# Patient Record
Sex: FEMALE | Race: ASIAN | NOT HISPANIC OR LATINO | URBAN - METROPOLITAN AREA
[De-identification: names, ages, dates, MRNs, and addresses within clinical notes are randomized per-mention and may not be internally consistent; named-entity substitution may affect disease eponyms.]

---

## 2017-04-27 ENCOUNTER — INPATIENT (INPATIENT)
Facility: HOSPITAL | Age: 62
LOS: 0 days | Discharge: ANOTHER HOSPITAL | DRG: 281 | End: 2017-04-28
Attending: INTERNAL MEDICINE | Admitting: INTERNAL MEDICINE
Payer: MEDICARE

## 2017-04-27 DIAGNOSIS — Z82.49 FAMILY HISTORY OF ISCHEMIC HEART DISEASE AND OTHER DISEASES OF THE CIRCULATORY SYSTEM: ICD-10-CM

## 2017-04-27 DIAGNOSIS — I21.4 NON-ST ELEVATION (NSTEMI) MYOCARDIAL INFARCTION: ICD-10-CM

## 2017-04-27 DIAGNOSIS — Z85.3 PERSONAL HISTORY OF MALIGNANT NEOPLASM OF BREAST: ICD-10-CM

## 2017-04-27 DIAGNOSIS — J44.1 CHRONIC OBSTRUCTIVE PULMONARY DISEASE WITH (ACUTE) EXACERBATION: ICD-10-CM

## 2017-04-27 DIAGNOSIS — I10 ESSENTIAL (PRIMARY) HYPERTENSION: ICD-10-CM

## 2017-04-27 DIAGNOSIS — Z88.0 ALLERGY STATUS TO PENICILLIN: ICD-10-CM

## 2017-04-27 PROCEDURE — 99223 1ST HOSP IP/OBS HIGH 75: CPT | Mod: AI

## 2017-04-27 PROCEDURE — 71010: CPT | Mod: 26

## 2017-04-27 PROCEDURE — 93010 ELECTROCARDIOGRAM REPORT: CPT

## 2017-04-27 PROCEDURE — 71275 CT ANGIOGRAPHY CHEST: CPT | Mod: 26

## 2017-04-28 ENCOUNTER — INPATIENT (INPATIENT)
Facility: HOSPITAL | Age: 62
LOS: 0 days | Discharge: HOME | DRG: 246 | End: 2017-04-29
Attending: INTERNAL MEDICINE | Admitting: INTERNAL MEDICINE
Payer: COMMERCIAL

## 2017-04-28 ENCOUNTER — TRANSCRIPTION ENCOUNTER (OUTPATIENT)
Age: 62
End: 2017-04-28

## 2017-04-28 VITALS
HEIGHT: 59 IN | OXYGEN SATURATION: 97 % | TEMPERATURE: 98 F | WEIGHT: 130.07 LBS | RESPIRATION RATE: 17 BRPM | HEART RATE: 70 BPM | SYSTOLIC BLOOD PRESSURE: 118 MMHG | DIASTOLIC BLOOD PRESSURE: 64 MMHG

## 2017-04-28 DIAGNOSIS — I25.10 ATHEROSCLEROTIC HEART DISEASE OF NATIVE CORONARY ARTERY WITHOUT ANGINA PECTORIS: ICD-10-CM

## 2017-04-28 DIAGNOSIS — Z90.12 ACQUIRED ABSENCE OF LEFT BREAST AND NIPPLE: Chronic | ICD-10-CM

## 2017-04-28 LAB
ALBUMIN SERPL ELPH-MCNC: 4 G/DL — SIGNIFICANT CHANGE UP (ref 3.3–5)
ALP SERPL-CCNC: 97 U/L — SIGNIFICANT CHANGE UP (ref 40–120)
ALT FLD-CCNC: 103 U/L RC — HIGH (ref 10–45)
ANION GAP SERPL CALC-SCNC: 11 MMOL/L — SIGNIFICANT CHANGE UP (ref 5–17)
APTT BLD: 38.8 SEC — HIGH (ref 27.5–37.4)
AST SERPL-CCNC: 130 U/L — HIGH (ref 10–40)
BILIRUB SERPL-MCNC: 0.4 MG/DL — SIGNIFICANT CHANGE UP (ref 0.2–1.2)
BUN SERPL-MCNC: 14 MG/DL — SIGNIFICANT CHANGE UP (ref 7–23)
CALCIUM SERPL-MCNC: 9.4 MG/DL — SIGNIFICANT CHANGE UP (ref 8.4–10.5)
CHLORIDE SERPL-SCNC: 104 MMOL/L — SIGNIFICANT CHANGE UP (ref 96–108)
CO2 SERPL-SCNC: 24 MMOL/L — SIGNIFICANT CHANGE UP (ref 22–31)
CREAT SERPL-MCNC: 0.56 MG/DL — SIGNIFICANT CHANGE UP (ref 0.5–1.3)
GLUCOSE SERPL-MCNC: 144 MG/DL — HIGH (ref 70–99)
HCT VFR BLD CALC: 40.2 % — SIGNIFICANT CHANGE UP (ref 34.5–45)
HGB BLD-MCNC: 13.7 G/DL — SIGNIFICANT CHANGE UP (ref 11.5–15.5)
INR BLD: 1.09 RATIO — SIGNIFICANT CHANGE UP (ref 0.88–1.16)
MCHC RBC-ENTMCNC: 32 PG — SIGNIFICANT CHANGE UP (ref 27–34)
MCHC RBC-ENTMCNC: 34.1 GM/DL — SIGNIFICANT CHANGE UP (ref 32–36)
MCV RBC AUTO: 94 FL — SIGNIFICANT CHANGE UP (ref 80–100)
PLATELET # BLD AUTO: 206 K/UL — SIGNIFICANT CHANGE UP (ref 150–400)
POTASSIUM SERPL-MCNC: 4.4 MMOL/L — SIGNIFICANT CHANGE UP (ref 3.5–5.3)
POTASSIUM SERPL-SCNC: 4.4 MMOL/L — SIGNIFICANT CHANGE UP (ref 3.5–5.3)
PROT SERPL-MCNC: 7.8 G/DL — SIGNIFICANT CHANGE UP (ref 6–8.3)
PROTHROM AB SERPL-ACNC: 11.9 SEC — SIGNIFICANT CHANGE UP (ref 9.8–12.7)
RBC # BLD: 4.27 M/UL — SIGNIFICANT CHANGE UP (ref 3.8–5.2)
RBC # FLD: 12 % — SIGNIFICANT CHANGE UP (ref 10.3–14.5)
SODIUM SERPL-SCNC: 139 MMOL/L — SIGNIFICANT CHANGE UP (ref 135–145)
WBC # BLD: 9.5 K/UL — SIGNIFICANT CHANGE UP (ref 3.8–10.5)
WBC # FLD AUTO: 9.5 K/UL — SIGNIFICANT CHANGE UP (ref 3.8–10.5)

## 2017-04-28 PROCEDURE — 99233 SBSQ HOSP IP/OBS HIGH 50: CPT

## 2017-04-28 PROCEDURE — 96361 HYDRATE IV INFUSION ADD-ON: CPT | Mod: XU

## 2017-04-28 PROCEDURE — 96374 THER/PROPH/DIAG INJ IV PUSH: CPT | Mod: XU

## 2017-04-28 PROCEDURE — 84484 ASSAY OF TROPONIN QUANT: CPT

## 2017-04-28 PROCEDURE — 99285 EMERGENCY DEPT VISIT HI MDM: CPT | Mod: 25

## 2017-04-28 PROCEDURE — 83880 ASSAY OF NATRIURETIC PEPTIDE: CPT

## 2017-04-28 PROCEDURE — 80048 BASIC METABOLIC PNL TOTAL CA: CPT

## 2017-04-28 PROCEDURE — 92978 ENDOLUMINL IVUS OCT C 1ST: CPT | Mod: 26,RC,GC

## 2017-04-28 PROCEDURE — 71045 X-RAY EXAM CHEST 1 VIEW: CPT

## 2017-04-28 PROCEDURE — 83036 HEMOGLOBIN GLYCOSYLATED A1C: CPT

## 2017-04-28 PROCEDURE — 82550 ASSAY OF CK (CPK): CPT

## 2017-04-28 PROCEDURE — 71275 CT ANGIOGRAPHY CHEST: CPT

## 2017-04-28 PROCEDURE — 93010 ELECTROCARDIOGRAM REPORT: CPT | Mod: 77

## 2017-04-28 PROCEDURE — 84439 ASSAY OF FREE THYROXINE: CPT

## 2017-04-28 PROCEDURE — 85730 THROMBOPLASTIN TIME PARTIAL: CPT

## 2017-04-28 PROCEDURE — 80047 BASIC METABLC PNL IONIZED CA: CPT

## 2017-04-28 PROCEDURE — 93454 CORONARY ARTERY ANGIO S&I: CPT | Mod: 26,59,GC

## 2017-04-28 PROCEDURE — 92941 PRQ TRLML REVSC TOT OCCL AMI: CPT | Mod: RC,GC

## 2017-04-28 PROCEDURE — 94640 AIRWAY INHALATION TREATMENT: CPT

## 2017-04-28 PROCEDURE — 85610 PROTHROMBIN TIME: CPT

## 2017-04-28 PROCEDURE — 93005 ELECTROCARDIOGRAM TRACING: CPT

## 2017-04-28 PROCEDURE — 85027 COMPLETE CBC AUTOMATED: CPT

## 2017-04-28 PROCEDURE — 83735 ASSAY OF MAGNESIUM: CPT

## 2017-04-28 PROCEDURE — 82803 BLOOD GASES ANY COMBINATION: CPT

## 2017-04-28 PROCEDURE — 93010 ELECTROCARDIOGRAM REPORT: CPT | Mod: 76

## 2017-04-28 PROCEDURE — 80061 LIPID PANEL: CPT

## 2017-04-28 PROCEDURE — 84443 ASSAY THYROID STIM HORMONE: CPT

## 2017-04-28 PROCEDURE — 82553 CREATINE MB FRACTION: CPT

## 2017-04-28 RX ORDER — NICOTINE POLACRILEX 2 MG
1 GUM BUCCAL DAILY
Qty: 0 | Refills: 0 | Status: DISCONTINUED | OUTPATIENT
Start: 2017-04-28 | End: 2017-04-29

## 2017-04-28 RX ORDER — TIOTROPIUM BROMIDE 18 UG/1
1 CAPSULE ORAL; RESPIRATORY (INHALATION) DAILY
Qty: 0 | Refills: 0 | Status: DISCONTINUED | OUTPATIENT
Start: 2017-04-28 | End: 2017-04-29

## 2017-04-28 RX ORDER — BUDESONIDE AND FORMOTEROL FUMARATE DIHYDRATE 160; 4.5 UG/1; UG/1
2 AEROSOL RESPIRATORY (INHALATION)
Qty: 0 | Refills: 0 | Status: DISCONTINUED | OUTPATIENT
Start: 2017-04-28 | End: 2017-04-29

## 2017-04-28 RX ORDER — TICAGRELOR 90 MG/1
90 TABLET ORAL
Qty: 0 | Refills: 0 | Status: DISCONTINUED | OUTPATIENT
Start: 2017-04-28 | End: 2017-04-29

## 2017-04-28 RX ORDER — TICAGRELOR 90 MG/1
1 TABLET ORAL
Qty: 0 | Refills: 0 | COMMUNITY
Start: 2017-04-28

## 2017-04-28 RX ORDER — SODIUM CHLORIDE 9 MG/ML
3 INJECTION INTRAMUSCULAR; INTRAVENOUS; SUBCUTANEOUS EVERY 8 HOURS
Qty: 0 | Refills: 0 | Status: DISCONTINUED | OUTPATIENT
Start: 2017-04-28 | End: 2017-04-29

## 2017-04-28 RX ORDER — LISINOPRIL 2.5 MG/1
5 TABLET ORAL DAILY
Qty: 0 | Refills: 0 | Status: DISCONTINUED | OUTPATIENT
Start: 2017-04-28 | End: 2017-04-29

## 2017-04-28 RX ORDER — ASPIRIN/CALCIUM CARB/MAGNESIUM 324 MG
81 TABLET ORAL DAILY
Qty: 0 | Refills: 0 | Status: DISCONTINUED | OUTPATIENT
Start: 2017-04-28 | End: 2017-04-29

## 2017-04-28 RX ORDER — DOCUSATE SODIUM 100 MG
100 CAPSULE ORAL DAILY
Qty: 0 | Refills: 0 | Status: DISCONTINUED | OUTPATIENT
Start: 2017-04-28 | End: 2017-04-29

## 2017-04-28 RX ADMIN — BUDESONIDE AND FORMOTEROL FUMARATE DIHYDRATE 2 PUFF(S): 160; 4.5 AEROSOL RESPIRATORY (INHALATION) at 21:28

## 2017-04-28 RX ADMIN — SODIUM CHLORIDE 3 MILLILITER(S): 9 INJECTION INTRAMUSCULAR; INTRAVENOUS; SUBCUTANEOUS at 21:32

## 2017-04-28 NOTE — DISCHARGE NOTE ADULT - CARE PROVIDER_API CALL
Jr Raygoza (MD), Cardiovascular Disease; Internal Medicine  [address] [place] [address]  Phone: [phone]  Fax: [phone]

## 2017-04-28 NOTE — DISCHARGE NOTE ADULT - PLAN OF CARE
free of chest pain, keep stent open Low salt, low fat diet.   Weight management.   Take medications as prescribed.    No smoking.  Follow up appointments with your doctor(s)  as instructed. free of shortness of breath Continue to take inhalers, follow up with your pulmonologist Your blood pressure will be controlled. Continue with your blood pressure medications; eat a heart healthy diet with low salt diet; exercise regularly (consult with your physician or cardiologist first); maintain a heart healthy weight; if you smoke - quit (A resource to help you stop smoking is the [facility] – phone number 461-090-3512.); include healthy ways to manage stress. Continue to follow with your primary care physician or cardiologist. Your hemoglobin A1C will be between 7-8. Continue to follow with your primary care MD or your endocrinologist.  Follow a heart healthy diabetic diet. If you check your fingerstick glucose at home, call your MD if it is greater than 250mg/dL on 2 occasions or less than 100mg/dL on 2 occasions. Know signs of low blood sugar, such as: dizziness, shakiness, sweating, confusion, hunger, nervousness-drink 4 ounces apple juice if occurs and call your doctor. Know early signs of high blood sugar, such as: frequent urination, increased thirst, blurry vision, fatigue, headache - call your doctor if this occurs. Follow with other practitioners to care for your diabetes, such as ophthalmologist and podiatrist. stop smoking If you are on medication to help you quit smoking, be sure to take it as prescribed. Find healthy ways to deal with stress, such as exercise (check with your healthcare provider first), deep breathing, meditation, or enjoyable healthy hobbies.  Avoid situations that may cause you to smoke a cigarette.  Look for help with quitting; you are not alone. A resource to help you stop smoking is the [facility] – phone number [phone]. Follow up with your cardiologist and primary care provider in 1-2 weeks.

## 2017-04-28 NOTE — DISCHARGE NOTE ADULT - HOSPITAL COURSE
61 yr old female with PMH of COPD, 2 ppd smoker,  htn, family history of CAD (brother had CABG in his 50s), breast cancer s/p left mastectomy 2010, on chemo q 3 weeks (next dose due in 1 week), presented to [facility] on 4/27 with complaint of increasing dyspnea and cough x 2 days. Upon admission had + troponin  with EKG changes (ST depression V2-V6). ACS protocol initiated. CT angio negative for PE. + COPD exacerbation and started on steroids. Transferred today for cardiac catheterization. Denies chest pain. Of note: patient was on lisinopril and spiriva at home which she stopped 3 months ago  S/p stent x 3 in RCA via right radial access. 61 yr old female with PMH of COPD, 2 ppd smoker,  htn, family history of CAD (brother had CABG in his 50s), breast cancer s/p left mastectomy 2010, on chemo q 3 weeks (next dose due in 1 week), presented to [facility] on 4/27 with complaint of increasing dyspnea and cough x 2 days. Upon admission had + troponin  with EKG changes (ST depression V2-V6). ACS protocol initiated. CT angio negative for PE. + COPD exacerbation and started on steroids. Transferred today for cardiac catheterization. Denies chest pain. Of note: patient was on lisinopril and spiriva at home which she stopped 3 months ago  S/p stent x 3 in RCA via right radial access.   Pulmonary consult Dr. Edgar ([facility]) 61 yr old female with PMH of COPD, 2 ppd smoker,  htn, family history of CAD (brother had CABG in his 50s), breast cancer s/p left mastectomy 2010, on chemo q 3 weeks (next dose due in 1 week), presented to [facility] on 4/27 with complaint of increasing dyspnea and cough x 2 days. Upon admission had + troponin  with EKG changes (ST depression V2-V6). ACS protocol initiated. CT angio negative for PE. + COPD exacerbation and started on steroids. Transferred today for cardiac catheterization. Denies chest pain. Of note: patient was on lisinopril and spiriva at home which she stopped 3 months ago.  Pt s/p ESTELA x 3 in RCA via right radial access. Pulmonary consult Dr. Edgar ([email or web address]) saw pt medications reviewed pt with O2 sat on RA 96% ambulating without distress.  Pt radial site stable with no chest pain or change in her chronic dyspnea with elevated Trop at [facility] unable to tolerate BB 2/2 severe COPD.  Received her Brilinta from [facility]. started on nicotine patch and reinforced the importance of smoking cessation.  Pt with elevated Liver enzymes and unable to be started on statin.  Pt will followup with her Cardiologist and monitor closely. Pt with 6.4 AIC and is following up with her PCP within the next 2 weeks.  Pt tolerated procedure well with no post cath complications and hospitalization remained uneventful. Pt stable for discharge home.

## 2017-04-28 NOTE — DISCHARGE NOTE ADULT - ADDITIONAL INSTRUCTIONS
No heavy lifting or pushing/pulling with procedure arm for 2 weeks. No driving for 2 days. You may shower 24 hours following the procedure but avoid baths/swimming for 1 week. Check your wrist site for bleeding and/or swelling daily following procedure and call your doctor immediately if it occurs or if you experience increased pain at the site. Follow up with your cardiologist in 1-2 weeks. You may call [facility] if you have any questions/concerns regarding your procedure [phone]. DO NOT STOP ASPIRIN OR Brilinta UNLESS INSTRUCTED BY CARDIOLOGIST.

## 2017-04-28 NOTE — H&P CARDIOLOGY - FAMILY HISTORY
Sibling  Still living? Yes, Estimated age: Age Unknown  Family history of heart disease, Age at diagnosis: Age Unknown

## 2017-04-28 NOTE — DISCHARGE NOTE ADULT - MEDICATION SUMMARY - MEDICATIONS TO CHANGE
I will SWITCH the dose or number of times a day I take the medications listed below when I get home from the hospital:    aspirin 81 mg oral tablet  -- 2 tab(s) by mouth once a day hosp    Colace 100 mg oral capsule  -- 2 cap(s) by mouth once a day HOSP

## 2017-04-28 NOTE — DISCHARGE NOTE ADULT - MEDICATION SUMMARY - MEDICATIONS TO TAKE
I will START or STAY ON the medications listed below when I get home from the hospital:    predniSONE 10 mg oral tablet  -- 2 tab(s) by mouth once a day for 3 days 1 tab by mouth once a day for 3 days 0.5 tab by mouth once a day for 3 days then d/c  -- It is very important that you take or use this exactly as directed.  Do not skip doses or discontinue unless directed by your doctor.  Obtain medical advice before taking any non-prescription drugs as some may affect the action of this medication.  Take with food or milk.    -- Indication: For COPD (chronic obstructive pulmonary disease)    aspirin 81 mg oral tablet  -- 1 tab(s) by mouth once a day  -- Indication: For CAD (coronary artery disease)    lisinopril 5 mg oral tablet  -- 1 tab(s) by mouth once a day HOSP  -- Indication: For Hypertension    ticagrelor 90 mg oral tablet  -- 1 tab(s) by mouth 2 times a day MDD:2  -- Indication: For Stents placed in heart    Advair Diskus 250 mcg-50 mcg inhalation powder  -- 1 puff(s) inhaled 2 times a day HOSP  -- Indication: For COPD (chronic obstructive pulmonary disease)    Spiriva 18 mcg inhalation capsule  -- 1 cap(s) inhaled once a day  -- Indication: For COPD (chronic obstructive pulmonary disease)    Colace 100 mg oral capsule  -- 1 cap(s) by mouth once a day, As Needed  -- Indication: For COnstipation    nicotine 21 mg/24 hr transdermal film, extended release  -- 1 patch by transdermal patch once a day MDD:1  -- Indication: For Smoking cessation

## 2017-04-28 NOTE — DISCHARGE NOTE ADULT - PATIENT PORTAL LINK FT
“You can access the [facility] Patient Portal, offered by [facility], by registering with the following website: [email or web address]”

## 2017-04-28 NOTE — DISCHARGE NOTE ADULT - CARE PROVIDERS DIRECT ADDRESSES
[email or web address]

## 2017-04-28 NOTE — DISCHARGE NOTE ADULT - CARE PLAN
Principal Discharge DX:	CAD (coronary artery disease)  Goal:	free of chest pain, keep stent open  Instructions for follow-up, activity and diet:	Low salt, low fat diet.   Weight management.   Take medications as prescribed.    No smoking.  Follow up appointments with your doctor(s)  as instructed.  Secondary Diagnosis:	COPD (chronic obstructive pulmonary disease)  Goal:	free of shortness of breath  Instructions for follow-up, activity and diet:	Continue to take inhalers, follow up with your pulmonologist  Secondary Diagnosis:	HTN (hypertension)  Goal:	Your blood pressure will be controlled.  Instructions for follow-up, activity and diet:	Continue with your blood pressure medications; eat a heart healthy diet with low salt diet; exercise regularly (consult with your physician or cardiologist first); maintain a heart healthy weight; if you smoke - quit (A resource to help you stop smoking is the [facility] – phone number [phone].); include healthy ways to manage stress. Continue to follow with your primary care physician or cardiologist.  Secondary Diagnosis:	DM (diabetes mellitus)  Goal:	Your hemoglobin A1C will be between 7-8.  Instructions for follow-up, activity and diet:	Continue to follow with your primary care MD or your endocrinologist.  Follow a heart healthy diabetic diet. If you check your fingerstick glucose at home, call your MD if it is greater than 250mg/dL on 2 occasions or less than 100mg/dL on 2 occasions. Know signs of low blood sugar, such as: dizziness, shakiness, sweating, confusion, hunger, nervousness-drink 4 ounces apple juice if occurs and call your doctor. Know early signs of high blood sugar, such as: frequent urination, increased thirst, blurry vision, fatigue, headache - call your doctor if this occurs. Follow with other practitioners to care for your diabetes, such as ophthalmologist and podiatrist. Principal Discharge DX:	CAD (coronary artery disease)  Goal:	free of chest pain, keep stent open  Instructions for follow-up, activity and diet:	Low salt, low fat diet.   Weight management.   Take medications as prescribed.    No smoking.  Follow up appointments with your doctor(s)  as instructed.  Secondary Diagnosis:	COPD (chronic obstructive pulmonary disease)  Goal:	free of shortness of breath  Instructions for follow-up, activity and diet:	Continue to take inhalers, follow up with your pulmonologist  Secondary Diagnosis:	HTN (hypertension)  Goal:	Your blood pressure will be controlled.  Instructions for follow-up, activity and diet:	Continue with your blood pressure medications; eat a heart healthy diet with low salt diet; exercise regularly (consult with your physician or cardiologist first); maintain a heart healthy weight; if you smoke - quit (A resource to help you stop smoking is the [facility] Tobacco [facility] – phone number [phone].); include healthy ways to manage stress. Continue to follow with your primary care physician or cardiologist.  Secondary Diagnosis:	DM (diabetes mellitus)  Goal:	Your hemoglobin A1C will be between 7-8.  Instructions for follow-up, activity and diet:	Continue to follow with your primary care MD or your endocrinologist.  Follow a heart healthy diabetic diet. If you check your fingerstick glucose at home, call your MD if it is greater than 250mg/dL on 2 occasions or less than 100mg/dL on 2 occasions. Know signs of low blood sugar, such as: dizziness, shakiness, sweating, confusion, hunger, nervousness-drink 4 ounces apple juice if occurs and call your doctor. Know early signs of high blood sugar, such as: frequent urination, increased thirst, blurry vision, fatigue, headache - call your doctor if this occurs. Follow with other practitioners to care for your diabetes, such as ophthalmologist and podiatrist. Principal Discharge DX:	CAD (coronary artery disease)  Goal:	free of chest pain, keep stent open  Instructions for follow-up, activity and diet:	Low salt, low fat diet.   Weight management.   Take medications as prescribed.    No smoking.  Follow up appointments with your doctor(s)  as instructed.  Secondary Diagnosis:	COPD (chronic obstructive pulmonary disease)  Goal:	free of shortness of breath  Instructions for follow-up, activity and diet:	Continue to take inhalers, follow up with your pulmonologist  Secondary Diagnosis:	HTN (hypertension)  Goal:	Your blood pressure will be controlled.  Instructions for follow-up, activity and diet:	Continue with your blood pressure medications; eat a heart healthy diet with low salt diet; exercise regularly (consult with your physician or cardiologist first); maintain a heart healthy weight; if you smoke - quit (A resource to help you stop smoking is the [facility] [facility] – phone number [phone].); include healthy ways to manage stress. Continue to follow with your primary care physician or cardiologist.  Secondary Diagnosis:	DM (diabetes mellitus)  Goal:	Your hemoglobin A1C will be between 7-8.  Instructions for follow-up, activity and diet:	Continue to follow with your primary care MD or your endocrinologist.  Follow a heart healthy diabetic diet. If you check your fingerstick glucose at home, call your MD if it is greater than 250mg/dL on 2 occasions or less than 100mg/dL on 2 occasions. Know signs of low blood sugar, such as: dizziness, shakiness, sweating, confusion, hunger, nervousness-drink 4 ounces apple juice if occurs and call your doctor. Know early signs of high blood sugar, such as: frequent urination, increased thirst, blurry vision, fatigue, headache - call your doctor if this occurs. Follow with other practitioners to care for your diabetes, such as ophthalmologist and podiatrist.  Secondary Diagnosis:	Smoker  Goal:	stop smoking  Instructions for follow-up, activity and diet:	If you are on medication to help you quit smoking, be sure to take it as prescribed. Find healthy ways to deal with stress, such as exercise (check with your healthcare provider first), deep breathing, meditation, or enjoyable healthy hobbies.  Avoid situations that may cause you to smoke a cigarette.  Look for help with quitting; you are not alone. A resource to help you stop smoking is the [facility] for Tobacco Control – phone number [phone]. Principal Discharge DX:	CAD (coronary artery disease)  Goal:	free of chest pain, keep stent open  Instructions for follow-up, activity and diet:	Low salt, low fat diet.   Weight management.   Take medications as prescribed.    No smoking.  Follow up appointments with your doctor(s)  as instructed.  Secondary Diagnosis:	COPD (chronic obstructive pulmonary disease)  Goal:	free of shortness of breath  Instructions for follow-up, activity and diet:	Continue to take inhalers, follow up with your pulmonologist  Secondary Diagnosis:	HTN (hypertension)  Goal:	Your blood pressure will be controlled.  Instructions for follow-up, activity and diet:	Continue with your blood pressure medications; eat a heart healthy diet with low salt diet; exercise regularly (consult with your physician or cardiologist first); maintain a heart healthy weight; if you smoke - quit (A resource to help you stop smoking is the [facility] – phone number [phone].); include healthy ways to manage stress. Continue to follow with your primary care physician or cardiologist.  Secondary Diagnosis:	DM (diabetes mellitus)  Goal:	Your hemoglobin A1C will be between 7-8.  Instructions for follow-up, activity and diet:	Continue to follow with your primary care MD or your endocrinologist.  Follow a heart healthy diabetic diet. If you check your fingerstick glucose at home, call your MD if it is greater than 250mg/dL on 2 occasions or less than 100mg/dL on 2 occasions. Know signs of low blood sugar, such as: dizziness, shakiness, sweating, confusion, hunger, nervousness-drink 4 ounces apple juice if occurs and call your doctor. Know early signs of high blood sugar, such as: frequent urination, increased thirst, blurry vision, fatigue, headache - call your doctor if this occurs. Follow with other practitioners to care for your diabetes, such as ophthalmologist and podiatrist.  Secondary Diagnosis:	Smoker  Goal:	stop smoking  Instructions for follow-up, activity and diet:	If you are on medication to help you quit smoking, be sure to take it as prescribed. Find healthy ways to deal with stress, such as exercise (check with your healthcare provider first), deep breathing, meditation, or enjoyable healthy hobbies.  Avoid situations that may cause you to smoke a cigarette.  Look for help with quitting; you are not alone. A resource to help you stop smoking is the [facility] for Tobacco Control – phone number [phone]. Principal Discharge DX:	CAD (coronary artery disease)  Goal:	free of chest pain, keep stent open  Instructions for follow-up, activity and diet:	Low salt, low fat diet.   Weight management.   Take medications as prescribed.    No smoking.  Follow up appointments with your doctor(s)  as instructed.  Secondary Diagnosis:	COPD (chronic obstructive pulmonary disease)  Goal:	free of shortness of breath  Instructions for follow-up, activity and diet:	Continue to take inhalers, follow up with your pulmonologist  Secondary Diagnosis:	HTN (hypertension)  Goal:	Your blood pressure will be controlled.  Instructions for follow-up, activity and diet:	Continue with your blood pressure medications; eat a heart healthy diet with low salt diet; exercise regularly (consult with your physician or cardiologist first); maintain a heart healthy weight; if you smoke - quit (A resource to help you stop smoking is the [facility] [employer] Control – phone number [phone].); include healthy ways to manage stress. Continue to follow with your primary care physician or cardiologist.  Secondary Diagnosis:	DM (diabetes mellitus)  Goal:	Your hemoglobin A1C will be between 7-8.  Instructions for follow-up, activity and diet:	Continue to follow with your primary care MD or your endocrinologist.  Follow a heart healthy diabetic diet. If you check your fingerstick glucose at home, call your MD if it is greater than 250mg/dL on 2 occasions or less than 100mg/dL on 2 occasions. Know signs of low blood sugar, such as: dizziness, shakiness, sweating, confusion, hunger, nervousness-drink 4 ounces apple juice if occurs and call your doctor. Know early signs of high blood sugar, such as: frequent urination, increased thirst, blurry vision, fatigue, headache - call your doctor if this occurs. Follow with other practitioners to care for your diabetes, such as ophthalmologist and podiatrist.  Secondary Diagnosis:	Smoker  Goal:	stop smoking  Instructions for follow-up, activity and diet:	If you are on medication to help you quit smoking, be sure to take it as prescribed. Find healthy ways to deal with stress, such as exercise (check with your healthcare provider first), deep breathing, meditation, or enjoyable healthy hobbies.  Avoid situations that may cause you to smoke a cigarette.  Look for help with quitting; you are not alone. A resource to help you stop smoking is the [facility] for Tobacco Control – phone number [phone]. Principal Discharge DX:	CAD (coronary artery disease)  Goal:	free of chest pain, keep stent open  Instructions for follow-up, activity and diet:	Low salt, low fat diet.   Weight management.   Take medications as prescribed.    No smoking.  Follow up appointments with your doctor(s)  as instructed.  Secondary Diagnosis:	COPD (chronic obstructive pulmonary disease)  Goal:	free of shortness of breath  Instructions for follow-up, activity and diet:	Continue to take inhalers, follow up with your pulmonologist  Secondary Diagnosis:	HTN (hypertension)  Goal:	Your blood pressure will be controlled.  Instructions for follow-up, activity and diet:	Continue with your blood pressure medications; eat a heart healthy diet with low salt diet; exercise regularly (consult with your physician or cardiologist first); maintain a heart healthy weight; if you smoke - quit (A resource to help you stop smoking is the [facility] [employer] Control – phone number [phone].); include healthy ways to manage stress. Continue to follow with your primary care physician or cardiologist.  Secondary Diagnosis:	DM (diabetes mellitus)  Goal:	Your hemoglobin A1C will be between 7-8.  Instructions for follow-up, activity and diet:	Continue to follow with your primary care MD or your endocrinologist.  Follow a heart healthy diabetic diet. If you check your fingerstick glucose at home, call your MD if it is greater than 250mg/dL on 2 occasions or less than 100mg/dL on 2 occasions. Know signs of low blood sugar, such as: dizziness, shakiness, sweating, confusion, hunger, nervousness-drink 4 ounces apple juice if occurs and call your doctor. Know early signs of high blood sugar, such as: frequent urination, increased thirst, blurry vision, fatigue, headache - call your doctor if this occurs. Follow with other practitioners to care for your diabetes, such as ophthalmologist and podiatrist.  Secondary Diagnosis:	Smoker  Goal:	stop smoking  Instructions for follow-up, activity and diet:	If you are on medication to help you quit smoking, be sure to take it as prescribed. Find healthy ways to deal with stress, such as exercise (check with your healthcare provider first), deep breathing, meditation, or enjoyable healthy hobbies.  Avoid situations that may cause you to smoke a cigarette.  Look for help with quitting; you are not alone. A resource to help you stop smoking is the [facility] Tobacco Control – phone number [phone].  Instructions for follow-up, activity and diet:	Follow up with your cardiologist and primary care provider in 1-2 weeks. Principal Discharge DX:	CAD (coronary artery disease)  Goal:	free of chest pain, keep stent open  Instructions for follow-up, activity and diet:	Low salt, low fat diet.   Weight management.   Take medications as prescribed.    No smoking.  Follow up appointments with your doctor(s)  as instructed.  Secondary Diagnosis:	COPD (chronic obstructive pulmonary disease)  Goal:	free of shortness of breath  Instructions for follow-up, activity and diet:	Continue to take inhalers, follow up with your pulmonologist  Secondary Diagnosis:	HTN (hypertension)  Goal:	Your blood pressure will be controlled.  Instructions for follow-up, activity and diet:	Continue with your blood pressure medications; eat a heart healthy diet with low salt diet; exercise regularly (consult with your physician or cardiologist first); maintain a heart healthy weight; if you smoke - quit (A resource to help you stop smoking is the [facility] [employer] Control – phone number [phone].); include healthy ways to manage stress. Continue to follow with your primary care physician or cardiologist.  Secondary Diagnosis:	DM (diabetes mellitus)  Goal:	Your hemoglobin A1C will be between 7-8.  Instructions for follow-up, activity and diet:	Continue to follow with your primary care MD or your endocrinologist.  Follow a heart healthy diabetic diet. If you check your fingerstick glucose at home, call your MD if it is greater than 250mg/dL on 2 occasions or less than 100mg/dL on 2 occasions. Know signs of low blood sugar, such as: dizziness, shakiness, sweating, confusion, hunger, nervousness-drink 4 ounces apple juice if occurs and call your doctor. Know early signs of high blood sugar, such as: frequent urination, increased thirst, blurry vision, fatigue, headache - call your doctor if this occurs. Follow with other practitioners to care for your diabetes, such as ophthalmologist and podiatrist.  Secondary Diagnosis:	Smoker  Goal:	stop smoking  Instructions for follow-up, activity and diet:	If you are on medication to help you quit smoking, be sure to take it as prescribed. Find healthy ways to deal with stress, such as exercise (check with your healthcare provider first), deep breathing, meditation, or enjoyable healthy hobbies.  Avoid situations that may cause you to smoke a cigarette.  Look for help with quitting; you are not alone. A resource to help you stop smoking is the [facility] Tobacco Control – phone number [phone].  Instructions for follow-up, activity and diet:	Follow up with your cardiologist and primary care provider in 1-2 weeks. Principal Discharge DX:	CAD (coronary artery disease)  Goal:	free of chest pain, keep stent open  Instructions for follow-up, activity and diet:	Low salt, low fat diet.   Weight management.   Take medications as prescribed.    No smoking.  Follow up appointments with your doctor(s)  as instructed.  Secondary Diagnosis:	COPD (chronic obstructive pulmonary disease)  Goal:	free of shortness of breath  Instructions for follow-up, activity and diet:	Continue to take inhalers, follow up with your pulmonologist  Secondary Diagnosis:	HTN (hypertension)  Goal:	Your blood pressure will be controlled.  Instructions for follow-up, activity and diet:	Continue with your blood pressure medications; eat a heart healthy diet with low salt diet; exercise regularly (consult with your physician or cardiologist first); maintain a heart healthy weight; if you smoke - quit (A resource to help you stop smoking is the [facility] [employer] Control – phone number [phone].); include healthy ways to manage stress. Continue to follow with your primary care physician or cardiologist.  Secondary Diagnosis:	DM (diabetes mellitus)  Goal:	Your hemoglobin A1C will be between 7-8.  Instructions for follow-up, activity and diet:	Continue to follow with your primary care MD or your endocrinologist.  Follow a heart healthy diabetic diet. If you check your fingerstick glucose at home, call your MD if it is greater than 250mg/dL on 2 occasions or less than 100mg/dL on 2 occasions. Know signs of low blood sugar, such as: dizziness, shakiness, sweating, confusion, hunger, nervousness-drink 4 ounces apple juice if occurs and call your doctor. Know early signs of high blood sugar, such as: frequent urination, increased thirst, blurry vision, fatigue, headache - call your doctor if this occurs. Follow with other practitioners to care for your diabetes, such as ophthalmologist and podiatrist.  Secondary Diagnosis:	Smoker  Goal:	stop smoking  Instructions for follow-up, activity and diet:	If you are on medication to help you quit smoking, be sure to take it as prescribed. Find healthy ways to deal with stress, such as exercise (check with your healthcare provider first), deep breathing, meditation, or enjoyable healthy hobbies.  Avoid situations that may cause you to smoke a cigarette.  Look for help with quitting; you are not alone. A resource to help you stop smoking is the [facility] for Tobacco Control – phone number [phone].  Instructions for follow-up, activity and diet:	Follow up with your cardiologist and primary care provider in 1-2 weeks.

## 2017-04-28 NOTE — H&P CARDIOLOGY - PMH
Breast cancer    COPD (chronic obstructive pulmonary disease)    DM (diabetes mellitus)  new diagnosis  Former smoker    Smoker Breast cancer    COPD (chronic obstructive pulmonary disease)    DM (diabetes mellitus)  new diagnosis  Former smoker    HTN (hypertension)    Smoker

## 2017-04-28 NOTE — H&P CARDIOLOGY - HISTORY OF PRESENT ILLNESS
61 yr old female with PMH of htn, family history of CAD (brother had CABG in his 50s), breast cancer s/p left mastectomy 2010, on chemo q 3 weeks (next dose due in 1 week), presented to [facility] on 4/27 with complaint of increasing dyspnea and cough. Upon admission had + troponin  with EKG changes (ST depression V2-V6). ACS protocol initiated. CT angio negative for PE. + COPD exacerbation and started on steroids. Transferred today for cardiac catheterization. Denies chest pain. 61 yr old female with PMH of COPD, 2 ppd smoker,  htn, family history of CAD (brother had CABG in his 50s), breast cancer s/p left mastectomy 2010, on chemo q 3 weeks (next dose due in 1 week), presented to [facility] on 4/27 with complaint of increasing dyspnea and cough x 2 days. Upon admission had + troponin  with EKG changes (ST depression V2-V6). ACS protocol initiated. CT angio negative for PE. + COPD exacerbation and started on steroids. Transferred today for cardiac catheterization. Denies chest pain.     Of note: patient was on lisinopril and spiriva at home which she stopped 3 months ago

## 2017-04-29 VITALS
OXYGEN SATURATION: 95 % | TEMPERATURE: 98 F | RESPIRATION RATE: 16 BRPM | DIASTOLIC BLOOD PRESSURE: 67 MMHG | HEART RATE: 84 BPM | SYSTOLIC BLOOD PRESSURE: 125 MMHG

## 2017-04-29 LAB
ANION GAP SERPL CALC-SCNC: 11 MMOL/L — SIGNIFICANT CHANGE UP (ref 5–17)
BUN SERPL-MCNC: 18 MG/DL — SIGNIFICANT CHANGE UP (ref 7–23)
CALCIUM SERPL-MCNC: 9 MG/DL — SIGNIFICANT CHANGE UP (ref 8.4–10.5)
CHLORIDE SERPL-SCNC: 104 MMOL/L — SIGNIFICANT CHANGE UP (ref 96–108)
CO2 SERPL-SCNC: 24 MMOL/L — SIGNIFICANT CHANGE UP (ref 22–31)
CREAT SERPL-MCNC: 0.68 MG/DL — SIGNIFICANT CHANGE UP (ref 0.5–1.3)
GLUCOSE SERPL-MCNC: 114 MG/DL — HIGH (ref 70–99)
HBA1C BLD-MCNC: 6.4 % — HIGH (ref 4–5.6)
HCT VFR BLD CALC: 38.9 % — SIGNIFICANT CHANGE UP (ref 34.5–45)
HGB BLD-MCNC: 12.8 G/DL — SIGNIFICANT CHANGE UP (ref 11.5–15.5)
MCHC RBC-ENTMCNC: 31.4 PG — SIGNIFICANT CHANGE UP (ref 27–34)
MCHC RBC-ENTMCNC: 32.8 GM/DL — SIGNIFICANT CHANGE UP (ref 32–36)
MCV RBC AUTO: 95.6 FL — SIGNIFICANT CHANGE UP (ref 80–100)
PLATELET # BLD AUTO: 223 K/UL — SIGNIFICANT CHANGE UP (ref 150–400)
POTASSIUM SERPL-MCNC: 4.5 MMOL/L — SIGNIFICANT CHANGE UP (ref 3.5–5.3)
POTASSIUM SERPL-SCNC: 4.5 MMOL/L — SIGNIFICANT CHANGE UP (ref 3.5–5.3)
RBC # BLD: 4.07 M/UL — SIGNIFICANT CHANGE UP (ref 3.8–5.2)
RBC # FLD: 11.9 % — SIGNIFICANT CHANGE UP (ref 10.3–14.5)
SODIUM SERPL-SCNC: 139 MMOL/L — SIGNIFICANT CHANGE UP (ref 135–145)
WBC # BLD: 14.7 K/UL — HIGH (ref 3.8–10.5)
WBC # FLD AUTO: 14.7 K/UL — HIGH (ref 3.8–10.5)

## 2017-04-29 PROCEDURE — 93005 ELECTROCARDIOGRAM TRACING: CPT

## 2017-04-29 PROCEDURE — C9606: CPT | Mod: RC

## 2017-04-29 PROCEDURE — 93454 CORONARY ARTERY ANGIO S&I: CPT | Mod: 59

## 2017-04-29 PROCEDURE — C1874: CPT

## 2017-04-29 PROCEDURE — 93010 ELECTROCARDIOGRAM REPORT: CPT

## 2017-04-29 PROCEDURE — 85730 THROMBOPLASTIN TIME PARTIAL: CPT

## 2017-04-29 PROCEDURE — 83036 HEMOGLOBIN GLYCOSYLATED A1C: CPT

## 2017-04-29 PROCEDURE — 85610 PROTHROMBIN TIME: CPT

## 2017-04-29 PROCEDURE — C1887: CPT

## 2017-04-29 PROCEDURE — C1894: CPT

## 2017-04-29 PROCEDURE — C1769: CPT

## 2017-04-29 PROCEDURE — 80053 COMPREHEN METABOLIC PANEL: CPT

## 2017-04-29 PROCEDURE — 85027 COMPLETE CBC AUTOMATED: CPT

## 2017-04-29 PROCEDURE — 92978 ENDOLUMINL IVUS OCT C 1ST: CPT | Mod: RC

## 2017-04-29 PROCEDURE — C1725: CPT

## 2017-04-29 PROCEDURE — 80048 BASIC METABOLIC PNL TOTAL CA: CPT

## 2017-04-29 PROCEDURE — C1753: CPT

## 2017-04-29 PROCEDURE — 94640 AIRWAY INHALATION TREATMENT: CPT

## 2017-04-29 RX ORDER — ASPIRIN/CALCIUM CARB/MAGNESIUM 324 MG
2 TABLET ORAL
Qty: 0 | Refills: 0 | COMMUNITY

## 2017-04-29 RX ORDER — TIOTROPIUM BROMIDE 18 UG/1
1 CAPSULE ORAL; RESPIRATORY (INHALATION)
Qty: 0 | Refills: 0 | COMMUNITY

## 2017-04-29 RX ORDER — TICAGRELOR 90 MG/1
1 TABLET ORAL
Qty: 60 | Refills: 11 | OUTPATIENT
Start: 2017-04-29 | End: 2018-04-23

## 2017-04-29 RX ORDER — TIOTROPIUM BROMIDE 18 UG/1
1 CAPSULE ORAL; RESPIRATORY (INHALATION)
Qty: 1 | Refills: 0 | OUTPATIENT
Start: 2017-04-29 | End: 2017-05-29

## 2017-04-29 RX ORDER — DOCUSATE SODIUM 100 MG
2 CAPSULE ORAL
Qty: 0 | Refills: 0 | COMMUNITY

## 2017-04-29 RX ORDER — TICAGRELOR 90 MG/1
1 TABLET ORAL
Qty: 60 | Refills: 0 | OUTPATIENT
Start: 2017-04-29 | End: 2017-05-29

## 2017-04-29 RX ORDER — LISINOPRIL 2.5 MG/1
1 TABLET ORAL
Qty: 30 | Refills: 0
Start: 2017-04-29 | End: 2017-05-29

## 2017-04-29 RX ORDER — FLUTICASONE PROPIONATE AND SALMETEROL 50; 250 UG/1; UG/1
1 POWDER ORAL; RESPIRATORY (INHALATION)
Qty: 1 | Refills: 0 | OUTPATIENT
Start: 2017-04-29 | End: 2017-05-29

## 2017-04-29 RX ORDER — NICOTINE POLACRILEX 2 MG
1 GUM BUCCAL
Qty: 30 | Refills: 0 | OUTPATIENT
Start: 2017-04-29 | End: 2017-05-29

## 2017-04-29 RX ORDER — CLOPIDOGREL BISULFATE 75 MG/1
1 TABLET, FILM COATED ORAL
Qty: 0 | Refills: 0 | COMMUNITY

## 2017-04-29 RX ORDER — LISINOPRIL 2.5 MG/1
1 TABLET ORAL
Qty: 30 | Refills: 0 | OUTPATIENT
Start: 2017-04-29 | End: 2017-05-29

## 2017-04-29 RX ORDER — ENOXAPARIN SODIUM 100 MG/ML
1 INJECTION SUBCUTANEOUS
Qty: 0 | Refills: 0 | COMMUNITY

## 2017-04-29 RX ORDER — LISINOPRIL 2.5 MG/1
1 TABLET ORAL
Qty: 0 | Refills: 0 | COMMUNITY

## 2017-04-29 RX ORDER — FLUTICASONE PROPIONATE AND SALMETEROL 50; 250 UG/1; UG/1
1 POWDER ORAL; RESPIRATORY (INHALATION)
Qty: 0 | Refills: 0 | COMMUNITY

## 2017-04-29 RX ADMIN — LISINOPRIL 5 MILLIGRAM(S): 2.5 TABLET ORAL at 05:05

## 2017-04-29 RX ADMIN — TIOTROPIUM BROMIDE 1 CAPSULE(S): 18 CAPSULE ORAL; RESPIRATORY (INHALATION) at 12:27

## 2017-04-29 RX ADMIN — Medication 81 MILLIGRAM(S): at 05:05

## 2017-04-29 RX ADMIN — Medication 40 MILLIGRAM(S): at 05:05

## 2017-04-29 RX ADMIN — SODIUM CHLORIDE 3 MILLILITER(S): 9 INJECTION INTRAMUSCULAR; INTRAVENOUS; SUBCUTANEOUS at 13:01

## 2017-04-29 RX ADMIN — TICAGRELOR 90 MILLIGRAM(S): 90 TABLET ORAL at 05:05

## 2017-04-29 RX ADMIN — Medication 1 PATCH: at 12:12

## 2017-04-29 RX ADMIN — SODIUM CHLORIDE 3 MILLILITER(S): 9 INJECTION INTRAMUSCULAR; INTRAVENOUS; SUBCUTANEOUS at 05:10

## 2017-04-29 RX ADMIN — Medication 100 MILLIGRAM(S): at 12:11

## 2017-04-29 RX ADMIN — BUDESONIDE AND FORMOTEROL FUMARATE DIHYDRATE 2 PUFF(S): 160; 4.5 AEROSOL RESPIRATORY (INHALATION) at 12:12

## 2017-08-08 ENCOUNTER — INPATIENT (INPATIENT)
Facility: HOSPITAL | Age: 62
LOS: 1 days | Discharge: HOME | DRG: 192 | End: 2017-08-10
Attending: HOSPITALIST | Admitting: HOSPITALIST
Payer: MEDICARE

## 2017-08-08 DIAGNOSIS — I25.10 ATHEROSCLEROTIC HEART DISEASE OF NATIVE CORONARY ARTERY WITHOUT ANGINA PECTORIS: ICD-10-CM

## 2017-08-08 DIAGNOSIS — I10 ESSENTIAL (PRIMARY) HYPERTENSION: ICD-10-CM

## 2017-08-08 DIAGNOSIS — Z95.5 PRESENCE OF CORONARY ANGIOPLASTY IMPLANT AND GRAFT: ICD-10-CM

## 2017-08-08 DIAGNOSIS — D72.829 ELEVATED WHITE BLOOD CELL COUNT, UNSPECIFIED: ICD-10-CM

## 2017-08-08 DIAGNOSIS — Z90.12 ACQUIRED ABSENCE OF LEFT BREAST AND NIPPLE: Chronic | ICD-10-CM

## 2017-08-08 DIAGNOSIS — J44.1 CHRONIC OBSTRUCTIVE PULMONARY DISEASE WITH (ACUTE) EXACERBATION: ICD-10-CM

## 2017-08-08 DIAGNOSIS — Z90.12 ACQUIRED ABSENCE OF LEFT BREAST AND NIPPLE: ICD-10-CM

## 2017-08-08 DIAGNOSIS — Z88.0 ALLERGY STATUS TO PENICILLIN: ICD-10-CM

## 2017-08-08 DIAGNOSIS — Z85.3 PERSONAL HISTORY OF MALIGNANT NEOPLASM OF BREAST: ICD-10-CM

## 2017-08-08 PROCEDURE — 71010: CPT | Mod: 26

## 2017-08-08 PROCEDURE — 93010 ELECTROCARDIOGRAM REPORT: CPT

## 2017-08-08 PROCEDURE — 99223 1ST HOSP IP/OBS HIGH 75: CPT | Mod: AI

## 2017-08-09 PROCEDURE — 93306 TTE W/DOPPLER COMPLETE: CPT | Mod: 26

## 2017-08-09 PROCEDURE — 99223 1ST HOSP IP/OBS HIGH 75: CPT

## 2017-08-09 PROCEDURE — 99233 SBSQ HOSP IP/OBS HIGH 50: CPT

## 2017-08-10 PROCEDURE — 83036 HEMOGLOBIN GLYCOSYLATED A1C: CPT

## 2017-08-10 PROCEDURE — 93005 ELECTROCARDIOGRAM TRACING: CPT

## 2017-08-10 PROCEDURE — 80069 RENAL FUNCTION PANEL: CPT

## 2017-08-10 PROCEDURE — 93306 TTE W/DOPPLER COMPLETE: CPT

## 2017-08-10 PROCEDURE — 94762 N-INVAS EAR/PLS OXIMTRY CONT: CPT

## 2017-08-10 PROCEDURE — 85027 COMPLETE CBC AUTOMATED: CPT

## 2017-08-10 PROCEDURE — 99285 EMERGENCY DEPT VISIT HI MDM: CPT | Mod: 25

## 2017-08-10 PROCEDURE — 71045 X-RAY EXAM CHEST 1 VIEW: CPT

## 2017-08-10 PROCEDURE — 87040 BLOOD CULTURE FOR BACTERIA: CPT

## 2017-08-10 PROCEDURE — 83880 ASSAY OF NATRIURETIC PEPTIDE: CPT

## 2017-08-10 PROCEDURE — 85025 COMPLETE CBC W/AUTO DIFF WBC: CPT

## 2017-08-10 PROCEDURE — 82553 CREATINE MB FRACTION: CPT

## 2017-08-10 PROCEDURE — 80048 BASIC METABOLIC PNL TOTAL CA: CPT

## 2017-08-10 PROCEDURE — 94640 AIRWAY INHALATION TREATMENT: CPT

## 2017-08-10 PROCEDURE — 84484 ASSAY OF TROPONIN QUANT: CPT

## 2017-08-10 PROCEDURE — 85730 THROMBOPLASTIN TIME PARTIAL: CPT

## 2017-08-10 PROCEDURE — 87086 URINE CULTURE/COLONY COUNT: CPT

## 2017-08-10 PROCEDURE — 81003 URINALYSIS AUTO W/O SCOPE: CPT

## 2017-08-10 PROCEDURE — 96374 THER/PROPH/DIAG INJ IV PUSH: CPT

## 2017-08-10 PROCEDURE — 83605 ASSAY OF LACTIC ACID: CPT

## 2017-08-10 PROCEDURE — 82550 ASSAY OF CK (CPK): CPT

## 2017-08-10 PROCEDURE — 85610 PROTHROMBIN TIME: CPT

## 2017-08-10 PROCEDURE — 99238 HOSP IP/OBS DSCHRG MGMT 30/<: CPT

## 2018-01-22 ENCOUNTER — INPATIENT (INPATIENT)
Facility: HOSPITAL | Age: 63
LOS: 3 days | Discharge: NURSING, REHAB OR LONG-TERM CARE | DRG: 190 | End: 2018-01-26
Attending: STUDENT IN AN ORGANIZED HEALTH CARE EDUCATION/TRAINING PROGRAM | Admitting: FAMILY MEDICINE
Payer: MEDICARE

## 2018-01-22 DIAGNOSIS — Z88.0 ALLERGY STATUS TO PENICILLIN: ICD-10-CM

## 2018-01-22 DIAGNOSIS — I26.99 OTHER PULMONARY EMBOLISM WITHOUT ACUTE COR PULMONALE: ICD-10-CM

## 2018-01-22 DIAGNOSIS — Z90.12 ACQUIRED ABSENCE OF LEFT BREAST AND NIPPLE: ICD-10-CM

## 2018-01-22 DIAGNOSIS — I25.10 ATHEROSCLEROTIC HEART DISEASE OF NATIVE CORONARY ARTERY WITHOUT ANGINA PECTORIS: ICD-10-CM

## 2018-01-22 DIAGNOSIS — Z90.12 ACQUIRED ABSENCE OF LEFT BREAST AND NIPPLE: Chronic | ICD-10-CM

## 2018-01-22 DIAGNOSIS — J44.1 CHRONIC OBSTRUCTIVE PULMONARY DISEASE WITH (ACUTE) EXACERBATION: ICD-10-CM

## 2018-01-22 DIAGNOSIS — Z95.5 PRESENCE OF CORONARY ANGIOPLASTY IMPLANT AND GRAFT: ICD-10-CM

## 2018-01-22 DIAGNOSIS — F17.210 NICOTINE DEPENDENCE, CIGARETTES, UNCOMPLICATED: ICD-10-CM

## 2018-01-22 DIAGNOSIS — Z85.3 PERSONAL HISTORY OF MALIGNANT NEOPLASM OF BREAST: ICD-10-CM

## 2018-01-22 DIAGNOSIS — J96.00 ACUTE RESPIRATORY FAILURE, UNSPECIFIED WHETHER WITH HYPOXIA OR HYPERCAPNIA: ICD-10-CM

## 2018-01-22 DIAGNOSIS — I10 ESSENTIAL (PRIMARY) HYPERTENSION: ICD-10-CM

## 2018-01-22 PROCEDURE — 71045 X-RAY EXAM CHEST 1 VIEW: CPT | Mod: 26

## 2018-01-22 PROCEDURE — 74177 CT ABD & PELVIS W/CONTRAST: CPT | Mod: 26

## 2018-01-22 PROCEDURE — 71275 CT ANGIOGRAPHY CHEST: CPT | Mod: 26

## 2018-01-22 PROCEDURE — 99223 1ST HOSP IP/OBS HIGH 75: CPT | Mod: AI

## 2018-01-22 PROCEDURE — 93010 ELECTROCARDIOGRAM REPORT: CPT

## 2018-01-23 PROCEDURE — 93306 TTE W/DOPPLER COMPLETE: CPT | Mod: 26

## 2018-01-23 PROCEDURE — 99223 1ST HOSP IP/OBS HIGH 75: CPT

## 2018-01-23 PROCEDURE — 93010 ELECTROCARDIOGRAM REPORT: CPT

## 2018-01-23 PROCEDURE — 99233 SBSQ HOSP IP/OBS HIGH 50: CPT

## 2018-01-24 PROCEDURE — 99233 SBSQ HOSP IP/OBS HIGH 50: CPT

## 2018-01-24 PROCEDURE — 99223 1ST HOSP IP/OBS HIGH 75: CPT

## 2018-01-25 PROCEDURE — 99233 SBSQ HOSP IP/OBS HIGH 50: CPT

## 2018-01-25 PROCEDURE — 99239 HOSP IP/OBS DSCHRG MGMT >30: CPT

## 2018-01-25 PROCEDURE — 99232 SBSQ HOSP IP/OBS MODERATE 35: CPT

## 2018-01-26 PROCEDURE — 93005 ELECTROCARDIOGRAM TRACING: CPT

## 2018-01-26 PROCEDURE — 82550 ASSAY OF CK (CPK): CPT

## 2018-01-26 PROCEDURE — 85027 COMPLETE CBC AUTOMATED: CPT

## 2018-01-26 PROCEDURE — 93970 EXTREMITY STUDY: CPT | Mod: 26

## 2018-01-26 PROCEDURE — 83036 HEMOGLOBIN GLYCOSYLATED A1C: CPT

## 2018-01-26 PROCEDURE — 99239 HOSP IP/OBS DSCHRG MGMT >30: CPT

## 2018-01-26 PROCEDURE — 97116 GAIT TRAINING THERAPY: CPT

## 2018-01-26 PROCEDURE — 80053 COMPREHEN METABOLIC PANEL: CPT

## 2018-01-26 PROCEDURE — 80048 BASIC METABOLIC PNL TOTAL CA: CPT

## 2018-01-26 PROCEDURE — 80069 RENAL FUNCTION PANEL: CPT

## 2018-01-26 PROCEDURE — 84443 ASSAY THYROID STIM HORMONE: CPT

## 2018-01-26 PROCEDURE — 87040 BLOOD CULTURE FOR BACTERIA: CPT

## 2018-01-26 PROCEDURE — 93970 EXTREMITY STUDY: CPT

## 2018-01-26 PROCEDURE — 80076 HEPATIC FUNCTION PANEL: CPT

## 2018-01-26 PROCEDURE — 87486 CHLMYD PNEUM DNA AMP PROBE: CPT

## 2018-01-26 PROCEDURE — 96361 HYDRATE IV INFUSION ADD-ON: CPT

## 2018-01-26 PROCEDURE — 85610 PROTHROMBIN TIME: CPT

## 2018-01-26 PROCEDURE — 71275 CT ANGIOGRAPHY CHEST: CPT

## 2018-01-26 PROCEDURE — 74177 CT ABD & PELVIS W/CONTRAST: CPT

## 2018-01-26 PROCEDURE — 96365 THER/PROPH/DIAG IV INF INIT: CPT | Mod: XU

## 2018-01-26 PROCEDURE — 83605 ASSAY OF LACTIC ACID: CPT

## 2018-01-26 PROCEDURE — 85730 THROMBOPLASTIN TIME PARTIAL: CPT

## 2018-01-26 PROCEDURE — 93306 TTE W/DOPPLER COMPLETE: CPT

## 2018-01-26 PROCEDURE — 71045 X-RAY EXAM CHEST 1 VIEW: CPT

## 2018-01-26 PROCEDURE — 87633 RESP VIRUS 12-25 TARGETS: CPT

## 2018-01-26 PROCEDURE — 83735 ASSAY OF MAGNESIUM: CPT

## 2018-01-26 PROCEDURE — 87400 INFLUENZA A/B EACH AG IA: CPT

## 2018-01-26 PROCEDURE — 97162 PT EVAL MOD COMPLEX 30 MIN: CPT

## 2018-01-26 PROCEDURE — 99285 EMERGENCY DEPT VISIT HI MDM: CPT | Mod: 25

## 2018-01-26 PROCEDURE — 83880 ASSAY OF NATRIURETIC PEPTIDE: CPT

## 2018-01-26 PROCEDURE — 82553 CREATINE MB FRACTION: CPT

## 2018-01-26 PROCEDURE — 82150 ASSAY OF AMYLASE: CPT

## 2018-01-26 PROCEDURE — 81003 URINALYSIS AUTO W/O SCOPE: CPT

## 2018-01-26 PROCEDURE — 96375 TX/PRO/DX INJ NEW DRUG ADDON: CPT | Mod: XU

## 2018-01-26 PROCEDURE — 80061 LIPID PANEL: CPT

## 2018-01-26 PROCEDURE — 99232 SBSQ HOSP IP/OBS MODERATE 35: CPT

## 2018-01-26 PROCEDURE — 87581 M.PNEUMON DNA AMP PROBE: CPT

## 2018-01-26 PROCEDURE — 83690 ASSAY OF LIPASE: CPT

## 2018-01-26 PROCEDURE — 82270 OCCULT BLOOD FECES: CPT

## 2018-01-26 PROCEDURE — 84484 ASSAY OF TROPONIN QUANT: CPT

## 2018-01-26 PROCEDURE — 94640 AIRWAY INHALATION TREATMENT: CPT

## 2019-03-29 ENCOUNTER — INPATIENT (INPATIENT)
Facility: HOSPITAL | Age: 64
LOS: 6 days | Discharge: HOME | DRG: 191 | End: 2019-04-05
Attending: INTERNAL MEDICINE | Admitting: INTERNAL MEDICINE
Payer: MEDICARE

## 2019-03-29 VITALS
DIASTOLIC BLOOD PRESSURE: 80 MMHG | OXYGEN SATURATION: 94 % | SYSTOLIC BLOOD PRESSURE: 150 MMHG | HEIGHT: 59 IN | TEMPERATURE: 99 F | HEART RATE: 132 BPM | RESPIRATION RATE: 23 BRPM | WEIGHT: 138.89 LBS

## 2019-03-29 DIAGNOSIS — Z90.12 ACQUIRED ABSENCE OF LEFT BREAST AND NIPPLE: Chronic | ICD-10-CM

## 2019-03-29 DIAGNOSIS — J44.1 CHRONIC OBSTRUCTIVE PULMONARY DISEASE WITH (ACUTE) EXACERBATION: ICD-10-CM

## 2019-03-29 PROBLEM — I10 ESSENTIAL (PRIMARY) HYPERTENSION: Chronic | Status: ACTIVE | Noted: 2017-04-28

## 2019-03-29 PROBLEM — E11.9 TYPE 2 DIABETES MELLITUS WITHOUT COMPLICATIONS: Chronic | Status: ACTIVE | Noted: 2017-04-28

## 2019-03-29 PROBLEM — F17.200 NICOTINE DEPENDENCE, UNSPECIFIED, UNCOMPLICATED: Chronic | Status: ACTIVE | Noted: 2017-04-28

## 2019-03-29 PROBLEM — J44.9 CHRONIC OBSTRUCTIVE PULMONARY DISEASE, UNSPECIFIED: Chronic | Status: ACTIVE | Noted: 2017-04-28

## 2019-03-29 PROBLEM — C50.919 MALIGNANT NEOPLASM OF UNSPECIFIED SITE OF UNSPECIFIED FEMALE BREAST: Chronic | Status: ACTIVE | Noted: 2017-04-28

## 2019-03-29 LAB
ALBUMIN SERPL ELPH-MCNC: 4.1 G/DL — SIGNIFICANT CHANGE UP (ref 3.3–5)
ALP SERPL-CCNC: 117 U/L — SIGNIFICANT CHANGE UP (ref 40–120)
ALT FLD-CCNC: 55 U/L DA — HIGH (ref 10–45)
ANION GAP SERPL CALC-SCNC: 13 MMOL/L — SIGNIFICANT CHANGE UP (ref 5–17)
APPEARANCE UR: CLEAR — SIGNIFICANT CHANGE UP
APTT BLD: 43.2 SEC — HIGH (ref 27.5–36.3)
AST SERPL-CCNC: 38 U/L — SIGNIFICANT CHANGE UP (ref 10–40)
BACTERIA # UR AUTO: ABNORMAL /HPF
BASOPHILS # BLD AUTO: 0.1 K/UL — SIGNIFICANT CHANGE UP (ref 0–0.2)
BASOPHILS NFR BLD AUTO: 0.7 % — SIGNIFICANT CHANGE UP (ref 0–2)
BILIRUB SERPL-MCNC: 0.4 MG/DL — SIGNIFICANT CHANGE UP (ref 0.2–1.2)
BILIRUB UR-MCNC: NEGATIVE — SIGNIFICANT CHANGE UP
BUN SERPL-MCNC: 12 MG/DL — SIGNIFICANT CHANGE UP (ref 7–23)
CALCIUM SERPL-MCNC: 9.8 MG/DL — SIGNIFICANT CHANGE UP (ref 8.4–10.5)
CHLORIDE SERPL-SCNC: 98 MMOL/L — SIGNIFICANT CHANGE UP (ref 96–108)
CO2 SERPL-SCNC: 26 MMOL/L — SIGNIFICANT CHANGE UP (ref 22–31)
COLOR SPEC: YELLOW — SIGNIFICANT CHANGE UP
CREAT SERPL-MCNC: 0.79 MG/DL — SIGNIFICANT CHANGE UP (ref 0.5–1.3)
DIFF PNL FLD: ABNORMAL
EOSINOPHIL # BLD AUTO: 0 K/UL — SIGNIFICANT CHANGE UP (ref 0–0.5)
EOSINOPHIL NFR BLD AUTO: 0 % — SIGNIFICANT CHANGE UP (ref 0–6)
EPI CELLS # UR: SIGNIFICANT CHANGE UP
FLU A RESULT: SIGNIFICANT CHANGE UP
FLU A RESULT: SIGNIFICANT CHANGE UP
FLUAV AG NPH QL: SIGNIFICANT CHANGE UP
FLUBV AG NPH QL: SIGNIFICANT CHANGE UP
GLUCOSE SERPL-MCNC: 178 MG/DL — HIGH (ref 70–99)
GLUCOSE UR QL: NEGATIVE — SIGNIFICANT CHANGE UP
HCT VFR BLD CALC: 47.8 % — HIGH (ref 34.5–45)
HCV AB S/CO SERPL IA: 0.19 S/CO — SIGNIFICANT CHANGE UP (ref 0–0.79)
HCV AB SERPL-IMP: SIGNIFICANT CHANGE UP
HGB BLD-MCNC: 15.7 G/DL — HIGH (ref 11.5–15.5)
INR BLD: 2.04 RATIO — HIGH (ref 0.88–1.16)
KETONES UR-MCNC: NEGATIVE — SIGNIFICANT CHANGE UP
LACTATE SERPL-SCNC: 2.8 MMOL/L — HIGH (ref 0.7–2)
LACTATE SERPL-SCNC: 4 MMOL/L — CRITICAL HIGH (ref 0.7–2)
LEUKOCYTE ESTERASE UR-ACNC: NEGATIVE — SIGNIFICANT CHANGE UP
LYMPHOCYTES # BLD AUTO: 1.2 K/UL — SIGNIFICANT CHANGE UP (ref 1–3.3)
LYMPHOCYTES # BLD AUTO: 11.7 % — LOW (ref 13–44)
MCHC RBC-ENTMCNC: 31.8 PG — SIGNIFICANT CHANGE UP (ref 27–34)
MCHC RBC-ENTMCNC: 32.8 GM/DL — SIGNIFICANT CHANGE UP (ref 32–36)
MCV RBC AUTO: 97 FL — SIGNIFICANT CHANGE UP (ref 80–100)
MONOCYTES # BLD AUTO: 0.6 K/UL — SIGNIFICANT CHANGE UP (ref 0–0.9)
MONOCYTES NFR BLD AUTO: 6.2 % — SIGNIFICANT CHANGE UP (ref 2–14)
NEUTROPHILS # BLD AUTO: 8.4 K/UL — HIGH (ref 1.8–7.4)
NEUTROPHILS NFR BLD AUTO: 81.4 % — HIGH (ref 43–77)
NITRITE UR-MCNC: NEGATIVE — SIGNIFICANT CHANGE UP
PH UR: 5 — SIGNIFICANT CHANGE UP (ref 5–8)
PLATELET # BLD AUTO: 256 K/UL — SIGNIFICANT CHANGE UP (ref 150–400)
POTASSIUM SERPL-MCNC: 3.6 MMOL/L — SIGNIFICANT CHANGE UP (ref 3.5–5.3)
POTASSIUM SERPL-SCNC: 3.6 MMOL/L — SIGNIFICANT CHANGE UP (ref 3.5–5.3)
PROCALCITONIN SERPL-MCNC: 0.12 NG/ML — HIGH
PROT SERPL-MCNC: 8.7 G/DL — HIGH (ref 6–8.3)
PROT UR-MCNC: 100
PROTHROM AB SERPL-ACNC: 23.4 SEC — HIGH (ref 10–12.9)
RAPID RVP RESULT: SIGNIFICANT CHANGE UP
RBC # BLD: 4.93 M/UL — SIGNIFICANT CHANGE UP (ref 3.8–5.2)
RBC # FLD: 12.3 % — SIGNIFICANT CHANGE UP (ref 10.3–14.5)
RBC CASTS # UR COMP ASSIST: ABNORMAL /HPF (ref 0–4)
RSV RESULT: SIGNIFICANT CHANGE UP
RSV RNA RESP QL NAA+PROBE: SIGNIFICANT CHANGE UP
SODIUM SERPL-SCNC: 137 MMOL/L — SIGNIFICANT CHANGE UP (ref 135–145)
SP GR SPEC: 1.02 — SIGNIFICANT CHANGE UP (ref 1.01–1.02)
TROPONIN I SERPL-MCNC: 0.03 NG/ML — SIGNIFICANT CHANGE UP (ref 0.02–0.06)
TROPONIN I SERPL-MCNC: <.017 NG/ML — LOW (ref 0.02–0.06)
TSH SERPL-MCNC: 0.88 UIU/ML — SIGNIFICANT CHANGE UP (ref 0.27–4.2)
UROBILINOGEN FLD QL: NEGATIVE — SIGNIFICANT CHANGE UP
WBC # BLD: 10.3 K/UL — SIGNIFICANT CHANGE UP (ref 3.8–10.5)
WBC # FLD AUTO: 10.3 K/UL — SIGNIFICANT CHANGE UP (ref 3.8–10.5)
WBC UR QL: NEGATIVE /HPF — SIGNIFICANT CHANGE UP (ref 0–5)

## 2019-03-29 PROCEDURE — 99223 1ST HOSP IP/OBS HIGH 75: CPT

## 2019-03-29 PROCEDURE — 71045 X-RAY EXAM CHEST 1 VIEW: CPT | Mod: 26

## 2019-03-29 PROCEDURE — 93010 ELECTROCARDIOGRAM REPORT: CPT

## 2019-03-29 PROCEDURE — 99285 EMERGENCY DEPT VISIT HI MDM: CPT | Mod: 25

## 2019-03-29 RX ORDER — IPRATROPIUM/ALBUTEROL SULFATE 18-103MCG
3 AEROSOL WITH ADAPTER (GRAM) INHALATION EVERY 4 HOURS
Qty: 0 | Refills: 0 | Status: DISCONTINUED | OUTPATIENT
Start: 2019-03-29 | End: 2019-03-31

## 2019-03-29 RX ORDER — SODIUM CHLORIDE 9 MG/ML
1000 INJECTION INTRAMUSCULAR; INTRAVENOUS; SUBCUTANEOUS ONCE
Qty: 0 | Refills: 0 | Status: COMPLETED | OUTPATIENT
Start: 2019-03-29 | End: 2019-03-29

## 2019-03-29 RX ORDER — LISINOPRIL 2.5 MG/1
5 TABLET ORAL DAILY
Qty: 0 | Refills: 0 | Status: DISCONTINUED | OUTPATIENT
Start: 2019-03-30 | End: 2019-04-05

## 2019-03-29 RX ORDER — BUDESONIDE, MICRONIZED 100 %
0.5 POWDER (GRAM) MISCELLANEOUS ONCE
Qty: 0 | Refills: 0 | Status: COMPLETED | OUTPATIENT
Start: 2019-03-29 | End: 2019-03-29

## 2019-03-29 RX ORDER — ACETAMINOPHEN 500 MG
650 TABLET ORAL EVERY 6 HOURS
Qty: 0 | Refills: 0 | Status: DISCONTINUED | OUTPATIENT
Start: 2019-03-29 | End: 2019-04-05

## 2019-03-29 RX ORDER — ACETAMINOPHEN 500 MG
650 TABLET ORAL ONCE
Qty: 0 | Refills: 0 | Status: COMPLETED | OUTPATIENT
Start: 2019-03-29 | End: 2019-03-29

## 2019-03-29 RX ORDER — BUDESONIDE AND FORMOTEROL FUMARATE DIHYDRATE 160; 4.5 UG/1; UG/1
2 AEROSOL RESPIRATORY (INHALATION)
Qty: 0 | Refills: 0 | Status: DISCONTINUED | OUTPATIENT
Start: 2019-03-29 | End: 2019-04-05

## 2019-03-29 RX ORDER — SODIUM CHLORIDE 9 MG/ML
1900 INJECTION INTRAMUSCULAR; INTRAVENOUS; SUBCUTANEOUS ONCE
Qty: 0 | Refills: 0 | Status: COMPLETED | OUTPATIENT
Start: 2019-03-29 | End: 2019-03-29

## 2019-03-29 RX ORDER — ASPIRIN/CALCIUM CARB/MAGNESIUM 324 MG
1 TABLET ORAL
Qty: 0 | Refills: 0 | COMMUNITY

## 2019-03-29 RX ORDER — RIVAROXABAN 15 MG-20MG
20 KIT ORAL AT BEDTIME
Qty: 0 | Refills: 0 | Status: DISCONTINUED | OUTPATIENT
Start: 2019-03-29 | End: 2019-04-05

## 2019-03-29 RX ORDER — DOCUSATE SODIUM 100 MG
1 CAPSULE ORAL
Qty: 0 | Refills: 0 | COMMUNITY

## 2019-03-29 RX ORDER — TIOTROPIUM BROMIDE 18 UG/1
1 CAPSULE ORAL; RESPIRATORY (INHALATION) DAILY
Qty: 0 | Refills: 0 | Status: DISCONTINUED | OUTPATIENT
Start: 2019-03-29 | End: 2019-04-05

## 2019-03-29 RX ORDER — NICOTINE POLACRILEX 2 MG
1 GUM BUCCAL DAILY
Qty: 0 | Refills: 0 | Status: DISCONTINUED | OUTPATIENT
Start: 2019-03-29 | End: 2019-04-05

## 2019-03-29 RX ORDER — IPRATROPIUM/ALBUTEROL SULFATE 18-103MCG
3 AEROSOL WITH ADAPTER (GRAM) INHALATION
Qty: 0 | Refills: 0 | Status: COMPLETED | OUTPATIENT
Start: 2019-03-29 | End: 2019-03-29

## 2019-03-29 RX ORDER — ATORVASTATIN CALCIUM 80 MG/1
80 TABLET, FILM COATED ORAL AT BEDTIME
Qty: 0 | Refills: 0 | Status: DISCONTINUED | OUTPATIENT
Start: 2019-03-29 | End: 2019-04-05

## 2019-03-29 RX ORDER — FAMOTIDINE 10 MG/ML
20 INJECTION INTRAVENOUS DAILY
Qty: 0 | Refills: 0 | Status: DISCONTINUED | OUTPATIENT
Start: 2019-03-29 | End: 2019-04-05

## 2019-03-29 RX ORDER — LETROZOLE 2.5 MG/1
2.5 TABLET, FILM COATED ORAL DAILY
Qty: 0 | Refills: 0 | Status: DISCONTINUED | OUTPATIENT
Start: 2019-03-29 | End: 2019-04-05

## 2019-03-29 RX ADMIN — Medication 650 MILLIGRAM(S): at 05:45

## 2019-03-29 RX ADMIN — BUDESONIDE AND FORMOTEROL FUMARATE DIHYDRATE 2 PUFF(S): 160; 4.5 AEROSOL RESPIRATORY (INHALATION) at 10:13

## 2019-03-29 RX ADMIN — Medication 1 PATCH: at 14:46

## 2019-03-29 RX ADMIN — Medication 1 PATCH: at 20:01

## 2019-03-29 RX ADMIN — Medication 40 MILLIGRAM(S): at 14:46

## 2019-03-29 RX ADMIN — TIOTROPIUM BROMIDE 1 CAPSULE(S): 18 CAPSULE ORAL; RESPIRATORY (INHALATION) at 10:13

## 2019-03-29 RX ADMIN — Medication 3 MILLILITER(S): at 06:15

## 2019-03-29 RX ADMIN — BUDESONIDE AND FORMOTEROL FUMARATE DIHYDRATE 2 PUFF(S): 160; 4.5 AEROSOL RESPIRATORY (INHALATION) at 20:23

## 2019-03-29 RX ADMIN — Medication 3 MILLILITER(S): at 03:00

## 2019-03-29 RX ADMIN — Medication 650 MILLIGRAM(S): at 06:27

## 2019-03-29 RX ADMIN — Medication 75 MILLIGRAM(S): at 06:26

## 2019-03-29 RX ADMIN — Medication 3 MILLILITER(S): at 20:24

## 2019-03-29 RX ADMIN — LETROZOLE 2.5 MILLIGRAM(S): 2.5 TABLET, FILM COATED ORAL at 14:47

## 2019-03-29 RX ADMIN — Medication 3 MILLILITER(S): at 03:24

## 2019-03-29 RX ADMIN — Medication 3 MILLILITER(S): at 16:10

## 2019-03-29 RX ADMIN — SODIUM CHLORIDE 1900 MILLILITER(S): 9 INJECTION INTRAMUSCULAR; INTRAVENOUS; SUBCUTANEOUS at 03:00

## 2019-03-29 RX ADMIN — Medication 40 MILLIGRAM(S): at 21:31

## 2019-03-29 RX ADMIN — Medication 3 MILLILITER(S): at 13:23

## 2019-03-29 RX ADMIN — SODIUM CHLORIDE 500 MILLILITER(S): 9 INJECTION INTRAMUSCULAR; INTRAVENOUS; SUBCUTANEOUS at 04:30

## 2019-03-29 RX ADMIN — ATORVASTATIN CALCIUM 80 MILLIGRAM(S): 80 TABLET, FILM COATED ORAL at 21:31

## 2019-03-29 RX ADMIN — RIVAROXABAN 20 MILLIGRAM(S): KIT at 21:31

## 2019-03-29 RX ADMIN — Medication 3 MILLILITER(S): at 10:13

## 2019-03-29 RX ADMIN — FAMOTIDINE 20 MILLIGRAM(S): 10 INJECTION INTRAVENOUS at 05:59

## 2019-03-29 RX ADMIN — Medication 125 MILLIGRAM(S): at 03:15

## 2019-03-29 NOTE — DIETITIAN INITIAL EVALUATION ADULT. - NS AS NUTRI INTERV ED CONTENT
Attempted to provide education on Cons CHO; DASH/TLC diet orders, pt not receptive and denied further need for education

## 2019-03-29 NOTE — ED PROVIDER NOTE - OBJECTIVE STATEMENT
Pertinent PMH/PSH/FHx/SHx and Review of Systems contained within:  62 y/o f with h/o COPD, BIB family, c/o cough, fever and SOB for 2 days.

## 2019-03-29 NOTE — H&P ADULT - NSICDXFAMILYHX_GEN_ALL_CORE_FT
FAMILY HISTORY:  Sibling  Still living? Yes, Estimated age: Age Unknown  Family history of heart disease, Age at diagnosis: Age Unknown

## 2019-03-29 NOTE — DIETITIAN INITIAL EVALUATION ADULT. - OTHER INFO
63yr Old Female - Dx: COPD exacerbation- Initial Assessment - Regular, Cons CHO, DASH/TLC Diet, Educated pt on need for Cons CHO, DASH/TLC diet, pt states that she has prior knowledge about both therapeutic diets and denies any further education and educational material, Denies Food Allergy, Tolerates Diet, No Chewing/Swallowing Complications (Per Pt), Consumes 100% of 1 Meal, No Pressure Ulcers, No Edema, No Recent N/V/D/C, Last BM 3/28 per pt

## 2019-03-29 NOTE — H&P ADULT - HISTORY OF PRESENT ILLNESS
62 y/o f with h/o COPD, BIB family, c/o cough, fever and SOB for 2 days. 62 y/o f with h/o breast Ca, +left mastectomy, +COPD, smoker (2PPD), on chronic Prednisone 5 mg/day, +CAD, +stent, brought in by family for worsening dyspnea.  Pt states she developed a cough 2 days ago, occ clear phlegm.  Pt developed a fever yesterday, breathing progressively worsened during the night, had to use nebulizer x 3, but not relieved.  Had a temp of 103.9 in the ED.  Pt tachy to 130's in the ED.  Pt states she's on Xarelto because of cardiac stent.  Flu swab so far, neg.

## 2019-03-29 NOTE — ED PROVIDER NOTE - PMH
Breast cancer    COPD (chronic obstructive pulmonary disease)    DM (diabetes mellitus)  new diagnosis  Former smoker    HTN (hypertension)    Smoker

## 2019-03-29 NOTE — ED PROVIDER NOTE - CLINICAL SUMMARY MEDICAL DECISION MAKING FREE TEXT BOX
pt has copd exacerbation  due to viral infection, IV steroids, bronchodilator , treat as flu and admit

## 2019-03-29 NOTE — DIETITIAN INITIAL EVALUATION ADULT. - ORAL INTAKE PTA
good/Pt reports a good appetite PTA. Pt reports eating 3 meals a day that she cooks herself at home with a snack or two between meals on occasion. Pt states that she does all of the food shopping. Pt denies taking any supplements at home

## 2019-03-29 NOTE — H&P ADULT - ASSESSMENT
64 y/o f with h/o breast Ca, +left mastectomy, +COPD, smoker (2PPD), on chronic Prednisone 5 mg/day, +CAD, +stent, presents with cough, fever, dyspnea  Acute bronchitis, COPD exacerbation    Admit  O2 supp to keep O2 sat >92%  Empiric Levaquin pending cultures, procalcitonin  RVP panel  IV Steroids for now  Bronchodilators RTC via neb - Duoneb  Cont LABA/inhaled steroids, Spiriva  Cont Xarelto, Lisinopril  Statin, Letrozole  GI prophylaxis  FFup labs, lactate  Smoking cessation, counseled, Nicotine patch    IMPROVE VTE Individual Risk Assessment  RISK                                                                Points  [  ] Previous VTE                                                  3  [  ] Thrombophilia                                               2  [  ] Lower limb paralysis                                      2        (unable to hold up >15 seconds)    [ x ] Current Cancer                                              2         (within 6 months)  [  ] Immobilization > 24 hrs                                1  [  ] ICU/CCU stay > 24 hours                              1  [ x ] Age > 60                                                      1  IMPROVE VTE Score __3____  IMPROVE Score 0-1: Low Risk, No VTE prophylaxis required for most patients, encourage ambulation.   IMPROVE Score 2-3: At risk, pharmacologic VTE prophylaxis is indicated for most patients (in the absence of a contraindication)  IMPROVE Score > or = 4: High Risk, pharmacologic VTE prophylaxis is indicated for most patients (in the absence of a contraindication)    *Pt is already on Xarelto

## 2019-03-29 NOTE — H&P ADULT - NSICDXPASTMEDICALHX_GEN_ALL_CORE_FT
PAST MEDICAL HISTORY:  Breast cancer     COPD (chronic obstructive pulmonary disease)     DM (diabetes mellitus) new diagnosis    Former smoker     HTN (hypertension)     Smoker

## 2019-03-29 NOTE — H&P ADULT - NEUROLOGICAL DETAILS
responds to pain/responds to verbal commands/alert and oriented x 3/sensation intact/deep reflexes intact

## 2019-03-29 NOTE — H&P ADULT - NSHPPHYSICALEXAM_GEN_ALL_CORE
Vital Signs (24 Hrs):  T(C): 39.9 (03-29-19 @ 03:33), Max: 39.9 (03-29-19 @ 03:33)  HR: 125 (03-29-19 @ 04:52) (121 - 132)  BP: 134/81 (03-29-19 @ 04:52) (130/68 - 150/80)  RR: 22 (03-29-19 @ 04:52) (22 - 23)  SpO2: 97% (03-29-19 @ 04:52) (94% - 99%)  Daily Height in cm: 149.86 (29 Mar 2019 03:28)

## 2019-03-30 LAB
ANION GAP SERPL CALC-SCNC: 12 MMOL/L — SIGNIFICANT CHANGE UP (ref 5–17)
BUN SERPL-MCNC: 13 MG/DL — SIGNIFICANT CHANGE UP (ref 7–23)
CALCIUM SERPL-MCNC: 9.4 MG/DL — SIGNIFICANT CHANGE UP (ref 8.4–10.5)
CHLORIDE SERPL-SCNC: 103 MMOL/L — SIGNIFICANT CHANGE UP (ref 96–108)
CO2 BLDA-SCNC: 28 MMOL/L — SIGNIFICANT CHANGE UP (ref 22–30)
CO2 SERPL-SCNC: 27 MMOL/L — SIGNIFICANT CHANGE UP (ref 22–31)
CREAT SERPL-MCNC: 0.79 MG/DL — SIGNIFICANT CHANGE UP (ref 0.5–1.3)
CULTURE RESULTS: SIGNIFICANT CHANGE UP
GAS PNL BLDA: SIGNIFICANT CHANGE UP
GLUCOSE SERPL-MCNC: 194 MG/DL — HIGH (ref 70–99)
HCT VFR BLD CALC: 40.8 % — SIGNIFICANT CHANGE UP (ref 34.5–45)
HGB BLD-MCNC: 13.2 G/DL — SIGNIFICANT CHANGE UP (ref 11.5–15.5)
HOROWITZ INDEX BLDA+IHG-RTO: SIGNIFICANT CHANGE UP
LACTATE SERPL-SCNC: 2.3 MMOL/L — HIGH (ref 0.7–2)
LACTATE SERPL-SCNC: 4.2 MMOL/L — CRITICAL HIGH (ref 0.7–2)
MCHC RBC-ENTMCNC: 32.1 PG — SIGNIFICANT CHANGE UP (ref 27–34)
MCHC RBC-ENTMCNC: 32.5 GM/DL — SIGNIFICANT CHANGE UP (ref 32–36)
MCV RBC AUTO: 98.8 FL — SIGNIFICANT CHANGE UP (ref 80–100)
PCO2 BLDA: 44 MMHG — SIGNIFICANT CHANGE UP (ref 32–46)
PH BLDA: 7.39 — SIGNIFICANT CHANGE UP (ref 7.35–7.45)
PLATELET # BLD AUTO: 214 K/UL — SIGNIFICANT CHANGE UP (ref 150–400)
PO2 BLDA: 99 MMHG — SIGNIFICANT CHANGE UP (ref 74–108)
POTASSIUM SERPL-MCNC: 3.7 MMOL/L — SIGNIFICANT CHANGE UP (ref 3.5–5.3)
POTASSIUM SERPL-SCNC: 3.7 MMOL/L — SIGNIFICANT CHANGE UP (ref 3.5–5.3)
RBC # BLD: 4.13 M/UL — SIGNIFICANT CHANGE UP (ref 3.8–5.2)
RBC # FLD: 12.7 % — SIGNIFICANT CHANGE UP (ref 10.3–14.5)
SAO2 % BLDA: 95 % — SIGNIFICANT CHANGE UP (ref 92–96)
SODIUM SERPL-SCNC: 142 MMOL/L — SIGNIFICANT CHANGE UP (ref 135–145)
SPECIMEN SOURCE: SIGNIFICANT CHANGE UP
WBC # BLD: 9.2 K/UL — SIGNIFICANT CHANGE UP (ref 3.8–10.5)
WBC # FLD AUTO: 9.2 K/UL — SIGNIFICANT CHANGE UP (ref 3.8–10.5)

## 2019-03-30 PROCEDURE — 99233 SBSQ HOSP IP/OBS HIGH 50: CPT

## 2019-03-30 PROCEDURE — 71275 CT ANGIOGRAPHY CHEST: CPT | Mod: 26

## 2019-03-30 RX ORDER — SODIUM CHLORIDE 9 MG/ML
1000 INJECTION INTRAMUSCULAR; INTRAVENOUS; SUBCUTANEOUS
Qty: 0 | Refills: 0 | Status: DISCONTINUED | OUTPATIENT
Start: 2019-03-30 | End: 2019-04-02

## 2019-03-30 RX ADMIN — RIVAROXABAN 20 MILLIGRAM(S): KIT at 22:13

## 2019-03-30 RX ADMIN — Medication 3 MILLILITER(S): at 01:17

## 2019-03-30 RX ADMIN — Medication 1 PATCH: at 14:00

## 2019-03-30 RX ADMIN — Medication 3 MILLILITER(S): at 16:29

## 2019-03-30 RX ADMIN — Medication 40 MILLIGRAM(S): at 22:13

## 2019-03-30 RX ADMIN — Medication 1 PATCH: at 12:20

## 2019-03-30 RX ADMIN — Medication 3 MILLILITER(S): at 05:18

## 2019-03-30 RX ADMIN — Medication 3 MILLILITER(S): at 20:46

## 2019-03-30 RX ADMIN — ATORVASTATIN CALCIUM 80 MILLIGRAM(S): 80 TABLET, FILM COATED ORAL at 22:13

## 2019-03-30 RX ADMIN — BUDESONIDE AND FORMOTEROL FUMARATE DIHYDRATE 2 PUFF(S): 160; 4.5 AEROSOL RESPIRATORY (INHALATION) at 20:46

## 2019-03-30 RX ADMIN — Medication 3 MILLILITER(S): at 14:16

## 2019-03-30 RX ADMIN — SODIUM CHLORIDE 100 MILLILITER(S): 9 INJECTION INTRAMUSCULAR; INTRAVENOUS; SUBCUTANEOUS at 17:50

## 2019-03-30 RX ADMIN — TIOTROPIUM BROMIDE 1 CAPSULE(S): 18 CAPSULE ORAL; RESPIRATORY (INHALATION) at 09:47

## 2019-03-30 RX ADMIN — Medication 40 MILLIGRAM(S): at 13:42

## 2019-03-30 RX ADMIN — Medication 1 PATCH: at 22:09

## 2019-03-30 RX ADMIN — Medication 3 MILLILITER(S): at 09:49

## 2019-03-30 RX ADMIN — FAMOTIDINE 20 MILLIGRAM(S): 10 INJECTION INTRAVENOUS at 12:19

## 2019-03-30 RX ADMIN — Medication 1 PATCH: at 07:24

## 2019-03-30 RX ADMIN — LISINOPRIL 5 MILLIGRAM(S): 2.5 TABLET ORAL at 05:10

## 2019-03-30 RX ADMIN — Medication 40 MILLIGRAM(S): at 05:10

## 2019-03-30 RX ADMIN — BUDESONIDE AND FORMOTEROL FUMARATE DIHYDRATE 2 PUFF(S): 160; 4.5 AEROSOL RESPIRATORY (INHALATION) at 09:49

## 2019-03-30 NOTE — PROGRESS NOTE ADULT - ASSESSMENT
63 y o F with PMH breast Ca, +left mastectomy, +COPD, smoker (2PPD), on chronic Prednisone 5 mg/day, +CAD, +stent, presents with cough, fever, dyspnea admitted for acute bronchitis, COPD exacerbation.    #Acute COPD exacerbation: continue steroids, nebs, abx, monitor oxygen saturation, PT    #Acute bronchitis: empirically covered with abx, limit to 5 days    #Breast cancer: continue Letrozole    #DVT PPX: on Xarelto 63 y o F with PMH breast Ca, +left mastectomy, +COPD, smoker (2PPD), on chronic Prednisone 5 mg/day, +CAD, +stent, presents with cough, fever, dyspnea admitted for acute bronchitis, COPD exacerbation.    #Acute COPD exacerbation: still has wheezing, GANDHI, will check ambulatory O2 sats, continue steroids, nebs, abx, monitor oxygen saturation, PT eval    #Acute bronchitis: empirically covered with abx, limit to 5 days    #Breast cancer: continue Letrozole    #Long term AC: pt does not know why she is on Xarelto, denies blood clot, denies Afib    #DVT PPX: on Xarelto 63 y o F with PMH breast Ca, +left mastectomy, +COPD, smoker (2PPD), on chronic Prednisone 5 mg/day, +CAD, +stent, presents with cough, fever, dyspnea admitted for acute bronchitis, COPD exacerbation.    #Acute COPD exacerbation: still has wheezing, GANDHI, will check ambulatory O2 sats, continue steroids, nebs, abx, monitor oxygen saturation, PT eval    #Acute bronchitis: empirically covered with abx, limit to 5 days    #Breast cancer: continue Letrozole    #Long term AC: pt does not know why she is on Xarelto, denies blood clot, denies Afib    #Smoking cessation counseling: counseled pt to stop smoking, states she still smokes, likely contributing to her COPD exacerbation    #DVT PPX: on Xarelto 63 y o F with PMH breast Ca, +left mastectomy, +COPD, smoker (2PPD), on chronic Prednisone 5 mg/day, +CAD, +stent, presents with cough, fever, dyspnea admitted for acute bronchitis, COPD exacerbation.    #Dyspnea: likely COPD but pt has a history of PE though pt cannot confirm it, she is on Xarelto though prior to admission, will get CTA to r/o acute PE as pt still has GANDHI, elevated lactate    #Acute COPD exacerbation: still has wheezing, GANDHI, will check ambulatory O2 sats, continue steroids, nebs, abx, monitor oxygen saturation, PT eval    #Acute bronchitis: empirically covered with abx, limit to 5 days    #Breast cancer: continue Letrozole    #Long term AC: pt does not know why she is on Xarelto, denies blood clot though it seems she may have had a PE in the past, denies Afib     #Smoking cessation counseling: counseled pt to stop smoking, states she still smokes, likely contributing to her COPD exacerbation    #DVT PPX: on Xarelto

## 2019-03-30 NOTE — PROGRESS NOTE ADULT - SUBJECTIVE AND OBJECTIVE BOX
CC: Patient is a 63y old  Female who presents with a chief complaint of dyspnea, cough, fever (29 Mar 2019 04:06)      S: No f/c/n/v/pain    Patient seen and examined at bedside.    ALLERGIES:  penicillin (Other)      MEDICATIONS:  acetaminophen   Tablet .. 650 milliGRAM(s) Oral every 6 hours PRN  ALBUTerol/ipratropium for Nebulization 3 milliLiter(s) Nebulizer every 4 hours  atorvastatin 80 milliGRAM(s) Oral at bedtime  buDESOnide 160 MICROgram(s)/formoterol 4.5 MICROgram(s) Inhaler 2 Puff(s) Inhalation two times a day  famotidine    Tablet 20 milliGRAM(s) Oral daily  letrozole 2.5 milliGRAM(s) Oral daily  levoFLOXacin  Tablet 500 milliGRAM(s) Oral every 24 hours  lisinopril 5 milliGRAM(s) Oral daily  methylPREDNISolone sodium succinate Injectable 40 milliGRAM(s) IV Push every 8 hours  nicotine - 21 mG/24Hr(s) Patch 1 patch Transdermal daily  rivaroxaban 20 milliGRAM(s) Oral at bedtime  tiotropium 18 MICROgram(s) Capsule 1 Capsule(s) Inhalation daily        Vital Signs Last 24 Hrs  T(F): 98.3 (29 Mar 2019 19:13), Max: 99.5 (29 Mar 2019 12:52)  HR: 113 (30 Mar 2019 05:21) (110 - 125)  BP: 145/57 (29 Mar 2019 19:13) (118/67 - 145/85)  RR: 16 (29 Mar 2019 19:13) (14 - 16)  SpO2: 97% (30 Mar 2019 05:21) (95% - 98%)  I&O's Summary    29 Mar 2019 07:01  -  30 Mar 2019 07:00  --------------------------------------------------------  IN: 800 mL / OUT: 0 mL / NET: 800 mL        PHYSICAL EXAM:  General: NAD  ENT: MMM  Neck: Supple, No JVD  Lungs: Clear to auscultation bilaterally  Cardio: RRR, S1/S2, No murmurs  Abdomen: Soft, Nontender, Nondistended; Bowel sounds present  Extremities: No cyanosis, No edema  Neuro: no new deficits  Skin: no rashes  Psych: AAO    LABS:                        15.7   10.3  )-----------( 256      ( 29 Mar 2019 02:57 )             47.8         137  |  98  |  12  ----------------------------<  178  3.6   |  26  |  0.79    Ca    9.8      29 Mar 2019 02:57    TPro  8.7  /  Alb  4.1  /  TBili  0.4  /  DBili  x   /  AST  38  /  ALT  55  /  AlkPhos  117      eGFR if Non African American: 80 mL/min/1.73M2 (19 @ 02:57)  eGFR if : 93 mL/min/1.73M2 (19 @ 02:57)    PT/INR - ( 29 Mar 2019 02:57 )   PT: 23.4 sec;   INR: 2.04 ratio         PTT - ( 29 Mar 2019 02:57 )  PTT:43.2 sec  Lactate, Blood: 2.8 mmol/L ( @ 06:33)  Lactate, Blood: 4.0 mmol/L ( @ 02:57)    CARDIAC MARKERS ( 29 Mar 2019 08:14 )  .027 ng/mL / x     / x     / x     / x      CARDIAC MARKERS ( 29 Mar 2019 02:50 )  <.017 ng/mL / x     / x     / x     / x            TSH 0.88   TSH with FT4 reflex --  Total T3 --        CAPILLARY BLOOD GLUCOSE          Urinalysis Basic - ( 29 Mar 2019 02:57 )    Color: Yellow / Appearance: Clear / S.025 / pH: x  Gluc: x / Ketone: Negative  / Bili: Negative / Urobili: Negative   Blood: x / Protein: 100 / Nitrite: Negative   Leuk Esterase: Negative / RBC: 5-10 /HPF / WBC Negative /HPF   Sq Epi: x / Non Sq Epi: Neg.-Few / Bacteria: Few /HPF          RADIOLOGY & ADDITIONAL TESTS:    Care Discussed with Consultants/Other Providers: CC: Patient is a 63y old  Female who presents with a chief complaint of dyspnea, cough, fever (29 Mar 2019 04:06)      S: No f/c/n/v/pain, still has GANDHI, dyspnea at rest    Patient seen and examined at bedside.    ALLERGIES:  penicillin (Other)      MEDICATIONS:  acetaminophen   Tablet .. 650 milliGRAM(s) Oral every 6 hours PRN  ALBUTerol/ipratropium for Nebulization 3 milliLiter(s) Nebulizer every 4 hours  atorvastatin 80 milliGRAM(s) Oral at bedtime  buDESOnide 160 MICROgram(s)/formoterol 4.5 MICROgram(s) Inhaler 2 Puff(s) Inhalation two times a day  famotidine    Tablet 20 milliGRAM(s) Oral daily  letrozole 2.5 milliGRAM(s) Oral daily  levoFLOXacin  Tablet 500 milliGRAM(s) Oral every 24 hours  lisinopril 5 milliGRAM(s) Oral daily  methylPREDNISolone sodium succinate Injectable 40 milliGRAM(s) IV Push every 8 hours  nicotine - 21 mG/24Hr(s) Patch 1 patch Transdermal daily  rivaroxaban 20 milliGRAM(s) Oral at bedtime  tiotropium 18 MICROgram(s) Capsule 1 Capsule(s) Inhalation daily        Vital Signs Last 24 Hrs  T(F): 98.3 (29 Mar 2019 19:13), Max: 99.5 (29 Mar 2019 12:52)  HR: 113 (30 Mar 2019 05:21) (110 - 125)  BP: 145/57 (29 Mar 2019 19:13) (118/67 - 145/85)  RR: 16 (29 Mar 2019 19:13) (14 - 16)  SpO2: 97% (30 Mar 2019 05:21) (95% - 98%)  I&O's Summary    29 Mar 2019 07:01  -  30 Mar 2019 07:00  --------------------------------------------------------  IN: 800 mL / OUT: 0 mL / NET: 800 mL        PHYSICAL EXAM:  General: NAD  ENT: MMM  Neck: Supple, No JVD  Lungs: Expiratory wheeze bilaterally mild  Cardio: RRR, S1/S2, No murmurs  Abdomen: Soft, Nontender, Nondistended; Bowel sounds present  Extremities: No cyanosis, No edema  Neuro: no new deficits  Skin: no rashes  Psych: AAO    LABS:                        15.7   10.3  )-----------( 256      ( 29 Mar 2019 02:57 )             47.8         137  |  98  |  12  ----------------------------<  178  3.6   |  26  |  0.79    Ca    9.8      29 Mar 2019 02:57    TPro  8.7  /  Alb  4.1  /  TBili  0.4  /  DBili  x   /  AST  38  /  ALT  55  /  AlkPhos  117      eGFR if Non African American: 80 mL/min/1.73M2 (19 @ 02:57)  eGFR if : 93 mL/min/1.73M2 (19 @ 02:57)    PT/INR - ( 29 Mar 2019 02:57 )   PT: 23.4 sec;   INR: 2.04 ratio         PTT - ( 29 Mar 2019 02:57 )  PTT:43.2 sec  Lactate, Blood: 2.8 mmol/L ( @ 06:33)  Lactate, Blood: 4.0 mmol/L ( @ 02:57)    CARDIAC MARKERS ( 29 Mar 2019 08:14 )  .027 ng/mL / x     / x     / x     / x      CARDIAC MARKERS ( 29 Mar 2019 02:50 )  <.017 ng/mL / x     / x     / x     / x            TSH 0.88   TSH with FT4 reflex --  Total T3 --        CAPILLARY BLOOD GLUCOSE          Urinalysis Basic - ( 29 Mar 2019 02:57 )    Color: Yellow / Appearance: Clear / S.025 / pH: x  Gluc: x / Ketone: Negative  / Bili: Negative / Urobili: Negative   Blood: x / Protein: 100 / Nitrite: Negative   Leuk Esterase: Negative / RBC: 5-10 /HPF / WBC Negative /HPF   Sq Epi: x / Non Sq Epi: Neg.-Few / Bacteria: Few /HPF          RADIOLOGY & ADDITIONAL TESTS:    Care Discussed with Consultants/Other Providers:

## 2019-03-31 LAB
GLUCOSE BLDC GLUCOMTR-MCNC: 224 MG/DL — HIGH (ref 70–99)
LACTATE SERPL-SCNC: 3 MMOL/L — HIGH (ref 0.7–2)

## 2019-03-31 PROCEDURE — 93306 TTE W/DOPPLER COMPLETE: CPT | Mod: 26

## 2019-03-31 PROCEDURE — 99233 SBSQ HOSP IP/OBS HIGH 50: CPT

## 2019-03-31 RX ORDER — IPRATROPIUM/ALBUTEROL SULFATE 18-103MCG
3 AEROSOL WITH ADAPTER (GRAM) INHALATION EVERY 6 HOURS
Qty: 0 | Refills: 0 | Status: DISCONTINUED | OUTPATIENT
Start: 2019-03-31 | End: 2019-04-01

## 2019-03-31 RX ADMIN — BUDESONIDE AND FORMOTEROL FUMARATE DIHYDRATE 2 PUFF(S): 160; 4.5 AEROSOL RESPIRATORY (INHALATION) at 20:27

## 2019-03-31 RX ADMIN — Medication 3 MILLILITER(S): at 20:27

## 2019-03-31 RX ADMIN — RIVAROXABAN 20 MILLIGRAM(S): KIT at 21:11

## 2019-03-31 RX ADMIN — LETROZOLE 2.5 MILLIGRAM(S): 2.5 TABLET, FILM COATED ORAL at 15:08

## 2019-03-31 RX ADMIN — FAMOTIDINE 20 MILLIGRAM(S): 10 INJECTION INTRAVENOUS at 11:53

## 2019-03-31 RX ADMIN — TIOTROPIUM BROMIDE 1 CAPSULE(S): 18 CAPSULE ORAL; RESPIRATORY (INHALATION) at 08:34

## 2019-03-31 RX ADMIN — BUDESONIDE AND FORMOTEROL FUMARATE DIHYDRATE 2 PUFF(S): 160; 4.5 AEROSOL RESPIRATORY (INHALATION) at 08:35

## 2019-03-31 RX ADMIN — Medication 40 MILLIGRAM(S): at 05:06

## 2019-03-31 RX ADMIN — Medication 40 MILLIGRAM(S): at 21:11

## 2019-03-31 RX ADMIN — Medication 1 PATCH: at 11:52

## 2019-03-31 RX ADMIN — LISINOPRIL 5 MILLIGRAM(S): 2.5 TABLET ORAL at 05:05

## 2019-03-31 RX ADMIN — Medication 3 MILLILITER(S): at 00:06

## 2019-03-31 RX ADMIN — SODIUM CHLORIDE 100 MILLILITER(S): 9 INJECTION INTRAMUSCULAR; INTRAVENOUS; SUBCUTANEOUS at 14:55

## 2019-03-31 RX ADMIN — Medication 3 MILLILITER(S): at 04:21

## 2019-03-31 RX ADMIN — Medication 40 MILLIGRAM(S): at 15:04

## 2019-03-31 RX ADMIN — Medication 3 MILLILITER(S): at 14:09

## 2019-03-31 RX ADMIN — Medication 3 MILLILITER(S): at 08:35

## 2019-03-31 RX ADMIN — Medication 1 PATCH: at 15:02

## 2019-03-31 RX ADMIN — ATORVASTATIN CALCIUM 80 MILLIGRAM(S): 80 TABLET, FILM COATED ORAL at 21:11

## 2019-03-31 RX ADMIN — Medication 1 PATCH: at 20:00

## 2019-03-31 NOTE — PROGRESS NOTE ADULT - ASSESSMENT
Physical Examination:  GENERAL:               Alert, Oriented, No acute distress.    HEENT:                   No JVD, dry  MM,   PULM:                     Bilateral air entry,  no significant sputum production, No Rales, +trace Rhonchi, mild Wheezing, prolonged expiratory phase  CVS:                         S1, S2,  + Murmur  ABD:                        Soft, nondistended, nontender, normoactive bowel sounds,   EXT:                         No edema, nontender, No Cyanosis or Clubbing   Vascular:                Warm Extremities, Normal Capillary refill, Normal Distal Pulses  NEURO:                  Alert, oriented, interactive, nonfocal, follows commands  PSYC:                      Calm, + Insight.        Assessment  Acute bronchitis   Chronic severe COPD - emphysema on chronic steroids- prednisone 5  Active nicotine use  persistent lactic acidosis unsure of cause, doubt from sepsis, clinically no evidence of hypoperfusion noted ? Beta agonist use  underlying DM2, HTN, h/o     Plan  Continue steroids  Nebs PRN  Continue Symbicort Spiriva  n/c o2 to maintain sat > 88  check sputum culture  will follow  trend lactic acid daily  case d/w Dr. Mathias Physical Examination:  GENERAL:               Alert, Oriented, No acute distress.    HEENT:                   No JVD, dry  MM,   PULM:                     Bilateral air entry,  no significant sputum production, No Rales, +trace Rhonchi, mild Wheezing, prolonged expiratory phase  CVS:                         S1, S2,  + Murmur  ABD:                        Soft, nondistended, nontender, normoactive bowel sounds,   EXT:                         No edema, nontender, No Cyanosis or Clubbing   Vascular:                Warm Extremities, Normal Capillary refill, Normal Distal Pulses  NEURO:                  Alert, oriented, interactive, nonfocal, follows commands  PSYC:                      Calm, + Insight.        Assessment  Acute bronchitis   Chronic severe COPD - emphysema on chronic steroids- prednisone 5  Active nicotine use  persistent lactic acidosis unsure of cause, doubt from sepsis, clinically no evidence of hypoperfusion noted ? Beta agonist use  underlying DM2, HTN, h/o Breat Cancer, CAD s/p pci    Plan  Continue steroids  Nebs PRN  Continue Symbicort Spiriva  n/c o2 to maintain sat > 88  check sputum culture  will follow  trend lactic acid daily  case d/w Dr. Mathias

## 2019-03-31 NOTE — PROGRESS NOTE ADULT - SUBJECTIVE AND OBJECTIVE BOX
PULMONARY CONSULT  Location of Patient :  [id number] ( [facility])  Attending requesting Consult:Chanelle Mathias  Chief Complaint :  SOB  Reason For consult : COPD      Initial HPI on admission:  HPI:  63 year old female with h/o Breast cancer s/p Left masectomy, COPD with active nicotine use, not on home o2, on chronic prednisone, CAD s/p PCI who p/w GANDHI, Cough, productive and fever x 2-3 days. not improving so came to ED for furhter evaluation. workup negative PE on CTA, RVP negative but noted to have severe emphysema.  and persistent lactic acidosis noted.   pt states sob at rest improved, but GANDHI continues.    denies sick contacts  last smoke thursday  no cp, palp, n/v noted.    PAST MEDICAL & SURGICAL HISTORY:  HTN (hypertension)  DM (diabetes mellitus): new diagnosis  Smoker  Breast cancer  Former smoker  COPD (chronic obstructive pulmonary disease)  H/O left mastectomy    Allergies    penicillin (Other)    Intolerances      FAMILY HISTORY:  Family history of heart disease (Sibling)    Social history:      Smoking:     Drinking:     Drug use:    Review of Systems:  CONSTITUTIONAL: +fever, No chills, +fatigue  EYES: No eye pain, No visual disturbances, No discharge  ENMT:  No difficulty hearing, No tinnitus, No vertigo; No sinus or throat pain  NECK: No pain or stiffness  RESPIRATORY: Per above  CARDIOVASCULAR: No chest pain, No palpitations, No dizziness, or No leg swelling  GASTROINTESTINAL: No abdominal or epigastric pain. No nausea, No vomiting, No hematemesis; No diarrhea or constipation. No melena, No hematochezia.  GENITOURINARY: No dysuria, No frequency, No hematuria, No incontinence  NEUROLOGICAL: No headaches, No memory loss, No loss of strength, No numbness, No tremors  SKIN: No itching, No burning, No rashes, No lesions   MUSCULOSKELETAL: No joint pain or swelling; No muscle, back, No extremity pain  PSYCHIATRIC: No depression, No anxiety, No mood swings, No difficulty sleeping    Medications:  MEDICATIONS  (STANDING):  ALBUTerol/ipratropium for Nebulization 3 milliLiter(s) Nebulizer every 4 hours  atorvastatin 80 milliGRAM(s) Oral at bedtime  buDESOnide 160 MICROgram(s)/formoterol 4.5 MICROgram(s) Inhaler 2 Puff(s) Inhalation two times a day  famotidine    Tablet 20 milliGRAM(s) Oral daily  letrozole 2.5 milliGRAM(s) Oral daily  levoFLOXacin  Tablet 500 milliGRAM(s) Oral every 24 hours  lisinopril 5 milliGRAM(s) Oral daily  methylPREDNISolone sodium succinate Injectable 40 milliGRAM(s) IV Push every 8 hours  nicotine - 21 mG/24Hr(s) Patch 1 patch Transdermal daily  rivaroxaban 20 milliGRAM(s) Oral at bedtime  sodium chloride 0.9%. 1000 milliLiter(s) (100 mL/Hr) IV Continuous <Continuous>  tiotropium 18 MICROgram(s) Capsule 1 Capsule(s) Inhalation daily    MEDICATIONS  (PRN):  acetaminophen   Tablet .. 650 milliGRAM(s) Oral every 6 hours PRN Temp greater or equal to 38C (100.4F), Mild Pain (1 - 3)      Home Medications:  Last Order Reconciliation Date: 03-29-19 @ 05:57 (Admission Reconciliation)  Advair Diskus 250 mcg-50 mcg inhalation powder: 1 puff(s) inhaled 2 times a day HOSP (04-29-17 @ 13:25)  Advair Diskus 250 mcg-50 mcg inhalation powder: 1 puff(s) inhaled 2 times a day HOSP (03-29-19 @ 04:12)  Advair Diskus 250 mcg-50 mcg inhalation powder: 1 puff(s) inhaled 2 times a day HOSP (04-29-17 @ 13:23)  aspirin 81 mg oral tablet: 1 tab(s) orally once a day (03-29-19 @ 04:12)  aspirin 81 mg oral tablet: 2 tab(s) orally once a day hosp (04-29-17 @ 13:23)  atorvastatin 80 mg oral tablet: 1 tab(s) orally once a day (03-29-19 @ 05:57)  Breo Ellipta 100 mcg-25 mcg/inh inhalation powder: 1 puff(s) inhaled once a day (03-29-19 @ 05:57)  Colace 100 mg oral capsule: 1 cap(s) orally once a day, As Needed (03-29-19 @ 04:12)  Colace 100 mg oral capsule: 2 cap(s) orally once a day HOSP (04-29-17 @ 13:23)  letrozole 2.5 mg oral tablet: 1 tab(s) orally once a day (03-29-19 @ 05:57)  lisinopril 5 mg oral tablet: 1 tab(s) orally once a day HOSP (04-29-17 @ 13:25)  lisinopril 5 mg oral tablet: 1 tab(s) orally once a day HOSP (03-29-19 @ 04:16)  lisinopril 5 mg oral tablet: 1 tab(s) orally once a day HOSP (04-29-17 @ 13:23)  Lovenox 60 mg/0.6 mL injectable solution: 1 dose(s) injectable 2 times a day HOSP  LAST DOSE 9 AM today (04-29-17 @ 13:23)  nicotine 21 mg/24 hr transdermal film, extended release: 1 patch transdermal once a day MDD:1 (04-29-17 @ 13:25)  nicotine 21 mg/24 hr transdermal film, extended release: 1 patch transdermal once a day MDD:1 (03-29-19 @ 04:12)  Plavix 75 mg oral tablet: 1 tab(s) orally once a day HOSP (04-29-17 @ 13:23)  predniSONE: 80 milligram(s) orally once a day HOSP (04-28-17 @ 15:43)  predniSONE: 80 milligram(s) orally once hosp (04-28-17 @ 16:09)  predniSONE: 40 milligram(s) orally once 4/28  hosp (04-29-17 @ 13:23)  predniSONE 10 mg oral tablet: 2 tab(s) orally once a day for 3 days  1 tab orally once a day for 3 days  0.5 tab orally once a day for 3 days then d/c (04-29-17 @ 13:32)  predniSONE 10 mg oral tablet: 2 tab(s) orally once a day for 3 days 1 tab orally once a day for 3 days 0.5 tab orally once a day for 3 days then d/c (03-29-19 @ 04:12)  Spiriva 18 mcg inhalation capsule: 1 cap(s) inhaled once a day (03-29-19 @ 04:12)  Spiriva 18 mcg inhalation capsule: 1 cap(s) inhaled once a day (04-29-17 @ 13:26)  Spiriva 18 mcg inhalation capsule: 1 cap(s) inhaled once a day HOSP (04-29-17 @ 13:23)  ticagrelor 90 mg oral tablet: 1 tab(s) orally 2 times a day (04-29-17 @ 10:33)  ticagrelor 90 mg oral tablet: 1 tab(s) orally 2 times a day MDD:2 (04-29-17 @ 10:46)  ticagrelor 90 mg oral tablet: 1 tab(s) orally 2 times a day MDD:2 (04-29-17 @ 13:23)  ticagrelor 90 mg oral tablet: 1 tab(s) orally 2 times a day MDD:2 (04-29-17 @ 13:25)  ticagrelor 90 mg oral tablet: 1 tab(s) orally 2 times a day MDD:2 (03-29-19 @ 04:12)  Xarelto 20 mg oral tablet: 1 tab(s) orally once a day (in the evening) (03-29-19 @ 05:57)      LABS:                        13.2   9.2   )-----------( 214      ( 30 Mar 2019 05:20 )             40.8     03-30    142  |  103  |  13  ----------------------------<  194<H>  3.7   |  27  |  0.79    Ca    9.4      30 Mar 2019 05:20                  Procalcitonin, Serum: 0.12 ng/mL (03-29-19 @ 02:50)          CULTURES: (if applicable)    Culture - Urine (collected 03-29-19 @ 02:57)  Source: .Urine Clean Catch (Midstream)  Final Report (03-30-19 @ 08:02):    <10,000 CFU/mL Normal Urogenital Daria    Culture - Blood (collected 03-29-19 @ 02:57)  Source: .Blood Blood-Peripheral  Preliminary Report (03-30-19 @ 10:00):    No growth to date.    Culture - Blood (collected 03-29-19 @ 02:57)  Source: .Blood Blood-Peripheral  Preliminary Report (03-30-19 @ 10:00):    No growth to date.      Rapid RVP Result: NotDetec (03-29-19 @ 07:00)      ABG - ( 30 Mar 2019 16:23 )  pH, Arterial: 7.39  pH, Blood: x     /  pCO2: 44    /  pO2: 99    / HCO3: x     / Base Excess: x     /  SaO2: 95        Lactic Acid Trend  03-31-19 @ 05:20   -   3.0<H>  03-30-19 @ 22:50   -   2.3<H>  03-30-19 @ 05:20   -   4.2<HH>  03-29-19 @ 06:33   -   2.8<H>  03-29-19 @ 02:57   -   4.0<HH>        CAPILLARY BLOOD GLUCOSE          RADIOLOGY  CXR:  < from: Xray Chest 1 View- PORTABLE-Urgent (03.29.19 @ 03:13) >    FINDINGS/  IMPRESSION:       The lungs are hyperaerated with emphysematous changes. There is no   pleural effusion. There is no pneumothorax.  The cardiac silhouette is   within normal limits. There is right sided catheter tip in right atrium.    Postsurgical changes in the left axillary region.      < end of copied text >      CT:  < from: CT Angio Chest w/ IV Cont (03.30.19 @ 17:00) >  CTA chest. Prior 1/22/2018.  75 cc Omnipaque 350 injected intravenously  Axial images augmented by coronal sagittal reformats reformats, 3-D MIP   images.    Satisfactory contrast bolus.  No pulmonary emboli. Nonaneurysmal thoracic aorta.  Central airways patent. No mediastinal adenopathy.  Severe emphysematous changes. No lobar consolidation or suspicious   nodularity.  No pleural pericardial effusion.  Normal heart size. Coronary artery calcination.  Gallstones noted.  No acute skeletal abnormality.    Impression:   Negative for pulmonary emboli.  Severe emphysema.    < end of copied text >          ECHO:  < from: TTE Echo Complete w/Doppler (03.31.19 @ 08:21) >  Summary:   1. Left ventricular ejection fraction, by visual estimation, is 55 to 60%.   2. Spectral Doppler shows impaired relaxation pattern of left ventricular myocardial filling (Grade I diastolic dysfunction).   3. Thickening and calcification of the anterior and posterior mitral valve leaflets.   4. Mild tricuspid regurgitation.   5. Sclerotic aortic valve with normal opening.   6. Trace pulmonic valve regurgitation.   7. Estimated pulmonary artery systolic pressure is 49.2 mmHg assuming a right atrial pressure of 10 mmHg, which is consistent withmild pulmonary hypertension.   8. LA volume Index is 24.3 ml/m² ml/m2.   9. The aortic valve mean gradient is 4.9 mmHg consistent with normally opening aortic valve.    < end of copied text >        VITALS:  T(C): 36.7 (03-31-19 @ 15:46), Max: 37.1 (03-30-19 @ 22:17)  T(F): 98.1 (03-31-19 @ 15:46), Max: 98.8 (03-30-19 @ 22:17)  HR: 110 (03-31-19 @ 15:46) (86 - 110)  BP: 124/58 (03-31-19 @ 15:46) (117/58 - 143/89)  BP(mean): --  ABP: --  ABP(mean): --  RR: 16 (03-31-19 @ 15:46) (15 - 16)  SpO2: 96% (03-31-19 @ 15:46) (95% - 99%)  CVP(mm Hg): --  CVP(cm H2O): --    Ins and Outs     03-30-19 @ 07:01  -  03-31-19 @ 07:00  --------------------------------------------------------  IN: 450 mL / OUT: 0 mL / NET: 450 mL    03-31-19 @ 07:01  -  03-31-19 @ 15:54  --------------------------------------------------------  IN: 620 mL / OUT: 0 mL / NET: 620 mL        Height (cm): 149.86 (03-29-19 @ 03:28)  Weight (kg): 63 (03-30-19 @ 08:01)  BMI (kg/m2): 28.1 (03-30-19 @ 08:01)

## 2019-03-31 NOTE — PROGRESS NOTE ADULT - ASSESSMENT
63 y o F with PMH breast Ca, +left mastectomy, +COPD, smoker (2PPD), on chronic Prednisone 5 mg/day, +CAD, +stent, presents with cough, fever, dyspnea admitted for acute bronchitis, COPD exacerbation.    #Dyspnea: likely COPD but pt has a history of PE though pt cannot confirm it, she is on Xarelto though prior to admission,CTA done to r/o acute PE as pt still has GANDHI which was negative but did show severe emphysematous changes    #Elevated lactate: improved but fluctuates now, giving IVF, oxygen sat stable    #Acute COPD exacerbation: still has wheezing, GANDHI, will check ambulatory O2 sats, continue steroids, nebs, abx, monitor oxygen saturation, PT eval    #Acute bronchitis: empirically covered with abx, limit to 5 days    #Breast cancer: continue Letrozole    #Long term AC: pt does not know why she is on Xarelto, denies blood clot though it seems she may have had a PE in the past, denies Afib     #Smoking cessation counseling: counseled pt to stop smoking, states she still smokes, likely contributing to her COPD exacerbation    #DVT PPX: on Xarelto 63 y o F with PMH breast Ca, +left mastectomy, +COPD, smoker (2PPD), on chronic Prednisone 5 mg/day, +CAD, +stent, presents with cough, fever, dyspnea admitted for acute bronchitis, COPD exacerbation.    #Dyspnea: likely COPD but pt has a history of PE though pt cannot confirm it, she is on Xarelto though prior to admission, CTA done to r/o acute PE as pt still has GANDHI which was negative but did show severe emphysematous changes    #Elevated lactate: improved but fluctuates now, giving IVF, oxygen sat stable    #Acute COPD exacerbation: still has wheezing, GANDHI, will check ambulatory O2 sats, continue steroids, nebs, abx, monitor oxygen saturation, PT eval    #Acute bronchitis: empirically covered with abx, limit to 5 days    #Breast cancer: continue Letrozole    #Long term AC: pt does not know why she is on Xarelto, denies blood clot though it seems she may have had a PE in the past, denies Afib     #Smoking cessation counseling: counseled pt to stop smoking, states she still smokes, likely contributing to her COPD exacerbation    #DVT PPX: on Xarelto

## 2019-03-31 NOTE — PROGRESS NOTE ADULT - SUBJECTIVE AND OBJECTIVE BOX
CC: Patient is a 63y old  Female who presents with a chief complaint of dyspnea, cough, fever (30 Mar 2019 07:22)      S: No f/c/n/v/pain    Patient seen and examined at bedside.    ALLERGIES:  penicillin (Other)      MEDICATIONS:  acetaminophen   Tablet .. 650 milliGRAM(s) Oral every 6 hours PRN  ALBUTerol/ipratropium for Nebulization 3 milliLiter(s) Nebulizer every 4 hours  atorvastatin 80 milliGRAM(s) Oral at bedtime  buDESOnide 160 MICROgram(s)/formoterol 4.5 MICROgram(s) Inhaler 2 Puff(s) Inhalation two times a day  famotidine    Tablet 20 milliGRAM(s) Oral daily  letrozole 2.5 milliGRAM(s) Oral daily  levoFLOXacin  Tablet 500 milliGRAM(s) Oral every 24 hours  lisinopril 5 milliGRAM(s) Oral daily  methylPREDNISolone sodium succinate Injectable 40 milliGRAM(s) IV Push every 8 hours  nicotine - 21 mG/24Hr(s) Patch 1 patch Transdermal daily  rivaroxaban 20 milliGRAM(s) Oral at bedtime  sodium chloride 0.9%. 1000 milliLiter(s) IV Continuous <Continuous>  tiotropium 18 MICROgram(s) Capsule 1 Capsule(s) Inhalation daily        Vital Signs Last 24 Hrs  T(F): 98.5 (31 Mar 2019 05:03), Max: 98.9 (30 Mar 2019 15:49)  HR: 105 (31 Mar 2019 05:03) (90 - 105)  BP: 117/58 (31 Mar 2019 05:03) (108/56 - 132/73)  RR: 16 (31 Mar 2019 05:03) (15 - 16)  SpO2: 96% (31 Mar 2019 05:03) (92% - 99%)  I&O's Summary    30 Mar 2019 07:01  -  31 Mar 2019 07:00  --------------------------------------------------------  IN: 450 mL / OUT: 0 mL / NET: 450 mL        PHYSICAL EXAM:  General: NAD  ENT: MMM  Neck: Supple, No JVD  Lungs: Clear to auscultation bilaterally  Cardio: RRR, S1/S2, No murmurs  Abdomen: Soft, Nontender, Nondistended; Bowel sounds present  Extremities: No cyanosis, No edema  Neuro: no new deficits  Skin: no rashes  Psych: AAO    LABS:                        13.2   9.2   )-----------( 214      ( 30 Mar 2019 05:20 )             40.8         142  |  103  |  13  ----------------------------<  194  3.7   |  27  |  0.79    Ca    9.4      30 Mar 2019 05:20    TPro  8.7  /  Alb  4.1  /  TBili  0.4  /  DBili  x   /  AST  38  /  ALT  55  /  AlkPhos  117      eGFR if Non African American: 81 mL/min/1.73M2 (19 @ 05:20)  eGFR if African American: 94 mL/min/1.73M2 (19 @ 05:20)    PT/INR - ( 29 Mar 2019 02:57 )   PT: 23.4 sec;   INR: 2.04 ratio         PTT - ( 29 Mar 2019 02:57 )  PTT:43.2 sec  Lactate, Blood: 3.0 mmol/L ( @ 05:20)  Lactate, Blood: 2.3 mmol/L ( @ 22:50)  Lactate, Blood: 4.2 mmol/L ( @ 05:20)  Lactate, Blood: 2.8 mmol/L ( @ 06:33)  Lactate, Blood: 4.0 mmol/L ( @ 02:57)    CARDIAC MARKERS ( 29 Mar 2019 08:14 )  .027 ng/mL / x     / x     / x     / x      CARDIAC MARKERS ( 29 Mar 2019 02:50 )  <.017 ng/mL / x     / x     / x     / x            TSH 0.88   TSH with FT4 reflex --  Total T3 --      ABG - ( 30 Mar 2019 16:23 )  pH, Arterial: 7.39  pH, Blood: x     /  pCO2: 44    /  pO2: 99    / HCO3: x     / Base Excess: x     /  SaO2: 95                CAPILLARY BLOOD GLUCOSE          Urinalysis Basic - ( 29 Mar 2019 02:57 )    Color: Yellow / Appearance: Clear / S.025 / pH: x  Gluc: x / Ketone: Negative  / Bili: Negative / Urobili: Negative   Blood: x / Protein: 100 / Nitrite: Negative   Leuk Esterase: Negative / RBC: 5-10 /HPF / WBC Negative /HPF   Sq Epi: x / Non Sq Epi: Neg.-Few / Bacteria: Few /HPF        Culture - Urine (collected 29 Mar 2019 02:57)  Source: .Urine Clean Catch (Midstream)  Final Report (30 Mar 2019 08:02):    <10,000 CFU/mL Normal Urogenital Daria    Culture - Blood (collected 29 Mar 2019 02:57)  Source: .Blood Blood-Peripheral  Preliminary Report (30 Mar 2019 10:00):    No growth to date.    Culture - Blood (collected 29 Mar 2019 02:57)  Source: .Blood Blood-Peripheral  Preliminary Report (30 Mar 2019 10:00):    No growth to date.        RADIOLOGY & ADDITIONAL TESTS:    Care Discussed with Consultants/Other Providers:

## 2019-04-01 LAB
CO2 BLDA-SCNC: 25 MMOL/L — SIGNIFICANT CHANGE UP (ref 22–30)
GAS PNL BLDA: SIGNIFICANT CHANGE UP
GLUCOSE BLDC GLUCOMTR-MCNC: 187 MG/DL — HIGH (ref 70–99)
GLUCOSE BLDC GLUCOMTR-MCNC: 198 MG/DL — HIGH (ref 70–99)
GLUCOSE BLDC GLUCOMTR-MCNC: 203 MG/DL — HIGH (ref 70–99)
HOROWITZ INDEX BLDA+IHG-RTO: 2.5 — SIGNIFICANT CHANGE UP
PCO2 BLDA: 39 MMHG — SIGNIFICANT CHANGE UP (ref 32–46)
PH BLDA: 7.41 — SIGNIFICANT CHANGE UP (ref 7.35–7.45)
PO2 BLDA: 99 MMHG — SIGNIFICANT CHANGE UP (ref 74–108)
SAO2 % BLDA: 97 % — HIGH (ref 92–96)

## 2019-04-01 PROCEDURE — 99233 SBSQ HOSP IP/OBS HIGH 50: CPT | Mod: GC

## 2019-04-01 RX ORDER — IPRATROPIUM BROMIDE 0.2 MG/ML
500 SOLUTION, NON-ORAL INHALATION EVERY 6 HOURS
Qty: 0 | Refills: 0 | Status: DISCONTINUED | OUTPATIENT
Start: 2019-04-01 | End: 2019-04-05

## 2019-04-01 RX ORDER — IPRATROPIUM/ALBUTEROL SULFATE 18-103MCG
3 AEROSOL WITH ADAPTER (GRAM) INHALATION EVERY 6 HOURS
Qty: 0 | Refills: 0 | Status: DISCONTINUED | OUTPATIENT
Start: 2019-04-01 | End: 2019-04-01

## 2019-04-01 RX ORDER — HYDROCORTISONE 20 MG
60 TABLET ORAL ONCE
Qty: 0 | Refills: 0 | Status: DISCONTINUED | OUTPATIENT
Start: 2019-04-01 | End: 2019-04-01

## 2019-04-01 RX ADMIN — Medication 500 MICROGRAM(S): at 22:35

## 2019-04-01 RX ADMIN — BUDESONIDE AND FORMOTEROL FUMARATE DIHYDRATE 2 PUFF(S): 160; 4.5 AEROSOL RESPIRATORY (INHALATION) at 22:35

## 2019-04-01 RX ADMIN — Medication 60 MILLIGRAM(S): at 09:56

## 2019-04-01 RX ADMIN — Medication 1 PATCH: at 07:22

## 2019-04-01 RX ADMIN — Medication 1 PATCH: at 12:00

## 2019-04-01 RX ADMIN — TIOTROPIUM BROMIDE 1 CAPSULE(S): 18 CAPSULE ORAL; RESPIRATORY (INHALATION) at 09:10

## 2019-04-01 RX ADMIN — LISINOPRIL 5 MILLIGRAM(S): 2.5 TABLET ORAL at 05:12

## 2019-04-01 RX ADMIN — LETROZOLE 2.5 MILLIGRAM(S): 2.5 TABLET, FILM COATED ORAL at 11:31

## 2019-04-01 RX ADMIN — RIVAROXABAN 20 MILLIGRAM(S): KIT at 21:55

## 2019-04-01 RX ADMIN — Medication 40 MILLIGRAM(S): at 21:55

## 2019-04-01 RX ADMIN — BUDESONIDE AND FORMOTEROL FUMARATE DIHYDRATE 2 PUFF(S): 160; 4.5 AEROSOL RESPIRATORY (INHALATION) at 09:10

## 2019-04-01 RX ADMIN — Medication 3 MILLILITER(S): at 09:10

## 2019-04-01 RX ADMIN — Medication 500 MICROGRAM(S): at 15:21

## 2019-04-01 RX ADMIN — Medication 1 PATCH: at 11:32

## 2019-04-01 RX ADMIN — ATORVASTATIN CALCIUM 80 MILLIGRAM(S): 80 TABLET, FILM COATED ORAL at 21:55

## 2019-04-01 RX ADMIN — Medication 40 MILLIGRAM(S): at 05:12

## 2019-04-01 RX ADMIN — SODIUM CHLORIDE 100 MILLILITER(S): 9 INJECTION INTRAMUSCULAR; INTRAVENOUS; SUBCUTANEOUS at 17:48

## 2019-04-01 RX ADMIN — SODIUM CHLORIDE 100 MILLILITER(S): 9 INJECTION INTRAMUSCULAR; INTRAVENOUS; SUBCUTANEOUS at 09:11

## 2019-04-01 RX ADMIN — Medication 40 MILLIGRAM(S): at 15:11

## 2019-04-01 RX ADMIN — FAMOTIDINE 20 MILLIGRAM(S): 10 INJECTION INTRAVENOUS at 11:31

## 2019-04-01 NOTE — PROGRESS NOTE ADULT - SUBJECTIVE AND OBJECTIVE BOX
Patient is a 63y old  Female who presents with a chief complaint of dyspnea, cough, fever (01 Apr 2019 11:38)  Patient seen and examined at bedside.  Pt. c/o shortness of breath, not getting better.    ALLERGIES:  penicillin (Other)    MEDICATIONS  (STANDING):  atorvastatin 80 milliGRAM(s) Oral at bedtime  buDESOnide 160 MICROgram(s)/formoterol 4.5 MICROgram(s) Inhaler 2 Puff(s) Inhalation two times a day  famotidine    Tablet 20 milliGRAM(s) Oral daily  ipratropium    for Nebulization 500 MICROGram(s) Nebulizer every 6 hours  letrozole 2.5 milliGRAM(s) Oral daily  levoFLOXacin  Tablet 500 milliGRAM(s) Oral every 24 hours  lisinopril 5 milliGRAM(s) Oral daily  methylPREDNISolone sodium succinate Injectable 40 milliGRAM(s) IV Push every 8 hours  nicotine - 21 mG/24Hr(s) Patch 1 patch Transdermal daily  rivaroxaban 20 milliGRAM(s) Oral at bedtime  sodium chloride 0.9%. 1000 milliLiter(s) (100 mL/Hr) IV Continuous <Continuous>  tiotropium 18 MICROgram(s) Capsule 1 Capsule(s) Inhalation daily    MEDICATIONS  (PRN):  acetaminophen   Tablet .. 650 milliGRAM(s) Oral every 6 hours PRN Temp greater or equal to 38C (100.4F), Mild Pain (1 - 3)    Vital Signs Last 24 Hrs  T(F): 98 (01 Apr 2019 08:03), Max: 98.1 (31 Mar 2019 15:46)  HR: 110 (01 Apr 2019 09:10) (85 - 110)  BP: 153/87 (01 Apr 2019 08:03) (124/58 - 156/81)  RR: 16 (01 Apr 2019 08:03) (16 - 16)  SpO2: 96% (01 Apr 2019 09:10) (96% - 99%)  I&O's Summary    31 Mar 2019 07:01  -  01 Apr 2019 07:00  --------------------------------------------------------  IN: 1570 mL / OUT: 0 mL / NET: 1570 mL    01 Apr 2019 07:01  -  01 Apr 2019 14:05  --------------------------------------------------------  IN: 420 mL / OUT: 0 mL / NET: 420 mL      PHYSICAL EXAM:  General: NAD, A/O x 3, oxygen in place via NC.  ENT: MMM  Neck: Supple, No JVD  Lungs: decreased air entry, +wheezing throughout all lung fields  Cardio: RRR, S1/S2, No murmurs  Abdomen: Soft, Nontender, Nondistended; Bowel sounds present  Extremities: No calf tenderness, No pitting edema  Neuro:  no focal deficits    LABS:                        13.2   9.2   )-----------( 214      ( 30 Mar 2019 05:20 )             40.8     03-30    142  |  103  |  13  ----------------------------<  194  3.7   |  27  |  0.79    Ca    9.4      30 Mar 2019 05:20      eGFR if Non African American: 81 mL/min/1.73M2 (03-30-19 @ 05:20)  eGFR if African American: 94 mL/min/1.73M2 (03-30-19 @ 05:20)      Lactate, Blood: 3.0 mmol/L (03-31 @ 05:20)  Lactate, Blood: 2.3 mmol/L (03-30 @ 22:50)  Lactate, Blood: 4.2 mmol/L (03-30 @ 05:20)              ABG - ( 01 Apr 2019 13:48 )  pH, Arterial: 7.41  pH, Blood: x     /  pCO2: 39    /  pO2: 99    / HCO3: x     / Base Excess: x     /  SaO2: 97                CAPILLARY BLOOD GLUCOSE      POCT Blood Glucose.: 198 mg/dL (01 Apr 2019 11:42)  POCT Blood Glucose.: 224 mg/dL (31 Mar 2019 22:05)          Culture - Urine (collected 29 Mar 2019 02:57)  Source: .Urine Clean Catch (Midstream)  Final Report (30 Mar 2019 08:02):    <10,000 CFU/mL Normal Urogenital Daria    Culture - Blood (collected 29 Mar 2019 02:57)  Source: .Blood Blood-Peripheral  Preliminary Report (30 Mar 2019 10:00):    No growth to date.    Culture - Blood (collected 29 Mar 2019 02:57)  Source: .Blood Blood-Peripheral  Preliminary Report (30 Mar 2019 10:00):    No growth to date.        RADIOLOGY & ADDITIONAL TESTS:    Care Discussed with Consultants/Other Providers:

## 2019-04-01 NOTE — PROGRESS NOTE ADULT - ASSESSMENT
63 y o F with PMH breast Ca, +left mastectomy, +COPD, smoker (2PPD), on chronic Prednisone 5 mg/day, +CAD, +stent, presents with cough, fever, dyspnea admitted for acute bronchitis, COPD exacerbation.    #Dyspnea: likely COPD but pt has a history of PE though pt cannot confirm it, she is on Xarelto though prior to admission, CTA done to r/o acute PE as pt still has GANDHI which was negative but did show severe emphysematous changes    #Elevated lactate: improved but fluctuates now, giving IVF, oxygen sat stable    #Acute COPD exacerbation: still has wheezing, GANDHI, will check ambulatory O2 sats, continue steroids, nebs, abx, monitor oxygen saturation, PT eval    #Acute bronchitis: empirically covered with abx, limit to 5 days    #Breast cancer: continue Letrozole    #Long term AC: pt does not know why she is on Xarelto, denies blood clot though it seems she may have had a PE in the past, denies Afib     #Smoking cessation counseling: counseled pt to stop smoking, states she still smokes, likely contributing to her COPD exacerbation    #DVT PPX: on Xarelto 63 y o F with PMH breast Ca, +left mastectomy, +COPD, smoker (2PPD), on chronic Prednisone 5 mg/day, +CAD, +stent, presents with cough, fever, dyspnea admitted for acute bronchitis, COPD exacerbation.  A/P:  1. COPD Exas.  -continue IV steroids, Duonebs, Spiriva, oral antibiotics  Pulm consult appreciated; ABG done today, no acidosis or retention    #Elevated lactate: improved but fluctuates now, giving IVF, oxygen sat stable    #Acute COPD exacerbation: still has wheezing, GANDHI, will check ambulatory O2 sats, continue steroids, nebs, abx, monitor oxygen saturation, PT eval    #Acute bronchitis: empirically covered with abx, limit to 5 days    #Breast cancer: continue Letrozole    #Long term AC: pt does not know why she is on Xarelto, denies blood clot though it seems she may have had a PE in the past, denies Afib     #Smoking cessation counseling: counseled pt to stop smoking, states she still smokes, likely contributing to her COPD exacerbation    #DVT PPX: on Xarelto 63 y o F with PMH breast Ca, +left mastectomy, +COPD, smoker (2PPD), on chronic Prednisone 5 mg/day, +CAD, +stent, presents with cough, fever, dyspnea admitted for acute bronchitis, COPD exacerbation.  A/P:  1. COPD Exas.  - continue IV steroids, given extra 60 mg. IV dose this morning  - Duonebs, Spiriva, oral antibiotics  - Pulm consult appreciated; ABG done today, no acidosis or retention  - PT eval    2. Elevated lactate: improved but fluctuates, giving IVF, oxygen sat stable    3. Breast cancer: continue Letrozole    4. Long term AC: pt does not know why she is on Xarelto, denies blood clot though it seems she may have had a PE in the past, denies Afib     5. Tobacco abuse; Smoking cessation counseling and Nicotine patch

## 2019-04-01 NOTE — PROGRESS NOTE ADULT - SUBJECTIVE AND OBJECTIVE BOX
Follow-up Pulmonary Progress Note  Chief Complaint : Chronic obstructive pulmonary disease with acute exacerbation    Patient seen at bedside. Sitting in chair. Talking to family in full sentences. Had an episode of distress this Am when walking to the bed side commode with out oxygen. No chest pain or palpitations.      Allergies :penicillin (Other)    PAST MEDICAL & SURGICAL HISTORY:  HTN (hypertension)  DM (diabetes mellitus): new diagnosis  Smoker  Breast cancer  Former smoker  COPD (chronic obstructive pulmonary disease)  H/O left mastectomy    Medications:  MEDICATIONS  (STANDING):  ALBUTerol/ipratropium for Nebulization 3 milliLiter(s) Nebulizer every 6 hours  atorvastatin 80 milliGRAM(s) Oral at bedtime  buDESOnide 160 MICROgram(s)/formoterol 4.5 MICROgram(s) Inhaler 2 Puff(s) Inhalation two times a day  famotidine    Tablet 20 milliGRAM(s) Oral daily  letrozole 2.5 milliGRAM(s) Oral daily  levoFLOXacin  Tablet 500 milliGRAM(s) Oral every 24 hours  lisinopril 5 milliGRAM(s) Oral daily  methylPREDNISolone sodium succinate Injectable 40 milliGRAM(s) IV Push every 8 hours  nicotine - 21 mG/24Hr(s) Patch 1 patch Transdermal daily  rivaroxaban 20 milliGRAM(s) Oral at bedtime  sodium chloride 0.9%. 1000 milliLiter(s) (100 mL/Hr) IV Continuous <Continuous>  tiotropium 18 MICROgram(s) Capsule 1 Capsule(s) Inhalation daily    MEDICATIONS  (PRN):  acetaminophen   Tablet .. 650 milliGRAM(s) Oral every 6 hours PRN Temp greater or equal to 38C (100.4F), Mild Pain (1 - 3)    CULTURES: (if applicable)    Culture - Urine (collected 03-29-19 @ 02:57)  Source: .Urine Clean Catch (Midstream)  Final Report (03-30-19 @ 08:02):    <10,000 CFU/mL Normal Urogenital Daria    Culture - Blood (collected 03-29-19 @ 02:57)  Source: .Blood Blood-Peripheral  Preliminary Report (03-30-19 @ 10:00):    No growth to date.    Culture - Blood (collected 03-29-19 @ 02:57)  Source: .Blood Blood-Peripheral  Preliminary Report (03-30-19 @ 10:00):    No growth to date.    Rapid RVP Result: NotDetec (03-29-19 @ 07:00)    ABG - ( 30 Mar 2019 16:23 )  pH, Arterial: 7.39  pH, Blood: x     /  pCO2: 44    /  pO2: 99    / HCO3: x     / Base Excess: x     /  SaO2: 95        CAPILLARY BLOOD GLUCOSE    POCT Blood Glucose.: 224 mg/dL (31 Mar 2019 22:05)    VITALS:  T(C): 36.7 (04-01-19 @ 08:03), Max: 36.7 (03-31-19 @ 14:01)  T(F): 98 (04-01-19 @ 08:03), Max: 98.1 (03-31-19 @ 15:46)  HR: 110 (04-01-19 @ 09:10) (85 - 110)  BP: 153/87 (04-01-19 @ 08:03) (124/58 - 156/81)  BP(mean): --  ABP: --  ABP(mean): --  RR: 16 (04-01-19 @ 08:03) (16 - 16)  SpO2: 96% (04-01-19 @ 09:10) (96% - 99%)  CVP(mm Hg): --  CVP(cm H2O): --    Ins and Outs     03-31-19 @ 07:01  -  04-01-19 @ 07:00  --------------------------------------------------------  IN: 1570 mL / OUT: 0 mL / NET: 1570 mL    04-01-19 @ 07:01  -  04-01-19 @ 11:39  --------------------------------------------------------  IN: 420 mL / OUT: 0 mL / NET: 420 mL    Weight (kg): 63 (03-30-19 @ 08:01)

## 2019-04-01 NOTE — PROGRESS NOTE ADULT - ASSESSMENT
Physical Examination:  GENERAL:               Alert, Oriented, No acute distress.    HEENT:                   No JVD, dry  MM,   PULM:                     Bilateral air entry,  no significant sputum production, No Rales, Prolonged expiratory phase, mild end expiratory wheezing   CVS:                        S1, S2,  + Murmur  ABD:                        Soft, nondistended, nontender, normoactive bowel sounds,   EXT:                         No edema, nontender, No Cyanosis or Clubbing   Vascular:                  Warm Extremities, Normal Capillary refill, Normal Distal Pulses  NEURO:                   Alert, oriented, interactive, nonfocal, follows commands  PSYC:                       Calm, + Insight.        Assessment  Acute bronchitis   Chronic severe COPD - emphysema on chronic steroids- prednisone 5  Active nicotine use  persistent lactic acidosis not resolving with IV hydration. Beta agonist use may be causing lactic acidosis  underlying DM2, HTN, h/o Breat Cancer, CAD s/p pci  Current every day smoker   CT scan of the chest with severe emphysema, no PE no dense consolidation, viral panel negative     Plan  Continue steroids  Nebs PRN  D/c albuterol and IV fluids   Continue Symbicort Spiriva  n/c o2 to maintain sat > 88  trend lactic acid daily  case d/w with Dr. Finch

## 2019-04-02 LAB
GLUCOSE BLDC GLUCOMTR-MCNC: 178 MG/DL — HIGH (ref 70–99)
GLUCOSE BLDC GLUCOMTR-MCNC: 184 MG/DL — HIGH (ref 70–99)
GLUCOSE BLDC GLUCOMTR-MCNC: 212 MG/DL — HIGH (ref 70–99)

## 2019-04-02 PROCEDURE — 99233 SBSQ HOSP IP/OBS HIGH 50: CPT | Mod: GC

## 2019-04-02 RX ORDER — INSULIN LISPRO 100/ML
VIAL (ML) SUBCUTANEOUS
Qty: 0 | Refills: 0 | Status: DISCONTINUED | OUTPATIENT
Start: 2019-04-02 | End: 2019-04-05

## 2019-04-02 RX ORDER — SODIUM CHLORIDE 9 MG/ML
1000 INJECTION, SOLUTION INTRAVENOUS
Qty: 0 | Refills: 0 | Status: DISCONTINUED | OUTPATIENT
Start: 2019-04-02 | End: 2019-04-05

## 2019-04-02 RX ORDER — DEXTROSE 50 % IN WATER 50 %
25 SYRINGE (ML) INTRAVENOUS ONCE
Qty: 0 | Refills: 0 | Status: DISCONTINUED | OUTPATIENT
Start: 2019-04-02 | End: 2019-04-05

## 2019-04-02 RX ORDER — GLUCAGON INJECTION, SOLUTION 0.5 MG/.1ML
1 INJECTION, SOLUTION SUBCUTANEOUS ONCE
Qty: 0 | Refills: 0 | Status: DISCONTINUED | OUTPATIENT
Start: 2019-04-02 | End: 2019-04-05

## 2019-04-02 RX ORDER — DEXTROSE 50 % IN WATER 50 %
15 SYRINGE (ML) INTRAVENOUS ONCE
Qty: 0 | Refills: 0 | Status: DISCONTINUED | OUTPATIENT
Start: 2019-04-02 | End: 2019-04-05

## 2019-04-02 RX ORDER — DEXTROSE 50 % IN WATER 50 %
12.5 SYRINGE (ML) INTRAVENOUS ONCE
Qty: 0 | Refills: 0 | Status: DISCONTINUED | OUTPATIENT
Start: 2019-04-02 | End: 2019-04-05

## 2019-04-02 RX ADMIN — LETROZOLE 2.5 MILLIGRAM(S): 2.5 TABLET, FILM COATED ORAL at 12:16

## 2019-04-02 RX ADMIN — Medication 1 PATCH: at 19:15

## 2019-04-02 RX ADMIN — Medication 1 PATCH: at 05:37

## 2019-04-02 RX ADMIN — Medication 500 MICROGRAM(S): at 21:29

## 2019-04-02 RX ADMIN — BUDESONIDE AND FORMOTEROL FUMARATE DIHYDRATE 2 PUFF(S): 160; 4.5 AEROSOL RESPIRATORY (INHALATION) at 09:20

## 2019-04-02 RX ADMIN — TIOTROPIUM BROMIDE 1 CAPSULE(S): 18 CAPSULE ORAL; RESPIRATORY (INHALATION) at 09:20

## 2019-04-02 RX ADMIN — Medication 1 PATCH: at 17:41

## 2019-04-02 RX ADMIN — Medication 4: at 17:23

## 2019-04-02 RX ADMIN — Medication 2: at 12:14

## 2019-04-02 RX ADMIN — Medication 500 MICROGRAM(S): at 04:15

## 2019-04-02 RX ADMIN — Medication 40 MILLIGRAM(S): at 14:14

## 2019-04-02 RX ADMIN — Medication 1 PATCH: at 12:19

## 2019-04-02 RX ADMIN — LISINOPRIL 5 MILLIGRAM(S): 2.5 TABLET ORAL at 05:35

## 2019-04-02 RX ADMIN — Medication 500 MICROGRAM(S): at 09:21

## 2019-04-02 RX ADMIN — Medication 40 MILLIGRAM(S): at 23:40

## 2019-04-02 RX ADMIN — ATORVASTATIN CALCIUM 80 MILLIGRAM(S): 80 TABLET, FILM COATED ORAL at 23:40

## 2019-04-02 RX ADMIN — BUDESONIDE AND FORMOTEROL FUMARATE DIHYDRATE 2 PUFF(S): 160; 4.5 AEROSOL RESPIRATORY (INHALATION) at 21:29

## 2019-04-02 RX ADMIN — Medication 40 MILLIGRAM(S): at 05:35

## 2019-04-02 RX ADMIN — RIVAROXABAN 20 MILLIGRAM(S): KIT at 23:40

## 2019-04-02 RX ADMIN — Medication 1 PATCH: at 12:14

## 2019-04-02 RX ADMIN — FAMOTIDINE 20 MILLIGRAM(S): 10 INJECTION INTRAVENOUS at 12:14

## 2019-04-02 RX ADMIN — Medication 500 MICROGRAM(S): at 15:31

## 2019-04-02 NOTE — PHARMACOTHERAPY INTERVENTION NOTE - COMMENTS
flu has been ruled out so dc tamiflu
Levaquin iv x 1 on 3/29 then PO 3/30 daily for bronchitis  5 day course  completed today - will monitor pt off AB
flu ruled out so dc tamilfu  continue with po levafloxacin for bronchitis (5 day course of therapy)

## 2019-04-02 NOTE — PROGRESS NOTE ADULT - SUBJECTIVE AND OBJECTIVE BOX
Follow-up Pulmonary Progress Note  Chief Complaint : Chronic obstructive pulmonary disease with acute exacerbation      Sitting in chair. Not in distress.     Allergies :penicillin (Other)      PAST MEDICAL & SURGICAL HISTORY:  HTN (hypertension)  DM (diabetes mellitus): new diagnosis  Smoker  Breast cancer  Former smoker  COPD (chronic obstructive pulmonary disease)  H/O left mastectomy      Medications:  MEDICATIONS  (STANDING):  atorvastatin 80 milliGRAM(s) Oral at bedtime  buDESOnide 160 MICROgram(s)/formoterol 4.5 MICROgram(s) Inhaler 2 Puff(s) Inhalation two times a day  dextrose 5%. 1000 milliLiter(s) (50 mL/Hr) IV Continuous <Continuous>  dextrose 50% Injectable 12.5 Gram(s) IV Push once  dextrose 50% Injectable 25 Gram(s) IV Push once  dextrose 50% Injectable 25 Gram(s) IV Push once  famotidine    Tablet 20 milliGRAM(s) Oral daily  insulin lispro (HumaLOG) corrective regimen sliding scale   SubCutaneous three times a day before meals  ipratropium    for Nebulization 500 MICROGram(s) Nebulizer every 6 hours  letrozole 2.5 milliGRAM(s) Oral daily  lisinopril 5 milliGRAM(s) Oral daily  methylPREDNISolone sodium succinate Injectable 40 milliGRAM(s) IV Push every 8 hours  nicotine - 21 mG/24Hr(s) Patch 1 patch Transdermal daily  rivaroxaban 20 milliGRAM(s) Oral at bedtime  tiotropium 18 MICROgram(s) Capsule 1 Capsule(s) Inhalation daily    MEDICATIONS  (PRN):  acetaminophen   Tablet .. 650 milliGRAM(s) Oral every 6 hours PRN Temp greater or equal to 38C (100.4F), Mild Pain (1 - 3)  dextrose 40% Gel 15 Gram(s) Oral once PRN Blood Glucose LESS THAN 70 milliGRAM(s)/deciliter  glucagon  Injectable 1 milliGRAM(s) IntraMuscular once PRN Glucose LESS THAN 70 milligrams/deciliter    CULTURES: (if applicable)    Culture - Urine (collected 03-29-19 @ 02:57)  Source: .Urine Clean Catch (Midstream)  Final Report (03-30-19 @ 08:02):    <10,000 CFU/mL Normal Urogenital Daria    Culture - Blood (collected 03-29-19 @ 02:57)  Source: .Blood Blood-Peripheral  Preliminary Report (03-30-19 @ 10:00):    No growth to date.    Culture - Blood (collected 03-29-19 @ 02:57)  Source: .Blood Blood-Peripheral  Preliminary Report (03-30-19 @ 10:00):    No growth to date.    Rapid RVP Result: NotDetec (03-29-19 @ 07:00)    ABG - ( 01 Apr 2019 13:48 )  pH, Arterial: 7.41  pH, Blood: x     /  pCO2: 39    /  pO2: 99    / HCO3: x     / Base Excess: x     /  SaO2: 97        CAPILLARY BLOOD GLUCOSE    POCT Blood Glucose.: 184 mg/dL (02 Apr 2019 11:54)    VITALS:  T(C): 36.4 (04-02-19 @ 10:43), Max: 36.8 (04-01-19 @ 20:42)  T(F): 97.5 (04-02-19 @ 10:43), Max: 98.2 (04-01-19 @ 20:42)  HR: 91 (04-02-19 @ 10:43) (83 - 102)  BP: 173/84 (04-02-19 @ 10:43) (149/95 - 173/84)  BP(mean): --  ABP: --  ABP(mean): --  RR: 16 (04-02-19 @ 10:43) (15 - 16)  SpO2: 98% (04-02-19 @ 10:43) (96% - 99%)  CVP(mm Hg): --  CVP(cm H2O): --    Ins and Outs     04-01-19 @ 07:01  -  04-02-19 @ 07:00  --------------------------------------------------------  IN: 1760 mL / OUT: 0 mL / NET: 1760 mL

## 2019-04-02 NOTE — PROGRESS NOTE ADULT - ASSESSMENT
63 y o F with PMHx of Breast Ca ( +left mastectomy), +COPD, smoker (2PPD), on chronic Prednisone 5 mg/day, +CAD, +stent, presents with cough, fever, dyspnea admitted for acute bronchitis, COPD exacerbation.  A/P:  1. COPD Exas.  - continue IV steroids, improved after extra dose of steroids  - Duonebs, Spiriva, oral antibiotics, oxygen via NC  - Pulm consult appreciated; pt. improving.    2. Elevated lactate: improved but fluctuates, check repeat lactate level in am  IVF stopped; pt with elevated BP's    3. Breast cancer: continue Letrozole    4. Long term AC: pt does not know why she is on Xarelto, denies blood clot though it seems she may have had a PE in the past, denies Afib     5. Tobacco abuse:  - Smoking cessation counseling and Nicotine patch

## 2019-04-02 NOTE — PROGRESS NOTE ADULT - ASSESSMENT
Physical Examination:  GENERAL:               Alert, Oriented, No acute distress.    HEENT:                   No JVD, dry  MM,   PULM:                     Bilateral air entry,  no significant sputum production, No Rales, No wheezing   CVS:                        S1, S2,  + Murmur  ABD:                        Soft, nondistended, nontender, normoactive bowel sounds,   EXT:                         No edema, nontender, No Cyanosis or Clubbing   Vascular:                  Warm Extremities, Normal Capillary refill, Normal Distal Pulses  NEURO:                   Alert, oriented, interactive, nonfocal, follows commands  PSYC:                       Calm, + Insight.        Assessment  Acute bronchitis   Chronic severe COPD - emphysema on chronic steroids- prednisone 5  Active nicotine use  persistent lactic acidosis not resolving with IV hydration. Beta agonist use may be causing lactic acidosis  underlying DM2, HTN, h/o Breat Cancer, CAD s/p pci  Current every day smoker   CT scan of the chest with severe emphysema, no PE no dense consolidation, viral panel negative     Plan  Continue steroids  Nebs PRN  Continue Symbicort Spiriva  n/c o2 to maintain sat > 88  trend lactic acid daily  case d/w with Dr. Finch   Attempted to call family member Enid Maurer ([phone]) per patient's request, phone kept ringing.

## 2019-04-02 NOTE — PROGRESS NOTE ADULT - SUBJECTIVE AND OBJECTIVE BOX
Patient is a 63y old  Female who presents with a chief complaint of dyspnea, cough, fever (02 Apr 2019 13:39)  Patient seen and examined at bedside.  Pt. feels better today, less short of breath.    ALLERGIES:  penicillin (Other)    MEDICATIONS  (STANDING):  atorvastatin 80 milliGRAM(s) Oral at bedtime  buDESOnide 160 MICROgram(s)/formoterol 4.5 MICROgram(s) Inhaler 2 Puff(s) Inhalation two times a day  dextrose 5%. 1000 milliLiter(s) (50 mL/Hr) IV Continuous <Continuous>  dextrose 50% Injectable 12.5 Gram(s) IV Push once  dextrose 50% Injectable 25 Gram(s) IV Push once  dextrose 50% Injectable 25 Gram(s) IV Push once  famotidine    Tablet 20 milliGRAM(s) Oral daily  insulin lispro (HumaLOG) corrective regimen sliding scale   SubCutaneous three times a day before meals  ipratropium    for Nebulization 500 MICROGram(s) Nebulizer every 6 hours  letrozole 2.5 milliGRAM(s) Oral daily  lisinopril 5 milliGRAM(s) Oral daily  methylPREDNISolone sodium succinate Injectable 40 milliGRAM(s) IV Push every 8 hours  nicotine - 21 mG/24Hr(s) Patch 1 patch Transdermal daily  rivaroxaban 20 milliGRAM(s) Oral at bedtime  tiotropium 18 MICROgram(s) Capsule 1 Capsule(s) Inhalation daily    MEDICATIONS  (PRN):  acetaminophen   Tablet .. 650 milliGRAM(s) Oral every 6 hours PRN Temp greater or equal to 38C (100.4F), Mild Pain (1 - 3)  dextrose 40% Gel 15 Gram(s) Oral once PRN Blood Glucose LESS THAN 70 milliGRAM(s)/deciliter  glucagon  Injectable 1 milliGRAM(s) IntraMuscular once PRN Glucose LESS THAN 70 milligrams/deciliter    Vital Signs Last 24 Hrs  T(F): 97.5 (02 Apr 2019 10:43), Max: 98.2 (01 Apr 2019 20:42)  HR: 91 (02 Apr 2019 10:43) (83 - 102)  BP: 173/84 (02 Apr 2019 10:43) (149/95 - 173/84)  RR: 16 (02 Apr 2019 10:43) (15 - 16)  SpO2: 98% (02 Apr 2019 10:43) (96% - 99%)  I&O's Summary    01 Apr 2019 07:01  -  02 Apr 2019 07:00  --------------------------------------------------------  IN: 1760 mL / OUT: 0 mL / NET: 1760 mL      PHYSICAL EXAM:  General: NAD, A/O x 3  ENT: MMM  Neck: Supple, No JVD  Lungs: poor air entry, no wheezing, no rhonchi  Cardio: RRR, S1/S2, No murmurs  Abdomen: Soft, Nontender, Nondistended; Bowel sounds present  Extremities: No calf tenderness, No pitting edema  Neuro:  no focal deficits    Lactate, Blood: 3.0 mmol/L (03-31 @ 05:20)  Lactate, Blood: 2.3 mmol/L (03-30 @ 22:50)              ABG - ( 01 Apr 2019 13:48 )  pH, Arterial: 7.41  pH, Blood: x     /  pCO2: 39    /  pO2: 99    / HCO3: x     / Base Excess: x     /  SaO2: 97                CAPILLARY BLOOD GLUCOSE      POCT Blood Glucose.: 184 mg/dL (02 Apr 2019 11:54)  POCT Blood Glucose.: 178 mg/dL (02 Apr 2019 08:15)  POCT Blood Glucose.: 187 mg/dL (01 Apr 2019 22:13)  POCT Blood Glucose.: 203 mg/dL (01 Apr 2019 17:05)          Culture - Urine (collected 29 Mar 2019 02:57)  Source: .Urine Clean Catch (Midstream)  Final Report (30 Mar 2019 08:02):    <10,000 CFU/mL Normal Urogenital Daria    Culture - Blood (collected 29 Mar 2019 02:57)  Source: .Blood Blood-Peripheral  Preliminary Report (30 Mar 2019 10:00):    No growth to date.    Culture - Blood (collected 29 Mar 2019 02:57)  Source: .Blood Blood-Peripheral  Preliminary Report (30 Mar 2019 10:00):    No growth to date.        RADIOLOGY & ADDITIONAL TESTS:    Care Discussed with Consultants/Other Providers:

## 2019-04-03 LAB
ANION GAP SERPL CALC-SCNC: 7 MMOL/L — SIGNIFICANT CHANGE UP (ref 5–17)
BUN SERPL-MCNC: 21 MG/DL — SIGNIFICANT CHANGE UP (ref 7–23)
CALCIUM SERPL-MCNC: 8.7 MG/DL — SIGNIFICANT CHANGE UP (ref 8.4–10.5)
CHLORIDE SERPL-SCNC: 104 MMOL/L — SIGNIFICANT CHANGE UP (ref 96–108)
CO2 SERPL-SCNC: 30 MMOL/L — SIGNIFICANT CHANGE UP (ref 22–31)
CREAT SERPL-MCNC: 0.54 MG/DL — SIGNIFICANT CHANGE UP (ref 0.5–1.3)
CULTURE RESULTS: SIGNIFICANT CHANGE UP
CULTURE RESULTS: SIGNIFICANT CHANGE UP
GLUCOSE BLDC GLUCOMTR-MCNC: 124 MG/DL — HIGH (ref 70–99)
GLUCOSE BLDC GLUCOMTR-MCNC: 157 MG/DL — HIGH (ref 70–99)
GLUCOSE BLDC GLUCOMTR-MCNC: 173 MG/DL — HIGH (ref 70–99)
GLUCOSE BLDC GLUCOMTR-MCNC: 185 MG/DL — HIGH (ref 70–99)
GLUCOSE BLDC GLUCOMTR-MCNC: 221 MG/DL — HIGH (ref 70–99)
GLUCOSE SERPL-MCNC: 222 MG/DL — HIGH (ref 70–99)
HBA1C BLD-MCNC: 8.1 % — HIGH (ref 4–5.6)
HCT VFR BLD CALC: 41.1 % — SIGNIFICANT CHANGE UP (ref 34.5–45)
HGB BLD-MCNC: 13.2 G/DL — SIGNIFICANT CHANGE UP (ref 11.5–15.5)
LACTATE SERPL-SCNC: 1.6 MMOL/L — SIGNIFICANT CHANGE UP (ref 0.7–2)
MCHC RBC-ENTMCNC: 30.9 PG — SIGNIFICANT CHANGE UP (ref 27–34)
MCHC RBC-ENTMCNC: 32.1 GM/DL — SIGNIFICANT CHANGE UP (ref 32–36)
MCV RBC AUTO: 96.2 FL — SIGNIFICANT CHANGE UP (ref 80–100)
PLATELET # BLD AUTO: 184 K/UL — SIGNIFICANT CHANGE UP (ref 150–400)
POTASSIUM SERPL-MCNC: 4.1 MMOL/L — SIGNIFICANT CHANGE UP (ref 3.5–5.3)
POTASSIUM SERPL-SCNC: 4.1 MMOL/L — SIGNIFICANT CHANGE UP (ref 3.5–5.3)
RBC # BLD: 4.28 M/UL — SIGNIFICANT CHANGE UP (ref 3.8–5.2)
RBC # FLD: 12 % — SIGNIFICANT CHANGE UP (ref 10.3–14.5)
SODIUM SERPL-SCNC: 141 MMOL/L — SIGNIFICANT CHANGE UP (ref 135–145)
SPECIMEN SOURCE: SIGNIFICANT CHANGE UP
SPECIMEN SOURCE: SIGNIFICANT CHANGE UP
WBC # BLD: 9.2 K/UL — SIGNIFICANT CHANGE UP (ref 3.8–10.5)
WBC # FLD AUTO: 9.2 K/UL — SIGNIFICANT CHANGE UP (ref 3.8–10.5)

## 2019-04-03 PROCEDURE — 99233 SBSQ HOSP IP/OBS HIGH 50: CPT | Mod: GC

## 2019-04-03 RX ADMIN — Medication 500 MICROGRAM(S): at 21:37

## 2019-04-03 RX ADMIN — Medication 2: at 08:40

## 2019-04-03 RX ADMIN — FAMOTIDINE 20 MILLIGRAM(S): 10 INJECTION INTRAVENOUS at 12:25

## 2019-04-03 RX ADMIN — Medication 1 PATCH: at 07:30

## 2019-04-03 RX ADMIN — TIOTROPIUM BROMIDE 1 CAPSULE(S): 18 CAPSULE ORAL; RESPIRATORY (INHALATION) at 08:28

## 2019-04-03 RX ADMIN — Medication 4: at 12:25

## 2019-04-03 RX ADMIN — Medication 500 MICROGRAM(S): at 08:27

## 2019-04-03 RX ADMIN — Medication 1 PATCH: at 12:24

## 2019-04-03 RX ADMIN — LETROZOLE 2.5 MILLIGRAM(S): 2.5 TABLET, FILM COATED ORAL at 12:28

## 2019-04-03 RX ADMIN — Medication 2: at 16:53

## 2019-04-03 RX ADMIN — Medication 40 MILLIGRAM(S): at 05:51

## 2019-04-03 RX ADMIN — BUDESONIDE AND FORMOTEROL FUMARATE DIHYDRATE 2 PUFF(S): 160; 4.5 AEROSOL RESPIRATORY (INHALATION) at 21:37

## 2019-04-03 RX ADMIN — Medication 500 MICROGRAM(S): at 03:14

## 2019-04-03 RX ADMIN — BUDESONIDE AND FORMOTEROL FUMARATE DIHYDRATE 2 PUFF(S): 160; 4.5 AEROSOL RESPIRATORY (INHALATION) at 08:27

## 2019-04-03 RX ADMIN — LISINOPRIL 5 MILLIGRAM(S): 2.5 TABLET ORAL at 05:51

## 2019-04-03 RX ADMIN — ATORVASTATIN CALCIUM 80 MILLIGRAM(S): 80 TABLET, FILM COATED ORAL at 21:22

## 2019-04-03 RX ADMIN — Medication 1 PATCH: at 12:15

## 2019-04-03 RX ADMIN — Medication 1 PATCH: at 19:30

## 2019-04-03 RX ADMIN — Medication 500 MICROGRAM(S): at 15:20

## 2019-04-03 RX ADMIN — RIVAROXABAN 20 MILLIGRAM(S): KIT at 21:21

## 2019-04-03 NOTE — PROGRESS NOTE ADULT - ASSESSMENT
Physical Examination:  GENERAL:               Alert, Oriented, No acute distress.    HEENT:                   No JVD, dry  MM,   PULM:                     Bilateral air entry,  no significant sputum production, No Rales, No wheezing   CVS:                        S1, S2,  + Murmur  ABD:                        Soft, nondistended, nontender, normoactive bowel sounds,   EXT:                         No edema, nontender, No Cyanosis or Clubbing   Vascular:                  Warm Extremities, Normal Capillary refill, Normal Distal Pulses  NEURO:                   Alert, oriented, interactive, nonfocal, follows commands  PSYC:                       Calm, + Insight.        Assessment  Acute bronchitis Improving   Chronic severe COPD - emphysema on chronic steroids- prednisone 5  Active nicotine use  Resolved lactic acidosis - doubt from sepsis   underlying DM2, HTN, h/o Breat Cancer, CAD s/p pci  Current every day smoker   CT scan of the chest with severe emphysema, no PE no dense consolidation, viral panel negative     Plan  Taper steoids  Continue Symbicort Spiriva  Taper n/c o2 to maintain sat > 88    check RA sat on ambulation and rest    today sat 93 RA Rest, 96 % on 2L rest  trend lactic acid daily  case d/w with Dr. Finch

## 2019-04-03 NOTE — PROGRESS NOTE ADULT - SUBJECTIVE AND OBJECTIVE BOX
Follow-up Pulmonary Progress Note  Chief Complaint : Chronic obstructive pulmonary disease with acute exacerbation      pt feeling better  more abulatory  less wheezing  dry cough unable to bring secretions up        Allergies :penicillin (Other)      PAST MEDICAL & SURGICAL HISTORY:  HTN (hypertension)  DM (diabetes mellitus): new diagnosis  Smoker  Breast cancer  Former smoker  COPD (chronic obstructive pulmonary disease)  H/O left mastectomy      Medications:  MEDICATIONS  (STANDING):  atorvastatin 80 milliGRAM(s) Oral at bedtime  buDESOnide 160 MICROgram(s)/formoterol 4.5 MICROgram(s) Inhaler 2 Puff(s) Inhalation two times a day  dextrose 5%. 1000 milliLiter(s) (50 mL/Hr) IV Continuous <Continuous>  dextrose 50% Injectable 12.5 Gram(s) IV Push once  dextrose 50% Injectable 25 Gram(s) IV Push once  dextrose 50% Injectable 25 Gram(s) IV Push once  famotidine    Tablet 20 milliGRAM(s) Oral daily  insulin lispro (HumaLOG) corrective regimen sliding scale   SubCutaneous three times a day before meals  ipratropium    for Nebulization 500 MICROGram(s) Nebulizer every 6 hours  letrozole 2.5 milliGRAM(s) Oral daily  lisinopril 5 milliGRAM(s) Oral daily  methylPREDNISolone sodium succinate Injectable 40 milliGRAM(s) IV Push every 8 hours  nicotine - 21 mG/24Hr(s) Patch 1 patch Transdermal daily  rivaroxaban 20 milliGRAM(s) Oral at bedtime  tiotropium 18 MICROgram(s) Capsule 1 Capsule(s) Inhalation daily    MEDICATIONS  (PRN):  acetaminophen   Tablet .. 650 milliGRAM(s) Oral every 6 hours PRN Temp greater or equal to 38C (100.4F), Mild Pain (1 - 3)  dextrose 40% Gel 15 Gram(s) Oral once PRN Blood Glucose LESS THAN 70 milliGRAM(s)/deciliter  glucagon  Injectable 1 milliGRAM(s) IntraMuscular once PRN Glucose LESS THAN 70 milligrams/deciliter      LABS:                        13.2   9.2   )-----------( 184      ( 03 Apr 2019 07:04 )             41.1     04-03    141  |  104  |  21  ----------------------------<  222<H>  4.1   |  30  |  0.54    Ca    8.7      03 Apr 2019 07:04                          CULTURES: (if applicable)    Culture - Urine (collected 03-29-19 @ 02:57)  Source: .Urine Clean Catch (Midstream)  Final Report (03-30-19 @ 08:02):    <10,000 CFU/mL Normal Urogenital Daria    Culture - Blood (collected 03-29-19 @ 02:57)  Source: .Blood Blood-Peripheral  Final Report (04-03-19 @ 10:01):    No growth at 5 days.    Culture - Blood (collected 03-29-19 @ 02:57)  Source: .Blood Blood-Peripheral  Final Report (04-03-19 @ 10:01):    No growth at 5 days.      Rapid RVP Result: NotDetec (03-29-19 @ 07:00)      ABG - ( 01 Apr 2019 13:48 )  pH, Arterial: 7.41  pH, Blood: x     /  pCO2: 39    /  pO2: 99    / HCO3: x     / Base Excess: x     /  SaO2: 97                CAPILLARY BLOOD GLUCOSE      POCT Blood Glucose.: 221 mg/dL (03 Apr 2019 12:23)      RADIOLOGY  CT:  < from: CT Angio Chest w/ IV Cont (03.30.19 @ 17:00) >    Impression:   Negative for pulmonary emboli.  Severe emphysema.    < end of copied text >        VITALS:  T(C): 37.1 (04-03-19 @ 09:30), Max: 37.1 (04-03-19 @ 09:30)  T(F): 98.7 (04-03-19 @ 09:30), Max: 98.7 (04-03-19 @ 09:30)  HR: 82 (04-03-19 @ 09:30) (80 - 95)  BP: 150/70 (04-03-19 @ 09:30) (148/73 - 157/74)  BP(mean): --  ABP: --  ABP(mean): --  RR: 15 (04-03-19 @ 09:30) (15 - 16)  SpO2: 98% (04-03-19 @ 09:30) (98% - 99%)  CVP(mm Hg): --  CVP(cm H2O): --    Ins and Outs     04-02-19 @ 07:01  -  04-03-19 @ 07:00  --------------------------------------------------------  IN: 480 mL / OUT: 0 mL / NET: 480 mL    04-03-19 @ 07:01  -  04-03-19 @ 13:41  --------------------------------------------------------  IN: 480 mL / OUT: 0 mL / NET: 480 mL

## 2019-04-04 ENCOUNTER — TRANSCRIPTION ENCOUNTER (OUTPATIENT)
Age: 64
End: 2019-04-04

## 2019-04-04 LAB
GLUCOSE BLDC GLUCOMTR-MCNC: 117 MG/DL — HIGH (ref 70–99)
GLUCOSE BLDC GLUCOMTR-MCNC: 135 MG/DL — HIGH (ref 70–99)
GLUCOSE BLDC GLUCOMTR-MCNC: 143 MG/DL — HIGH (ref 70–99)
GLUCOSE BLDC GLUCOMTR-MCNC: 224 MG/DL — HIGH (ref 70–99)

## 2019-04-04 PROCEDURE — 99233 SBSQ HOSP IP/OBS HIGH 50: CPT

## 2019-04-04 RX ADMIN — Medication 1 PATCH: at 11:45

## 2019-04-04 RX ADMIN — Medication 1 PATCH: at 15:31

## 2019-04-04 RX ADMIN — Medication 500 MICROGRAM(S): at 21:59

## 2019-04-04 RX ADMIN — LISINOPRIL 5 MILLIGRAM(S): 2.5 TABLET ORAL at 06:24

## 2019-04-04 RX ADMIN — ATORVASTATIN CALCIUM 80 MILLIGRAM(S): 80 TABLET, FILM COATED ORAL at 21:21

## 2019-04-04 RX ADMIN — RIVAROXABAN 20 MILLIGRAM(S): KIT at 21:21

## 2019-04-04 RX ADMIN — Medication 500 MICROGRAM(S): at 09:42

## 2019-04-04 RX ADMIN — Medication 500 MICROGRAM(S): at 15:47

## 2019-04-04 RX ADMIN — LETROZOLE 2.5 MILLIGRAM(S): 2.5 TABLET, FILM COATED ORAL at 11:30

## 2019-04-04 RX ADMIN — BUDESONIDE AND FORMOTEROL FUMARATE DIHYDRATE 2 PUFF(S): 160; 4.5 AEROSOL RESPIRATORY (INHALATION) at 09:41

## 2019-04-04 RX ADMIN — Medication 500 MICROGRAM(S): at 04:29

## 2019-04-04 RX ADMIN — Medication 1 PATCH: at 19:50

## 2019-04-04 RX ADMIN — Medication 1 PATCH: at 11:30

## 2019-04-04 RX ADMIN — FAMOTIDINE 20 MILLIGRAM(S): 10 INJECTION INTRAVENOUS at 11:30

## 2019-04-04 RX ADMIN — BUDESONIDE AND FORMOTEROL FUMARATE DIHYDRATE 2 PUFF(S): 160; 4.5 AEROSOL RESPIRATORY (INHALATION) at 21:59

## 2019-04-04 RX ADMIN — Medication 40 MILLIGRAM(S): at 06:24

## 2019-04-04 RX ADMIN — TIOTROPIUM BROMIDE 1 CAPSULE(S): 18 CAPSULE ORAL; RESPIRATORY (INHALATION) at 09:42

## 2019-04-04 RX ADMIN — Medication 4: at 11:55

## 2019-04-04 NOTE — PROGRESS NOTE ADULT - ASSESSMENT
63 y o F with PMHx of Breast Ca ( +left mastectomy), +COPD, smoker (2PPD), on chronic Prednisone 5 mg/day, +CAD, +stent, presents with cough, fever, dyspnea admitted for acute bronchitis, COPD exacerbation.    #COPD Exacerbation, severe  - slow steroid taper  - Spiriva, LABA/inhaled steroids, oxygen via NC  -Will need home O2 (see below)   - Pulmonary rehab eval  -Pulmonary following - will need to discuss if okay to restart albuterol prn (had lactic acidosis from the albuterol previously)     #Breast cancer: stable, continue Letrozole    #Possible history of PE: pt does not know why she is on Xarelto, though it seems she may have had a PE in the past, denies Afib     #Tobacco abuse:  - Smoking cessation counseling and Nicotine patch    #Chronic hypoxic respiratory failure due to #Advanced COPD  -The patient is currently in a chronic and stable state.   -The medication that the patient is currently taking is not sufficient in correcting the hypoxemia and requires home oxygen at this time.      SpO2 at rest on ROOM AIR = 91%    Ambulatory SpO2 on ROOM AIR  = 85%    Ambulatory SpO2 with 2 L NC = 93%    2 Liters Nasal Canula of Supplemental Oxygen is required to treat the patient's J44.9    Call placed to Enid Maurer, HCP (and niece) [phone] - updated with the care plan

## 2019-04-04 NOTE — PROGRESS NOTE ADULT - ASSESSMENT
Physical Examination:  GENERAL:               Alert, Oriented, No acute distress.    HEENT:                   No JVD, dry  MM,   PULM:                     Bilateral air entry,  no significant sputum production, No Rales, No wheezing   CVS:                        S1, S2,  + Murmur  ABD:                        Soft, nondistended, nontender, normoactive bowel sounds,   EXT:                         No edema, nontender, No Cyanosis or Clubbing   Vascular:                  Warm Extremities, Normal Capillary refill, Normal Distal Pulses  NEURO:                   Alert, oriented, interactive, nonfocal, follows commands  PSYC:                       Calm, + Insight.        Assessment  Acute bronchitis Improving   Chronic severe COPD - emphysema on chronic steroids- prednisone 5  Active nicotine use  Resolved lactic acidosis - doubt from sepsis   underlying DM2, HTN, h/o Breat Cancer, CAD s/p pci  Current every day smoker   CT scan of the chest with severe emphysema, no PE no dense consolidation, viral panel negative     Plan  continue oral steroids  Continue Symbicort Spiriva  Taper n/c o2 to maintain sat > 88    check RA sat on ambulation and rest    may require home o2  smoking cessation advised  pt sees dr. Hurst as out patient  case d/w with Dr. Finch if stable in am, on oral steroids, may consider d/c planning.

## 2019-04-04 NOTE — PROVIDER CONTACT NOTE (OTHER) - SITUATION
Pt ambulated without supplemental O2. Pt maintained Sp02 between 92 and 95%. Pt becomes out of breath, but maintained oximetry. RN guided patient back to chair, supplemental O2 placed via NC.

## 2019-04-04 NOTE — PROGRESS NOTE ADULT - SUBJECTIVE AND OBJECTIVE BOX
Patient is a 63y old  Female who presents with a chief complaint of dyspnea, cough, fever (03 Apr 2019 13:41)      Patient seen and examined at bedside.    ALLERGIES:  penicillin (Other)    MEDICATIONS  (STANDING):  atorvastatin 80 milliGRAM(s) Oral at bedtime  buDESOnide 160 MICROgram(s)/formoterol 4.5 MICROgram(s) Inhaler 2 Puff(s) Inhalation two times a day  dextrose 5%. 1000 milliLiter(s) (50 mL/Hr) IV Continuous <Continuous>  dextrose 50% Injectable 12.5 Gram(s) IV Push once  dextrose 50% Injectable 25 Gram(s) IV Push once  dextrose 50% Injectable 25 Gram(s) IV Push once  famotidine    Tablet 20 milliGRAM(s) Oral daily  insulin lispro (HumaLOG) corrective regimen sliding scale   SubCutaneous three times a day before meals  ipratropium    for Nebulization 500 MICROGram(s) Nebulizer every 6 hours  letrozole 2.5 milliGRAM(s) Oral daily  lisinopril 5 milliGRAM(s) Oral daily  nicotine - 21 mG/24Hr(s) Patch 1 patch Transdermal daily  predniSONE   Tablet 40 milliGRAM(s) Oral daily  rivaroxaban 20 milliGRAM(s) Oral at bedtime  tiotropium 18 MICROgram(s) Capsule 1 Capsule(s) Inhalation daily    MEDICATIONS  (PRN):  acetaminophen   Tablet .. 650 milliGRAM(s) Oral every 6 hours PRN Temp greater or equal to 38C (100.4F), Mild Pain (1 - 3)  dextrose 40% Gel 15 Gram(s) Oral once PRN Blood Glucose LESS THAN 70 milliGRAM(s)/deciliter  glucagon  Injectable 1 milliGRAM(s) IntraMuscular once PRN Glucose LESS THAN 70 milligrams/deciliter    Vital Signs Last 24 Hrs  T(F): 97.8 (04 Apr 2019 09:30), Max: 98.2 (03 Apr 2019 21:14)  HR: 70 (04 Apr 2019 09:30) (70 - 94)  BP: 136/73 (04 Apr 2019 09:30) (136/73 - 165/82)  RR: 14 (04 Apr 2019 09:30) (14 - 16)  SpO2: 94% (04 Apr 2019 09:30) (91% - 97%)  I&O's Summary    03 Apr 2019 07:01  -  04 Apr 2019 07:00  --------------------------------------------------------  IN: 680 mL / OUT: 0 mL / NET: 680 mL      PHYSICAL EXAM:  General: NAD, A/O x 3  ENT: MMM  Neck: Supple, No JVD  Lungs: Clear to auscultation bilaterally, fair air entry into lungs bilaterally (improving)  Cardio: RRR, S1/S2  Abdomen: Soft, Nontender, Nondistended; Bowel sounds present  Extremities: No calf tenderness, No pitting edema    LABS:                        13.2   9.2   )-----------( 184      ( 03 Apr 2019 07:04 )             41.1     04-03    141  |  104  |  21  ----------------------------<  222  4.1   |  30  |  0.54    Ca    8.7      03 Apr 2019 07:04      eGFR if Non African American: 102 mL/min/1.73M2 (04-03-19 @ 07:04)  eGFR if African American: 118 mL/min/1.73M2 (04-03-19 @ 07:04)      Lactate, Blood: 1.6 mmol/L (04-03 @ 07:04)              ABG - ( 01 Apr 2019 13:48 )  pH, Arterial: 7.41  pH, Blood: x     /  pCO2: 39    /  pO2: 99    / HCO3: x     / Base Excess: x     /  SaO2: 97                CAPILLARY BLOOD GLUCOSE      POCT Blood Glucose.: 117 mg/dL (04 Apr 2019 07:26)  POCT Blood Glucose.: 124 mg/dL (03 Apr 2019 21:23)  POCT Blood Glucose.: 157 mg/dL (03 Apr 2019 16:52)  POCT Blood Glucose.: 221 mg/dL (03 Apr 2019 12:23)    04-03 [id number] 8.1

## 2019-04-04 NOTE — PROGRESS NOTE ADULT - SUBJECTIVE AND OBJECTIVE BOX
Follow-up Pulmonary Progress Note  Chief Complaint : Chronic obstructive pulmonary disease with acute exacerbation      pt feeling better  less sob  wheezing improved  noted to desat and required o2     Allergies :penicillin (Other)      PAST MEDICAL & SURGICAL HISTORY:  HTN (hypertension)  DM (diabetes mellitus): new diagnosis  Smoker  Breast cancer  Former smoker  COPD (chronic obstructive pulmonary disease)  H/O left mastectomy      Medications:  MEDICATIONS  (STANDING):  atorvastatin 80 milliGRAM(s) Oral at bedtime  buDESOnide 160 MICROgram(s)/formoterol 4.5 MICROgram(s) Inhaler 2 Puff(s) Inhalation two times a day  dextrose 5%. 1000 milliLiter(s) (50 mL/Hr) IV Continuous <Continuous>  dextrose 50% Injectable 12.5 Gram(s) IV Push once  dextrose 50% Injectable 25 Gram(s) IV Push once  dextrose 50% Injectable 25 Gram(s) IV Push once  famotidine    Tablet 20 milliGRAM(s) Oral daily  insulin lispro (HumaLOG) corrective regimen sliding scale   SubCutaneous three times a day before meals  ipratropium    for Nebulization 500 MICROGram(s) Nebulizer every 6 hours  letrozole 2.5 milliGRAM(s) Oral daily  lisinopril 5 milliGRAM(s) Oral daily  nicotine - 21 mG/24Hr(s) Patch 1 patch Transdermal daily  predniSONE   Tablet 40 milliGRAM(s) Oral daily  rivaroxaban 20 milliGRAM(s) Oral at bedtime  tiotropium 18 MICROgram(s) Capsule 1 Capsule(s) Inhalation daily    MEDICATIONS  (PRN):  acetaminophen   Tablet .. 650 milliGRAM(s) Oral every 6 hours PRN Temp greater or equal to 38C (100.4F), Mild Pain (1 - 3)  dextrose 40% Gel 15 Gram(s) Oral once PRN Blood Glucose LESS THAN 70 milliGRAM(s)/deciliter  glucagon  Injectable 1 milliGRAM(s) IntraMuscular once PRN Glucose LESS THAN 70 milligrams/deciliter      LABS:                        13.2   9.2   )-----------( 184      ( 03 Apr 2019 07:04 )             41.1     04-03    141  |  104  |  21  ----------------------------<  222<H>  4.1   |  30  |  0.54    Ca    8.7      03 Apr 2019 07:04                          CULTURES: (if applicable)    Culture - Urine (collected 03-29-19 @ 02:57)  Source: .Urine Clean Catch (Midstream)  Final Report (03-30-19 @ 08:02):    <10,000 CFU/mL Normal Urogenital Daria    Culture - Blood (collected 03-29-19 @ 02:57)  Source: .Blood Blood-Peripheral  Final Report (04-03-19 @ 10:01):    No growth at 5 days.    Culture - Blood (collected 03-29-19 @ 02:57)  Source: .Blood Blood-Peripheral  Final Report (04-03-19 @ 10:01):    No growth at 5 days.      Rapid RVP Result: NotDetec (03-29-19 @ 07:00)      VITALS:  T(C): 36.6 (04-04-19 @ 09:30), Max: 36.8 (04-03-19 @ 21:14)  T(F): 97.8 (04-04-19 @ 09:30), Max: 98.2 (04-03-19 @ 21:14)  HR: 70 (04-04-19 @ 09:30) (70 - 94)  BP: 136/73 (04-04-19 @ 09:30) (136/73 - 165/82)  BP(mean): --  ABP: --  ABP(mean): --  RR: 14 (04-04-19 @ 09:30) (14 - 16)  SpO2: 94% (04-04-19 @ 09:30) (91% - 97%)  CVP(mm Hg): --  CVP(cm H2O): --    Ins and Outs     04-03-19 @ 07:01  -  04-04-19 @ 07:00  --------------------------------------------------------  IN: 680 mL / OUT: 0 mL / NET: 680 mL

## 2019-04-05 ENCOUNTER — TRANSCRIPTION ENCOUNTER (OUTPATIENT)
Age: 64
End: 2019-04-05

## 2019-04-05 VITALS — OXYGEN SATURATION: 93 %

## 2019-04-05 LAB
GLUCOSE BLDC GLUCOMTR-MCNC: 107 MG/DL — HIGH (ref 70–99)
GLUCOSE BLDC GLUCOMTR-MCNC: 268 MG/DL — HIGH (ref 70–99)

## 2019-04-05 PROCEDURE — 83605 ASSAY OF LACTIC ACID: CPT

## 2019-04-05 PROCEDURE — 87633 RESP VIRUS 12-25 TARGETS: CPT

## 2019-04-05 PROCEDURE — 36415 COLL VENOUS BLD VENIPUNCTURE: CPT

## 2019-04-05 PROCEDURE — 87086 URINE CULTURE/COLONY COUNT: CPT

## 2019-04-05 PROCEDURE — 83036 HEMOGLOBIN GLYCOSYLATED A1C: CPT

## 2019-04-05 PROCEDURE — 99239 HOSP IP/OBS DSCHRG MGMT >30: CPT

## 2019-04-05 PROCEDURE — 71045 X-RAY EXAM CHEST 1 VIEW: CPT

## 2019-04-05 PROCEDURE — 85730 THROMBOPLASTIN TIME PARTIAL: CPT

## 2019-04-05 PROCEDURE — 87798 DETECT AGENT NOS DNA AMP: CPT

## 2019-04-05 PROCEDURE — 87486 CHLMYD PNEUM DNA AMP PROBE: CPT

## 2019-04-05 PROCEDURE — 84484 ASSAY OF TROPONIN QUANT: CPT

## 2019-04-05 PROCEDURE — 93005 ELECTROCARDIOGRAM TRACING: CPT

## 2019-04-05 PROCEDURE — 84443 ASSAY THYROID STIM HORMONE: CPT

## 2019-04-05 PROCEDURE — 87040 BLOOD CULTURE FOR BACTERIA: CPT

## 2019-04-05 PROCEDURE — 80053 COMPREHEN METABOLIC PANEL: CPT

## 2019-04-05 PROCEDURE — 87581 M.PNEUMON DNA AMP PROBE: CPT

## 2019-04-05 PROCEDURE — 96374 THER/PROPH/DIAG INJ IV PUSH: CPT

## 2019-04-05 PROCEDURE — 81001 URINALYSIS AUTO W/SCOPE: CPT

## 2019-04-05 PROCEDURE — 97161 PT EVAL LOW COMPLEX 20 MIN: CPT

## 2019-04-05 PROCEDURE — 85610 PROTHROMBIN TIME: CPT

## 2019-04-05 PROCEDURE — 93306 TTE W/DOPPLER COMPLETE: CPT

## 2019-04-05 PROCEDURE — 99285 EMERGENCY DEPT VISIT HI MDM: CPT | Mod: 25

## 2019-04-05 PROCEDURE — 94640 AIRWAY INHALATION TREATMENT: CPT

## 2019-04-05 PROCEDURE — 84145 PROCALCITONIN (PCT): CPT

## 2019-04-05 PROCEDURE — 80048 BASIC METABOLIC PNL TOTAL CA: CPT

## 2019-04-05 PROCEDURE — 82962 GLUCOSE BLOOD TEST: CPT

## 2019-04-05 PROCEDURE — 87631 RESP VIRUS 3-5 TARGETS: CPT

## 2019-04-05 PROCEDURE — 86803 HEPATITIS C AB TEST: CPT

## 2019-04-05 PROCEDURE — 82803 BLOOD GASES ANY COMBINATION: CPT

## 2019-04-05 PROCEDURE — 36600 WITHDRAWAL OF ARTERIAL BLOOD: CPT

## 2019-04-05 PROCEDURE — 85027 COMPLETE CBC AUTOMATED: CPT

## 2019-04-05 PROCEDURE — 71275 CT ANGIOGRAPHY CHEST: CPT

## 2019-04-05 RX ORDER — POLYETHYLENE GLYCOL 3350 17 G/17G
17 POWDER, FOR SOLUTION ORAL DAILY
Qty: 0 | Refills: 0 | Status: DISCONTINUED | OUTPATIENT
Start: 2019-04-05 | End: 2019-04-05

## 2019-04-05 RX ORDER — TIOTROPIUM BROMIDE 18 UG/1
1 CAPSULE ORAL; RESPIRATORY (INHALATION)
Qty: 30 | Refills: 0
Start: 2019-04-05 | End: 2019-05-04

## 2019-04-05 RX ORDER — BUDESONIDE AND FORMOTEROL FUMARATE DIHYDRATE 160; 4.5 UG/1; UG/1
2 AEROSOL RESPIRATORY (INHALATION)
Qty: 1 | Refills: 0
Start: 2019-04-05 | End: 2019-05-04

## 2019-04-05 RX ORDER — TIOTROPIUM BROMIDE 18 UG/1
1 CAPSULE ORAL; RESPIRATORY (INHALATION)
Qty: 0 | Refills: 0 | COMMUNITY
Start: 2019-04-05

## 2019-04-05 RX ORDER — NICOTINE POLACRILEX 2 MG
1 GUM BUCCAL
Qty: 30 | Refills: 0
Start: 2019-04-05 | End: 2019-05-04

## 2019-04-05 RX ORDER — BUDESONIDE AND FORMOTEROL FUMARATE DIHYDRATE 160; 4.5 UG/1; UG/1
1 AEROSOL RESPIRATORY (INHALATION)
Qty: 1 | Refills: 0
Start: 2019-04-05 | End: 2019-05-04

## 2019-04-05 RX ORDER — FLUTICASONE FUROATE AND VILANTEROL TRIFENATATE 100; 25 UG/1; UG/1
1 POWDER RESPIRATORY (INHALATION)
Qty: 0 | Refills: 0 | COMMUNITY

## 2019-04-05 RX ORDER — METFORMIN HYDROCHLORIDE 850 MG/1
500 TABLET ORAL
Qty: 60 | Refills: 0
Start: 2019-04-05 | End: 2019-05-04

## 2019-04-05 RX ORDER — METFORMIN HYDROCHLORIDE 850 MG/1
1 TABLET ORAL
Qty: 60 | Refills: 0
Start: 2019-04-05 | End: 2019-05-04

## 2019-04-05 RX ORDER — NICOTINE POLACRILEX 2 MG
1 GUM BUCCAL
Qty: 0 | Refills: 0 | COMMUNITY
Start: 2019-04-05

## 2019-04-05 RX ORDER — FAMOTIDINE 10 MG/ML
1 INJECTION INTRAVENOUS
Qty: 0 | Refills: 0 | DISCHARGE
Start: 2019-04-05

## 2019-04-05 RX ADMIN — Medication 500 MICROGRAM(S): at 03:44

## 2019-04-05 RX ADMIN — LETROZOLE 2.5 MILLIGRAM(S): 2.5 TABLET, FILM COATED ORAL at 11:29

## 2019-04-05 RX ADMIN — FAMOTIDINE 20 MILLIGRAM(S): 10 INJECTION INTRAVENOUS at 11:28

## 2019-04-05 RX ADMIN — Medication 1 PATCH: at 08:06

## 2019-04-05 RX ADMIN — Medication 6: at 11:27

## 2019-04-05 RX ADMIN — POLYETHYLENE GLYCOL 3350 17 GRAM(S): 17 POWDER, FOR SOLUTION ORAL at 05:58

## 2019-04-05 RX ADMIN — BUDESONIDE AND FORMOTEROL FUMARATE DIHYDRATE 2 PUFF(S): 160; 4.5 AEROSOL RESPIRATORY (INHALATION) at 11:07

## 2019-04-05 RX ADMIN — Medication 40 MILLIGRAM(S): at 05:58

## 2019-04-05 RX ADMIN — LISINOPRIL 5 MILLIGRAM(S): 2.5 TABLET ORAL at 05:58

## 2019-04-05 RX ADMIN — Medication 1 PATCH: at 11:29

## 2019-04-05 RX ADMIN — TIOTROPIUM BROMIDE 1 CAPSULE(S): 18 CAPSULE ORAL; RESPIRATORY (INHALATION) at 11:07

## 2019-04-05 RX ADMIN — Medication 500 MICROGRAM(S): at 11:07

## 2019-04-05 NOTE — DISCHARGE NOTE PROVIDER - HOSPITAL COURSE
63 y o F with PMHx of Breast Ca ( +left mastectomy), +COPD, smoker (2PPD), on chronic Prednisone 5 mg/day, +CAD, +stent, presents with cough, fever, dyspnea admitted for acute bronchitis, COPD exacerbation. Treated with prednisone.  Pulm consulted during hospital course. Stable for discharge home to complete prednisone taper        Of note, her HgbA1c was noted to be 8.1 She will start metformin 500mg BID at discharge    has follow up with family medicine clinic on 4/12 .        Advied to quit smoking, counseled on smoking cessation. 63 y o F with PMHx of Breast Ca ( +left mastectomy), +COPD, smoker (2PPD), on chronic Prednisone 5 mg/day, +CAD, +stent, presents with cough, fever, dyspnea admitted for acute bronchitis, COPD exacerbation. Treated with prednisone.  Pulm consulted during hospital course. Stable for discharge home to complete prednisone taper. She was set up for home O2        Of note, her HgbA1c was noted to be 8.1 She will start metformin 500mg BID at discharge    has follow up with family medicine clinic on 4/12 .        Advied to quit smoking, counseled on smoking cessation.         She has PMD appt with family clinic on 4.12 and she will make appt with Dr Jayro Nash this week

## 2019-04-05 NOTE — PROGRESS NOTE ADULT - ATTENDING COMMENTS
I have personally seen and examined patient on the above date.  I discussed the case with MORIAH Casey and I agree with findings and plan as detailed per note above, which I have amended where appropriate.      COPD exacerbation, severe - improving overall, but still with poor air entry, continue inpatient management
I have personally seen and examined patient on the above date.  I discussed the case with MORIAH Casey and I agree with findings and plan as detailed per note above, which I have amended where appropriate.      COPD exacerbation, severe - improving overall, but still with poor air entry, continue inpatient management  Need to document O2 sats with ambulation - patient notified to let RN know when she goes to restroom  Pulmonary rehab eval
I have personally seen and examined patient on the above date.  I discussed the case with Polly, PA and I agree with findings and plan as detailed per note above, which I have amended where appropriate.      COPD exacerbation, continue prednisone taper   pulmonary following, discused with Dr. Edgar   going on home oxygen.   quit smoking, counseled on smoking cessation.     DM2- a1c 8.1, diabetic teaching by RN, start metformin 500mg BID at discharge  has follow up with family medicine clinic on 4/12
I have personally seen and examined patient on the above date.  I discussed the case with MORIHA Casey and I agree with findings and plan as detailed per note above, which I have amended where appropriate.    Increased lactate could be from beta agonists. Albuterol discontinued. Repeat lactate in AM. Case d/w Dr. Stark.    ALANNA.C tele monitoring

## 2019-04-05 NOTE — PROGRESS NOTE ADULT - SUBJECTIVE AND OBJECTIVE BOX
Follow-up Pulmonary Progress Note  Chief Complaint : Chronic obstructive pulmonary disease with acute exacerbation      PT feels well, + mild GANDHI   no sob at rest  no cough  no fever  no wheezing    smoking cessation advised    Allergies :penicillin (Other)      PAST MEDICAL & SURGICAL HISTORY:  HTN (hypertension)  DM (diabetes mellitus): new diagnosis  Smoker  Breast cancer  Former smoker  COPD (chronic obstructive pulmonary disease)  H/O left mastectomy      Medications:  MEDICATIONS  (STANDING):  atorvastatin 80 milliGRAM(s) Oral at bedtime  buDESOnide 160 MICROgram(s)/formoterol 4.5 MICROgram(s) Inhaler 2 Puff(s) Inhalation two times a day  dextrose 5%. 1000 milliLiter(s) (50 mL/Hr) IV Continuous <Continuous>  dextrose 50% Injectable 12.5 Gram(s) IV Push once  dextrose 50% Injectable 25 Gram(s) IV Push once  dextrose 50% Injectable 25 Gram(s) IV Push once  famotidine    Tablet 20 milliGRAM(s) Oral daily  insulin lispro (HumaLOG) corrective regimen sliding scale   SubCutaneous three times a day before meals  ipratropium    for Nebulization 500 MICROGram(s) Nebulizer every 6 hours  letrozole 2.5 milliGRAM(s) Oral daily  lisinopril 5 milliGRAM(s) Oral daily  nicotine - 21 mG/24Hr(s) Patch 1 patch Transdermal daily  predniSONE   Tablet 40 milliGRAM(s) Oral daily  rivaroxaban 20 milliGRAM(s) Oral at bedtime  tiotropium 18 MICROgram(s) Capsule 1 Capsule(s) Inhalation daily    MEDICATIONS  (PRN):  acetaminophen   Tablet .. 650 milliGRAM(s) Oral every 6 hours PRN Temp greater or equal to 38C (100.4F), Mild Pain (1 - 3)  dextrose 40% Gel 15 Gram(s) Oral once PRN Blood Glucose LESS THAN 70 milliGRAM(s)/deciliter  glucagon  Injectable 1 milliGRAM(s) IntraMuscular once PRN Glucose LESS THAN 70 milligrams/deciliter  polyethylene glycol 3350 17 Gram(s) Oral daily PRN Constipation      LABS:                              CULTURES: (if applicable)    Culture - Urine (collected 03-29-19 @ 02:57)  Source: .Urine Clean Catch (Midstream)  Final Report (03-30-19 @ 08:02):    <10,000 CFU/mL Normal Urogenital Daria    Culture - Blood (collected 03-29-19 @ 02:57)  Source: .Blood Blood-Peripheral  Final Report (04-03-19 @ 10:01):    No growth at 5 days.    Culture - Blood (collected 03-29-19 @ 02:57)  Source: .Blood Blood-Peripheral  Final Report (04-03-19 @ 10:01):    No growth at 5 days.      Rapid RVP Result: NotDetec (03-29-19 @ 07:00)        VITALS:  T(C): 36.7 (04-05-19 @ 09:36), Max: 37.4 (04-04-19 @ 20:43)  T(F): 98 (04-05-19 @ 09:36), Max: 99.3 (04-04-19 @ 20:43)  HR: 97 (04-05-19 @ 09:36) (79 - 97)  BP: 147/74 (04-05-19 @ 09:36) (134/66 - 151/65)  BP(mean): --  ABP: --  ABP(mean): --  RR: 15 (04-05-19 @ 09:36) (14 - 16)  SpO2: 93% (04-05-19 @ 09:36) (93% - 100%)  CVP(mm Hg): --  CVP(cm H2O): --    Ins and Outs     04-04-19 @ 07:01  -  04-05-19 @ 07:00  --------------------------------------------------------  IN: 500 mL / OUT: 0 mL / NET: 500 mL    04-05-19 @ 07:01  -  04-05-19 @ 12:29  --------------------------------------------------------  IN: 360 mL / OUT: 0 mL / NET: 360 mL

## 2019-04-05 NOTE — DISCHARGE NOTE NURSING/CASE MANAGEMENT/SOCIAL WORK - NSDCPEEMAIL_GEN_ALL_CORE
[facility] for Tobacco [facility] email [email or web address] for Tobacco [email or web address]

## 2019-04-05 NOTE — PROGRESS NOTE ADULT - ASSESSMENT
Physical Examination:  GENERAL:               Alert, Oriented, No acute distress.    HEENT:                   No JVD, dry  MM,   PULM:                     Bilateral air entry,  no significant sputum production, No Rales, No wheezing   CVS:                        S1, S2,  + Murmur  ABD:                        Soft, nondistended, nontender, normoactive bowel sounds,   EXT:                         No edema, nontender, No Cyanosis or Clubbing   Vascular:                  Warm Extremities, Normal Capillary refill, Normal Distal Pulses  NEURO:                   Alert, oriented, interactive, nonfocal, follows commands  PSYC:                       Calm, + Insight.        Assessment  Acute bronchitis Improving   Chronic severe COPD - emphysema on chronic steroids- prednisone 5  Active nicotine use  Resolved lactic acidosis - doubt from sepsis   underlying DM2, HTN, h/o Breat Cancer, CAD s/p pci  Current every day smoker   CT scan of the chest with severe emphysema, no PE no dense consolidation, viral panel negative     Plan  continue oral steroids, taper 10mg q 3 days to baseline 5 mg  Continue Symbicort Spiriva  will need home o2.  smoking cessation re advised, nicotine patch    pt aware of explosive risks of smoking and home oxygen  follow up with  dr. Hurst as out patient  Case d/w Dr. Veronica Physical Examination:  GENERAL:               Alert, Oriented, No acute distress.    HEENT:                   No JVD, dry  MM,   PULM:                     Bilateral air entry,  no significant sputum production, No Rales, No wheezing , low lung sounds I=E  CVS:                        S1, S2,  + Murmur  ABD:                        Soft, nondistended, nontender, normoactive bowel sounds,   EXT:                         No edema, nontender, No Cyanosis or Clubbing   Vascular:                  Warm Extremities, Normal Capillary refill, Normal Distal Pulses  NEURO:                   Alert, oriented, interactive, nonfocal, follows commands  PSYC:                       Calm, + Insight.        Assessment  Acute bronchitis Improving   Chronic severe COPD - emphysema on chronic steroids- prednisone 5mg  Active nicotine use  Resolved lactic acidosis - doubt from sepsis   underlying DM2, HTN, h/o Breat Cancer, CAD s/p pci  Current every day smoker   CT scan of the chest with severe emphysema, no PE no dense consolidation, viral panel negative     Plan  continue oral steroids, taper 10mg q 3 days to baseline 5 mg  Continue Symbicort Spiriva  will need home o2.  smoking cessation re advised, nicotine patch    pt aware of explosive risks of smoking and home oxygen  follow up with  dr. Hurst as out patient  Case d/w Dr. Veronica

## 2019-04-05 NOTE — PROGRESS NOTE ADULT - ASSESSMENT
63 y o F with PMHx of Breast Ca ( +left mastectomy), +COPD, smoker (2PPD), on chronic Prednisone 5 mg/day, +CAD, +stent, presents with cough, fever, dyspnea admitted for acute bronchitis, COPD exacerbation.    #COPD Exacerbation, severe  - On Prednisone 40 mg   - Spiriva, LABA/inhaled steroids, oxygen via NC  -Will need home O2   - Pulmonary rehab eval  -Pulmonary following    #Breast cancer: stable, continue Letrozole    #Possible history of PE: pt does not know why she is on Xarelto, though it seems she may have had a PE in the past, denies Afib     #Tobacco abuse:  - Smoking cessation counseling and Nicotine patch    #Chronic hypoxic respiratory failure due to #Advanced COPD  -The patient is currently in a chronic and stable state.   -The medication that the patient is currently taking is not sufficient in correcting the hypoxemia and requires home oxygen at this time.        Dispo: Probable home today with home O2. [facility].  Will follow up with Dr Nash as outpt 63 y o F with PMHx of Breast Ca ( +left mastectomy), +COPD, smoker (2PPD), on chronic Prednisone 5 mg/day, +CAD, +stent, presents with cough, fever, dyspnea admitted for acute bronchitis, COPD exacerbation.    #COPD Exacerbation, severe  - On Prednisone 40 mg   - Spiriva, LABA/inhaled steroids, oxygen via NC  -Will need home O2   - Pulmonary rehab eval  -Pulmonary following    #Breast cancer: stable, continue Letrozole    #Possible history of PE: pt does not know why she is on Xarelto, though it seems she may have had a PE in the past, denies Afib     #Tobacco abuse:  - Smoking cessation counseling and Nicotine patch    #Chronic hypoxic respiratory failure due to #Advanced COPD  -The patient is currently in a chronic and stable state.   -The medication that the patient is currently taking is not sufficient in correcting the hypoxemia and requires home oxygen at this time.        Dispo: Probable home today with home O2. [facility].  Will follow up with Dr Nash as outpt. She has PMD Appt on 4/12 at 10 AM

## 2019-04-05 NOTE — CHART NOTE - NSCHARTNOTEFT_GEN_A_CORE
Assessment:   Patient seen for: F/U    Source: [x] medical review, [x] patient    Factors impacting intake: [x] constipation     Intake: As per chart pt consumes % of her meals.    Pt reported fair appetite, stated consumes half of her meals or less. Pt denies N/V/D. Pt complains about constipation. Last BM 04/01. Pt was suggested to consume prunes, increasing fluids and walks as much as her physician recommends. Consistent Carbohydrates diet was discussed. Pt stated she consumes Chinese food every day, doesn't like sweets, avoids juices, consumes artificial sweeteners. Pt was advice to control CHO intake per meal. Portions were discussed. Pt agreed to read about CHO counting handout offered to better manage BG levels. Currently pt HgA1C 8.1 (04/03). Pt is on corticoids medicines as well.     Diet Prescription: Diet, Regular:   Consistent Carbohydrate {No Snacks}  DASH/TLC {Sodium & Cholesterol Restricted} (03-29-19 @ 04:22)    Current Weight: Weight (kg): 63 (03-30 @ 08:01)  % Weight Change: +4.5% (Pt gained 6 lbs in a week)     Pertinent Medications: MEDICATIONS  (STANDING):  atorvastatin 80 milliGRAM(s) Oral at bedtime  buDESOnide 160 MICROgram(s)/formoterol 4.5 MICROgram(s) Inhaler 2 Puff(s) Inhalation two times a day  dextrose 5%. 1000 milliLiter(s) (50 mL/Hr) IV Continuous <Continuous>  dextrose 50% Injectable 12.5 Gram(s) IV Push once  dextrose 50% Injectable 25 Gram(s) IV Push once  dextrose 50% Injectable 25 Gram(s) IV Push once  famotidine    Tablet 20 milliGRAM(s) Oral daily  insulin lispro (HumaLOG) corrective regimen sliding scale   SubCutaneous three times a day before meals  ipratropium    for Nebulization 500 MICROGram(s) Nebulizer every 6 hours  letrozole 2.5 milliGRAM(s) Oral daily  lisinopril 5 milliGRAM(s) Oral daily  nicotine - 21 mG/24Hr(s) Patch 1 patch Transdermal daily  predniSONE   Tablet 40 milliGRAM(s) Oral daily  rivaroxaban 20 milliGRAM(s) Oral at bedtime  tiotropium 18 MICROgram(s) Capsule 1 Capsule(s) Inhalation daily    MEDICATIONS  (PRN):  acetaminophen   Tablet .. 650 milliGRAM(s) Oral every 6 hours PRN Temp greater or equal to 38C (100.4F), Mild Pain (1 - 3)  dextrose 40% Gel 15 Gram(s) Oral once PRN Blood Glucose LESS THAN 70 milliGRAM(s)/deciliter  glucagon  Injectable 1 milliGRAM(s) IntraMuscular once PRN Glucose LESS THAN 70 milligrams/deciliter  polyethylene glycol 3350 17 Gram(s) Oral daily PRN Constipation    Pertinent Labs: 04-03 Na141 mmol/L Glu 222 mg/dL<H> K+ 4.1 mmol/L Cr  0.54 mg/dL BUN 21 mg/dL 04-03 [id number] 8.1 %<H>    CAPILLARY BLOOD GLUCOSE    POCT Blood Glucose.: 107 mg/dL (05 Apr 2019 07:20)  POCT Blood Glucose.: 135 mg/dL (04 Apr 2019 20:39)  POCT Blood Glucose.: 143 mg/dL (04 Apr 2019 16:13)  POCT Blood Glucose.: 224 mg/dL (04 Apr 2019 11:25)      Skin: intact    Edema: none    Estimated Needs:   [x] no change since previous assessment  [ ] recalculated:     Previous Nutrition Diagnosis: Limited adherence to nutrition recommendation related diet    Nutrition Diagnosis is [x] ongoing, but improving. Today pt was receptive to diet recommendations and accepted CHO counting literature to follow at home.     Interventions:   Recommend  [x] Continue with current diet: Consistent Carbohydrates diet (no snacks) DASH/TLC    [x] Other:     Monitoring and Evaluation:   Continue to monitor Nutritional intake, Tolerance to diet prescription, weights, labs, skin integrity.  other:  RD remains available upon request and will follow up per protocol.

## 2019-04-05 NOTE — DISCHARGE NOTE NURSING/CASE MANAGEMENT/SOCIAL WORK - NSDCPEPAMP_GEN_ALL_CORE
“[facility] for Tobacco Control” pamphlet given
“[facility] for Tobacco Control” pamphlet given

## 2019-04-05 NOTE — DISCHARGE NOTE PROVIDER - CARE PROVIDER_API CALL
Jayro Nash)  Critical Care Medicine; Internal Medicine; Pulmonary Disease  [address] [place] [address], [facility] [address]  [place] [address]  Phone: [phone]  Fax: [phone]  Follow Up Time:

## 2019-04-05 NOTE — PROGRESS NOTE ADULT - REASON FOR ADMISSION
dyspnea, cough, fever

## 2019-04-05 NOTE — PROGRESS NOTE ADULT - SUBJECTIVE AND OBJECTIVE BOX
Patient is a 63y old  Female who presents with a chief complaint of dyspnea, cough, fever (04 Apr 2019 14:54).  Feeling well. No acute events overnight.  Eager to go home      Patient seen and examined at bedside.    ALLERGIES:  penicillin (Other)    MEDICATIONS  (STANDING):  atorvastatin 80 milliGRAM(s) Oral at bedtime  buDESOnide 160 MICROgram(s)/formoterol 4.5 MICROgram(s) Inhaler 2 Puff(s) Inhalation two times a day  dextrose 5%. 1000 milliLiter(s) (50 mL/Hr) IV Continuous <Continuous>  dextrose 50% Injectable 12.5 Gram(s) IV Push once  dextrose 50% Injectable 25 Gram(s) IV Push once  dextrose 50% Injectable 25 Gram(s) IV Push once  famotidine    Tablet 20 milliGRAM(s) Oral daily  insulin lispro (HumaLOG) corrective regimen sliding scale   SubCutaneous three times a day before meals  ipratropium    for Nebulization 500 MICROGram(s) Nebulizer every 6 hours  letrozole 2.5 milliGRAM(s) Oral daily  lisinopril 5 milliGRAM(s) Oral daily  nicotine - 21 mG/24Hr(s) Patch 1 patch Transdermal daily  predniSONE   Tablet 40 milliGRAM(s) Oral daily  rivaroxaban 20 milliGRAM(s) Oral at bedtime  tiotropium 18 MICROgram(s) Capsule 1 Capsule(s) Inhalation daily    MEDICATIONS  (PRN):  acetaminophen   Tablet .. 650 milliGRAM(s) Oral every 6 hours PRN Temp greater or equal to 38C (100.4F), Mild Pain (1 - 3)  dextrose 40% Gel 15 Gram(s) Oral once PRN Blood Glucose LESS THAN 70 milliGRAM(s)/deciliter  glucagon  Injectable 1 milliGRAM(s) IntraMuscular once PRN Glucose LESS THAN 70 milligrams/deciliter  polyethylene glycol 3350 17 Gram(s) Oral daily PRN Constipation    Vital Signs Last 24 Hrs  T(F): 98 (05 Apr 2019 09:36), Max: 99.3 (04 Apr 2019 20:43)  HR: 97 (05 Apr 2019 09:36) (79 - 97)  BP: 147/74 (05 Apr 2019 09:36) (134/66 - 151/65)  RR: 15 (05 Apr 2019 09:36) (14 - 16)  SpO2: 93% (05 Apr 2019 09:36) (93% - 100%)  I&O's Summary    04 Apr 2019 07:01  -  05 Apr 2019 07:00  --------------------------------------------------------  IN: 500 mL / OUT: 0 mL / NET: 500 mL    05 Apr 2019 07:01  -  05 Apr 2019 10:21  --------------------------------------------------------  IN: 360 mL / OUT: 0 mL / NET: 360 mL        PHYSICAL EXAM:  General: NAD, A/O x 3  ENT: MMM  Neck: Supple, No JVD  Lungs: Clear to auscultation bilaterally  Cardio: RRR, S1/S2, +murmur  Abdomen: Soft, Nontender, Nondistended; Bowel sounds present  Extremities: No calf tenderness, No pitting edema    LABS:                        13.2   9.2   )-----------( 184      ( 03 Apr 2019 07:04 )             41.1       04-03    141  |  104  |  21  ----------------------------<  222  4.1   |  30  |  0.54    Ca    8.7      03 Apr 2019 07:04       eGFR if Non African American: 102 mL/min/1.73M2 (04-03-19 @ 07:04)  eGFR if African American: 118 mL/min/1.73M2 (04-03-19 @ 07:04)       Lactate, Blood: 1.6 mmol/L (04-03 @ 07:04)                CAPILLARY BLOOD GLUCOSE      POCT Blood Glucose.: 107 mg/dL (05 Apr 2019 07:20)  POCT Blood Glucose.: 135 mg/dL (04 Apr 2019 20:39)  POCT Blood Glucose.: 143 mg/dL (04 Apr 2019 16:13)  POCT Blood Glucose.: 224 mg/dL (04 Apr 2019 11:25)    04-03 [id number] 8.1          RADIOLOGY & ADDITIONAL TESTS:    Care Discussed with Consultants/Other Providers:

## 2019-04-05 NOTE — DISCHARGE NOTE NURSING/CASE MANAGEMENT/SOCIAL WORK - NSDCPEWEB_GEN_ALL_CORE
[facility] for Tobacco Control website --- [email or web address] website --- [email or web address]
[place] website --- [email or web address]/[facility] for Tobacco Control website --- [email or web address]

## 2019-04-05 NOTE — DISCHARGE NOTE NURSING/CASE MANAGEMENT/SOCIAL WORK - NSDCPETBCESMAN_GEN_ALL_CORE
If you are a smoker, it is important for your health to stop smoking. Please be aware that second hand smoke is also harmful.
If you are a smoker, it is important for your health to stop smoking. Please be aware that second hand smoke is also harmful.

## 2019-04-05 NOTE — DISCHARGE NOTE NURSING/CASE MANAGEMENT/SOCIAL WORK - NSDCDPATPORTLINK_GEN_ALL_CORE
You can access the [facility] Patient Portal, offered by [facility], by registering with the following website: [email or web address]
You can access the [facility] Patient Portal, offered by [facility], by registering with the following website: [email or web address]

## 2019-04-05 NOTE — DISCHARGE NOTE PROVIDER - NSDCCPCAREPLAN_GEN_ALL_CORE_FT
PRINCIPAL DISCHARGE DIAGNOSIS  Diagnosis: COPD exacerbation  Assessment and Plan of Treatment:       SECONDARY DISCHARGE DIAGNOSES  Diagnosis: Acute viral bronchitis  Assessment and Plan of Treatment:

## 2019-04-05 NOTE — PROGRESS NOTE ADULT - PROVIDER SPECIALTY LIST ADULT
Hospitalist
Pulmonology
Hospitalist

## 2019-04-06 RX ORDER — IPRATROPIUM/ALBUTEROL SULFATE 18-103MCG
3 AEROSOL WITH ADAPTER (GRAM) INHALATION
Qty: 270 | Refills: 0
Start: 2019-04-06

## 2019-04-12 ENCOUNTER — APPOINTMENT (OUTPATIENT)
Age: 64
End: 2019-04-12

## 2019-04-12 ENCOUNTER — OUTPATIENT (OUTPATIENT)
Dept: OUTPATIENT SERVICES | Facility: HOSPITAL | Age: 64
LOS: 1 days | End: 2019-04-12
Payer: MEDICARE

## 2019-04-12 VITALS
HEART RATE: 101 BPM | DIASTOLIC BLOOD PRESSURE: 90 MMHG | WEIGHT: 124 LBS | BODY MASS INDEX: 25 KG/M2 | OXYGEN SATURATION: 98 % | RESPIRATION RATE: 16 BRPM | TEMPERATURE: 98 F | SYSTOLIC BLOOD PRESSURE: 179 MMHG | HEIGHT: 59 IN

## 2019-04-12 DIAGNOSIS — Z95.5 PRESENCE OF CORONARY ANGIOPLASTY IMPLANT AND GRAFT: ICD-10-CM

## 2019-04-12 DIAGNOSIS — Z86.79 PERSONAL HISTORY OF OTHER DISEASES OF THE CIRCULATORY SYSTEM: ICD-10-CM

## 2019-04-12 DIAGNOSIS — Z00.00 ENCOUNTER FOR GENERAL ADULT MEDICAL EXAMINATION WITHOUT ABNORMAL FINDINGS: ICD-10-CM

## 2019-04-12 DIAGNOSIS — Z85.3 PERSONAL HISTORY OF MALIGNANT NEOPLASM OF BREAST: ICD-10-CM

## 2019-04-12 DIAGNOSIS — Z90.12 ACQUIRED ABSENCE OF LEFT BREAST AND NIPPLE: Chronic | ICD-10-CM

## 2019-04-12 DIAGNOSIS — Z86.39 PERSONAL HISTORY OF OTHER ENDOCRINE, NUTRITIONAL AND METABOLIC DISEASE: ICD-10-CM

## 2019-04-12 PROCEDURE — G0463: CPT

## 2019-04-12 RX ORDER — BUDESONIDE AND FORMOTEROL FUMARATE DIHYDRATE 160; 4.5 UG/1; UG/1
160-4.5 AEROSOL RESPIRATORY (INHALATION) TWICE DAILY
Refills: 0 | Status: COMPLETED | COMMUNITY
Start: 2019-04-12 | End: 2019-04-12

## 2019-04-12 NOTE — COUNSELING
[Healthy eating counseling provided] : healthy eating [Weight management counseling provided] : Weight management [Low Fat Diet] : Low fat diet [Activity counseling provided] : activity [Low Salt Diet] : Low salt diet [Decrease Portions] : Decrease food portions [Walking] : Walking 5

## 2019-04-13 NOTE — HEALTH RISK ASSESSMENT
[No falls in past year] : Patient reported no falls in the past year [0] : 2) Feeling down, depressed, or hopeless: Not at all (0) [] : No [[id number]] : 0 [de-identified] : 2PPD

## 2019-04-13 NOTE — PHYSICAL EXAM
[No Acute Distress] : no acute distress [Well Nourished] : well nourished [Well Developed] : well developed [Well-Appearing] : well-appearing [No Respiratory Distress] : no respiratory distress  [No Accessory Muscle Use] : no accessory muscle use [Normal Rate] : normal rate  [Regular Rhythm] : with a regular rhythm [Normal S1, S2] : normal S1 and S2 [No Murmur] : no murmur heard [Normal Gait] : normal gait [Coordination Grossly Intact] : coordination grossly intact [No Focal Deficits] : no focal deficits [Normal Affect] : the affect was normal [Normal Insight/Judgement] : insight and judgment were intact [de-identified] : decreased breath sounds bilaterally.

## 2019-04-13 NOTE — ASSESSMENT
[FreeTextEntry1] : 63 year old female with pmhx of copd (currently on home oxygen), DM2 on metformin, CAD s/p 1 stent last year on xarelto, breast cancer  currently on letrozole here for f/u s/p discharge from [facility] for copd exacerbation on 4/5/19. \par \par #COPD\par - continue prednisone taper\par - continue famotidine\par - f/u pulm appointment 4/18/19\par - continue spiriva and symbicort\par - continue home oxygen\par - pt believes she got influenza and pneumovax with her pulmonologist but will f/u in 2 weeks and check with pulmonologist\par \par #History of Cardiac Stent\par - atorvastatin 80\par - continue xarelto\par - follow up with Dr. Roland in [facility] on xarelto.\par \par #HTN\par - start lisinopril 10mg\par \par #DM\par - increased metformin 1000mg BID\par - last a1c of 8.1\par - repeat in 3 months\par \par #History of Breast cancer\par - continue letrozole\par \par return in 2 weeks\par \par case discussed with Dr. Best

## 2019-04-13 NOTE — HISTORY OF PRESENT ILLNESS
[FreeTextEntry8] : 63 year old female with pmhx of copd (currently on home oxygen), DM2 on metformin, CAD s/p 1 stent last year on xarelto, breast cancer  currently on letrozole here for f/u s/p discharge from [facility] for copd exacerbation on 4/5/19. Pt currently is still on her prednisone taper and has no further complaints. Pt has her pulm appointment on 4/18/19.

## 2019-04-15 DIAGNOSIS — J44.9 CHRONIC OBSTRUCTIVE PULMONARY DISEASE, UNSPECIFIED: ICD-10-CM

## 2019-04-24 ENCOUNTER — APPOINTMENT (OUTPATIENT)
Age: 64
End: 2019-04-24

## 2019-04-24 ENCOUNTER — OUTPATIENT (OUTPATIENT)
Dept: OUTPATIENT SERVICES | Facility: HOSPITAL | Age: 64
LOS: 1 days | End: 2019-04-24
Payer: MEDICARE

## 2019-04-24 VITALS
HEART RATE: 89 BPM | TEMPERATURE: 97.8 F | RESPIRATION RATE: 16 BRPM | DIASTOLIC BLOOD PRESSURE: 81 MMHG | SYSTOLIC BLOOD PRESSURE: 154 MMHG | WEIGHT: 128 LBS | BODY MASS INDEX: 25.85 KG/M2 | OXYGEN SATURATION: 97 %

## 2019-04-24 DIAGNOSIS — Z90.12 ACQUIRED ABSENCE OF LEFT BREAST AND NIPPLE: Chronic | ICD-10-CM

## 2019-04-24 DIAGNOSIS — I26.99 OTHER PULMONARY EMBOLISM W/OUT ACUTE COR PULMONALE: ICD-10-CM

## 2019-04-24 DIAGNOSIS — Z86.711 PERSONAL HISTORY OF PULMONARY EMBOLISM: ICD-10-CM

## 2019-04-24 DIAGNOSIS — Z00.00 ENCOUNTER FOR GENERAL ADULT MEDICAL EXAMINATION WITHOUT ABNORMAL FINDINGS: ICD-10-CM

## 2019-04-24 PROCEDURE — G0463: CPT

## 2019-04-24 PROCEDURE — G0008: CPT

## 2019-04-24 PROCEDURE — 90732 PPSV23 VACC 2 YRS+ SUBQ/IM: CPT

## 2019-04-24 PROCEDURE — G0009: CPT

## 2019-04-24 PROCEDURE — 82043 UR ALBUMIN QUANTITATIVE: CPT

## 2019-04-24 RX ORDER — TIOTROPIUM BROMIDE 18 UG/1
18 CAPSULE ORAL; RESPIRATORY (INHALATION) DAILY
Refills: 0 | Status: DISCONTINUED | COMMUNITY
Start: 2019-04-12 | End: 2019-04-24

## 2019-04-24 RX ORDER — PREDNISONE 5 MG/1
5 TABLET ORAL DAILY
Qty: 1 | Refills: 0 | Status: DISCONTINUED | COMMUNITY
Start: 2019-04-12 | End: 2019-04-24

## 2019-04-24 NOTE — PHYSICAL EXAM
[No Acute Distress] : no acute distress [Well Nourished] : well nourished [No JVD] : no jugular venous distention [No Respiratory Distress] : no respiratory distress  [Clear to Auscultation] : lungs were clear to auscultation bilaterally [No Accessory Muscle Use] : no accessory muscle use [Normal Rate] : normal rate  [Regular Rhythm] : with a regular rhythm [Normal S1, S2] : normal S1 and S2 [No Murmur] : no murmur heard [Pedal Pulses Present] : the pedal pulses are present [Soft] : abdomen soft [No Edema] : there was no peripheral edema [Non Tender] : non-tender [Non-distended] : non-distended [No Masses] : no abdominal mass palpated [No HSM] : no HSM [Normal Bowel Sounds] : normal bowel sounds [No Rash] : no rash [Normal Gait] : normal gait

## 2019-04-24 NOTE — ASSESSMENT
[FreeTextEntry1] : #COPD\par -Continue prednisone therapy per Dr. Nash's office\par -Continue inhalers per Dr. Nash's office (renewed tiotropium for now)\par -Patient symptomatically improving\par -Congratulated on smoking cessation\par -Give pneumovax today\par \par #DM2\par -Encouraged patient to take the new metformin dose of 1000mg BID (she had been taking the prior dose)\par -Referral for A1C given to be completed prior to next appointment in 2-3 months\par \par #HTN\par -Increase lisinopril to 20mg (patient to finish current bottle then  new dose to avoid medication confusion)\par -Follow-up in 2-3 months\par -Check BMP prior to next visit\par \par #Hx of PE\par -As d/w Dr. Adams\par -Follows with Dr. Roland at [facility] for Xarelto and management of her CAD

## 2019-04-24 NOTE — HISTORY OF PRESENT ILLNESS
[FreeTextEntry1] : Follow-up COPD, HTN [de-identified] : Patient reports her breathing is improved. Adherent with prednisone (was prescribed 10mg daily by her pulmonologist but previously on chronic prednisone 5mg) and inhaler medications. Still with residual cough but no other symptoms. She has quit smoking since her last visit and is using the nicotine patches. Patient was congratulated.\par \par Patient has been taking her lisinopril daily.\par \par Patient was initially unsure if she needs the prednisone permanently. Spoke with Dr. Adams (pulmonologist who is partner with Dr. Nash at [facility]) who states she is on chronic prednisone. Also noted in the chart was a history of PE which may explain her Xarelto use.

## 2019-04-25 DIAGNOSIS — I10 ESSENTIAL (PRIMARY) HYPERTENSION: ICD-10-CM

## 2019-04-25 DIAGNOSIS — Z23 ENCOUNTER FOR IMMUNIZATION: ICD-10-CM

## 2019-04-25 DIAGNOSIS — J44.9 CHRONIC OBSTRUCTIVE PULMONARY DISEASE, UNSPECIFIED: ICD-10-CM

## 2019-05-01 LAB
CREAT SPEC-SCNC: 50 MG/DL
MICROALBUMIN 24H UR DL<=1MG/L-MCNC: 4.5 MG/DL
MICROALBUMIN/CREAT 24H UR-RTO: 90 MG/G

## 2019-06-12 ENCOUNTER — LABORATORY RESULT (OUTPATIENT)
Age: 64
End: 2019-06-12

## 2019-06-19 ENCOUNTER — OUTPATIENT (OUTPATIENT)
Dept: OUTPATIENT SERVICES | Facility: HOSPITAL | Age: 64
LOS: 1 days | End: 2019-06-19
Payer: MEDICARE

## 2019-06-19 ENCOUNTER — APPOINTMENT (OUTPATIENT)
Age: 64
End: 2019-06-19

## 2019-06-19 VITALS
BODY MASS INDEX: 26.41 KG/M2 | WEIGHT: 131 LBS | SYSTOLIC BLOOD PRESSURE: 151 MMHG | OXYGEN SATURATION: 98 % | HEIGHT: 59 IN | HEART RATE: 88 BPM | TEMPERATURE: 98.1 F | DIASTOLIC BLOOD PRESSURE: 80 MMHG | RESPIRATION RATE: 14 BRPM

## 2019-06-19 DIAGNOSIS — Z00.00 ENCOUNTER FOR GENERAL ADULT MEDICAL EXAMINATION WITHOUT ABNORMAL FINDINGS: ICD-10-CM

## 2019-06-19 DIAGNOSIS — Z90.12 ACQUIRED ABSENCE OF LEFT BREAST AND NIPPLE: Chronic | ICD-10-CM

## 2019-06-19 PROCEDURE — G0463: CPT

## 2019-06-19 RX ORDER — PREDNISONE 5 MG/1
5 TABLET ORAL DAILY
Qty: 1 | Refills: 0 | Status: DISCONTINUED | COMMUNITY
Start: 2019-04-24 | End: 2019-06-19

## 2019-06-19 NOTE — PHYSICAL EXAM
[No Acute Distress] : no acute distress [Well Nourished] : well nourished [No Respiratory Distress] : no respiratory distress  [Clear to Auscultation] : lungs were clear to auscultation bilaterally [No Accessory Muscle Use] : no accessory muscle use [Normal Rate] : normal rate  [Regular Rhythm] : with a regular rhythm [Normal S1, S2] : normal S1 and S2 [No Murmur] : no murmur heard [Pedal Pulses Present] : the pedal pulses are present [No Edema] : there was no peripheral edema [Soft] : abdomen soft [Non Tender] : non-tender [No HSM] : no HSM [Normal Bowel Sounds] : normal bowel sounds [No Rash] : no rash [Normal Gait] : normal gait [Comprehensive Foot Exam Normal] : Right and left foot were examined and both feet are normal. No ulcers in either foot. Toes are normal and with full ROM.  Normal tactile sensation with monofilament testing throughout both feet

## 2019-06-19 NOTE — HISTORY OF PRESENT ILLNESS
[Diabetes Mellitus] : Diabetes Mellitus [Hyperlipidemia] : Hyperlipidemia [Hypertension] : Hypertension [FreeTextEntry6] : Adherent with her COPD medications.\par Reports prednisone was discontinued by her pulmonologist\par When the weather was humid she used oxygen but now she is feeling better.\par Has a chronic cough with grey sputum. Unchanged from baseline. No fever, chills, weakness.  [No episodes] : No hypoglycemic episodes since the last visit. [Does not check] : Patient does not check blood glucose regularly [Understanding of foot care] : Patient expressed understanding of foot care [Most Recent A1C: ___] : Most recent A1C was [unfilled] [Target goal met] : A1C target goal met [High Intensity] : Patient is currently on high intensity statin therapy [Checks BP Sporadically] : The patient checks ~his/her~ blood pressure sporadically [<140/90] : Target blood pressure is <140/90 [Near target goal] : BP near target goal [Doing Well] : Patient is doing well [Managed with medications] : managed with  medication [Stopped use since ___] : No tobacco use since [unfilled] [___ Months] : [unfilled] months ago

## 2019-06-19 NOTE — ASSESSMENT
[FreeTextEntry1] : #DM2\par -Well controlled on metformin\par -Follow-up 3 months\par -Lipid profile at that time\par -Ophthalmology referral given\par \par #HTN\par -Increase lisinopril to 40 mg daily\par -Prior BMP was normal\par -Has follow-up with her cardiologist in July (letter given to front office staff to fax to Dr. Javier Roland's office regarding her current medications)\par \par #COPD\par -Congratulated on smoking cessation\par -Has been following regularly with pulmonology for this \par \par #Health maintenance\par -Recommended pap smear within next 3-6 months\par -Follows with another doctor for her breast cancer follow-up/ screening

## 2019-06-20 DIAGNOSIS — I10 ESSENTIAL (PRIMARY) HYPERTENSION: ICD-10-CM

## 2019-06-20 DIAGNOSIS — E11.9 TYPE 2 DIABETES MELLITUS WITHOUT COMPLICATIONS: ICD-10-CM

## 2019-09-03 NOTE — ED ADULT NURSE NOTE - NS PRO AD BILL OF RIGHTS
Message left for patient to return call to clinic regarding shingrix vaccine. If patient is still interested in receiving the vaccine, please schedule.  
Patient wife Connie returned call and scheduled appointment for Shingrix vaccine on 9/11/2019 at 1:45 PM. No further action needed.   
Yes

## 2019-09-09 ENCOUNTER — APPOINTMENT (OUTPATIENT)
Dept: FAMILY MEDICINE | Facility: HOSPITAL | Age: 64
End: 2019-09-09

## 2019-09-09 ENCOUNTER — RESULT CHARGE (OUTPATIENT)
Age: 64
End: 2019-09-09

## 2019-09-09 ENCOUNTER — MED ADMIN CHARGE (OUTPATIENT)
Age: 64
End: 2019-09-09

## 2019-09-09 ENCOUNTER — OUTPATIENT (OUTPATIENT)
Dept: OUTPATIENT SERVICES | Facility: HOSPITAL | Age: 64
LOS: 1 days | End: 2019-09-09
Payer: MEDICARE

## 2019-09-09 VITALS
HEIGHT: 59 IN | OXYGEN SATURATION: 96 % | WEIGHT: 125 LBS | SYSTOLIC BLOOD PRESSURE: 125 MMHG | RESPIRATION RATE: 16 BRPM | BODY MASS INDEX: 25.2 KG/M2 | DIASTOLIC BLOOD PRESSURE: 87 MMHG | HEART RATE: 94 BPM | TEMPERATURE: 98.2 F

## 2019-09-09 DIAGNOSIS — Z00.00 ENCOUNTER FOR GENERAL ADULT MEDICAL EXAMINATION WITHOUT ABNORMAL FINDINGS: ICD-10-CM

## 2019-09-09 DIAGNOSIS — E78.5 HYPERLIPIDEMIA, UNSPECIFIED: ICD-10-CM

## 2019-09-09 DIAGNOSIS — Z90.12 ACQUIRED ABSENCE OF LEFT BREAST AND NIPPLE: Chronic | ICD-10-CM

## 2019-09-09 DIAGNOSIS — Z82.69 FAMILY HISTORY OF OTHER DISEASES OF THE MUSCULOSKELETAL SYSTEM AND CONNECTIVE TISSUE: ICD-10-CM

## 2019-09-09 DIAGNOSIS — Z82.49 FAMILY HISTORY OF ISCHEMIC HEART DISEASE AND OTHER DISEASES OF THE CIRCULATORY SYSTEM: ICD-10-CM

## 2019-09-09 LAB — HBA1C MFR BLD HPLC: 6.4

## 2019-09-09 NOTE — PLAN
[FreeTextEntry1] : RTC for pap smear as soon as possible\par RTC for smoking cessation counseling when ready, patient refuses today, not ready to stop smoking.\par RTC q3mos for A1C check.\par Flu vaccine administered today.\par \par case dw Dr. Rios

## 2019-09-09 NOTE — COUNSELING
[Risk of tobacco use and health benefits of smoking cessation discussed] : Risk of tobacco use and health benefits of smoking cessation discussed [Cessation strategies including cessation program discussed] : Cessation strategies including cessation program discussed [Encouraged to pick a quit date and identify support needed to quit] : Encouraged to pick a quit date and identify support needed to quit [Use of nicotine replacement therapies and other medications discussed] : Use of nicotine replacement therapies and other medications discussed [Good understanding] : Patient has a good understanding of lifestyle changes and steps needed to achieve self management goal [Willing to Quit Smoking] : Not willing to quit smoking [FreeTextEntry2] : Patient feels she is not ready to try and quit again, despite education, and her COPD. [de-identified] : Low carbohydrate diets are what are going to help decrease the A1C in DMII, avoid juices/sodas, decrease intake of breads, starches, pastas, noodles, sweets.

## 2019-09-09 NOTE — HISTORY OF PRESENT ILLNESS
[FreeTextEntry1] : CPE [de-identified] : Patient admits that she has chronic SOB, 2/2 COPD, admits that she is still smoking. Patient with abdominal pain, with sitting down, pain is sharp, in LUQ, RUQ, 5-10 seconds, no exacerbating factors, denies n/v, no Diarrhea/Constipation, urinary changes.\par \par SOB with exertion, going up and down flights of stairs, could not walk one block 2/2 sob.

## 2019-09-09 NOTE — REVIEW OF SYSTEMS
[Shortness Of Breath] : shortness of breath [Wheezing] : wheezing [Cough] : cough [Dyspnea on Exertion] : dyspnea on exertion [Abdominal Pain] : abdominal pain [Negative] : Musculoskeletal [Fever] : no fever [Fatigue] : no fatigue [Chills] : no chills [Recent Change In Weight] : ~T no recent weight change [Vision Problems] : no vision problems [Sore Throat] : no sore throat [Nasal Discharge] : no nasal discharge [Chest Pain] : no chest pain [Palpitations] : no palpitations [Lower Ext Edema] : no lower extremity edema [Orthopnea] : no orthopnea [Constipation] : no constipation [Nausea] : no nausea [Diarrhea] : diarrhea [Dizziness] : no dizziness [Vomiting] : no vomiting [Swollen Glands] : no swollen glands [FreeTextEntry7] : intermtitent abdominal pain, sharp, self resolving

## 2019-09-09 NOTE — PHYSICAL EXAM
[No Acute Distress] : no acute distress [Well Developed] : well developed [Well Nourished] : well nourished [Well-Appearing] : well-appearing [Normal Sclera/Conjunctiva] : normal sclera/conjunctiva [PERRL] : pupils equal round and reactive to light [EOMI] : extraocular movements intact [Normal Outer Ear/Nose] : the outer ears and nose were normal in appearance [Normal Oropharynx] : the oropharynx was normal [No Lymphadenopathy] : no lymphadenopathy [No JVD] : no jugular venous distention [Supple] : supple [Thyroid Normal, No Nodules] : the thyroid was normal and there were no nodules present [No Respiratory Distress] : no respiratory distress  [No Accessory Muscle Use] : no accessory muscle use [Clear to Auscultation] : lungs were clear to auscultation bilaterally [Normal Rate] : normal rate  [Regular Rhythm] : with a regular rhythm [Normal S1, S2] : normal S1 and S2 [No Murmur] : no murmur heard [No Abdominal Bruit] : a ~M bruit was not heard ~T in the abdomen [Pedal Pulses Present] : the pedal pulses are present [No Edema] : there was no peripheral edema [No Extremity Clubbing/Cyanosis] : no extremity clubbing/cyanosis [Non Tender] : non-tender [Soft] : abdomen soft [Non-distended] : non-distended [Normal Bowel Sounds] : normal bowel sounds [No Masses] : no abdominal mass palpated [No Hernias] : no hernias [Normal Supraclavicular Nodes] : no supraclavicular lymphadenopathy [Normal Posterior Cervical Nodes] : no posterior cervical lymphadenopathy [Normal Anterior Cervical Nodes] : no anterior cervical lymphadenopathy [No CVA Tenderness] : no CVA  tenderness [No Joint Swelling] : no joint swelling [No Spinal Tenderness] : no spinal tenderness [Normal Gait] : normal gait [Normal Affect] : the affect was normal [Normal Mood] : the mood was normal [Comprehensive Foot Exam Normal] : Right and left foot were examined and both feet are normal. No ulcers in either foot. Toes are normal and with full ROM.  Normal tactile sensation with monofilament testing throughout both feet [Normal Insight/Judgement] : insight and judgment were intact [Normal Axillary Nodes] : no axillary lymphadenopathy [de-identified] : L sided mastectomy+

## 2019-09-09 NOTE — HEALTH RISK ASSESSMENT
[Good] : ~his/her~  mood as  good [] : Yes [30 or more] : 30 or more [No] : No [0] : 2) Feeling down, depressed, or hopeless: Not at all (0) [HIV test declined] : HIV test declined [Hepatitis C test declined] : Hepatitis C test declined [] :  [# Of Children ___] : has [unfilled] children [de-identified] : Various diet [de-identified] : 42years 1.5packs. Quit for 2 months, and then started again. [de-identified] : no physical activity [MammogramComments] : Follows every 3 weeks, Dr. KEITH traceyectomy Dr. Ronan Bravo [ColonoscopyComments] : No hx of Colonoscopy [PapSmearComments] : No hx of Pap smear [HIVComments] : patient feels she does not need it, will decline [HepatitisCComments] : educated on benefits of testing and reasons for screening [de-identified] : disability/retired - [employer] [de-identified] : [place], lives with extended family

## 2019-09-11 DIAGNOSIS — E11.9 TYPE 2 DIABETES MELLITUS WITHOUT COMPLICATIONS: ICD-10-CM

## 2019-09-11 DIAGNOSIS — E78.5 HYPERLIPIDEMIA, UNSPECIFIED: ICD-10-CM

## 2019-09-17 ENCOUNTER — CHART COPY (OUTPATIENT)
Age: 64
End: 2019-09-17

## 2019-09-17 LAB
CHOLEST SERPL-MCNC: 125 MG/DL
CHOLEST/HDLC SERPL: 3.5 RATIO
HDLC SERPL-MCNC: 36 MG/DL
HEMOCCULT STL QL IA: POSITIVE
LDLC SERPL CALC-MCNC: 77 MG/DL
TRIGL SERPL-MCNC: 60 MG/DL
TSH SERPL-ACNC: 2.02 UIU/ML

## 2019-09-17 PROCEDURE — 84443 ASSAY THYROID STIM HORMONE: CPT

## 2019-09-17 PROCEDURE — 82274 ASSAY TEST FOR BLOOD FECAL: CPT

## 2019-09-17 PROCEDURE — 80061 LIPID PANEL: CPT

## 2019-09-17 PROCEDURE — G0463: CPT

## 2019-09-17 PROCEDURE — 36415 COLL VENOUS BLD VENIPUNCTURE: CPT

## 2019-09-30 ENCOUNTER — OUTPATIENT (OUTPATIENT)
Dept: OUTPATIENT SERVICES | Facility: HOSPITAL | Age: 64
LOS: 1 days | End: 2019-09-30
Payer: MEDICARE

## 2019-09-30 ENCOUNTER — APPOINTMENT (OUTPATIENT)
Dept: FAMILY MEDICINE | Facility: HOSPITAL | Age: 64
End: 2019-09-30

## 2019-09-30 VITALS
SYSTOLIC BLOOD PRESSURE: 142 MMHG | WEIGHT: 126 LBS | DIASTOLIC BLOOD PRESSURE: 82 MMHG | BODY MASS INDEX: 25.45 KG/M2 | RESPIRATION RATE: 14 BRPM | OXYGEN SATURATION: 99 % | HEART RATE: 82 BPM | TEMPERATURE: 97.5 F

## 2019-09-30 DIAGNOSIS — Z00.00 ENCOUNTER FOR GENERAL ADULT MEDICAL EXAMINATION WITHOUT ABNORMAL FINDINGS: ICD-10-CM

## 2019-09-30 DIAGNOSIS — Z90.12 ACQUIRED ABSENCE OF LEFT BREAST AND NIPPLE: Chronic | ICD-10-CM

## 2019-09-30 PROCEDURE — G0463: CPT

## 2019-09-30 NOTE — END OF VISIT
[] : Resident [FreeTextEntry3] : Dr. heller called pt and discussed due to use of Xarelto pt should be monitoring her stool and if she has more bleeding to go to ER

## 2019-09-30 NOTE — HISTORY OF PRESENT ILLNESS
[de-identified] : 64F w/PMH of COPD, DM2 (A1c 6.4 Sept 2019), HTN, HLD, CAD S/P stent presents for F/U positive FOBI Sept 2019. Pt denies BRBPR or dark stools. No family hx colon ca. Denies changes in weight. [FreeTextEntry1] : F/U positive FOBI

## 2019-09-30 NOTE — REVIEW OF SYSTEMS
[Negative] : Cardiovascular [Abdominal Pain] : no abdominal pain [Diarrhea] : diarrhea [Constipation] : no constipation [Melena] : no melena

## 2019-09-30 NOTE — PLAN
[FreeTextEntry1] : 64F w/PMH of COPD, DM2 (A1c 6.4 Sept 2019), HTN, HLD, CAD S/P stent presents for F/U positive FOBI Sept 2019. \par \par #Positive FOBI\par - Asymptomatic\par - GI referral\par \par #DM2\par - Continue metformin 1000mg bid \par \par Case discussed w/ Dr Rios

## 2019-09-30 NOTE — PHYSICAL EXAM
[No Acute Distress] : no acute distress [Well Nourished] : well nourished [Well Developed] : well developed [Well-Appearing] : well-appearing [No Respiratory Distress] : no respiratory distress  [No Accessory Muscle Use] : no accessory muscle use [Clear to Auscultation] : lungs were clear to auscultation bilaterally [Normal Rate] : normal rate  [Regular Rhythm] : with a regular rhythm [Normal S1, S2] : normal S1 and S2 [Normal Affect] : the affect was normal [No Murmur] : no murmur heard [Normal Insight/Judgement] : insight and judgment were intact

## 2019-10-01 DIAGNOSIS — E11.9 TYPE 2 DIABETES MELLITUS WITHOUT COMPLICATIONS: ICD-10-CM

## 2019-10-01 DIAGNOSIS — R19.5 OTHER FECAL ABNORMALITIES: ICD-10-CM

## 2019-12-01 ENCOUNTER — TRANSCRIPTION ENCOUNTER (OUTPATIENT)
Age: 64
End: 2019-12-01

## 2019-12-02 ENCOUNTER — OUTPATIENT (OUTPATIENT)
Dept: OUTPATIENT SERVICES | Facility: HOSPITAL | Age: 64
LOS: 1 days | End: 2019-12-02
Payer: MEDICARE

## 2019-12-02 DIAGNOSIS — Z12.11 ENCOUNTER FOR SCREENING FOR MALIGNANT NEOPLASM OF COLON: ICD-10-CM

## 2019-12-02 DIAGNOSIS — Z90.12 ACQUIRED ABSENCE OF LEFT BREAST AND NIPPLE: Chronic | ICD-10-CM

## 2019-12-02 DIAGNOSIS — K92.2 GASTROINTESTINAL HEMORRHAGE, UNSPECIFIED: ICD-10-CM

## 2019-12-02 LAB — GLUCOSE BLDC GLUCOMTR-MCNC: 111 MG/DL — HIGH (ref 70–99)

## 2019-12-02 PROCEDURE — G0121: CPT

## 2019-12-02 PROCEDURE — 82962 GLUCOSE BLOOD TEST: CPT

## 2019-12-02 RX ORDER — SODIUM CHLORIDE 9 MG/ML
1000 INJECTION INTRAMUSCULAR; INTRAVENOUS; SUBCUTANEOUS
Refills: 0 | Status: DISCONTINUED | OUTPATIENT
Start: 2019-12-02 | End: 2019-12-17

## 2019-12-02 RX ADMIN — SODIUM CHLORIDE 75 MILLILITER(S): 9 INJECTION INTRAMUSCULAR; INTRAVENOUS; SUBCUTANEOUS at 11:49

## 2019-12-02 NOTE — CHART NOTE - NSCHARTNOTEFT_GEN_A_CORE
COLONOSCOPY REPORT    INDICATION: Screening for malignancy    ANESTHESIOLOGIST: Olvin Diaz MD    MEDICATION: MAC    COMPLICATIONS: none    --------------------------------------------------------------------------------------------------------------------------------------------------------------------    PRE-ANESTHESIA ASSESSMENT:    1. The risks and benefits of the procedure and the sedation options and risks were discussed with the patient.  All questions were answered and informed consent was obtained.    2. Prior to the procedure, a History and Physical was performed, and patient medications, allergies and sensitivities have been reviewed.  The patient's tolerance of preious anesthesia has been reviewed.    3. After obtaining informed consent, the endoscope was passed under direct vision.  Throughout the procedure, the patient's blood pressure, pulse, and oxygen saturation were monitored continuously.  The colonoscope was introduced through the anus and advanced to the cecum, identified by the appendiceal orifice and ileal-cecal valve.  The colonoscopy was performed without difficulty.  The patient tolerated the procedure well.  The quality of the bowel preparation was good.    FINDINGS:    A. Mucosa throughout the colon appeared unremarkable.  No ulcerations, mass lesions or polyps.  No colitis present.  B. Non-bleeding hemorrhoids, moderate in size seen upon retroflexion in the rectal vault.    ESTIMATED BLOOD LOSS: 0 cc.    IMPRESSION:  1. Non-bleeding internal hemorrhoids      RECOMMENDATIONS:  1. High fiber diet  2. Follow-up colonoscopy in 7-10 years

## 2020-02-01 ENCOUNTER — OUTPATIENT (OUTPATIENT)
Dept: OUTPATIENT SERVICES | Facility: HOSPITAL | Age: 65
LOS: 1 days | End: 2020-02-01
Payer: MEDICARE

## 2020-02-01 ENCOUNTER — RESULT CHARGE (OUTPATIENT)
Age: 65
End: 2020-02-01

## 2020-02-01 ENCOUNTER — APPOINTMENT (OUTPATIENT)
Dept: FAMILY MEDICINE | Facility: HOSPITAL | Age: 65
End: 2020-02-01

## 2020-02-01 VITALS
RESPIRATION RATE: 14 BRPM | TEMPERATURE: 97.7 F | OXYGEN SATURATION: 92 % | WEIGHT: 118 LBS | DIASTOLIC BLOOD PRESSURE: 91 MMHG | HEART RATE: 112 BPM | BODY MASS INDEX: 23.83 KG/M2 | SYSTOLIC BLOOD PRESSURE: 142 MMHG

## 2020-02-01 DIAGNOSIS — Z12.11 ENCOUNTER FOR SCREENING FOR MALIGNANT NEOPLASM OF COLON: ICD-10-CM

## 2020-02-01 DIAGNOSIS — Z95.5 PRESENCE OF CORONARY ANGIOPLASTY IMPLANT AND GRAFT: ICD-10-CM

## 2020-02-01 DIAGNOSIS — Z00.00 ENCOUNTER FOR GENERAL ADULT MEDICAL EXAMINATION WITHOUT ABNORMAL FINDINGS: ICD-10-CM

## 2020-02-01 DIAGNOSIS — Z90.12 ACQUIRED ABSENCE OF LEFT BREAST AND NIPPLE: Chronic | ICD-10-CM

## 2020-02-01 LAB — HBA1C MFR BLD HPLC: 6.1

## 2020-02-01 PROCEDURE — G0463: CPT

## 2020-02-01 RX ORDER — TIOTROPIUM BROMIDE INHALATION SPRAY 3.12 UG/1
2.5 SPRAY, METERED RESPIRATORY (INHALATION)
Qty: 4 | Refills: 0 | Status: DISCONTINUED | COMMUNITY
Start: 2020-01-13

## 2020-02-01 RX ORDER — BUPROPION HYDROCHLORIDE 100 MG/1
100 TABLET, FILM COATED, EXTENDED RELEASE ORAL
Qty: 30 | Refills: 0 | Status: DISCONTINUED | COMMUNITY
Start: 2020-01-13

## 2020-02-01 RX ORDER — POLYETHYLENE GLYCOL-3350 AND ELECTROLYTES 236; 6.74; 5.86; 2.97; 22.74 G/274.31G; G/274.31G; G/274.31G; G/274.31G; G/274.31G
236 POWDER, FOR SOLUTION ORAL
Qty: 4000 | Refills: 0 | Status: DISCONTINUED | COMMUNITY
Start: 2019-10-24

## 2020-02-01 RX ORDER — FAMOTIDINE 20 MG/1
20 TABLET, FILM COATED ORAL DAILY
Qty: 30 | Refills: 0 | Status: DISCONTINUED | COMMUNITY
Start: 2019-04-12 | End: 2020-02-01

## 2020-02-01 RX ORDER — METFORMIN HYDROCHLORIDE 500 MG/1
500 TABLET, COATED ORAL
Qty: 180 | Refills: 0 | Status: DISCONTINUED | COMMUNITY
Start: 2020-01-27

## 2020-02-01 NOTE — PHYSICAL EXAM
[No Acute Distress] : no acute distress [Well Nourished] : well nourished [Normal Outer Ear/Nose] : the outer ears and nose were normal in appearance [Normal Oropharynx] : the oropharynx was normal [No Respiratory Distress] : no respiratory distress  [No Accessory Muscle Use] : no accessory muscle use [Clear to Auscultation] : lungs were clear to auscultation bilaterally [Normal Rate] : normal rate  [Regular Rhythm] : with a regular rhythm [Normal S1, S2] : normal S1 and S2 [No Murmur] : no murmur heard [Soft] : abdomen soft [Non Tender] : non-tender [Non-distended] : non-distended [Normal Bowel Sounds] : normal bowel sounds [Normal Affect] : the affect was normal [Normal Insight/Judgement] : insight and judgment were intact

## 2020-02-03 DIAGNOSIS — E11.9 TYPE 2 DIABETES MELLITUS WITHOUT COMPLICATIONS: ICD-10-CM

## 2020-02-07 DIAGNOSIS — J44.9 CHRONIC OBSTRUCTIVE PULMONARY DISEASE, UNSPECIFIED: ICD-10-CM

## 2020-02-07 DIAGNOSIS — Z95.5 PRESENCE OF CORONARY ANGIOPLASTY IMPLANT AND GRAFT: ICD-10-CM

## 2020-02-07 DIAGNOSIS — Z85.3 PERSONAL HISTORY OF MALIGNANT NEOPLASM OF BREAST: ICD-10-CM

## 2020-02-07 DIAGNOSIS — R19.5 OTHER FECAL ABNORMALITIES: ICD-10-CM

## 2020-07-20 NOTE — PROGRESS NOTE ADULT - ASSESSMENT
63 y o F with PMHx of Breast Ca ( +left mastectomy), +COPD, smoker (2PPD), on chronic Prednisone 5 mg/day, +CAD, +stent, presents with cough, fever, dyspnea admitted for acute bronchitis, COPD exacerbation.  A/P:  1. COPD Exas.  - continue IV steroids, improved after extra dose of steroids  - Duonebs, Spiriva, oral antibiotics, oxygen via NC  - Pulm consult appreciated; pt. improving.    2. Elevated lactate: improved but fluctuates, check repeat lactate level in am  IVF stopped; pt with elevated BP's    3. Breast cancer: continue Letrozole    4. Long term AC: pt does not know why she is on Xarelto, denies blood clot though it seems she may have had a PE in the past, denies Afib     5. Tobacco abuse:  - Smoking cessation counseling and Nicotine patch 63 y o F with PMHx of Breast Ca ( +left mastectomy), +COPD, smoker (2PPD), on chronic Prednisone 5 mg/day, +CAD, +stent, presents with cough, fever, dyspnea admitted for acute bronchitis, COPD exacerbation.  A/P:  1. COPD Exas.  - continue IV steroids, improved after extra dose of steroids  - Duonebs, Spiriva, oral antibiotics, oxygen via NC; will need to evaluate O2 levels today to determine need for Home O2  - Pulm consult appreciated; pt. improving.  - refer to Pulm Rehab upon D/C    2. Lactic Acidosis; resolved  IVF stopped; pt with elevated BP's    3. Breast cancer: continue Letrozole    4. Long term AC: pt does not know why she is on Xarelto, denies blood clot though it seems she may have had a PE in the past, denies Afib     5. Tobacco abuse:  - Smoking cessation counseling and Nicotine patch No Residual Tumor Seen Histology Text: There were no malignant cells seen in the sections examined.

## 2021-01-26 NOTE — PROGRESS NOTE ADULT - SUBJECTIVE AND OBJECTIVE BOX
Patient is a 63y old  Female who presents with a chief complaint of dyspnea, cough, fever (02 Apr 2019 13:54)    Patient seen and examined at bedside.  Pt. feeling better, still SOB w exertion.    ALLERGIES:  penicillin (Other)    MEDICATIONS  (STANDING):  atorvastatin 80 milliGRAM(s) Oral at bedtime  buDESOnide 160 MICROgram(s)/formoterol 4.5 MICROgram(s) Inhaler 2 Puff(s) Inhalation two times a day  dextrose 5%. 1000 milliLiter(s) (50 mL/Hr) IV Continuous <Continuous>  dextrose 50% Injectable 12.5 Gram(s) IV Push once  dextrose 50% Injectable 25 Gram(s) IV Push once  dextrose 50% Injectable 25 Gram(s) IV Push once  famotidine    Tablet 20 milliGRAM(s) Oral daily  insulin lispro (HumaLOG) corrective regimen sliding scale   SubCutaneous three times a day before meals  ipratropium    for Nebulization 500 MICROGram(s) Nebulizer every 6 hours  letrozole 2.5 milliGRAM(s) Oral daily  lisinopril 5 milliGRAM(s) Oral daily  methylPREDNISolone sodium succinate Injectable 40 milliGRAM(s) IV Push every 8 hours  nicotine - 21 mG/24Hr(s) Patch 1 patch Transdermal daily  rivaroxaban 20 milliGRAM(s) Oral at bedtime  tiotropium 18 MICROgram(s) Capsule 1 Capsule(s) Inhalation daily    MEDICATIONS  (PRN):  acetaminophen   Tablet .. 650 milliGRAM(s) Oral every 6 hours PRN Temp greater or equal to 38C (100.4F), Mild Pain (1 - 3)  dextrose 40% Gel 15 Gram(s) Oral once PRN Blood Glucose LESS THAN 70 milliGRAM(s)/deciliter  glucagon  Injectable 1 milliGRAM(s) IntraMuscular once PRN Glucose LESS THAN 70 milligrams/deciliter    Vital Signs Last 24 Hrs  T(F): 98.1 (03 Apr 2019 05:36), Max: 98.1 (02 Apr 2019 15:36)  HR: 91 (03 Apr 2019 08:30) (80 - 95)  BP: 148/73 (03 Apr 2019 05:36) (148/73 - 173/84)  RR: 16 (03 Apr 2019 05:36) (16 - 16)  SpO2: 98% (03 Apr 2019 08:30) (98% - 99%)  I&O's Summary    02 Apr 2019 07:01  -  03 Apr 2019 07:00  --------------------------------------------------------  IN: 480 mL / OUT: 0 mL / NET: 480 mL    03 Apr 2019 07:01  -  03 Apr 2019 10:17  --------------------------------------------------------  IN: 480 mL / OUT: 0 mL / NET: 480 mL      PHYSICAL EXAM:  General: NAD, A/O x 3  ENT: MMM  Neck: Supple, No JVD  Lungs: very tight airway, poor air entry, no wheezing, no rhonchi  Cardio: RRR, S1/S2, No murmurs  Abdomen: Soft, Nontender, Nondistended; Bowel sounds present  Extremities: No calf tenderness, No pitting edema  Neuro;  No focal deficits    LABS:                        13.2   9.2   )-----------( 184      ( 03 Apr 2019 07:04 )             41.1     04-03    141  |  104  |  21  ----------------------------<  222  4.1   |  30  |  0.54    Ca    8.7      03 Apr 2019 07:04      eGFR if Non African American: 102 mL/min/1.73M2 (04-03-19 @ 07:04)  eGFR if African American: 118 mL/min/1.73M2 (04-03-19 @ 07:04)      Lactate, Blood: 1.6 mmol/L (04-03 @ 07:04)      ABG - ( 01 Apr 2019 13:48 )  pH, Arterial: 7.41  pH, Blood: x     /  pCO2: 39    /  pO2: 99    / HCO3: x     / Base Excess: x     /  SaO2: 97                CAPILLARY BLOOD GLUCOSE      POCT Blood Glucose.: 185 mg/dL (03 Apr 2019 08:36)  POCT Blood Glucose.: 173 mg/dL (02 Apr 2019 23:39)  POCT Blood Glucose.: 212 mg/dL (02 Apr 2019 17:11)  POCT Blood Glucose.: 184 mg/dL (02 Apr 2019 11:54)            RADIOLOGY & ADDITIONAL TESTS:    Care Discussed with Consultants/Other Providers: Acitretin Counseling:  I discussed with the patient the risks of acitretin including but not limited to hair loss, dry lips/skin/eyes, liver damage, hyperlipidemia, depression/suicidal ideation, photosensitivity.  Serious rare side effects can include but are not limited to pancreatitis, pseudotumor cerebri, bony changes, clot formation/stroke/heart attack.  Patient understands that alcohol is contraindicated since it can result in liver toxicity and significantly prolong the elimination of the drug by many years.

## 2021-04-12 ENCOUNTER — APPOINTMENT (OUTPATIENT)
Dept: DISASTER EMERGENCY | Facility: OTHER | Age: 66
End: 2021-04-12

## 2021-06-11 ENCOUNTER — EMERGENCY (EMERGENCY)
Facility: HOSPITAL | Age: 66
LOS: 1 days | Discharge: HOME | End: 2021-06-11
Attending: EMERGENCY MEDICINE | Admitting: EMERGENCY MEDICINE
Payer: MEDICARE

## 2021-06-11 VITALS
SYSTOLIC BLOOD PRESSURE: 171 MMHG | HEIGHT: 59 IN | WEIGHT: 95.9 LBS | OXYGEN SATURATION: 98 % | RESPIRATION RATE: 17 BRPM | HEART RATE: 95 BPM | TEMPERATURE: 99 F | DIASTOLIC BLOOD PRESSURE: 90 MMHG

## 2021-06-11 DIAGNOSIS — Z90.12 ACQUIRED ABSENCE OF LEFT BREAST AND NIPPLE: Chronic | ICD-10-CM

## 2021-06-11 PROCEDURE — 99283 EMERGENCY DEPT VISIT LOW MDM: CPT

## 2021-06-11 NOTE — ED ADULT NURSE NOTE - PMH
Breast cancer    COPD (chronic obstructive pulmonary disease)    DM (diabetes mellitus)  new diagnosis  HTN (hypertension)    Smoker

## 2021-06-12 VITALS
HEART RATE: 90 BPM | OXYGEN SATURATION: 98 % | TEMPERATURE: 99 F | SYSTOLIC BLOOD PRESSURE: 150 MMHG | DIASTOLIC BLOOD PRESSURE: 85 MMHG | RESPIRATION RATE: 16 BRPM

## 2021-06-12 PROCEDURE — 73562 X-RAY EXAM OF KNEE 3: CPT | Mod: 26,LT

## 2021-06-12 PROCEDURE — 73562 X-RAY EXAM OF KNEE 3: CPT

## 2021-06-12 PROCEDURE — 99283 EMERGENCY DEPT VISIT LOW MDM: CPT | Mod: 25

## 2021-06-12 RX ORDER — OXYCODONE AND ACETAMINOPHEN 5; 325 MG/1; MG/1
1 TABLET ORAL ONCE
Refills: 0 | Status: DISCONTINUED | OUTPATIENT
Start: 2021-06-12 | End: 2021-06-12

## 2021-06-12 RX ORDER — OXYCODONE HYDROCHLORIDE 5 MG/1
1 TABLET ORAL
Qty: 12 | Refills: 0
Start: 2021-06-12

## 2021-06-12 RX ADMIN — OXYCODONE AND ACETAMINOPHEN 1 TABLET(S): 5; 325 TABLET ORAL at 00:13

## 2021-06-12 NOTE — ED PROVIDER NOTE - CARE PROVIDER_API CALL
Gatito Michelle)  Orthopaedic Surgery  [address], [address]  [place] [address]  Phone: [phone]  Fax: [phone]  Follow Up Time: 1-3 Days

## 2021-06-12 NOTE — ED PROVIDER NOTE - MUSCULOSKELETAL, MLM
Spine appears normal, range of motion is not limited,  L knee: slight focal ttp medial knee. no swelling or increased warmth. no redness. no skin break. FROM of knee but with pain. no calf swelling or ttp. 2+ dp pulses

## 2021-06-12 NOTE — ED PROVIDER NOTE - OBJECTIVE STATEMENT
pt c/o L knee pain, moderate, x 1 day, today, worse c walking. no recalled trauma. no chest pain/sob, fever. no leg swelling. took tylenol with minimal relief.  takes xarelto (pt doesn't know for what)

## 2021-06-12 NOTE — ED PROVIDER NOTE - PATIENT PORTAL LINK FT
You can access the FollowMyHealth Patient Portal offered by [facility] by registering at the following website: [email or web address]. By joining [employer]’s FollowMyHealth portal, you will also be able to view your health information using other applications (apps) compatible with our system.

## 2021-06-12 NOTE — ED PROVIDER NOTE - NSFOLLOWUPINSTRUCTIONS_ED_ALL_ED_FT
- take tylenol (extra strength ) for pain as needed  - take oxycodone in addition for stronger pain.  - wear ACE wrap for support      Knee Pain    WHAT YOU NEED TO KNOW:    Knee pain may start suddenly, or it may be a long-term problem. You may have pain on the side, front, or back of your knee. You may have knee stiffness and swelling. You may hear popping sounds or feel like your knee is giving way or locking up as you walk. You may feel pain when you sit, stand, walk, or climb up and down stairs. Knee pain can be caused by conditions such as obesity, inflammation, or strains or tears in ligaments or tendons.     DISCHARGE INSTRUCTIONS:    Return to the emergency department if:   •Your pain is worse, even after treatment.       •You cannot bend or straighten your leg completely.       •The swelling around your knee does not go down even with treatment.      •Your knee is painful and hot to the touch.       Contact your healthcare provider if:   •You have questions or concerns about your condition or care.           Medicines: You may need any of the following:   •NSAIDs help decrease swelling and pain or fever. This medicine is available with or without a doctor's order. NSAIDs can cause stomach bleeding or kidney problems in certain people. If you take blood thinner medicine, always ask your healthcare provider if NSAIDs are safe for you. Always read the medicine label and follow directions.      •Acetaminophen decreases pain and fever. It is available without a doctor's order. Ask how much to take and how often to take it. Follow directions. Read the labels of all other medicines you are using to see if they also contain acetaminophen, or ask your doctor or pharmacist. Acetaminophen can cause liver damage if not taken correctly. Do not use more than 4 grams (4,000 milligrams) total of acetaminophen in one day.       •Prescription pain medicine may be given. Ask your healthcare provider how to take this medicine safely. Some prescription pain medicines contain acetaminophen. Do not take other medicines that contain acetaminophen without talking to your healthcare provider. Too much acetaminophen may cause liver damage. Prescription pain medicine may cause constipation. Ask your healthcare provider how to prevent or treat constipation.       •Take your medicine as directed. Contact your healthcare provider if you think your medicine is not helping or if you have side effects. Tell him or her if you are allergic to any medicine. Keep a list of the medicines, vitamins, and herbs you take. Include the amounts, and when and why you take them. Bring the list or the pill bottles to follow-up visits. Carry your medicine list with you in case of an emergency.      What you can do to manage your symptoms:   •Rest your knee so it can heal. Limit activities that increase your pain. Do low-impact exercises, such as walking or swimming.       •Apply ice to help reduce swelling and pain. Use an ice pack, or put crushed ice in a plastic bag. Cover it with a towel before you apply it to your knee. Apply ice for 15 to 20 minutes every hour, or as directed.      •Apply compression to help reduce swelling. Use a brace or bandage only as directed.      •Elevate your knee to help decrease pain and swelling. Elevate your knee while you are sitting or lying down. Prop your leg on pillows to keep your knee above the level of your heart.      •Prevent your knee from moving as directed. Your healthcare provider may put on a cast or splint. You may need to wear a leg brace to stabilize your knee. A leg brace can be adjusted to increase your range of motion as your knee heals.  Hinged Knee Braces            What you can do to prevent knee pain:   •Maintain a healthy weight. Extra weight increases your risk for knee pain. Ask your healthcare provider how much you should weigh. He or she can help you create a safe weight loss plan if you need to lose weight.      •Exercise or train properly. Use the correct equipment for sports. Wear shoes that provide good support. Check your posture often as you exercise, play sports, or train for an event. This can help prevent stress and strain on your knees. Rest between sessions so you do not overwork your knees.      Follow up with your healthcare provider within 24 hours or as directed: You may need follow-up treatments, such as steroid injections to decrease pain. Write down your questions so you remember to ask them during your visits.

## 2021-06-12 NOTE — ED PROVIDER NOTE - CLINICAL SUMMARY MEDICAL DECISION MAKING FREE TEXT BOX
atraumatic L knee pain x 1 d. possible arthritis, sprain.  unlikely dvt as pain focal and no swelling and pt taking xarelto.  check xr.  percocet for pain.     xray shows some djd. pain much better p meds. continue ACE wrap. f/u ortho

## 2021-08-06 ENCOUNTER — RESULT CHARGE (OUTPATIENT)
Age: 66
End: 2021-08-06

## 2021-08-06 ENCOUNTER — APPOINTMENT (OUTPATIENT)
Dept: FAMILY MEDICINE | Facility: HOSPITAL | Age: 66
End: 2021-08-06

## 2021-08-06 ENCOUNTER — OUTPATIENT (OUTPATIENT)
Dept: OUTPATIENT SERVICES | Facility: HOSPITAL | Age: 66
LOS: 1 days | End: 2021-08-06
Payer: MEDICARE

## 2021-08-06 VITALS
SYSTOLIC BLOOD PRESSURE: 150 MMHG | TEMPERATURE: 97 F | HEART RATE: 92 BPM | WEIGHT: 98 LBS | RESPIRATION RATE: 14 BRPM | DIASTOLIC BLOOD PRESSURE: 77 MMHG | BODY MASS INDEX: 19.79 KG/M2 | OXYGEN SATURATION: 98 %

## 2021-08-06 DIAGNOSIS — Z85.3 PERSONAL HISTORY OF MALIGNANT NEOPLASM OF BREAST: ICD-10-CM

## 2021-08-06 DIAGNOSIS — Z00.00 ENCOUNTER FOR GENERAL ADULT MEDICAL EXAMINATION WITHOUT ABNORMAL FINDINGS: ICD-10-CM

## 2021-08-06 DIAGNOSIS — Z90.12 ACQUIRED ABSENCE OF LEFT BREAST AND NIPPLE: Chronic | ICD-10-CM

## 2021-08-06 LAB — HBA1C MFR BLD HPLC: 5.7

## 2021-08-06 PROCEDURE — 82043 UR ALBUMIN QUANTITATIVE: CPT

## 2021-08-06 PROCEDURE — G0463: CPT

## 2021-08-06 NOTE — PHYSICAL EXAM
[No Respiratory Distress] : no respiratory distress  [No Accessory Muscle Use] : no accessory muscle use [Clear to Auscultation] : lungs were clear to auscultation bilaterally [Normal Rate] : normal rate  [Regular Rhythm] : with a regular rhythm [Normal S1, S2] : normal S1 and S2 [Soft] : abdomen soft [Non Tender] : non-tender [No HSM] : no HSM [Normal Bowel Sounds] : normal bowel sounds [Normal] : no joint swelling and grossly normal strength and tone

## 2021-08-07 DIAGNOSIS — Z85.3 PERSONAL HISTORY OF MALIGNANT NEOPLASM OF BREAST: ICD-10-CM

## 2021-08-08 NOTE — HISTORY OF PRESENT ILLNESS
[FreeTextEntry1] : Patient presents for physical exam, recent ER Visit for knee pain.  [de-identified] : Patient presents for a follow up of DM, COPD, She still smokes, 1/2 a pack of cigarettes a day  and has no intention of quitting, She states that she does not like physicians, but follows up with Pulmonology, Cardiologist and OBGYN separately. She receives most of her medications from them, but is looking for refills for Metformin 1000 BID. Patient states that she checks her blood sugar twice a day most days and she does not have any numbers outside of the 100-200 range.

## 2021-08-08 NOTE — ASSESSMENT
[FreeTextEntry1] : 66 YO F with a PMH Of COPD, DM2 presents for follow up of medications and DM, COPD. \par \par #Type 2 DM\par - Controlled DM\par - POCT/Last  A1c 5.7. Celebrated goal!\par - Continue metformin 1000 BID \par - Maintain DM Diet, weight loss, continued glucose monitoring. \par - Referral to Podiatry, Opthalmology provided.\par - Follow up Urine Micro\par \par #Smoking Cessation\par - Precontemplative stage\par - Offered resources for quitting\par \par #COPD\par - Continue medications per pulmonology\par \par #HTN\par - Continue Lisinopril 40 QD\par - Follow up with Cardiology \par - DASH Diet, Exercise goals \par \par Return to clinic in 3 months for continued DM, HTN Management \par

## 2021-08-08 NOTE — REVIEW OF SYSTEMS
[Negative] : Heme/Lymph [Chest Pain] : no chest pain [Palpitations] : no palpitations [Paroxysmal Nocturnal Dyspnea] : no paroxysmal nocturnal dyspnea [Shortness Of Breath] : no shortness of breath [Wheezing] : no wheezing [Abdominal Pain] : no abdominal pain [Nausea] : no nausea [Constipation] : no constipation [Vomiting] : no vomiting [Joint Pain] : no joint pain [Joint Stiffness] : no joint stiffness [Muscle Pain] : no muscle pain [Itching] : no itching [Skin Rash] : no skin rash [Headache] : no headache [Dizziness] : no dizziness [Memory Loss] : no memory loss [Unsteady Walking] : no ataxia [Suicidal] : not suicidal

## 2021-08-08 NOTE — HEALTH RISK ASSESSMENT
[Intercurrent Urgi Care visits] : went to urgent care [No] : No [Smoke Detector] : smoke detector [Carbon Monoxide Detector] : carbon monoxide detector [] : No [de-identified] : Knee pain 2 months ago.  [Reports changes in hearing] : Reports no changes in hearing [Reports changes in vision] : Reports no changes in vision [Reports changes in dental health] : Reports no changes in dental health

## 2021-08-10 LAB
CREAT SPEC-SCNC: 20 MG/DL
MICROALBUMIN 24H UR DL<=1MG/L-MCNC: 7.4 MG/DL
MICROALBUMIN/CREAT 24H UR-RTO: 373 MG/G

## 2021-12-30 NOTE — H&P CARDIOLOGY - PATIENT REFERRED TO
93/53/5100    I certify that the skilled nursing and/or rehabilitation services are required to be given on a daily basis, which as a practical matter can only be provided in a skilled nursing unit on an inpatient basis.     Electronically signed by Penny Stoner RN on 12/30/2021 at 4:23 PM Cardiac catherization with possible intervention

## 2022-01-06 ENCOUNTER — OUTPATIENT (OUTPATIENT)
Dept: OUTPATIENT SERVICES | Facility: HOSPITAL | Age: 67
LOS: 1 days | End: 2022-01-06
Payer: MEDICARE

## 2022-01-06 ENCOUNTER — RESULT CHARGE (OUTPATIENT)
Age: 67
End: 2022-01-06

## 2022-01-06 ENCOUNTER — APPOINTMENT (OUTPATIENT)
Dept: FAMILY MEDICINE | Facility: HOSPITAL | Age: 67
End: 2022-01-06

## 2022-01-06 VITALS
RESPIRATION RATE: 18 BRPM | HEART RATE: 99 BPM | DIASTOLIC BLOOD PRESSURE: 85 MMHG | TEMPERATURE: 97.1 F | BODY MASS INDEX: 20.2 KG/M2 | SYSTOLIC BLOOD PRESSURE: 161 MMHG | OXYGEN SATURATION: 95 % | WEIGHT: 100 LBS

## 2022-01-06 VITALS — SYSTOLIC BLOOD PRESSURE: 144 MMHG | DIASTOLIC BLOOD PRESSURE: 90 MMHG

## 2022-01-06 DIAGNOSIS — Z00.00 ENCOUNTER FOR GENERAL ADULT MEDICAL EXAMINATION WITHOUT ABNORMAL FINDINGS: ICD-10-CM

## 2022-01-06 DIAGNOSIS — Z90.12 ACQUIRED ABSENCE OF LEFT BREAST AND NIPPLE: Chronic | ICD-10-CM

## 2022-01-06 LAB — HBA1C MFR BLD HPLC: 5.9

## 2022-01-06 PROCEDURE — G0463: CPT

## 2022-01-06 PROCEDURE — 82043 UR ALBUMIN QUANTITATIVE: CPT

## 2022-01-06 RX ORDER — LISINOPRIL 40 MG/1
40 TABLET ORAL DAILY
Qty: 45 | Refills: 0 | Status: DISCONTINUED | COMMUNITY
Start: 2019-04-12 | End: 2022-01-06

## 2022-01-06 NOTE — PHYSICAL EXAM
[No Respiratory Distress] : no respiratory distress  [No Accessory Muscle Use] : no accessory muscle use [Clear to Auscultation] : lungs were clear to auscultation bilaterally [Normal Rate] : normal rate  [Regular Rhythm] : with a regular rhythm [Normal S1, S2] : normal S1 and S2 [Soft] : abdomen soft [Non Tender] : non-tender [No HSM] : no HSM [Normal Bowel Sounds] : normal bowel sounds [Normal] : no joint swelling and grossly normal strength and tone [Fundoscopic Exam Performed] : fundoscopic ~T exam ~C was performed [Comprehensive Foot Exam Normal] : Right and left foot were examined and both feet are normal. No ulcers in either foot. Toes are normal and with full ROM.  Normal tactile sensation with monofilament testing throughout both feet

## 2022-01-10 LAB
CREAT SPEC-SCNC: 51 MG/DL
MICROALBUMIN 24H UR DL<=1MG/L-MCNC: 12.2 MG/DL
MICROALBUMIN/CREAT 24H UR-RTO: 239 MG/G

## 2022-01-13 NOTE — ASSESSMENT
[FreeTextEntry1] : Return to Clinic in 1 month for GYN examination \par Follow up labs. Patient fasting. \par Patient to return for Optho Clinic \par

## 2022-01-13 NOTE — HISTORY OF PRESENT ILLNESS
[Diabetes Mellitus] : Diabetes Mellitus [Hypertension] : Hypertension [Patient was last seen on ___] : Patient was last seen on [unfilled] [No episodes] : No hypoglycemic episodes since the last visit. [Regular podiatrist visits] : Patient had regular podiatrist visits [Understanding of foot care] : Patient expressed understanding of foot care [Most Recent A1C: ___] : Most recent A1C was [unfilled] [Target A1C:  ___] : Target A1C is [unfilled] [Target goal met] : A1C target goal met [Does not check BP] : The patient is not checking blood pressure [<130/90] : Target blood pressure is  <130/90 [At intermediate goal] : BP at intermediate goal [Stable] : Patient is stable [Moderate Intensity Therapy] : Patient is currently on moderate intensity statin  therapy [Symptoms Limit Activities] : Symptoms limit ~his/her~ activities [Attempted to quit since ___, occasional tobacco use] : Attempted to quit since [unfilled] with occasional tobacco use [Cigarettes ___ packs/day] : Patient smokes [unfilled] packs of cigarettes per day [___ Year(s)] : [unfilled] year(s) ago [Discussed Risks and Advised to Quit Smoking] : Discussed risks and advised to quit smoking [Not Ready] : Patient is not ready for cessation intervention [Pre-contemplation] : Pre-contemplation: patient is not planning to quit within the next 6 months [No support system] : no support system available [Patient refused treatment] : Patient refused treatment [Quit Smoking] : Quit Smoking [Will quit smoking within ___ months] : Will quit smoking within [unfilled]  months [Continue smoking cessation plan] : Continue smoking cessation plan [FreeTextEntry1] : 67 YO F with a PMH of DM, COPD, Currently follows with Pulmonology (Dr. Nash) and Cardiology (Dr. Roland). She claims to not have any issues with chest pain, shortness of breath, abdominal pain, problems with bowel movements.

## 2022-02-04 ENCOUNTER — OUTPATIENT (OUTPATIENT)
Dept: OUTPATIENT SERVICES | Facility: HOSPITAL | Age: 67
LOS: 1 days | End: 2022-02-04
Payer: MEDICARE

## 2022-02-04 ENCOUNTER — APPOINTMENT (OUTPATIENT)
Dept: FAMILY MEDICINE | Facility: HOSPITAL | Age: 67
End: 2022-02-04

## 2022-02-04 VITALS
OXYGEN SATURATION: 97 % | DIASTOLIC BLOOD PRESSURE: 85 MMHG | BODY MASS INDEX: 19.39 KG/M2 | TEMPERATURE: 96.8 F | HEART RATE: 96 BPM | SYSTOLIC BLOOD PRESSURE: 140 MMHG | WEIGHT: 96 LBS | RESPIRATION RATE: 18 BRPM

## 2022-02-04 DIAGNOSIS — Z90.12 ACQUIRED ABSENCE OF LEFT BREAST AND NIPPLE: Chronic | ICD-10-CM

## 2022-02-04 DIAGNOSIS — Z00.00 ENCOUNTER FOR GENERAL ADULT MEDICAL EXAMINATION WITHOUT ABNORMAL FINDINGS: ICD-10-CM

## 2022-02-04 PROCEDURE — 87624 HPV HI-RISK TYP POOLED RSLT: CPT

## 2022-02-04 PROCEDURE — G0463: CPT

## 2022-02-04 NOTE — COUNSELING
[Cessation strategies including cessation program discussed] : Cessation strategies including cessation program discussed [Encouraged to pick a quit date and identify support needed to quit] : Encouraged to pick a quit date and identify support needed to quit [Smoking Cessation Program Referral] : Smoking Cessation Program Referral  [Yes] : Willing to quit smoking [FreeTextEntry3] : 10

## 2022-02-04 NOTE — HISTORY OF PRESENT ILLNESS
[de-identified] : 65 YO F smoker, DM, HTN, follows up for PAP Smear. Patient states that she continues to smoke cigarettes, she has high blood pressure but is on Losartan 25mg every day, states that she is nervous for pap smear. She denies any complaints today, states that she knows that smoking is bad for her, but she does not feel like she can quit. She states that she has no other complaints at this time.

## 2022-02-04 NOTE — HEALTH RISK ASSESSMENT
[Current] : Current [15-19] : 15-19 [No] : No [0] : 2) Feeling down, depressed, or hopeless: Not at all (0)

## 2022-02-04 NOTE — PHYSICAL EXAM
[Fundoscopic Exam Performed] : fundoscopic ~T exam ~C was performed [No Respiratory Distress] : no respiratory distress  [No Accessory Muscle Use] : no accessory muscle use [Clear to Auscultation] : lungs were clear to auscultation bilaterally [Normal Rate] : normal rate  [Regular Rhythm] : with a regular rhythm [Normal S1, S2] : normal S1 and S2 [Soft] : abdomen soft [Non Tender] : non-tender [No HSM] : no HSM [Normal Bowel Sounds] : normal bowel sounds [Normal] : no joint swelling and grossly normal strength and tone [Comprehensive Foot Exam Normal] : Right and left foot were examined and both feet are normal. No ulcers in either foot. Toes are normal and with full ROM.  Normal tactile sensation with monofilament testing throughout both feet

## 2022-02-04 NOTE — ASSESSMENT
[FreeTextEntry1] : Patient to return to clinic in 2 weeks for follow up of wellbutrin and smoking cessaton.

## 2022-02-05 ENCOUNTER — NON-APPOINTMENT (OUTPATIENT)
Age: 67
End: 2022-02-05

## 2022-02-05 DIAGNOSIS — R19.5 OTHER FECAL ABNORMALITIES: ICD-10-CM

## 2022-02-05 DIAGNOSIS — Z12.4 ENCOUNTER FOR SCREENING FOR MALIGNANT NEOPLASM OF CERVIX: ICD-10-CM

## 2022-02-05 LAB — HPV HIGH+LOW RISK DNA PNL CVX: NOT DETECTED

## 2022-02-13 LAB — CYTOLOGY CVX/VAG DOC THIN PREP: ABNORMAL

## 2022-02-17 NOTE — ED ADULT TRIAGE NOTE - WEIGHT METHOD
Pt with incarcerated hernia/abscess/perf appendix s/p femoral hernia repair and appendectomy on 2/14/22  HTN   no h/o MI or CVA   Ductal Ca Rt Breat - s/p mastectomy - DR Canela/Surgery and Dr Kauffman RT   h/o OA - rt hip replacement     Plan:  pain mx with IV morphine and percocet  on IV zosyn post-op - but abscess cx shows esbl ecoli - start ertapenem - ID Cx  on regular diet - will dc IVFs  hypokalemia - replete K prn     regarding her paranoia - pt very upset that neuro/psych etc are seeing her - she states that she feels old and vulnerable and tries to be safe.   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  she has capacity to make her own decisions.   regardless, agree with neuro that given her advanced age she will benefit from closer supervision from her kids and other family.     VTE P x- hep sc     discussed with RN   Pls call with winifred      86 yr old female with PMH of right breast cancer s/p mastectomy, HTN admitted to surgical service for right femoral hernia repair  on 2/14/22 found to have  perforated appendix and abscess in incarcerated hernia sac.    s/p appendectomy and femoral hernia repair on 2/14/22 . Medicine called for assistance with her medical issues.      Right femoral  incarcerated hernia with perforated appendix and abscess with  ESBL E coli , ENFAE and BAOV   s/p femoral hernia repair and appendectomy on 2/14/22  - s/p Zosyn, plan for 1 gm IV qd Ertapenem for 10 more days   - midline placement   - ID and surgery input appreciated     no h/o MI or CVA     Ductal Ca Rt Breat - s/p mastectomy - DR Canela/Surgery and Dr Kauffman RT     h/o OA - rt hip replacement     hypokalemia - replete K prn     Ruled out  paranoia   - pt very upset that neuro/psych etc are seeing her - she states that she feels old and vulnerable and tries to be safe.   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  she has capacity to make her own decisions.   regardless, agree with neuro that given her advanced age she will benefit from closer supervision from her kids and other family.     VTE P x- hep sc     discussed with RN    Thank you for consult will follow with you.    stated

## 2022-02-18 ENCOUNTER — APPOINTMENT (OUTPATIENT)
Dept: FAMILY MEDICINE | Facility: HOSPITAL | Age: 67
End: 2022-02-18

## 2022-02-18 ENCOUNTER — OUTPATIENT (OUTPATIENT)
Dept: OUTPATIENT SERVICES | Facility: HOSPITAL | Age: 67
LOS: 1 days | End: 2022-02-18
Payer: MEDICARE

## 2022-02-18 VITALS
TEMPERATURE: 98 F | WEIGHT: 98 LBS | OXYGEN SATURATION: 98 % | BODY MASS INDEX: 19.76 KG/M2 | HEART RATE: 82 BPM | SYSTOLIC BLOOD PRESSURE: 171 MMHG | HEIGHT: 59 IN | DIASTOLIC BLOOD PRESSURE: 76 MMHG | RESPIRATION RATE: 18 BRPM

## 2022-02-18 DIAGNOSIS — Z00.00 ENCOUNTER FOR GENERAL ADULT MEDICAL EXAMINATION WITHOUT ABNORMAL FINDINGS: ICD-10-CM

## 2022-02-18 DIAGNOSIS — Z90.12 ACQUIRED ABSENCE OF LEFT BREAST AND NIPPLE: Chronic | ICD-10-CM

## 2022-02-18 PROCEDURE — G0463: CPT

## 2022-02-18 NOTE — HISTORY OF PRESENT ILLNESS
[FreeTextEntry1] : Smoking cessation.  [de-identified] : 65 YO F with a PMH of HTN, HLD, DM2 presents for follow up of Starting Wellbutrin. Patient states that she has been taking the medication as prescribed, and that she is cutting down her cigarette intake to ~ 10 a day. Patient would like to continue as she states that she is losing cravings for cigarettes and she does not even finish some cigarettes as she has done before. blood pressure elevated on Losartan 25, but did not take her medications this morning.

## 2022-02-18 NOTE — HEALTH RISK ASSESSMENT
[Current] : Current [20 or more] : 20 or more [No] : No [No falls in past year] : Patient reported no falls in the past year

## 2022-02-25 DIAGNOSIS — E11.9 TYPE 2 DIABETES MELLITUS WITHOUT COMPLICATIONS: ICD-10-CM

## 2022-02-25 DIAGNOSIS — I10 ESSENTIAL (PRIMARY) HYPERTENSION: ICD-10-CM

## 2022-02-25 DIAGNOSIS — F17.219 NICOTINE DEPENDENCE, CIGARETTES, WITH UNSPECIFIED NICOTINE-INDUCED DISORDERS: ICD-10-CM

## 2022-03-21 ENCOUNTER — APPOINTMENT (OUTPATIENT)
Dept: FAMILY MEDICINE | Facility: HOSPITAL | Age: 67
End: 2022-03-21

## 2022-03-21 ENCOUNTER — OUTPATIENT (OUTPATIENT)
Dept: OUTPATIENT SERVICES | Facility: HOSPITAL | Age: 67
LOS: 1 days | End: 2022-03-21
Payer: MEDICARE

## 2022-03-21 VITALS
RESPIRATION RATE: 18 BRPM | SYSTOLIC BLOOD PRESSURE: 135 MMHG | TEMPERATURE: 97.6 F | OXYGEN SATURATION: 95 % | HEIGHT: 59 IN | DIASTOLIC BLOOD PRESSURE: 83 MMHG | HEART RATE: 90 BPM

## 2022-03-21 DIAGNOSIS — Z12.4 ENCOUNTER FOR SCREENING FOR MALIGNANT NEOPLASM OF CERVIX: ICD-10-CM

## 2022-03-21 DIAGNOSIS — Z90.12 ACQUIRED ABSENCE OF LEFT BREAST AND NIPPLE: Chronic | ICD-10-CM

## 2022-03-21 DIAGNOSIS — Z00.00 ENCOUNTER FOR GENERAL ADULT MEDICAL EXAMINATION WITHOUT ABNORMAL FINDINGS: ICD-10-CM

## 2022-03-23 DIAGNOSIS — E11.69 TYPE 2 DIABETES MELLITUS WITH OTHER SPECIFIED COMPLICATION: ICD-10-CM

## 2022-03-23 DIAGNOSIS — Z12.4 ENCOUNTER FOR SCREENING FOR MALIGNANT NEOPLASM OF CERVIX: ICD-10-CM

## 2022-03-23 NOTE — COUNSELING
[Cessation strategies including cessation program discussed] : Cessation strategies including cessation program discussed [Use of nicotine replacement therapies and other medications discussed] : Use of nicotine replacement therapies and other medications discussed [Encouraged to pick a quit date and identify support needed to quit] : Encouraged to pick a quit date and identify support needed to quit [Smoking Cessation Program Referral] : Smoking Cessation Program Referral

## 2022-03-23 NOTE — HISTORY OF PRESENT ILLNESS
[de-identified] : Follow up for insufficient cells on PAP Smear. Patient is still a smoker and is taking Wellbutrin. Patient followed up by Cardiology and pulmonary at [facility].  Patient does not have any complaints at this time, and has decreased smoking to ~5 cigarettes a day. Patient would like to make this a quick visit, and understands the reasoning behind the repeat pap smear.

## 2022-03-23 NOTE — PHYSICAL EXAM
[Normal] : soft, non-tender, non-distended, no masses palpated, no HSM and normal bowel sounds [Urethral Meatus] : normal urethra [Vagina] : normal vaginal exam

## 2022-03-26 LAB — CYTOLOGY CVX/VAG DOC THIN PREP: ABNORMAL

## 2022-05-14 ENCOUNTER — INPATIENT (INPATIENT)
Facility: HOSPITAL | Age: 67
LOS: 3 days | Discharge: HOME | DRG: 193 | End: 2022-05-18
Attending: STUDENT IN AN ORGANIZED HEALTH CARE EDUCATION/TRAINING PROGRAM | Admitting: FAMILY MEDICINE
Payer: MEDICARE

## 2022-05-14 VITALS
SYSTOLIC BLOOD PRESSURE: 147 MMHG | HEIGHT: 59 IN | DIASTOLIC BLOOD PRESSURE: 72 MMHG | WEIGHT: 95.9 LBS | RESPIRATION RATE: 22 BRPM | HEART RATE: 89 BPM | TEMPERATURE: 99 F | OXYGEN SATURATION: 96 %

## 2022-05-14 DIAGNOSIS — Z90.12 ACQUIRED ABSENCE OF LEFT BREAST AND NIPPLE: Chronic | ICD-10-CM

## 2022-05-14 DIAGNOSIS — J44.1 CHRONIC OBSTRUCTIVE PULMONARY DISEASE WITH (ACUTE) EXACERBATION: ICD-10-CM

## 2022-05-14 LAB
ALBUMIN SERPL ELPH-MCNC: 3.3 G/DL — SIGNIFICANT CHANGE UP (ref 3.3–5)
ALP SERPL-CCNC: 100 U/L — SIGNIFICANT CHANGE UP (ref 40–120)
ALT FLD-CCNC: 41 U/L — SIGNIFICANT CHANGE UP (ref 10–45)
ANION GAP SERPL CALC-SCNC: 11 MMOL/L — SIGNIFICANT CHANGE UP (ref 5–17)
AST SERPL-CCNC: 42 U/L — HIGH (ref 10–40)
B PERT DNA SPEC QL NAA+PROBE: SIGNIFICANT CHANGE UP
BASOPHILS # BLD AUTO: 0.01 K/UL — SIGNIFICANT CHANGE UP (ref 0–0.2)
BASOPHILS NFR BLD AUTO: 0.2 % — SIGNIFICANT CHANGE UP (ref 0–2)
BILIRUB SERPL-MCNC: 0.3 MG/DL — SIGNIFICANT CHANGE UP (ref 0.2–1.2)
BUN SERPL-MCNC: 16 MG/DL — SIGNIFICANT CHANGE UP (ref 7–23)
C PNEUM DNA SPEC QL NAA+PROBE: SIGNIFICANT CHANGE UP
CALCIUM SERPL-MCNC: 9.2 MG/DL — SIGNIFICANT CHANGE UP (ref 8.4–10.5)
CHLORIDE SERPL-SCNC: 100 MMOL/L — SIGNIFICANT CHANGE UP (ref 96–108)
CO2 SERPL-SCNC: 28 MMOL/L — SIGNIFICANT CHANGE UP (ref 22–31)
CREAT SERPL-MCNC: 0.53 MG/DL — SIGNIFICANT CHANGE UP (ref 0.5–1.3)
D DIMER BLD IA.RAPID-MCNC: <150 NG/ML DDU — SIGNIFICANT CHANGE UP
EGFR: 103 ML/MIN/1.73M2 — SIGNIFICANT CHANGE UP
EOSINOPHIL # BLD AUTO: 0.05 K/UL — SIGNIFICANT CHANGE UP (ref 0–0.5)
EOSINOPHIL NFR BLD AUTO: 0.8 % — SIGNIFICANT CHANGE UP (ref 0–6)
FLUAV H1 2009 PAND RNA SPEC QL NAA+PROBE: DETECTED
FLUAV H1 RNA SPEC QL NAA+PROBE: SIGNIFICANT CHANGE UP
FLUAV H3 RNA SPEC QL NAA+PROBE: SIGNIFICANT CHANGE UP
FLUAV SUBTYP SPEC NAA+PROBE: DETECTED
FLUBV RNA SPEC QL NAA+PROBE: SIGNIFICANT CHANGE UP
GLUCOSE BLDC GLUCOMTR-MCNC: 137 MG/DL — HIGH (ref 70–99)
GLUCOSE SERPL-MCNC: 108 MG/DL — HIGH (ref 70–99)
HADV DNA SPEC QL NAA+PROBE: SIGNIFICANT CHANGE UP
HCOV PNL SPEC NAA+PROBE: SIGNIFICANT CHANGE UP
HCT VFR BLD CALC: 37.3 % — SIGNIFICANT CHANGE UP (ref 34.5–45)
HGB BLD-MCNC: 12.6 G/DL — SIGNIFICANT CHANGE UP (ref 11.5–15.5)
HMPV RNA SPEC QL NAA+PROBE: SIGNIFICANT CHANGE UP
HPIV1 RNA SPEC QL NAA+PROBE: SIGNIFICANT CHANGE UP
HPIV2 RNA SPEC QL NAA+PROBE: SIGNIFICANT CHANGE UP
HPIV3 RNA SPEC QL NAA+PROBE: SIGNIFICANT CHANGE UP
HPIV4 RNA SPEC QL NAA+PROBE: SIGNIFICANT CHANGE UP
IMM GRANULOCYTES NFR BLD AUTO: 0.3 % — SIGNIFICANT CHANGE UP (ref 0–1.5)
LYMPHOCYTES # BLD AUTO: 2.16 K/UL — SIGNIFICANT CHANGE UP (ref 1–3.3)
LYMPHOCYTES # BLD AUTO: 33.3 % — SIGNIFICANT CHANGE UP (ref 13–44)
MCHC RBC-ENTMCNC: 31.6 PG — SIGNIFICANT CHANGE UP (ref 27–34)
MCHC RBC-ENTMCNC: 33.8 GM/DL — SIGNIFICANT CHANGE UP (ref 32–36)
MCV RBC AUTO: 93.5 FL — SIGNIFICANT CHANGE UP (ref 80–100)
MONOCYTES # BLD AUTO: 0.82 K/UL — SIGNIFICANT CHANGE UP (ref 0–0.9)
MONOCYTES NFR BLD AUTO: 12.7 % — SIGNIFICANT CHANGE UP (ref 2–14)
NEUTROPHILS # BLD AUTO: 3.42 K/UL — SIGNIFICANT CHANGE UP (ref 1.8–7.4)
NEUTROPHILS NFR BLD AUTO: 52.7 % — SIGNIFICANT CHANGE UP (ref 43–77)
NRBC # BLD: 0 /100 WBCS — SIGNIFICANT CHANGE UP (ref 0–0)
PLATELET # BLD AUTO: 248 K/UL — SIGNIFICANT CHANGE UP (ref 150–400)
POTASSIUM SERPL-MCNC: 3.6 MMOL/L — SIGNIFICANT CHANGE UP (ref 3.5–5.3)
POTASSIUM SERPL-SCNC: 3.6 MMOL/L — SIGNIFICANT CHANGE UP (ref 3.5–5.3)
PROT SERPL-MCNC: 7.8 G/DL — SIGNIFICANT CHANGE UP (ref 6–8.3)
RAPID RVP RESULT: DETECTED
RBC # BLD: 3.99 M/UL — SIGNIFICANT CHANGE UP (ref 3.8–5.2)
RBC # FLD: 12.9 % — SIGNIFICANT CHANGE UP (ref 10.3–14.5)
RV+EV RNA SPEC QL NAA+PROBE: SIGNIFICANT CHANGE UP
SARS-COV-2 RNA SPEC QL NAA+PROBE: SIGNIFICANT CHANGE UP
SODIUM SERPL-SCNC: 139 MMOL/L — SIGNIFICANT CHANGE UP (ref 135–145)
TROPONIN I, HIGH SENSITIVITY RESULT: 10.1 NG/L — SIGNIFICANT CHANGE UP
TROPONIN I, HIGH SENSITIVITY RESULT: 12.6 NG/L — SIGNIFICANT CHANGE UP
WBC # BLD: 6.48 K/UL — SIGNIFICANT CHANGE UP (ref 3.8–10.5)
WBC # FLD AUTO: 6.48 K/UL — SIGNIFICANT CHANGE UP (ref 3.8–10.5)

## 2022-05-14 PROCEDURE — 99285 EMERGENCY DEPT VISIT HI MDM: CPT | Mod: FS

## 2022-05-14 PROCEDURE — 93010 ELECTROCARDIOGRAM REPORT: CPT

## 2022-05-14 PROCEDURE — 99223 1ST HOSP IP/OBS HIGH 75: CPT

## 2022-05-14 PROCEDURE — 71045 X-RAY EXAM CHEST 1 VIEW: CPT | Mod: 26

## 2022-05-14 RX ORDER — IPRATROPIUM/ALBUTEROL SULFATE 18-103MCG
3 AEROSOL WITH ADAPTER (GRAM) INHALATION ONCE
Refills: 0 | Status: COMPLETED | OUTPATIENT
Start: 2022-05-14 | End: 2022-05-14

## 2022-05-14 RX ORDER — TIOTROPIUM BROMIDE 18 UG/1
1 CAPSULE ORAL; RESPIRATORY (INHALATION) DAILY
Refills: 0 | Status: DISCONTINUED | OUTPATIENT
Start: 2022-05-14 | End: 2022-05-18

## 2022-05-14 RX ORDER — BUDESONIDE AND FORMOTEROL FUMARATE DIHYDRATE 160; 4.5 UG/1; UG/1
2 AEROSOL RESPIRATORY (INHALATION)
Refills: 0 | Status: DISCONTINUED | OUTPATIENT
Start: 2022-05-14 | End: 2022-05-18

## 2022-05-14 RX ORDER — ALBUTEROL 90 UG/1
2 AEROSOL, METERED ORAL EVERY 6 HOURS
Refills: 0 | Status: DISCONTINUED | OUTPATIENT
Start: 2022-05-14 | End: 2022-05-18

## 2022-05-14 RX ORDER — SODIUM CHLORIDE 9 MG/ML
1000 INJECTION INTRAMUSCULAR; INTRAVENOUS; SUBCUTANEOUS ONCE
Refills: 0 | Status: COMPLETED | OUTPATIENT
Start: 2022-05-14 | End: 2022-05-14

## 2022-05-14 RX ADMIN — SODIUM CHLORIDE 1000 MILLILITER(S): 9 INJECTION INTRAMUSCULAR; INTRAVENOUS; SUBCUTANEOUS at 21:35

## 2022-05-14 RX ADMIN — SODIUM CHLORIDE 1000 MILLILITER(S): 9 INJECTION INTRAMUSCULAR; INTRAVENOUS; SUBCUTANEOUS at 22:35

## 2022-05-14 RX ADMIN — Medication 125 MILLIGRAM(S): at 21:35

## 2022-05-14 RX ADMIN — Medication 3 MILLILITER(S): at 21:36

## 2022-05-14 NOTE — ED PROVIDER NOTE - ATTENDING APP SHARED VISIT CONTRIBUTION OF CARE
Kunal with MORIAH Ruiz. 65 y/o F BIBA accompanied with Brother from home, with h/o COPD (every day smoker), HTN, DM pw productive cough and low grade fever x 1 week and now worsening SOB. As per EMS O2 sat 88% on RA, given neb Albuterol and Atrovent and sat improved. Denies CP, Ab pain, n/v/d, weakness. Not on home O2. No known sick contacts.   (+) 1.5 ppd smoker  Plan: Will check labs, CXR, EKG, give nebs and steroids, fluids and reassess  Pt with exertional dyspnea. Still with cough. O2 sat on RA is 95%. However, with SOB at minimal exertion, will admit. Will add dimer. Pt on xarelto 20mg. d/w Dr Mathias, who will f/u dimer, trop, and rvp. Plan for admission.    I performed a face to face bedside interview with patient regarding history of present illness, review of symptoms and past medical history. I completed an independent physical exam.  I have discussed the patient's plan of care with Physician Assistant (PA). I agree with note as stated above, having amended the EMR as needed to reflect my findings.   This includes History of Present Illness, HIV, Past Medical/Surgical/Family/Social History, Allergies and Home Medications, Review of Systems, Physical Exam, and any Progress Notes during the time I functioned as the attending physician for this patient.

## 2022-05-14 NOTE — H&P ADULT - NSHPPHYSICALEXAM_GEN_ALL_CORE
Vitals  T(F): 99.7 (05-14-22 @ 21:00), Max: 99.7 (05-14-22 @ 21:00)  HR: 93 (05-15-22 @ 00:00) (89 - 95)  BP: 136/54 (05-15-22 @ 00:00) (136/54 - 153/55)  RR: 18 (05-15-22 @ 00:00) (17 - 22)  SpO2: 100% (05-15-22 @ 00:00) (96% - 100%)    PHYSICAL EXAM   Gen: NAD, comfortable, AA&Ox3  HEENT: head atrumatic and normocephalic, PEERLA, EOMI,  no gross abnormalities of ears, mucous membranes moist, no oral lesions, neck supple without masses/goiter/lymphadenopathy, no JVD  CVS: +s1, s2, regular rate and rhythm, no murmurs, rubs or gallops, no thrill, normal PMI  Pulmonary: b/l wheezing throughout lung fields  Abdomen: soft, non-tender, non-distended, +bowel sounds in all 4 quadrants, no masses noted, no guarding or rigidity   Back: no scoliosis, lordosis, or kyphosis, no point tenderness, no CVA tenderness   Extremities: no pedal edema, pedal pulses palpable, <2 sec. cap refill   Skin: warm and dry, no wounds   Neuro: answering questions appropriately, face symmetric, sensation equal bilaterally in face, tongue midline, no dysarthria, 5/5 strength in upper and lower extremities bilaterally, sensation intact in upper and lower extremities bilaterally, finger to nose wnl, and heel to shin wnl. no abnormalities in gait. Vitals  T(F): 99.7 (05-14-22 @ 21:00), Max: 99.7 (05-14-22 @ 21:00)  HR: 93 (05-15-22 @ 00:00) (89 - 95)  BP: 136/54 (05-15-22 @ 00:00) (136/54 - 153/55)  RR: 18 (05-15-22 @ 00:00) (17 - 22)  SpO2: 100% (05-15-22 @ 00:00) (96% - 100%)    PHYSICAL EXAM   Gen: NAD, comfortable, AA&Ox3  HEENT: head traumatic and normocephalic, NIC, EOMI,  no gross abnormalities of ears, mucous membranes moist, no oral lesions, neck supple without masses/goiter/lymphadenopathy, no JVD  CVS: +s1, s2, regular rate and rhythm, no murmurs, rubs or gallops, no thrill, normal PMI  Pulmonary: b/l wheezing throughout lung fields, laboured breathing on exertion  Abdomen: soft, non-tender, non-distended, +bowel sounds in all 4 quadrants, no masses noted, no guarding or rigidity   Back: no scoliosis, lordosis, or kyphosis, no point tenderness, no CVA tenderness   Extremities: no pedal edema, pedal pulses palpable, <2 sec. cap refill   Skin: warm and dry, no wounds   Neuro: answering questions appropriately, face symmetric, sensation equal bilaterally in face, tongue midline, no dysarthria, 5/5 strength in upper and lower extremities bilaterally, sensation intact in upper and lower extremities bilaterally, finger to nose wnl, and heel to shin wnl. no abnormalities in gait.

## 2022-05-14 NOTE — H&P ADULT - ASSESSMENT
That would be fine - Please given Dr. Arsenio Delong and Dr. David Leroy names.    66F with PMHx as above presents for acute hypoxic respiratory failure.    #Acute Hypoxic Respiratory Failure 2/2 multifactorial  -In ED, satting 100% on 4L NC  -RVP +Influenza A  -CXR shows hyperinflated lungs concerning for progressive COPD though no evidence of infiltrates; awaiting official read  -D-dimer <150  -Continuous pulse Ox   -s/p Methylprednisolone 125mg IVP x1  -Cont Spiriva 18mcg 1 cap QD  -Cont Symbicort 160mcg 2 puffs  -Cont Albuterol 2puffs Q6H   -Methylprednisolone 40mg TID IV   -Cont Tamiflu 75mg PO BID for 5 days   -Pulm consult    #HLD  -Cont Lipitor 80mg QHS    #HTN  -Cont Losartan 25mg PO QD    #DM2  -F/U A1c in AM  -ISS    #DVT PPX   -Xarelto 20mg PO QD    #Full Code    Case discussed with Dr Mathias     66F with PMHx as above presents for acute hypoxic respiratory failure.    #Acute Hypoxic Respiratory Failure 2/2 multifactorial  -In ED, satting 100% on 4L NC  -RVP +Influenza A  -CXR shows hyperinflated lungs concerning for progressive COPD though no evidence of infiltrates; personally reviewed; awaiting official read  -EKG unremarkable; personally reviewed  -D-dimer <150  -Continuous pulse Ox   -s/p Methylprednisolone 125mg IVP x1  -Cont Spiriva 18mcg 1 cap QD  -Cont Symbicort 160mcg 2 puffs  -Cont Albuterol 2puffs Q6H   -Methylprednisolone 40mg BID IV   -Cont Tamiflu 75mg PO BID for 5 days   -Pulm consult    #HLD  -Cont Lipitor 80mg QHS    #HTN  -Cont Losartan 25mg PO QD    #DM2  -F/U A1c in AM  -ISS pre meals  -ISS QHS    #DVT PPX   -Xarelto 20mg PO QD    #Full Code    Case discussed with Dr Mathias     66F with PMHx COPD, DM, HLD, HTN, breast CA s/p left mastectomy (2011) presents for acute hypoxic respiratory failure.    #Acute Hypoxic Respiratory Failure 2/2 multifactorial  -In ED, satting 100% on 4L NC  -RVP +Influenza A  -CXR shows hyperinflated lungs concerning for progressive COPD though no evidence of infiltrates; personally reviewed; awaiting official read  -EKG unremarkable; personally reviewed  -D-dimer <150  -Continuous pulse Ox   -s/p Methylprednisolone 125mg IVP x1  -Cont Spiriva 18mcg 1 cap QD  -Cont Symbicort 160mcg 2 puffs  -Cont Albuterol 2puffs Q6H   -Methylprednisolone 40mg BID IV   -Cont Tamiflu 75mg PO BID for 5 days   -Pulm consult    #Hx of breast CA s/p left mastectomy  -pt's next chemo treatment on 5/31  -Has right chest port placed    #HLD  -Cont Lipitor 80mg QHS    #HTN  -Cont Losartan 25mg PO QD    #DM2  -F/U A1c in AM  -ISS pre meals  -ISS QHS    #DVT PPX   -Xarelto 20mg PO QD    #Full Code    Case discussed with Dr Mathias

## 2022-05-14 NOTE — H&P ADULT - ATTENDING COMMENTS
66F with PMHx COPD, DM2, HLD, HTN, breast CA s/p left mastectomy (2011), CAD s/p cardiac stent x1 (2020) PE/DVT on Xarelto, presents for worsening SOB since yesterday with associated yellow sputum production. per EMS sats on RA 88%, received albuterol and sats in ED 95%  O/E  lungs- wheezing b/l   cxr- no acute infiltrates noted  trops and d dimer negative  RVP positive for influenza A  plan- admitted for acute hypoxic respiratory failure,  likely due to influenza A infection  stated on Tamiflu  solumedrol ivp bid x 4 doses then reassess  albuterol prn  o2 support  DM2- iss, Accu-Cheks  HTN- c/w losartan  H/O PE/DVT- c/w Xarelto  monitor.

## 2022-05-14 NOTE — ED PROVIDER NOTE - NS ED ATTENDING STATEMENT MOD
This was a shared visit with the [facility]. I reviewed and verified the documentation and independently performed the documented:

## 2022-05-14 NOTE — ED PROVIDER NOTE - OBJECTIVE STATEMENT
65 y/o F BIBA accompanied with Brother from home, with h/o COPD (every day smoker), HTN, DM pw cough and low grade fever x 1 week and now worsening SOB. As per EMS O2 sat 88% on RA, given neb Albuterol and Atrovent and sat came up to 99%on RA. Denies CP, Ab pain, n/v/d, weakness. 65 y/o F BIBA accompanied with Brother from home, with h/o COPD (every day smoker), HTN, DM pw productive cough and low grade fever x 1 week and now worsening SOB. As per EMS O2 sat 88% on RA, given neb Albuterol and Atrovent and sat improved. Denies CP, Ab pain, n/v/d, weakness. Not on home O2. No known sick contacts.   (+) 1.5 ppd smoker

## 2022-05-14 NOTE — H&P ADULT - NSHPREVIEWOFSYSTEMS_GEN_ALL_CORE
Review of Symptoms  Gen: no fevers, chills, sweats, weight loss, fatigue  Visual: no recent changes in vision, no blurriness, no seeing spots  Cardiovascular: no chest pain, no palpitations, no orthopnea, no leg swelling  Respiratory: ++shortness of breath, no exertional dyspnea, no cough, no rhinorrhea, no nasal congestion  GI: no difficulty swallowing, no nausea, no vomiting, no abdominal pain, no diarrhea, no constipation, no melana  : no dysuria, no increased freq, no hematuria, no malodorous urine  Derm: no wounds, no rashes  Heme: no easy bleeding or bruising  MSK: no joint pain, no joint swelling or redness, no extremity pain   Neuro: no headache, no numbness, no weakness, no memory loss  Psych: no depression, no anxiety, no SI

## 2022-05-14 NOTE — ED PROVIDER NOTE - CLINICAL SUMMARY MEDICAL DECISION MAKING FREE TEXT BOX
65 y/o F BIBA accompanied with Brother from home, with h/o COPD (every day smoker), HTN, DM pw cough and low grade fever x 1 week and now worsening SOB. As per EMS O2 sat 88% on RA, given neb Albuterol and Atrovent and sat came up to 99%on RA. Denies CP, Ab pain, n/v/d, weakness.   Plan: Will check labs, CXR, give nebs and steroids 65 y/o F BIBA accompanied with Brother from home, with h/o COPD (every day smoker), HTN, DM pw productive cough and low grade fever x 1 week and now worsening SOB. As per EMS O2 sat 88% on RA, given neb Albuterol and Atrovent and sat improved. Denies CP, Ab pain, n/v/d, weakness. Not on home O2. No known sick contacts.   (+) 1.5 ppd smoker  Plan: Will check labs, CXR, EKG, give nebs and steroids, fluids and reassess 67 y/o F BIBA accompanied with Brother from home, with h/o COPD (every day smoker), HTN, DM pw productive cough and low grade fever x 1 week and now worsening SOB. As per EMS O2 sat 88% on RA, given neb Albuterol and Atrovent and sat improved. Denies CP, Ab pain, n/v/d, weakness. Not on home O2. No known sick contacts.   (+) 1.5 ppd smoker  Plan: Will check labs, CXR, EKG, give nebs and steroids, fluids and reassess  Pt with exertional dyspnea. Still with cough. O2 sat on RA is 95%. However, with SOB at minimal exertion, will admit. Will add dimer. Pt on xarelto 20mg. d/w Dr Mathias, who will f/u dimer, trop, and rvp. Plan for admission.

## 2022-05-14 NOTE — H&P ADULT - HISTORY OF PRESENT ILLNESS
66F with PMHx COPD, DM, HLD, HTN presents for SOB. Worsening SOB. 45 pack/year. smoking 1-1.5pack/day. Per EMS, pt waas satting 88% on RA, given albuterol and atrovent and sats improved. Denies CP, abd pain, nausea/vomiting. Not on home O2. Denies sick contacts.  66F with PMHx COPD, DM, HLD, HTN, breast CA s/p left mastectomy (2011), CAD s/p cardiac stent x1 (2020) presents for worsening SOB since yesterday with associated yellow sputum production. Today, pt states she had SOB just walking to the bathroom which promted her to go to ED. 45 pack/year. smoking 1-1.5pack/day. Per EMS, pt waas satting 88% on RA, given albuterol and atrovent and sats improved. Denies CP, abd pain, nausea/vomiting. Has home O2 but doesn't use it. Denies sick contacts. Dr paulson -outpt oncologist. Dr morales -outpt Cardio. Dr Nash -outpt Pulm.    In ED, vitals T99.7F, HR95, /55, RR17, satting 100% on 4L  66F with PMHx COPD, DM2, HLD, HTN, breast CA s/p left mastectomy (2011), CAD s/p cardiac stent x1 (2020) dvt /PE on Xarelto presents for worsening SOB since yesterday with associated yellow sputum production. Today, pt states she had SOB just walking to the bathroom which prompted her to go to ED. 45 pack/year. smoking 1-1.5pack/day. Per EMS, pt was satting 88% on RA, given albuterol and Atrovent and sats improved to 95%.   Denies abd pain, nausea/vomiting. Has home O2 but doesn't use it. Denies sick contacts. Dr paulson -outpt oncologist. Dr morales -outpt Cardio. Dr Nash -outpt Pulm.    In ED, triage vitals T 99.7F, HR 89, /72, RR 22, sat 100% on 4L

## 2022-05-14 NOTE — H&P ADULT - NSICDXFAMILYHX_GEN_ALL_CORE_FT
FAMILY HISTORY:  Father  Still living? Unknown  Family history of gout, Age at diagnosis: Age Unknown  FH: coronary artery disease, Age at diagnosis: Age Unknown    Mother  Still living? Unknown  Family history of gout, Age at diagnosis: Age Unknown

## 2022-05-14 NOTE — ED ADULT TRIAGE NOTE - ARRIVAL INFO ADDITIONAL COMMENTS
Patient arrives by ambulance from home for three to four days of increased SOB, history of COPD with recent sick contacts. Initially with 02Sat 88% RA per EMS at home. Received albuterol atrovent.Patient with obvious shortness of breath, tripod posture on 4L 02Sat 96%, receiving nebulizer. Alert and oriented, pale, warm and dry skin. Patient able to speak in abbreviated sentences. Subjective fever two days ago. Rhonchi bilaterally reported by EMS. Afebrile in triage. No extremity swelling noted.

## 2022-05-14 NOTE — ED ADULT NURSE NOTE - OBJECTIVE STATEMENT
BIBEMS from home, with h/o COPD (every day smoker), HTN, DM pw productive cough and low grade fever x 1 week and now worsening SOB. As per EMS O2 sat 88% on RA, given neb Albuterol and Atrovent and sat improved. Pt. received from EMS on 4LNC. Denies CP, Ab pain, n/v/d, weakness. Not on home O2. No known sick contacts. Pt. reports smoking 1.5 packs a day.

## 2022-05-15 DIAGNOSIS — Z98.890 OTHER SPECIFIED POSTPROCEDURAL STATES: Chronic | ICD-10-CM

## 2022-05-15 LAB
A1C WITH ESTIMATED AVERAGE GLUCOSE RESULT: 5.8 % — HIGH (ref 4–5.6)
ALBUMIN SERPL ELPH-MCNC: 3 G/DL — LOW (ref 3.3–5)
ALP SERPL-CCNC: 80 U/L — SIGNIFICANT CHANGE UP (ref 40–120)
ALT FLD-CCNC: 38 U/L — SIGNIFICANT CHANGE UP (ref 10–45)
ANION GAP SERPL CALC-SCNC: 5 MMOL/L — SIGNIFICANT CHANGE UP (ref 5–17)
AST SERPL-CCNC: 32 U/L — SIGNIFICANT CHANGE UP (ref 10–40)
BILIRUB SERPL-MCNC: 0.3 MG/DL — SIGNIFICANT CHANGE UP (ref 0.2–1.2)
BUN SERPL-MCNC: 10 MG/DL — SIGNIFICANT CHANGE UP (ref 7–23)
CALCIUM SERPL-MCNC: 9.2 MG/DL — SIGNIFICANT CHANGE UP (ref 8.4–10.5)
CHLORIDE SERPL-SCNC: 103 MMOL/L — SIGNIFICANT CHANGE UP (ref 96–108)
CO2 SERPL-SCNC: 31 MMOL/L — SIGNIFICANT CHANGE UP (ref 22–31)
CREAT SERPL-MCNC: 0.43 MG/DL — LOW (ref 0.5–1.3)
EGFR: 109 ML/MIN/1.73M2 — SIGNIFICANT CHANGE UP
ESTIMATED AVERAGE GLUCOSE: 120 MG/DL — HIGH (ref 68–114)
GLUCOSE BLDC GLUCOMTR-MCNC: 111 MG/DL — HIGH (ref 70–99)
GLUCOSE BLDC GLUCOMTR-MCNC: 119 MG/DL — HIGH (ref 70–99)
GLUCOSE BLDC GLUCOMTR-MCNC: 141 MG/DL — HIGH (ref 70–99)
GLUCOSE BLDC GLUCOMTR-MCNC: 97 MG/DL — SIGNIFICANT CHANGE UP (ref 70–99)
GLUCOSE SERPL-MCNC: 134 MG/DL — HIGH (ref 70–99)
HCT VFR BLD CALC: 34.5 % — SIGNIFICANT CHANGE UP (ref 34.5–45)
HCV AB S/CO SERPL IA: 0.08 S/CO — SIGNIFICANT CHANGE UP (ref 0–0.99)
HCV AB SERPL-IMP: SIGNIFICANT CHANGE UP
HGB BLD-MCNC: 11.3 G/DL — LOW (ref 11.5–15.5)
MCHC RBC-ENTMCNC: 30.9 PG — SIGNIFICANT CHANGE UP (ref 27–34)
MCHC RBC-ENTMCNC: 32.8 GM/DL — SIGNIFICANT CHANGE UP (ref 32–36)
MCV RBC AUTO: 94.3 FL — SIGNIFICANT CHANGE UP (ref 80–100)
NRBC # BLD: 0 /100 WBCS — SIGNIFICANT CHANGE UP (ref 0–0)
PLATELET # BLD AUTO: 213 K/UL — SIGNIFICANT CHANGE UP (ref 150–400)
POTASSIUM SERPL-MCNC: 4.1 MMOL/L — SIGNIFICANT CHANGE UP (ref 3.5–5.3)
POTASSIUM SERPL-SCNC: 4.1 MMOL/L — SIGNIFICANT CHANGE UP (ref 3.5–5.3)
PROT SERPL-MCNC: 7.4 G/DL — SIGNIFICANT CHANGE UP (ref 6–8.3)
RBC # BLD: 3.66 M/UL — LOW (ref 3.8–5.2)
RBC # FLD: 12.8 % — SIGNIFICANT CHANGE UP (ref 10.3–14.5)
SODIUM SERPL-SCNC: 139 MMOL/L — SIGNIFICANT CHANGE UP (ref 135–145)
WBC # BLD: 3.76 K/UL — LOW (ref 3.8–10.5)
WBC # FLD AUTO: 3.76 K/UL — LOW (ref 3.8–10.5)

## 2022-05-15 PROCEDURE — 99233 SBSQ HOSP IP/OBS HIGH 50: CPT | Mod: GC

## 2022-05-15 RX ORDER — GLUCAGON INJECTION, SOLUTION 0.5 MG/.1ML
1 INJECTION, SOLUTION SUBCUTANEOUS ONCE
Refills: 0 | Status: DISCONTINUED | OUTPATIENT
Start: 2022-05-15 | End: 2022-05-18

## 2022-05-15 RX ORDER — ALBUTEROL 90 UG/1
2 AEROSOL, METERED ORAL ONCE
Refills: 0 | Status: COMPLETED | OUTPATIENT
Start: 2022-05-15 | End: 2022-05-15

## 2022-05-15 RX ORDER — ALPRAZOLAM 0.25 MG
0.25 TABLET ORAL ONCE
Refills: 0 | Status: DISCONTINUED | OUTPATIENT
Start: 2022-05-15 | End: 2022-05-15

## 2022-05-15 RX ORDER — DEXTROSE 50 % IN WATER 50 %
25 SYRINGE (ML) INTRAVENOUS ONCE
Refills: 0 | Status: DISCONTINUED | OUTPATIENT
Start: 2022-05-15 | End: 2022-05-18

## 2022-05-15 RX ORDER — RIVAROXABAN 15 MG-20MG
20 KIT ORAL
Refills: 0 | Status: DISCONTINUED | OUTPATIENT
Start: 2022-05-15 | End: 2022-05-18

## 2022-05-15 RX ORDER — SODIUM CHLORIDE 9 MG/ML
1000 INJECTION, SOLUTION INTRAVENOUS
Refills: 0 | Status: DISCONTINUED | OUTPATIENT
Start: 2022-05-15 | End: 2022-05-18

## 2022-05-15 RX ORDER — DEXTROSE 50 % IN WATER 50 %
12.5 SYRINGE (ML) INTRAVENOUS ONCE
Refills: 0 | Status: DISCONTINUED | OUTPATIENT
Start: 2022-05-15 | End: 2022-05-18

## 2022-05-15 RX ORDER — LOSARTAN POTASSIUM 100 MG/1
25 TABLET, FILM COATED ORAL DAILY
Refills: 0 | Status: DISCONTINUED | OUTPATIENT
Start: 2022-05-15 | End: 2022-05-18

## 2022-05-15 RX ORDER — DEXTROSE 50 % IN WATER 50 %
15 SYRINGE (ML) INTRAVENOUS ONCE
Refills: 0 | Status: DISCONTINUED | OUTPATIENT
Start: 2022-05-15 | End: 2022-05-18

## 2022-05-15 RX ORDER — LETROZOLE 2.5 MG/1
2.5 TABLET, FILM COATED ORAL DAILY
Refills: 0 | Status: DISCONTINUED | OUTPATIENT
Start: 2022-05-15 | End: 2022-05-18

## 2022-05-15 RX ORDER — ATORVASTATIN CALCIUM 80 MG/1
80 TABLET, FILM COATED ORAL AT BEDTIME
Refills: 0 | Status: DISCONTINUED | OUTPATIENT
Start: 2022-05-15 | End: 2022-05-18

## 2022-05-15 RX ORDER — INSULIN LISPRO 100/ML
VIAL (ML) SUBCUTANEOUS AT BEDTIME
Refills: 0 | Status: DISCONTINUED | OUTPATIENT
Start: 2022-05-15 | End: 2022-05-18

## 2022-05-15 RX ORDER — INSULIN LISPRO 100/ML
VIAL (ML) SUBCUTANEOUS
Refills: 0 | Status: DISCONTINUED | OUTPATIENT
Start: 2022-05-15 | End: 2022-05-18

## 2022-05-15 RX ADMIN — ATORVASTATIN CALCIUM 80 MILLIGRAM(S): 80 TABLET, FILM COATED ORAL at 21:56

## 2022-05-15 RX ADMIN — Medication 40 MILLIGRAM(S): at 03:27

## 2022-05-15 RX ADMIN — LETROZOLE 2.5 MILLIGRAM(S): 2.5 TABLET, FILM COATED ORAL at 17:04

## 2022-05-15 RX ADMIN — BUDESONIDE AND FORMOTEROL FUMARATE DIHYDRATE 2 PUFF(S): 160; 4.5 AEROSOL RESPIRATORY (INHALATION) at 21:46

## 2022-05-15 RX ADMIN — BUDESONIDE AND FORMOTEROL FUMARATE DIHYDRATE 2 PUFF(S): 160; 4.5 AEROSOL RESPIRATORY (INHALATION) at 08:51

## 2022-05-15 RX ADMIN — TIOTROPIUM BROMIDE 1 CAPSULE(S): 18 CAPSULE ORAL; RESPIRATORY (INHALATION) at 08:51

## 2022-05-15 RX ADMIN — Medication 0.25 MILLIGRAM(S): at 03:40

## 2022-05-15 RX ADMIN — RIVAROXABAN 20 MILLIGRAM(S): KIT at 17:04

## 2022-05-15 RX ADMIN — Medication 40 MILLIGRAM(S): at 17:05

## 2022-05-15 RX ADMIN — LOSARTAN POTASSIUM 25 MILLIGRAM(S): 100 TABLET, FILM COATED ORAL at 05:59

## 2022-05-15 RX ADMIN — Medication 600 MILLIGRAM(S): at 06:00

## 2022-05-15 RX ADMIN — Medication 600 MILLIGRAM(S): at 17:04

## 2022-05-15 RX ADMIN — Medication 75 MILLIGRAM(S): at 17:05

## 2022-05-15 RX ADMIN — Medication 75 MILLIGRAM(S): at 06:00

## 2022-05-15 NOTE — PATIENT PROFILE ADULT - FALL HARM RISK - HARM RISK INTERVENTIONS

## 2022-05-15 NOTE — PROGRESS NOTE ADULT - ASSESSMENT
A: 66F with PMHx COPD, DM, HLD, HTN, breast CA s/p left mastectomy (2011) presents for acute hypoxic respiratory failure.    #Acute Hypoxic Respiratory Failure  likely 2/2 Influenza  Positive RVP for influenza A  continuous pulse ox  Oxygen goal 92-95% on NC  as per patient did not require oxygen as outpatient  Methylprednisolone 40mg BID IV  Spiriva  Symbicort  Albuterol prn cough/wheezing   Albuterol q6   Tamiflu- Day 2/5  Pending Pulm recs if patient needs antibiotic coverage     #Hx of Breast CA s/p mastetcomy  X-ray shows possible nipple shadow vs mass, will order x-ray with nipple marker  Patient has right chest port    #HLD  Lipitor    #HTN  Losartan     #DM2  5.8 A1C  -ISS premeals and qhs    #DVT ppx  Xarelto    #Code Status    Full Code     Discussed with Dr. Manzanares

## 2022-05-16 LAB
ALBUMIN SERPL ELPH-MCNC: 2.9 G/DL — LOW (ref 3.3–5)
ALP SERPL-CCNC: 77 U/L — SIGNIFICANT CHANGE UP (ref 40–120)
ALT FLD-CCNC: 32 U/L — SIGNIFICANT CHANGE UP (ref 10–45)
ANION GAP SERPL CALC-SCNC: 7 MMOL/L — SIGNIFICANT CHANGE UP (ref 5–17)
AST SERPL-CCNC: 31 U/L — SIGNIFICANT CHANGE UP (ref 10–40)
BILIRUB SERPL-MCNC: 0.2 MG/DL — SIGNIFICANT CHANGE UP (ref 0.2–1.2)
BUN SERPL-MCNC: 17 MG/DL — SIGNIFICANT CHANGE UP (ref 7–23)
CALCIUM SERPL-MCNC: 8.9 MG/DL — SIGNIFICANT CHANGE UP (ref 8.4–10.5)
CHLORIDE SERPL-SCNC: 107 MMOL/L — SIGNIFICANT CHANGE UP (ref 96–108)
CO2 SERPL-SCNC: 29 MMOL/L — SIGNIFICANT CHANGE UP (ref 22–31)
CREAT SERPL-MCNC: 0.45 MG/DL — LOW (ref 0.5–1.3)
EGFR: 107 ML/MIN/1.73M2 — SIGNIFICANT CHANGE UP
GLUCOSE BLDC GLUCOMTR-MCNC: 126 MG/DL — HIGH (ref 70–99)
GLUCOSE BLDC GLUCOMTR-MCNC: 128 MG/DL — HIGH (ref 70–99)
GLUCOSE BLDC GLUCOMTR-MCNC: 143 MG/DL — HIGH (ref 70–99)
GLUCOSE BLDC GLUCOMTR-MCNC: 150 MG/DL — HIGH (ref 70–99)
GLUCOSE SERPL-MCNC: 106 MG/DL — HIGH (ref 70–99)
POTASSIUM SERPL-MCNC: 3.9 MMOL/L — SIGNIFICANT CHANGE UP (ref 3.5–5.3)
POTASSIUM SERPL-SCNC: 3.9 MMOL/L — SIGNIFICANT CHANGE UP (ref 3.5–5.3)
PROT SERPL-MCNC: 7.2 G/DL — SIGNIFICANT CHANGE UP (ref 6–8.3)
SODIUM SERPL-SCNC: 143 MMOL/L — SIGNIFICANT CHANGE UP (ref 135–145)

## 2022-05-16 PROCEDURE — 99232 SBSQ HOSP IP/OBS MODERATE 35: CPT | Mod: GC

## 2022-05-16 PROCEDURE — 71045 X-RAY EXAM CHEST 1 VIEW: CPT | Mod: 26

## 2022-05-16 RX ORDER — AZITHROMYCIN 500 MG/1
500 TABLET, FILM COATED ORAL
Refills: 0 | Status: DISCONTINUED | OUTPATIENT
Start: 2022-05-16 | End: 2022-05-18

## 2022-05-16 RX ADMIN — Medication 75 MILLIGRAM(S): at 05:29

## 2022-05-16 RX ADMIN — LETROZOLE 2.5 MILLIGRAM(S): 2.5 TABLET, FILM COATED ORAL at 16:43

## 2022-05-16 RX ADMIN — Medication 40 MILLIGRAM(S): at 17:45

## 2022-05-16 RX ADMIN — AZITHROMYCIN 500 MILLIGRAM(S): 500 TABLET, FILM COATED ORAL at 16:43

## 2022-05-16 RX ADMIN — Medication 40 MILLIGRAM(S): at 05:29

## 2022-05-16 RX ADMIN — RIVAROXABAN 20 MILLIGRAM(S): KIT at 16:43

## 2022-05-16 RX ADMIN — BUDESONIDE AND FORMOTEROL FUMARATE DIHYDRATE 2 PUFF(S): 160; 4.5 AEROSOL RESPIRATORY (INHALATION) at 09:54

## 2022-05-16 RX ADMIN — ATORVASTATIN CALCIUM 80 MILLIGRAM(S): 80 TABLET, FILM COATED ORAL at 22:12

## 2022-05-16 RX ADMIN — LOSARTAN POTASSIUM 25 MILLIGRAM(S): 100 TABLET, FILM COATED ORAL at 05:29

## 2022-05-16 RX ADMIN — BUDESONIDE AND FORMOTEROL FUMARATE DIHYDRATE 2 PUFF(S): 160; 4.5 AEROSOL RESPIRATORY (INHALATION) at 21:00

## 2022-05-16 RX ADMIN — Medication 600 MILLIGRAM(S): at 17:46

## 2022-05-16 RX ADMIN — Medication 600 MILLIGRAM(S): at 05:29

## 2022-05-16 RX ADMIN — Medication 75 MILLIGRAM(S): at 17:45

## 2022-05-16 RX ADMIN — TIOTROPIUM BROMIDE 1 CAPSULE(S): 18 CAPSULE ORAL; RESPIRATORY (INHALATION) at 09:54

## 2022-05-16 NOTE — DIETITIAN INITIAL EVALUATION ADULT - OTHER INFO
66F with PMHx COPD, DM2, HLD, HTN, breast CA s/p left mastectomy (2011), CAD s/p cardiac stent x1 (2020) dvt /PE on Xarelto presents for worsening SOB since yesterday with associated yellow sputum production .Patient admitted with SOB . Hx of DM , patient noted on Metformin as home med. Patient with evidence of moderate protein calorie malnutrition (+)  muscle wasting & loss of body fat observed  66F with PMHx COPD, DM2, HLD, HTN, breast CA s/p left mastectomy (2011), CAD s/p cardiac stent x1 (2020) dvt /PE on Xarelto presents for worsening SOB since yesterday with associated yellow sputum production .Patient admitted with SOB . Hx of DM , patient noted on Metformin as home med. Suggest add Consistent Carbohydrate restriction to current diet  & the addition of Glucerna BID .  Patient with evidence of moderate protein calorie malnutrition (+)  muscle wasting & loss of body fat observed

## 2022-05-16 NOTE — PROGRESS NOTE ADULT - ASSESSMENT
66F with PMHx COPD, DM, HLD, HTN, breast CA s/p left mastectomy (2011) presents for acute hypoxic respiratory failure.    #Acute Hypoxic Respiratory Failure  -likely 2/2 Influenza  -Positive RVP for influenza A  -continuous pulse ox, Oxygen goal 92-95% on NC. Currently on 4L NC, titrate as tolerated  -as per patient did not require oxygen as outpatient, has home O2, however, does not use  -Continue Methylprednisolone 40mg BID IV, will titrate as improvement is noted  -Continue breathing treatments: Spiriva, Symbicort, Albuterol prn cough/wheezing   -Tamiflu- Day 3/5  -Pulm recs appreciated: started on Azithromycin for 3 days  -Continue to monitor    #COPD  -Pt active smoker  -CXR compatible w severe emphysema  -Pt has various CTA's throughout the years for PE, however, no lung nodules noted. No low contrast CT chest noted on record  -Smoking cessation advised, pt in pre-contemplation    #Hx of Breast CA s/p mastetcomy  -pt's next chemo treatment on 5/31  -Patient has right chest port    #HLD  -Continue Lipitor    #HTN  -Continue Losartan   -monitor BP    #DM2  -5.8 A1C  -ISS premeals and qhs    #DVT ppx  Xarelto    #Code Status  Full Code     #Dispo  -Pending improvement of resp status    *Above discussed with Dr. Manzanares

## 2022-05-16 NOTE — CONSULT NOTE ADULT - ASSESSMENT
Dr. Hurst   66 year old female with active nicotine use, COPD, DM2, HTN, Breast cancer s/p left mastectomy 2011, CAD s/p PCI 2020, DVT/PE on xaralto who p/w one week of progressive sob, with productive yellow cough associated with SOB, GANDHI,.     Assessment  1. Influenza AH3 Infection  2. Bronchospasm / Ac on chronic copd exacerbation - due to above  3. Active nicotine adiction  4. DVT/PE on xarlto   5. underlying DM2, HTN, CAD, Breast cancer      Plan  Continue steroids,   Nebs, bronchodilators - Symbicort / Spiriva  N/C o2 to maintain sat  zithromax x 3 days  On tamiflu for 5 days  check sputum culture  continue xaralto      Dr. Hurst [phone] paged to see if old CT available r/o malignancy if need CT while here    case d/w Resident

## 2022-05-16 NOTE — DIETITIAN INITIAL EVALUATION ADULT - PERTINENT MEDS FT
MEDICATIONS  (STANDING):  atorvastatin 80 milliGRAM(s) Oral at bedtime  budesonide 160 MICROgram(s)/formoterol 4.5 MICROgram(s) Inhaler 2 Puff(s) Inhalation two times a day  dextrose 5%. 1000 milliLiter(s) (50 mL/Hr) IV Continuous <Continuous>  dextrose 5%. 1000 milliLiter(s) (100 mL/Hr) IV Continuous <Continuous>  dextrose 50% Injectable 25 Gram(s) IV Push once  dextrose 50% Injectable 12.5 Gram(s) IV Push once  dextrose 50% Injectable 25 Gram(s) IV Push once  glucagon  Injectable 1 milliGRAM(s) IntraMuscular once  guaiFENesin  milliGRAM(s) Oral every 12 hours  insulin lispro (ADMELOG) corrective regimen sliding scale   SubCutaneous three times a day before meals  insulin lispro (ADMELOG) corrective regimen sliding scale   SubCutaneous at bedtime  letrozole 2.5 milliGRAM(s) Oral daily  losartan 25 milliGRAM(s) Oral daily  methylPREDNISolone sodium succinate Injectable 40 milliGRAM(s) IV Push every 12 hours  oseltamivir 75 milliGRAM(s) Oral two times a day  rivaroxaban 20 milliGRAM(s) Oral with dinner  tiotropium 18 MICROgram(s) Capsule 1 Capsule(s) Inhalation daily    MEDICATIONS  (PRN):  ALBUTerol    90 MICROgram(s) HFA Inhaler 2 Puff(s) Inhalation every 6 hours PRN Shortness of Breath and/or Wheezing  dextrose Oral Gel 15 Gram(s) Oral once PRN Blood Glucose LESS THAN 70 milliGRAM(s)/deciliter

## 2022-05-16 NOTE — PHARMACOTHERAPY INTERVENTION NOTE - COMMENTS
Met with patient and reviewed current medications. Pt reports no issues taking medications and has no concerns. Advised patient to take Xarelto with food for better absorption. Pt verbalized understanding, all questions answered.

## 2022-05-16 NOTE — DIETITIAN INITIAL EVALUATION ADULT - PERTINENT LABORATORY DATA
05-16    143  |  107  |  17  ----------------------------<  106<H>  3.9   |  29  |  0.45<L>    Ca    8.9      16 May 2022 06:25    TPro  7.2  /  Alb  2.9<L>  /  TBili  0.2  /  DBili  x   /  AST  31  /  ALT  32  /  AlkPhos  77  05-16  POCT Blood Glucose.: 128 mg/dL (05-16-22 @ 11:54)  A1C with Estimated Average Glucose Result: 5.8 % (05-15-22 @ 06:45)

## 2022-05-16 NOTE — CONSULT NOTE ADULT - SUBJECTIVE AND OBJECTIVE BOX
sPULMONARY CONSULT  Location of Patient : [facility] ([facility]  Attending requesting Consult:Savita Mathias  Chief Complaint :     Reason For consult :      Initial HPI on admission:  HPI:  66F with PMHx COPD, DM2, HLD, HTN, breast CA s/p left mastectomy (2011), CAD s/p cardiac stent x1 (2020) dvt /PE on Xarelto presents for worsening SOB since yesterday with associated yellow sputum production. Today, pt states she had SOB just walking to the bathroom which prompted her to go to ED. 45 pack/year. smoking 1-1.5pack/day. Per EMS, pt was satting 88% on RA, given albuterol and Atrovent and sats improved to 95%.   Denies abd pain, nausea/vomiting. Has home O2 but doesn't use it. Denies sick contacts. Dr paulson -outpt oncologist. Dr morales -outpt Cardio. Dr Nash -outpt Pulm.    In ED, triage vitals T 99.7F, HR 89, /72, RR 22, sat 100% on 4L  (14 May 2022 23:57)      BRIEF HOSPITAL COURSE: ***    PAST MEDICAL & SURGICAL HISTORY:  COPD, moderate      HTN (hypertension)      Diabetes      H/O breast surgery      History of cardiac cath        Allergies    penicillin (Short breath)    Intolerances      FAMILY HISTORY:  Family history of gout (Father, Mother)    FH: coronary artery disease (Father)      Social history: Social History:  Active smoking- 1.5 pk /day since age [age]  denies etoh use (14 May 2022 23:57)       Smoking:     Drinking:     Drug use:    Review of Systems: as stated above    CONSTITUTIONAL: No fever, No chills, No fatigue  EYES: No eye pain, No visual disturbances, No discharge  ENMT:  No difficulty hearing, No tinnitus, No vertigo; No sinus or throat pain  NECK: No pain, No stiffness  RESPIRATORY: No Cough, No SOB, No Secretions  CARDIOVASCULAR: No chest pain, No palpitations, No dizziness, or No leg swelling  GASTROINTESTINAL: No abdominal or epigastric pain. No nausea, No vomiting, No hematemesis; No diarrhea, No constipation. No melena, No hematochezia.  GENITOURINARY: No dysuria, No frequency, No hematuria, No incontinence  NEUROLOGICAL: No headaches, No memory loss, No loss of strength, No numbness, No tremors  SKIN: No itching, No burning, No rashes, No lesions   MUSCULOSKELETAL: No joint pain or swelling; No muscle, back, No extremity pain  PSYCHIATRIC: No depression, No anxiety, No mood swings, No difficulty sleeping      Medications:  MEDICATIONS  (STANDING):  atorvastatin 80 milliGRAM(s) Oral at bedtime  budesonide 160 MICROgram(s)/formoterol 4.5 MICROgram(s) Inhaler 2 Puff(s) Inhalation two times a day  dextrose 5%. 1000 milliLiter(s) (50 mL/Hr) IV Continuous <Continuous>  dextrose 5%. 1000 milliLiter(s) (100 mL/Hr) IV Continuous <Continuous>  dextrose 50% Injectable 25 Gram(s) IV Push once  dextrose 50% Injectable 12.5 Gram(s) IV Push once  dextrose 50% Injectable 25 Gram(s) IV Push once  glucagon  Injectable 1 milliGRAM(s) IntraMuscular once  guaiFENesin  milliGRAM(s) Oral every 12 hours  insulin lispro (ADMELOG) corrective regimen sliding scale   SubCutaneous three times a day before meals  insulin lispro (ADMELOG) corrective regimen sliding scale   SubCutaneous at bedtime  letrozole 2.5 milliGRAM(s) Oral daily  losartan 25 milliGRAM(s) Oral daily  methylPREDNISolone sodium succinate Injectable 40 milliGRAM(s) IV Push every 12 hours  oseltamivir 75 milliGRAM(s) Oral two times a day  rivaroxaban 20 milliGRAM(s) Oral with dinner  tiotropium 18 MICROgram(s) Capsule 1 Capsule(s) Inhalation daily    MEDICATIONS  (PRN):  ALBUTerol    90 MICROgram(s) HFA Inhaler 2 Puff(s) Inhalation every 6 hours PRN Shortness of Breath and/or Wheezing  dextrose Oral Gel 15 Gram(s) Oral once PRN Blood Glucose LESS THAN 70 milliGRAM(s)/deciliter      Antibiotics History  oseltamivir 75 milliGRAM(s) Oral two times a day, 05-14-22 @ 23:47, Stop order after: 5 Days      Heme Medications   rivaroxaban 20 milliGRAM(s) Oral with dinner, 05-15-22 @ 00:02      GI Medications        Home Medications:  Last Order Reconciliation Date: Not Done  atorvastatin 80 mg oral tablet: 1 tab(s) orally once a day (05-16-22 @ 11:25)  buPROPion 300 mg/24 hours (XL) oral tablet, extended release: 1 tab(s) orally every 24 hours (05-16-22 @ 11:25)  letrozole 2.5 mg oral tablet: 1 tab(s) orally once a day (05-16-22 @ 11:25)  losartan 25 mg oral tablet: 1 tab(s) orally once a day (05-16-22 @ 11:25)  metFORMIN 1000 mg oral tablet: 1 tab(s) orally 2 times a day (05-16-22 @ 11:25)  Spiriva Respimat 10 ACT 2.5 mcg/inh inhalation aerosol: 2 puff(s) inhaled once a day (05-16-22 @ 11:25)  Symbicort 160 mcg-4.5 mcg/inh inhalation aerosol: 2 puff(s) inhaled 2 times a day (05-16-22 @ 11:25)  Xarelto 20 mg oral tablet: 1 tab(s) orally once a day (in the evening) (05-16-22 @ 11:25)      LABS:                        11.3   3.76  )-----------( 213      ( 15 May 2022 06:45 )             34.5     05-16    143  |  107  |  17  ----------------------------<  106<H>  3.9   |  29  |  0.45<L>    Ca    8.9      16 May 2022 06:25    TPro  7.2  /  Alb  2.9<L>  /  TBili  0.2  /  DBili  x   /  AST  31  /  ALT  32  /  AlkPhos  77  05-16    HIT ab -- 05-14 @ 22:42  HIT ab EIA --  D Dimer -<150           CULTURES: (if applicable)    Rapid RVP Result: Detected (05-14-22 @ 21:30)        CAPILLARY BLOOD GLUCOSE      POCT Blood Glucose.: 128 mg/dL (16 May 2022 11:54)      RADIOLOGY  CXR:    < from: Xray Chest 1 View- PORTABLE-Urgent (05.14.22 @ 21:31) >    ACC: [id number] EXAM:  XR CHEST PORTABLE URGENT 1V                          PROCEDURE DATE:  05/14/2022          INTERPRETATION:  Chest radiograph (one view)     CPT 22289    CLINICAL INFORMATION: Chest pain of unknown severity or duration.    TECHNIQUE:  Single frontal view of the chest was obtained.    FINDINGS:  No previous examinations are available for review.    The lungs are clear.  No pleural abnormality is seen. A   well-circumscribed 2 cm nodular density is seen at the right base   suggestive of a nipple shadow. Repeat frontal view with nipple marker is   suggested for confirmation. Right-sided central venous access catheter is   seen with the distal tip overlying the region of the right atrium.  The heart and mediastinum appear intact.  Surgical clips are seen in the   left axilla. The patient appears status post left breast mastectomy.          IMPRESSION: No gross consolidation is seen. 2 cm nodular density seen at   the right base suggestive of a nipple shadow. Repeat frontal view with   nipple marker is suggested for confirmation.    --- End of Report ---    < end of copied text >    CT:    ECHO:      VITALS:  T(C): 36.6 (05-16-22 @ 05:20), Max: 36.8 (05-15-22 @ 16:42)  T(F): 97.9 (05-16-22 @ 05:20), Max: 98.3 (05-15-22 @ 21:16)  HR: 84 (05-16-22 @ 09:57) (60 - 90)  BP: 124/58 (05-16-22 @ 05:20) (124/58 - 140/78)  BP(mean): --  ABP: --  ABP(mean): --  RR: 16 (05-16-22 @ 05:20) (16 - 17)  SpO2: 99% (05-16-22 @ 09:57) (99% - 99%)  CVP(mm Hg): --  CVP(cm H2O): --    Ins and Outs       Height (cm): 149.9 (05-14-22 @ 21:02)  Weight (kg): 43.5 (05-14-22 @ 21:02)  BMI (kg/m2): 19.4 (05-14-22 @ 21:02)        I&O's Detail      Physical Examination:  GENERAL:               Alert, Oriented, No acute distress.    HEENT:                    Pupils equal, reactive to light.  Symmetric. No JVD, Moist MM  PULM:                     Bilateral air entry, Clear to auscultation bilaterally, no significant sputum production, No Rales, No Rhonchi, No Wheezing  CVS:                         S1, S2,  No Murmur  ABD:                        Soft, nondistended, nontender, normoactive bowel sounds,   EXT:                         No edema, nontender, No Cyanosis or Clubbing   Vascular:                Warm Extremities, Normal Capillary refill, Normal Distal Pulses  SKIN:                       Warm and well perfused, no rashes noted.   NEURO:                  Alert, oriented, interactive, nonfocal, follows commands  PSYC:                      Calm, + Insight.     sPULMONARY CONSULT  Location of Patient : [facility] [address] ([facility]  Attending requesting Consult: Family practice resident  Chief Complaint :  SOB    Reason For consult : COPD       Initial HPI on admission:  HPI:  66 year old female with active nicotine use, COPD, DM2, HTN, Breast cancer s/p left mastectomy 2011, CAD s/p PCI 2020, DVT/PE on xaralto who p/w one week of progressive sob, with productive yellow cough associated with SOB, PÉREZ,.   Denies fever, chills, sick contacts travel, cp, n/v  unsure if wheezing  states has home o2 in past that was taken away, but has kept the portable o2, and used it prior to arrival.    In ED, triage vitals T 99.7F, HR 89, /72, RR 22, sat 100% on 4L    work up found ot have Influenza AH3 positive    BRIEF HOSPITAL COURSE:   today states feeling btter  pérez not want to stop smoking  no plap, n/v    PAST MEDICAL & SURGICAL HISTORY:  COPD, moderate      HTN (hypertension)      Diabetes      H/O breast surgery      History of cardiac cath        Allergies    penicillin (Short breath)    Intolerances      FAMILY HISTORY:  Family history of gout (Father, Mother)    FH: coronary artery disease (Father)      Social history: Social History:  Active smoking- 1.5 pk /day since age [age]  denies etoh use (14 May 2022 23:57)     Review of Systems: as stated above    CONSTITUTIONAL: No fever, No chills, +fatigue  EYES: No eye pain, No visual disturbances, No discharge  ENMT:  No difficulty hearing, No tinnitus, No vertigo; No sinus or throat pain  NECK: No pain, No stiffness  RESPIRATORY: +Cough, +SOB, +Secretions  CARDIOVASCULAR: No chest pain, No palpitations, No dizziness, or No leg swelling  GASTROINTESTINAL: No abdominal or epigastric pain. No nausea, No vomiting, No hematemesis; No diarrhea, No constipation. No melena, No hematochezia.  GENITOURINARY: No dysuria, No frequency, No hematuria, No incontinence  NEUROLOGICAL: No headaches, No memory loss, No loss of strength, No numbness, No tremors  SKIN: No itching, No burning, No rashes, No lesions   MUSCULOSKELETAL: No joint pain or swelling; No muscle, back, No extremity pain  PSYCHIATRIC: No depression, No anxiety, No mood swings, No difficulty sleeping      Medications:  MEDICATIONS  (STANDING):  atorvastatin 80 milliGRAM(s) Oral at bedtime  budesonide 160 MICROgram(s)/formoterol 4.5 MICROgram(s) Inhaler 2 Puff(s) Inhalation two times a day  dextrose 5%. 1000 milliLiter(s) (50 mL/Hr) IV Continuous <Continuous>  dextrose 5%. 1000 milliLiter(s) (100 mL/Hr) IV Continuous <Continuous>  dextrose 50% Injectable 25 Gram(s) IV Push once  dextrose 50% Injectable 12.5 Gram(s) IV Push once  dextrose 50% Injectable 25 Gram(s) IV Push once  glucagon  Injectable 1 milliGRAM(s) IntraMuscular once  guaiFENesin  milliGRAM(s) Oral every 12 hours  insulin lispro (ADMELOG) corrective regimen sliding scale   SubCutaneous three times a day before meals  insulin lispro (ADMELOG) corrective regimen sliding scale   SubCutaneous at bedtime  letrozole 2.5 milliGRAM(s) Oral daily  losartan 25 milliGRAM(s) Oral daily  methylPREDNISolone sodium succinate Injectable 40 milliGRAM(s) IV Push every 12 hours  oseltamivir 75 milliGRAM(s) Oral two times a day  rivaroxaban 20 milliGRAM(s) Oral with dinner  tiotropium 18 MICROgram(s) Capsule 1 Capsule(s) Inhalation daily    MEDICATIONS  (PRN):  ALBUTerol    90 MICROgram(s) HFA Inhaler 2 Puff(s) Inhalation every 6 hours PRN Shortness of Breath and/or Wheezing  dextrose Oral Gel 15 Gram(s) Oral once PRN Blood Glucose LESS THAN 70 milliGRAM(s)/deciliter      Antibiotics History  oseltamivir 75 milliGRAM(s) Oral two times a day, 05-14-22 @ 23:47, Stop order after: 5 Days      Heme Medications   rivaroxaban 20 milliGRAM(s) Oral with dinner, 05-15-22 @ 00:02      GI Medications        Home Medications:  Last Order Reconciliation Date: Not Done  atorvastatin 80 mg oral tablet: 1 tab(s) orally once a day (05-16-22 @ 11:25)  buPROPion 300 mg/24 hours (XL) oral tablet, extended release: 1 tab(s) orally every 24 hours (05-16-22 @ 11:25)  letrozole 2.5 mg oral tablet: 1 tab(s) orally once a day (05-16-22 @ 11:25)  losartan 25 mg oral tablet: 1 tab(s) orally once a day (05-16-22 @ 11:25)  metFORMIN 1000 mg oral tablet: 1 tab(s) orally 2 times a day (05-16-22 @ 11:25)  Spiriva Respimat 10 ACT 2.5 mcg/inh inhalation aerosol: 2 puff(s) inhaled once a day (05-16-22 @ 11:25)  Symbicort 160 mcg-4.5 mcg/inh inhalation aerosol: 2 puff(s) inhaled 2 times a day (05-16-22 @ 11:25)  Xarelto 20 mg oral tablet: 1 tab(s) orally once a day (in the evening) (05-16-22 @ 11:25)      LABS:                        11.3   3.76  )-----------( 213      ( 15 May 2022 06:45 )             34.5     05-16    143  |  107  |  17  ----------------------------<  106<H>  3.9   |  29  |  0.45<L>    Ca    8.9      16 May 2022 06:25    TPro  7.2  /  Alb  2.9<L>  /  TBili  0.2  /  DBili  x   /  AST  31  /  ALT  32  /  AlkPhos  77  05-16    HIT ab -- 05-14 @ 22:42  HIT ab EIA --  D Dimer -<150           CULTURES: (if applicable)    Rapid RVP Result: Detected (05-14-22 @ 21:30)        CAPILLARY BLOOD GLUCOSE      POCT Blood Glucose.: 128 mg/dL (16 May 2022 11:54)      RADIOLOGY  CXR:  Looked at pacs, noted pt with diffrent MR number  noted that nipple shadow present in previous o2 cxr, and appeared out side of lung field (3/29/19 cxr)    < from: Xray Chest 1 View- PORTABLE-Urgent (05.14.22 @ 21:31) >    ACC: [id number] EXAM:  XR CHEST PORTABLE URGENT 1V                          PROCEDURE DATE:  05/14/2022          INTERPRETATION:  Chest radiograph (one view)     CPT 97552    CLINICAL INFORMATION: Chest pain of unknown severity or duration.    TECHNIQUE:  Single frontal view of the chest was obtained.    FINDINGS:  No previous examinations are available for review.    The lungs are clear.  No pleural abnormality is seen. A   well-circumscribed 2 cm nodular density is seen at the right base   suggestive of a nipple shadow. Repeat frontal view with nipple marker is   suggested for confirmation. Right-sided central venous access catheter is   seen with the distal tip overlying the region of the right atrium.  The heart and mediastinum appear intact.  Surgical clips are seen in the   left axilla. The patient appears status post left breast mastectomy.          IMPRESSION: No gross consolidation is seen. 2 cm nodular density seen at   the right base suggestive of a nipple shadow. Repeat frontal view with   nipple marker is suggested for confirmation.    --- End of Report ---    < end of copied text >    CT:  States has had CT chest with in past year at [facility] and has no nodules            EXAM: CT ANGIO CHEST (W)AW IC    PROCEDURE DATE: 03/30/2019      INTERPRETATION: COPD, dyspnea evaluate for pulmonary emboli.    CTA chest. Prior 1/22/2018.  75 cc Omnipaque 350 injected intravenously  Axial images augmented by coronal sagittal reformats reformats, 3-D MIP images.    Satisfactory contrast bolus.  No pulmonary emboli. Nonaneurysmal thoracic aorta.  Central airways patent. No mediastinal adenopathy.  Severe emphysematous changes. No lobar consolidation or suspicious  nodularity.  No pleural pericardial effusion.  Normal heart size. Coronary artery calcination.  Gallstones noted.  No acute skeletal abnormality.    Impression:  Negative for pulmonary emboli.  Severe emphysema.    ECHO:      VITALS:  T(C): 36.6 (05-16-22 @ 05:20), Max: 36.8 (05-15-22 @ 16:42)  T(F): 97.9 (05-16-22 @ 05:20), Max: 98.3 (05-15-22 @ 21:16)  HR: 84 (05-16-22 @ 09:57) (60 - 90)  BP: 124/58 (05-16-22 @ 05:20) (124/58 - 140/78)  BP(mean): --  ABP: --  ABP(mean): --  RR: 16 (05-16-22 @ 05:20) (16 - 17)  SpO2: 99% (05-16-22 @ 09:57) (99% - 99%)  CVP(mm Hg): --  CVP(cm H2O): --    Ins and Outs       Height (cm): 149.9 (05-14-22 @ 21:02)  Weight (kg): 43.5 (05-14-22 @ 21:02)  BMI (kg/m2): 19.4 (05-14-22 @ 21:02)        I&O's Detail      Physical Examination:  GENERAL:               Alert, Oriented, No acute distress.    HEENT:                    o JVD, Moist MM  PULM:                     Bilateral air entry, dimminished to auscultation bilaterally, no significant sputum production, No Rales, No Rhonchi, +Wheezing  CVS:                         S1, S2,  +Murmur  ABD:                        Soft, nondistended, nontender, normoactive bowel sounds,   EXT:                         No edema, nontender, No Cyanosis or Clubbing   Vascular:                Warm Extremities, Normal Capillary refill, Normal Distal Pulses  SKIN:                       Warm and well perfused, no rashes noted.   NEURO:                  Alert, oriented, interactive, nonfocal, follows commands  PSYC:                      Calm, + Insight.

## 2022-05-17 LAB
ANION GAP SERPL CALC-SCNC: 6 MMOL/L — SIGNIFICANT CHANGE UP (ref 5–17)
BUN SERPL-MCNC: 12 MG/DL — SIGNIFICANT CHANGE UP (ref 7–23)
CALCIUM SERPL-MCNC: 9.2 MG/DL — SIGNIFICANT CHANGE UP (ref 8.4–10.5)
CHLORIDE SERPL-SCNC: 104 MMOL/L — SIGNIFICANT CHANGE UP (ref 96–108)
CO2 SERPL-SCNC: 33 MMOL/L — HIGH (ref 22–31)
CREAT SERPL-MCNC: 0.42 MG/DL — LOW (ref 0.5–1.3)
EGFR: 109 ML/MIN/1.73M2 — SIGNIFICANT CHANGE UP
GLUCOSE BLDC GLUCOMTR-MCNC: 130 MG/DL — HIGH (ref 70–99)
GLUCOSE BLDC GLUCOMTR-MCNC: 130 MG/DL — HIGH (ref 70–99)
GLUCOSE BLDC GLUCOMTR-MCNC: 200 MG/DL — HIGH (ref 70–99)
GLUCOSE BLDC GLUCOMTR-MCNC: 91 MG/DL — SIGNIFICANT CHANGE UP (ref 70–99)
GLUCOSE SERPL-MCNC: 124 MG/DL — HIGH (ref 70–99)
GRAM STN FLD: SIGNIFICANT CHANGE UP
HCT VFR BLD CALC: 34.7 % — SIGNIFICANT CHANGE UP (ref 34.5–45)
HGB BLD-MCNC: 11.3 G/DL — LOW (ref 11.5–15.5)
MAGNESIUM SERPL-MCNC: 2.2 MG/DL — SIGNIFICANT CHANGE UP (ref 1.6–2.6)
MCHC RBC-ENTMCNC: 30.6 PG — SIGNIFICANT CHANGE UP (ref 27–34)
MCHC RBC-ENTMCNC: 32.6 GM/DL — SIGNIFICANT CHANGE UP (ref 32–36)
MCV RBC AUTO: 94 FL — SIGNIFICANT CHANGE UP (ref 80–100)
NRBC # BLD: 0 /100 WBCS — SIGNIFICANT CHANGE UP (ref 0–0)
PHOSPHATE SERPL-MCNC: 4.1 MG/DL — SIGNIFICANT CHANGE UP (ref 2.5–4.5)
PLATELET # BLD AUTO: 254 K/UL — SIGNIFICANT CHANGE UP (ref 150–400)
POTASSIUM SERPL-MCNC: 4.3 MMOL/L — SIGNIFICANT CHANGE UP (ref 3.5–5.3)
POTASSIUM SERPL-SCNC: 4.3 MMOL/L — SIGNIFICANT CHANGE UP (ref 3.5–5.3)
RBC # BLD: 3.69 M/UL — LOW (ref 3.8–5.2)
RBC # FLD: 13.2 % — SIGNIFICANT CHANGE UP (ref 10.3–14.5)
SODIUM SERPL-SCNC: 143 MMOL/L — SIGNIFICANT CHANGE UP (ref 135–145)
SPECIMEN SOURCE: SIGNIFICANT CHANGE UP
WBC # BLD: 8.56 K/UL — SIGNIFICANT CHANGE UP (ref 3.8–10.5)
WBC # FLD AUTO: 8.56 K/UL — SIGNIFICANT CHANGE UP (ref 3.8–10.5)

## 2022-05-17 PROCEDURE — 99232 SBSQ HOSP IP/OBS MODERATE 35: CPT | Mod: GC

## 2022-05-17 RX ADMIN — LOSARTAN POTASSIUM 25 MILLIGRAM(S): 100 TABLET, FILM COATED ORAL at 05:52

## 2022-05-17 RX ADMIN — RIVAROXABAN 20 MILLIGRAM(S): KIT at 16:59

## 2022-05-17 RX ADMIN — Medication 75 MILLIGRAM(S): at 19:10

## 2022-05-17 RX ADMIN — TIOTROPIUM BROMIDE 1 CAPSULE(S): 18 CAPSULE ORAL; RESPIRATORY (INHALATION) at 08:39

## 2022-05-17 RX ADMIN — ATORVASTATIN CALCIUM 80 MILLIGRAM(S): 80 TABLET, FILM COATED ORAL at 21:36

## 2022-05-17 RX ADMIN — Medication 40 MILLIGRAM(S): at 05:53

## 2022-05-17 RX ADMIN — Medication 75 MILLIGRAM(S): at 05:52

## 2022-05-17 RX ADMIN — Medication 600 MILLIGRAM(S): at 05:52

## 2022-05-17 RX ADMIN — AZITHROMYCIN 500 MILLIGRAM(S): 500 TABLET, FILM COATED ORAL at 16:59

## 2022-05-17 RX ADMIN — BUDESONIDE AND FORMOTEROL FUMARATE DIHYDRATE 2 PUFF(S): 160; 4.5 AEROSOL RESPIRATORY (INHALATION) at 21:05

## 2022-05-17 RX ADMIN — LETROZOLE 2.5 MILLIGRAM(S): 2.5 TABLET, FILM COATED ORAL at 17:15

## 2022-05-17 RX ADMIN — BUDESONIDE AND FORMOTEROL FUMARATE DIHYDRATE 2 PUFF(S): 160; 4.5 AEROSOL RESPIRATORY (INHALATION) at 08:41

## 2022-05-17 RX ADMIN — Medication 600 MILLIGRAM(S): at 19:10

## 2022-05-17 RX ADMIN — Medication 20 MILLIGRAM(S): at 19:09

## 2022-05-17 NOTE — PROGRESS NOTE ADULT - NUTRITIONAL ASSESSMENT
This patient has been assessed with a concern for Malnutrition and has been determined to have a diagnosis/diagnoses of Severe protein-calorie malnutrition and Underweight (BMI < 19).    This patient is being managed with:   Diet DASH/TLC-  Sodium & Cholesterol Restricted  Entered: May 16 2022  1:07PM    The following pending diet order is being considered for treatment of Severe protein-calorie malnutrition and Underweight (BMI < 19):  Diet Consistent Carbohydrate/No Snacks-  DASH/TLC {Sodium & Cholesterol Restricted}  Supplement Feeding Modality:  Oral  Glucerna Shake Cans or Servings Per Day:  1       Frequency:  Two Times a day  Entered: May 16 2022 12:55PM  
This patient has been assessed with a concern for Malnutrition and has been determined to have a diagnosis/diagnoses of Severe protein-calorie malnutrition and Underweight (BMI < 19).    This patient is being managed with:   Diet DASH/TLC-  Sodium & Cholesterol Restricted  Entered: May 16 2022  1:07PM    The following pending diet order is being considered for treatment of Severe protein-calorie malnutrition and Underweight (BMI < 19):  Diet Consistent Carbohydrate/No Snacks-  DASH/TLC {Sodium & Cholesterol Restricted}  Supplement Feeding Modality:  Oral  Glucerna Shake Cans or Servings Per Day:  1       Frequency:  Two Times a day  Entered: May 16 2022 12:55PM

## 2022-05-17 NOTE — PHYSICAL THERAPY INITIAL EVALUATION ADULT - PERTINENT HX OF CURRENT PROBLEM, REHAB EVAL
66F with PMHx COPD, DM, HLD, HTN, breast CA s/p left mastectomy (2011) presents for acute hypoxic respiratory failure.

## 2022-05-17 NOTE — PROGRESS NOTE ADULT - ASSESSMENT
Physical Examination:  GENERAL:               Alert, Oriented, No acute distress.    HEENT:                    o JVD, Moist MM  PULM:                     Bilateral air entry, No Rales, No Rhonchi, +Wheezing  CVS:                         S1, S2,  +Murmur  ABD:                        Soft, nondistended, nontender, normoactive bowel sounds,   EXT:                         No edema, nontender, No Cyanosis or Clubbing   Vascular:                Warm Extremities, Normal Capillary refill, Normal Distal Pulses  SKIN:                       Warm and well perfused, no rashes noted.   NEURO:                  Alert, oriented, interactive, nonfocal, follows commands  PSYC:                      Calm, + Insight.      66 year old female with active nicotine use, COPD, DM2, HTN, Breast cancer s/p left mastectomy 2011, CAD s/p PCI 2020, DVT/PE on xaralto who p/w one week of progressive sob, with productive yellow cough associated with SOB, GANDHI,.     Assessment  1. Influenza AH3 Infection  2. Bronchospasm / Ac on chronic copd exacerbation - due to above  3. Active nicotine adiction  4. DVT/PE on xarlto   5. underlying DM2, HTN, CAD, Breast cancer      Plan:  Continue steroids,   Nebs, bronchodilators - Symbicort / Spiriva  N/C o2 to maintain sat >92%  zithromax x 3 days  On tamiflu for 5 days  continue xaralto  OOB to chair

## 2022-05-17 NOTE — PHYSICAL THERAPY INITIAL EVALUATION ADULT - DID THE PATIENT HAVE SURGERY?
C/O COUGH, N/V/D, HA, SORE THROAT  9/10 SINCE  11/20/20. COVID TESTED + 
11/24/20.

 MED HX: DENIES
Patient discharged with v/s stable. Written and verbal after care instructions 
given and explained. 

Patient alert, oriented and verbalized understanding of instructions. 
Ambulatory with steady gait. All questions addressed prior to discharge. ID 
band removed. Patient advised to follow up with PMD. Rx of ZOFRAN & 
PROMETHAZINE given. Patient educated on indication of medication including 
possible reaction and side effects. Opportunity to ask questions provided and 
answered.
n/a

## 2022-05-17 NOTE — PHYSICAL THERAPY INITIAL EVALUATION ADULT - ADDITIONAL COMMENTS
pt lives in a house with 5 steps to enter, lives with brother. Remains independent with transfer and ambulation. Pt drives.

## 2022-05-17 NOTE — PROGRESS NOTE ADULT - ASSESSMENT
66F with PMHx COPD, DM, HLD, HTN, breast CA s/p left mastectomy (2011) presents for acute hypoxic respiratory failure.    #Acute Hypoxic Respiratory Failure  -likely 2/2 Influenza  -Positive RVP for influenza A  -continuous pulse ox, Oxygen goal 92-95% on NC. Currently on 2L NC, titrate as tolerated  -as per patient did not require oxygen as outpatient, has home O2, however, does not use  -Methylprednisolone decreased to 20mg BID IV, will titrate as improvement is noted  -Continue breathing treatments: Spiriva, Symbicort, Albuterol prn cough/wheezing   -Tamiflu- Day 4/5  -Pulm recs appreciated: continue Azithromycin for 2/3 days  -Continue to monitor    #COPD  -Pt active smoker  -CXR compatible w severe emphysema  -Pt has various CTA's throughout the years for PE, however, no lung nodules noted. No low contrast CT chest noted on record  -Smoking cessation advised, pt in pre-contemplation    #Hx of Breast CA s/p mastetcomy  -pt's next chemo treatment on 5/31  -Patient has right chest port    #HLD  -Continue Lipitor    #HTN  -Continue Losartan   -monitor BP    #DM2  -5.8 A1C  -ISS premeals and qhs    #DVT ppx  Xarelto    #Code Status  Full Code     #Dispo  -Pending improvement of resp status, within the next day or two  -No PT needs    *Above discussed with Dr. Manzanares  66F with PMHx COPD, DM, HLD, HTN, breast CA s/p left mastectomy (2011) presents for acute hypoxic respiratory failure.    #Acute Hypoxic Respiratory Failure  -likely 2/2 Influenza  -Positive RVP for influenza A  -continuous pulse ox, Oxygen goal 92-95% on NC. Currently on 2L NC, titrate as tolerated  -as per patient did not require oxygen as outpatient, has home O2, however, does not use  -Methylprednisolone decreased to 20mg BID IV, will titrate as improvement is noted  -Continue breathing treatments: Spiriva, Symbicort, Albuterol prn cough/wheezing   -Tamiflu- Day 4/5  -Pulm recs appreciated: continue Azithromycin for 2/3 days  -Continue to monitor    #COPD  -Pt active smoker  -CXR compatible w severe emphysema  -Pt has various CTA's throughout the years for PE, however, no lung nodules noted. No low contrast CT chest noted on record  -Smoking cessation advised, pt in pre-contemplation    #Hx of Breast CA s/p mastetcomy  -pt's next chemo treatment on 5/31  -Patient has right chest port    #HLD  -Continue Lipitor    #HTN  -Continue Losartan   -monitor BP    #DM2  -5.8 A1C  -ISS premeals and qhs    #DVT ppx  Xarelto    #Code Status  Full Code     #Dispo  -Pending improvement of resp status, within the next day or two  -No PT needs    -Per patient's request, [facility] called and updated. All questions and concerns addressed.     *Above discussed with Dr. Manzanares

## 2022-05-18 ENCOUNTER — TRANSCRIPTION ENCOUNTER (OUTPATIENT)
Age: 67
End: 2022-05-18

## 2022-05-18 VITALS — OXYGEN SATURATION: 98 %

## 2022-05-18 LAB
ANION GAP SERPL CALC-SCNC: 5 MMOL/L — SIGNIFICANT CHANGE UP (ref 5–17)
BUN SERPL-MCNC: 15 MG/DL — SIGNIFICANT CHANGE UP (ref 7–23)
CALCIUM SERPL-MCNC: 9.2 MG/DL — SIGNIFICANT CHANGE UP (ref 8.4–10.5)
CHLORIDE SERPL-SCNC: 104 MMOL/L — SIGNIFICANT CHANGE UP (ref 96–108)
CO2 SERPL-SCNC: 33 MMOL/L — HIGH (ref 22–31)
CREAT SERPL-MCNC: 0.47 MG/DL — LOW (ref 0.5–1.3)
EGFR: 106 ML/MIN/1.73M2 — SIGNIFICANT CHANGE UP
GLUCOSE BLDC GLUCOMTR-MCNC: 138 MG/DL — HIGH (ref 70–99)
GLUCOSE BLDC GLUCOMTR-MCNC: 169 MG/DL — HIGH (ref 70–99)
GLUCOSE SERPL-MCNC: 120 MG/DL — HIGH (ref 70–99)
POTASSIUM SERPL-MCNC: 3.8 MMOL/L — SIGNIFICANT CHANGE UP (ref 3.5–5.3)
POTASSIUM SERPL-SCNC: 3.8 MMOL/L — SIGNIFICANT CHANGE UP (ref 3.5–5.3)
SODIUM SERPL-SCNC: 142 MMOL/L — SIGNIFICANT CHANGE UP (ref 135–145)

## 2022-05-18 PROCEDURE — 99239 HOSP IP/OBS DSCHRG MGMT >30: CPT

## 2022-05-18 RX ADMIN — ALBUTEROL 2 PUFF(S): 90 AEROSOL, METERED ORAL at 08:20

## 2022-05-18 RX ADMIN — Medication 20 MILLIGRAM(S): at 05:43

## 2022-05-18 RX ADMIN — LOSARTAN POTASSIUM 25 MILLIGRAM(S): 100 TABLET, FILM COATED ORAL at 05:42

## 2022-05-18 RX ADMIN — TIOTROPIUM BROMIDE 1 CAPSULE(S): 18 CAPSULE ORAL; RESPIRATORY (INHALATION) at 08:48

## 2022-05-18 RX ADMIN — ALBUTEROL 2 PUFF(S): 90 AEROSOL, METERED ORAL at 15:55

## 2022-05-18 RX ADMIN — Medication 600 MILLIGRAM(S): at 05:43

## 2022-05-18 RX ADMIN — Medication 75 MILLIGRAM(S): at 05:43

## 2022-05-18 RX ADMIN — BUDESONIDE AND FORMOTEROL FUMARATE DIHYDRATE 2 PUFF(S): 160; 4.5 AEROSOL RESPIRATORY (INHALATION) at 08:20

## 2022-05-18 NOTE — DISCHARGE NOTE PROVIDER - NSDCMRMEDTOKEN_GEN_ALL_CORE_FT
atorvastatin 80 mg oral tablet: 1 tab(s) orally once a day  letrozole 2.5 mg oral tablet: 1 tab(s) orally once a day  losartan 25 mg oral tablet: 1 tab(s) orally once a day  metFORMIN 1000 mg oral tablet: 1 tab(s) orally 2 times a day  Spiriva Respimat 10 ACT 2.5 mcg/inh inhalation aerosol: 2 puff(s) inhaled once a day  Symbicort 160 mcg-4.5 mcg/inh inhalation aerosol: 2 puff(s) inhaled 2 times a day  Xarelto 20 mg oral tablet: 1 tab(s) orally once a day (in the evening)   atorvastatin 80 mg oral tablet: 1 tab(s) orally once a day  letrozole 2.5 mg oral tablet: 1 tab(s) orally once a day  losartan 25 mg oral tablet: 1 tab(s) orally once a day  metFORMIN 1000 mg oral tablet: 1 tab(s) orally 2 times a day  predniSONE 20 mg oral tablet: 2 tab(s) orally once a day   Spiriva Respimat 10 ACT 2.5 mcg/inh inhalation aerosol: 2 puff(s) inhaled once a day  Symbicort 160 mcg-4.5 mcg/inh inhalation aerosol: 2 puff(s) inhaled 2 times a day  Xarelto 20 mg oral tablet: 1 tab(s) orally once a day (in the evening)

## 2022-05-18 NOTE — DISCHARGE NOTE PROVIDER - DETAILS OF MALNUTRITION DIAGNOSIS/DIAGNOSES
This patient has been assessed with a concern for Malnutrition and was treated during this hospitalization for the following Nutrition diagnosis/diagnoses:     -  05/16/2022: Severe protein-calorie malnutrition   -  05/16/2022: Underweight (BMI < 19)

## 2022-05-18 NOTE — DISCHARGE NOTE PROVIDER - NSDCHC_MEDRECSTATUS_GEN_ALL_CORE
Admission Reconciliation is Not Complete  Discharge Reconciliation is Not Complete lateral recumbent Admission Reconciliation is Not Complete  Discharge Reconciliation is Completed

## 2022-05-18 NOTE — DISCHARGE NOTE NURSING/CASE MANAGEMENT/SOCIAL WORK - NSDCPEWEB_GEN_ALL_CORE
[facility] for Tobacco Control website --- [email or web address] website --- [email or web address]

## 2022-05-18 NOTE — DISCHARGE NOTE NURSING/CASE MANAGEMENT/SOCIAL WORK - PATIENT PORTAL LINK FT
You can access the FollowMyHealth Patient Portal offered by [facility] by registering at the following website: [email or web address]. By joining [facility]’s FollowMyHealth portal, you will also be able to view your health information using other applications (apps) compatible with our system.

## 2022-05-18 NOTE — PROGRESS NOTE ADULT - ASSESSMENT
Physical Examination:  GENERAL:               Alert, Oriented, No acute distress.    HEENT:                    o JVD, Moist MM  PULM:                     Bilateral air entry, No Rales, No Rhonchi, No Wheezing noted on exam this morning  CVS:                         S1, S2,  +Murmur  ABD:                        Soft, nondistended, nontender, normoactive bowel sounds,   EXT:                         No edema, nontender, No Cyanosis or Clubbing   Vascular:                Warm Extremities, Normal Capillary refill, Normal Distal Pulses  SKIN:                       Warm and well perfused, no rashes noted.   NEURO:                  Alert, oriented, interactive, nonfocal, follows commands  PSYC:                      Calm, + Insight.      66 year old female with active nicotine use, COPD, DM2, HTN, Breast cancer s/p left mastectomy 2011, CAD s/p PCI 2020, DVT/PE on xaralto who p/w one week of progressive sob, with productive yellow cough associated with SOB, GANDHI,.     Assessment  1. Influenza AH3 Infection  2. Bronchospasm / Ac on chronic copd exacerbation - due to above  3. Active nicotine adiction  4. DVT/PE on xarlto   5. underlying DM2, HTN, CAD, Breast cancer      Plan:  Continue steroids, can transition to PO  Nebs, bronchodilators - Symbicort / Spiriva  N/C o2 to maintain sat >92%  zithromax x 3 days  On tamiflu for 5 days  continue xaralto  OOB to chair

## 2022-05-18 NOTE — DISCHARGE NOTE NURSING/CASE MANAGEMENT/SOCIAL WORK - NSDCPEEMAIL_GEN_ALL_CORE
[facility] for Tobacco Control email [email or web address]

## 2022-05-18 NOTE — PROGRESS NOTE ADULT - SUBJECTIVE AND OBJECTIVE BOX
Follow-up Pulmonary Progress Note  Chief Complaint : Chronic obstructive pulmonary disease with acute exacerbation    Feels better. Not in distress    Allergies :penicillin (Short breath)      PAST MEDICAL & SURGICAL HISTORY:  COPD, moderate    HTN (hypertension)    Diabetes    H/O breast surgery    History of cardiac cath        Medications:  MEDICATIONS  (STANDING):  atorvastatin 80 milliGRAM(s) Oral at bedtime  azithromycin   Tablet 500 milliGRAM(s) Oral <User Schedule>  budesonide 160 MICROgram(s)/formoterol 4.5 MICROgram(s) Inhaler 2 Puff(s) Inhalation two times a day  dextrose 5%. 1000 milliLiter(s) (100 mL/Hr) IV Continuous <Continuous>  dextrose 5%. 1000 milliLiter(s) (50 mL/Hr) IV Continuous <Continuous>  dextrose 50% Injectable 25 Gram(s) IV Push once  dextrose 50% Injectable 12.5 Gram(s) IV Push once  dextrose 50% Injectable 25 Gram(s) IV Push once  glucagon  Injectable 1 milliGRAM(s) IntraMuscular once  guaiFENesin  milliGRAM(s) Oral every 12 hours  insulin lispro (ADMELOG) corrective regimen sliding scale   SubCutaneous at bedtime  insulin lispro (ADMELOG) corrective regimen sliding scale   SubCutaneous three times a day before meals  letrozole 2.5 milliGRAM(s) Oral daily  losartan 25 milliGRAM(s) Oral daily  methylPREDNISolone sodium succinate Injectable 20 milliGRAM(s) IV Push two times a day  oseltamivir 75 milliGRAM(s) Oral two times a day  rivaroxaban 20 milliGRAM(s) Oral with dinner  tiotropium 18 MICROgram(s) Capsule 1 Capsule(s) Inhalation daily    MEDICATIONS  (PRN):  ALBUTerol    90 MICROgram(s) HFA Inhaler 2 Puff(s) Inhalation every 6 hours PRN Shortness of Breath and/or Wheezing  dextrose Oral Gel 15 Gram(s) Oral once PRN Blood Glucose LESS THAN 70 milliGRAM(s)/deciliter      Antibiotics History  azithromycin   Tablet 500 milliGRAM(s) Oral <User Schedule>, 05-16-22 @ 15:19, Stop order after: 3 Doses  oseltamivir 75 milliGRAM(s) Oral two times a day, 05-14-22 @ 23:47, Stop order after: 5 Days      Heme Medications   rivaroxaban 20 milliGRAM(s) Oral with dinner, 05-15-22 @ 00:02      GI Medications        LABS:                        11.3   8.56  )-----------( 254      ( 17 May 2022 06:34 )             34.7     05-18    142  |  104  |  15  ----------------------------<  120<H>  3.8   |  33<H>  |  0.47<L>    Ca    9.2      18 May 2022 06:20  Phos  4.1     05-17  Mg     2.2     05-17      HIT ab -- 05-14 @ 22:42  HIT ab EIA --  D Dimer -<150                      CULTURES: (if applicable)    Culture - Sputum (collected 05-16-22 @ 22:40)  Source: .Sputum Sputum  Gram Stain (05-17-22 @ 13:27):    Moderate polymorphonuclear leukocytes per low power field    Few Squamous epithelial cells per low power field    Rare Gram Positive Rods per oil power field    Few Gram Positive Cocci in Pairs and Chains per oil power field  Preliminary Report (05-18-22 @ 10:13):    Normal Respiratory Daria present      Rapid RVP Result: Detected (05-14-22 @ 21:30)        CAPILLARY BLOOD GLUCOSE      POCT Blood Glucose.: 138 mg/dL (18 May 2022 08:01)    VITALS:  T(C): 36.4 (05-18-22 @ 05:13), Max: 36.7 (05-17-22 @ 19:53)  T(F): 97.5 (05-18-22 @ 05:13), Max: 98 (05-17-22 @ 19:53)  HR: 76 (05-18-22 @ 08:20) (76 - 84)  BP: 132/76 (05-18-22 @ 05:13) (120/62 - 150/80)  BP(mean): --  ABP: --  ABP(mean): --  RR: 18 (05-18-22 @ 05:13) (18 - 19)  SpO2: 100% (05-18-22 @ 08:20) (98% - 100%)  CVP(mm Hg): --  CVP(cm H2O): --    Ins and Outs     05-17-22 @ 07:01  -  05-18-22 @ 07:00  --------------------------------------------------------  IN: 720 mL / OUT: 0 mL / NET: 720 mL                I&O's Detail    17 May 2022 07:01  -  18 May 2022 07:00  --------------------------------------------------------  IN:    Oral Fluid: 720 mL  Total IN: 720 mL    OUT:  Total OUT: 0 mL    Total NET: 720 mL          
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 Had SOB just walking to the bathroom which prompted her to go to ED. 45 pack/year. smoking 1-1.5pack/day. Per EMS, pt was satting 88% on RA, given albuterol and Atrovent and sats improved to 95%.  Has home O2 but doesn't use it. Dr Larios -outpt Oncologist. Dr Roland -outpt Cardio. Dr Nash -outpt Pulm.    05/17: Pt seen at bedside, states mild improvement of SOB. Seems to desat on ambulation. Cough improving. Denies chest pain or palpitations.     REVIEW OF SYSTEMS:  CONSTITUTIONAL: No weakness, fevers or chills  EYES/ENT: No visual changes;  No vertigo or throat pain   NECK: No pain or stiffness  RESPIRATORY: No cough, wheezing, hemoptysis; Mild SOB.   CARDIOVASCULAR: No chest pain or palpitations  GASTROINTESTINAL: No abdominal or epigastric pain. No nausea, vomiting, or hematemesis; No diarrhea or constipation. No melena or hematochezia.  GENITOURINARY: No dysuria, frequency or hematuria  NEUROLOGICAL: No numbness or weakness  SKIN: No itching, burning, rashes, or lesions   All other review of systems is negative unless indicated above    Vitals  T(F): 98.1 (05-17-22 @ 05:46), Max: 98.6 (05-16-22 @ 15:48)  HR: 84 (05-17-22 @ 11:46) (73 - 89)  BP: 150/80 (05-17-22 @ 11:46) (132/63 - 150/80)  RR: 18 (05-17-22 @ 11:46) (13 - 18)  SpO2: 99% (05-17-22 @ 11:46) (92% - 100%)    Constitutional: Pt lying in bed, awake and alert, NAD  HEENT: EOMI, normal hearing, moist mucous membranes  Neck: Soft and supple, no JVD  Respiratory: CTABL, No wheezing, rales or rhonchi  Cardiovascular: S1S2+, RRR, no M/G/R  Gastrointestinal: BS+, soft, NT/ND, no guarding, no rebound  Extremities: No peripheral edema  Vascular: 2+ peripheral pulses  Neurological: AAOx3, no focal deficits  Skin: No rashes    MEDICATIONS  (STANDING):  atorvastatin 80 milliGRAM(s) Oral at bedtime  azithromycin   Tablet 500 milliGRAM(s) Oral <User Schedule>  budesonide 160 MICROgram(s)/formoterol 4.5 MICROgram(s) Inhaler 2 Puff(s) Inhalation two times a day  dextrose 5%. 1000 milliLiter(s) (50 mL/Hr) IV Continuous <Continuous>  dextrose 5%. 1000 milliLiter(s) (100 mL/Hr) IV Continuous <Continuous>  dextrose 50% Injectable 25 Gram(s) IV Push once  dextrose 50% Injectable 12.5 Gram(s) IV Push once  dextrose 50% Injectable 25 Gram(s) IV Push once  glucagon  Injectable 1 milliGRAM(s) IntraMuscular once  guaiFENesin  milliGRAM(s) Oral every 12 hours  insulin lispro (ADMELOG) corrective regimen sliding scale   SubCutaneous three times a day before meals  insulin lispro (ADMELOG) corrective regimen sliding scale   SubCutaneous at bedtime  letrozole 2.5 milliGRAM(s) Oral daily  losartan 25 milliGRAM(s) Oral daily  methylPREDNISolone sodium succinate Injectable 20 milliGRAM(s) IV Push two times a day  oseltamivir 75 milliGRAM(s) Oral two times a day  rivaroxaban 20 milliGRAM(s) Oral with dinner  tiotropium 18 MICROgram(s) Capsule 1 Capsule(s) Inhalation daily    MEDICATIONS  (PRN):  ALBUTerol    90 MICROgram(s) HFA Inhaler 2 Puff(s) Inhalation every 6 hours PRN Shortness of Breath and/or Wheezing  dextrose Oral Gel 15 Gram(s) Oral once PRN Blood Glucose LESS THAN 70 milliGRAM(s)/deciliter      LABS:  cret                        11.3   8.56  )-----------( 254      ( 17 May 2022 06:34 )             34.7     05-17    143  |  104  |  12  ----------------------------<  124<H>  4.3   |  33<H>  |  0.42<L>    Ca    9.2      17 May 2022 06:34  Phos  4.1     05-17  Mg     2.2     05-17    TPro  7.2  /  Alb  2.9<L>  /  TBili  0.2  /  DBili  x   /  AST  31  /  ALT  32  /  AlkPhos  77  05-16          RADIOLOGY/EKG:    < from: Xray Chest 1 View- PORTABLE-Urgent (05.14.22 @ 21:31) >  FINDINGS:  No previous examinations are available for review.    The lungs are clear.  No pleural abnormality is seen. A   well-circumscribed 2 cm nodular density is seen at the right base   suggestive of a nipple shadow. Repeat frontal view with nipple marker is   suggested for confirmation. Right-sided central venous access catheter is   seen with the distal tip overlying the region of the right atrium.  The heart and mediastinum appear intact.  Surgical clips are seen in the   left axilla. The patient appears status post left breast mastectomy.          IMPRESSION: No gross consolidation is seen. 2 cm nodular density seen at   the right base suggestive of a nipple shadow. Repeat frontal view with   nipple marker is suggested for confirmation.    < end of copied text >    < from: Xray Chest 1 View- PORTABLE-Routine (Xray Chest 1 View- PORTABLE-Routine .) (05.16.22 @ 14:01) >  IMPRESSION: No acute finding or change.    < end of copied text >  
HPI: 66F with PMHx COPD, DM2, HLD, HTN, breast CA s/p left mastectomy (2011), CAD s/p cardiac stent x1 (2020) dvt /PE on Xarelto presents for worsening SOB since yesterday with associated yellow sputum production. Today, pt states she had SOB just walking to the bathroom which prompted her to go to ED. 45 pack/year. smoking 1-1.5pack/day. Per EMS, pt was satting 88% on RA, given albuterol and Atrovent and sats improved to 95%.   Denies abd pain, nausea/vomiting. Has home O2 but doesn't use it. Denies sick contacts. Dr paulson -outpt oncologist. Dr morales -outpt Cardio. Dr Nash -outpt Pulm.    S: Patient says she has a cough today, and has felt anxious when she coughs    O:     REVIEW OF SYSTEMS:  CONSTITUTIONAL: No weakness, fevers or chills  EYES/ENT: No visual changes;  No vertigo or throat pain   NECK: No pain or stiffness  RESPIRATORY: No cough, wheezing, hemoptysis; No shortness of breath  CARDIOVASCULAR: No chest pain or palpitations  GASTROINTESTINAL: No abdominal or epigastric pain. No nausea, vomiting, or hematemesis; No diarrhea or constipation. No melena or hematochezia.  GENITOURINARY: No dysuria, frequency or hematuria  NEUROLOGICAL: No numbness or weakness  SKIN: No itching, burning, rashes, or lesions   All other review of systems is negative unless indicated above    PHYSICAL EXAM:  Vital Signs Last 24 Hrs  T(C): 36.6 (15 May 2022 05:58), Max: 37.6 (14 May 2022 21:00)  T(F): 97.9 (15 May 2022 05:58), Max: 99.7 (14 May 2022 21:00)  HR: 88 (15 May 2022 05:58) (84 - 105)  BP: 146/72 (15 May 2022 05:58) (132/63 - 157/74)  BP(mean): 70 (15 May 2022 00:00) (70 - 81)  RR: 18 (15 May 2022 05:58) (16 - 24)  SpO2: 99% (15 May 2022 05:58) (96% - 100%)    Constitutional: Pt lying in bed, awake and alert, NAD  HEENT: EOMI, normal hearing, moist mucous membranes  Neck: Soft and supple, no JVD  Respiratory: CTABL, No wheezing, rales or rhonchi  Cardiovascular: S1S2+, RRR, no M/G/R  Gastrointestinal: BS+, soft, NT/ND, no guarding, no rebound  Extremities: No peripheral edema  Vascular: 2+ peripheral pulses  Neurological: AAOx3, no focal deficits  Musculoskeletal: 5/5 strength b/l upper and lower extremities  Skin: No rashes    MEDICATIONS:  MEDICATIONS  (STANDING):  atorvastatin 80 milliGRAM(s) Oral at bedtime  budesonide 160 MICROgram(s)/formoterol 4.5 MICROgram(s) Inhaler 2 Puff(s) Inhalation two times a day  dextrose 5%. 1000 milliLiter(s) (50 mL/Hr) IV Continuous <Continuous>  dextrose 5%. 1000 milliLiter(s) (100 mL/Hr) IV Continuous <Continuous>  dextrose 50% Injectable 25 Gram(s) IV Push once  dextrose 50% Injectable 12.5 Gram(s) IV Push once  dextrose 50% Injectable 25 Gram(s) IV Push once  glucagon  Injectable 1 milliGRAM(s) IntraMuscular once  guaiFENesin  milliGRAM(s) Oral every 12 hours  insulin lispro (ADMELOG) corrective regimen sliding scale   SubCutaneous three times a day before meals  insulin lispro (ADMELOG) corrective regimen sliding scale   SubCutaneous at bedtime  letrozole 2.5 milliGRAM(s) Oral daily  losartan 25 milliGRAM(s) Oral daily  methylPREDNISolone sodium succinate Injectable 40 milliGRAM(s) IV Push every 12 hours  oseltamivir 75 milliGRAM(s) Oral two times a day  rivaroxaban 20 milliGRAM(s) Oral with dinner  tiotropium 18 MICROgram(s) Capsule 1 Capsule(s) Inhalation daily      LABS: All Labs Reviewed:                        11.3   3.76  )-----------( 213      ( 15 May 2022 06:45 )             34.5     05-15    139  |  103  |  10  ----------------------------<  134<H>  4.1   |  31  |  0.43<L>    Ca    9.2      15 May 2022 10:40    TPro  7.4  /  Alb  3.0<L>  /  TBili  0.3  /  DBili  x   /  AST  32  /  ALT  38  /  AlkPhos  80  05-15      Blood Culture:   I&O's Summary    CAPILLARY BLOOD GLUCOSE      POCT Blood Glucose.: 141 mg/dL (15 May 2022 07:56)  POCT Blood Glucose.: 137 mg/dL (14 May 2022 23:55)      RADIOLOGY/EKG:    < from: Xray Chest 1 View- PORTABLE-Urgent (05.14.22 @ 21:31) >  FINDINGS:  No previous examinations are available for review.    The lungs are clear.  No pleural abnormality is seen. A   well-circumscribed 2 cm nodular density is seen at the right base   suggestive of a nipple shadow. Repeat frontal view with nipple marker is   suggested for confirmation. Right-sided central venous access catheter is   seen with the distal tip overlying the region of the right atrium.  The heart and mediastinum appear intact.  Surgical clips are seen in the   left axilla. The patient appears status post left breast mastectomy.          IMPRESSION: No gross consolidation is seen. 2 cm nodular density seen at   the right base suggestive of a nipple shadow. Repeat frontal view with   nipple marker is suggested for confirmation.    < end of copied text >  
Follow-up Pulmonary Progress Note  Chief Complaint : Chronic obstructive pulmonary disease with acute exacerbation    Comfortable, not in distress. Though feels not back to baseline yet, endorsing dyspnea with exertion    Allergies :penicillin (Short breath)      PAST MEDICAL & SURGICAL HISTORY:  COPD, moderate    HTN (hypertension)    Diabetes    H/O breast surgery    History of cardiac cath        Medications:  MEDICATIONS  (STANDING):  atorvastatin 80 milliGRAM(s) Oral at bedtime  azithromycin   Tablet 500 milliGRAM(s) Oral <User Schedule>  budesonide 160 MICROgram(s)/formoterol 4.5 MICROgram(s) Inhaler 2 Puff(s) Inhalation two times a day  dextrose 5%. 1000 milliLiter(s) (50 mL/Hr) IV Continuous <Continuous>  dextrose 5%. 1000 milliLiter(s) (100 mL/Hr) IV Continuous <Continuous>  dextrose 50% Injectable 25 Gram(s) IV Push once  dextrose 50% Injectable 12.5 Gram(s) IV Push once  dextrose 50% Injectable 25 Gram(s) IV Push once  glucagon  Injectable 1 milliGRAM(s) IntraMuscular once  guaiFENesin  milliGRAM(s) Oral every 12 hours  insulin lispro (ADMELOG) corrective regimen sliding scale   SubCutaneous three times a day before meals  insulin lispro (ADMELOG) corrective regimen sliding scale   SubCutaneous at bedtime  letrozole 2.5 milliGRAM(s) Oral daily  losartan 25 milliGRAM(s) Oral daily  methylPREDNISolone sodium succinate Injectable 20 milliGRAM(s) IV Push two times a day  oseltamivir 75 milliGRAM(s) Oral two times a day  rivaroxaban 20 milliGRAM(s) Oral with dinner  tiotropium 18 MICROgram(s) Capsule 1 Capsule(s) Inhalation daily    MEDICATIONS  (PRN):  ALBUTerol    90 MICROgram(s) HFA Inhaler 2 Puff(s) Inhalation every 6 hours PRN Shortness of Breath and/or Wheezing  dextrose Oral Gel 15 Gram(s) Oral once PRN Blood Glucose LESS THAN 70 milliGRAM(s)/deciliter      Antibiotics History  azithromycin   Tablet 500 milliGRAM(s) Oral <User Schedule>, 05-16-22 @ 15:19, Stop order after: 3 Doses  oseltamivir 75 milliGRAM(s) Oral two times a day, 05-14-22 @ 23:47, Stop order after: 5 Days      Heme Medications   rivaroxaban 20 milliGRAM(s) Oral with dinner, 05-15-22 @ 00:02      GI Medications        LABS:                        11.3   8.56  )-----------( 254      ( 17 May 2022 06:34 )             34.7     05-17    143  |  104  |  12  ----------------------------<  124<H>  4.3   |  33<H>  |  0.42<L>    Ca    9.2      17 May 2022 06:34  Phos  4.1     05-17  Mg     2.2     05-17    TPro  7.2  /  Alb  2.9<L>  /  TBili  0.2  /  DBili  x   /  AST  31  /  ALT  32  /  AlkPhos  77  05-16    HIT ab -- 05-14 @ 22:42  HIT ab EIA --  D Dimer -<150                      CULTURES: (if applicable)    Rapid RVP Result: Detected (05-14-22 @ 21:30)        CAPILLARY BLOOD GLUCOSE      POCT Blood Glucose.: 130 mg/dL (17 May 2022 08:32)      RADIOLOGY  CXR:      CT:    ECHO:      VITALS:  T(C): 36.7 (05-17-22 @ 05:46), Max: 37 (05-16-22 @ 15:48)  T(F): 98.1 (05-17-22 @ 05:46), Max: 98.6 (05-16-22 @ 15:48)  HR: 73 (05-17-22 @ 08:41) (73 - 89)  BP: 145/66 (05-17-22 @ 05:46) (132/63 - 147/74)  BP(mean): --  ABP: --  ABP(mean): --  RR: 13 (05-17-22 @ 05:46) (13 - 18)  SpO2: 99% (05-17-22 @ 08:41) (92% - 100%)  CVP(mm Hg): --  CVP(cm H2O): --    Ins and Outs       Height (cm): 149.9 (05-14-22 @ 21:02)  Weight (kg): 43.5 (05-14-22 @ 21:02)  BMI (kg/m2): 19.4 (05-14-22 @ 21:02)          
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 Had SOB just walking to the bathroom which prompted her to go to ED. 45 pack/year. smoking 1-1.5pack/day. Per EMS, pt was satting 88% on RA, given albuterol and Atrovent and sats improved to 95%.  Has home O2 but doesn't use it. Dr Larios -outpt Oncologist. Dr Roland -outpt Cardio. Dr Nash -outpt Pulm.    05/16: Pt seen at bedside, denies much improvement of SOB when compared to yesterday.  Still on 4L NC. Has no interest in quitting smoking.     REVIEW OF SYSTEMS:  CONSTITUTIONAL: No weakness, fevers or chills  EYES/ENT: No visual changes;  No vertigo or throat pain   NECK: No pain or stiffness  RESPIRATORY: No cough, wheezing, hemoptysis; Mild SOB.   CARDIOVASCULAR: No chest pain or palpitations  GASTROINTESTINAL: No abdominal or epigastric pain. No nausea, vomiting, or hematemesis; No diarrhea or constipation. No melena or hematochezia.  GENITOURINARY: No dysuria, frequency or hematuria  NEUROLOGICAL: No numbness or weakness  SKIN: No itching, burning, rashes, or lesions   All other review of systems is negative unless indicated above    Vitals  T(F): 98.6 (05-16-22 @ 15:48), Max: 98.6 (05-16-22 @ 15:48)  HR: 82 (05-16-22 @ 15:48) (60 - 90)  BP: 132/63 (05-16-22 @ 15:48) (124/58 - 140/78)  RR: 16 (05-16-22 @ 15:48) (16 - 17)  SpO2: 99% (05-16-22 @ 15:48) (99% - 99%)    Constitutional: Pt lying in bed, awake and alert, NAD  HEENT: EOMI, normal hearing, moist mucous membranes  Neck: Soft and supple, no JVD  Respiratory: CTABL, No wheezing, rales or rhonchi  Cardiovascular: S1S2+, RRR, no M/G/R  Gastrointestinal: BS+, soft, NT/ND, no guarding, no rebound  Extremities: No peripheral edema  Vascular: 2+ peripheral pulses  Neurological: AAOx3, no focal deficits  Skin: No rashes    MEDICATIONS  (STANDING):  atorvastatin 80 milliGRAM(s) Oral at bedtime  azithromycin   Tablet 500 milliGRAM(s) Oral <User Schedule>  budesonide 160 MICROgram(s)/formoterol 4.5 MICROgram(s) Inhaler 2 Puff(s) Inhalation two times a day  dextrose 5%. 1000 milliLiter(s) (50 mL/Hr) IV Continuous <Continuous>  dextrose 5%. 1000 milliLiter(s) (100 mL/Hr) IV Continuous <Continuous>  dextrose 50% Injectable 25 Gram(s) IV Push once  dextrose 50% Injectable 12.5 Gram(s) IV Push once  dextrose 50% Injectable 25 Gram(s) IV Push once  glucagon  Injectable 1 milliGRAM(s) IntraMuscular once  guaiFENesin  milliGRAM(s) Oral every 12 hours  insulin lispro (ADMELOG) corrective regimen sliding scale   SubCutaneous three times a day before meals  insulin lispro (ADMELOG) corrective regimen sliding scale   SubCutaneous at bedtime  letrozole 2.5 milliGRAM(s) Oral daily  losartan 25 milliGRAM(s) Oral daily  methylPREDNISolone sodium succinate Injectable 40 milliGRAM(s) IV Push every 12 hours  oseltamivir 75 milliGRAM(s) Oral two times a day  rivaroxaban 20 milliGRAM(s) Oral with dinner  tiotropium 18 MICROgram(s) Capsule 1 Capsule(s) Inhalation daily    MEDICATIONS  (PRN):  ALBUTerol    90 MICROgram(s) HFA Inhaler 2 Puff(s) Inhalation every 6 hours PRN Shortness of Breath and/or Wheezing  dextrose Oral Gel 15 Gram(s) Oral once PRN Blood Glucose LESS THAN 70 milliGRAM(s)/deciliter        LABS:  cret                        11.3   3.76  )-----------( 213      ( 15 May 2022 06:45 )             34.5     05-16    143  |  107  |  17  ----------------------------<  106<H>  3.9   |  29  |  0.45<L>    Ca    8.9      16 May 2022 06:25    TPro  7.2  /  Alb  2.9<L>  /  TBili  0.2  /  DBili  x   /  AST  31  /  ALT  32  /  AlkPhos  77  05-16          RADIOLOGY/EKG:    < from: Xray Chest 1 View- PORTABLE-Urgent (05.14.22 @ 21:31) >  FINDINGS:  No previous examinations are available for review.    The lungs are clear.  No pleural abnormality is seen. A   well-circumscribed 2 cm nodular density is seen at the right base   suggestive of a nipple shadow. Repeat frontal view with nipple marker is   suggested for confirmation. Right-sided central venous access catheter is   seen with the distal tip overlying the region of the right atrium.  The heart and mediastinum appear intact.  Surgical clips are seen in the   left axilla. The patient appears status post left breast mastectomy.          IMPRESSION: No gross consolidation is seen. 2 cm nodular density seen at   the right base suggestive of a nipple shadow. Repeat frontal view with   nipple marker is suggested for confirmation.    < end of copied text >    < from: Xray Chest 1 View- PORTABLE-Routine (Xray Chest 1 View- PORTABLE-Routine .) (05.16.22 @ 14:01) >  IMPRESSION: No acute finding or change.    < end of copied text >

## 2022-05-18 NOTE — DISCHARGE NOTE PROVIDER - CARE PROVIDER_API CALL
Jayro Nash)  Critical Care Medicine; Internal Medicine; Pulmonary Disease  [address] [place] [address], [facility] [address]  [place] [address]  Phone: [phone]  Fax: [phone]  Follow Up Time:     [facility],   Phone: (   )    -  Fax: (   )    -  Follow Up Time:

## 2022-05-18 NOTE — DISCHARGE NOTE PROVIDER - NSDCCPCAREPLAN_GEN_ALL_CORE_FT
PRINCIPAL DISCHARGE DIAGNOSIS  Diagnosis: Influenza with respiratory manifestation  Assessment and Plan of Treatment:        PRINCIPAL DISCHARGE DIAGNOSIS  Diagnosis: Influenza  Assessment and Plan of Treatment: You came in with increased SOB and cough and were admitted due to exacerbation of your COPD secondary to influenza virus.  Your chest x-ray did not show any signs of pneumonia. We gave you steroids, antibiotics, tamiflu, albuterol, symbicort, spiriva to help control your symptoms  -We will send you home with a short course of steroids, prednisone 40 mg daily for 5 days.    -Continue using your home inhalers upon discharge. Continue using supplemental oxygen as needed. Please ollow-up with your outpatient pulmonologist and primary care doctor within one week of discharge for further monitoring of this condition  Call your provider immediately if you have any of the following:  Shortness of breath, wheezing, or coughing  Increased mucus  Yellow, green, bloody, or smelly mucus  Fever or chills  Tightness in your chest that does not go away with rest or medicine  An irregular heartbeat or a feeling that your heart is beating very fast  Swollen ankles.       PRINCIPAL DISCHARGE DIAGNOSIS  Diagnosis: Influenza  Assessment and Plan of Treatment: You came in with increased SOB and cough and were admitted due to exacerbation of your COPD secondary to influenza virus.  Your chest x-ray did not show any signs of pneumonia. We gave you steroids, antibiotics, tamiflu, albuterol, symbicort, spiriva to help control your symptoms  -We will send you home with a short course of steroids, prednisone 40 mg daily for 5 days.    -Continue using your home inhalers upon discharge. Continue using supplemental oxygen as needed. Please follow-up with your outpatient pulmonologist and primary care doctor within one week of discharge for further monitoring of this condition  Call your provider immediately if you have any of the following:  Shortness of breath, wheezing, or coughing  Increased mucus  Yellow, green, bloody, or smelly mucus  Fever or chills  Tightness in your chest that does not go away with rest or medicine  An irregular heartbeat or a feeling that your heart is beating very fast  Swollen ankles.

## 2022-05-18 NOTE — DISCHARGE NOTE PROVIDER - HOSPITAL COURSE
HPI: 66F with PMHx COPD, DM2, HLD, HTN, breast CA s/p left mastectomy (2011), CAD s/p cardiac stent x1 (2020) dvt /PE in 2018 on Xarelto presents for worsening SOB x2 days with associated yellow sputum production. Per EMS, pt was satting 88% on RA, given albuterol and Atrovent and sats improved to 95%.  Pt has home O2 but doesn't use it. Dr Larios -outpt Oncologist. Dr Roland -outpt Cardio. Dr Nash -outpt Pulm.  100%)    Vitals  T(F): 97.5 (05-18-22 @ 05:13), Max: 98 (05-17-22 @ 19:53)  HR: 76 (05-18-22 @ 08:20) (76 - 82)  BP: 132/76 (05-18-22 @ 05:13) (120/62 - 132/76)  RR: 18 (05-18-22 @ 05:13) (18 - 19)  SpO2: 97% (05-18-22 @ 11:44) (91% - 100%)    Constitutional: Pt lying in bed, awake and alert, NAD  HEENT: EOMI, normal hearing, moist mucous membranes  Neck: Soft and supple, no JVD  Respiratory: CTABL, No wheezing, rales or rhonchi  Cardiovascular: S1S2+, RRR, no M/G/R  Gastrointestinal: BS+, soft, NT/ND, no guarding, no rebound  Extremities: No peripheral edema  Vascular: 2+ peripheral pulses  Neurological: AAOx3, no focal deficits  Skin: No rashes    RADIOLOGY:   HPI: 66F with PMHx COPD, DM2, HLD, HTN, breast CA s/p left mastectomy (2011), CAD s/p cardiac stent x1 (2020) dvt /PE in 2018 on Xarelto presents for worsening SOB x2 days with associated yellow sputum production. Per EMS, pt was satting 88% on RA, given albuterol and Atrovent and sats improved to 95%.  In the ED temp 99.7 satting 100% on 4L.   100%). RVP positive for influenza A. CXR show no acute findings, EKG unremarkable. D-dimer <150. S/p methylprednisone 125 mg IVP x1, duoneb x1 in ED. Pt continued on methylprednisone 40 mg BID IV, spirivia, symbicort, albuterol. Pulmonology consulted. S/p azithromycin x 3 days and tamiflu x 5 days. Pt has home oxygen at home which has not used. Recommend to use home oxygen upon discharge. Pt cleared for discharge, will send home with short course steroid prednisone 40 mg x 5 days. Pt recommend to followup outpatient with pulmonologist Dr. Nash and PCP at [facility].     Vitals  T(F): 97.5 (05-18-22 @ 05:13), Max: 98 (05-17-22 @ 19:53)  HR: 76 (05-18-22 @ 08:20) (76 - 82)  BP: 132/76 (05-18-22 @ 05:13) (120/62 - 132/76)  RR: 18 (05-18-22 @ 05:13) (18 - 19)  SpO2: 97% (05-18-22 @ 11:44) (91% - 100%)    Constitutional: Pt lying in bed, awake and alert, NAD  HEENT: EOMI, normal hearing, moist mucous membranes  Neck: Soft and supple, no JVD  Respiratory: CTABL, No wheezing, rales or rhonchi  Cardiovascular: S1S2+, RRR, no M/G/R  Gastrointestinal: BS+, soft, NT/ND, no guarding, no rebound  Extremities: No peripheral edema  Vascular: 2+ peripheral pulses  Neurological: AAOx3, no focal deficits  Skin: No rashes    RADIOLOGY:   HPI: 66F with PMHx COPD, DM2, HLD, HTN, breast CA s/p left mastectomy (2011), CAD s/p cardiac stent x1 (2020) dvt /PE in 2018 on Xarelto presents for worsening SOB x 2 days with associated yellow sputum production. Per EMS, pt was satting 88% on RA, given albuterol and Atrovent and sats improved to 95%.  In the ED temp 99.7 satting 100% on 4L.   100%). RVP positive for influenza A. CXR show no acute findings, EKG unremarkable. D-dimer <150. S/p methylprednisone 125 mg IVP x1, duoneb x1 in ED. Pt continued on methylprednisone 40 mg BID IV, spirivia, symbicort, albuterol. Pulmonology consulted. S/p azithromycin x 3 days and tamiflu x 5 days. Pulse ox on ambulation at RA 91% and with 2L oxygen 97%. Pt currently has home oxygen at home which she states has not been using. Pt's symptoms have improved and she is cleared for discharge, will send home with short course steroid prednisone 40 mg x 5 days and recommended to use home oxygen. Pt should followup outpatient with pulmonologist Dr. Nash and PCP at [facility].     Vitals  T(F): 97.5 (05-18-22 @ 05:13), Max: 98 (05-17-22 @ 19:53)  HR: 76 (05-18-22 @ 08:20) (76 - 82)  BP: 132/76 (05-18-22 @ 05:13) (120/62 - 132/76)  RR: 18 (05-18-22 @ 05:13) (18 - 19)  SpO2: 97% (05-18-22 @ 11:44) (91% - 100%)    Constitutional: Pt lying in bed, awake and alert, NAD  HEENT: EOMI, normal hearing, moist mucous membranes  Neck: Soft and supple, no JVD  Respiratory: CTABL, No wheezing, rales or rhonchi  Cardiovascular: S1S2+, RRR, no M/G/R  Gastrointestinal: BS+, soft, NT/ND, no guarding, no rebound  Extremities: No peripheral edema  Vascular: 2+ peripheral pulses  Neurological: AAOx3, no focal deficits  Skin: No rashes    RADIOLOGY:  < from: Xray Chest 1 View- PORTABLE-Routine (Xray Chest 1 View- PORTABLE-Routine .) (05.16.22 @ 14:01) >      IMPRESSION: No acute finding or change.    --- End of Report ---      < end of copied text >     HPI: 66F with PMHx COPD, DM2, HLD, HTN, breast CA s/p left mastectomy (2011), CAD s/p cardiac stent x1 (2020) dvt /PE in 2018 on Xarelto presents for worsening SOB x 2 days with associated yellow sputum production. Per EMS, pt was satting 88% on RA, given albuterol and Atrovent and sats improved to 95%.  In the ED temp 99.7 satting 100% on 4L. RVP positive for influenza A. CXR show no acute findings, EKG unremarkable. D-dimer <150. S/p methylprednisone 125 mg IVP x1, duoneb x1 in ED. Pt continued on methylprednisone 40 mg BID IV, spirivia, symbicort, albuterol. Pulmonology consulted. S/p azithromycin x 3 days and tamiflu x 5 days. Pulse ox on ambulation at RA 91% and with 2L oxygen 97%. Pt currently has home oxygen at home which she states has not been using. Pt's symptoms have improved and she is cleared for discharge, will send home with short course steroid prednisone 40 mg x 5 days and recommended to use home oxygen. Pt should followup outpatient with pulmonologist Dr. Nash and PCP at [facility].     Vitals  T(F): 97.5 (05-18-22 @ 05:13), Max: 98 (05-17-22 @ 19:53)  HR: 76 (05-18-22 @ 08:20) (76 - 82)  BP: 132/76 (05-18-22 @ 05:13) (120/62 - 132/76)  RR: 18 (05-18-22 @ 05:13) (18 - 19)  SpO2: 97% (05-18-22 @ 11:44) (91% - 100%)    Constitutional: Pt lying in bed, awake and alert, NAD  HEENT: EOMI, normal hearing, moist mucous membranes  Neck: Soft and supple, no JVD  Respiratory: CTABL, No wheezing, rales or rhonchi  Cardiovascular: S1S2+, RRR, no M/G/R  Gastrointestinal: BS+, soft, NT/ND, no guarding, no rebound  Extremities: No peripheral edema  Vascular: 2+ peripheral pulses  Neurological: AAOx3, no focal deficits  Skin: No rashes    RADIOLOGY:  < from: Xray Chest 1 View- PORTABLE-Routine (Xray Chest 1 View- PORTABLE-Routine .) (05.16.22 @ 14:01) >      IMPRESSION: No acute finding or change.    --- End of Report ---      < end of copied text >

## 2022-05-19 LAB
CULTURE RESULTS: SIGNIFICANT CHANGE UP
SPECIMEN SOURCE: SIGNIFICANT CHANGE UP

## 2022-05-23 ENCOUNTER — OUTPATIENT (OUTPATIENT)
Dept: OUTPATIENT SERVICES | Facility: HOSPITAL | Age: 67
LOS: 1 days | End: 2022-05-23
Payer: MEDICARE

## 2022-05-23 ENCOUNTER — APPOINTMENT (OUTPATIENT)
Dept: FAMILY MEDICINE | Facility: HOSPITAL | Age: 67
End: 2022-05-23

## 2022-05-23 VITALS
TEMPERATURE: 98 F | BODY MASS INDEX: 18.95 KG/M2 | RESPIRATION RATE: 18 BRPM | DIASTOLIC BLOOD PRESSURE: 75 MMHG | HEIGHT: 59 IN | OXYGEN SATURATION: 95 % | WEIGHT: 94 LBS | SYSTOLIC BLOOD PRESSURE: 146 MMHG | HEART RATE: 86 BPM

## 2022-05-23 DIAGNOSIS — Z98.890 OTHER SPECIFIED POSTPROCEDURAL STATES: Chronic | ICD-10-CM

## 2022-05-23 DIAGNOSIS — Z00.00 ENCOUNTER FOR GENERAL ADULT MEDICAL EXAMINATION WITHOUT ABNORMAL FINDINGS: ICD-10-CM

## 2022-05-23 DIAGNOSIS — Z90.12 ACQUIRED ABSENCE OF LEFT BREAST AND NIPPLE: Chronic | ICD-10-CM

## 2022-05-23 PROCEDURE — G0463: CPT

## 2022-05-23 RX ORDER — PREDNISONE 20 MG/1
20 TABLET ORAL
Qty: 10 | Refills: 0 | Status: COMPLETED | COMMUNITY
Start: 2022-05-18

## 2022-05-23 NOTE — PLAN
[FreeTextEntry1] : \par #COPD exacerbation 2/2 Influenza A infection\par -Recent hospitalization 5/14-5/18 at [facility]\par -PO steroid course completed\par -Pt doing better, continue Symbicort, spiriva\par -f/u with Dr. Nash of pulmonology\par -SpO2 95% on RA today\par -ED precautions given, if having fever, continued cough after 2 weeks, SOB\par \par RTC in 2 weeks for f/u with PCP for routine screening f/u and discuss bupropion use\par

## 2022-05-23 NOTE — ASSESSMENT
[FreeTextEntry1] : \par 67 y/o F presenting after hospitalization for COPD exacerbation 2/2 influenza A infection

## 2022-05-23 NOTE — HISTORY OF PRESENT ILLNESS
[Post-hospitalization from ___ Hospital] : Post-hospitalization from [unfilled] Hospital [Admitted on: ___] : The patient was admitted on [unfilled] [Discharged on ___] : discharged on [unfilled] [FreeTextEntry2] : 65 y/o F presenting after hospitalization for COPD exacerbation 2/2 influenza A infection. Pt was admitted from 5/14-5/18 and discharged home on 5 day oral prednisone course. Received tamiflu while hospitalized. Today, pt reports she is feeling better and not short of breath at this time. Still with occasional productive cough and dyspnea with excessive exertion. Denies fever, chills, HA, SOB, CP, n/v/d, abd pain. Pt has not smoked since being discharged last week.\par Pt was prescribed home oxygen last year but states she is not using it at this time.

## 2022-05-23 NOTE — REVIEW OF SYSTEMS
[Cough] : cough [Dyspnea on Exertion] : dyspnea on exertion [Negative] : Psychiatric [Shortness Of Breath] : no shortness of breath [Wheezing] : no wheezing

## 2022-05-27 DIAGNOSIS — J44.9 CHRONIC OBSTRUCTIVE PULMONARY DISEASE, UNSPECIFIED: ICD-10-CM

## 2022-05-27 DIAGNOSIS — Z09 ENCOUNTER FOR FOLLOW-UP EXAMINATION AFTER COMPLETED TREATMENT FOR CONDITIONS OTHER THAN MALIGNANT NEOPLASM: ICD-10-CM

## 2022-06-08 RX ORDER — BUDESONIDE AND FORMOTEROL FUMARATE DIHYDRATE 160; 4.5 UG/1; UG/1
0 AEROSOL RESPIRATORY (INHALATION)
Qty: 0 | Refills: 0 | DISCHARGE

## 2022-06-08 RX ORDER — BUPROPION HYDROCHLORIDE 150 MG/1
1 TABLET, EXTENDED RELEASE ORAL
Qty: 0 | Refills: 0 | DISCHARGE

## 2022-06-08 RX ORDER — METFORMIN HYDROCHLORIDE 850 MG/1
0 TABLET ORAL
Qty: 0 | Refills: 0 | DISCHARGE

## 2022-06-08 RX ORDER — TIOTROPIUM BROMIDE 18 UG/1
1 CAPSULE ORAL; RESPIRATORY (INHALATION)
Qty: 0 | Refills: 0 | DISCHARGE

## 2022-06-09 ENCOUNTER — OUTPATIENT (OUTPATIENT)
Dept: OUTPATIENT SERVICES | Facility: HOSPITAL | Age: 67
LOS: 1 days | End: 2022-06-09
Payer: MEDICARE

## 2022-06-09 ENCOUNTER — APPOINTMENT (OUTPATIENT)
Dept: FAMILY MEDICINE | Facility: HOSPITAL | Age: 67
End: 2022-06-09

## 2022-06-09 ENCOUNTER — RESULT CHARGE (OUTPATIENT)
Age: 67
End: 2022-06-09

## 2022-06-09 VITALS
SYSTOLIC BLOOD PRESSURE: 146 MMHG | RESPIRATION RATE: 6 BRPM | BODY MASS INDEX: 19.15 KG/M2 | OXYGEN SATURATION: 96 % | HEART RATE: 89 BPM | HEIGHT: 59 IN | WEIGHT: 95 LBS | DIASTOLIC BLOOD PRESSURE: 80 MMHG | TEMPERATURE: 96.8 F

## 2022-06-09 DIAGNOSIS — Z98.890 OTHER SPECIFIED POSTPROCEDURAL STATES: Chronic | ICD-10-CM

## 2022-06-09 DIAGNOSIS — Z90.12 ACQUIRED ABSENCE OF LEFT BREAST AND NIPPLE: Chronic | ICD-10-CM

## 2022-06-09 DIAGNOSIS — Z00.00 ENCOUNTER FOR GENERAL ADULT MEDICAL EXAMINATION WITHOUT ABNORMAL FINDINGS: ICD-10-CM

## 2022-06-09 LAB — HBA1C MFR BLD HPLC: 5.9

## 2022-06-09 PROCEDURE — G0463: CPT

## 2022-06-09 RX ORDER — LETROZOLE TABLETS 2.5 MG/1
2.5 TABLET, FILM COATED ORAL DAILY
Qty: 1 | Refills: 3 | Status: ACTIVE | COMMUNITY
Start: 2019-04-12

## 2022-06-09 NOTE — HISTORY OF PRESENT ILLNESS
[de-identified] : 65 YO F with a PMH of DM2, COPD, presents for follow up after hospitalization in May 2022 for COPD exacerbation secondary to Influenza infection. Patient follows up with Dr. Roland and Dr. Nash for Cardiology and Pulmonology respectively. Patient has been seen multiple times with outstanding follow up with Gastroenterolgy for a positve FIT test in 2019, mammogram. She claims to have cut down on smoking since her discharge where she continues to smoke 1 cigarette a day. Patient has her COVID vaccines up to date, but she has not gotten her Flu shot this past year.

## 2022-06-09 NOTE — REVIEW OF SYSTEMS
[Shortness Of Breath] : shortness of breath [Negative] : Heme/Lymph [Chest Pain] : no chest pain [Palpitations] : no palpitations [Paroxysmal Nocturnal Dyspnea] : no paroxysmal nocturnal dyspnea [Wheezing] : no wheezing [Abdominal Pain] : no abdominal pain [Nausea] : no nausea [Constipation] : no constipation [Vomiting] : no vomiting [Joint Pain] : no joint pain [Joint Stiffness] : no joint stiffness [Muscle Pain] : no muscle pain [Itching] : no itching [Skin Rash] : no skin rash [Headache] : no headache [Dizziness] : no dizziness [Memory Loss] : no memory loss [Unsteady Walking] : no ataxia [Suicidal] : not suicidal [FreeTextEntry6] : on walking.

## 2022-06-09 NOTE — ASSESSMENT
[FreeTextEntry1] : patient to return to clinic in 1 month for continued maintenance, followup of COPD

## 2022-06-13 DIAGNOSIS — E11.9 TYPE 2 DIABETES MELLITUS WITHOUT COMPLICATIONS: ICD-10-CM

## 2022-07-08 ENCOUNTER — APPOINTMENT (OUTPATIENT)
Dept: FAMILY MEDICINE | Facility: HOSPITAL | Age: 67
End: 2022-07-08

## 2022-07-08 ENCOUNTER — OUTPATIENT (OUTPATIENT)
Dept: OUTPATIENT SERVICES | Facility: HOSPITAL | Age: 67
LOS: 1 days | End: 2022-07-08
Payer: MEDICARE

## 2022-07-08 VITALS
SYSTOLIC BLOOD PRESSURE: 179 MMHG | BODY MASS INDEX: 20.2 KG/M2 | HEART RATE: 91 BPM | WEIGHT: 100 LBS | DIASTOLIC BLOOD PRESSURE: 73 MMHG | RESPIRATION RATE: 16 BRPM | TEMPERATURE: 97.4 F | OXYGEN SATURATION: 98 %

## 2022-07-08 VITALS
BODY MASS INDEX: 33.93 KG/M2 | OXYGEN SATURATION: 97 % | TEMPERATURE: 97.1 F | RESPIRATION RATE: 16 BRPM | HEART RATE: 72 BPM | WEIGHT: 168 LBS | SYSTOLIC BLOOD PRESSURE: 134 MMHG | DIASTOLIC BLOOD PRESSURE: 67 MMHG

## 2022-07-08 DIAGNOSIS — Z98.890 OTHER SPECIFIED POSTPROCEDURAL STATES: Chronic | ICD-10-CM

## 2022-07-08 DIAGNOSIS — R19.5 OTHER FECAL ABNORMALITIES: ICD-10-CM

## 2022-07-08 DIAGNOSIS — Z00.00 ENCOUNTER FOR GENERAL ADULT MEDICAL EXAMINATION WITHOUT ABNORMAL FINDINGS: ICD-10-CM

## 2022-07-08 DIAGNOSIS — Z90.12 ACQUIRED ABSENCE OF LEFT BREAST AND NIPPLE: Chronic | ICD-10-CM

## 2022-07-08 PROBLEM — J44.9 CHRONIC OBSTRUCTIVE PULMONARY DISEASE, UNSPECIFIED: Chronic | Status: ACTIVE | Noted: 2022-05-14

## 2022-07-08 PROBLEM — I10 ESSENTIAL (PRIMARY) HYPERTENSION: Chronic | Status: ACTIVE | Noted: 2022-05-14

## 2022-07-08 PROBLEM — E11.9 TYPE 2 DIABETES MELLITUS WITHOUT COMPLICATIONS: Chronic | Status: ACTIVE | Noted: 2022-05-14

## 2022-07-08 PROCEDURE — G0463: CPT

## 2022-07-08 RX ORDER — NICOTINE 21-14-7MG
21-14-7 KIT TRANSDERMAL
Refills: 0 | Status: DISCONTINUED | COMMUNITY
Start: 2019-04-12 | End: 2022-07-08

## 2022-07-10 RX ORDER — LOSARTAN POTASSIUM 25 MG/1
25 TABLET, FILM COATED ORAL DAILY
Qty: 1 | Refills: 0 | Status: DISCONTINUED | COMMUNITY
Start: 2022-02-18 | End: 2022-07-10

## 2022-07-11 PROBLEM — R19.5 POSITIVE FECAL OCCULT BLOOD TEST: Status: ACTIVE | Noted: 2019-09-30

## 2022-07-12 NOTE — HISTORY OF PRESENT ILLNESS
[FreeTextEntry1] : follow-up  [de-identified] : Patient is a 65 y/o F with a PMH of COPD, T2DM, and HTN presenting to clinic for follow-up. She was last seen on June 9, 2022. Since then she does not report any significant changes in her breathing. She manages with Symbicort and Spiriva which she uses everyday. Patient has a significant smoking history but reports that she now smokes 2 cigarettes a day - down from 1.5 to 2 ppd. She attributes being able to cut down due to Wellbutrin. Today, patient noted to have a BP of 179/73. Repeat blood pressure was 156/79. She is currently prescribed Losartan 25 mg daily which she reports taking every morning. Patient has had multiple visits where her blood pressure has been elevated. Currently, she is asymptomatic. Emphasized the importance of following up with a gastroenterologist as she had a positive FIT test in 2019.

## 2022-07-12 NOTE — REVIEW OF SYSTEMS
[Fever] : no fever [Chills] : no chills [Fatigue] : no fatigue [Vision Problems] : no vision problems [Nasal Discharge] : no nasal discharge [Sore Throat] : no sore throat [Chest Pain] : no chest pain [Palpitations] : no palpitations [Shortness Of Breath] : no shortness of breath [Wheezing] : no wheezing [Cough] : no cough [Abdominal Pain] : no abdominal pain [Nausea] : no nausea [Headache] : no headache [Dizziness] : no dizziness

## 2022-07-14 DIAGNOSIS — Z12.2 ENCOUNTER FOR SCREENING FOR MALIGNANT NEOPLASM OF RESPIRATORY ORGANS: ICD-10-CM

## 2022-07-14 DIAGNOSIS — R19.5 OTHER FECAL ABNORMALITIES: ICD-10-CM

## 2022-07-14 DIAGNOSIS — I10 ESSENTIAL (PRIMARY) HYPERTENSION: ICD-10-CM

## 2022-07-14 DIAGNOSIS — J44.9 CHRONIC OBSTRUCTIVE PULMONARY DISEASE, UNSPECIFIED: ICD-10-CM

## 2022-07-21 ENCOUNTER — APPOINTMENT (OUTPATIENT)
Dept: FAMILY MEDICINE | Facility: HOSPITAL | Age: 67
End: 2022-07-21

## 2022-07-21 ENCOUNTER — OUTPATIENT (OUTPATIENT)
Dept: OUTPATIENT SERVICES | Facility: HOSPITAL | Age: 67
LOS: 1 days | End: 2022-07-21
Payer: MEDICARE

## 2022-07-21 VITALS
BODY MASS INDEX: 19.79 KG/M2 | SYSTOLIC BLOOD PRESSURE: 165 MMHG | WEIGHT: 98 LBS | RESPIRATION RATE: 17 BRPM | HEART RATE: 100 BPM | OXYGEN SATURATION: 99 % | TEMPERATURE: 97.8 F | DIASTOLIC BLOOD PRESSURE: 78 MMHG

## 2022-07-21 DIAGNOSIS — Z98.890 OTHER SPECIFIED POSTPROCEDURAL STATES: Chronic | ICD-10-CM

## 2022-07-21 DIAGNOSIS — Z90.12 ACQUIRED ABSENCE OF LEFT BREAST AND NIPPLE: Chronic | ICD-10-CM

## 2022-07-21 DIAGNOSIS — Z00.00 ENCOUNTER FOR GENERAL ADULT MEDICAL EXAMINATION WITHOUT ABNORMAL FINDINGS: ICD-10-CM

## 2022-07-21 PROCEDURE — G0463: CPT

## 2022-07-26 NOTE — HISTORY OF PRESENT ILLNESS
[de-identified] : 65 YO F presents for a blood pressure check. Patient was seen 1 week prior to this visit with a BP of  179/73. Patient was increased from Losartan 25 to 50, which she states she is taking. She also claims to have cut down on her smoking, as she is taking wellbutrin. Patient denies any fever, chills, chest pain, shortness of breath, nausea, vomiting, headache, cough, leg pain dizziness or weakness. \par \par

## 2022-07-27 DIAGNOSIS — I10 ESSENTIAL (PRIMARY) HYPERTENSION: ICD-10-CM

## 2022-07-29 ENCOUNTER — APPOINTMENT (OUTPATIENT)
Dept: FAMILY MEDICINE | Facility: HOSPITAL | Age: 67
End: 2022-07-29

## 2022-07-29 ENCOUNTER — OUTPATIENT (OUTPATIENT)
Dept: OUTPATIENT SERVICES | Facility: HOSPITAL | Age: 67
LOS: 1 days | End: 2022-07-29
Payer: MEDICARE

## 2022-07-29 VITALS
DIASTOLIC BLOOD PRESSURE: 85 MMHG | OXYGEN SATURATION: 96 % | HEART RATE: 110 BPM | BODY MASS INDEX: 19.59 KG/M2 | RESPIRATION RATE: 18 BRPM | TEMPERATURE: 98.2 F | WEIGHT: 97 LBS | SYSTOLIC BLOOD PRESSURE: 161 MMHG

## 2022-07-29 DIAGNOSIS — Z98.890 OTHER SPECIFIED POSTPROCEDURAL STATES: Chronic | ICD-10-CM

## 2022-07-29 DIAGNOSIS — I10 ESSENTIAL (PRIMARY) HYPERTENSION: ICD-10-CM

## 2022-07-29 DIAGNOSIS — Z90.12 ACQUIRED ABSENCE OF LEFT BREAST AND NIPPLE: Chronic | ICD-10-CM

## 2022-07-29 DIAGNOSIS — Z00.00 ENCOUNTER FOR GENERAL ADULT MEDICAL EXAMINATION WITHOUT ABNORMAL FINDINGS: ICD-10-CM

## 2022-07-29 PROCEDURE — G0463: CPT

## 2022-08-02 NOTE — ASSESSMENT
[FreeTextEntry1] : Hypertension (401.9) (I10)\par  · - Blood Pressure is not at goal. \par     - Will increase Losartan to 100 mg daily \par     - Encouraged DASH Diet \par     - Encouraged Exercise for 30-45 min a day, 5 days a week\par     - Encouraged BP Log Journal. \par     - Patient claims to have normal BP at home, advised to bring machine to compare and to\par     evaluate\par \par Patient to return to clinic in 2 weeks with BP Cuff for HTN monitor. \par

## 2022-08-02 NOTE — HISTORY OF PRESENT ILLNESS
[FreeTextEntry1] : BP check [de-identified] : 65 YO F presents for a blood pressure check. Patient was seen 1 week prior to this visit with a BP of 165/78. Patient currently on Losartan 50, which she states she is taking. She also claims to have cut down on her smoking, as she is taking wellbutrin. Patient denies any fever, chills, chest pain, shortness of breath, nausea, vomiting, headache, cough, leg pain dizziness or weakness. \par

## 2022-08-03 RX ORDER — LOSARTAN POTASSIUM 50 MG/1
50 TABLET, FILM COATED ORAL DAILY
Qty: 30 | Refills: 2 | Status: DISCONTINUED | COMMUNITY
Start: 2022-07-10 | End: 2022-08-03

## 2022-08-08 ENCOUNTER — NON-APPOINTMENT (OUTPATIENT)
Age: 67
End: 2022-08-08

## 2022-08-08 ENCOUNTER — OUTPATIENT (OUTPATIENT)
Dept: OUTPATIENT SERVICES | Facility: HOSPITAL | Age: 67
LOS: 1 days | End: 2022-08-08
Payer: MEDICARE

## 2022-08-08 ENCOUNTER — APPOINTMENT (OUTPATIENT)
Dept: FAMILY MEDICINE | Facility: HOSPITAL | Age: 67
End: 2022-08-08

## 2022-08-08 VITALS
HEART RATE: 102 BPM | DIASTOLIC BLOOD PRESSURE: 68 MMHG | OXYGEN SATURATION: 98 % | BODY MASS INDEX: 19.56 KG/M2 | SYSTOLIC BLOOD PRESSURE: 133 MMHG | WEIGHT: 97 LBS | HEIGHT: 59 IN | TEMPERATURE: 97.6 F | RESPIRATION RATE: 18 BRPM

## 2022-08-08 DIAGNOSIS — Z00.00 ENCOUNTER FOR GENERAL ADULT MEDICAL EXAMINATION WITHOUT ABNORMAL FINDINGS: ICD-10-CM

## 2022-08-08 DIAGNOSIS — Z90.12 ACQUIRED ABSENCE OF LEFT BREAST AND NIPPLE: Chronic | ICD-10-CM

## 2022-08-08 DIAGNOSIS — Z98.890 OTHER SPECIFIED POSTPROCEDURAL STATES: Chronic | ICD-10-CM

## 2022-08-08 DIAGNOSIS — H25.093 OTHER AGE-RELATED INCIPIENT CATARACT, BILATERAL: ICD-10-CM

## 2022-08-08 PROCEDURE — G0463: CPT

## 2022-08-08 NOTE — HISTORY OF PRESENT ILLNESS
[FreeTextEntry1] : 65 y/o F presents for fundscopic exam  [de-identified] : Pt is a 65 yo F w PMH of Type 2 DM, COPD, HLD presenting for funduscopic exam. Pt denies any acute complaints. Denies any changes in vision since last being seen. Pt's last HbA1c was on 6/9/22 5.9. Pt's diabetes is currently managed on 1000 mg BID. Pt has next appt set up w PCP on 8/12/22.

## 2022-08-08 NOTE — ASSESSMENT
[FreeTextEntry1] : 65 y/o F presents for fundoscopic eye exam\par \par #Funduscopic Examination \par -Pt presents for funduscopic exam \par -Pt informed that results will be interpreted by Ophthalmologist and repeat yearly if results WNL \par Patient informed that they will be called with results of eye exam.  If results are abnormal, patient will be referred for further ophthalmologic evaluation.  We will assist in making that appointment if needed. . \par -Patient verbalized understanding, all questions and concerns addressed.\par  \par *Above discussed w Dr. Rios \par \par -RTC on 8/12/22 w PCP Dr. Stewart\par

## 2022-08-09 DIAGNOSIS — E11.9 TYPE 2 DIABETES MELLITUS WITHOUT COMPLICATIONS: ICD-10-CM

## 2022-08-12 ENCOUNTER — OUTPATIENT (OUTPATIENT)
Dept: OUTPATIENT SERVICES | Facility: HOSPITAL | Age: 67
LOS: 1 days | End: 2022-08-12
Payer: MEDICARE

## 2022-08-12 ENCOUNTER — APPOINTMENT (OUTPATIENT)
Dept: FAMILY MEDICINE | Facility: HOSPITAL | Age: 67
End: 2022-08-12

## 2022-08-12 VITALS
BODY MASS INDEX: 19.76 KG/M2 | SYSTOLIC BLOOD PRESSURE: 136 MMHG | OXYGEN SATURATION: 100 % | DIASTOLIC BLOOD PRESSURE: 71 MMHG | HEART RATE: 100 BPM | TEMPERATURE: 98.6 F | WEIGHT: 98 LBS | HEIGHT: 59 IN | RESPIRATION RATE: 18 BRPM

## 2022-08-12 DIAGNOSIS — Z00.00 ENCOUNTER FOR GENERAL ADULT MEDICAL EXAMINATION WITHOUT ABNORMAL FINDINGS: ICD-10-CM

## 2022-08-12 DIAGNOSIS — Z98.890 OTHER SPECIFIED POSTPROCEDURAL STATES: Chronic | ICD-10-CM

## 2022-08-12 DIAGNOSIS — Z90.12 ACQUIRED ABSENCE OF LEFT BREAST AND NIPPLE: Chronic | ICD-10-CM

## 2022-08-12 PROCEDURE — G0463: CPT

## 2022-08-15 ENCOUNTER — APPOINTMENT (OUTPATIENT)
Dept: OPHTHALMOLOGY | Facility: CLINIC | Age: 67
End: 2022-08-15

## 2022-08-15 ENCOUNTER — NON-APPOINTMENT (OUTPATIENT)
Age: 67
End: 2022-08-15

## 2022-08-15 DIAGNOSIS — I10 ESSENTIAL (PRIMARY) HYPERTENSION: ICD-10-CM

## 2022-08-15 PROCEDURE — 92133 CPTRZD OPH DX IMG PST SGM ON: CPT

## 2022-08-15 PROCEDURE — 92015 DETERMINE REFRACTIVE STATE: CPT

## 2022-08-15 PROCEDURE — 92004 COMPRE OPH EXAM NEW PT 1/>: CPT

## 2022-08-17 PROBLEM — H25.093 AGE-RELATED INCIPIENT CATARACT OF BOTH EYES: Status: ACTIVE | Noted: 2022-08-17

## 2022-08-17 NOTE — HISTORY OF PRESENT ILLNESS
[FreeTextEntry1] : 65 y/o F presents for BP check  [de-identified] : 65 y/o F presents for BP check after her Losartan dose was increased from 50 to 100 mg. Today patient feels well and has no symptoms or concerns. Today BP is improved at 136/71. Pt does not log her at home BP readings, but states that they are "all over the place", with her systolic BP going as high as 160 and as low as 118. In regards to her smoking, pt states that she is trying to quit. She has not yet undergone CT lung cancer screening because the office has not called her to schedule it. Pt was asked to record her BP readings for the next visit. Denies fever, chills, chest pain, SOB, N/V, abdominal pain, headache, cough, palpitations, leg pain, dizziness, weakness\par

## 2022-08-17 NOTE — ASSESSMENT
[FreeTextEntry1] : RTC in one month with PCP for HTN mgmt\par \par Case discussed with Dr. Hernandez

## 2022-08-17 NOTE — REVIEW OF SYSTEMS
[Fever] : no fever [Chills] : no chills [Chest Pain] : no chest pain [Palpitations] : no palpitations [Shortness Of Breath] : no shortness of breath [Wheezing] : no wheezing [Cough] : no cough [Headache] : no headache [Dizziness] : no dizziness

## 2022-09-04 ENCOUNTER — RX RENEWAL (OUTPATIENT)
Age: 67
End: 2022-09-04

## 2022-09-12 ENCOUNTER — OUTPATIENT (OUTPATIENT)
Dept: OUTPATIENT SERVICES | Facility: HOSPITAL | Age: 67
LOS: 1 days | End: 2022-09-12

## 2022-09-12 ENCOUNTER — APPOINTMENT (OUTPATIENT)
Dept: FAMILY MEDICINE | Facility: HOSPITAL | Age: 67
End: 2022-09-12

## 2022-09-12 ENCOUNTER — EMERGENCY (EMERGENCY)
Facility: HOSPITAL | Age: 67
LOS: 1 days | Discharge: HOME | End: 2022-09-12
Attending: EMERGENCY MEDICINE | Admitting: EMERGENCY MEDICINE
Payer: MEDICARE

## 2022-09-12 VITALS — HEART RATE: 97 BPM | RESPIRATION RATE: 18 BRPM | TEMPERATURE: 97 F

## 2022-09-12 VITALS
RESPIRATION RATE: 18 BRPM | WEIGHT: 93.04 LBS | HEART RATE: 87 BPM | SYSTOLIC BLOOD PRESSURE: 145 MMHG | TEMPERATURE: 98 F | OXYGEN SATURATION: 98 % | DIASTOLIC BLOOD PRESSURE: 75 MMHG | HEIGHT: 59 IN

## 2022-09-12 VITALS
HEART RATE: 84 BPM | OXYGEN SATURATION: 100 % | SYSTOLIC BLOOD PRESSURE: 127 MMHG | DIASTOLIC BLOOD PRESSURE: 66 MMHG | RESPIRATION RATE: 18 BRPM

## 2022-09-12 VITALS — SYSTOLIC BLOOD PRESSURE: 153 MMHG | TEMPERATURE: 97 F | DIASTOLIC BLOOD PRESSURE: 82 MMHG | OXYGEN SATURATION: 100 %

## 2022-09-12 DIAGNOSIS — Z98.890 OTHER SPECIFIED POSTPROCEDURAL STATES: Chronic | ICD-10-CM

## 2022-09-12 DIAGNOSIS — Z90.12 ACQUIRED ABSENCE OF LEFT BREAST AND NIPPLE: Chronic | ICD-10-CM

## 2022-09-12 DIAGNOSIS — Z00.00 ENCOUNTER FOR GENERAL ADULT MEDICAL EXAMINATION WITHOUT ABNORMAL FINDINGS: ICD-10-CM

## 2022-09-12 DIAGNOSIS — F17.210 NICOTINE DEPENDENCE, CIGARETTES, UNCOMPLICATED: ICD-10-CM

## 2022-09-12 LAB
ALBUMIN SERPL ELPH-MCNC: 3.9 G/DL — SIGNIFICANT CHANGE UP (ref 3.3–5)
ALP SERPL-CCNC: 97 U/L — SIGNIFICANT CHANGE UP (ref 40–120)
ALT FLD-CCNC: 40 U/L — SIGNIFICANT CHANGE UP (ref 10–45)
ANION GAP SERPL CALC-SCNC: 7 MMOL/L — SIGNIFICANT CHANGE UP (ref 5–17)
AST SERPL-CCNC: 36 U/L — SIGNIFICANT CHANGE UP (ref 10–40)
BASOPHILS # BLD AUTO: 0.02 K/UL — SIGNIFICANT CHANGE UP (ref 0–0.2)
BASOPHILS NFR BLD AUTO: 0.2 % — SIGNIFICANT CHANGE UP (ref 0–2)
BILIRUB SERPL-MCNC: 0.2 MG/DL — SIGNIFICANT CHANGE UP (ref 0.2–1.2)
BUN SERPL-MCNC: 10 MG/DL — SIGNIFICANT CHANGE UP (ref 7–23)
CALCIUM SERPL-MCNC: 9.9 MG/DL — SIGNIFICANT CHANGE UP (ref 8.4–10.5)
CHLORIDE SERPL-SCNC: 104 MMOL/L — SIGNIFICANT CHANGE UP (ref 96–108)
CO2 SERPL-SCNC: 30 MMOL/L — SIGNIFICANT CHANGE UP (ref 22–31)
CREAT SERPL-MCNC: 0.6 MG/DL — SIGNIFICANT CHANGE UP (ref 0.5–1.3)
D DIMER BLD IA.RAPID-MCNC: <150 NG/ML DDU — SIGNIFICANT CHANGE UP
EGFR: 99 ML/MIN/1.73M2 — SIGNIFICANT CHANGE UP
EOSINOPHIL # BLD AUTO: 0.06 K/UL — SIGNIFICANT CHANGE UP (ref 0–0.5)
EOSINOPHIL NFR BLD AUTO: 0.7 % — SIGNIFICANT CHANGE UP (ref 0–6)
GLUCOSE SERPL-MCNC: 106 MG/DL — HIGH (ref 70–99)
HCT VFR BLD CALC: 38.4 % — SIGNIFICANT CHANGE UP (ref 34.5–45)
HGB BLD-MCNC: 11.9 G/DL — SIGNIFICANT CHANGE UP (ref 11.5–15.5)
IMM GRANULOCYTES NFR BLD AUTO: 0.1 % — SIGNIFICANT CHANGE UP (ref 0–1.5)
LYMPHOCYTES # BLD AUTO: 2.17 K/UL — SIGNIFICANT CHANGE UP (ref 1–3.3)
LYMPHOCYTES # BLD AUTO: 24 % — SIGNIFICANT CHANGE UP (ref 13–44)
MCHC RBC-ENTMCNC: 27.2 PG — SIGNIFICANT CHANGE UP (ref 27–34)
MCHC RBC-ENTMCNC: 31 GM/DL — LOW (ref 32–36)
MCV RBC AUTO: 87.9 FL — SIGNIFICANT CHANGE UP (ref 80–100)
MONOCYTES # BLD AUTO: 0.68 K/UL — SIGNIFICANT CHANGE UP (ref 0–0.9)
MONOCYTES NFR BLD AUTO: 7.5 % — SIGNIFICANT CHANGE UP (ref 2–14)
NEUTROPHILS # BLD AUTO: 6.09 K/UL — SIGNIFICANT CHANGE UP (ref 1.8–7.4)
NEUTROPHILS NFR BLD AUTO: 67.5 % — SIGNIFICANT CHANGE UP (ref 43–77)
NRBC # BLD: 0 /100 WBCS — SIGNIFICANT CHANGE UP (ref 0–0)
NT-PROBNP SERPL-SCNC: 29 PG/ML — SIGNIFICANT CHANGE UP (ref 0–300)
PLATELET # BLD AUTO: 366 K/UL — SIGNIFICANT CHANGE UP (ref 150–400)
POTASSIUM SERPL-MCNC: 4.8 MMOL/L — SIGNIFICANT CHANGE UP (ref 3.5–5.3)
POTASSIUM SERPL-SCNC: 4.8 MMOL/L — SIGNIFICANT CHANGE UP (ref 3.5–5.3)
PROT SERPL-MCNC: 8.6 G/DL — HIGH (ref 6–8.3)
RAPID RVP RESULT: SIGNIFICANT CHANGE UP
RBC # BLD: 4.37 M/UL — SIGNIFICANT CHANGE UP (ref 3.8–5.2)
RBC # FLD: 14.1 % — SIGNIFICANT CHANGE UP (ref 10.3–14.5)
SARS-COV-2 RNA SPEC QL NAA+PROBE: SIGNIFICANT CHANGE UP
SODIUM SERPL-SCNC: 141 MMOL/L — SIGNIFICANT CHANGE UP (ref 135–145)
TROPONIN I, HIGH SENSITIVITY RESULT: 10.2 NG/L — SIGNIFICANT CHANGE UP
TROPONIN I, HIGH SENSITIVITY RESULT: 10.4 NG/L — SIGNIFICANT CHANGE UP
WBC # BLD: 9.03 K/UL — SIGNIFICANT CHANGE UP (ref 3.8–10.5)
WBC # FLD AUTO: 9.03 K/UL — SIGNIFICANT CHANGE UP (ref 3.8–10.5)

## 2022-09-12 PROCEDURE — 93010 ELECTROCARDIOGRAM REPORT: CPT

## 2022-09-12 PROCEDURE — 83880 ASSAY OF NATRIURETIC PEPTIDE: CPT

## 2022-09-12 PROCEDURE — 96360 HYDRATION IV INFUSION INIT: CPT

## 2022-09-12 PROCEDURE — G0463: CPT

## 2022-09-12 PROCEDURE — 36415 COLL VENOUS BLD VENIPUNCTURE: CPT

## 2022-09-12 PROCEDURE — 0225U NFCT DS DNA&RNA 21 SARSCOV2: CPT

## 2022-09-12 PROCEDURE — 85379 FIBRIN DEGRADATION QUANT: CPT

## 2022-09-12 PROCEDURE — 71045 X-RAY EXAM CHEST 1 VIEW: CPT

## 2022-09-12 PROCEDURE — 80053 COMPREHEN METABOLIC PANEL: CPT

## 2022-09-12 PROCEDURE — 99285 EMERGENCY DEPT VISIT HI MDM: CPT | Mod: FS

## 2022-09-12 PROCEDURE — 93005 ELECTROCARDIOGRAM TRACING: CPT

## 2022-09-12 PROCEDURE — 94640 AIRWAY INHALATION TREATMENT: CPT

## 2022-09-12 PROCEDURE — 99285 EMERGENCY DEPT VISIT HI MDM: CPT | Mod: 25

## 2022-09-12 PROCEDURE — 71045 X-RAY EXAM CHEST 1 VIEW: CPT | Mod: 26

## 2022-09-12 PROCEDURE — 85025 COMPLETE CBC W/AUTO DIFF WBC: CPT

## 2022-09-12 PROCEDURE — 84484 ASSAY OF TROPONIN QUANT: CPT

## 2022-09-12 RX ORDER — SODIUM CHLORIDE 9 MG/ML
1000 INJECTION INTRAMUSCULAR; INTRAVENOUS; SUBCUTANEOUS ONCE
Refills: 0 | Status: COMPLETED | OUTPATIENT
Start: 2022-09-12 | End: 2022-09-12

## 2022-09-12 RX ORDER — ALBUTEROL 90 UG/1
3 AEROSOL, METERED ORAL
Qty: 360 | Refills: 0
Start: 2022-09-12 | End: 2022-10-11

## 2022-09-12 RX ORDER — LEVOFLOXACIN 5 MG/ML
1 INJECTION, SOLUTION INTRAVENOUS
Qty: 5 | Refills: 0
Start: 2022-09-12 | End: 2022-09-16

## 2022-09-12 RX ORDER — IPRATROPIUM/ALBUTEROL SULFATE 18-103MCG
3 AEROSOL WITH ADAPTER (GRAM) INHALATION ONCE
Refills: 0 | Status: COMPLETED | OUTPATIENT
Start: 2022-09-12 | End: 2022-09-12

## 2022-09-12 RX ADMIN — Medication 3 MILLILITER(S): at 14:53

## 2022-09-12 RX ADMIN — SODIUM CHLORIDE 1000 MILLILITER(S): 9 INJECTION INTRAMUSCULAR; INTRAVENOUS; SUBCUTANEOUS at 13:05

## 2022-09-12 NOTE — ED ADULT NURSE NOTE - CADM POA CENTRAL LINE
Discharge to home or other facility with appropriate resources Progressing      Absence or prevention of progression during hospitalization Progressing      Verbalizes/displays adequate comfort level or baseline comfort level Progressing      Patient will remain free of falls Progressing      Skin integrity is maintained or improved Progressing      Maintain or return to baseline ADL function Progressing      Maintain or return mobility status to optimal level Progressing No

## 2022-09-12 NOTE — ED PROVIDER NOTE - NSFOLLOWUPINSTRUCTIONS_ED_ALL_ED_FT
Stop smoking    Continue previously prescribed medications Stop smoking    Continue previously prescribed medications   Take Levaquin as prescribed   Return to the ED if any worsening or persistent symptoms.             COPD (Chronic Obstructive Pulmonary Disease)    WHAT YOU NEED TO KNOW:    COPD (chronic obstructive pulmonary disease) can get worse quickly. Your healthcare providers will help you create a care plan to use at home. The plan will give directions on how to prevent or manage shortness of breath. Your family members or anyone who cares for you will also get directions to help you.  [employer]         DISCHARGE INSTRUCTIONS:    Call your local emergency number (911 in the [place]) if:   •You feel lightheaded, short of breath, and have chest pain.          Return to the emergency department if:   •You cough up blood.      •You are confused, dizzy, or feel faint.      •Your arm or leg feels warm, tender, and painful. It may look swollen and red.      Call your doctor if:   •You have increased shortness of breath.      •You need more medicine than usual to control your symptoms.      •You are coughing or wheezing more than usual.      •You are coughing up more mucus, or it has a new color or odor.      •You gain more than 3 pounds in a week.      •You have a fever, a runny or stuffy nose, and a sore throat, or other cold or flu symptoms.      •Your skin, lips, or nails start to turn blue.      •You have swelling in your legs or ankles.      •You are very tired or weak for more than a day.      •You notice changes in your mood, or changes in your ability to think or concentrate.      •You have questions or concerns about your condition or care.      Medicines:   •Short-acting bronchodilators may be called rescue inhalers or relievers. They relieve sudden, severe symptoms and start to work right away.      •Long-acting bronchodilators may be called controllers. This medicine helps open the airways over time, and is used to decrease and prevent breathing problems. Long-acting bronchodilators should not be used to treat sudden, severe symptoms, such as trouble breathing.      •Antibioticsmay be given for up to 5 days to treat a bacterial infection during an exacerbation.      •Take your medicine as directed. Contact your healthcare provider if you think your medicine is not helping or if you have side effects. Tell your provider if you are allergic to any medicine. Keep a list of the medicines, vitamins, and herbs you take. Include the amounts, and when and why you take them. Bring the list or the pill bottles to follow-up visits. Carry your medicine list with you in case of an emergency.      Help make breathing easier:   •Use pursed-lip breathing any time you feel short of breath. Take a deep breath in through your nose. Slowly breathe out through your mouth with your lips pursed. Try to take 2 times as long to breathe out as to breathe in. This helps you get rid of as much air from your lungs as possible. You can also practice this breathing pattern while you bend, lift, climb stairs, or exercise. It slows down your breathing and helps move more air in and out of your lungs.  Breathe in Breathe out           •Avoid anything that makes your symptoms worse. Stay out of high altitudes and places with high humidity. Stay inside, or cover your mouth and nose with a scarf when you are outside in cold weather. Stay inside on days when air pollution or pollen counts are high. Do not use aerosol sprays such as deodorant, bug spray, and hairspray.      •Exercise as directed. Your healthcare provider may recommend at least 20 minutes of exercise each day to help increase your energy and decrease shortness of breath. Talk to your provider about the best exercise plan for you.  [employer] Walking for Exercise           Manage COPD and help prevent exacerbations: COPD is a serious condition that gets worse over time. A COPD exacerbation means your symptoms suddenly get worse. It is important to prevent exacerbations. An exacerbation can cause more lung damage. COPD cannot be cured, but you can take action to feel better and prevent exacerbations:   •Do not smoke. Nicotine and other chemicals in cigarettes and cigars can cause lung damage and make your COPD worse. Ask your healthcare provider for information if you currently smoke and need help to quit. E-cigarettes or smokeless tobacco still contain nicotine. Talk to your healthcare provider before you use these products.      •Avoid secondhand smoke. This is smoke another person exhales. Even if you have never smoked or have quit, it is important to avoid secondhand smoke. This smoke can also cause lung damage or trigger an exacerbation.      •Go to pulmonary rehabilitation (rehab) if directed. Rehab is a program run by specialists who help you learn to manage COPD. Examples include a pulmonologist (lung specialist), dietitian, or exercise therapist. The specialists will help you make a plan to avoid triggers that cause an exacerbation.      •Take your medicines as directed. Refill your medicines before you are out so that you do not miss a dose. Ask your healthcare provider if you have any questions on how to take your medicines.      •Protect yourself from germs. Germs can get into your lungs and cause an infection. An infection in your lungs can create more mucus and make it harder to breathe. An infection can also create swelling in your airway and prevent air from getting in. You can decrease your risk for infection by doing the following:        ?Wash your hands often with soap and water. Carry germ-killing gel with you. You can use the gel to clean your hands when soap and water are not available.  Handwashing           ?Do not touch your eyes, nose, or mouth unless you have washed your hands first.      ?Always cover your mouth when you cough. Cough into a tissue or your shirtsleeve so you do not spread germs from your hands.      ?Try to avoid people who have a cold or the flu. If you are sick, stay away from others as much as possible.      ?Ask about vaccines you may need. Influenza (the flu), pneumonia, and COVID-19 can become life-threatening for a person who has COPD. Get a yearly flu vaccine as soon as recommended, usually in September or October. The pneumonia vaccine may be given every 5 years, or as directed. COVID-19 vaccines are available in shots given in 1 or 2 doses. Your healthcare provider can tell you if you should also get other vaccines, and when to get them.      •Drink liquids as directed. You may need to drink more liquid than usual. Liquid will help to keep your air passages moist and help you cough up mucus. Ask how much liquid to drink each day and which liquids are best for you.      Follow up with your doctor as directed: You may need more tests. Your doctor may refer you to a specialist, depending on your needs. Some specialist services may be available through your pulmonary rehab program. Write down your questions so you remember to ask them during your visits.       © Copyright [employer] 2022           back to top                          © Copyright [employer] 2022

## 2022-09-12 NOTE — ED ADULT TRIAGE NOTE - CHIEF COMPLAINT QUOTE
Patient presents to ED from Family [facility] with SOB x2 days. Patient has hx of COPD, has home O2, does not wear it all the time but started herself on 2LNC this morning.

## 2022-09-12 NOTE — ED PROVIDER NOTE - CLINICAL SUMMARY MEDICAL DECISION MAKING FREE TEXT BOX
67 yr old female with hx of HTN, DM, COPD, smoker, CAD, breast cancer, PE, on home o2  ( 2L at times) presents with worsening shortness of breath x 2 days. Pt sent by family practice for eval. Pt reports used her O2 this am and inhalers this am. + productive cough. Denies any fever, chills or any other symptoms. No leg swelling.    well appearing, no respiratory distress or LOB, decreased Lung sounds diffuse 67 yr old female with hx of HTN, DM, COPD, smoker, CAD, breast cancer, PE, on home o2  ( 2L at times) presents with worsening shortness of breath x 2 days. Pt sent by family practice for eval. Pt reports used her O2 this am and inhalers this am. + productive cough. Denies any fever, chills or any other symptoms. No leg swelling.    well appearing, no respiratory distress or LOB, decreased Lung sounds diffuse   labs and imaging reviewed, pt stable for dc and to follow up with family pracdarlene yeung at dc: pt on 02 2L and o2 100 percent   pt stable for dc and will follow up with family practice 67 yr old female with hx of HTN, DM, COPD, smoker, CAD, breast cancer, PE, on home o2  ( 2L at times) presents with worsening shortness of breath x 2 days. Pt sent by family practice for eval. Pt reports used her O2 this am and inhalers this am. + productive cough. Denies any fever, chills or any other symptoms. No leg swelling.    well appearing, no respiratory distress or LOB, decreased Lung sounds diffuse   labs and imaging reviewed, pt stable for dc and to follow up with family practice   re eval at dc: pt on 02 2L and o2 100 percent , pt feeling better after duoneb, .pt stable for dc   pt stable for dc and will follow up with family practice Elvin: 67 yr old female with hx of HTN, DM, COPD, smoker, CAD, breast cancer, PE, on home o2  ( 2L at times) presents with worsening shortness of breath x 2 days. Pt sent by family practice for eval. Pt reports used her O2 this am and inhalers this am. + productive cough. Denies any fever, chills or any other symptoms. No leg swelling.    well appearing, no respiratory distress or LOB, decreased Lung sounds diffuse   labs and imaging reviewed, pt stable for dc and to follow up with family practice   re eval at dc: pt on 02 2L and o2 100 percent , pt feeling better after duoneb, .pt stable for dc   pt stable for dc and will follow up with family practice

## 2022-09-12 NOTE — ED ADULT NURSE NOTE - NSICDXFAMILYHX_GEN_ALL_CORE_FT
FAMILY HISTORY:  Father  Still living? Unknown  Family history of gout, Age at diagnosis: Age Unknown  FH: coronary artery disease, Age at diagnosis: Age Unknown    Mother  Still living? Unknown  Family history of gout, Age at diagnosis: Age Unknown    Sibling  Still living? Yes, Estimated age: Age Unknown  Family history of heart disease, Age at diagnosis: Age Unknown

## 2022-09-12 NOTE — ED PROVIDER NOTE - OBJECTIVE STATEMENT
67 yr old female with hx of HTN, DM, COPD, smoker, CAD, breast cancer, PE, on home o2  ( 2L at times) presents with worsening shortness of breath x 2 days. Pt sent by family practice for eval. Pt reports used her O2 this am and inhalers this am. + productive cough. Denies any fever, chills or any other symptoms. No leg swelling.

## 2022-09-12 NOTE — HISTORY OF PRESENT ILLNESS
[de-identified] : 68 YO F with a PMH of smoking, HTN, DM2, COPD,  She originally was supposed to present for HTN follow up but had Shortness of breath for 2 days. Patient states that she has been using her portable oxygen tank since this morning. she has labored breathing, Her last appointment with Pulmonologist (Dr. Nash) was 1 month ago. Productive cough, Brown mucus, no fevers, no chills, She is taking her spiriva, sybicort, albuterol as a rescue inhaler this morning, and has been taking her lisinopril, wheeze, scattered, decreased breath sounds. She is located in a wheelchair and followed up

## 2022-09-12 NOTE — PHYSICAL EXAM
[No Accessory Muscle Use] : no accessory muscle use [Normal] : soft, non-tender, non-distended, no masses palpated, no HSM and normal bowel sounds [Urethral Meatus] : normal urethra [Vagina] : normal vaginal exam [de-identified] : decreased breath sounds , in a wheelchair, satting 100%, able to speak in short sentences.

## 2022-09-12 NOTE — ED PROVIDER NOTE - NSFOLLOWUPCLINICS_GEN_ALL_ED_FT
Family Practice Clinic  Family Medicine  [address] [place] [id number]  Phone: [phone]  Fax:

## 2022-09-12 NOTE — ED PROVIDER NOTE - PATIENT PORTAL LINK FT
You can access the FollowMyHealth Patient Portal offered by [facility] by registering at the following website: [email or web address]. By joining [facility]’s FollowMyHealth portal, you will also be able to view your health information using other applications (apps) compatible with our system.

## 2022-09-12 NOTE — ED ADULT NURSE NOTE - NSICDXPASTMEDICALHX_GEN_ALL_CORE_FT
PAST MEDICAL HISTORY:  Breast cancer     COPD (chronic obstructive pulmonary disease)     COPD, moderate     Diabetes     DM (diabetes mellitus) new diagnosis    HTN (hypertension)     HTN (hypertension)     Smoker

## 2022-09-12 NOTE — REVIEW OF SYSTEMS
[Shortness Of Breath] : shortness of breath [Cough] : cough [Dyspnea on Exertion] : dyspnea on exertion [Negative] : Heme/Lymph [Chest Pain] : no chest pain [Palpitations] : no palpitations [Paroxysmal Nocturnal Dyspnea] : no paroxysmal nocturnal dyspnea [Wheezing] : no wheezing [Abdominal Pain] : no abdominal pain [Nausea] : no nausea [Constipation] : no constipation [Vomiting] : no vomiting [Joint Pain] : no joint pain [Joint Stiffness] : no joint stiffness [Muscle Pain] : no muscle pain [Itching] : no itching [Skin Rash] : no skin rash [Headache] : no headache [Dizziness] : no dizziness [Memory Loss] : no memory loss [Unsteady Walking] : no ataxia [Suicidal] : not suicidal [FreeTextEntry6] : on 2 L home oxygen, 2 days, smoker, productive cough,

## 2022-09-13 DIAGNOSIS — J44.9 CHRONIC OBSTRUCTIVE PULMONARY DISEASE, UNSPECIFIED: ICD-10-CM

## 2022-09-15 NOTE — CHART NOTE - NSCHARTNOTEFT_GEN_A_CORE
RUSTY placed call to patient to discuss and assist with follow up care.  Patient presented to ED on 9/12/22 for SOB.  Patient reports she is feeling better, denies scheduling PMD appointment as of yet.  RUSTY offered, patient in agreement.  RUSTY called [facility] [phone] and scheduled follow up for 9/19/22 at 1:00 PM.  Patient in agreement with time and date.

## 2022-09-19 ENCOUNTER — OUTPATIENT (OUTPATIENT)
Dept: OUTPATIENT SERVICES | Facility: HOSPITAL | Age: 67
LOS: 1 days | End: 2022-09-19
Payer: MEDICARE

## 2022-09-19 ENCOUNTER — APPOINTMENT (OUTPATIENT)
Dept: FAMILY MEDICINE | Facility: HOSPITAL | Age: 67
End: 2022-09-19

## 2022-09-19 VITALS
SYSTOLIC BLOOD PRESSURE: 146 MMHG | DIASTOLIC BLOOD PRESSURE: 73 MMHG | HEIGHT: 59 IN | RESPIRATION RATE: 18 BRPM | TEMPERATURE: 98.3 F | WEIGHT: 100 LBS | OXYGEN SATURATION: 99 % | HEART RATE: 100 BPM | BODY MASS INDEX: 20.16 KG/M2

## 2022-09-19 DIAGNOSIS — Z98.890 OTHER SPECIFIED POSTPROCEDURAL STATES: Chronic | ICD-10-CM

## 2022-09-19 DIAGNOSIS — Z90.12 ACQUIRED ABSENCE OF LEFT BREAST AND NIPPLE: Chronic | ICD-10-CM

## 2022-09-19 DIAGNOSIS — Z00.00 ENCOUNTER FOR GENERAL ADULT MEDICAL EXAMINATION WITHOUT ABNORMAL FINDINGS: ICD-10-CM

## 2022-09-19 PROCEDURE — G0463: CPT

## 2022-09-19 NOTE — PHYSICAL EXAM
[No Respiratory Distress] : no respiratory distress  [No Accessory Muscle Use] : no accessory muscle use [Normal] : no joint swelling and grossly normal strength and tone

## 2022-09-20 DIAGNOSIS — J44.9 CHRONIC OBSTRUCTIVE PULMONARY DISEASE, UNSPECIFIED: ICD-10-CM

## 2022-09-20 DIAGNOSIS — F17.210 NICOTINE DEPENDENCE, CIGARETTES, UNCOMPLICATED: ICD-10-CM

## 2022-09-20 NOTE — HISTORY OF PRESENT ILLNESS
[FreeTextEntry2] : 66 YO F Sent to ER from clinic on 9/12 for SOB, requiring home O2 administration. Patient with a PMH of COPD and patient continues to smoke a cigarette every few days. Patient states that she feels better and had an xray done, had DuoNeb administered and was discharged the same day. Patient denies any fever, chills, chest pain, shortness of breath, nausea, vomiting, headache, cough, leg pain dizziness or weakness. Patient had flu shot given by pulmonologist earlier, will bring paperwork at next session\par \par \par

## 2022-10-06 ENCOUNTER — RX RENEWAL (OUTPATIENT)
Age: 67
End: 2022-10-06

## 2022-10-21 ENCOUNTER — APPOINTMENT (OUTPATIENT)
Dept: FAMILY MEDICINE | Facility: HOSPITAL | Age: 67
End: 2022-10-21

## 2022-10-21 ENCOUNTER — OUTPATIENT (OUTPATIENT)
Dept: OUTPATIENT SERVICES | Facility: HOSPITAL | Age: 67
LOS: 1 days | End: 2022-10-21
Payer: MEDICARE

## 2022-10-21 ENCOUNTER — RESULT CHARGE (OUTPATIENT)
Age: 67
End: 2022-10-21

## 2022-10-21 VITALS
BODY MASS INDEX: 20.16 KG/M2 | OXYGEN SATURATION: 100 % | TEMPERATURE: 98.6 F | WEIGHT: 100 LBS | RESPIRATION RATE: 18 BRPM | HEART RATE: 100 BPM | SYSTOLIC BLOOD PRESSURE: 127 MMHG | DIASTOLIC BLOOD PRESSURE: 81 MMHG | HEIGHT: 59 IN

## 2022-10-21 DIAGNOSIS — Z00.00 ENCOUNTER FOR GENERAL ADULT MEDICAL EXAMINATION WITHOUT ABNORMAL FINDINGS: ICD-10-CM

## 2022-10-21 DIAGNOSIS — Z98.890 OTHER SPECIFIED POSTPROCEDURAL STATES: Chronic | ICD-10-CM

## 2022-10-21 DIAGNOSIS — Z90.12 ACQUIRED ABSENCE OF LEFT BREAST AND NIPPLE: Chronic | ICD-10-CM

## 2022-10-21 LAB — HBA1C MFR BLD HPLC: 5.6

## 2022-10-21 PROCEDURE — 82043 UR ALBUMIN QUANTITATIVE: CPT

## 2022-10-21 PROCEDURE — G0463: CPT

## 2022-10-21 RX ORDER — BUPROPION HYDROCHLORIDE 150 MG/1
150 TABLET, EXTENDED RELEASE ORAL
Refills: 0 | Status: COMPLETED | COMMUNITY
Start: 2022-02-04 | End: 2022-10-21

## 2022-10-21 NOTE — PHYSICAL EXAM
[No Respiratory Distress] : no respiratory distress  [No Accessory Muscle Use] : no accessory muscle use [Normal] : no joint swelling and grossly normal strength and tone [de-identified] : Breath sounds present, no wheezes or rales bilaterally.

## 2022-10-21 NOTE — REVIEW OF SYSTEMS
[Negative] : Heme/Lymph [Chest Pain] : no chest pain [Palpitations] : no palpitations [Paroxysmal Nocturnal Dyspnea] : no paroxysmal nocturnal dyspnea [Shortness Of Breath] : no shortness of breath [Wheezing] : no wheezing [Cough] : no cough [Dyspnea on Exertion] : no dyspnea on exertion [Abdominal Pain] : no abdominal pain [Nausea] : no nausea [Constipation] : no constipation [Vomiting] : no vomiting [Joint Pain] : no joint pain [Joint Stiffness] : no joint stiffness [Muscle Pain] : no muscle pain [Itching] : no itching [Skin Rash] : no skin rash [Headache] : no headache [Dizziness] : no dizziness [Memory Loss] : no memory loss [Unsteady Walking] : no ataxia [Suicidal] : not suicidal [FreeTextEntry6] : resipiratory symptoms improving

## 2022-10-21 NOTE — HISTORY OF PRESENT ILLNESS
[de-identified] : 68 YO F with a PMH of COPD, T2DM, presents for follow up of COPD. Patient was recently in ER for SOB. She has Home O2, and is not using it this morning, she is able to speak in complete sentences. Patient is jittery which she states is due to her not smoking for a bit, she has tried gums and patches and bupropion, which she does not like to use. Patient received flu shot at pulmonologist and will get her bivalent vaccine when she can. DM A1c 5.6 today. Taking medications as indicated. Patient still smokes 1 cigarette every few days, and states that she feels jittery when she doesn’t smoke.

## 2022-10-22 DIAGNOSIS — F12.122 CANNABIS ABUSE WITH INTOXICATION WITH PERCEPTUAL DISTURBANCE: ICD-10-CM

## 2022-10-22 DIAGNOSIS — J44.9 CHRONIC OBSTRUCTIVE PULMONARY DISEASE, UNSPECIFIED: ICD-10-CM

## 2022-10-22 DIAGNOSIS — E11.9 TYPE 2 DIABETES MELLITUS WITHOUT COMPLICATIONS: ICD-10-CM

## 2022-10-22 DIAGNOSIS — F17.219 NICOTINE DEPENDENCE, CIGARETTES, WITH UNSPECIFIED NICOTINE-INDUCED DISORDERS: ICD-10-CM

## 2022-10-24 LAB
CREAT SPEC-SCNC: 135 MG/DL
MICROALBUMIN 24H UR DL<=1MG/L-MCNC: 10.3 MG/DL
MICROALBUMIN/CREAT 24H UR-RTO: 76 MG/G

## 2022-11-04 NOTE — DISCHARGE NOTE NURSING/CASE MANAGEMENT/SOCIAL WORK - NSDCPEHOTLINE_GEN_ALL_CORE
November 4, 2022     Patient: Danielle Castrejon  YOB: 1984  Date of Visit: 11/4/2022      To Whom it May Concern:    Kaye Stapleton is under my professional care  Kaye was seen in my office on 11/4/2022  Kaye may return to work on 11/6/2022  If you have any questions or concerns, please don't hesitate to call           Sincerely,          Juliet Rodriguez MD        CC: No Recipients
[facility] Smokers Quitline [phone] ([phone])
[facility] Smokers Quitline [phone] ([phone])

## 2022-11-19 ENCOUNTER — APPOINTMENT (OUTPATIENT)
Dept: FAMILY MEDICINE | Facility: HOSPITAL | Age: 67
End: 2022-11-19

## 2022-11-19 ENCOUNTER — MED ADMIN CHARGE (OUTPATIENT)
Age: 67
End: 2022-11-19

## 2022-11-19 ENCOUNTER — OUTPATIENT (OUTPATIENT)
Dept: OUTPATIENT SERVICES | Facility: HOSPITAL | Age: 67
LOS: 1 days | End: 2022-11-19
Payer: MEDICARE

## 2022-11-19 VITALS
WEIGHT: 100 LBS | OXYGEN SATURATION: 98 % | TEMPERATURE: 98.6 F | SYSTOLIC BLOOD PRESSURE: 128 MMHG | HEIGHT: 59 IN | HEART RATE: 87 BPM | DIASTOLIC BLOOD PRESSURE: 74 MMHG | RESPIRATION RATE: 18 BRPM | BODY MASS INDEX: 20.16 KG/M2

## 2022-11-19 DIAGNOSIS — Z00.00 ENCOUNTER FOR GENERAL ADULT MEDICAL EXAMINATION WITHOUT ABNORMAL FINDINGS: ICD-10-CM

## 2022-11-19 PROCEDURE — G0463: CPT | Mod: 25

## 2022-11-19 PROCEDURE — G0008: CPT

## 2022-11-19 RX ORDER — LEVOFLOXACIN 500 MG/1
500 TABLET, FILM COATED ORAL
Qty: 5 | Refills: 0 | Status: COMPLETED | COMMUNITY
Start: 2022-09-12

## 2022-11-19 RX ORDER — SODIUM PICOSULFATE, MAGNESIUM OXIDE, AND ANHYDROUS CITRIC ACID 10; 3.5; 12 MG/160ML; G/160ML; G/160ML
10-3.5-12 MG-GM LIQUID ORAL
Qty: 320 | Refills: 0 | Status: COMPLETED | COMMUNITY
Start: 2022-11-01

## 2022-11-19 NOTE — HEALTH RISK ASSESSMENT
[Never] : Never [20 or more] : 20 or more [No] : No [PHQ-2 Positive] : PHQ-2 Positive [1] : 2) Feeling down, depressed, or hopeless for several days (1)

## 2022-11-21 DIAGNOSIS — E11.9 TYPE 2 DIABETES MELLITUS WITHOUT COMPLICATIONS: ICD-10-CM

## 2022-11-21 DIAGNOSIS — J44.9 CHRONIC OBSTRUCTIVE PULMONARY DISEASE, UNSPECIFIED: ICD-10-CM

## 2022-11-23 ENCOUNTER — EMERGENCY (EMERGENCY)
Facility: HOSPITAL | Age: 67
LOS: 1 days | Discharge: HOME | End: 2022-11-23
Attending: EMERGENCY MEDICINE | Admitting: EMERGENCY MEDICINE
Payer: MEDICARE

## 2022-11-23 VITALS
TEMPERATURE: 97 F | HEART RATE: 119 BPM | WEIGHT: 97 LBS | OXYGEN SATURATION: 98 % | DIASTOLIC BLOOD PRESSURE: 81 MMHG | SYSTOLIC BLOOD PRESSURE: 178 MMHG | HEIGHT: 59 IN | RESPIRATION RATE: 32 BRPM

## 2022-11-23 DIAGNOSIS — Z90.12 ACQUIRED ABSENCE OF LEFT BREAST AND NIPPLE: Chronic | ICD-10-CM

## 2022-11-23 DIAGNOSIS — Z98.890 OTHER SPECIFIED POSTPROCEDURAL STATES: Chronic | ICD-10-CM

## 2022-11-23 LAB
ALBUMIN SERPL ELPH-MCNC: 4.2 G/DL — SIGNIFICANT CHANGE UP (ref 3.3–5)
ALP SERPL-CCNC: 105 U/L — SIGNIFICANT CHANGE UP (ref 40–120)
ALT FLD-CCNC: 45 U/L — SIGNIFICANT CHANGE UP (ref 10–45)
ANION GAP SERPL CALC-SCNC: 11 MMOL/L — SIGNIFICANT CHANGE UP (ref 5–17)
AST SERPL-CCNC: 45 U/L — HIGH (ref 10–40)
BASOPHILS # BLD AUTO: 0.02 K/UL — SIGNIFICANT CHANGE UP (ref 0–0.2)
BASOPHILS NFR BLD AUTO: 0.3 % — SIGNIFICANT CHANGE UP (ref 0–2)
BILIRUB SERPL-MCNC: 0.3 MG/DL — SIGNIFICANT CHANGE UP (ref 0.2–1.2)
BUN SERPL-MCNC: 19 MG/DL — SIGNIFICANT CHANGE UP (ref 7–23)
CALCIUM SERPL-MCNC: 10.1 MG/DL — SIGNIFICANT CHANGE UP (ref 8.4–10.5)
CHLORIDE SERPL-SCNC: 100 MMOL/L — SIGNIFICANT CHANGE UP (ref 96–108)
CO2 SERPL-SCNC: 28 MMOL/L — SIGNIFICANT CHANGE UP (ref 22–31)
CREAT SERPL-MCNC: 0.8 MG/DL — SIGNIFICANT CHANGE UP (ref 0.5–1.3)
EGFR: 82 ML/MIN/1.73M2 — SIGNIFICANT CHANGE UP
EOSINOPHIL # BLD AUTO: 0.06 K/UL — SIGNIFICANT CHANGE UP (ref 0–0.5)
EOSINOPHIL NFR BLD AUTO: 0.8 % — SIGNIFICANT CHANGE UP (ref 0–6)
GLUCOSE SERPL-MCNC: 90 MG/DL — SIGNIFICANT CHANGE UP (ref 70–99)
HCT VFR BLD CALC: 34.7 % — SIGNIFICANT CHANGE UP (ref 34.5–45)
HGB BLD-MCNC: 10.4 G/DL — LOW (ref 11.5–15.5)
IMM GRANULOCYTES NFR BLD AUTO: 0.3 % — SIGNIFICANT CHANGE UP (ref 0–0.9)
LYMPHOCYTES # BLD AUTO: 2.13 K/UL — SIGNIFICANT CHANGE UP (ref 1–3.3)
LYMPHOCYTES # BLD AUTO: 27.5 % — SIGNIFICANT CHANGE UP (ref 13–44)
MCHC RBC-ENTMCNC: 23.3 PG — LOW (ref 27–34)
MCHC RBC-ENTMCNC: 30 GM/DL — LOW (ref 32–36)
MCV RBC AUTO: 77.8 FL — LOW (ref 80–100)
MONOCYTES # BLD AUTO: 0.74 K/UL — SIGNIFICANT CHANGE UP (ref 0–0.9)
MONOCYTES NFR BLD AUTO: 9.6 % — SIGNIFICANT CHANGE UP (ref 2–14)
NEUTROPHILS # BLD AUTO: 4.77 K/UL — SIGNIFICANT CHANGE UP (ref 1.8–7.4)
NEUTROPHILS NFR BLD AUTO: 61.5 % — SIGNIFICANT CHANGE UP (ref 43–77)
NRBC # BLD: 0 /100 WBCS — SIGNIFICANT CHANGE UP (ref 0–0)
PLATELET # BLD AUTO: 431 K/UL — HIGH (ref 150–400)
POTASSIUM SERPL-MCNC: 4.1 MMOL/L — SIGNIFICANT CHANGE UP (ref 3.5–5.3)
POTASSIUM SERPL-SCNC: 4.1 MMOL/L — SIGNIFICANT CHANGE UP (ref 3.5–5.3)
PROT SERPL-MCNC: 8.8 G/DL — HIGH (ref 6–8.3)
RAPID RVP RESULT: SIGNIFICANT CHANGE UP
RBC # BLD: 4.46 M/UL — SIGNIFICANT CHANGE UP (ref 3.8–5.2)
RBC # FLD: 16.8 % — HIGH (ref 10.3–14.5)
SARS-COV-2 RNA SPEC QL NAA+PROBE: SIGNIFICANT CHANGE UP
SODIUM SERPL-SCNC: 139 MMOL/L — SIGNIFICANT CHANGE UP (ref 135–145)
TROPONIN I, HIGH SENSITIVITY RESULT: 11.9 NG/L — SIGNIFICANT CHANGE UP
WBC # BLD: 7.74 K/UL — SIGNIFICANT CHANGE UP (ref 3.8–10.5)
WBC # FLD AUTO: 7.74 K/UL — SIGNIFICANT CHANGE UP (ref 3.8–10.5)

## 2022-11-23 PROCEDURE — 99285 EMERGENCY DEPT VISIT HI MDM: CPT

## 2022-11-23 PROCEDURE — 71045 X-RAY EXAM CHEST 1 VIEW: CPT | Mod: 26

## 2022-11-23 PROCEDURE — 93010 ELECTROCARDIOGRAM REPORT: CPT

## 2022-11-23 RX ORDER — IPRATROPIUM/ALBUTEROL SULFATE 18-103MCG
3 AEROSOL WITH ADAPTER (GRAM) INHALATION
Refills: 0 | Status: COMPLETED | OUTPATIENT
Start: 2022-11-23 | End: 2022-11-23

## 2022-11-23 RX ORDER — IPRATROPIUM/ALBUTEROL SULFATE 18-103MCG
3 AEROSOL WITH ADAPTER (GRAM) INHALATION
Refills: 0 | Status: COMPLETED | OUTPATIENT
Start: 2022-11-23 | End: 2022-11-24

## 2022-11-23 RX ADMIN — Medication 3 MILLILITER(S): at 21:42

## 2022-11-23 RX ADMIN — Medication 3 MILLILITER(S): at 21:57

## 2022-11-23 RX ADMIN — Medication 125 MILLIGRAM(S): at 21:42

## 2022-11-23 RX ADMIN — Medication 3 MILLILITER(S): at 23:49

## 2022-11-23 RX ADMIN — Medication 3 MILLILITER(S): at 22:30

## 2022-11-23 NOTE — ED PROVIDER NOTE - CLINICAL SUMMARY MEDICAL DECISION MAKING FREE TEXT BOX
Patient is a [age]-year-old female with history of COPD on home oxygen active smoker presented complaining exacerbation of COPD with wheezing on exam patient had a very minimal distress coarse wheezing bilaterally she was given at DuoNebs multiple times and IVs Solu-Medrol she was improved after treatment the wheezing was cleared patient had a oxygen saturation of 93 to 94% on room air so she was placed on 2 L and she was satting 90s saturating 97% patient does have a home oxygen she was advised advised to use it she was also advised to use albuterol nebulizer every 4 hours for next 3 days she was also prescribed Medrol Dosepak and was advised to follow-up with her primary doctor.

## 2022-11-23 NOTE — ED ADULT TRIAGE NOTE - PAIN: PRESENCE, MLM
Patient is presented to clinic to check the status of [facility] Form paperwork that  Casandra Pickard MD received. The form had been  given  to the Dr Mei Mcmillan. Patient Name: Cris Blackwell Number:  Mobile [phone]       Additional Info: Dr Mei Mcmillan informed patient he wanted to add dates to form. Ralph Cadena stated it is on his desk filled out and signed but patient wants to make sure it is fully complete. Ralph Cadena will check with Dr Mei Mcmillan and bring to the front and I will call patient to let him know they are ready. Patient was advised that they will receive a call back from our clinic within 24-48 hours. denies pain/discomfort

## 2022-11-23 NOTE — ED ADULT NURSE NOTE - OBJECTIVE STATEMENT
pt axo3, c/o difficulty breathing since this morning. upon arrival to ED, pt airway is patent and breathing is tachypneic. pt states that she has hx of COPD and uses home O2 3L. pt used breathing treatments at home prior to arrival, but breathing was still difficult. pt denies chest pain. safety maintained.

## 2022-11-23 NOTE — ED PROVIDER NOTE - PHYSICAL EXAMINATION
General:     NAD, well-nourished, well-appearing, distress minimal   Head:     NC/AT, EOMI, oral mucosa moist  Neck:     supple  Lungs:     poor air entry B/l, + wheezing  CVS:     S1S2, RRR, no m/g/r  Abd:     +BS, s/nt/nd, no organomegaly  Ext:    2+ radial and pedal pulses, no c/c/e  Neuro: grossly intact

## 2022-11-23 NOTE — ED PROVIDER NOTE - PATIENT PORTAL LINK FT
You can access the FollowMyHealth Patient Portal offered by [facility] by registering at the following website: [email or web address]. By joining [employer] [facility]’s FollowMyHealth portal, you will also be able to view your health information using other applications (apps) compatible with our system.

## 2022-11-23 NOTE — ED PROVIDER NOTE - NSFOLLOWUPINSTRUCTIONS_ED_ALL_ED_FT
Chronic Obstructive Pulmonary Disease    Chronic obstructive pulmonary disease (COPD) is a lung condition in which airflow from the lungs is limited. Causes include smoking, secondhand smoke exposure, genetics, or recurrent infections. Take all medicines (inhaled or pills) as directed by your health care provider. Avoid exposure to irritants such as smoke, chemicals, and fumes that aggravate your breathing.  please use oxygen at home  take nebulized albuterol every 4 hours for 3 days then PRN  take medrol dose pack as directed on packet     If you are a smoker, the most important thing that you can do is stop smoking. Continuing to smoke will cause further lung damage and breathing trouble. Ask your health care provider for help with quitting smoking.    SEEK IMMEDIATE MEDICAL CARE IF YOU HAVE ANY OF THE FOLLOWING SYMPTOMS: shortness of breath at rest or when talking, bluish discoloration of lips, skin, fever, worsening cough, unexplained chest pain, or lightheadedness/dizziness.

## 2022-11-23 NOTE — ED PROVIDER NOTE - OBJECTIVE STATEMENT
67-year-old female active smoker with history of COPD asthma presents to the ED complaining mild shortness of breath since this afternoon.  She used her nebulizer treatment at home without any relief.  Denies any fever denies any chest pain, denies any runny nose nasal congestion.

## 2022-11-23 NOTE — ED PROVIDER NOTE - WET READ LAUNCH FT
[id number] [facility]  Admission Note     Admission Type:   Admission Type: Involuntary    Reason for admission:  Reason for Admission: recent manic behaviors spending money, recent bought a AR 15 to protect the city, has been threatening to shoot people family concerned, pt denies wanting to hurt people states \"I'm a [place] I love Trump\"         Addictive Behavior:   Addictive Behavior  In the Past 3 Months, Have You Felt or Has Someone Told You That You Have a Problem With  : None    Medical Problems:   History reviewed. No pertinent past medical history.     Status EXAM:  Mental Status and Behavioral Exam  Normal: No  Level of Assistance: Independent/Self  Facial Expression: Exaggerated,Euphoric  Affect: Unstable  Level of Consciousness: Alert  Frequency of Checks: 4 times per hour, close  Mood:Normal: No  Mood: Anxious,Elated,Euphoric  Motor Activity:Normal: No  Motor Activity: Increased  Eye Contact: Fair  Observed Behavior: Preoccupied,Impulsive,Hypermobile  Sexual Misconduct History: Past - no  Preception: [place] to person,[place] to time,[place] to place  Attention:Normal: No  Attention: Distractible,Unable to concentrate  Thought Processes: Flight of ideas,Tangential  Thought Content:Normal: No  Thought Content: Delusions,Preoccupations  Depression Symptoms: Impaired concentration  Anxiety Symptoms: Obsessions  Rochelle Symptoms: Flight of ideas,Grandiosity,Increased energy,Increased spending,Labile,Poor judgment,Pressured speech  Hallucinations: None  Delusions: Yes  Delusions: Grandeur,Mosque,Obsessions  Memory:Normal: No  Memory: Poor recent  Insight and Judgment: No  Insight and Judgment: Poor judgment,Poor insight,Unrealistic    Tobacco Screening:  Practical Counseling, on admission, arti X, if applicable and completed (first 3 are required if patient doesn't refuse):            ( )  Recognizing danger situations (included triggers and roadblocks)                    ( )  Coping skills (new ways to manage stress, exercise, relaxation techniques, changing routine, distraction)                                                           ( )  Basic information about quitting (benefits of quitting, techniques in how to quit, available resources  ( ) Referral for counseling faxed to [facility]                                           ( ) Patient refused counseling  (X ) Patient has not smoked in the last 30 days    Metabolic Screening:    No results found for: LABA1C    No results found for: CHOL  No results found for: TRIG  No results found for: HDL  No components found for: LDLCAL  No results found for: LABVLDL      Body mass index is 22.56 kg/m². BP Readings from Last 2 Encounters:   07/06/22 127/79   09/03/21 131/75           Pt admitted with followings belongings:  Dental Appliances: None  Vision - Corrective Lenses: None  Hearing Aid: None  Jewelry: None  Body Piercings Removed: No  Clothing: Footwear,Shirt,Shorts,Socks,Undergarments  Other Valuables: Other (Comment) (none)     Patient's home medications were NA. Patient oriented to surroundings and program expectations and copy of patient rights given. Received admission packet:  yes. Consents reviewed, signed no. Refused to sign any paperwork. Patient verbalize understanding:  yes. Patient education on precautions: yes    PT admitted to unit and wanded for safety. PT has had increase in manic behaviors the last few weeks and was brought to ED by family. Per family pt has been spending a lot of money believes he is going to make a million dollars this month. 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 Per family pt recently bought a AR 13 and has been driving around in the car with it threatening to shoot people. PT is hyper verbal and grandiose upon admission. PT upset about unit rules due to he has so many 's and rappers calling  Him he needs his phone.   PT denies suicidal or homicidal thoughts states \"I'm a [place] and love Trump\". PT denies any alcohol use states he does smoke marijuana and is worried it was laced.              Renate Leonardo RN There are no Wet Read(s) to document.

## 2022-11-24 VITALS
DIASTOLIC BLOOD PRESSURE: 73 MMHG | RESPIRATION RATE: 18 BRPM | HEART RATE: 102 BPM | SYSTOLIC BLOOD PRESSURE: 125 MMHG | OXYGEN SATURATION: 94 %

## 2022-11-24 PROCEDURE — 36415 COLL VENOUS BLD VENIPUNCTURE: CPT

## 2022-11-24 PROCEDURE — 99285 EMERGENCY DEPT VISIT HI MDM: CPT | Mod: 25

## 2022-11-24 PROCEDURE — 71045 X-RAY EXAM CHEST 1 VIEW: CPT

## 2022-11-24 PROCEDURE — 84484 ASSAY OF TROPONIN QUANT: CPT

## 2022-11-24 PROCEDURE — 80053 COMPREHEN METABOLIC PANEL: CPT

## 2022-11-24 PROCEDURE — 93005 ELECTROCARDIOGRAM TRACING: CPT

## 2022-11-24 PROCEDURE — 96374 THER/PROPH/DIAG INJ IV PUSH: CPT

## 2022-11-24 PROCEDURE — 94640 AIRWAY INHALATION TREATMENT: CPT

## 2022-11-24 PROCEDURE — 85025 COMPLETE CBC W/AUTO DIFF WBC: CPT

## 2022-11-24 PROCEDURE — 0225U NFCT DS DNA&RNA 21 SARSCOV2: CPT

## 2022-11-24 RX ORDER — ALBUTEROL 90 UG/1
3 AEROSOL, METERED ORAL
Qty: 1 | Refills: 0
Start: 2022-11-24 | End: 2022-12-23

## 2022-11-24 RX ADMIN — Medication 3 MILLILITER(S): at 00:18

## 2022-11-24 RX ADMIN — Medication 3 MILLILITER(S): at 00:49

## 2022-11-24 NOTE — ED PROVIDER NOTE - BIRTH SEX
· Not on medications per chart review  · Consider psych consult while in patient  · Supportive care Female

## 2022-11-25 ENCOUNTER — NON-APPOINTMENT (OUTPATIENT)
Age: 67
End: 2022-11-25

## 2022-12-01 ENCOUNTER — APPOINTMENT (OUTPATIENT)
Dept: FAMILY MEDICINE | Facility: HOSPITAL | Age: 67
End: 2022-12-01

## 2022-12-03 NOTE — CHART NOTE - NSCHARTNOTEFT_GEN_A_CORE
SW called pt to discuss and assist with follow up care.  Pt is 68 y/o female presented to ED for Breathing difficulty.  As per HIE, pt had scheduled primary care appt with Jose Raul Johns on 12/1/22.

## 2022-12-15 ENCOUNTER — APPOINTMENT (OUTPATIENT)
Dept: OPHTHALMOLOGY | Facility: CLINIC | Age: 67
End: 2022-12-15

## 2022-12-15 ENCOUNTER — NON-APPOINTMENT (OUTPATIENT)
Age: 67
End: 2022-12-15

## 2022-12-15 ENCOUNTER — OUTPATIENT (OUTPATIENT)
Dept: OUTPATIENT SERVICES | Facility: HOSPITAL | Age: 67
LOS: 1 days | End: 2022-12-15
Payer: MEDICARE

## 2022-12-15 ENCOUNTER — APPOINTMENT (OUTPATIENT)
Dept: FAMILY MEDICINE | Facility: HOSPITAL | Age: 67
End: 2022-12-15

## 2022-12-15 VITALS
DIASTOLIC BLOOD PRESSURE: 79 MMHG | RESPIRATION RATE: 18 BRPM | OXYGEN SATURATION: 98 % | BODY MASS INDEX: 19.39 KG/M2 | SYSTOLIC BLOOD PRESSURE: 157 MMHG | WEIGHT: 96 LBS | TEMPERATURE: 97.4 F | HEART RATE: 110 BPM

## 2022-12-15 VITALS — SYSTOLIC BLOOD PRESSURE: 145 MMHG | DIASTOLIC BLOOD PRESSURE: 75 MMHG

## 2022-12-15 DIAGNOSIS — Z90.12 ACQUIRED ABSENCE OF LEFT BREAST AND NIPPLE: Chronic | ICD-10-CM

## 2022-12-15 DIAGNOSIS — Z98.890 OTHER SPECIFIED POSTPROCEDURAL STATES: Chronic | ICD-10-CM

## 2022-12-15 DIAGNOSIS — Z00.00 ENCOUNTER FOR GENERAL ADULT MEDICAL EXAMINATION WITHOUT ABNORMAL FINDINGS: ICD-10-CM

## 2022-12-15 PROCEDURE — G0463: CPT

## 2022-12-15 RX ORDER — LOSARTAN POTASSIUM AND HYDROCHLOROTHIAZIDE 12.5; 1 MG/1; MG/1
100-12.5 TABLET ORAL DAILY
Qty: 90 | Refills: 2 | Status: ACTIVE | COMMUNITY
Start: 2022-12-15 | End: 1900-01-01

## 2022-12-15 RX ORDER — BUPROPION HYDROCHLORIDE 150 MG/1
150 TABLET, EXTENDED RELEASE ORAL DAILY
Qty: 180 | Refills: 3 | Status: ACTIVE | COMMUNITY
Start: 2022-03-21 | End: 1900-01-01

## 2022-12-15 RX ORDER — LOSARTAN POTASSIUM 100 MG/1
100 TABLET, FILM COATED ORAL
Qty: 60 | Refills: 1 | Status: DISCONTINUED | COMMUNITY
Start: 2022-08-03 | End: 2022-12-15

## 2022-12-15 RX ORDER — ATORVASTATIN CALCIUM 80 MG/1
80 TABLET, FILM COATED ORAL
Qty: 90 | Refills: 3 | Status: ACTIVE | COMMUNITY
Start: 2019-04-12 | End: 1900-01-01

## 2022-12-15 NOTE — PHYSICAL EXAM
[No Acute Distress] : no acute distress [No Respiratory Distress] : no respiratory distress  [Scattered Wheezes] : scattered wheezing was heard [Prolonged Exp Time] : expiratory time was prolonged [Rales / Crackles Right Base] : crackles were heard over the right base [Rales / Crackles Left Base] : crackles were heard over the left  base [Wheezing Unilaterally On The Right At The Midlung Field] : wheezing was heard over the right midlung field [Wheezing Unilaterally On The Left At The Midlung Field] : wheezing was heard over the left midlung field [Decreased Breath Sounds Bilaterally] : breath sounds were diminished over both lungs [Normal to Percussion] : the lungs were normal to percussion [Normal Rate] : normal rate  [Normal Affect] : the affect was normal [Normal Insight/Judgement] : insight and judgment were intact

## 2022-12-15 NOTE — REVIEW OF SYSTEMS
[Dyspnea on Exertion] : dyspnea on exertion [Fever] : no fever [Discharge] : no discharge [Earache] : no earache [Chest Pain] : no chest pain [Palpitations] : no palpitations [Orthopnea] : no orthopnea [Shortness Of Breath] : no shortness of breath [Wheezing] : no wheezing [Abdominal Pain] : no abdominal pain [Vomiting] : no vomiting [Dysuria] : no dysuria [Anxiety] : no anxiety [Depression] : no depression

## 2022-12-15 NOTE — HISTORY OF PRESENT ILLNESS
[Other: ___] : [unfilled] [FreeTextEntry6] : Patient continues to get short of breath when she is walking too fast.

## 2022-12-15 NOTE — PLAN
[FreeTextEntry1] : #COPD\par s/p exacerbation\par patient at baseline\par asked patient to have Dr. Nash's office send over recent records, including PFT reports\par Symbicort\par Albuterol prn as needed\par Spiriva\par \par #Hx of smoking cessation\par patient interested in quitting again\par Nicotine patch\par Wellbutrin daily increase to 300mg \par \par RTC in 2 weeks for f/u with PCP and DM check\par \par Discussed with Dr. Hernandez

## 2022-12-16 DIAGNOSIS — F17.210 NICOTINE DEPENDENCE, CIGARETTES, UNCOMPLICATED: ICD-10-CM

## 2022-12-16 DIAGNOSIS — I10 ESSENTIAL (PRIMARY) HYPERTENSION: ICD-10-CM

## 2022-12-16 DIAGNOSIS — J44.9 CHRONIC OBSTRUCTIVE PULMONARY DISEASE, UNSPECIFIED: ICD-10-CM

## 2023-01-01 ENCOUNTER — TRANSCRIPTION ENCOUNTER (OUTPATIENT)
Age: 68
End: 2023-01-01

## 2023-01-01 ENCOUNTER — MED ADMIN CHARGE (OUTPATIENT)
Age: 68
End: 2023-01-01

## 2023-01-01 ENCOUNTER — NON-APPOINTMENT (OUTPATIENT)
Age: 68
End: 2023-01-01

## 2023-01-01 ENCOUNTER — OUTPATIENT (OUTPATIENT)
Dept: OUTPATIENT SERVICES | Facility: HOSPITAL | Age: 68
LOS: 1 days | End: 2023-01-01
Payer: MEDICARE

## 2023-01-01 ENCOUNTER — APPOINTMENT (OUTPATIENT)
Dept: FAMILY MEDICINE | Facility: HOSPITAL | Age: 68
End: 2023-01-01

## 2023-01-01 ENCOUNTER — APPOINTMENT (OUTPATIENT)
Dept: MAMMOGRAPHY | Facility: HOSPITAL | Age: 68
End: 2023-01-01
Payer: MEDICARE

## 2023-01-01 ENCOUNTER — INPATIENT (INPATIENT)
Facility: HOSPITAL | Age: 68
LOS: 19 days | Discharge: NURSING, REHAB OR LONG-TERM CARE | DRG: 208 | End: 2023-11-16
Attending: FAMILY MEDICINE | Admitting: INTERNAL MEDICINE
Payer: MEDICARE

## 2023-01-01 ENCOUNTER — LABORATORY RESULT (OUTPATIENT)
Age: 68
End: 2023-01-01

## 2023-01-01 ENCOUNTER — RESULT REVIEW (OUTPATIENT)
Age: 68
End: 2023-01-01

## 2023-01-01 ENCOUNTER — INPATIENT (INPATIENT)
Facility: HOSPITAL | Age: 68
LOS: 6 days | Discharge: NURSING, REHAB OR LONG-TERM CARE | DRG: 189 | End: 2023-12-11
Attending: STUDENT IN AN ORGANIZED HEALTH CARE EDUCATION/TRAINING PROGRAM | Admitting: FAMILY MEDICINE
Payer: MEDICARE

## 2023-01-01 ENCOUNTER — RX RENEWAL (OUTPATIENT)
Age: 68
End: 2023-01-01

## 2023-01-01 ENCOUNTER — APPOINTMENT (OUTPATIENT)
Dept: CT IMAGING | Facility: HOSPITAL | Age: 68
End: 2023-01-01
Payer: MEDICARE

## 2023-01-01 ENCOUNTER — APPOINTMENT (OUTPATIENT)
Dept: THORACIC SURGERY | Facility: CLINIC | Age: 68
End: 2023-01-01
Payer: MEDICARE

## 2023-01-01 ENCOUNTER — INPATIENT (INPATIENT)
Facility: HOSPITAL | Age: 68
LOS: 3 days | Discharge: NURSING, REHAB OR LONG-TERM CARE | DRG: 871 | End: 2023-12-31
Attending: FAMILY MEDICINE | Admitting: INTERNAL MEDICINE
Payer: MEDICARE

## 2023-01-01 VITALS
WEIGHT: 79 LBS | HEART RATE: 92 BPM | SYSTOLIC BLOOD PRESSURE: 107 MMHG | RESPIRATION RATE: 14 BRPM | BODY MASS INDEX: 15.96 KG/M2 | DIASTOLIC BLOOD PRESSURE: 68 MMHG | OXYGEN SATURATION: 100 % | TEMPERATURE: 97.6 F

## 2023-01-01 VITALS
DIASTOLIC BLOOD PRESSURE: 78 MMHG | OXYGEN SATURATION: 100 % | HEART RATE: 78 BPM | RESPIRATION RATE: 15 BRPM | SYSTOLIC BLOOD PRESSURE: 117 MMHG | WEIGHT: 79 LBS | BODY MASS INDEX: 15.96 KG/M2

## 2023-01-01 VITALS
RESPIRATION RATE: 32 BRPM | DIASTOLIC BLOOD PRESSURE: 72 MMHG | OXYGEN SATURATION: 100 % | WEIGHT: 74.08 LBS | HEIGHT: 59 IN | SYSTOLIC BLOOD PRESSURE: 164 MMHG | TEMPERATURE: 99 F | HEART RATE: 123 BPM

## 2023-01-01 VITALS
HEART RATE: 112 BPM | WEIGHT: 81 LBS | RESPIRATION RATE: 16 BRPM | OXYGEN SATURATION: 96 % | DIASTOLIC BLOOD PRESSURE: 65 MMHG | BODY MASS INDEX: 16.36 KG/M2 | TEMPERATURE: 98.2 F | SYSTOLIC BLOOD PRESSURE: 105 MMHG

## 2023-01-01 VITALS
DIASTOLIC BLOOD PRESSURE: 79 MMHG | SYSTOLIC BLOOD PRESSURE: 147 MMHG | OXYGEN SATURATION: 98 % | BODY MASS INDEX: 18.35 KG/M2 | TEMPERATURE: 98.6 F | HEART RATE: 105 BPM | RESPIRATION RATE: 18 BRPM | HEIGHT: 59 IN | WEIGHT: 91 LBS

## 2023-01-01 VITALS
RESPIRATION RATE: 18 BRPM | TEMPERATURE: 97.3 F | DIASTOLIC BLOOD PRESSURE: 57 MMHG | OXYGEN SATURATION: 96 % | HEART RATE: 106 BPM | SYSTOLIC BLOOD PRESSURE: 113 MMHG

## 2023-01-01 VITALS — OXYGEN SATURATION: 100 %

## 2023-01-01 VITALS
TEMPERATURE: 99 F | HEART RATE: 134 BPM | WEIGHT: 93.04 LBS | RESPIRATION RATE: 23 BRPM | SYSTOLIC BLOOD PRESSURE: 90 MMHG | OXYGEN SATURATION: 100 % | DIASTOLIC BLOOD PRESSURE: 70 MMHG

## 2023-01-01 VITALS
TEMPERATURE: 98.2 F | RESPIRATION RATE: 18 BRPM | SYSTOLIC BLOOD PRESSURE: 124 MMHG | HEART RATE: 99 BPM | DIASTOLIC BLOOD PRESSURE: 75 MMHG | OXYGEN SATURATION: 97 %

## 2023-01-01 VITALS
TEMPERATURE: 98 F | HEART RATE: 123 BPM | RESPIRATION RATE: 14 BRPM | WEIGHT: 80 LBS | HEIGHT: 59 IN | OXYGEN SATURATION: 97 % | BODY MASS INDEX: 16.13 KG/M2 | SYSTOLIC BLOOD PRESSURE: 94 MMHG | DIASTOLIC BLOOD PRESSURE: 59 MMHG

## 2023-01-01 VITALS
HEART RATE: 83 BPM | BODY MASS INDEX: 17.37 KG/M2 | WEIGHT: 86 LBS | DIASTOLIC BLOOD PRESSURE: 69 MMHG | SYSTOLIC BLOOD PRESSURE: 139 MMHG | TEMPERATURE: 98.7 F | RESPIRATION RATE: 17 BRPM | OXYGEN SATURATION: 96 %

## 2023-01-01 VITALS — RESPIRATION RATE: 15 BRPM | WEIGHT: 80 LBS | BODY MASS INDEX: 16.16 KG/M2

## 2023-01-01 VITALS
SYSTOLIC BLOOD PRESSURE: 109 MMHG | HEIGHT: 59 IN | RESPIRATION RATE: 26 BRPM | WEIGHT: 85.1 LBS | TEMPERATURE: 99 F | OXYGEN SATURATION: 96 % | HEART RATE: 85 BPM | DIASTOLIC BLOOD PRESSURE: 64 MMHG

## 2023-01-01 VITALS
DIASTOLIC BLOOD PRESSURE: 70 MMHG | HEART RATE: 112 BPM | SYSTOLIC BLOOD PRESSURE: 129 MMHG | RESPIRATION RATE: 16 BRPM | TEMPERATURE: 98.2 F | OXYGEN SATURATION: 98 %

## 2023-01-01 VITALS — HEART RATE: 95 BPM | OXYGEN SATURATION: 97 %

## 2023-01-01 VITALS — DIASTOLIC BLOOD PRESSURE: 81 MMHG | SYSTOLIC BLOOD PRESSURE: 145 MMHG | HEART RATE: 106 BPM

## 2023-01-01 VITALS — BODY MASS INDEX: 15.72 KG/M2 | WEIGHT: 78 LBS | HEIGHT: 59 IN

## 2023-01-01 DIAGNOSIS — Z98.890 OTHER SPECIFIED POSTPROCEDURAL STATES: Chronic | ICD-10-CM

## 2023-01-01 DIAGNOSIS — A41.9 SEPSIS, UNSPECIFIED ORGANISM: ICD-10-CM

## 2023-01-01 DIAGNOSIS — Z12.39 ENCOUNTER FOR OTHER SCREENING FOR MALIGNANT NEOPLASM OF BREAST: ICD-10-CM

## 2023-01-01 DIAGNOSIS — F17.219 NICOTINE DEPENDENCE, CIGARETTES, WITH UNSPECIFIED NICOTINE-INDUCED DISORDERS: ICD-10-CM

## 2023-01-01 DIAGNOSIS — Z23 ENCOUNTER FOR IMMUNIZATION: ICD-10-CM

## 2023-01-01 DIAGNOSIS — D64.9 ANEMIA, UNSPECIFIED: ICD-10-CM

## 2023-01-01 DIAGNOSIS — Z12.11 ENCOUNTER FOR SCREENING FOR MALIGNANT NEOPLASM OF COLON: ICD-10-CM

## 2023-01-01 DIAGNOSIS — Z00.00 ENCOUNTER FOR GENERAL ADULT MEDICAL EXAMINATION WITHOUT ABNORMAL FINDINGS: ICD-10-CM

## 2023-01-01 DIAGNOSIS — J44.9 CHRONIC OBSTRUCTIVE PULMONARY DISEASE, UNSPECIFIED: ICD-10-CM

## 2023-01-01 DIAGNOSIS — E87.6 HYPOKALEMIA: ICD-10-CM

## 2023-01-01 DIAGNOSIS — Z12.2 ENCOUNTER FOR SCREENING FOR MALIGNANT NEOPLASM OF RESPIRATORY ORGANS: ICD-10-CM

## 2023-01-01 DIAGNOSIS — E43 UNSPECIFIED SEVERE PROTEIN-CALORIE MALNUTRITION: ICD-10-CM

## 2023-01-01 DIAGNOSIS — J44.1 CHRONIC OBSTRUCTIVE PULMONARY DISEASE WITH (ACUTE) EXACERBATION: ICD-10-CM

## 2023-01-01 DIAGNOSIS — Z90.12 ACQUIRED ABSENCE OF LEFT BREAST AND NIPPLE: Chronic | ICD-10-CM

## 2023-01-01 DIAGNOSIS — I25.10 ATHEROSCLEROTIC HEART DISEASE OF NATIVE CORONARY ARTERY WITHOUT ANGINA PECTORIS: ICD-10-CM

## 2023-01-01 DIAGNOSIS — J96.21 ACUTE AND CHRONIC RESPIRATORY FAILURE WITH HYPOXIA: ICD-10-CM

## 2023-01-01 DIAGNOSIS — R71.0 PRECIPITOUS DROP IN HEMATOCRIT: ICD-10-CM

## 2023-01-01 DIAGNOSIS — E11.9 TYPE 2 DIABETES MELLITUS WITHOUT COMPLICATIONS: ICD-10-CM

## 2023-01-01 DIAGNOSIS — Z87.891 PERSONAL HISTORY OF NICOTINE DEPENDENCE: ICD-10-CM

## 2023-01-01 DIAGNOSIS — Z09 ENCOUNTER FOR FOLLOW-UP EXAMINATION AFTER COMPLETED TREATMENT FOR CONDITIONS OTHER THAN MALIGNANT NEOPLASM: ICD-10-CM

## 2023-01-01 DIAGNOSIS — Z71.85 ENCOUNTER FOR IMMUNIZATION SAFETY COUNSELING: ICD-10-CM

## 2023-01-01 DIAGNOSIS — K63.5 POLYP OF COLON: ICD-10-CM

## 2023-01-01 DIAGNOSIS — K80.20 CALCULUS OF GALLBLADDER WITHOUT CHOLECYSTITIS WITHOUT OBSTRUCTION: ICD-10-CM

## 2023-01-01 DIAGNOSIS — C50.919 MALIGNANT NEOPLASM OF UNSPECIFIED SITE OF UNSPECIFIED FEMALE BREAST: ICD-10-CM

## 2023-01-01 DIAGNOSIS — F17.210 NICOTINE DEPENDENCE, CIGARETTES, UNCOMPLICATED: ICD-10-CM

## 2023-01-01 DIAGNOSIS — L98.429 NON-PRESSURE CHRONIC ULCER OF BACK WITH UNSPECIFIED SEVERITY: ICD-10-CM

## 2023-01-01 DIAGNOSIS — I27.23 PULMONARY HYPERTENSION DUE TO LUNG DISEASES AND HYPOXIA: ICD-10-CM

## 2023-01-01 DIAGNOSIS — R79.89 OTHER SPECIFIED ABNORMAL FINDINGS OF BLOOD CHEMISTRY: ICD-10-CM

## 2023-01-01 DIAGNOSIS — Z85.3 PERSONAL HISTORY OF MALIGNANT NEOPLASM OF BREAST: ICD-10-CM

## 2023-01-01 DIAGNOSIS — Z71.89 OTHER SPECIFIED COUNSELING: ICD-10-CM

## 2023-01-01 DIAGNOSIS — I51.89 OTHER ILL-DEFINED HEART DISEASES: ICD-10-CM

## 2023-01-01 DIAGNOSIS — Z95.5 PRESENCE OF CORONARY ANGIOPLASTY IMPLANT AND GRAFT: ICD-10-CM

## 2023-01-01 DIAGNOSIS — J44.9 PULMONARY HYPERTENSION DUE TO LUNG DISEASES AND HYPOXIA: ICD-10-CM

## 2023-01-01 DIAGNOSIS — F41.1 GENERALIZED ANXIETY DISORDER: ICD-10-CM

## 2023-01-01 DIAGNOSIS — F93.8 OTHER CHILDHOOD EMOTIONAL DISORDERS: ICD-10-CM

## 2023-01-01 DIAGNOSIS — Z29.9 ENCOUNTER FOR PROPHYLACTIC MEASURES, UNSPECIFIED: ICD-10-CM

## 2023-01-01 DIAGNOSIS — E11.9 TYPE 2 DIABETES MELLITUS W/OUT COMPLICATIONS: ICD-10-CM

## 2023-01-01 LAB
-  AMPICILLIN: SIGNIFICANT CHANGE UP
-  AMPICILLIN: SIGNIFICANT CHANGE UP
-  CIPROFLOXACIN: SIGNIFICANT CHANGE UP
-  CIPROFLOXACIN: SIGNIFICANT CHANGE UP
-  LEVOFLOXACIN: SIGNIFICANT CHANGE UP
-  LEVOFLOXACIN: SIGNIFICANT CHANGE UP
-  NITROFURANTOIN: SIGNIFICANT CHANGE UP
-  NITROFURANTOIN: SIGNIFICANT CHANGE UP
-  STAPHYLOCOCCUS EPIDERMIDIS: SIGNIFICANT CHANGE UP
-  STAPHYLOCOCCUS EPIDERMIDIS: SIGNIFICANT CHANGE UP
-  TETRACYCLINE: SIGNIFICANT CHANGE UP
-  TETRACYCLINE: SIGNIFICANT CHANGE UP
-  VANCOMYCIN: SIGNIFICANT CHANGE UP
-  VANCOMYCIN: SIGNIFICANT CHANGE UP
A1C WITH ESTIMATED AVERAGE GLUCOSE RESULT: 5.1 % — SIGNIFICANT CHANGE UP (ref 4–5.6)
A1C WITH ESTIMATED AVERAGE GLUCOSE RESULT: 5.1 % — SIGNIFICANT CHANGE UP (ref 4–5.6)
A1C WITH ESTIMATED AVERAGE GLUCOSE RESULT: 5.3 % — SIGNIFICANT CHANGE UP (ref 4–5.6)
A1C WITH ESTIMATED AVERAGE GLUCOSE RESULT: 5.3 % — SIGNIFICANT CHANGE UP (ref 4–5.6)
ABO RH CONFIRMATION: SIGNIFICANT CHANGE UP
ABO RH CONFIRMATION: SIGNIFICANT CHANGE UP
ALBUMIN SERPL ELPH-MCNC: 1.6 G/DL — LOW (ref 3.3–5)
ALBUMIN SERPL ELPH-MCNC: 1.6 G/DL — LOW (ref 3.3–5)
ALBUMIN SERPL ELPH-MCNC: 1.7 G/DL — LOW (ref 3.3–5)
ALBUMIN SERPL ELPH-MCNC: 1.7 G/DL — LOW (ref 3.3–5)
ALBUMIN SERPL ELPH-MCNC: 1.8 G/DL — LOW (ref 3.3–5)
ALBUMIN SERPL ELPH-MCNC: 1.8 G/DL — LOW (ref 3.3–5)
ALBUMIN SERPL ELPH-MCNC: 2.1 G/DL — LOW (ref 3.3–5)
ALBUMIN SERPL ELPH-MCNC: 2.1 G/DL — LOW (ref 3.3–5)
ALBUMIN SERPL ELPH-MCNC: 2.2 G/DL — LOW (ref 3.3–5)
ALBUMIN SERPL ELPH-MCNC: 2.2 G/DL — LOW (ref 3.3–5)
ALBUMIN SERPL ELPH-MCNC: 2.3 G/DL — LOW (ref 3.3–5)
ALBUMIN SERPL ELPH-MCNC: 2.3 G/DL — LOW (ref 3.3–5)
ALBUMIN SERPL ELPH-MCNC: 2.4 G/DL — LOW (ref 3.3–5)
ALBUMIN SERPL ELPH-MCNC: 2.4 G/DL — LOW (ref 3.3–5)
ALBUMIN SERPL ELPH-MCNC: 2.5 G/DL — LOW (ref 3.3–5)
ALBUMIN SERPL ELPH-MCNC: 2.5 G/DL — LOW (ref 3.3–5)
ALBUMIN SERPL ELPH-MCNC: 2.6 G/DL — LOW (ref 3.3–5)
ALBUMIN SERPL ELPH-MCNC: 2.6 G/DL — LOW (ref 3.3–5)
ALBUMIN SERPL ELPH-MCNC: 2.7 G/DL — LOW (ref 3.3–5)
ALBUMIN SERPL ELPH-MCNC: 2.7 G/DL — LOW (ref 3.3–5)
ALBUMIN SERPL ELPH-MCNC: 2.8 G/DL — LOW (ref 3.3–5)
ALBUMIN SERPL ELPH-MCNC: 2.8 G/DL — LOW (ref 3.3–5)
ALBUMIN SERPL ELPH-MCNC: 3.1 G/DL — LOW (ref 3.3–5)
ALBUMIN SERPL ELPH-MCNC: 3.3 G/DL — SIGNIFICANT CHANGE UP (ref 3.3–5)
ALBUMIN SERPL ELPH-MCNC: 3.3 G/DL — SIGNIFICANT CHANGE UP (ref 3.3–5)
ALBUMIN SERPL ELPH-MCNC: 3.4 G/DL — SIGNIFICANT CHANGE UP (ref 3.3–5)
ALBUMIN SERPL ELPH-MCNC: 3.4 G/DL — SIGNIFICANT CHANGE UP (ref 3.3–5)
ALBUMIN SERPL ELPH-MCNC: 4 G/DL — SIGNIFICANT CHANGE UP (ref 3.3–5)
ALBUMIN SERPL ELPH-MCNC: 4 G/DL — SIGNIFICANT CHANGE UP (ref 3.3–5)
ALP SERPL-CCNC: 43 U/L — SIGNIFICANT CHANGE UP (ref 40–120)
ALP SERPL-CCNC: 43 U/L — SIGNIFICANT CHANGE UP (ref 40–120)
ALP SERPL-CCNC: 49 U/L — SIGNIFICANT CHANGE UP (ref 40–120)
ALP SERPL-CCNC: 49 U/L — SIGNIFICANT CHANGE UP (ref 40–120)
ALP SERPL-CCNC: 50 U/L — SIGNIFICANT CHANGE UP (ref 40–120)
ALP SERPL-CCNC: 50 U/L — SIGNIFICANT CHANGE UP (ref 40–120)
ALP SERPL-CCNC: 51 U/L — SIGNIFICANT CHANGE UP (ref 40–120)
ALP SERPL-CCNC: 51 U/L — SIGNIFICANT CHANGE UP (ref 40–120)
ALP SERPL-CCNC: 52 U/L — SIGNIFICANT CHANGE UP (ref 40–120)
ALP SERPL-CCNC: 56 U/L — SIGNIFICANT CHANGE UP (ref 40–120)
ALP SERPL-CCNC: 56 U/L — SIGNIFICANT CHANGE UP (ref 40–120)
ALP SERPL-CCNC: 57 U/L — SIGNIFICANT CHANGE UP (ref 40–120)
ALP SERPL-CCNC: 57 U/L — SIGNIFICANT CHANGE UP (ref 40–120)
ALP SERPL-CCNC: 59 U/L — SIGNIFICANT CHANGE UP (ref 40–120)
ALP SERPL-CCNC: 59 U/L — SIGNIFICANT CHANGE UP (ref 40–120)
ALP SERPL-CCNC: 60 U/L — SIGNIFICANT CHANGE UP (ref 40–120)
ALP SERPL-CCNC: 60 U/L — SIGNIFICANT CHANGE UP (ref 40–120)
ALP SERPL-CCNC: 63 U/L — SIGNIFICANT CHANGE UP (ref 40–120)
ALP SERPL-CCNC: 63 U/L — SIGNIFICANT CHANGE UP (ref 40–120)
ALP SERPL-CCNC: 70 U/L — SIGNIFICANT CHANGE UP (ref 40–120)
ALP SERPL-CCNC: 70 U/L — SIGNIFICANT CHANGE UP (ref 40–120)
ALP SERPL-CCNC: 76 U/L — SIGNIFICANT CHANGE UP (ref 40–120)
ALP SERPL-CCNC: 76 U/L — SIGNIFICANT CHANGE UP (ref 40–120)
ALP SERPL-CCNC: 79 U/L — SIGNIFICANT CHANGE UP (ref 40–120)
ALP SERPL-CCNC: 79 U/L — SIGNIFICANT CHANGE UP (ref 40–120)
ALP SERPL-CCNC: 85 U/L — SIGNIFICANT CHANGE UP (ref 40–120)
ALP SERPL-CCNC: 85 U/L — SIGNIFICANT CHANGE UP (ref 40–120)
ALP SERPL-CCNC: 91 U/L — SIGNIFICANT CHANGE UP (ref 40–120)
ALP SERPL-CCNC: 91 U/L — SIGNIFICANT CHANGE UP (ref 40–120)
ALT FLD-CCNC: 24 U/L — SIGNIFICANT CHANGE UP (ref 10–45)
ALT FLD-CCNC: 24 U/L — SIGNIFICANT CHANGE UP (ref 10–45)
ALT FLD-CCNC: 27 U/L — SIGNIFICANT CHANGE UP (ref 10–45)
ALT FLD-CCNC: 27 U/L — SIGNIFICANT CHANGE UP (ref 10–45)
ALT FLD-CCNC: 28 U/L — SIGNIFICANT CHANGE UP (ref 10–45)
ALT FLD-CCNC: 30 U/L — SIGNIFICANT CHANGE UP (ref 10–45)
ALT FLD-CCNC: 30 U/L — SIGNIFICANT CHANGE UP (ref 10–45)
ALT FLD-CCNC: 32 U/L — SIGNIFICANT CHANGE UP (ref 10–45)
ALT FLD-CCNC: 35 U/L — SIGNIFICANT CHANGE UP (ref 10–45)
ALT FLD-CCNC: 39 U/L — SIGNIFICANT CHANGE UP (ref 10–45)
ALT FLD-CCNC: 39 U/L — SIGNIFICANT CHANGE UP (ref 10–45)
ALT FLD-CCNC: 43 U/L — SIGNIFICANT CHANGE UP (ref 10–45)
ALT FLD-CCNC: 43 U/L — SIGNIFICANT CHANGE UP (ref 10–45)
ALT FLD-CCNC: 51 U/L — HIGH (ref 10–45)
ALT FLD-CCNC: 51 U/L — HIGH (ref 10–45)
ALT FLD-CCNC: 56 U/L — HIGH (ref 10–45)
ALT FLD-CCNC: 57 U/L — HIGH (ref 10–45)
ALT FLD-CCNC: 57 U/L — HIGH (ref 10–45)
ANION GAP SERPL CALC-SCNC: 0 MMOL/L — LOW (ref 5–17)
ANION GAP SERPL CALC-SCNC: 1 MMOL/L — LOW (ref 5–17)
ANION GAP SERPL CALC-SCNC: 1 MMOL/L — LOW (ref 5–17)
ANION GAP SERPL CALC-SCNC: 10 MMOL/L — SIGNIFICANT CHANGE UP (ref 5–17)
ANION GAP SERPL CALC-SCNC: 10 MMOL/L — SIGNIFICANT CHANGE UP (ref 5–17)
ANION GAP SERPL CALC-SCNC: 11 MMOL/L — SIGNIFICANT CHANGE UP (ref 5–17)
ANION GAP SERPL CALC-SCNC: 11 MMOL/L — SIGNIFICANT CHANGE UP (ref 5–17)
ANION GAP SERPL CALC-SCNC: 2 MMOL/L — LOW (ref 5–17)
ANION GAP SERPL CALC-SCNC: 3 MMOL/L — LOW (ref 5–17)
ANION GAP SERPL CALC-SCNC: 4 MMOL/L — LOW (ref 5–17)
ANION GAP SERPL CALC-SCNC: 5 MMOL/L — SIGNIFICANT CHANGE UP (ref 5–17)
ANION GAP SERPL CALC-SCNC: 6 MMOL/L — SIGNIFICANT CHANGE UP (ref 5–17)
ANION GAP SERPL CALC-SCNC: 7 MMOL/L — SIGNIFICANT CHANGE UP (ref 5–17)
ANION GAP SERPL CALC-SCNC: 8 MMOL/L — SIGNIFICANT CHANGE UP (ref 5–17)
ANION GAP SERPL CALC-SCNC: 9 MMOL/L — SIGNIFICANT CHANGE UP (ref 5–17)
ANION GAP SERPL CALC-SCNC: 9 MMOL/L — SIGNIFICANT CHANGE UP (ref 5–17)
APPEARANCE UR: CLEAR — SIGNIFICANT CHANGE UP
APTT BLD: 30.5 SEC — SIGNIFICANT CHANGE UP (ref 24.5–35.6)
APTT BLD: 30.5 SEC — SIGNIFICANT CHANGE UP (ref 24.5–35.6)
APTT BLD: 37.1 SEC — HIGH (ref 24.5–35.6)
APTT BLD: 37.1 SEC — HIGH (ref 24.5–35.6)
APTT BLD: 51.6 SEC — HIGH (ref 24.5–35.6)
APTT BLD: 51.6 SEC — HIGH (ref 24.5–35.6)
AST SERPL-CCNC: 22 U/L — SIGNIFICANT CHANGE UP (ref 10–40)
AST SERPL-CCNC: 24 U/L — SIGNIFICANT CHANGE UP (ref 10–40)
AST SERPL-CCNC: 25 U/L — SIGNIFICANT CHANGE UP (ref 10–40)
AST SERPL-CCNC: 26 U/L — SIGNIFICANT CHANGE UP (ref 10–40)
AST SERPL-CCNC: 26 U/L — SIGNIFICANT CHANGE UP (ref 10–40)
AST SERPL-CCNC: 27 U/L — SIGNIFICANT CHANGE UP (ref 10–40)
AST SERPL-CCNC: 27 U/L — SIGNIFICANT CHANGE UP (ref 10–40)
AST SERPL-CCNC: 28 U/L — SIGNIFICANT CHANGE UP (ref 10–40)
AST SERPL-CCNC: 28 U/L — SIGNIFICANT CHANGE UP (ref 10–40)
AST SERPL-CCNC: 29 U/L — SIGNIFICANT CHANGE UP (ref 10–40)
AST SERPL-CCNC: 29 U/L — SIGNIFICANT CHANGE UP (ref 10–40)
AST SERPL-CCNC: 30 U/L — SIGNIFICANT CHANGE UP (ref 10–40)
AST SERPL-CCNC: 30 U/L — SIGNIFICANT CHANGE UP (ref 10–40)
AST SERPL-CCNC: 36 U/L — SIGNIFICANT CHANGE UP (ref 10–40)
AST SERPL-CCNC: 36 U/L — SIGNIFICANT CHANGE UP (ref 10–40)
AST SERPL-CCNC: 42 U/L — HIGH (ref 10–40)
AST SERPL-CCNC: 42 U/L — HIGH (ref 10–40)
AST SERPL-CCNC: 48 U/L — HIGH (ref 10–40)
AST SERPL-CCNC: 48 U/L — HIGH (ref 10–40)
AST SERPL-CCNC: 52 U/L — HIGH (ref 10–40)
AST SERPL-CCNC: 52 U/L — HIGH (ref 10–40)
AST SERPL-CCNC: 78 U/L — HIGH (ref 10–40)
AST SERPL-CCNC: 78 U/L — HIGH (ref 10–40)
BACTERIA # UR AUTO: ABNORMAL /HPF
BACTERIA # UR AUTO: ABNORMAL /HPF
BASE EXCESS BLDA CALC-SCNC: 1.5 MMOL/L — SIGNIFICANT CHANGE UP (ref -2–3)
BASE EXCESS BLDA CALC-SCNC: 1.5 MMOL/L — SIGNIFICANT CHANGE UP (ref -2–3)
BASE EXCESS BLDA CALC-SCNC: 1.6 MMOL/L — SIGNIFICANT CHANGE UP (ref -2–3)
BASE EXCESS BLDA CALC-SCNC: 1.6 MMOL/L — SIGNIFICANT CHANGE UP (ref -2–3)
BASE EXCESS BLDA CALC-SCNC: 1.8 MMOL/L — SIGNIFICANT CHANGE UP (ref -2–3)
BASE EXCESS BLDA CALC-SCNC: 1.8 MMOL/L — SIGNIFICANT CHANGE UP (ref -2–3)
BASE EXCESS BLDA CALC-SCNC: 10.2 MMOL/L — HIGH (ref -2–3)
BASE EXCESS BLDA CALC-SCNC: 10.2 MMOL/L — HIGH (ref -2–3)
BASE EXCESS BLDA CALC-SCNC: 10.4 MMOL/L — HIGH (ref -2–3)
BASE EXCESS BLDA CALC-SCNC: 10.4 MMOL/L — HIGH (ref -2–3)
BASE EXCESS BLDA CALC-SCNC: 11.5 MMOL/L — HIGH (ref -2–3)
BASE EXCESS BLDA CALC-SCNC: 11.5 MMOL/L — HIGH (ref -2–3)
BASE EXCESS BLDA CALC-SCNC: 11.8 MMOL/L — HIGH (ref -2–3)
BASE EXCESS BLDA CALC-SCNC: 11.8 MMOL/L — HIGH (ref -2–3)
BASE EXCESS BLDA CALC-SCNC: 13.5 MMOL/L — HIGH (ref -2–3)
BASE EXCESS BLDA CALC-SCNC: 13.5 MMOL/L — HIGH (ref -2–3)
BASE EXCESS BLDA CALC-SCNC: 14.1 MMOL/L — HIGH (ref -2–3)
BASE EXCESS BLDA CALC-SCNC: 14.1 MMOL/L — HIGH (ref -2–3)
BASE EXCESS BLDA CALC-SCNC: 15.3 MMOL/L — HIGH (ref -2–3)
BASE EXCESS BLDA CALC-SCNC: 15.3 MMOL/L — HIGH (ref -2–3)
BASE EXCESS BLDA CALC-SCNC: 2 MMOL/L — SIGNIFICANT CHANGE UP (ref -2–3)
BASE EXCESS BLDA CALC-SCNC: 2 MMOL/L — SIGNIFICANT CHANGE UP (ref -2–3)
BASE EXCESS BLDA CALC-SCNC: 5 MMOL/L — HIGH (ref -2–3)
BASE EXCESS BLDA CALC-SCNC: 5 MMOL/L — HIGH (ref -2–3)
BASE EXCESS BLDA CALC-SCNC: 5.2 MMOL/L — HIGH (ref -2–3)
BASE EXCESS BLDA CALC-SCNC: 5.2 MMOL/L — HIGH (ref -2–3)
BASE EXCESS BLDA CALC-SCNC: 5.3 MMOL/L — HIGH (ref -2–3)
BASE EXCESS BLDA CALC-SCNC: 5.3 MMOL/L — HIGH (ref -2–3)
BASE EXCESS BLDA CALC-SCNC: 9.6 MMOL/L — HIGH (ref -2–3)
BASE EXCESS BLDV CALC-SCNC: 7.2 MMOL/L — HIGH (ref -2–3)
BASE EXCESS BLDV CALC-SCNC: 7.2 MMOL/L — HIGH (ref -2–3)
BASOPHILS # BLD AUTO: 0 K/UL — SIGNIFICANT CHANGE UP (ref 0–0.2)
BASOPHILS # BLD AUTO: 0 K/UL — SIGNIFICANT CHANGE UP (ref 0–0.2)
BASOPHILS # BLD AUTO: 0.02 K/UL — SIGNIFICANT CHANGE UP (ref 0–0.2)
BASOPHILS # BLD AUTO: 0.04 K/UL
BASOPHILS # BLD AUTO: 0.04 K/UL — SIGNIFICANT CHANGE UP (ref 0–0.2)
BASOPHILS # BLD AUTO: 0.04 K/UL — SIGNIFICANT CHANGE UP (ref 0–0.2)
BASOPHILS # BLD AUTO: 0.05 K/UL
BASOPHILS # BLD AUTO: 0.05 K/UL — SIGNIFICANT CHANGE UP (ref 0–0.2)
BASOPHILS # BLD AUTO: 0.05 K/UL — SIGNIFICANT CHANGE UP (ref 0–0.2)
BASOPHILS # BLD AUTO: 0.06 K/UL — SIGNIFICANT CHANGE UP (ref 0–0.2)
BASOPHILS # BLD AUTO: 0.06 K/UL — SIGNIFICANT CHANGE UP (ref 0–0.2)
BASOPHILS NFR BLD AUTO: 0 % — SIGNIFICANT CHANGE UP (ref 0–2)
BASOPHILS NFR BLD AUTO: 0 % — SIGNIFICANT CHANGE UP (ref 0–2)
BASOPHILS NFR BLD AUTO: 0.1 % — SIGNIFICANT CHANGE UP (ref 0–2)
BASOPHILS NFR BLD AUTO: 0.1 % — SIGNIFICANT CHANGE UP (ref 0–2)
BASOPHILS NFR BLD AUTO: 0.2 % — SIGNIFICANT CHANGE UP (ref 0–2)
BASOPHILS NFR BLD AUTO: 0.2 % — SIGNIFICANT CHANGE UP (ref 0–2)
BASOPHILS NFR BLD AUTO: 0.3 % — SIGNIFICANT CHANGE UP (ref 0–2)
BASOPHILS NFR BLD AUTO: 0.4 % — SIGNIFICANT CHANGE UP (ref 0–2)
BASOPHILS NFR BLD AUTO: 0.4 % — SIGNIFICANT CHANGE UP (ref 0–2)
BASOPHILS NFR BLD AUTO: 0.6 %
BASOPHILS NFR BLD AUTO: 0.7 %
BILIRUB SERPL-MCNC: 0.2 MG/DL — SIGNIFICANT CHANGE UP (ref 0.2–1.2)
BILIRUB SERPL-MCNC: 0.3 MG/DL — SIGNIFICANT CHANGE UP (ref 0.2–1.2)
BILIRUB SERPL-MCNC: 0.4 MG/DL — SIGNIFICANT CHANGE UP (ref 0.2–1.2)
BILIRUB SERPL-MCNC: 0.5 MG/DL — SIGNIFICANT CHANGE UP (ref 0.2–1.2)
BILIRUB SERPL-MCNC: 0.6 MG/DL — SIGNIFICANT CHANGE UP (ref 0.2–1.2)
BILIRUB UR-MCNC: NEGATIVE — SIGNIFICANT CHANGE UP
BLD GP AB SCN SERPL QL: SIGNIFICANT CHANGE UP
BLD GP AB SCN SERPL QL: SIGNIFICANT CHANGE UP
BLOOD GAS COMMENTS ARTERIAL: SIGNIFICANT CHANGE UP
BUN SERPL-MCNC: 10 MG/DL — SIGNIFICANT CHANGE UP (ref 7–23)
BUN SERPL-MCNC: 10 MG/DL — SIGNIFICANT CHANGE UP (ref 7–23)
BUN SERPL-MCNC: 11 MG/DL — SIGNIFICANT CHANGE UP (ref 7–23)
BUN SERPL-MCNC: 12 MG/DL — SIGNIFICANT CHANGE UP (ref 7–23)
BUN SERPL-MCNC: 13 MG/DL — SIGNIFICANT CHANGE UP (ref 7–23)
BUN SERPL-MCNC: 14 MG/DL — SIGNIFICANT CHANGE UP (ref 7–23)
BUN SERPL-MCNC: 14 MG/DL — SIGNIFICANT CHANGE UP (ref 7–23)
BUN SERPL-MCNC: 15 MG/DL — SIGNIFICANT CHANGE UP (ref 7–23)
BUN SERPL-MCNC: 16 MG/DL — SIGNIFICANT CHANGE UP (ref 7–23)
BUN SERPL-MCNC: 19 MG/DL — SIGNIFICANT CHANGE UP (ref 7–23)
BUN SERPL-MCNC: 19 MG/DL — SIGNIFICANT CHANGE UP (ref 7–23)
BUN SERPL-MCNC: 22 MG/DL — SIGNIFICANT CHANGE UP (ref 7–23)
BUN SERPL-MCNC: 22 MG/DL — SIGNIFICANT CHANGE UP (ref 7–23)
BUN SERPL-MCNC: 3 MG/DL — LOW (ref 7–23)
BUN SERPL-MCNC: 3 MG/DL — LOW (ref 7–23)
BUN SERPL-MCNC: 30 MG/DL — HIGH (ref 7–23)
BUN SERPL-MCNC: 30 MG/DL — HIGH (ref 7–23)
BUN SERPL-MCNC: 31 MG/DL — HIGH (ref 7–23)
BUN SERPL-MCNC: 31 MG/DL — HIGH (ref 7–23)
BUN SERPL-MCNC: 32 MG/DL — HIGH (ref 7–23)
BUN SERPL-MCNC: 32 MG/DL — HIGH (ref 7–23)
BUN SERPL-MCNC: 44 MG/DL — HIGH (ref 7–23)
BUN SERPL-MCNC: 44 MG/DL — HIGH (ref 7–23)
BUN SERPL-MCNC: 6 MG/DL — LOW (ref 7–23)
BUN SERPL-MCNC: 6 MG/DL — LOW (ref 7–23)
BUN SERPL-MCNC: 7 MG/DL — SIGNIFICANT CHANGE UP (ref 7–23)
BUN SERPL-MCNC: 7 MG/DL — SIGNIFICANT CHANGE UP (ref 7–23)
BUN SERPL-MCNC: 8 MG/DL — SIGNIFICANT CHANGE UP (ref 7–23)
BUN SERPL-MCNC: 9 MG/DL — SIGNIFICANT CHANGE UP (ref 7–23)
CALCIUM SERPL-MCNC: 7.6 MG/DL — LOW (ref 8.4–10.5)
CALCIUM SERPL-MCNC: 7.6 MG/DL — LOW (ref 8.4–10.5)
CALCIUM SERPL-MCNC: 7.8 MG/DL — LOW (ref 8.4–10.5)
CALCIUM SERPL-MCNC: 7.9 MG/DL — LOW (ref 8.4–10.5)
CALCIUM SERPL-MCNC: 8 MG/DL — LOW (ref 8.4–10.5)
CALCIUM SERPL-MCNC: 8.1 MG/DL — LOW (ref 8.4–10.5)
CALCIUM SERPL-MCNC: 8.2 MG/DL — LOW (ref 8.4–10.5)
CALCIUM SERPL-MCNC: 8.3 MG/DL — LOW (ref 8.4–10.5)
CALCIUM SERPL-MCNC: 8.4 MG/DL — SIGNIFICANT CHANGE UP (ref 8.4–10.5)
CALCIUM SERPL-MCNC: 8.5 MG/DL — SIGNIFICANT CHANGE UP (ref 8.4–10.5)
CALCIUM SERPL-MCNC: 8.6 MG/DL — SIGNIFICANT CHANGE UP (ref 8.4–10.5)
CALCIUM SERPL-MCNC: 8.7 MG/DL — SIGNIFICANT CHANGE UP (ref 8.4–10.5)
CALCIUM SERPL-MCNC: 9.1 MG/DL — SIGNIFICANT CHANGE UP (ref 8.4–10.5)
CALCIUM SERPL-MCNC: 9.1 MG/DL — SIGNIFICANT CHANGE UP (ref 8.4–10.5)
CHLORIDE SERPL-SCNC: 100 MMOL/L — SIGNIFICANT CHANGE UP (ref 96–108)
CHLORIDE SERPL-SCNC: 101 MMOL/L — SIGNIFICANT CHANGE UP (ref 96–108)
CHLORIDE SERPL-SCNC: 102 MMOL/L — SIGNIFICANT CHANGE UP (ref 96–108)
CHLORIDE SERPL-SCNC: 103 MMOL/L — SIGNIFICANT CHANGE UP (ref 96–108)
CHLORIDE SERPL-SCNC: 104 MMOL/L — SIGNIFICANT CHANGE UP (ref 96–108)
CHLORIDE SERPL-SCNC: 105 MMOL/L — SIGNIFICANT CHANGE UP (ref 96–108)
CHLORIDE SERPL-SCNC: 106 MMOL/L — SIGNIFICANT CHANGE UP (ref 96–108)
CHLORIDE SERPL-SCNC: 95 MMOL/L — LOW (ref 96–108)
CHLORIDE SERPL-SCNC: 95 MMOL/L — LOW (ref 96–108)
CHLORIDE SERPL-SCNC: 97 MMOL/L — SIGNIFICANT CHANGE UP (ref 96–108)
CHLORIDE SERPL-SCNC: 97 MMOL/L — SIGNIFICANT CHANGE UP (ref 96–108)
CHLORIDE SERPL-SCNC: 98 MMOL/L — SIGNIFICANT CHANGE UP (ref 96–108)
CHLORIDE SERPL-SCNC: 98 MMOL/L — SIGNIFICANT CHANGE UP (ref 96–108)
CHLORIDE SERPL-SCNC: 99 MMOL/L — SIGNIFICANT CHANGE UP (ref 96–108)
CHLORIDE SERPL-SCNC: 99 MMOL/L — SIGNIFICANT CHANGE UP (ref 96–108)
CO2 BLDA-SCNC: 27 MMOL/L — HIGH (ref 19–24)
CO2 BLDA-SCNC: 28 MMOL/L — HIGH (ref 19–24)
CO2 BLDA-SCNC: 28 MMOL/L — HIGH (ref 19–24)
CO2 BLDA-SCNC: 29 MMOL/L — HIGH (ref 19–24)
CO2 BLDA-SCNC: 29 MMOL/L — HIGH (ref 19–24)
CO2 BLDA-SCNC: 30 MMOL/L — HIGH (ref 19–24)
CO2 BLDA-SCNC: 30 MMOL/L — HIGH (ref 19–24)
CO2 BLDA-SCNC: 31 MMOL/L — HIGH (ref 19–24)
CO2 BLDA-SCNC: 35 MMOL/L — HIGH (ref 19–24)
CO2 BLDA-SCNC: 36 MMOL/L — HIGH (ref 19–24)
CO2 BLDA-SCNC: 36 MMOL/L — HIGH (ref 19–24)
CO2 BLDA-SCNC: 37 MMOL/L — HIGH (ref 19–24)
CO2 BLDA-SCNC: 37 MMOL/L — HIGH (ref 19–24)
CO2 BLDA-SCNC: 38 MMOL/L — HIGH (ref 19–24)
CO2 BLDA-SCNC: 38 MMOL/L — HIGH (ref 19–24)
CO2 BLDA-SCNC: 40 MMOL/L — HIGH (ref 19–24)
CO2 BLDA-SCNC: 41 MMOL/L — HIGH (ref 19–24)
CO2 BLDA-SCNC: 41 MMOL/L — HIGH (ref 19–24)
CO2 BLDV-SCNC: 32 MMOL/L — HIGH (ref 22–26)
CO2 BLDV-SCNC: 32 MMOL/L — HIGH (ref 22–26)
CO2 SERPL-SCNC: 23 MMOL/L — SIGNIFICANT CHANGE UP (ref 22–31)
CO2 SERPL-SCNC: 23 MMOL/L — SIGNIFICANT CHANGE UP (ref 22–31)
CO2 SERPL-SCNC: 27 MMOL/L — SIGNIFICANT CHANGE UP (ref 22–31)
CO2 SERPL-SCNC: 28 MMOL/L — SIGNIFICANT CHANGE UP (ref 22–31)
CO2 SERPL-SCNC: 29 MMOL/L — SIGNIFICANT CHANGE UP (ref 22–31)
CO2 SERPL-SCNC: 30 MMOL/L — SIGNIFICANT CHANGE UP (ref 22–31)
CO2 SERPL-SCNC: 31 MMOL/L — SIGNIFICANT CHANGE UP (ref 22–31)
CO2 SERPL-SCNC: 33 MMOL/L — HIGH (ref 22–31)
CO2 SERPL-SCNC: 34 MMOL/L — HIGH (ref 22–31)
CO2 SERPL-SCNC: 35 MMOL/L — HIGH (ref 22–31)
CO2 SERPL-SCNC: 36 MMOL/L — HIGH (ref 22–31)
CO2 SERPL-SCNC: 37 MMOL/L — HIGH (ref 22–31)
CO2 SERPL-SCNC: 37 MMOL/L — HIGH (ref 22–31)
COLOR SPEC: YELLOW — SIGNIFICANT CHANGE UP
CREAT SERPL-MCNC: 0.2 MG/DL — LOW (ref 0.5–1.3)
CREAT SERPL-MCNC: 0.2 MG/DL — LOW (ref 0.5–1.3)
CREAT SERPL-MCNC: 0.21 MG/DL — LOW (ref 0.5–1.3)
CREAT SERPL-MCNC: 0.21 MG/DL — LOW (ref 0.5–1.3)
CREAT SERPL-MCNC: 0.22 MG/DL — LOW (ref 0.5–1.3)
CREAT SERPL-MCNC: 0.23 MG/DL — LOW (ref 0.5–1.3)
CREAT SERPL-MCNC: 0.24 MG/DL — LOW (ref 0.5–1.3)
CREAT SERPL-MCNC: 0.24 MG/DL — LOW (ref 0.5–1.3)
CREAT SERPL-MCNC: 0.25 MG/DL — LOW (ref 0.5–1.3)
CREAT SERPL-MCNC: 0.25 MG/DL — LOW (ref 0.5–1.3)
CREAT SERPL-MCNC: 0.26 MG/DL — LOW (ref 0.5–1.3)
CREAT SERPL-MCNC: 0.26 MG/DL — LOW (ref 0.5–1.3)
CREAT SERPL-MCNC: 0.27 MG/DL — LOW (ref 0.5–1.3)
CREAT SERPL-MCNC: 0.28 MG/DL — LOW (ref 0.5–1.3)
CREAT SERPL-MCNC: 0.28 MG/DL — LOW (ref 0.5–1.3)
CREAT SERPL-MCNC: 0.29 MG/DL — LOW (ref 0.5–1.3)
CREAT SERPL-MCNC: 0.29 MG/DL — LOW (ref 0.5–1.3)
CREAT SERPL-MCNC: 0.33 MG/DL — LOW (ref 0.5–1.3)
CREAT SERPL-MCNC: 0.33 MG/DL — LOW (ref 0.5–1.3)
CREAT SERPL-MCNC: 0.34 MG/DL — LOW (ref 0.5–1.3)
CREAT SERPL-MCNC: 0.34 MG/DL — LOW (ref 0.5–1.3)
CREAT SERPL-MCNC: 0.35 MG/DL — LOW (ref 0.5–1.3)
CREAT SERPL-MCNC: 0.35 MG/DL — LOW (ref 0.5–1.3)
CREAT SERPL-MCNC: 0.36 MG/DL — LOW (ref 0.5–1.3)
CREAT SERPL-MCNC: 0.39 MG/DL — LOW (ref 0.5–1.3)
CREAT SERPL-MCNC: 0.39 MG/DL — LOW (ref 0.5–1.3)
CREAT SERPL-MCNC: 0.48 MG/DL — LOW (ref 0.5–1.3)
CREAT SERPL-MCNC: 0.48 MG/DL — LOW (ref 0.5–1.3)
CREAT SERPL-MCNC: 0.5 MG/DL — SIGNIFICANT CHANGE UP (ref 0.5–1.3)
CREAT SERPL-MCNC: 0.5 MG/DL — SIGNIFICANT CHANGE UP (ref 0.5–1.3)
CREAT SERPL-MCNC: 0.53 MG/DL — SIGNIFICANT CHANGE UP (ref 0.5–1.3)
CREAT SERPL-MCNC: 0.53 MG/DL — SIGNIFICANT CHANGE UP (ref 0.5–1.3)
CREAT SERPL-MCNC: 0.56 MG/DL — SIGNIFICANT CHANGE UP (ref 0.5–1.3)
CREAT SERPL-MCNC: 0.56 MG/DL — SIGNIFICANT CHANGE UP (ref 0.5–1.3)
CREAT SERPL-MCNC: 0.58 MG/DL — SIGNIFICANT CHANGE UP (ref 0.5–1.3)
CREAT SERPL-MCNC: 0.58 MG/DL — SIGNIFICANT CHANGE UP (ref 0.5–1.3)
CREAT SERPL-MCNC: <0.2 MG/DL — LOW (ref 0.5–1.3)
CRP SERPL-MCNC: 202 MG/L — HIGH
CRP SERPL-MCNC: 202 MG/L — HIGH
CULTURE RESULTS: ABNORMAL
CULTURE RESULTS: NO GROWTH — SIGNIFICANT CHANGE UP
CULTURE RESULTS: NO GROWTH — SIGNIFICANT CHANGE UP
CULTURE RESULTS: SIGNIFICANT CHANGE UP
D DIMER BLD IA.RAPID-MCNC: <150 NG/ML DDU — SIGNIFICANT CHANGE UP
D DIMER BLD IA.RAPID-MCNC: <150 NG/ML DDU — SIGNIFICANT CHANGE UP
DIFF PNL FLD: ABNORMAL
DIFF PNL FLD: NEGATIVE — SIGNIFICANT CHANGE UP
DIFF PNL FLD: NEGATIVE — SIGNIFICANT CHANGE UP
EGFR: 100 ML/MIN/1.73M2 — SIGNIFICANT CHANGE UP
EGFR: 102 ML/MIN/1.73M2 — SIGNIFICANT CHANGE UP
EGFR: 102 ML/MIN/1.73M2 — SIGNIFICANT CHANGE UP
EGFR: 104 ML/MIN/1.73M2 — SIGNIFICANT CHANGE UP
EGFR: 110 ML/MIN/1.73M2 — SIGNIFICANT CHANGE UP
EGFR: 110 ML/MIN/1.73M2 — SIGNIFICANT CHANGE UP
EGFR: 112 ML/MIN/1.73M2 — SIGNIFICANT CHANGE UP
EGFR: 113 ML/MIN/1.73M2 — SIGNIFICANT CHANGE UP
EGFR: 113 ML/MIN/1.73M2 — SIGNIFICANT CHANGE UP
EGFR: 114 ML/MIN/1.73M2 — SIGNIFICANT CHANGE UP
EGFR: 118 ML/MIN/1.73M2 — SIGNIFICANT CHANGE UP
EGFR: 118 ML/MIN/1.73M2 — SIGNIFICANT CHANGE UP
EGFR: 119 ML/MIN/1.73M2 — SIGNIFICANT CHANGE UP
EGFR: 119 ML/MIN/1.73M2 — SIGNIFICANT CHANGE UP
EGFR: 120 ML/MIN/1.73M2 — SIGNIFICANT CHANGE UP
EGFR: 121 ML/MIN/1.73M2 — SIGNIFICANT CHANGE UP
EGFR: 121 ML/MIN/1.73M2 — SIGNIFICANT CHANGE UP
EGFR: 122 ML/MIN/1.73M2 — SIGNIFICANT CHANGE UP
EGFR: 122 ML/MIN/1.73M2 — SIGNIFICANT CHANGE UP
EGFR: 123 ML/MIN/1.73M2 — SIGNIFICANT CHANGE UP
EGFR: 123 ML/MIN/1.73M2 — SIGNIFICANT CHANGE UP
EGFR: 124 ML/MIN/1.73M2 — SIGNIFICANT CHANGE UP
EGFR: 126 ML/MIN/1.73M2 — SIGNIFICANT CHANGE UP
EGFR: 127 ML/MIN/1.73M2 — SIGNIFICANT CHANGE UP
EGFR: 127 ML/MIN/1.73M2 — SIGNIFICANT CHANGE UP
EGFR: 129 ML/MIN/1.73M2 — SIGNIFICANT CHANGE UP
EGFR: 129 ML/MIN/1.73M2 — SIGNIFICANT CHANGE UP
EGFR: 131 ML/MIN/1.73M2 — SIGNIFICANT CHANGE UP
EGFR: 131 ML/MIN/1.73M2 — SIGNIFICANT CHANGE UP
EGFR: 132 ML/MIN/1.73M2 — SIGNIFICANT CHANGE UP
EGFR: 132 ML/MIN/1.73M2 — SIGNIFICANT CHANGE UP
EGFR: 136 ML/MIN/1.73M2 — SIGNIFICANT CHANGE UP
EGFR: 136 ML/MIN/1.73M2 — SIGNIFICANT CHANGE UP
EGFR: 138 ML/MIN/1.73M2 — SIGNIFICANT CHANGE UP
EGFR: 138 ML/MIN/1.73M2 — SIGNIFICANT CHANGE UP
EGFR: 146 ML/MIN/1.73M2 — SIGNIFICANT CHANGE UP
EGFR: 156 ML/MIN/1.73M2 — SIGNIFICANT CHANGE UP
EGFR: 156 ML/MIN/1.73M2 — SIGNIFICANT CHANGE UP
EGFR: SIGNIFICANT CHANGE UP ML/MIN/1.73M2
EGFR: SIGNIFICANT CHANGE UP ML/MIN/1.73M2
EOSINOPHIL # BLD AUTO: 0 K/UL — SIGNIFICANT CHANGE UP (ref 0–0.5)
EOSINOPHIL # BLD AUTO: 0.01 K/UL — SIGNIFICANT CHANGE UP (ref 0–0.5)
EOSINOPHIL # BLD AUTO: 0.02 K/UL — SIGNIFICANT CHANGE UP (ref 0–0.5)
EOSINOPHIL # BLD AUTO: 0.04 K/UL
EOSINOPHIL # BLD AUTO: 0.07 K/UL
EOSINOPHIL NFR BLD AUTO: 0 % — SIGNIFICANT CHANGE UP (ref 0–6)
EOSINOPHIL NFR BLD AUTO: 0.1 % — SIGNIFICANT CHANGE UP (ref 0–6)
EOSINOPHIL NFR BLD AUTO: 0.5 %
EOSINOPHIL NFR BLD AUTO: 1.2 %
EPI CELLS # UR: 0 — SIGNIFICANT CHANGE UP
EPI CELLS # UR: 0 — SIGNIFICANT CHANGE UP
ESTIMATED AVERAGE GLUCOSE: 100 MG/DL — SIGNIFICANT CHANGE UP (ref 68–114)
ESTIMATED AVERAGE GLUCOSE: 100 MG/DL — SIGNIFICANT CHANGE UP (ref 68–114)
ESTIMATED AVERAGE GLUCOSE: 105 MG/DL — SIGNIFICANT CHANGE UP (ref 68–114)
ESTIMATED AVERAGE GLUCOSE: 105 MG/DL — SIGNIFICANT CHANGE UP (ref 68–114)
FERRITIN SERPL-MCNC: 49 NG/ML
GAS PNL BLDA: SIGNIFICANT CHANGE UP
GAS PNL BLDV: SIGNIFICANT CHANGE UP
GAS PNL BLDV: SIGNIFICANT CHANGE UP
GLUCOSE BLDC GLUCOMTR-MCNC: 112 MG/DL — HIGH (ref 70–99)
GLUCOSE BLDC GLUCOMTR-MCNC: 112 MG/DL — HIGH (ref 70–99)
GLUCOSE BLDC GLUCOMTR-MCNC: 113 MG/DL — HIGH (ref 70–99)
GLUCOSE BLDC GLUCOMTR-MCNC: 117 MG/DL — HIGH (ref 70–99)
GLUCOSE BLDC GLUCOMTR-MCNC: 118 MG/DL — HIGH (ref 70–99)
GLUCOSE BLDC GLUCOMTR-MCNC: 118 MG/DL — HIGH (ref 70–99)
GLUCOSE BLDC GLUCOMTR-MCNC: 119 MG/DL — HIGH (ref 70–99)
GLUCOSE BLDC GLUCOMTR-MCNC: 119 MG/DL — HIGH (ref 70–99)
GLUCOSE BLDC GLUCOMTR-MCNC: 121 MG/DL — HIGH (ref 70–99)
GLUCOSE BLDC GLUCOMTR-MCNC: 121 MG/DL — HIGH (ref 70–99)
GLUCOSE BLDC GLUCOMTR-MCNC: 123 MG/DL — HIGH (ref 70–99)
GLUCOSE BLDC GLUCOMTR-MCNC: 123 MG/DL — HIGH (ref 70–99)
GLUCOSE BLDC GLUCOMTR-MCNC: 124 MG/DL — HIGH (ref 70–99)
GLUCOSE BLDC GLUCOMTR-MCNC: 124 MG/DL — HIGH (ref 70–99)
GLUCOSE BLDC GLUCOMTR-MCNC: 125 MG/DL — HIGH (ref 70–99)
GLUCOSE BLDC GLUCOMTR-MCNC: 127 MG/DL — HIGH (ref 70–99)
GLUCOSE BLDC GLUCOMTR-MCNC: 127 MG/DL — HIGH (ref 70–99)
GLUCOSE BLDC GLUCOMTR-MCNC: 129 MG/DL — HIGH (ref 70–99)
GLUCOSE BLDC GLUCOMTR-MCNC: 130 MG/DL — HIGH (ref 70–99)
GLUCOSE BLDC GLUCOMTR-MCNC: 130 MG/DL — HIGH (ref 70–99)
GLUCOSE BLDC GLUCOMTR-MCNC: 131 MG/DL — HIGH (ref 70–99)
GLUCOSE BLDC GLUCOMTR-MCNC: 131 MG/DL — HIGH (ref 70–99)
GLUCOSE BLDC GLUCOMTR-MCNC: 133 MG/DL — HIGH (ref 70–99)
GLUCOSE BLDC GLUCOMTR-MCNC: 134 MG/DL — HIGH (ref 70–99)
GLUCOSE BLDC GLUCOMTR-MCNC: 134 MG/DL — HIGH (ref 70–99)
GLUCOSE BLDC GLUCOMTR-MCNC: 136 MG/DL — HIGH (ref 70–99)
GLUCOSE BLDC GLUCOMTR-MCNC: 136 MG/DL — HIGH (ref 70–99)
GLUCOSE BLDC GLUCOMTR-MCNC: 137 MG/DL — HIGH (ref 70–99)
GLUCOSE BLDC GLUCOMTR-MCNC: 137 MG/DL — HIGH (ref 70–99)
GLUCOSE BLDC GLUCOMTR-MCNC: 138 MG/DL — HIGH (ref 70–99)
GLUCOSE BLDC GLUCOMTR-MCNC: 138 MG/DL — HIGH (ref 70–99)
GLUCOSE BLDC GLUCOMTR-MCNC: 140 MG/DL — HIGH (ref 70–99)
GLUCOSE BLDC GLUCOMTR-MCNC: 144 MG/DL — HIGH (ref 70–99)
GLUCOSE BLDC GLUCOMTR-MCNC: 144 MG/DL — HIGH (ref 70–99)
GLUCOSE BLDC GLUCOMTR-MCNC: 145 MG/DL — HIGH (ref 70–99)
GLUCOSE BLDC GLUCOMTR-MCNC: 145 MG/DL — HIGH (ref 70–99)
GLUCOSE BLDC GLUCOMTR-MCNC: 146 MG/DL — HIGH (ref 70–99)
GLUCOSE BLDC GLUCOMTR-MCNC: 147 MG/DL — HIGH (ref 70–99)
GLUCOSE BLDC GLUCOMTR-MCNC: 148 MG/DL — HIGH (ref 70–99)
GLUCOSE BLDC GLUCOMTR-MCNC: 148 MG/DL — HIGH (ref 70–99)
GLUCOSE BLDC GLUCOMTR-MCNC: 149 MG/DL — HIGH (ref 70–99)
GLUCOSE BLDC GLUCOMTR-MCNC: 151 MG/DL — HIGH (ref 70–99)
GLUCOSE BLDC GLUCOMTR-MCNC: 151 MG/DL — HIGH (ref 70–99)
GLUCOSE BLDC GLUCOMTR-MCNC: 153 MG/DL — HIGH (ref 70–99)
GLUCOSE BLDC GLUCOMTR-MCNC: 157 MG/DL — HIGH (ref 70–99)
GLUCOSE BLDC GLUCOMTR-MCNC: 158 MG/DL — HIGH (ref 70–99)
GLUCOSE BLDC GLUCOMTR-MCNC: 163 MG/DL — HIGH (ref 70–99)
GLUCOSE BLDC GLUCOMTR-MCNC: 163 MG/DL — HIGH (ref 70–99)
GLUCOSE BLDC GLUCOMTR-MCNC: 164 MG/DL — HIGH (ref 70–99)
GLUCOSE BLDC GLUCOMTR-MCNC: 164 MG/DL — HIGH (ref 70–99)
GLUCOSE BLDC GLUCOMTR-MCNC: 166 MG/DL — HIGH (ref 70–99)
GLUCOSE BLDC GLUCOMTR-MCNC: 166 MG/DL — HIGH (ref 70–99)
GLUCOSE BLDC GLUCOMTR-MCNC: 168 MG/DL — HIGH (ref 70–99)
GLUCOSE BLDC GLUCOMTR-MCNC: 169 MG/DL — HIGH (ref 70–99)
GLUCOSE BLDC GLUCOMTR-MCNC: 171 MG/DL — HIGH (ref 70–99)
GLUCOSE BLDC GLUCOMTR-MCNC: 172 MG/DL — HIGH (ref 70–99)
GLUCOSE BLDC GLUCOMTR-MCNC: 172 MG/DL — HIGH (ref 70–99)
GLUCOSE BLDC GLUCOMTR-MCNC: 173 MG/DL — HIGH (ref 70–99)
GLUCOSE BLDC GLUCOMTR-MCNC: 173 MG/DL — HIGH (ref 70–99)
GLUCOSE BLDC GLUCOMTR-MCNC: 174 MG/DL — HIGH (ref 70–99)
GLUCOSE BLDC GLUCOMTR-MCNC: 174 MG/DL — HIGH (ref 70–99)
GLUCOSE BLDC GLUCOMTR-MCNC: 175 MG/DL — HIGH (ref 70–99)
GLUCOSE BLDC GLUCOMTR-MCNC: 177 MG/DL — HIGH (ref 70–99)
GLUCOSE BLDC GLUCOMTR-MCNC: 177 MG/DL — HIGH (ref 70–99)
GLUCOSE BLDC GLUCOMTR-MCNC: 178 MG/DL — HIGH (ref 70–99)
GLUCOSE BLDC GLUCOMTR-MCNC: 178 MG/DL — HIGH (ref 70–99)
GLUCOSE BLDC GLUCOMTR-MCNC: 182 MG/DL — HIGH (ref 70–99)
GLUCOSE BLDC GLUCOMTR-MCNC: 182 MG/DL — HIGH (ref 70–99)
GLUCOSE BLDC GLUCOMTR-MCNC: 184 MG/DL — HIGH (ref 70–99)
GLUCOSE BLDC GLUCOMTR-MCNC: 184 MG/DL — HIGH (ref 70–99)
GLUCOSE BLDC GLUCOMTR-MCNC: 186 MG/DL — HIGH (ref 70–99)
GLUCOSE BLDC GLUCOMTR-MCNC: 186 MG/DL — HIGH (ref 70–99)
GLUCOSE BLDC GLUCOMTR-MCNC: 188 MG/DL — HIGH (ref 70–99)
GLUCOSE BLDC GLUCOMTR-MCNC: 188 MG/DL — HIGH (ref 70–99)
GLUCOSE BLDC GLUCOMTR-MCNC: 189 MG/DL — HIGH (ref 70–99)
GLUCOSE BLDC GLUCOMTR-MCNC: 189 MG/DL — HIGH (ref 70–99)
GLUCOSE BLDC GLUCOMTR-MCNC: 190 MG/DL — HIGH (ref 70–99)
GLUCOSE BLDC GLUCOMTR-MCNC: 190 MG/DL — HIGH (ref 70–99)
GLUCOSE BLDC GLUCOMTR-MCNC: 192 MG/DL — HIGH (ref 70–99)
GLUCOSE BLDC GLUCOMTR-MCNC: 192 MG/DL — HIGH (ref 70–99)
GLUCOSE BLDC GLUCOMTR-MCNC: 193 MG/DL — HIGH (ref 70–99)
GLUCOSE BLDC GLUCOMTR-MCNC: 193 MG/DL — HIGH (ref 70–99)
GLUCOSE BLDC GLUCOMTR-MCNC: 194 MG/DL — HIGH (ref 70–99)
GLUCOSE BLDC GLUCOMTR-MCNC: 194 MG/DL — HIGH (ref 70–99)
GLUCOSE BLDC GLUCOMTR-MCNC: 196 MG/DL — HIGH (ref 70–99)
GLUCOSE BLDC GLUCOMTR-MCNC: 197 MG/DL — HIGH (ref 70–99)
GLUCOSE BLDC GLUCOMTR-MCNC: 198 MG/DL — HIGH (ref 70–99)
GLUCOSE BLDC GLUCOMTR-MCNC: 198 MG/DL — HIGH (ref 70–99)
GLUCOSE BLDC GLUCOMTR-MCNC: 200 MG/DL — HIGH (ref 70–99)
GLUCOSE BLDC GLUCOMTR-MCNC: 200 MG/DL — HIGH (ref 70–99)
GLUCOSE BLDC GLUCOMTR-MCNC: 202 MG/DL — HIGH (ref 70–99)
GLUCOSE BLDC GLUCOMTR-MCNC: 203 MG/DL — HIGH (ref 70–99)
GLUCOSE BLDC GLUCOMTR-MCNC: 205 MG/DL — HIGH (ref 70–99)
GLUCOSE BLDC GLUCOMTR-MCNC: 207 MG/DL — HIGH (ref 70–99)
GLUCOSE BLDC GLUCOMTR-MCNC: 207 MG/DL — HIGH (ref 70–99)
GLUCOSE BLDC GLUCOMTR-MCNC: 208 MG/DL — HIGH (ref 70–99)
GLUCOSE BLDC GLUCOMTR-MCNC: 208 MG/DL — HIGH (ref 70–99)
GLUCOSE BLDC GLUCOMTR-MCNC: 210 MG/DL — HIGH (ref 70–99)
GLUCOSE BLDC GLUCOMTR-MCNC: 210 MG/DL — HIGH (ref 70–99)
GLUCOSE BLDC GLUCOMTR-MCNC: 211 MG/DL — HIGH (ref 70–99)
GLUCOSE BLDC GLUCOMTR-MCNC: 211 MG/DL — HIGH (ref 70–99)
GLUCOSE BLDC GLUCOMTR-MCNC: 212 MG/DL — HIGH (ref 70–99)
GLUCOSE BLDC GLUCOMTR-MCNC: 212 MG/DL — HIGH (ref 70–99)
GLUCOSE BLDC GLUCOMTR-MCNC: 213 MG/DL — HIGH (ref 70–99)
GLUCOSE BLDC GLUCOMTR-MCNC: 214 MG/DL — HIGH (ref 70–99)
GLUCOSE BLDC GLUCOMTR-MCNC: 214 MG/DL — HIGH (ref 70–99)
GLUCOSE BLDC GLUCOMTR-MCNC: 216 MG/DL — HIGH (ref 70–99)
GLUCOSE BLDC GLUCOMTR-MCNC: 216 MG/DL — HIGH (ref 70–99)
GLUCOSE BLDC GLUCOMTR-MCNC: 217 MG/DL — HIGH (ref 70–99)
GLUCOSE BLDC GLUCOMTR-MCNC: 217 MG/DL — HIGH (ref 70–99)
GLUCOSE BLDC GLUCOMTR-MCNC: 220 MG/DL — HIGH (ref 70–99)
GLUCOSE BLDC GLUCOMTR-MCNC: 223 MG/DL — HIGH (ref 70–99)
GLUCOSE BLDC GLUCOMTR-MCNC: 223 MG/DL — HIGH (ref 70–99)
GLUCOSE BLDC GLUCOMTR-MCNC: 224 MG/DL — HIGH (ref 70–99)
GLUCOSE BLDC GLUCOMTR-MCNC: 224 MG/DL — HIGH (ref 70–99)
GLUCOSE BLDC GLUCOMTR-MCNC: 226 MG/DL — HIGH (ref 70–99)
GLUCOSE BLDC GLUCOMTR-MCNC: 226 MG/DL — HIGH (ref 70–99)
GLUCOSE BLDC GLUCOMTR-MCNC: 228 MG/DL — HIGH (ref 70–99)
GLUCOSE BLDC GLUCOMTR-MCNC: 228 MG/DL — HIGH (ref 70–99)
GLUCOSE BLDC GLUCOMTR-MCNC: 231 MG/DL — HIGH (ref 70–99)
GLUCOSE BLDC GLUCOMTR-MCNC: 231 MG/DL — HIGH (ref 70–99)
GLUCOSE BLDC GLUCOMTR-MCNC: 233 MG/DL — HIGH (ref 70–99)
GLUCOSE BLDC GLUCOMTR-MCNC: 233 MG/DL — HIGH (ref 70–99)
GLUCOSE BLDC GLUCOMTR-MCNC: 236 MG/DL — HIGH (ref 70–99)
GLUCOSE BLDC GLUCOMTR-MCNC: 236 MG/DL — HIGH (ref 70–99)
GLUCOSE BLDC GLUCOMTR-MCNC: 240 MG/DL — HIGH (ref 70–99)
GLUCOSE BLDC GLUCOMTR-MCNC: 240 MG/DL — HIGH (ref 70–99)
GLUCOSE BLDC GLUCOMTR-MCNC: 245 MG/DL — HIGH (ref 70–99)
GLUCOSE BLDC GLUCOMTR-MCNC: 245 MG/DL — HIGH (ref 70–99)
GLUCOSE BLDC GLUCOMTR-MCNC: 246 MG/DL — HIGH (ref 70–99)
GLUCOSE BLDC GLUCOMTR-MCNC: 246 MG/DL — HIGH (ref 70–99)
GLUCOSE BLDC GLUCOMTR-MCNC: 253 MG/DL — HIGH (ref 70–99)
GLUCOSE BLDC GLUCOMTR-MCNC: 253 MG/DL — HIGH (ref 70–99)
GLUCOSE BLDC GLUCOMTR-MCNC: 261 MG/DL — HIGH (ref 70–99)
GLUCOSE BLDC GLUCOMTR-MCNC: 261 MG/DL — HIGH (ref 70–99)
GLUCOSE BLDC GLUCOMTR-MCNC: 263 MG/DL — HIGH (ref 70–99)
GLUCOSE BLDC GLUCOMTR-MCNC: 263 MG/DL — HIGH (ref 70–99)
GLUCOSE BLDC GLUCOMTR-MCNC: 264 MG/DL — HIGH (ref 70–99)
GLUCOSE BLDC GLUCOMTR-MCNC: 264 MG/DL — HIGH (ref 70–99)
GLUCOSE BLDC GLUCOMTR-MCNC: 266 MG/DL — HIGH (ref 70–99)
GLUCOSE BLDC GLUCOMTR-MCNC: 266 MG/DL — HIGH (ref 70–99)
GLUCOSE BLDC GLUCOMTR-MCNC: 274 MG/DL — HIGH (ref 70–99)
GLUCOSE BLDC GLUCOMTR-MCNC: 274 MG/DL — HIGH (ref 70–99)
GLUCOSE BLDC GLUCOMTR-MCNC: 277 MG/DL — HIGH (ref 70–99)
GLUCOSE BLDC GLUCOMTR-MCNC: 281 MG/DL — HIGH (ref 70–99)
GLUCOSE BLDC GLUCOMTR-MCNC: 281 MG/DL — HIGH (ref 70–99)
GLUCOSE BLDC GLUCOMTR-MCNC: 307 MG/DL — HIGH (ref 70–99)
GLUCOSE BLDC GLUCOMTR-MCNC: 307 MG/DL — HIGH (ref 70–99)
GLUCOSE BLDC GLUCOMTR-MCNC: 313 MG/DL — HIGH (ref 70–99)
GLUCOSE BLDC GLUCOMTR-MCNC: 313 MG/DL — HIGH (ref 70–99)
GLUCOSE BLDC GLUCOMTR-MCNC: 321 MG/DL — HIGH (ref 70–99)
GLUCOSE BLDC GLUCOMTR-MCNC: 82 MG/DL — SIGNIFICANT CHANGE UP (ref 70–99)
GLUCOSE BLDC GLUCOMTR-MCNC: 82 MG/DL — SIGNIFICANT CHANGE UP (ref 70–99)
GLUCOSE BLDC GLUCOMTR-MCNC: 97 MG/DL — SIGNIFICANT CHANGE UP (ref 70–99)
GLUCOSE SERPL-MCNC: 101 MG/DL — HIGH (ref 70–99)
GLUCOSE SERPL-MCNC: 101 MG/DL — HIGH (ref 70–99)
GLUCOSE SERPL-MCNC: 103 MG/DL — HIGH (ref 70–99)
GLUCOSE SERPL-MCNC: 103 MG/DL — HIGH (ref 70–99)
GLUCOSE SERPL-MCNC: 111 MG/DL — HIGH (ref 70–99)
GLUCOSE SERPL-MCNC: 111 MG/DL — HIGH (ref 70–99)
GLUCOSE SERPL-MCNC: 113 MG/DL — HIGH (ref 70–99)
GLUCOSE SERPL-MCNC: 113 MG/DL — HIGH (ref 70–99)
GLUCOSE SERPL-MCNC: 121 MG/DL — HIGH (ref 70–99)
GLUCOSE SERPL-MCNC: 121 MG/DL — HIGH (ref 70–99)
GLUCOSE SERPL-MCNC: 129 MG/DL — HIGH (ref 70–99)
GLUCOSE SERPL-MCNC: 130 MG/DL — HIGH (ref 70–99)
GLUCOSE SERPL-MCNC: 130 MG/DL — HIGH (ref 70–99)
GLUCOSE SERPL-MCNC: 133 MG/DL — HIGH (ref 70–99)
GLUCOSE SERPL-MCNC: 133 MG/DL — HIGH (ref 70–99)
GLUCOSE SERPL-MCNC: 135 MG/DL — HIGH (ref 70–99)
GLUCOSE SERPL-MCNC: 135 MG/DL — HIGH (ref 70–99)
GLUCOSE SERPL-MCNC: 139 MG/DL — HIGH (ref 70–99)
GLUCOSE SERPL-MCNC: 139 MG/DL — HIGH (ref 70–99)
GLUCOSE SERPL-MCNC: 140 MG/DL — HIGH (ref 70–99)
GLUCOSE SERPL-MCNC: 140 MG/DL — HIGH (ref 70–99)
GLUCOSE SERPL-MCNC: 149 MG/DL — HIGH (ref 70–99)
GLUCOSE SERPL-MCNC: 149 MG/DL — HIGH (ref 70–99)
GLUCOSE SERPL-MCNC: 156 MG/DL — HIGH (ref 70–99)
GLUCOSE SERPL-MCNC: 156 MG/DL — HIGH (ref 70–99)
GLUCOSE SERPL-MCNC: 160 MG/DL — HIGH (ref 70–99)
GLUCOSE SERPL-MCNC: 160 MG/DL — HIGH (ref 70–99)
GLUCOSE SERPL-MCNC: 166 MG/DL — HIGH (ref 70–99)
GLUCOSE SERPL-MCNC: 168 MG/DL — HIGH (ref 70–99)
GLUCOSE SERPL-MCNC: 168 MG/DL — HIGH (ref 70–99)
GLUCOSE SERPL-MCNC: 170 MG/DL — HIGH (ref 70–99)
GLUCOSE SERPL-MCNC: 170 MG/DL — HIGH (ref 70–99)
GLUCOSE SERPL-MCNC: 177 MG/DL — HIGH (ref 70–99)
GLUCOSE SERPL-MCNC: 177 MG/DL — HIGH (ref 70–99)
GLUCOSE SERPL-MCNC: 183 MG/DL — HIGH (ref 70–99)
GLUCOSE SERPL-MCNC: 183 MG/DL — HIGH (ref 70–99)
GLUCOSE SERPL-MCNC: 184 MG/DL — HIGH (ref 70–99)
GLUCOSE SERPL-MCNC: 184 MG/DL — HIGH (ref 70–99)
GLUCOSE SERPL-MCNC: 187 MG/DL — HIGH (ref 70–99)
GLUCOSE SERPL-MCNC: 187 MG/DL — HIGH (ref 70–99)
GLUCOSE SERPL-MCNC: 188 MG/DL — HIGH (ref 70–99)
GLUCOSE SERPL-MCNC: 188 MG/DL — HIGH (ref 70–99)
GLUCOSE SERPL-MCNC: 191 MG/DL — HIGH (ref 70–99)
GLUCOSE SERPL-MCNC: 191 MG/DL — HIGH (ref 70–99)
GLUCOSE SERPL-MCNC: 198 MG/DL — HIGH (ref 70–99)
GLUCOSE SERPL-MCNC: 198 MG/DL — HIGH (ref 70–99)
GLUCOSE SERPL-MCNC: 202 MG/DL — HIGH (ref 70–99)
GLUCOSE SERPL-MCNC: 202 MG/DL — HIGH (ref 70–99)
GLUCOSE SERPL-MCNC: 204 MG/DL — HIGH (ref 70–99)
GLUCOSE SERPL-MCNC: 204 MG/DL — HIGH (ref 70–99)
GLUCOSE SERPL-MCNC: 205 MG/DL — HIGH (ref 70–99)
GLUCOSE SERPL-MCNC: 205 MG/DL — HIGH (ref 70–99)
GLUCOSE SERPL-MCNC: 211 MG/DL — HIGH (ref 70–99)
GLUCOSE SERPL-MCNC: 211 MG/DL — HIGH (ref 70–99)
GLUCOSE SERPL-MCNC: 213 MG/DL — HIGH (ref 70–99)
GLUCOSE SERPL-MCNC: 213 MG/DL — HIGH (ref 70–99)
GLUCOSE SERPL-MCNC: 74 MG/DL — SIGNIFICANT CHANGE UP (ref 70–99)
GLUCOSE SERPL-MCNC: 74 MG/DL — SIGNIFICANT CHANGE UP (ref 70–99)
GLUCOSE SERPL-MCNC: 87 MG/DL — SIGNIFICANT CHANGE UP (ref 70–99)
GLUCOSE SERPL-MCNC: 87 MG/DL — SIGNIFICANT CHANGE UP (ref 70–99)
GLUCOSE UR QL: 100 MG/DL
GLUCOSE UR QL: 100 MG/DL
GLUCOSE UR QL: NEGATIVE MG/DL — SIGNIFICANT CHANGE UP
GRAM STN FLD: ABNORMAL
GRAM STN FLD: ABNORMAL
GRAM STN FLD: SIGNIFICANT CHANGE UP
GRAM STN FLD: SIGNIFICANT CHANGE UP
HBA1C MFR BLD HPLC: 4.5
HCO3 BLDA-SCNC: 26 MMOL/L — SIGNIFICANT CHANGE UP (ref 21–28)
HCO3 BLDA-SCNC: 27 MMOL/L — SIGNIFICANT CHANGE UP (ref 21–28)
HCO3 BLDA-SCNC: 27 MMOL/L — SIGNIFICANT CHANGE UP (ref 21–28)
HCO3 BLDA-SCNC: 28 MMOL/L — SIGNIFICANT CHANGE UP (ref 21–28)
HCO3 BLDA-SCNC: 28 MMOL/L — SIGNIFICANT CHANGE UP (ref 21–28)
HCO3 BLDA-SCNC: 29 MMOL/L — HIGH (ref 21–28)
HCO3 BLDA-SCNC: 29 MMOL/L — HIGH (ref 21–28)
HCO3 BLDA-SCNC: 30 MMOL/L — HIGH (ref 21–28)
HCO3 BLDA-SCNC: 33 MMOL/L — HIGH (ref 21–28)
HCO3 BLDA-SCNC: 34 MMOL/L — HIGH (ref 21–28)
HCO3 BLDA-SCNC: 34 MMOL/L — HIGH (ref 21–28)
HCO3 BLDA-SCNC: 35 MMOL/L — HIGH (ref 21–28)
HCO3 BLDA-SCNC: 35 MMOL/L — HIGH (ref 21–28)
HCO3 BLDA-SCNC: 36 MMOL/L — HIGH (ref 21–28)
HCO3 BLDA-SCNC: 36 MMOL/L — HIGH (ref 21–28)
HCO3 BLDA-SCNC: 37 MMOL/L — HIGH (ref 21–28)
HCO3 BLDA-SCNC: 37 MMOL/L — HIGH (ref 21–28)
HCO3 BLDA-SCNC: 38 MMOL/L — HIGH (ref 21–28)
HCO3 BLDA-SCNC: 39 MMOL/L — HIGH (ref 21–28)
HCO3 BLDA-SCNC: 39 MMOL/L — HIGH (ref 21–28)
HCO3 BLDV-SCNC: 30 MMOL/L — HIGH (ref 22–29)
HCO3 BLDV-SCNC: 30 MMOL/L — HIGH (ref 22–29)
HCT VFR BLD CALC: 22.7 % — LOW (ref 34.5–45)
HCT VFR BLD CALC: 22.7 % — LOW (ref 34.5–45)
HCT VFR BLD CALC: 23.5 % — LOW (ref 34.5–45)
HCT VFR BLD CALC: 23.5 % — LOW (ref 34.5–45)
HCT VFR BLD CALC: 23.8 % — LOW (ref 34.5–45)
HCT VFR BLD CALC: 24.6 % — LOW (ref 34.5–45)
HCT VFR BLD CALC: 24.6 % — LOW (ref 34.5–45)
HCT VFR BLD CALC: 24.8 % — LOW (ref 34.5–45)
HCT VFR BLD CALC: 24.8 % — LOW (ref 34.5–45)
HCT VFR BLD CALC: 24.9 % — LOW (ref 34.5–45)
HCT VFR BLD CALC: 24.9 % — LOW (ref 34.5–45)
HCT VFR BLD CALC: 25.1 % — LOW (ref 34.5–45)
HCT VFR BLD CALC: 25.1 % — LOW (ref 34.5–45)
HCT VFR BLD CALC: 25.2 % — LOW (ref 34.5–45)
HCT VFR BLD CALC: 25.2 % — LOW (ref 34.5–45)
HCT VFR BLD CALC: 25.3 % — LOW (ref 34.5–45)
HCT VFR BLD CALC: 25.5 % — LOW (ref 34.5–45)
HCT VFR BLD CALC: 25.6 % — LOW (ref 34.5–45)
HCT VFR BLD CALC: 25.6 % — LOW (ref 34.5–45)
HCT VFR BLD CALC: 25.7 % — LOW (ref 34.5–45)
HCT VFR BLD CALC: 25.7 % — LOW (ref 34.5–45)
HCT VFR BLD CALC: 26 % — LOW (ref 34.5–45)
HCT VFR BLD CALC: 26 % — LOW (ref 34.5–45)
HCT VFR BLD CALC: 26.1 % — LOW (ref 34.5–45)
HCT VFR BLD CALC: 26.1 % — LOW (ref 34.5–45)
HCT VFR BLD CALC: 26.3 % — LOW (ref 34.5–45)
HCT VFR BLD CALC: 26.3 % — LOW (ref 34.5–45)
HCT VFR BLD CALC: 26.4 % — LOW (ref 34.5–45)
HCT VFR BLD CALC: 26.4 % — LOW (ref 34.5–45)
HCT VFR BLD CALC: 26.6 % — LOW (ref 34.5–45)
HCT VFR BLD CALC: 26.6 % — LOW (ref 34.5–45)
HCT VFR BLD CALC: 26.7 % — LOW (ref 34.5–45)
HCT VFR BLD CALC: 26.7 % — LOW (ref 34.5–45)
HCT VFR BLD CALC: 26.8 % — LOW (ref 34.5–45)
HCT VFR BLD CALC: 26.8 % — LOW (ref 34.5–45)
HCT VFR BLD CALC: 27.1 % — LOW (ref 34.5–45)
HCT VFR BLD CALC: 27.1 % — LOW (ref 34.5–45)
HCT VFR BLD CALC: 27.3 % — LOW (ref 34.5–45)
HCT VFR BLD CALC: 27.5 % — LOW (ref 34.5–45)
HCT VFR BLD CALC: 27.5 % — LOW (ref 34.5–45)
HCT VFR BLD CALC: 29 % — LOW (ref 34.5–45)
HCT VFR BLD CALC: 29 % — LOW (ref 34.5–45)
HCT VFR BLD CALC: 29.4 % — LOW (ref 34.5–45)
HCT VFR BLD CALC: 29.4 % — LOW (ref 34.5–45)
HCT VFR BLD CALC: 30 % — LOW (ref 34.5–45)
HCT VFR BLD CALC: 30 % — LOW (ref 34.5–45)
HCT VFR BLD CALC: 30.9 % — LOW (ref 34.5–45)
HCT VFR BLD CALC: 30.9 % — LOW (ref 34.5–45)
HCT VFR BLD CALC: 31 % — LOW (ref 34.5–45)
HCT VFR BLD CALC: 31 % — LOW (ref 34.5–45)
HCT VFR BLD CALC: 31.3 % — LOW (ref 34.5–45)
HCT VFR BLD CALC: 31.3 % — LOW (ref 34.5–45)
HCT VFR BLD CALC: 31.8 % — LOW (ref 34.5–45)
HCT VFR BLD CALC: 31.8 % — LOW (ref 34.5–45)
HCT VFR BLD CALC: 32.9 % — LOW (ref 34.5–45)
HCT VFR BLD CALC: 32.9 % — LOW (ref 34.5–45)
HCT VFR BLD CALC: 34.5 % — SIGNIFICANT CHANGE UP (ref 34.5–45)
HCT VFR BLD CALC: 34.5 % — SIGNIFICANT CHANGE UP (ref 34.5–45)
HCT VFR BLD CALC: 38.9 %
HCT VFR BLD CALC: 39.4 % — SIGNIFICANT CHANGE UP (ref 34.5–45)
HCT VFR BLD CALC: 39.4 % — SIGNIFICANT CHANGE UP (ref 34.5–45)
HCT VFR BLD CALC: 44.3 %
HCV AB S/CO SERPL IA: 0.06 S/CO — SIGNIFICANT CHANGE UP (ref 0–0.99)
HCV AB S/CO SERPL IA: 0.06 S/CO — SIGNIFICANT CHANGE UP (ref 0–0.99)
HCV AB SERPL-IMP: SIGNIFICANT CHANGE UP
HCV AB SERPL-IMP: SIGNIFICANT CHANGE UP
HGB BLD-MCNC: 10 G/DL — LOW (ref 11.5–15.5)
HGB BLD-MCNC: 10 G/DL — LOW (ref 11.5–15.5)
HGB BLD-MCNC: 10.2 G/DL — LOW (ref 11.5–15.5)
HGB BLD-MCNC: 10.2 G/DL — LOW (ref 11.5–15.5)
HGB BLD-MCNC: 10.3 G/DL — LOW (ref 11.5–15.5)
HGB BLD-MCNC: 10.3 G/DL — LOW (ref 11.5–15.5)
HGB BLD-MCNC: 10.9 G/DL — LOW (ref 11.5–15.5)
HGB BLD-MCNC: 10.9 G/DL — LOW (ref 11.5–15.5)
HGB BLD-MCNC: 11.3 G/DL
HGB BLD-MCNC: 12.5 G/DL — SIGNIFICANT CHANGE UP (ref 11.5–15.5)
HGB BLD-MCNC: 12.5 G/DL — SIGNIFICANT CHANGE UP (ref 11.5–15.5)
HGB BLD-MCNC: 13.4 G/DL
HGB BLD-MCNC: 7.1 G/DL — LOW (ref 11.5–15.5)
HGB BLD-MCNC: 7.4 G/DL — LOW (ref 11.5–15.5)
HGB BLD-MCNC: 7.4 G/DL — LOW (ref 11.5–15.5)
HGB BLD-MCNC: 7.5 G/DL — LOW (ref 11.5–15.5)
HGB BLD-MCNC: 7.5 G/DL — LOW (ref 11.5–15.5)
HGB BLD-MCNC: 7.6 G/DL — LOW (ref 11.5–15.5)
HGB BLD-MCNC: 7.8 G/DL — LOW (ref 11.5–15.5)
HGB BLD-MCNC: 7.8 G/DL — LOW (ref 11.5–15.5)
HGB BLD-MCNC: 7.9 G/DL — LOW (ref 11.5–15.5)
HGB BLD-MCNC: 7.9 G/DL — LOW (ref 11.5–15.5)
HGB BLD-MCNC: 8 G/DL — LOW (ref 11.5–15.5)
HGB BLD-MCNC: 8.1 G/DL — LOW (ref 11.5–15.5)
HGB BLD-MCNC: 8.1 G/DL — LOW (ref 11.5–15.5)
HGB BLD-MCNC: 8.2 G/DL — LOW (ref 11.5–15.5)
HGB BLD-MCNC: 8.3 G/DL — LOW (ref 11.5–15.5)
HGB BLD-MCNC: 8.4 G/DL — LOW (ref 11.5–15.5)
HGB BLD-MCNC: 8.4 G/DL — LOW (ref 11.5–15.5)
HGB BLD-MCNC: 8.5 G/DL — LOW (ref 11.5–15.5)
HGB BLD-MCNC: 8.5 G/DL — LOW (ref 11.5–15.5)
HGB BLD-MCNC: 8.6 G/DL — LOW (ref 11.5–15.5)
HGB BLD-MCNC: 8.8 G/DL — LOW (ref 11.5–15.5)
HGB BLD-MCNC: 8.8 G/DL — LOW (ref 11.5–15.5)
HGB BLD-MCNC: 9.2 G/DL — LOW (ref 11.5–15.5)
HGB BLD-MCNC: 9.2 G/DL — LOW (ref 11.5–15.5)
HGB BLD-MCNC: 9.3 G/DL — LOW (ref 11.5–15.5)
HGB BLD-MCNC: 9.8 G/DL — LOW (ref 11.5–15.5)
HGB BLD-MCNC: 9.8 G/DL — LOW (ref 11.5–15.5)
HGB BLD-MCNC: 9.9 G/DL — LOW (ref 11.5–15.5)
HGB BLD-MCNC: 9.9 G/DL — LOW (ref 11.5–15.5)
HOROWITZ INDEX BLDA+IHG-RTO: 28 — SIGNIFICANT CHANGE UP
HOROWITZ INDEX BLDA+IHG-RTO: 30 — SIGNIFICANT CHANGE UP
HOROWITZ INDEX BLDA+IHG-RTO: 35 — SIGNIFICANT CHANGE UP
HOROWITZ INDEX BLDA+IHG-RTO: 35 — SIGNIFICANT CHANGE UP
HOROWITZ INDEX BLDA+IHG-RTO: 40 — SIGNIFICANT CHANGE UP
HOROWITZ INDEX BLDA+IHG-RTO: 50 — SIGNIFICANT CHANGE UP
HOROWITZ INDEX BLDA+IHG-RTO: 50 — SIGNIFICANT CHANGE UP
IMM GRANULOCYTES NFR BLD AUTO: 0.2 %
IMM GRANULOCYTES NFR BLD AUTO: 0.2 %
IMM GRANULOCYTES NFR BLD AUTO: 0.4 % — SIGNIFICANT CHANGE UP (ref 0–0.9)
IMM GRANULOCYTES NFR BLD AUTO: 0.7 % — SIGNIFICANT CHANGE UP (ref 0–0.9)
IMM GRANULOCYTES NFR BLD AUTO: 0.9 % — SIGNIFICANT CHANGE UP (ref 0–0.9)
IMM GRANULOCYTES NFR BLD AUTO: 0.9 % — SIGNIFICANT CHANGE UP (ref 0–0.9)
IMM GRANULOCYTES NFR BLD AUTO: 1.3 % — HIGH (ref 0–0.9)
IMM GRANULOCYTES NFR BLD AUTO: 1.3 % — HIGH (ref 0–0.9)
INR BLD: 1.04 RATIO — SIGNIFICANT CHANGE UP (ref 0.85–1.18)
INR BLD: 1.04 RATIO — SIGNIFICANT CHANGE UP (ref 0.85–1.18)
INR BLD: 1.19 RATIO — HIGH (ref 0.85–1.18)
INR BLD: 1.19 RATIO — HIGH (ref 0.85–1.18)
INR BLD: 1.95 RATIO — HIGH (ref 0.85–1.18)
INR BLD: 1.95 RATIO — HIGH (ref 0.85–1.18)
IRON SATN MFR SERPL: 66 %
IRON SERPL-MCNC: 301 UG/DL
KETONES UR-MCNC: 15 MG/DL
KETONES UR-MCNC: 15 MG/DL
KETONES UR-MCNC: NEGATIVE MG/DL — SIGNIFICANT CHANGE UP
LACTATE SERPL-SCNC: 0.3 MMOL/L — LOW (ref 0.7–2)
LACTATE SERPL-SCNC: 0.3 MMOL/L — LOW (ref 0.7–2)
LACTATE SERPL-SCNC: 1 MMOL/L — SIGNIFICANT CHANGE UP (ref 0.7–2)
LACTATE SERPL-SCNC: 1 MMOL/L — SIGNIFICANT CHANGE UP (ref 0.7–2)
LACTATE SERPL-SCNC: 1.9 MMOL/L — SIGNIFICANT CHANGE UP (ref 0.7–2)
LACTATE SERPL-SCNC: 1.9 MMOL/L — SIGNIFICANT CHANGE UP (ref 0.7–2)
LACTATE SERPL-SCNC: 3.3 MMOL/L — HIGH (ref 0.7–2)
LACTATE SERPL-SCNC: 3.3 MMOL/L — HIGH (ref 0.7–2)
LACTATE SERPL-SCNC: 3.8 MMOL/L — HIGH (ref 0.7–2)
LACTATE SERPL-SCNC: 3.8 MMOL/L — HIGH (ref 0.7–2)
LDH SERPL L TO P-CCNC: 352 U/L — HIGH (ref 50–242)
LDH SERPL L TO P-CCNC: 352 U/L — HIGH (ref 50–242)
LEUKOCYTE ESTERASE UR-ACNC: ABNORMAL
LEUKOCYTE ESTERASE UR-ACNC: ABNORMAL
LEUKOCYTE ESTERASE UR-ACNC: NEGATIVE — SIGNIFICANT CHANGE UP
LYMPHOCYTES # BLD AUTO: 0.46 K/UL — LOW (ref 1–3.3)
LYMPHOCYTES # BLD AUTO: 0.46 K/UL — LOW (ref 1–3.3)
LYMPHOCYTES # BLD AUTO: 0.51 K/UL — LOW (ref 1–3.3)
LYMPHOCYTES # BLD AUTO: 0.51 K/UL — LOW (ref 1–3.3)
LYMPHOCYTES # BLD AUTO: 0.84 K/UL — LOW (ref 1–3.3)
LYMPHOCYTES # BLD AUTO: 0.84 K/UL — LOW (ref 1–3.3)
LYMPHOCYTES # BLD AUTO: 1.04 K/UL — SIGNIFICANT CHANGE UP (ref 1–3.3)
LYMPHOCYTES # BLD AUTO: 1.04 K/UL — SIGNIFICANT CHANGE UP (ref 1–3.3)
LYMPHOCYTES # BLD AUTO: 1.25 K/UL — SIGNIFICANT CHANGE UP (ref 1–3.3)
LYMPHOCYTES # BLD AUTO: 1.25 K/UL — SIGNIFICANT CHANGE UP (ref 1–3.3)
LYMPHOCYTES # BLD AUTO: 1.41 K/UL — SIGNIFICANT CHANGE UP (ref 1–3.3)
LYMPHOCYTES # BLD AUTO: 1.41 K/UL — SIGNIFICANT CHANGE UP (ref 1–3.3)
LYMPHOCYTES # BLD AUTO: 1.54 K/UL
LYMPHOCYTES # BLD AUTO: 1.76 K/UL
LYMPHOCYTES # BLD AUTO: 3.2 % — LOW (ref 13–44)
LYMPHOCYTES # BLD AUTO: 3.2 % — LOW (ref 13–44)
LYMPHOCYTES # BLD AUTO: 5.7 % — LOW (ref 13–44)
LYMPHOCYTES # BLD AUTO: 5.7 % — LOW (ref 13–44)
LYMPHOCYTES # BLD AUTO: 6 % — LOW (ref 13–44)
LYMPHOCYTES # BLD AUTO: 6 % — LOW (ref 13–44)
LYMPHOCYTES # BLD AUTO: 6.5 % — LOW (ref 13–44)
LYMPHOCYTES # BLD AUTO: 6.5 % — LOW (ref 13–44)
LYMPHOCYTES # BLD AUTO: 9.5 % — LOW (ref 13–44)
LYMPHOCYTES # BLD AUTO: 9.5 % — LOW (ref 13–44)
LYMPHOCYTES # BLD AUTO: 9.9 % — LOW (ref 13–44)
LYMPHOCYTES # BLD AUTO: 9.9 % — LOW (ref 13–44)
LYMPHOCYTES NFR BLD AUTO: 18.9 %
LYMPHOCYTES NFR BLD AUTO: 29.9 %
MAGNESIUM SERPL-MCNC: 1.4 MG/DL — LOW (ref 1.6–2.6)
MAGNESIUM SERPL-MCNC: 1.4 MG/DL — LOW (ref 1.6–2.6)
MAGNESIUM SERPL-MCNC: 1.7 MG/DL — SIGNIFICANT CHANGE UP (ref 1.6–2.6)
MAGNESIUM SERPL-MCNC: 1.9 MG/DL — SIGNIFICANT CHANGE UP (ref 1.6–2.6)
MAGNESIUM SERPL-MCNC: 1.9 MG/DL — SIGNIFICANT CHANGE UP (ref 1.6–2.6)
MAGNESIUM SERPL-MCNC: 2 MG/DL — SIGNIFICANT CHANGE UP (ref 1.6–2.6)
MAGNESIUM SERPL-MCNC: 2.1 MG/DL — SIGNIFICANT CHANGE UP (ref 1.6–2.6)
MAGNESIUM SERPL-MCNC: 2.2 MG/DL — SIGNIFICANT CHANGE UP (ref 1.6–2.6)
MAGNESIUM SERPL-MCNC: 2.3 MG/DL — SIGNIFICANT CHANGE UP (ref 1.6–2.6)
MAGNESIUM SERPL-MCNC: 2.4 MG/DL — SIGNIFICANT CHANGE UP (ref 1.6–2.6)
MAGNESIUM SERPL-MCNC: 2.4 MG/DL — SIGNIFICANT CHANGE UP (ref 1.6–2.6)
MAGNESIUM SERPL-MCNC: 2.6 MG/DL — SIGNIFICANT CHANGE UP (ref 1.6–2.6)
MAGNESIUM SERPL-MCNC: 2.6 MG/DL — SIGNIFICANT CHANGE UP (ref 1.6–2.6)
MAGNESIUM SERPL-MCNC: 2.8 MG/DL — HIGH (ref 1.6–2.6)
MAGNESIUM SERPL-MCNC: 2.8 MG/DL — HIGH (ref 1.6–2.6)
MAGNESIUM SERPL-MCNC: 2.9 MG/DL — HIGH (ref 1.6–2.6)
MAN DIFF?: NORMAL
MAN DIFF?: NORMAL
MCHC RBC-ENTMCNC: 25.6 PG
MCHC RBC-ENTMCNC: 27.7 PG — SIGNIFICANT CHANGE UP (ref 27–34)
MCHC RBC-ENTMCNC: 27.7 PG — SIGNIFICANT CHANGE UP (ref 27–34)
MCHC RBC-ENTMCNC: 27.9 PG
MCHC RBC-ENTMCNC: 27.9 PG — SIGNIFICANT CHANGE UP (ref 27–34)
MCHC RBC-ENTMCNC: 27.9 PG — SIGNIFICANT CHANGE UP (ref 27–34)
MCHC RBC-ENTMCNC: 28.6 PG — SIGNIFICANT CHANGE UP (ref 27–34)
MCHC RBC-ENTMCNC: 28.6 PG — SIGNIFICANT CHANGE UP (ref 27–34)
MCHC RBC-ENTMCNC: 28.7 PG — SIGNIFICANT CHANGE UP (ref 27–34)
MCHC RBC-ENTMCNC: 28.8 PG — SIGNIFICANT CHANGE UP (ref 27–34)
MCHC RBC-ENTMCNC: 28.8 PG — SIGNIFICANT CHANGE UP (ref 27–34)
MCHC RBC-ENTMCNC: 28.9 PG — SIGNIFICANT CHANGE UP (ref 27–34)
MCHC RBC-ENTMCNC: 29 GM/DL
MCHC RBC-ENTMCNC: 29.1 PG — SIGNIFICANT CHANGE UP (ref 27–34)
MCHC RBC-ENTMCNC: 29.1 PG — SIGNIFICANT CHANGE UP (ref 27–34)
MCHC RBC-ENTMCNC: 29.2 PG — SIGNIFICANT CHANGE UP (ref 27–34)
MCHC RBC-ENTMCNC: 29.2 PG — SIGNIFICANT CHANGE UP (ref 27–34)
MCHC RBC-ENTMCNC: 29.3 PG — SIGNIFICANT CHANGE UP (ref 27–34)
MCHC RBC-ENTMCNC: 29.3 PG — SIGNIFICANT CHANGE UP (ref 27–34)
MCHC RBC-ENTMCNC: 29.7 PG — SIGNIFICANT CHANGE UP (ref 27–34)
MCHC RBC-ENTMCNC: 29.7 PG — SIGNIFICANT CHANGE UP (ref 27–34)
MCHC RBC-ENTMCNC: 29.8 GM/DL — LOW (ref 32–36)
MCHC RBC-ENTMCNC: 29.8 GM/DL — LOW (ref 32–36)
MCHC RBC-ENTMCNC: 29.9 GM/DL — LOW (ref 32–36)
MCHC RBC-ENTMCNC: 29.9 GM/DL — LOW (ref 32–36)
MCHC RBC-ENTMCNC: 30.2 GM/DL
MCHC RBC-ENTMCNC: 30.2 GM/DL — LOW (ref 32–36)
MCHC RBC-ENTMCNC: 30.4 GM/DL — LOW (ref 32–36)
MCHC RBC-ENTMCNC: 30.4 GM/DL — LOW (ref 32–36)
MCHC RBC-ENTMCNC: 30.5 GM/DL — LOW (ref 32–36)
MCHC RBC-ENTMCNC: 30.5 GM/DL — LOW (ref 32–36)
MCHC RBC-ENTMCNC: 30.9 GM/DL — LOW (ref 32–36)
MCHC RBC-ENTMCNC: 30.9 GM/DL — LOW (ref 32–36)
MCHC RBC-ENTMCNC: 31 GM/DL — LOW (ref 32–36)
MCHC RBC-ENTMCNC: 31.1 GM/DL — LOW (ref 32–36)
MCHC RBC-ENTMCNC: 31.1 GM/DL — LOW (ref 32–36)
MCHC RBC-ENTMCNC: 31.1 PG — SIGNIFICANT CHANGE UP (ref 27–34)
MCHC RBC-ENTMCNC: 31.2 GM/DL — LOW (ref 32–36)
MCHC RBC-ENTMCNC: 31.2 GM/DL — LOW (ref 32–36)
MCHC RBC-ENTMCNC: 31.2 PG — SIGNIFICANT CHANGE UP (ref 27–34)
MCHC RBC-ENTMCNC: 31.2 PG — SIGNIFICANT CHANGE UP (ref 27–34)
MCHC RBC-ENTMCNC: 31.3 GM/DL — LOW (ref 32–36)
MCHC RBC-ENTMCNC: 31.3 PG — SIGNIFICANT CHANGE UP (ref 27–34)
MCHC RBC-ENTMCNC: 31.4 GM/DL — LOW (ref 32–36)
MCHC RBC-ENTMCNC: 31.4 PG — SIGNIFICANT CHANGE UP (ref 27–34)
MCHC RBC-ENTMCNC: 31.5 GM/DL — LOW (ref 32–36)
MCHC RBC-ENTMCNC: 31.5 PG — SIGNIFICANT CHANGE UP (ref 27–34)
MCHC RBC-ENTMCNC: 31.5 PG — SIGNIFICANT CHANGE UP (ref 27–34)
MCHC RBC-ENTMCNC: 31.6 GM/DL — LOW (ref 32–36)
MCHC RBC-ENTMCNC: 31.6 PG — SIGNIFICANT CHANGE UP (ref 27–34)
MCHC RBC-ENTMCNC: 31.6 PG — SIGNIFICANT CHANGE UP (ref 27–34)
MCHC RBC-ENTMCNC: 31.7 GM/DL — LOW (ref 32–36)
MCHC RBC-ENTMCNC: 31.7 PG — SIGNIFICANT CHANGE UP (ref 27–34)
MCHC RBC-ENTMCNC: 31.7 PG — SIGNIFICANT CHANGE UP (ref 27–34)
MCHC RBC-ENTMCNC: 31.8 GM/DL — LOW (ref 32–36)
MCHC RBC-ENTMCNC: 31.8 GM/DL — LOW (ref 32–36)
MCHC RBC-ENTMCNC: 31.8 PG — SIGNIFICANT CHANGE UP (ref 27–34)
MCHC RBC-ENTMCNC: 31.8 PG — SIGNIFICANT CHANGE UP (ref 27–34)
MCHC RBC-ENTMCNC: 31.9 GM/DL — LOW (ref 32–36)
MCHC RBC-ENTMCNC: 31.9 PG — SIGNIFICANT CHANGE UP (ref 27–34)
MCHC RBC-ENTMCNC: 32 GM/DL — SIGNIFICANT CHANGE UP (ref 32–36)
MCHC RBC-ENTMCNC: 32 GM/DL — SIGNIFICANT CHANGE UP (ref 32–36)
MCHC RBC-ENTMCNC: 32 PG — SIGNIFICANT CHANGE UP (ref 27–34)
MCHC RBC-ENTMCNC: 32 PG — SIGNIFICANT CHANGE UP (ref 27–34)
MCHC RBC-ENTMCNC: 32.1 GM/DL — SIGNIFICANT CHANGE UP (ref 32–36)
MCHC RBC-ENTMCNC: 32.1 GM/DL — SIGNIFICANT CHANGE UP (ref 32–36)
MCHC RBC-ENTMCNC: 32.2 GM/DL — SIGNIFICANT CHANGE UP (ref 32–36)
MCHC RBC-ENTMCNC: 32.2 GM/DL — SIGNIFICANT CHANGE UP (ref 32–36)
MCHC RBC-ENTMCNC: 32.2 PG — SIGNIFICANT CHANGE UP (ref 27–34)
MCHC RBC-ENTMCNC: 32.3 GM/DL — SIGNIFICANT CHANGE UP (ref 32–36)
MCHC RBC-ENTMCNC: 32.3 GM/DL — SIGNIFICANT CHANGE UP (ref 32–36)
MCHC RBC-ENTMCNC: 32.4 PG — SIGNIFICANT CHANGE UP (ref 27–34)
MCHC RBC-ENTMCNC: 32.4 PG — SIGNIFICANT CHANGE UP (ref 27–34)
MCHC RBC-ENTMCNC: 32.5 PG — SIGNIFICANT CHANGE UP (ref 27–34)
MCHC RBC-ENTMCNC: 33 GM/DL — SIGNIFICANT CHANGE UP (ref 32–36)
MCHC RBC-ENTMCNC: 33 GM/DL — SIGNIFICANT CHANGE UP (ref 32–36)
MCV RBC AUTO: 100 FL — SIGNIFICANT CHANGE UP (ref 80–100)
MCV RBC AUTO: 100 FL — SIGNIFICANT CHANGE UP (ref 80–100)
MCV RBC AUTO: 100.6 FL — HIGH (ref 80–100)
MCV RBC AUTO: 100.6 FL — HIGH (ref 80–100)
MCV RBC AUTO: 100.9 FL — HIGH (ref 80–100)
MCV RBC AUTO: 101 FL — HIGH (ref 80–100)
MCV RBC AUTO: 101 FL — HIGH (ref 80–100)
MCV RBC AUTO: 101.5 FL — HIGH (ref 80–100)
MCV RBC AUTO: 101.5 FL — HIGH (ref 80–100)
MCV RBC AUTO: 101.6 FL — HIGH (ref 80–100)
MCV RBC AUTO: 101.6 FL — HIGH (ref 80–100)
MCV RBC AUTO: 102.2 FL — HIGH (ref 80–100)
MCV RBC AUTO: 102.2 FL — HIGH (ref 80–100)
MCV RBC AUTO: 102.3 FL — HIGH (ref 80–100)
MCV RBC AUTO: 102.3 FL — HIGH (ref 80–100)
MCV RBC AUTO: 102.4 FL — HIGH (ref 80–100)
MCV RBC AUTO: 102.4 FL — HIGH (ref 80–100)
MCV RBC AUTO: 102.5 FL — HIGH (ref 80–100)
MCV RBC AUTO: 102.5 FL — HIGH (ref 80–100)
MCV RBC AUTO: 102.6 FL — HIGH (ref 80–100)
MCV RBC AUTO: 102.6 FL — HIGH (ref 80–100)
MCV RBC AUTO: 102.9 FL — HIGH (ref 80–100)
MCV RBC AUTO: 102.9 FL — HIGH (ref 80–100)
MCV RBC AUTO: 103.1 FL — HIGH (ref 80–100)
MCV RBC AUTO: 103.1 FL — HIGH (ref 80–100)
MCV RBC AUTO: 88.2 FL
MCV RBC AUTO: 91 FL — SIGNIFICANT CHANGE UP (ref 80–100)
MCV RBC AUTO: 91 FL — SIGNIFICANT CHANGE UP (ref 80–100)
MCV RBC AUTO: 91.4 FL — SIGNIFICANT CHANGE UP (ref 80–100)
MCV RBC AUTO: 91.4 FL — SIGNIFICANT CHANGE UP (ref 80–100)
MCV RBC AUTO: 91.5 FL — SIGNIFICANT CHANGE UP (ref 80–100)
MCV RBC AUTO: 91.5 FL — SIGNIFICANT CHANGE UP (ref 80–100)
MCV RBC AUTO: 92.2 FL — SIGNIFICANT CHANGE UP (ref 80–100)
MCV RBC AUTO: 92.3 FL
MCV RBC AUTO: 92.7 FL — SIGNIFICANT CHANGE UP (ref 80–100)
MCV RBC AUTO: 92.7 FL — SIGNIFICANT CHANGE UP (ref 80–100)
MCV RBC AUTO: 92.8 FL — SIGNIFICANT CHANGE UP (ref 80–100)
MCV RBC AUTO: 92.8 FL — SIGNIFICANT CHANGE UP (ref 80–100)
MCV RBC AUTO: 92.9 FL — SIGNIFICANT CHANGE UP (ref 80–100)
MCV RBC AUTO: 92.9 FL — SIGNIFICANT CHANGE UP (ref 80–100)
MCV RBC AUTO: 93 FL — SIGNIFICANT CHANGE UP (ref 80–100)
MCV RBC AUTO: 93 FL — SIGNIFICANT CHANGE UP (ref 80–100)
MCV RBC AUTO: 93.3 FL — SIGNIFICANT CHANGE UP (ref 80–100)
MCV RBC AUTO: 93.3 FL — SIGNIFICANT CHANGE UP (ref 80–100)
MCV RBC AUTO: 94 FL — SIGNIFICANT CHANGE UP (ref 80–100)
MCV RBC AUTO: 94 FL — SIGNIFICANT CHANGE UP (ref 80–100)
MCV RBC AUTO: 94.1 FL — SIGNIFICANT CHANGE UP (ref 80–100)
MCV RBC AUTO: 94.1 FL — SIGNIFICANT CHANGE UP (ref 80–100)
MCV RBC AUTO: 95 FL — SIGNIFICANT CHANGE UP (ref 80–100)
MCV RBC AUTO: 95 FL — SIGNIFICANT CHANGE UP (ref 80–100)
MCV RBC AUTO: 95.8 FL — SIGNIFICANT CHANGE UP (ref 80–100)
MCV RBC AUTO: 95.8 FL — SIGNIFICANT CHANGE UP (ref 80–100)
MCV RBC AUTO: 96.7 FL — SIGNIFICANT CHANGE UP (ref 80–100)
MCV RBC AUTO: 96.7 FL — SIGNIFICANT CHANGE UP (ref 80–100)
MCV RBC AUTO: 97.3 FL — SIGNIFICANT CHANGE UP (ref 80–100)
MCV RBC AUTO: 97.3 FL — SIGNIFICANT CHANGE UP (ref 80–100)
MCV RBC AUTO: 98.3 FL — SIGNIFICANT CHANGE UP (ref 80–100)
MCV RBC AUTO: 98.3 FL — SIGNIFICANT CHANGE UP (ref 80–100)
MCV RBC AUTO: 98.4 FL — SIGNIFICANT CHANGE UP (ref 80–100)
MCV RBC AUTO: 98.4 FL — SIGNIFICANT CHANGE UP (ref 80–100)
MCV RBC AUTO: 98.5 FL — SIGNIFICANT CHANGE UP (ref 80–100)
MCV RBC AUTO: 99.2 FL — SIGNIFICANT CHANGE UP (ref 80–100)
MCV RBC AUTO: 99.2 FL — SIGNIFICANT CHANGE UP (ref 80–100)
MCV RBC AUTO: 99.6 FL — SIGNIFICANT CHANGE UP (ref 80–100)
MCV RBC AUTO: 99.6 FL — SIGNIFICANT CHANGE UP (ref 80–100)
METHOD TYPE: SIGNIFICANT CHANGE UP
MONOCYTES # BLD AUTO: 0.49 K/UL — SIGNIFICANT CHANGE UP (ref 0–0.9)
MONOCYTES # BLD AUTO: 0.68 K/UL
MONOCYTES # BLD AUTO: 0.84 K/UL — SIGNIFICANT CHANGE UP (ref 0–0.9)
MONOCYTES # BLD AUTO: 0.84 K/UL — SIGNIFICANT CHANGE UP (ref 0–0.9)
MONOCYTES # BLD AUTO: 0.87 K/UL — SIGNIFICANT CHANGE UP (ref 0–0.9)
MONOCYTES # BLD AUTO: 0.87 K/UL — SIGNIFICANT CHANGE UP (ref 0–0.9)
MONOCYTES # BLD AUTO: 0.89 K/UL
MONOCYTES # BLD AUTO: 0.99 K/UL — HIGH (ref 0–0.9)
MONOCYTES # BLD AUTO: 0.99 K/UL — HIGH (ref 0–0.9)
MONOCYTES # BLD AUTO: 1.08 K/UL — HIGH (ref 0–0.9)
MONOCYTES # BLD AUTO: 1.08 K/UL — HIGH (ref 0–0.9)
MONOCYTES NFR BLD AUTO: 10.9 %
MONOCYTES NFR BLD AUTO: 11.5 %
MONOCYTES NFR BLD AUTO: 3.4 % — SIGNIFICANT CHANGE UP (ref 2–14)
MONOCYTES NFR BLD AUTO: 3.4 % — SIGNIFICANT CHANGE UP (ref 2–14)
MONOCYTES NFR BLD AUTO: 5.4 % — SIGNIFICANT CHANGE UP (ref 2–14)
MONOCYTES NFR BLD AUTO: 5.4 % — SIGNIFICANT CHANGE UP (ref 2–14)
MONOCYTES NFR BLD AUTO: 5.7 % — SIGNIFICANT CHANGE UP (ref 2–14)
MONOCYTES NFR BLD AUTO: 5.7 % — SIGNIFICANT CHANGE UP (ref 2–14)
MONOCYTES NFR BLD AUTO: 6.6 % — SIGNIFICANT CHANGE UP (ref 2–14)
MONOCYTES NFR BLD AUTO: 6.6 % — SIGNIFICANT CHANGE UP (ref 2–14)
MONOCYTES NFR BLD AUTO: 6.7 % — SIGNIFICANT CHANGE UP (ref 2–14)
MONOCYTES NFR BLD AUTO: 6.7 % — SIGNIFICANT CHANGE UP (ref 2–14)
MONOCYTES NFR BLD AUTO: 7.2 % — SIGNIFICANT CHANGE UP (ref 2–14)
MONOCYTES NFR BLD AUTO: 7.2 % — SIGNIFICANT CHANGE UP (ref 2–14)
MRSA PCR RESULT.: SIGNIFICANT CHANGE UP
MRSA PCR RESULT.: SIGNIFICANT CHANGE UP
NEUTROPHILS # BLD AUTO: 10.45 K/UL — HIGH (ref 1.8–7.4)
NEUTROPHILS # BLD AUTO: 10.45 K/UL — HIGH (ref 1.8–7.4)
NEUTROPHILS # BLD AUTO: 12.3 K/UL — HIGH (ref 1.8–7.4)
NEUTROPHILS # BLD AUTO: 12.3 K/UL — HIGH (ref 1.8–7.4)
NEUTROPHILS # BLD AUTO: 12.79 K/UL — HIGH (ref 1.8–7.4)
NEUTROPHILS # BLD AUTO: 12.79 K/UL — HIGH (ref 1.8–7.4)
NEUTROPHILS # BLD AUTO: 13.26 K/UL — HIGH (ref 1.8–7.4)
NEUTROPHILS # BLD AUTO: 13.26 K/UL — HIGH (ref 1.8–7.4)
NEUTROPHILS # BLD AUTO: 14.02 K/UL — HIGH (ref 1.8–7.4)
NEUTROPHILS # BLD AUTO: 14.02 K/UL — HIGH (ref 1.8–7.4)
NEUTROPHILS # BLD AUTO: 3.33 K/UL
NEUTROPHILS # BLD AUTO: 5.62 K/UL
NEUTROPHILS # BLD AUTO: 7.43 K/UL — HIGH (ref 1.8–7.4)
NEUTROPHILS # BLD AUTO: 7.43 K/UL — HIGH (ref 1.8–7.4)
NEUTROPHILS NFR BLD AUTO: 56.5 %
NEUTROPHILS NFR BLD AUTO: 68.9 %
NEUTROPHILS NFR BLD AUTO: 82.3 % — HIGH (ref 43–77)
NEUTROPHILS NFR BLD AUTO: 82.3 % — HIGH (ref 43–77)
NEUTROPHILS NFR BLD AUTO: 82.5 % — HIGH (ref 43–77)
NEUTROPHILS NFR BLD AUTO: 82.5 % — HIGH (ref 43–77)
NEUTROPHILS NFR BLD AUTO: 86.5 % — HIGH (ref 43–77)
NEUTROPHILS NFR BLD AUTO: 86.5 % — HIGH (ref 43–77)
NEUTROPHILS NFR BLD AUTO: 87 % — HIGH (ref 43–77)
NEUTROPHILS NFR BLD AUTO: 87 % — HIGH (ref 43–77)
NEUTROPHILS NFR BLD AUTO: 87.3 % — HIGH (ref 43–77)
NEUTROPHILS NFR BLD AUTO: 87.3 % — HIGH (ref 43–77)
NEUTROPHILS NFR BLD AUTO: 92.5 % — HIGH (ref 43–77)
NEUTROPHILS NFR BLD AUTO: 92.5 % — HIGH (ref 43–77)
NITRITE UR-MCNC: NEGATIVE — SIGNIFICANT CHANGE UP
NRBC # BLD: 0 /100 WBCS — SIGNIFICANT CHANGE UP (ref 0–0)
NT-PROBNP SERPL-SCNC: 206 PG/ML — SIGNIFICANT CHANGE UP (ref 0–300)
NT-PROBNP SERPL-SCNC: 206 PG/ML — SIGNIFICANT CHANGE UP (ref 0–300)
OB PNL STL: NEGATIVE — SIGNIFICANT CHANGE UP
OB PNL STL: NEGATIVE — SIGNIFICANT CHANGE UP
OB PNL STL: POSITIVE
ORGANISM # SPEC MICROSCOPIC CNT: ABNORMAL
ORGANISM # SPEC MICROSCOPIC CNT: SIGNIFICANT CHANGE UP
PCO2 BLDA: 36 MMHG — HIGH (ref 32–35)
PCO2 BLDA: 36 MMHG — HIGH (ref 32–35)
PCO2 BLDA: 39 MMHG — HIGH (ref 32–35)
PCO2 BLDA: 39 MMHG — HIGH (ref 32–35)
PCO2 BLDA: 43 MMHG — HIGH (ref 32–35)
PCO2 BLDA: 43 MMHG — HIGH (ref 32–35)
PCO2 BLDA: 44 MMHG — HIGH (ref 32–35)
PCO2 BLDA: 44 MMHG — HIGH (ref 32–35)
PCO2 BLDA: 45 MMHG — HIGH (ref 32–35)
PCO2 BLDA: 45 MMHG — HIGH (ref 32–35)
PCO2 BLDA: 46 MMHG — HIGH (ref 32–35)
PCO2 BLDA: 46 MMHG — HIGH (ref 32–35)
PCO2 BLDA: 47 MMHG — HIGH (ref 32–35)
PCO2 BLDA: 48 MMHG — HIGH (ref 32–35)
PCO2 BLDA: 48 MMHG — HIGH (ref 32–35)
PCO2 BLDA: 50 MMHG — HIGH (ref 32–35)
PCO2 BLDA: 51 MMHG — HIGH (ref 32–35)
PCO2 BLDA: 51 MMHG — HIGH (ref 32–35)
PCO2 BLDA: 61 MMHG — HIGH (ref 32–35)
PCO2 BLDA: 61 MMHG — HIGH (ref 32–35)
PCO2 BLDA: 75 MMHG — CRITICAL HIGH (ref 32–35)
PCO2 BLDA: 75 MMHG — CRITICAL HIGH (ref 32–35)
PCO2 BLDV: 40 MMHG — SIGNIFICANT CHANGE UP (ref 39–42)
PCO2 BLDV: 40 MMHG — SIGNIFICANT CHANGE UP (ref 39–42)
PH BLDA: 7.31 — LOW (ref 7.35–7.45)
PH BLDA: 7.31 — LOW (ref 7.35–7.45)
PH BLDA: 7.35 — SIGNIFICANT CHANGE UP (ref 7.35–7.45)
PH BLDA: 7.35 — SIGNIFICANT CHANGE UP (ref 7.35–7.45)
PH BLDA: 7.38 — SIGNIFICANT CHANGE UP (ref 7.35–7.45)
PH BLDA: 7.38 — SIGNIFICANT CHANGE UP (ref 7.35–7.45)
PH BLDA: 7.41 — SIGNIFICANT CHANGE UP (ref 7.35–7.45)
PH BLDA: 7.42 — SIGNIFICANT CHANGE UP (ref 7.35–7.45)
PH BLDA: 7.42 — SIGNIFICANT CHANGE UP (ref 7.35–7.45)
PH BLDA: 7.43 — SIGNIFICANT CHANGE UP (ref 7.35–7.45)
PH BLDA: 7.43 — SIGNIFICANT CHANGE UP (ref 7.35–7.45)
PH BLDA: 7.44 — SIGNIFICANT CHANGE UP (ref 7.35–7.45)
PH BLDA: 7.46 — HIGH (ref 7.35–7.45)
PH BLDA: 7.49 — HIGH (ref 7.35–7.45)
PH BLDA: 7.49 — HIGH (ref 7.35–7.45)
PH BLDA: 7.5 — HIGH (ref 7.35–7.45)
PH BLDA: 7.5 — HIGH (ref 7.35–7.45)
PH BLDA: 7.51 — HIGH (ref 7.35–7.45)
PH BLDA: 7.51 — HIGH (ref 7.35–7.45)
PH BLDV: 7.49 — HIGH (ref 7.32–7.43)
PH BLDV: 7.49 — HIGH (ref 7.32–7.43)
PH UR: 6.5 — SIGNIFICANT CHANGE UP (ref 5–8)
PH UR: 7.5 — SIGNIFICANT CHANGE UP (ref 5–8)
PH UR: 7.5 — SIGNIFICANT CHANGE UP (ref 5–8)
PHOSPHATE SERPL-MCNC: 2.4 MG/DL — LOW (ref 2.5–4.5)
PHOSPHATE SERPL-MCNC: 2.6 MG/DL — SIGNIFICANT CHANGE UP (ref 2.5–4.5)
PHOSPHATE SERPL-MCNC: 2.7 MG/DL — SIGNIFICANT CHANGE UP (ref 2.5–4.5)
PHOSPHATE SERPL-MCNC: 3 MG/DL — SIGNIFICANT CHANGE UP (ref 2.5–4.5)
PHOSPHATE SERPL-MCNC: 3 MG/DL — SIGNIFICANT CHANGE UP (ref 2.5–4.5)
PHOSPHATE SERPL-MCNC: 3.2 MG/DL — SIGNIFICANT CHANGE UP (ref 2.5–4.5)
PHOSPHATE SERPL-MCNC: 3.2 MG/DL — SIGNIFICANT CHANGE UP (ref 2.5–4.5)
PHOSPHATE SERPL-MCNC: 3.7 MG/DL — SIGNIFICANT CHANGE UP (ref 2.5–4.5)
PHOSPHATE SERPL-MCNC: 3.8 MG/DL — SIGNIFICANT CHANGE UP (ref 2.5–4.5)
PHOSPHATE SERPL-MCNC: 3.8 MG/DL — SIGNIFICANT CHANGE UP (ref 2.5–4.5)
PLATELET # BLD AUTO: 154 K/UL — SIGNIFICANT CHANGE UP (ref 150–400)
PLATELET # BLD AUTO: 154 K/UL — SIGNIFICANT CHANGE UP (ref 150–400)
PLATELET # BLD AUTO: 163 K/UL — SIGNIFICANT CHANGE UP (ref 150–400)
PLATELET # BLD AUTO: 163 K/UL — SIGNIFICANT CHANGE UP (ref 150–400)
PLATELET # BLD AUTO: 177 K/UL — SIGNIFICANT CHANGE UP (ref 150–400)
PLATELET # BLD AUTO: 177 K/UL — SIGNIFICANT CHANGE UP (ref 150–400)
PLATELET # BLD AUTO: 183 K/UL — SIGNIFICANT CHANGE UP (ref 150–400)
PLATELET # BLD AUTO: 183 K/UL — SIGNIFICANT CHANGE UP (ref 150–400)
PLATELET # BLD AUTO: 186 K/UL — SIGNIFICANT CHANGE UP (ref 150–400)
PLATELET # BLD AUTO: 188 K/UL — SIGNIFICANT CHANGE UP (ref 150–400)
PLATELET # BLD AUTO: 188 K/UL — SIGNIFICANT CHANGE UP (ref 150–400)
PLATELET # BLD AUTO: 190 K/UL — SIGNIFICANT CHANGE UP (ref 150–400)
PLATELET # BLD AUTO: 190 K/UL — SIGNIFICANT CHANGE UP (ref 150–400)
PLATELET # BLD AUTO: 194 K/UL — SIGNIFICANT CHANGE UP (ref 150–400)
PLATELET # BLD AUTO: 194 K/UL — SIGNIFICANT CHANGE UP (ref 150–400)
PLATELET # BLD AUTO: 197 K/UL — SIGNIFICANT CHANGE UP (ref 150–400)
PLATELET # BLD AUTO: 197 K/UL — SIGNIFICANT CHANGE UP (ref 150–400)
PLATELET # BLD AUTO: 199 K/UL — SIGNIFICANT CHANGE UP (ref 150–400)
PLATELET # BLD AUTO: 199 K/UL — SIGNIFICANT CHANGE UP (ref 150–400)
PLATELET # BLD AUTO: 201 K/UL — SIGNIFICANT CHANGE UP (ref 150–400)
PLATELET # BLD AUTO: 201 K/UL — SIGNIFICANT CHANGE UP (ref 150–400)
PLATELET # BLD AUTO: 202 K/UL — SIGNIFICANT CHANGE UP (ref 150–400)
PLATELET # BLD AUTO: 202 K/UL — SIGNIFICANT CHANGE UP (ref 150–400)
PLATELET # BLD AUTO: 216 K/UL — SIGNIFICANT CHANGE UP (ref 150–400)
PLATELET # BLD AUTO: 219 K/UL — SIGNIFICANT CHANGE UP (ref 150–400)
PLATELET # BLD AUTO: 219 K/UL — SIGNIFICANT CHANGE UP (ref 150–400)
PLATELET # BLD AUTO: 226 K/UL — SIGNIFICANT CHANGE UP (ref 150–400)
PLATELET # BLD AUTO: 226 K/UL — SIGNIFICANT CHANGE UP (ref 150–400)
PLATELET # BLD AUTO: 239 K/UL — SIGNIFICANT CHANGE UP (ref 150–400)
PLATELET # BLD AUTO: 239 K/UL — SIGNIFICANT CHANGE UP (ref 150–400)
PLATELET # BLD AUTO: 261 K/UL — SIGNIFICANT CHANGE UP (ref 150–400)
PLATELET # BLD AUTO: 261 K/UL — SIGNIFICANT CHANGE UP (ref 150–400)
PLATELET # BLD AUTO: 280 K/UL — SIGNIFICANT CHANGE UP (ref 150–400)
PLATELET # BLD AUTO: 280 K/UL — SIGNIFICANT CHANGE UP (ref 150–400)
PLATELET # BLD AUTO: 295 K/UL — SIGNIFICANT CHANGE UP (ref 150–400)
PLATELET # BLD AUTO: 295 K/UL — SIGNIFICANT CHANGE UP (ref 150–400)
PLATELET # BLD AUTO: 314 K/UL — SIGNIFICANT CHANGE UP (ref 150–400)
PLATELET # BLD AUTO: 314 K/UL — SIGNIFICANT CHANGE UP (ref 150–400)
PLATELET # BLD AUTO: 330 K/UL — SIGNIFICANT CHANGE UP (ref 150–400)
PLATELET # BLD AUTO: 330 K/UL — SIGNIFICANT CHANGE UP (ref 150–400)
PLATELET # BLD AUTO: 345 K/UL — SIGNIFICANT CHANGE UP (ref 150–400)
PLATELET # BLD AUTO: 345 K/UL — SIGNIFICANT CHANGE UP (ref 150–400)
PLATELET # BLD AUTO: 346 K/UL — SIGNIFICANT CHANGE UP (ref 150–400)
PLATELET # BLD AUTO: 346 K/UL — SIGNIFICANT CHANGE UP (ref 150–400)
PLATELET # BLD AUTO: 359 K/UL
PLATELET # BLD AUTO: 362 K/UL — SIGNIFICANT CHANGE UP (ref 150–400)
PLATELET # BLD AUTO: 362 K/UL — SIGNIFICANT CHANGE UP (ref 150–400)
PLATELET # BLD AUTO: 369 K/UL
PLATELET # BLD AUTO: 408 K/UL — HIGH (ref 150–400)
PLATELET # BLD AUTO: 408 K/UL — HIGH (ref 150–400)
PLATELET # BLD AUTO: 434 K/UL — HIGH (ref 150–400)
PLATELET # BLD AUTO: 434 K/UL — HIGH (ref 150–400)
PLATELET # BLD AUTO: 435 K/UL — HIGH (ref 150–400)
PLATELET # BLD AUTO: 435 K/UL — HIGH (ref 150–400)
PLATELET # BLD AUTO: 437 K/UL — HIGH (ref 150–400)
PLATELET # BLD AUTO: 437 K/UL — HIGH (ref 150–400)
PLATELET # BLD AUTO: 438 K/UL — HIGH (ref 150–400)
PLATELET # BLD AUTO: 438 K/UL — HIGH (ref 150–400)
PLATELET # BLD AUTO: 444 K/UL — HIGH (ref 150–400)
PLATELET # BLD AUTO: 444 K/UL — HIGH (ref 150–400)
PLATELET # BLD AUTO: 461 K/UL — HIGH (ref 150–400)
PLATELET # BLD AUTO: 461 K/UL — HIGH (ref 150–400)
PLATELET # BLD AUTO: 467 K/UL — HIGH (ref 150–400)
PLATELET # BLD AUTO: 467 K/UL — HIGH (ref 150–400)
PLATELET # BLD AUTO: 485 K/UL — HIGH (ref 150–400)
PLATELET # BLD AUTO: 485 K/UL — HIGH (ref 150–400)
PLATELET # BLD AUTO: 488 K/UL — HIGH (ref 150–400)
PLATELET # BLD AUTO: 488 K/UL — HIGH (ref 150–400)
PO2 BLDA: 102 MMHG — SIGNIFICANT CHANGE UP (ref 83–108)
PO2 BLDA: 102 MMHG — SIGNIFICANT CHANGE UP (ref 83–108)
PO2 BLDA: 103 MMHG — SIGNIFICANT CHANGE UP (ref 83–108)
PO2 BLDA: 103 MMHG — SIGNIFICANT CHANGE UP (ref 83–108)
PO2 BLDA: 105 MMHG — SIGNIFICANT CHANGE UP (ref 83–108)
PO2 BLDA: 105 MMHG — SIGNIFICANT CHANGE UP (ref 83–108)
PO2 BLDA: 107 MMHG — SIGNIFICANT CHANGE UP (ref 83–108)
PO2 BLDA: 107 MMHG — SIGNIFICANT CHANGE UP (ref 83–108)
PO2 BLDA: 110 MMHG — HIGH (ref 83–108)
PO2 BLDA: 110 MMHG — HIGH (ref 83–108)
PO2 BLDA: 111 MMHG — HIGH (ref 83–108)
PO2 BLDA: 111 MMHG — HIGH (ref 83–108)
PO2 BLDA: 115 MMHG — HIGH (ref 83–108)
PO2 BLDA: 115 MMHG — HIGH (ref 83–108)
PO2 BLDA: 148 MMHG — HIGH (ref 83–108)
PO2 BLDA: 148 MMHG — HIGH (ref 83–108)
PO2 BLDA: 163 MMHG — HIGH (ref 83–108)
PO2 BLDA: 163 MMHG — HIGH (ref 83–108)
PO2 BLDA: 179 MMHG — HIGH (ref 83–108)
PO2 BLDA: 179 MMHG — HIGH (ref 83–108)
PO2 BLDA: 216 MMHG — HIGH (ref 83–108)
PO2 BLDA: 216 MMHG — HIGH (ref 83–108)
PO2 BLDA: 349 MMHG — HIGH (ref 83–108)
PO2 BLDA: 349 MMHG — HIGH (ref 83–108)
PO2 BLDA: 85 MMHG — SIGNIFICANT CHANGE UP (ref 83–108)
PO2 BLDA: 90 MMHG — SIGNIFICANT CHANGE UP (ref 83–108)
PO2 BLDA: 90 MMHG — SIGNIFICANT CHANGE UP (ref 83–108)
PO2 BLDA: 95 MMHG — SIGNIFICANT CHANGE UP (ref 83–108)
PO2 BLDA: 95 MMHG — SIGNIFICANT CHANGE UP (ref 83–108)
PO2 BLDV: 72 MMHG — HIGH (ref 25–45)
PO2 BLDV: 72 MMHG — HIGH (ref 25–45)
POTASSIUM SERPL-MCNC: 2.7 MMOL/L — CRITICAL LOW (ref 3.5–5.3)
POTASSIUM SERPL-MCNC: 2.7 MMOL/L — CRITICAL LOW (ref 3.5–5.3)
POTASSIUM SERPL-MCNC: 2.9 MMOL/L — CRITICAL LOW (ref 3.5–5.3)
POTASSIUM SERPL-MCNC: 2.9 MMOL/L — CRITICAL LOW (ref 3.5–5.3)
POTASSIUM SERPL-MCNC: 3 MMOL/L — LOW (ref 3.5–5.3)
POTASSIUM SERPL-MCNC: 3.1 MMOL/L — LOW (ref 3.5–5.3)
POTASSIUM SERPL-MCNC: 3.1 MMOL/L — LOW (ref 3.5–5.3)
POTASSIUM SERPL-MCNC: 3.2 MMOL/L — LOW (ref 3.5–5.3)
POTASSIUM SERPL-MCNC: 3.2 MMOL/L — LOW (ref 3.5–5.3)
POTASSIUM SERPL-MCNC: 3.4 MMOL/L — LOW (ref 3.5–5.3)
POTASSIUM SERPL-MCNC: 3.4 MMOL/L — LOW (ref 3.5–5.3)
POTASSIUM SERPL-MCNC: 3.5 MMOL/L — SIGNIFICANT CHANGE UP (ref 3.5–5.3)
POTASSIUM SERPL-MCNC: 3.6 MMOL/L — SIGNIFICANT CHANGE UP (ref 3.5–5.3)
POTASSIUM SERPL-MCNC: 3.7 MMOL/L — SIGNIFICANT CHANGE UP (ref 3.5–5.3)
POTASSIUM SERPL-MCNC: 3.8 MMOL/L — SIGNIFICANT CHANGE UP (ref 3.5–5.3)
POTASSIUM SERPL-MCNC: 3.9 MMOL/L — SIGNIFICANT CHANGE UP (ref 3.5–5.3)
POTASSIUM SERPL-MCNC: 3.9 MMOL/L — SIGNIFICANT CHANGE UP (ref 3.5–5.3)
POTASSIUM SERPL-MCNC: 4 MMOL/L — SIGNIFICANT CHANGE UP (ref 3.5–5.3)
POTASSIUM SERPL-MCNC: 4.3 MMOL/L — SIGNIFICANT CHANGE UP (ref 3.5–5.3)
POTASSIUM SERPL-MCNC: 4.3 MMOL/L — SIGNIFICANT CHANGE UP (ref 3.5–5.3)
POTASSIUM SERPL-MCNC: 4.4 MMOL/L — SIGNIFICANT CHANGE UP (ref 3.5–5.3)
POTASSIUM SERPL-MCNC: 4.5 MMOL/L — SIGNIFICANT CHANGE UP (ref 3.5–5.3)
POTASSIUM SERPL-MCNC: 4.5 MMOL/L — SIGNIFICANT CHANGE UP (ref 3.5–5.3)
POTASSIUM SERPL-MCNC: 4.6 MMOL/L — SIGNIFICANT CHANGE UP (ref 3.5–5.3)
POTASSIUM SERPL-MCNC: 4.8 MMOL/L — SIGNIFICANT CHANGE UP (ref 3.5–5.3)
POTASSIUM SERPL-MCNC: 4.8 MMOL/L — SIGNIFICANT CHANGE UP (ref 3.5–5.3)
POTASSIUM SERPL-MCNC: 4.9 MMOL/L — SIGNIFICANT CHANGE UP (ref 3.5–5.3)
POTASSIUM SERPL-MCNC: 4.9 MMOL/L — SIGNIFICANT CHANGE UP (ref 3.5–5.3)
POTASSIUM SERPL-MCNC: 5 MMOL/L — SIGNIFICANT CHANGE UP (ref 3.5–5.3)
POTASSIUM SERPL-MCNC: 5.2 MMOL/L — SIGNIFICANT CHANGE UP (ref 3.5–5.3)
POTASSIUM SERPL-MCNC: 5.2 MMOL/L — SIGNIFICANT CHANGE UP (ref 3.5–5.3)
POTASSIUM SERPL-SCNC: 2.7 MMOL/L — CRITICAL LOW (ref 3.5–5.3)
POTASSIUM SERPL-SCNC: 2.7 MMOL/L — CRITICAL LOW (ref 3.5–5.3)
POTASSIUM SERPL-SCNC: 2.9 MMOL/L — CRITICAL LOW (ref 3.5–5.3)
POTASSIUM SERPL-SCNC: 2.9 MMOL/L — CRITICAL LOW (ref 3.5–5.3)
POTASSIUM SERPL-SCNC: 3 MMOL/L — LOW (ref 3.5–5.3)
POTASSIUM SERPL-SCNC: 3.1 MMOL/L — LOW (ref 3.5–5.3)
POTASSIUM SERPL-SCNC: 3.1 MMOL/L — LOW (ref 3.5–5.3)
POTASSIUM SERPL-SCNC: 3.2 MMOL/L — LOW (ref 3.5–5.3)
POTASSIUM SERPL-SCNC: 3.2 MMOL/L — LOW (ref 3.5–5.3)
POTASSIUM SERPL-SCNC: 3.4 MMOL/L — LOW (ref 3.5–5.3)
POTASSIUM SERPL-SCNC: 3.4 MMOL/L — LOW (ref 3.5–5.3)
POTASSIUM SERPL-SCNC: 3.5 MMOL/L — SIGNIFICANT CHANGE UP (ref 3.5–5.3)
POTASSIUM SERPL-SCNC: 3.6 MMOL/L — SIGNIFICANT CHANGE UP (ref 3.5–5.3)
POTASSIUM SERPL-SCNC: 3.7 MMOL/L — SIGNIFICANT CHANGE UP (ref 3.5–5.3)
POTASSIUM SERPL-SCNC: 3.8 MMOL/L — SIGNIFICANT CHANGE UP (ref 3.5–5.3)
POTASSIUM SERPL-SCNC: 3.9 MMOL/L — SIGNIFICANT CHANGE UP (ref 3.5–5.3)
POTASSIUM SERPL-SCNC: 3.9 MMOL/L — SIGNIFICANT CHANGE UP (ref 3.5–5.3)
POTASSIUM SERPL-SCNC: 4 MMOL/L — SIGNIFICANT CHANGE UP (ref 3.5–5.3)
POTASSIUM SERPL-SCNC: 4.3 MMOL/L — SIGNIFICANT CHANGE UP (ref 3.5–5.3)
POTASSIUM SERPL-SCNC: 4.3 MMOL/L — SIGNIFICANT CHANGE UP (ref 3.5–5.3)
POTASSIUM SERPL-SCNC: 4.4 MMOL/L — SIGNIFICANT CHANGE UP (ref 3.5–5.3)
POTASSIUM SERPL-SCNC: 4.5 MMOL/L — SIGNIFICANT CHANGE UP (ref 3.5–5.3)
POTASSIUM SERPL-SCNC: 4.5 MMOL/L — SIGNIFICANT CHANGE UP (ref 3.5–5.3)
POTASSIUM SERPL-SCNC: 4.6 MMOL/L — SIGNIFICANT CHANGE UP (ref 3.5–5.3)
POTASSIUM SERPL-SCNC: 4.8 MMOL/L — SIGNIFICANT CHANGE UP (ref 3.5–5.3)
POTASSIUM SERPL-SCNC: 4.8 MMOL/L — SIGNIFICANT CHANGE UP (ref 3.5–5.3)
POTASSIUM SERPL-SCNC: 4.9 MMOL/L — SIGNIFICANT CHANGE UP (ref 3.5–5.3)
POTASSIUM SERPL-SCNC: 4.9 MMOL/L — SIGNIFICANT CHANGE UP (ref 3.5–5.3)
POTASSIUM SERPL-SCNC: 5 MMOL/L — SIGNIFICANT CHANGE UP (ref 3.5–5.3)
POTASSIUM SERPL-SCNC: 5.2 MMOL/L — SIGNIFICANT CHANGE UP (ref 3.5–5.3)
POTASSIUM SERPL-SCNC: 5.2 MMOL/L — SIGNIFICANT CHANGE UP (ref 3.5–5.3)
PROCALCITONIN SERPL-MCNC: 0.32 NG/ML — HIGH
PROCALCITONIN SERPL-MCNC: 0.32 NG/ML — HIGH
PROCALCITONIN SERPL-MCNC: 0.56 NG/ML — HIGH
PROCALCITONIN SERPL-MCNC: 0.56 NG/ML — HIGH
PROT SERPL-MCNC: 5.2 G/DL — LOW (ref 6–8.3)
PROT SERPL-MCNC: 5.2 G/DL — LOW (ref 6–8.3)
PROT SERPL-MCNC: 5.3 G/DL — LOW (ref 6–8.3)
PROT SERPL-MCNC: 5.3 G/DL — LOW (ref 6–8.3)
PROT SERPL-MCNC: 5.5 G/DL — LOW (ref 6–8.3)
PROT SERPL-MCNC: 5.5 G/DL — LOW (ref 6–8.3)
PROT SERPL-MCNC: 5.6 G/DL — LOW (ref 6–8.3)
PROT SERPL-MCNC: 5.6 G/DL — LOW (ref 6–8.3)
PROT SERPL-MCNC: 5.8 G/DL — LOW (ref 6–8.3)
PROT SERPL-MCNC: 6 G/DL — SIGNIFICANT CHANGE UP (ref 6–8.3)
PROT SERPL-MCNC: 6.2 G/DL — SIGNIFICANT CHANGE UP (ref 6–8.3)
PROT SERPL-MCNC: 6.2 G/DL — SIGNIFICANT CHANGE UP (ref 6–8.3)
PROT SERPL-MCNC: 6.3 G/DL — SIGNIFICANT CHANGE UP (ref 6–8.3)
PROT SERPL-MCNC: 6.3 G/DL — SIGNIFICANT CHANGE UP (ref 6–8.3)
PROT SERPL-MCNC: 6.8 G/DL — SIGNIFICANT CHANGE UP (ref 6–8.3)
PROT SERPL-MCNC: 6.8 G/DL — SIGNIFICANT CHANGE UP (ref 6–8.3)
PROT SERPL-MCNC: 7.1 G/DL — SIGNIFICANT CHANGE UP (ref 6–8.3)
PROT SERPL-MCNC: 7.1 G/DL — SIGNIFICANT CHANGE UP (ref 6–8.3)
PROT SERPL-MCNC: 8.7 G/DL — HIGH (ref 6–8.3)
PROT SERPL-MCNC: 8.7 G/DL — HIGH (ref 6–8.3)
PROT UR-MCNC: SIGNIFICANT CHANGE UP MG/DL
PROTHROM AB SERPL-ACNC: 11.8 SEC — SIGNIFICANT CHANGE UP (ref 9.5–13)
PROTHROM AB SERPL-ACNC: 11.8 SEC — SIGNIFICANT CHANGE UP (ref 9.5–13)
PROTHROM AB SERPL-ACNC: 13.9 SEC — HIGH (ref 9.5–13)
PROTHROM AB SERPL-ACNC: 13.9 SEC — HIGH (ref 9.5–13)
PROTHROM AB SERPL-ACNC: 21.8 SEC — HIGH (ref 9.5–13)
PROTHROM AB SERPL-ACNC: 21.8 SEC — HIGH (ref 9.5–13)
RAPID RVP RESULT: DETECTED
RAPID RVP RESULT: SIGNIFICANT CHANGE UP
RBC # BLD: 2.22 M/UL — LOW (ref 3.8–5.2)
RBC # BLD: 2.22 M/UL — LOW (ref 3.8–5.2)
RBC # BLD: 2.28 M/UL — LOW (ref 3.8–5.2)
RBC # BLD: 2.28 M/UL — LOW (ref 3.8–5.2)
RBC # BLD: 2.39 M/UL — LOW (ref 3.8–5.2)
RBC # BLD: 2.39 M/UL — LOW (ref 3.8–5.2)
RBC # BLD: 2.42 M/UL — LOW (ref 3.8–5.2)
RBC # BLD: 2.57 M/UL — LOW (ref 3.8–5.2)
RBC # BLD: 2.6 M/UL — LOW (ref 3.8–5.2)
RBC # BLD: 2.6 M/UL — LOW (ref 3.8–5.2)
RBC # BLD: 2.62 M/UL — LOW (ref 3.8–5.2)
RBC # BLD: 2.62 M/UL — LOW (ref 3.8–5.2)
RBC # BLD: 2.63 M/UL — LOW (ref 3.8–5.2)
RBC # BLD: 2.63 M/UL — LOW (ref 3.8–5.2)
RBC # BLD: 2.65 M/UL — LOW (ref 3.8–5.2)
RBC # BLD: 2.65 M/UL — LOW (ref 3.8–5.2)
RBC # BLD: 2.67 M/UL — LOW (ref 3.8–5.2)
RBC # BLD: 2.68 M/UL — LOW (ref 3.8–5.2)
RBC # BLD: 2.68 M/UL — LOW (ref 3.8–5.2)
RBC # BLD: 2.69 M/UL — LOW (ref 3.8–5.2)
RBC # BLD: 2.71 M/UL — LOW (ref 3.8–5.2)
RBC # BLD: 2.71 M/UL — LOW (ref 3.8–5.2)
RBC # BLD: 2.75 M/UL — LOW (ref 3.8–5.2)
RBC # BLD: 2.75 M/UL — LOW (ref 3.8–5.2)
RBC # BLD: 2.76 M/UL — LOW (ref 3.8–5.2)
RBC # BLD: 2.76 M/UL — LOW (ref 3.8–5.2)
RBC # BLD: 2.82 M/UL — LOW (ref 3.8–5.2)
RBC # BLD: 2.82 M/UL — LOW (ref 3.8–5.2)
RBC # BLD: 2.83 M/UL — LOW (ref 3.8–5.2)
RBC # BLD: 2.84 M/UL — LOW (ref 3.8–5.2)
RBC # BLD: 2.84 M/UL — LOW (ref 3.8–5.2)
RBC # BLD: 2.85 M/UL — LOW (ref 3.8–5.2)
RBC # BLD: 2.85 M/UL — LOW (ref 3.8–5.2)
RBC # BLD: 2.89 M/UL — LOW (ref 3.8–5.2)
RBC # BLD: 2.89 M/UL — LOW (ref 3.8–5.2)
RBC # BLD: 2.93 M/UL — LOW (ref 3.8–5.2)
RBC # BLD: 2.93 M/UL — LOW (ref 3.8–5.2)
RBC # BLD: 2.94 M/UL — LOW (ref 3.8–5.2)
RBC # BLD: 2.94 M/UL — LOW (ref 3.8–5.2)
RBC # BLD: 3.01 M/UL — LOW (ref 3.8–5.2)
RBC # BLD: 3.01 M/UL — LOW (ref 3.8–5.2)
RBC # BLD: 3.04 M/UL — LOW (ref 3.8–5.2)
RBC # BLD: 3.04 M/UL — LOW (ref 3.8–5.2)
RBC # BLD: 3.05 M/UL — LOW (ref 3.8–5.2)
RBC # BLD: 3.05 M/UL — LOW (ref 3.8–5.2)
RBC # BLD: 3.08 M/UL — LOW (ref 3.8–5.2)
RBC # BLD: 3.08 M/UL — LOW (ref 3.8–5.2)
RBC # BLD: 3.15 M/UL — LOW (ref 3.8–5.2)
RBC # BLD: 3.15 M/UL — LOW (ref 3.8–5.2)
RBC # BLD: 3.19 M/UL — LOW (ref 3.8–5.2)
RBC # BLD: 3.19 M/UL — LOW (ref 3.8–5.2)
RBC # BLD: 3.26 M/UL — LOW (ref 3.8–5.2)
RBC # BLD: 3.26 M/UL — LOW (ref 3.8–5.2)
RBC # BLD: 3.42 M/UL — LOW (ref 3.8–5.2)
RBC # BLD: 3.42 M/UL — LOW (ref 3.8–5.2)
RBC # BLD: 3.88 M/UL — SIGNIFICANT CHANGE UP (ref 3.8–5.2)
RBC # BLD: 3.88 M/UL — SIGNIFICANT CHANGE UP (ref 3.8–5.2)
RBC # BLD: 4.41 M/UL
RBC # BLD: 4.8 M/UL
RBC # FLD: 13.2 % — SIGNIFICANT CHANGE UP (ref 10.3–14.5)
RBC # FLD: 13.3 % — SIGNIFICANT CHANGE UP (ref 10.3–14.5)
RBC # FLD: 13.4 % — SIGNIFICANT CHANGE UP (ref 10.3–14.5)
RBC # FLD: 13.4 % — SIGNIFICANT CHANGE UP (ref 10.3–14.5)
RBC # FLD: 13.5 % — SIGNIFICANT CHANGE UP (ref 10.3–14.5)
RBC # FLD: 13.6 % — SIGNIFICANT CHANGE UP (ref 10.3–14.5)
RBC # FLD: 13.8 % — SIGNIFICANT CHANGE UP (ref 10.3–14.5)
RBC # FLD: 13.9 % — SIGNIFICANT CHANGE UP (ref 10.3–14.5)
RBC # FLD: 14 % — SIGNIFICANT CHANGE UP (ref 10.3–14.5)
RBC # FLD: 14 % — SIGNIFICANT CHANGE UP (ref 10.3–14.5)
RBC # FLD: 14.1 % — SIGNIFICANT CHANGE UP (ref 10.3–14.5)
RBC # FLD: 14.6 % — HIGH (ref 10.3–14.5)
RBC # FLD: 14.6 % — HIGH (ref 10.3–14.5)
RBC # FLD: 14.8 % — HIGH (ref 10.3–14.5)
RBC # FLD: 14.8 % — HIGH (ref 10.3–14.5)
RBC # FLD: 14.9 % — HIGH (ref 10.3–14.5)
RBC # FLD: 14.9 % — HIGH (ref 10.3–14.5)
RBC # FLD: 15.1 % — HIGH (ref 10.3–14.5)
RBC # FLD: 15.1 % — HIGH (ref 10.3–14.5)
RBC # FLD: 15.2 % — HIGH (ref 10.3–14.5)
RBC # FLD: 15.2 % — HIGH (ref 10.3–14.5)
RBC # FLD: 15.3 % — HIGH (ref 10.3–14.5)
RBC # FLD: 15.3 % — HIGH (ref 10.3–14.5)
RBC # FLD: 15.6 % — HIGH (ref 10.3–14.5)
RBC # FLD: 15.6 % — HIGH (ref 10.3–14.5)
RBC # FLD: 15.8 % — HIGH (ref 10.3–14.5)
RBC # FLD: 15.8 % — HIGH (ref 10.3–14.5)
RBC # FLD: 16.1 % — HIGH (ref 10.3–14.5)
RBC # FLD: 16.1 % — HIGH (ref 10.3–14.5)
RBC # FLD: 16.9 % — HIGH (ref 10.3–14.5)
RBC # FLD: 16.9 % — HIGH (ref 10.3–14.5)
RBC # FLD: 17 % — HIGH (ref 10.3–14.5)
RBC # FLD: 17.1 % — HIGH (ref 10.3–14.5)
RBC # FLD: 17.3 % — HIGH (ref 10.3–14.5)
RBC # FLD: 23.9 %
RBC # FLD: NORMAL
RBC CASTS # UR COMP ASSIST: 20 /HPF — HIGH (ref 0–4)
RBC CASTS # UR COMP ASSIST: 20 /HPF — HIGH (ref 0–4)
RV+EV RNA SPEC QL NAA+PROBE: DETECTED
RV+EV RNA SPEC QL NAA+PROBE: DETECTED
S AUREUS DNA NOSE QL NAA+PROBE: DETECTED
S AUREUS DNA NOSE QL NAA+PROBE: DETECTED
SAO2 % BLDA: 96.2 % — SIGNIFICANT CHANGE UP (ref 94–98)
SAO2 % BLDA: 96.2 % — SIGNIFICANT CHANGE UP (ref 94–98)
SAO2 % BLDA: 96.7 % — SIGNIFICANT CHANGE UP (ref 94–98)
SAO2 % BLDA: 96.7 % — SIGNIFICANT CHANGE UP (ref 94–98)
SAO2 % BLDA: 97.1 % — SIGNIFICANT CHANGE UP (ref 94–98)
SAO2 % BLDA: 97.1 % — SIGNIFICANT CHANGE UP (ref 94–98)
SAO2 % BLDA: 97.2 % — SIGNIFICANT CHANGE UP (ref 94–98)
SAO2 % BLDA: 97.3 % — SIGNIFICANT CHANGE UP (ref 94–98)
SAO2 % BLDA: 97.3 % — SIGNIFICANT CHANGE UP (ref 94–98)
SAO2 % BLDA: 97.5 % — SIGNIFICANT CHANGE UP (ref 94–98)
SAO2 % BLDA: 97.5 % — SIGNIFICANT CHANGE UP (ref 94–98)
SAO2 % BLDA: 97.9 % — SIGNIFICANT CHANGE UP (ref 94–98)
SAO2 % BLDA: 97.9 % — SIGNIFICANT CHANGE UP (ref 94–98)
SAO2 % BLDA: 98 % — SIGNIFICANT CHANGE UP (ref 94–98)
SAO2 % BLDA: 98.1 % — HIGH (ref 94–98)
SAO2 % BLDA: 98.1 % — HIGH (ref 94–98)
SAO2 % BLDA: 98.2 % — HIGH (ref 94–98)
SAO2 % BLDA: 98.2 % — HIGH (ref 94–98)
SAO2 % BLDA: 98.4 % — HIGH (ref 94–98)
SAO2 % BLDA: 98.4 % — HIGH (ref 94–98)
SAO2 % BLDA: 98.7 % — HIGH (ref 94–98)
SAO2 % BLDA: 98.7 % — HIGH (ref 94–98)
SAO2 % BLDA: 99.4 % — HIGH (ref 94–98)
SAO2 % BLDA: 99.4 % — HIGH (ref 94–98)
SAO2 % BLDV: 95.2 % — HIGH (ref 67–88)
SAO2 % BLDV: 95.2 % — HIGH (ref 67–88)
SARS-COV-2 RNA SPEC QL NAA+PROBE: DETECTED
SARS-COV-2 RNA SPEC QL NAA+PROBE: DETECTED
SARS-COV-2 RNA SPEC QL NAA+PROBE: SIGNIFICANT CHANGE UP
SODIUM SERPL-SCNC: 132 MMOL/L — LOW (ref 135–145)
SODIUM SERPL-SCNC: 132 MMOL/L — LOW (ref 135–145)
SODIUM SERPL-SCNC: 134 MMOL/L — LOW (ref 135–145)
SODIUM SERPL-SCNC: 136 MMOL/L — SIGNIFICANT CHANGE UP (ref 135–145)
SODIUM SERPL-SCNC: 138 MMOL/L — SIGNIFICANT CHANGE UP (ref 135–145)
SODIUM SERPL-SCNC: 139 MMOL/L — SIGNIFICANT CHANGE UP (ref 135–145)
SODIUM SERPL-SCNC: 140 MMOL/L — SIGNIFICANT CHANGE UP (ref 135–145)
SODIUM SERPL-SCNC: 141 MMOL/L — SIGNIFICANT CHANGE UP (ref 135–145)
SODIUM SERPL-SCNC: 142 MMOL/L — SIGNIFICANT CHANGE UP (ref 135–145)
SODIUM SERPL-SCNC: 143 MMOL/L — SIGNIFICANT CHANGE UP (ref 135–145)
SP GR SPEC: 1.01 — SIGNIFICANT CHANGE UP (ref 1–1.03)
SP GR SPEC: 1.01 — SIGNIFICANT CHANGE UP (ref 1–1.03)
SP GR SPEC: 1.02 — SIGNIFICANT CHANGE UP (ref 1–1.03)
SP GR SPEC: 1.02 — SIGNIFICANT CHANGE UP (ref 1–1.03)
SP GR SPEC: 1.05 — HIGH (ref 1–1.03)
SP GR SPEC: 1.05 — HIGH (ref 1–1.03)
SPECIMEN SOURCE: SIGNIFICANT CHANGE UP
T3 SERPL-MCNC: 80 NG/DL — SIGNIFICANT CHANGE UP (ref 80–200)
T3 SERPL-MCNC: 80 NG/DL — SIGNIFICANT CHANGE UP (ref 80–200)
T3FREE SERPL-MCNC: 0.98 PG/ML — LOW (ref 2–4.4)
T3FREE SERPL-MCNC: 0.98 PG/ML — LOW (ref 2–4.4)
T4 AB SER-ACNC: 6.9 UG/DL — SIGNIFICANT CHANGE UP (ref 4.6–12)
T4 AB SER-ACNC: 6.9 UG/DL — SIGNIFICANT CHANGE UP (ref 4.6–12)
TIBC SERPL-MCNC: 459 UG/DL
TRANSFERRIN SERPL-MCNC: 347 MG/DL
TROPONIN I, HIGH SENSITIVITY RESULT: 11.4 NG/L — SIGNIFICANT CHANGE UP
TROPONIN I, HIGH SENSITIVITY RESULT: 20.1 NG/L — SIGNIFICANT CHANGE UP
TROPONIN I, HIGH SENSITIVITY RESULT: 20.1 NG/L — SIGNIFICANT CHANGE UP
TROPONIN I, HIGH SENSITIVITY RESULT: 28.4 NG/L — SIGNIFICANT CHANGE UP
TROPONIN I, HIGH SENSITIVITY RESULT: 28.4 NG/L — SIGNIFICANT CHANGE UP
TSH SERPL-MCNC: 0.27 UIU/ML — LOW (ref 0.36–3.74)
TSH SERPL-MCNC: 0.27 UIU/ML — LOW (ref 0.36–3.74)
TSH SERPL-MCNC: 0.32 UIU/ML — LOW (ref 0.36–3.74)
TSH SERPL-MCNC: 0.32 UIU/ML — LOW (ref 0.36–3.74)
UIBC SERPL-MCNC: 158 UG/DL
UROBILINOGEN FLD QL: 0.2 MG/DL — SIGNIFICANT CHANGE UP (ref 0.2–1)
UROBILINOGEN FLD QL: 1 MG/DL — SIGNIFICANT CHANGE UP (ref 0.2–1)
UROBILINOGEN FLD QL: 1 MG/DL — SIGNIFICANT CHANGE UP (ref 0.2–1)
WBC # BLD: 10.03 K/UL — SIGNIFICANT CHANGE UP (ref 3.8–10.5)
WBC # BLD: 10.03 K/UL — SIGNIFICANT CHANGE UP (ref 3.8–10.5)
WBC # BLD: 10.18 K/UL — SIGNIFICANT CHANGE UP (ref 3.8–10.5)
WBC # BLD: 10.18 K/UL — SIGNIFICANT CHANGE UP (ref 3.8–10.5)
WBC # BLD: 10.27 K/UL — SIGNIFICANT CHANGE UP (ref 3.8–10.5)
WBC # BLD: 10.27 K/UL — SIGNIFICANT CHANGE UP (ref 3.8–10.5)
WBC # BLD: 10.52 K/UL — HIGH (ref 3.8–10.5)
WBC # BLD: 10.52 K/UL — HIGH (ref 3.8–10.5)
WBC # BLD: 10.54 K/UL — HIGH (ref 3.8–10.5)
WBC # BLD: 10.54 K/UL — HIGH (ref 3.8–10.5)
WBC # BLD: 11.4 K/UL — HIGH (ref 3.8–10.5)
WBC # BLD: 11.4 K/UL — HIGH (ref 3.8–10.5)
WBC # BLD: 11.5 K/UL — HIGH (ref 3.8–10.5)
WBC # BLD: 11.5 K/UL — HIGH (ref 3.8–10.5)
WBC # BLD: 12.06 K/UL — HIGH (ref 3.8–10.5)
WBC # BLD: 12.06 K/UL — HIGH (ref 3.8–10.5)
WBC # BLD: 12.14 K/UL — HIGH (ref 3.8–10.5)
WBC # BLD: 12.14 K/UL — HIGH (ref 3.8–10.5)
WBC # BLD: 12.21 K/UL — HIGH (ref 3.8–10.5)
WBC # BLD: 12.21 K/UL — HIGH (ref 3.8–10.5)
WBC # BLD: 12.32 K/UL — HIGH (ref 3.8–10.5)
WBC # BLD: 12.32 K/UL — HIGH (ref 3.8–10.5)
WBC # BLD: 12.51 K/UL — HIGH (ref 3.8–10.5)
WBC # BLD: 12.51 K/UL — HIGH (ref 3.8–10.5)
WBC # BLD: 12.64 K/UL — HIGH (ref 3.8–10.5)
WBC # BLD: 12.64 K/UL — HIGH (ref 3.8–10.5)
WBC # BLD: 12.69 K/UL — HIGH (ref 3.8–10.5)
WBC # BLD: 12.69 K/UL — HIGH (ref 3.8–10.5)
WBC # BLD: 13.02 K/UL — HIGH (ref 3.8–10.5)
WBC # BLD: 13.02 K/UL — HIGH (ref 3.8–10.5)
WBC # BLD: 13.08 K/UL — HIGH (ref 3.8–10.5)
WBC # BLD: 13.08 K/UL — HIGH (ref 3.8–10.5)
WBC # BLD: 13.14 K/UL — HIGH (ref 3.8–10.5)
WBC # BLD: 13.14 K/UL — HIGH (ref 3.8–10.5)
WBC # BLD: 13.15 K/UL — HIGH (ref 3.8–10.5)
WBC # BLD: 13.15 K/UL — HIGH (ref 3.8–10.5)
WBC # BLD: 14.35 K/UL — HIGH (ref 3.8–10.5)
WBC # BLD: 14.35 K/UL — HIGH (ref 3.8–10.5)
WBC # BLD: 14.37 K/UL — HIGH (ref 3.8–10.5)
WBC # BLD: 14.37 K/UL — HIGH (ref 3.8–10.5)
WBC # BLD: 14.57 K/UL — HIGH (ref 3.8–10.5)
WBC # BLD: 14.57 K/UL — HIGH (ref 3.8–10.5)
WBC # BLD: 14.79 K/UL — HIGH (ref 3.8–10.5)
WBC # BLD: 14.79 K/UL — HIGH (ref 3.8–10.5)
WBC # BLD: 14.91 K/UL — HIGH (ref 3.8–10.5)
WBC # BLD: 14.91 K/UL — HIGH (ref 3.8–10.5)
WBC # BLD: 16.07 K/UL — HIGH (ref 3.8–10.5)
WBC # BLD: 5.99 K/UL — SIGNIFICANT CHANGE UP (ref 3.8–10.5)
WBC # BLD: 5.99 K/UL — SIGNIFICANT CHANGE UP (ref 3.8–10.5)
WBC # BLD: 6.99 K/UL — SIGNIFICANT CHANGE UP (ref 3.8–10.5)
WBC # BLD: 6.99 K/UL — SIGNIFICANT CHANGE UP (ref 3.8–10.5)
WBC # BLD: 7.45 K/UL — SIGNIFICANT CHANGE UP (ref 3.8–10.5)
WBC # BLD: 7.45 K/UL — SIGNIFICANT CHANGE UP (ref 3.8–10.5)
WBC # BLD: 7.71 K/UL — SIGNIFICANT CHANGE UP (ref 3.8–10.5)
WBC # BLD: 7.71 K/UL — SIGNIFICANT CHANGE UP (ref 3.8–10.5)
WBC # BLD: 8.51 K/UL — SIGNIFICANT CHANGE UP (ref 3.8–10.5)
WBC # BLD: 8.51 K/UL — SIGNIFICANT CHANGE UP (ref 3.8–10.5)
WBC # BLD: 8.54 K/UL — SIGNIFICANT CHANGE UP (ref 3.8–10.5)
WBC # BLD: 8.54 K/UL — SIGNIFICANT CHANGE UP (ref 3.8–10.5)
WBC # BLD: 9.19 K/UL — SIGNIFICANT CHANGE UP (ref 3.8–10.5)
WBC # BLD: 9.19 K/UL — SIGNIFICANT CHANGE UP (ref 3.8–10.5)
WBC # BLD: 9.3 K/UL — SIGNIFICANT CHANGE UP (ref 3.8–10.5)
WBC # BLD: 9.3 K/UL — SIGNIFICANT CHANGE UP (ref 3.8–10.5)
WBC # BLD: 9.38 K/UL — SIGNIFICANT CHANGE UP (ref 3.8–10.5)
WBC # BLD: 9.38 K/UL — SIGNIFICANT CHANGE UP (ref 3.8–10.5)
WBC # BLD: 9.65 K/UL — SIGNIFICANT CHANGE UP (ref 3.8–10.5)
WBC # BLD: 9.65 K/UL — SIGNIFICANT CHANGE UP (ref 3.8–10.5)
WBC # BLD: 9.86 K/UL — SIGNIFICANT CHANGE UP (ref 3.8–10.5)
WBC # BLD: 9.86 K/UL — SIGNIFICANT CHANGE UP (ref 3.8–10.5)
WBC # FLD AUTO: 10.03 K/UL — SIGNIFICANT CHANGE UP (ref 3.8–10.5)
WBC # FLD AUTO: 10.03 K/UL — SIGNIFICANT CHANGE UP (ref 3.8–10.5)
WBC # FLD AUTO: 10.18 K/UL — SIGNIFICANT CHANGE UP (ref 3.8–10.5)
WBC # FLD AUTO: 10.18 K/UL — SIGNIFICANT CHANGE UP (ref 3.8–10.5)
WBC # FLD AUTO: 10.27 K/UL — SIGNIFICANT CHANGE UP (ref 3.8–10.5)
WBC # FLD AUTO: 10.27 K/UL — SIGNIFICANT CHANGE UP (ref 3.8–10.5)
WBC # FLD AUTO: 10.52 K/UL — HIGH (ref 3.8–10.5)
WBC # FLD AUTO: 10.52 K/UL — HIGH (ref 3.8–10.5)
WBC # FLD AUTO: 10.54 K/UL — HIGH (ref 3.8–10.5)
WBC # FLD AUTO: 10.54 K/UL — HIGH (ref 3.8–10.5)
WBC # FLD AUTO: 11.4 K/UL — HIGH (ref 3.8–10.5)
WBC # FLD AUTO: 11.4 K/UL — HIGH (ref 3.8–10.5)
WBC # FLD AUTO: 11.5 K/UL — HIGH (ref 3.8–10.5)
WBC # FLD AUTO: 11.5 K/UL — HIGH (ref 3.8–10.5)
WBC # FLD AUTO: 12.06 K/UL — HIGH (ref 3.8–10.5)
WBC # FLD AUTO: 12.06 K/UL — HIGH (ref 3.8–10.5)
WBC # FLD AUTO: 12.14 K/UL — HIGH (ref 3.8–10.5)
WBC # FLD AUTO: 12.14 K/UL — HIGH (ref 3.8–10.5)
WBC # FLD AUTO: 12.21 K/UL — HIGH (ref 3.8–10.5)
WBC # FLD AUTO: 12.21 K/UL — HIGH (ref 3.8–10.5)
WBC # FLD AUTO: 12.32 K/UL — HIGH (ref 3.8–10.5)
WBC # FLD AUTO: 12.32 K/UL — HIGH (ref 3.8–10.5)
WBC # FLD AUTO: 12.51 K/UL — HIGH (ref 3.8–10.5)
WBC # FLD AUTO: 12.51 K/UL — HIGH (ref 3.8–10.5)
WBC # FLD AUTO: 12.64 K/UL — HIGH (ref 3.8–10.5)
WBC # FLD AUTO: 12.64 K/UL — HIGH (ref 3.8–10.5)
WBC # FLD AUTO: 12.69 K/UL — HIGH (ref 3.8–10.5)
WBC # FLD AUTO: 12.69 K/UL — HIGH (ref 3.8–10.5)
WBC # FLD AUTO: 13.02 K/UL — HIGH (ref 3.8–10.5)
WBC # FLD AUTO: 13.02 K/UL — HIGH (ref 3.8–10.5)
WBC # FLD AUTO: 13.08 K/UL — HIGH (ref 3.8–10.5)
WBC # FLD AUTO: 13.08 K/UL — HIGH (ref 3.8–10.5)
WBC # FLD AUTO: 13.14 K/UL — HIGH (ref 3.8–10.5)
WBC # FLD AUTO: 13.14 K/UL — HIGH (ref 3.8–10.5)
WBC # FLD AUTO: 13.15 K/UL — HIGH (ref 3.8–10.5)
WBC # FLD AUTO: 13.15 K/UL — HIGH (ref 3.8–10.5)
WBC # FLD AUTO: 14.35 K/UL — HIGH (ref 3.8–10.5)
WBC # FLD AUTO: 14.35 K/UL — HIGH (ref 3.8–10.5)
WBC # FLD AUTO: 14.37 K/UL — HIGH (ref 3.8–10.5)
WBC # FLD AUTO: 14.37 K/UL — HIGH (ref 3.8–10.5)
WBC # FLD AUTO: 14.57 K/UL — HIGH (ref 3.8–10.5)
WBC # FLD AUTO: 14.57 K/UL — HIGH (ref 3.8–10.5)
WBC # FLD AUTO: 14.79 K/UL — HIGH (ref 3.8–10.5)
WBC # FLD AUTO: 14.79 K/UL — HIGH (ref 3.8–10.5)
WBC # FLD AUTO: 14.91 K/UL — HIGH (ref 3.8–10.5)
WBC # FLD AUTO: 14.91 K/UL — HIGH (ref 3.8–10.5)
WBC # FLD AUTO: 16.07 K/UL — HIGH (ref 3.8–10.5)
WBC # FLD AUTO: 5.89 K/UL
WBC # FLD AUTO: 5.99 K/UL — SIGNIFICANT CHANGE UP (ref 3.8–10.5)
WBC # FLD AUTO: 5.99 K/UL — SIGNIFICANT CHANGE UP (ref 3.8–10.5)
WBC # FLD AUTO: 6.99 K/UL — SIGNIFICANT CHANGE UP (ref 3.8–10.5)
WBC # FLD AUTO: 6.99 K/UL — SIGNIFICANT CHANGE UP (ref 3.8–10.5)
WBC # FLD AUTO: 7.45 K/UL — SIGNIFICANT CHANGE UP (ref 3.8–10.5)
WBC # FLD AUTO: 7.45 K/UL — SIGNIFICANT CHANGE UP (ref 3.8–10.5)
WBC # FLD AUTO: 7.71 K/UL — SIGNIFICANT CHANGE UP (ref 3.8–10.5)
WBC # FLD AUTO: 7.71 K/UL — SIGNIFICANT CHANGE UP (ref 3.8–10.5)
WBC # FLD AUTO: 8.16 K/UL
WBC # FLD AUTO: 8.51 K/UL — SIGNIFICANT CHANGE UP (ref 3.8–10.5)
WBC # FLD AUTO: 8.51 K/UL — SIGNIFICANT CHANGE UP (ref 3.8–10.5)
WBC # FLD AUTO: 8.54 K/UL — SIGNIFICANT CHANGE UP (ref 3.8–10.5)
WBC # FLD AUTO: 8.54 K/UL — SIGNIFICANT CHANGE UP (ref 3.8–10.5)
WBC # FLD AUTO: 9.19 K/UL — SIGNIFICANT CHANGE UP (ref 3.8–10.5)
WBC # FLD AUTO: 9.19 K/UL — SIGNIFICANT CHANGE UP (ref 3.8–10.5)
WBC # FLD AUTO: 9.3 K/UL — SIGNIFICANT CHANGE UP (ref 3.8–10.5)
WBC # FLD AUTO: 9.3 K/UL — SIGNIFICANT CHANGE UP (ref 3.8–10.5)
WBC # FLD AUTO: 9.38 K/UL — SIGNIFICANT CHANGE UP (ref 3.8–10.5)
WBC # FLD AUTO: 9.38 K/UL — SIGNIFICANT CHANGE UP (ref 3.8–10.5)
WBC # FLD AUTO: 9.65 K/UL — SIGNIFICANT CHANGE UP (ref 3.8–10.5)
WBC # FLD AUTO: 9.65 K/UL — SIGNIFICANT CHANGE UP (ref 3.8–10.5)
WBC # FLD AUTO: 9.86 K/UL — SIGNIFICANT CHANGE UP (ref 3.8–10.5)
WBC # FLD AUTO: 9.86 K/UL — SIGNIFICANT CHANGE UP (ref 3.8–10.5)
WBC UR QL: 3 /HPF — SIGNIFICANT CHANGE UP (ref 0–5)
WBC UR QL: 3 /HPF — SIGNIFICANT CHANGE UP (ref 0–5)

## 2023-01-01 PROCEDURE — 99233 SBSQ HOSP IP/OBS HIGH 50: CPT

## 2023-01-01 PROCEDURE — 99233 SBSQ HOSP IP/OBS HIGH 50: CPT | Mod: FS

## 2023-01-01 PROCEDURE — 71045 X-RAY EXAM CHEST 1 VIEW: CPT | Mod: 26

## 2023-01-01 PROCEDURE — 99285 EMERGENCY DEPT VISIT HI MDM: CPT | Mod: GC

## 2023-01-01 PROCEDURE — 99291 CRITICAL CARE FIRST HOUR: CPT

## 2023-01-01 PROCEDURE — 77067 SCR MAMMO BI INCL CAD: CPT | Mod: 26,RT,52

## 2023-01-01 PROCEDURE — 77063 BREAST TOMOSYNTHESIS BI: CPT

## 2023-01-01 PROCEDURE — 93010 ELECTROCARDIOGRAM REPORT: CPT

## 2023-01-01 PROCEDURE — 99497 ADVNCD CARE PLAN 30 MIN: CPT

## 2023-01-01 PROCEDURE — 43255 EGD CONTROL BLEEDING ANY: CPT

## 2023-01-01 PROCEDURE — 84466 ASSAY OF TRANSFERRIN: CPT

## 2023-01-01 PROCEDURE — 99233 SBSQ HOSP IP/OBS HIGH 50: CPT | Mod: GC

## 2023-01-01 PROCEDURE — 93306 TTE W/DOPPLER COMPLETE: CPT | Mod: 26

## 2023-01-01 PROCEDURE — 99239 HOSP IP/OBS DSCHRG MGMT >30: CPT

## 2023-01-01 PROCEDURE — 99223 1ST HOSP IP/OBS HIGH 75: CPT

## 2023-01-01 PROCEDURE — G0463: CPT

## 2023-01-01 PROCEDURE — 82728 ASSAY OF FERRITIN: CPT

## 2023-01-01 PROCEDURE — 99232 SBSQ HOSP IP/OBS MODERATE 35: CPT

## 2023-01-01 PROCEDURE — 85025 COMPLETE CBC W/AUTO DIFF WBC: CPT

## 2023-01-01 PROCEDURE — 93010 ELECTROCARDIOGRAM REPORT: CPT | Mod: 76

## 2023-01-01 PROCEDURE — G0008: CPT

## 2023-01-01 PROCEDURE — 93970 EXTREMITY STUDY: CPT | Mod: 26

## 2023-01-01 PROCEDURE — G0296 VISIT TO DETERM LDCT ELIG: CPT | Mod: 95

## 2023-01-01 PROCEDURE — 99222 1ST HOSP IP/OBS MODERATE 55: CPT | Mod: GC

## 2023-01-01 PROCEDURE — 83550 IRON BINDING TEST: CPT

## 2023-01-01 PROCEDURE — 90656 IIV3 VACC NO PRSV 0.5 ML IM: CPT

## 2023-01-01 PROCEDURE — 71275 CT ANGIOGRAPHY CHEST: CPT | Mod: 26,MA

## 2023-01-01 PROCEDURE — 77063 BREAST TOMOSYNTHESIS BI: CPT | Mod: 26,52

## 2023-01-01 PROCEDURE — 90662 IIV NO PRSV INCREASED AG IM: CPT

## 2023-01-01 PROCEDURE — 71271 CT THORAX LUNG CANCER SCR C-: CPT

## 2023-01-01 PROCEDURE — 93971 EXTREMITY STUDY: CPT | Mod: 26,RT

## 2023-01-01 PROCEDURE — 74176 CT ABD & PELVIS W/O CONTRAST: CPT | Mod: 26

## 2023-01-01 PROCEDURE — 90471 IMMUNIZATION ADMIN: CPT

## 2023-01-01 PROCEDURE — 99407 BEHAV CHNG SMOKING > 10 MIN: CPT

## 2023-01-01 PROCEDURE — 71045 X-RAY EXAM CHEST 1 VIEW: CPT | Mod: 26,76

## 2023-01-01 PROCEDURE — 77067 SCR MAMMO BI INCL CAD: CPT

## 2023-01-01 PROCEDURE — 74230 X-RAY XM SWLNG FUNCJ C+: CPT | Mod: 26

## 2023-01-01 PROCEDURE — 99223 1ST HOSP IP/OBS HIGH 75: CPT | Mod: FS

## 2023-01-01 PROCEDURE — 71271 CT THORAX LUNG CANCER SCR C-: CPT | Mod: 26

## 2023-01-01 RX ORDER — ALBUTEROL 90 UG/1
2.5 AEROSOL, METERED ORAL
Qty: 0 | Refills: 0 | DISCHARGE
Start: 2023-01-01

## 2023-01-01 RX ORDER — ZINC SULFATE TAB 220 MG (50 MG ZINC EQUIVALENT) 220 (50 ZN) MG
220 TAB ORAL DAILY
Refills: 0 | Status: DISCONTINUED | OUTPATIENT
Start: 2023-01-01 | End: 2023-01-01

## 2023-01-01 RX ORDER — DEXMEDETOMIDINE HYDROCHLORIDE IN 0.9% SODIUM CHLORIDE 4 UG/ML
0.4 INJECTION INTRAVENOUS
Qty: 400 | Refills: 0 | Status: DISCONTINUED | OUTPATIENT
Start: 2023-01-01 | End: 2023-01-01

## 2023-01-01 RX ORDER — ALBUMIN HUMAN 25 %
50 VIAL (ML) INTRAVENOUS EVERY 6 HOURS
Refills: 0 | Status: COMPLETED | OUTPATIENT
Start: 2023-01-01 | End: 2023-01-01

## 2023-01-01 RX ORDER — RIVAROXABAN 20 MG/1
20 TABLET, FILM COATED ORAL
Qty: 90 | Refills: 3 | Status: ACTIVE | COMMUNITY
Start: 2019-04-12 | End: 1900-01-01

## 2023-01-01 RX ORDER — MAGNESIUM OXIDE 400 MG ORAL TABLET 241.3 MG
400 TABLET ORAL
Refills: 0 | Status: COMPLETED | OUTPATIENT
Start: 2023-01-01 | End: 2023-01-01

## 2023-01-01 RX ORDER — DOXAZOSIN MESYLATE 4 MG
1 TABLET ORAL
Qty: 0 | Refills: 0 | DISCHARGE
Start: 2023-01-01

## 2023-01-01 RX ORDER — FENTANYL CITRATE 50 UG/ML
50 INJECTION INTRAVENOUS ONCE
Refills: 0 | Status: DISCONTINUED | OUTPATIENT
Start: 2023-01-01 | End: 2023-01-01

## 2023-01-01 RX ORDER — LEVALBUTEROL 1.25 MG/.5ML
0.63 SOLUTION, CONCENTRATE RESPIRATORY (INHALATION) ONCE
Refills: 0 | Status: COMPLETED | OUTPATIENT
Start: 2023-01-01 | End: 2023-01-01

## 2023-01-01 RX ORDER — LANOLIN ALCOHOL/MO/W.PET/CERES
1 CREAM (GRAM) TOPICAL
Qty: 0 | Refills: 0 | DISCHARGE
Start: 2023-01-01

## 2023-01-01 RX ORDER — ETOMIDATE 2 MG/ML
20 INJECTION INTRAVENOUS ONCE
Refills: 0 | Status: COMPLETED | OUTPATIENT
Start: 2023-01-01 | End: 2023-01-01

## 2023-01-01 RX ORDER — ONDANSETRON 8 MG/1
4 TABLET, FILM COATED ORAL EVERY 8 HOURS
Refills: 0 | Status: DISCONTINUED | OUTPATIENT
Start: 2023-01-01 | End: 2023-01-01

## 2023-01-01 RX ORDER — DEXTROSE 50 % IN WATER 50 %
25 SYRINGE (ML) INTRAVENOUS ONCE
Refills: 0 | Status: DISCONTINUED | OUTPATIENT
Start: 2023-01-01 | End: 2023-01-01

## 2023-01-01 RX ORDER — SODIUM CHLORIDE 9 MG/ML
1000 INJECTION, SOLUTION INTRAVENOUS
Refills: 0 | Status: DISCONTINUED | OUTPATIENT
Start: 2023-01-01 | End: 2023-01-01

## 2023-01-01 RX ORDER — DEXMEDETOMIDINE HYDROCHLORIDE IN 0.9% SODIUM CHLORIDE 4 UG/ML
0.3 INJECTION INTRAVENOUS
Qty: 400 | Refills: 0 | Status: DISCONTINUED | OUTPATIENT
Start: 2023-01-01 | End: 2023-01-01

## 2023-01-01 RX ORDER — MIDODRINE HYDROCHLORIDE 2.5 MG/1
5 TABLET ORAL EVERY 8 HOURS
Refills: 0 | Status: DISCONTINUED | OUTPATIENT
Start: 2023-01-01 | End: 2023-01-01

## 2023-01-01 RX ORDER — RIVAROXABAN 15 MG-20MG
1 KIT ORAL
Qty: 0 | Refills: 0 | DISCHARGE
Start: 2023-01-01

## 2023-01-01 RX ORDER — MEROPENEM 1 G/30ML
1000 INJECTION INTRAVENOUS ONCE
Refills: 0 | Status: COMPLETED | OUTPATIENT
Start: 2023-01-01 | End: 2023-01-01

## 2023-01-01 RX ORDER — POLYETHYLENE GLYCOL 3350 17 G/17G
17 POWDER, FOR SOLUTION ORAL DAILY
Refills: 0 | Status: DISCONTINUED | OUTPATIENT
Start: 2023-01-01 | End: 2023-01-01

## 2023-01-01 RX ORDER — BUDESONIDE AND FORMOTEROL FUMARATE DIHYDRATE 160; 4.5 UG/1; UG/1
0 AEROSOL RESPIRATORY (INHALATION)
Qty: 0 | Refills: 0 | DISCHARGE
Start: 2023-01-01

## 2023-01-01 RX ORDER — MAGNESIUM SULFATE 500 MG/ML
2 VIAL (ML) INJECTION ONCE
Refills: 0 | Status: COMPLETED | OUTPATIENT
Start: 2023-01-01 | End: 2023-01-01

## 2023-01-01 RX ORDER — LEVALBUTEROL 1.25 MG/.5ML
3 SOLUTION, CONCENTRATE RESPIRATORY (INHALATION)
Qty: 0 | Refills: 0 | DISCHARGE
Start: 2023-01-01

## 2023-01-01 RX ORDER — FERROUS SULFATE 325(65) MG
325 TABLET ORAL DAILY
Refills: 0 | Status: DISCONTINUED | OUTPATIENT
Start: 2023-01-01 | End: 2023-01-01

## 2023-01-01 RX ORDER — BUPROPION HYDROCHLORIDE 150 MG/1
150 TABLET, EXTENDED RELEASE ORAL DAILY
Refills: 0 | Status: DISCONTINUED | OUTPATIENT
Start: 2023-01-01 | End: 2023-01-01

## 2023-01-01 RX ORDER — ALPRAZOLAM 0.25 MG
0.5 TABLET ORAL EVERY 8 HOURS
Refills: 0 | Status: DISCONTINUED | OUTPATIENT
Start: 2023-01-01 | End: 2023-01-01

## 2023-01-01 RX ORDER — INSULIN LISPRO 100/ML
VIAL (ML) SUBCUTANEOUS
Refills: 0 | Status: DISCONTINUED | OUTPATIENT
Start: 2023-01-01 | End: 2023-01-01

## 2023-01-01 RX ORDER — SODIUM CHLORIDE 9 MG/ML
250 INJECTION INTRAMUSCULAR; INTRAVENOUS; SUBCUTANEOUS ONCE
Refills: 0 | Status: COMPLETED | OUTPATIENT
Start: 2023-01-01 | End: 2023-01-01

## 2023-01-01 RX ORDER — POTASSIUM CHLORIDE 20 MEQ
40 PACKET (EA) ORAL EVERY 4 HOURS
Refills: 0 | Status: COMPLETED | OUTPATIENT
Start: 2023-01-01 | End: 2023-01-01

## 2023-01-01 RX ORDER — ALPRAZOLAM 0.25 MG
0.5 TABLET ORAL EVERY 12 HOURS
Refills: 0 | Status: DISCONTINUED | OUTPATIENT
Start: 2023-01-01 | End: 2023-01-01

## 2023-01-01 RX ORDER — MIDODRINE HYDROCHLORIDE 2.5 MG/1
5 TABLET ORAL
Refills: 0 | Status: DISCONTINUED | OUTPATIENT
Start: 2023-01-01 | End: 2023-01-01

## 2023-01-01 RX ORDER — ALPRAZOLAM 0.25 MG
0.25 TABLET ORAL EVERY 8 HOURS
Refills: 0 | Status: DISCONTINUED | OUTPATIENT
Start: 2023-01-01 | End: 2023-01-01

## 2023-01-01 RX ORDER — DRONABINOL 5 MG/1
5 CAPSULE ORAL TWICE DAILY
Qty: 60 | Refills: 0 | Status: ACTIVE | COMMUNITY
Start: 2023-01-01 | End: 1900-01-01

## 2023-01-01 RX ORDER — INSULIN LISPRO 100/ML
VIAL (ML) SUBCUTANEOUS EVERY 6 HOURS
Refills: 0 | Status: DISCONTINUED | OUTPATIENT
Start: 2023-01-01 | End: 2023-01-01

## 2023-01-01 RX ORDER — ALBUTEROL 90 UG/1
2 AEROSOL, METERED ORAL EVERY 6 HOURS
Refills: 0 | Status: DISCONTINUED | OUTPATIENT
Start: 2023-01-01 | End: 2023-01-01

## 2023-01-01 RX ORDER — DOXAZOSIN MESYLATE 4 MG
2 TABLET ORAL AT BEDTIME
Refills: 0 | Status: DISCONTINUED | OUTPATIENT
Start: 2023-01-01 | End: 2023-01-01

## 2023-01-01 RX ORDER — ATORVASTATIN CALCIUM 80 MG/1
80 TABLET, FILM COATED ORAL AT BEDTIME
Refills: 0 | Status: DISCONTINUED | OUTPATIENT
Start: 2023-01-01 | End: 2023-01-01

## 2023-01-01 RX ORDER — GLUCAGON INJECTION, SOLUTION 0.5 MG/.1ML
1 INJECTION, SOLUTION SUBCUTANEOUS ONCE
Refills: 0 | Status: DISCONTINUED | OUTPATIENT
Start: 2023-01-01 | End: 2023-01-01

## 2023-01-01 RX ORDER — CEFEPIME 1 G/1
1000 INJECTION, POWDER, FOR SOLUTION INTRAMUSCULAR; INTRAVENOUS EVERY 12 HOURS
Refills: 0 | Status: DISCONTINUED | OUTPATIENT
Start: 2023-01-01 | End: 2023-01-01

## 2023-01-01 RX ORDER — CHLORHEXIDINE GLUCONATE 213 G/1000ML
1 SOLUTION TOPICAL
Refills: 0 | Status: DISCONTINUED | OUTPATIENT
Start: 2023-01-01 | End: 2023-01-01

## 2023-01-01 RX ORDER — ONDANSETRON 8 MG/1
4 TABLET, FILM COATED ORAL EVERY 6 HOURS
Refills: 0 | Status: DISCONTINUED | OUTPATIENT
Start: 2023-01-01 | End: 2023-01-01

## 2023-01-01 RX ORDER — SODIUM CHLORIDE 9 MG/ML
500 INJECTION, SOLUTION INTRAVENOUS ONCE
Refills: 0 | Status: COMPLETED | OUTPATIENT
Start: 2023-01-01 | End: 2023-01-01

## 2023-01-01 RX ORDER — REMDESIVIR 5 MG/ML
INJECTION INTRAVENOUS
Refills: 0 | Status: DISCONTINUED | OUTPATIENT
Start: 2023-01-01 | End: 2023-01-01

## 2023-01-01 RX ORDER — ACETAMINOPHEN 500 MG
650 TABLET ORAL EVERY 6 HOURS
Refills: 0 | Status: DISCONTINUED | OUTPATIENT
Start: 2023-01-01 | End: 2023-01-01

## 2023-01-01 RX ORDER — ASPIRIN ENTERIC COATED TABLETS 81 MG 81 MG/1
81 TABLET, DELAYED RELEASE ORAL DAILY
Refills: 0 | Status: ACTIVE | COMMUNITY
Start: 2023-01-01

## 2023-01-01 RX ORDER — SENNA PLUS 8.6 MG/1
10 TABLET ORAL AT BEDTIME
Refills: 0 | Status: DISCONTINUED | OUTPATIENT
Start: 2023-01-01 | End: 2023-01-01

## 2023-01-01 RX ORDER — NICOTINE POLACRILEX 2 MG
1 GUM BUCCAL DAILY
Refills: 0 | Status: DISCONTINUED | OUTPATIENT
Start: 2023-01-01 | End: 2023-01-01

## 2023-01-01 RX ORDER — ALPRAZOLAM 0.25 MG
0.25 TABLET ORAL ONCE
Refills: 0 | Status: DISCONTINUED | OUTPATIENT
Start: 2023-01-01 | End: 2023-01-01

## 2023-01-01 RX ORDER — ROFLUMILAST 500 UG/1
1 TABLET ORAL
Qty: 0 | Refills: 0 | DISCHARGE
Start: 2023-01-01

## 2023-01-01 RX ORDER — FENTANYL CITRATE 50 UG/ML
25 INJECTION INTRAVENOUS EVERY 4 HOURS
Refills: 0 | Status: DISCONTINUED | OUTPATIENT
Start: 2023-01-01 | End: 2023-01-01

## 2023-01-01 RX ORDER — DEXTROSE 50 % IN WATER 50 %
15 SYRINGE (ML) INTRAVENOUS ONCE
Refills: 0 | Status: DISCONTINUED | OUTPATIENT
Start: 2023-01-01 | End: 2023-01-01

## 2023-01-01 RX ORDER — CHLORHEXIDINE GLUCONATE 213 G/1000ML
15 SOLUTION TOPICAL EVERY 12 HOURS
Refills: 0 | Status: DISCONTINUED | OUTPATIENT
Start: 2023-01-01 | End: 2023-01-01

## 2023-01-01 RX ORDER — ONDANSETRON 8 MG/1
4 TABLET, FILM COATED ORAL
Refills: 0 | Status: DISCONTINUED | OUTPATIENT
Start: 2023-01-01 | End: 2023-01-01

## 2023-01-01 RX ORDER — FENTANYL CITRATE 50 UG/ML
0.5 INJECTION INTRAVENOUS
Qty: 2500 | Refills: 0 | Status: DISCONTINUED | OUTPATIENT
Start: 2023-01-01 | End: 2023-01-01

## 2023-01-01 RX ORDER — BUDESONIDE AND FORMOTEROL FUMARATE DIHYDRATE 160; 4.5 UG/1; UG/1
2 AEROSOL RESPIRATORY (INHALATION)
Refills: 0 | Status: DISCONTINUED | OUTPATIENT
Start: 2023-01-01 | End: 2023-01-01

## 2023-01-01 RX ORDER — POLYETHYLENE GLYCOL 3350 17 G/17G
17 POWDER, FOR SOLUTION ORAL
Qty: 0 | Refills: 0 | DISCHARGE
Start: 2023-01-01

## 2023-01-01 RX ORDER — METFORMIN HYDROCHLORIDE 1000 MG/1
1000 TABLET, COATED ORAL TWICE DAILY
Qty: 1 | Refills: 2 | Status: DISCONTINUED | COMMUNITY
Start: 2019-04-12 | End: 2023-01-01

## 2023-01-01 RX ORDER — ACETAMINOPHEN 500 MG
2 TABLET ORAL
Qty: 0 | Refills: 0 | DISCHARGE
Start: 2023-01-01

## 2023-01-01 RX ORDER — ALPRAZOLAM 0.25 MG
0.25 TABLET ORAL EVERY 12 HOURS
Refills: 0 | Status: DISCONTINUED | OUTPATIENT
Start: 2023-01-01 | End: 2023-01-01

## 2023-01-01 RX ORDER — PANTOPRAZOLE SODIUM 20 MG/1
40 TABLET, DELAYED RELEASE ORAL
Refills: 0 | Status: DISCONTINUED | OUTPATIENT
Start: 2023-01-01 | End: 2023-01-01

## 2023-01-01 RX ORDER — IPRATROPIUM/ALBUTEROL SULFATE 18-103MCG
3 AEROSOL WITH ADAPTER (GRAM) INHALATION
Refills: 0 | Status: COMPLETED | OUTPATIENT
Start: 2023-01-01 | End: 2023-01-01

## 2023-01-01 RX ORDER — DEXTROSE 50 % IN WATER 50 %
12.5 SYRINGE (ML) INTRAVENOUS ONCE
Refills: 0 | Status: DISCONTINUED | OUTPATIENT
Start: 2023-01-01 | End: 2023-01-01

## 2023-01-01 RX ORDER — POTASSIUM CHLORIDE 20 MEQ
10 PACKET (EA) ORAL
Refills: 0 | Status: COMPLETED | OUTPATIENT
Start: 2023-01-01 | End: 2023-01-01

## 2023-01-01 RX ORDER — ASPIRIN/CALCIUM CARB/MAGNESIUM 324 MG
81 TABLET ORAL DAILY
Refills: 0 | Status: DISCONTINUED | OUTPATIENT
Start: 2023-01-01 | End: 2023-01-01

## 2023-01-01 RX ORDER — BUDESONIDE AND FORMOTEROL FUMARATE DIHYDRATE 160; 4.5 UG/1; UG/1
160-4.5 AEROSOL RESPIRATORY (INHALATION)
Qty: 1 | Refills: 3 | Status: ACTIVE | COMMUNITY
Start: 2019-04-12 | End: 1900-01-01

## 2023-01-01 RX ORDER — LETROZOLE 2.5 MG/1
2.5 TABLET, FILM COATED ORAL DAILY
Refills: 0 | Status: DISCONTINUED | OUTPATIENT
Start: 2023-01-01 | End: 2023-01-01

## 2023-01-01 RX ORDER — SODIUM CHLORIDE 9 MG/ML
10 INJECTION INTRAMUSCULAR; INTRAVENOUS; SUBCUTANEOUS
Refills: 0 | Status: DISCONTINUED | OUTPATIENT
Start: 2023-01-01 | End: 2023-01-01

## 2023-01-01 RX ORDER — CEFTRIAXONE 500 MG/1
INJECTION, POWDER, FOR SOLUTION INTRAMUSCULAR; INTRAVENOUS
Refills: 0 | Status: COMPLETED | OUTPATIENT
Start: 2023-01-01 | End: 2023-01-01

## 2023-01-01 RX ORDER — PANTOPRAZOLE SODIUM 20 MG/1
1 TABLET, DELAYED RELEASE ORAL
Qty: 0 | Refills: 0 | DISCHARGE
Start: 2023-01-01

## 2023-01-01 RX ORDER — MIDODRINE HYDROCHLORIDE 2.5 MG/1
10 TABLET ORAL EVERY 8 HOURS
Refills: 0 | Status: DISCONTINUED | OUTPATIENT
Start: 2023-01-01 | End: 2023-01-01

## 2023-01-01 RX ORDER — HEPARIN SODIUM 5000 [USP'U]/ML
300 INJECTION INTRAVENOUS; SUBCUTANEOUS ONCE
Refills: 0 | Status: DISCONTINUED | OUTPATIENT
Start: 2023-01-01 | End: 2023-01-01

## 2023-01-01 RX ORDER — IPRATROPIUM/ALBUTEROL SULFATE 18-103MCG
3 AEROSOL WITH ADAPTER (GRAM) INHALATION ONCE
Refills: 0 | Status: COMPLETED | OUTPATIENT
Start: 2023-01-01 | End: 2023-01-01

## 2023-01-01 RX ORDER — HYDROCORTISONE 20 MG
40 TABLET ORAL DAILY
Refills: 0 | Status: DISCONTINUED | OUTPATIENT
Start: 2023-01-01 | End: 2023-01-01

## 2023-01-01 RX ORDER — ROFLUMILAST 500 UG/1
500 TABLET ORAL DAILY
Refills: 0 | Status: DISCONTINUED | OUTPATIENT
Start: 2023-01-01 | End: 2023-01-01

## 2023-01-01 RX ORDER — ALPRAZOLAM 0.25 MG
1 TABLET ORAL
Qty: 0 | Refills: 0 | DISCHARGE
Start: 2023-01-01

## 2023-01-01 RX ORDER — TIOTROPIUM BROMIDE 18 UG/1
2 CAPSULE ORAL; RESPIRATORY (INHALATION) DAILY
Refills: 0 | Status: DISCONTINUED | OUTPATIENT
Start: 2023-01-01 | End: 2023-01-01

## 2023-01-01 RX ORDER — SENNA PLUS 8.6 MG/1
2 TABLET ORAL AT BEDTIME
Refills: 0 | Status: DISCONTINUED | OUTPATIENT
Start: 2023-01-01 | End: 2023-01-01

## 2023-01-01 RX ORDER — SENNA PLUS 8.6 MG/1
10 TABLET ORAL
Qty: 0 | Refills: 0 | DISCHARGE
Start: 2023-01-01

## 2023-01-01 RX ORDER — ENOXAPARIN SODIUM 100 MG/ML
40 INJECTION SUBCUTANEOUS EVERY 12 HOURS
Refills: 0 | Status: DISCONTINUED | OUTPATIENT
Start: 2023-01-01 | End: 2023-01-01

## 2023-01-01 RX ORDER — PHENYLEPHRINE HYDROCHLORIDE 10 MG/ML
0.5 INJECTION INTRAVENOUS
Qty: 40 | Refills: 0 | Status: DISCONTINUED | OUTPATIENT
Start: 2023-01-01 | End: 2023-01-01

## 2023-01-01 RX ORDER — CEFEPIME 1 G/1
1000 INJECTION, POWDER, FOR SOLUTION INTRAMUSCULAR; INTRAVENOUS ONCE
Refills: 0 | Status: COMPLETED | OUTPATIENT
Start: 2023-01-01 | End: 2023-01-01

## 2023-01-01 RX ORDER — PROPOFOL 10 MG/ML
20 INJECTION, EMULSION INTRAVENOUS
Qty: 1000 | Refills: 0 | Status: DISCONTINUED | OUTPATIENT
Start: 2023-01-01 | End: 2023-01-01

## 2023-01-01 RX ORDER — LANOLIN ALCOHOL/MO/W.PET/CERES
3 CREAM (GRAM) TOPICAL AT BEDTIME
Refills: 0 | Status: DISCONTINUED | OUTPATIENT
Start: 2023-01-01 | End: 2023-01-01

## 2023-01-01 RX ORDER — DILTIAZEM HCL 120 MG
180 CAPSULE, EXT RELEASE 24 HR ORAL DAILY
Refills: 0 | Status: DISCONTINUED | OUTPATIENT
Start: 2023-01-01 | End: 2023-01-01

## 2023-01-01 RX ORDER — REMDESIVIR 5 MG/ML
200 INJECTION INTRAVENOUS EVERY 24 HOURS
Refills: 0 | Status: COMPLETED | OUTPATIENT
Start: 2023-01-01 | End: 2023-01-01

## 2023-01-01 RX ORDER — AZITHROMYCIN 500 MG/1
500 TABLET, FILM COATED ORAL ONCE
Refills: 0 | Status: COMPLETED | OUTPATIENT
Start: 2023-01-01 | End: 2023-01-01

## 2023-01-01 RX ORDER — IPRATROPIUM/ALBUTEROL SULFATE 18-103MCG
3 AEROSOL WITH ADAPTER (GRAM) INHALATION EVERY 6 HOURS
Refills: 0 | Status: DISCONTINUED | OUTPATIENT
Start: 2023-01-01 | End: 2023-01-01

## 2023-01-01 RX ORDER — DRONABINOL 2.5 MG
1 CAPSULE ORAL
Qty: 0 | Refills: 0 | DISCHARGE
Start: 2023-01-01

## 2023-01-01 RX ORDER — CEFTRIAXONE 500 MG/1
1000 INJECTION, POWDER, FOR SOLUTION INTRAMUSCULAR; INTRAVENOUS ONCE
Refills: 0 | Status: COMPLETED | OUTPATIENT
Start: 2023-01-01 | End: 2023-01-01

## 2023-01-01 RX ORDER — INSULIN LISPRO 100/ML
VIAL (ML) SUBCUTANEOUS AT BEDTIME
Refills: 0 | Status: DISCONTINUED | OUTPATIENT
Start: 2023-01-01 | End: 2023-01-01

## 2023-01-01 RX ORDER — BUPROPION HYDROCHLORIDE 150 MG/1
100 TABLET, EXTENDED RELEASE ORAL THREE TIMES A DAY
Refills: 0 | Status: DISCONTINUED | OUTPATIENT
Start: 2023-01-01 | End: 2023-01-01

## 2023-01-01 RX ORDER — ACETAMINOPHEN 500 MG
575 TABLET ORAL ONCE
Refills: 0 | Status: COMPLETED | OUTPATIENT
Start: 2023-01-01 | End: 2023-01-01

## 2023-01-01 RX ORDER — SODIUM CHLORIDE 9 MG/ML
1050 INJECTION INTRAMUSCULAR; INTRAVENOUS; SUBCUTANEOUS ONCE
Refills: 0 | Status: COMPLETED | OUTPATIENT
Start: 2023-01-01 | End: 2023-01-01

## 2023-01-01 RX ORDER — AZITHROMYCIN 500 MG/1
500 TABLET, FILM COATED ORAL EVERY 24 HOURS
Refills: 0 | Status: COMPLETED | OUTPATIENT
Start: 2023-01-01 | End: 2023-01-01

## 2023-01-01 RX ORDER — SENNA PLUS 8.6 MG/1
1 TABLET ORAL AT BEDTIME
Refills: 0 | Status: DISCONTINUED | OUTPATIENT
Start: 2023-01-01 | End: 2023-01-01

## 2023-01-01 RX ORDER — LEVALBUTEROL 1.25 MG/.5ML
0.63 SOLUTION, CONCENTRATE RESPIRATORY (INHALATION) EVERY 6 HOURS
Refills: 0 | Status: DISCONTINUED | OUTPATIENT
Start: 2023-01-01 | End: 2023-01-01

## 2023-01-01 RX ORDER — DEXMEDETOMIDINE HYDROCHLORIDE IN 0.9% SODIUM CHLORIDE 4 UG/ML
0.2 INJECTION INTRAVENOUS
Qty: 200 | Refills: 0 | Status: DISCONTINUED | OUTPATIENT
Start: 2023-01-01 | End: 2023-01-01

## 2023-01-01 RX ORDER — PROPOFOL 10 MG/ML
10 INJECTION, EMULSION INTRAVENOUS ONCE
Refills: 0 | Status: COMPLETED | OUTPATIENT
Start: 2023-01-01 | End: 2023-01-01

## 2023-01-01 RX ORDER — MUPIROCIN 20 MG/G
1 OINTMENT TOPICAL
Refills: 0 | Status: COMPLETED | OUTPATIENT
Start: 2023-01-01 | End: 2023-01-01

## 2023-01-01 RX ORDER — ALBUTEROL 90 UG/1
2.5 AEROSOL, METERED ORAL EVERY 6 HOURS
Refills: 0 | Status: DISCONTINUED | OUTPATIENT
Start: 2023-01-01 | End: 2023-01-01

## 2023-01-01 RX ORDER — MEROPENEM 1 G/30ML
1000 INJECTION INTRAVENOUS EVERY 8 HOURS
Refills: 0 | Status: DISCONTINUED | OUTPATIENT
Start: 2023-01-01 | End: 2023-01-01

## 2023-01-01 RX ORDER — POTASSIUM CHLORIDE 20 MEQ
20 PACKET (EA) ORAL ONCE
Refills: 0 | Status: DISCONTINUED | OUTPATIENT
Start: 2023-01-01 | End: 2023-01-01

## 2023-01-01 RX ORDER — RIVAROXABAN 15 MG-20MG
10 KIT ORAL
Refills: 0 | Status: DISCONTINUED | OUTPATIENT
Start: 2023-01-01 | End: 2023-01-01

## 2023-01-01 RX ORDER — MORPHINE SULFATE 50 MG/1
1 CAPSULE, EXTENDED RELEASE ORAL ONCE
Refills: 0 | Status: DISCONTINUED | OUTPATIENT
Start: 2023-01-01 | End: 2023-01-01

## 2023-01-01 RX ORDER — SODIUM CHLORIDE 9 MG/ML
1000 INJECTION INTRAMUSCULAR; INTRAVENOUS; SUBCUTANEOUS
Refills: 0 | Status: COMPLETED | OUTPATIENT
Start: 2023-01-01 | End: 2023-01-01

## 2023-01-01 RX ORDER — DEXTROSE MONOHYDRATE, SODIUM CHLORIDE, AND POTASSIUM CHLORIDE 50; .745; 4.5 G/1000ML; G/1000ML; G/1000ML
500 INJECTION, SOLUTION INTRAVENOUS
Refills: 0 | Status: DISCONTINUED | OUTPATIENT
Start: 2023-01-01 | End: 2023-01-01

## 2023-01-01 RX ORDER — CHLORHEXIDINE GLUCONATE 213 G/1000ML
1 SOLUTION TOPICAL DAILY
Refills: 0 | Status: DISCONTINUED | OUTPATIENT
Start: 2023-01-01 | End: 2023-01-01

## 2023-01-01 RX ORDER — ASCORBIC ACID 60 MG
500 TABLET,CHEWABLE ORAL DAILY
Refills: 0 | Status: DISCONTINUED | OUTPATIENT
Start: 2023-01-01 | End: 2023-01-01

## 2023-01-01 RX ORDER — POTASSIUM CHLORIDE 20 MEQ
20 PACKET (EA) ORAL ONCE
Refills: 0 | Status: COMPLETED | OUTPATIENT
Start: 2023-01-01 | End: 2023-01-01

## 2023-01-01 RX ORDER — LABETALOL HCL 100 MG
5 TABLET ORAL ONCE
Refills: 0 | Status: COMPLETED | OUTPATIENT
Start: 2023-01-01 | End: 2023-01-01

## 2023-01-01 RX ORDER — REMDESIVIR 5 MG/ML
100 INJECTION INTRAVENOUS EVERY 24 HOURS
Refills: 0 | Status: DISCONTINUED | OUTPATIENT
Start: 2023-01-01 | End: 2023-01-01

## 2023-01-01 RX ORDER — MAGNESIUM SULFATE 500 MG/ML
1 VIAL (ML) INJECTION ONCE
Refills: 0 | Status: COMPLETED | OUTPATIENT
Start: 2023-01-01 | End: 2023-01-01

## 2023-01-01 RX ORDER — PANTOPRAZOLE SODIUM 20 MG/1
40 TABLET, DELAYED RELEASE ORAL DAILY
Refills: 0 | Status: DISCONTINUED | OUTPATIENT
Start: 2023-01-01 | End: 2023-01-01

## 2023-01-01 RX ORDER — DEXAMETHASONE 0.5 MG/5ML
6 ELIXIR ORAL DAILY
Refills: 0 | Status: DISCONTINUED | OUTPATIENT
Start: 2023-01-01 | End: 2023-01-01

## 2023-01-01 RX ORDER — BUDESONIDE, MICRONIZED 100 %
0.5 POWDER (GRAM) MISCELLANEOUS EVERY 12 HOURS
Refills: 0 | Status: DISCONTINUED | OUTPATIENT
Start: 2023-01-01 | End: 2023-01-01

## 2023-01-01 RX ORDER — PROPOFOL 10 MG/ML
100 INJECTION, EMULSION INTRAVENOUS ONCE
Refills: 0 | Status: COMPLETED | OUTPATIENT
Start: 2023-01-01 | End: 2023-01-01

## 2023-01-01 RX ORDER — INFLUENZA A VIRUS A/VICTORIA/4897/2022 IVR-238 (H1N1) ANTIGEN (FORMALDEHYDE INACTIVATED), INFLUENZA A VIRUS A/DARWIN/9/2021 SAN-010 (H3N2) ANTIGEN (FORMALDEHYDE INACTIVATED), INFLUENZA B VIRUS B/PHUKET/3073/2013 ANTIGEN (FORMALDEHYDE INACTIVATED), AND INFLUENZA B VIRUS B/MICHIGAN/01/2021 ANTIGEN (FORMALDEHYDE INACTIVATED) 60; 60; 60; 60 UG/.7ML; UG/.7ML; UG/.7ML; UG/.7ML
0.7 INJECTION, SUSPENSION INTRAMUSCULAR
Qty: 1 | Refills: 0 | Status: ACTIVE | COMMUNITY
Start: 2023-01-01 | End: 1900-01-01

## 2023-01-01 RX ORDER — TAMSULOSIN HYDROCHLORIDE 0.4 MG/1
0.4 CAPSULE ORAL AT BEDTIME
Refills: 0 | Status: DISCONTINUED | OUTPATIENT
Start: 2023-01-01 | End: 2023-01-01

## 2023-01-01 RX ORDER — TIOTROPIUM BROMIDE 18 UG/1
18 CAPSULE ORAL; RESPIRATORY (INHALATION) DAILY
Qty: 30 | Refills: 0 | Status: ACTIVE | COMMUNITY
Start: 2019-04-12 | End: 1900-01-01

## 2023-01-01 RX ORDER — DILTIAZEM HCL 120 MG
30 CAPSULE, EXT RELEASE 24 HR ORAL ONCE
Refills: 0 | Status: COMPLETED | OUTPATIENT
Start: 2023-01-01 | End: 2023-01-01

## 2023-01-01 RX ORDER — AZITHROMYCIN 500 MG/1
500 TABLET, FILM COATED ORAL EVERY 24 HOURS
Refills: 0 | Status: DISCONTINUED | OUTPATIENT
Start: 2023-01-01 | End: 2023-01-01

## 2023-01-01 RX ORDER — MEROPENEM 1 G/30ML
INJECTION INTRAVENOUS
Refills: 0 | Status: DISCONTINUED | OUTPATIENT
Start: 2023-01-01 | End: 2023-01-01

## 2023-01-01 RX ORDER — POTASSIUM CHLORIDE 20 MEQ
20 PACKET (EA) ORAL
Refills: 0 | Status: DISCONTINUED | OUTPATIENT
Start: 2023-01-01 | End: 2023-01-01

## 2023-01-01 RX ORDER — CEFTRIAXONE 500 MG/1
1000 INJECTION, POWDER, FOR SOLUTION INTRAMUSCULAR; INTRAVENOUS EVERY 24 HOURS
Refills: 0 | Status: COMPLETED | OUTPATIENT
Start: 2023-01-01 | End: 2023-01-01

## 2023-01-01 RX ORDER — ETOMIDATE 2 MG/ML
800 INJECTION INTRAVENOUS ONCE
Refills: 0 | Status: DISCONTINUED | OUTPATIENT
Start: 2023-01-01 | End: 2023-01-01

## 2023-01-01 RX ORDER — ALBUTEROL SULFATE 2.5 MG/3ML
(2.5 MG/3ML) SOLUTION RESPIRATORY (INHALATION)
Qty: 1 | Refills: 0 | Status: DISCONTINUED | COMMUNITY
Start: 2022-09-12 | End: 2023-01-01

## 2023-01-01 RX ORDER — ACETAMINOPHEN 500 MG
585 TABLET ORAL EVERY 6 HOURS
Refills: 0 | Status: DISCONTINUED | OUTPATIENT
Start: 2023-01-01 | End: 2023-01-01

## 2023-01-01 RX ORDER — NICOTINE POLACRILEX 2 MG
1 GUM BUCCAL
Qty: 0 | Refills: 0 | DISCHARGE
Start: 2023-01-01

## 2023-01-01 RX ORDER — DRONABINOL 2.5 MG
5 CAPSULE ORAL DAILY
Refills: 0 | Status: DISCONTINUED | OUTPATIENT
Start: 2023-01-01 | End: 2023-01-01

## 2023-01-01 RX ORDER — BUPROPION HYDROCHLORIDE 150 MG/1
300 TABLET, EXTENDED RELEASE ORAL DAILY
Refills: 0 | Status: DISCONTINUED | OUTPATIENT
Start: 2023-01-01 | End: 2023-01-01

## 2023-01-01 RX ADMIN — Medication 40 MILLIGRAM(S): at 23:42

## 2023-01-01 RX ADMIN — Medication 1 PATCH: at 07:36

## 2023-01-01 RX ADMIN — FENTANYL CITRATE 50 MICROGRAM(S): 50 INJECTION INTRAVENOUS at 10:34

## 2023-01-01 RX ADMIN — Medication 3 MILLILITER(S): at 04:41

## 2023-01-01 RX ADMIN — Medication 1 PATCH: at 20:08

## 2023-01-01 RX ADMIN — DEXMEDETOMIDINE HYDROCHLORIDE IN 0.9% SODIUM CHLORIDE 1.95 MICROGRAM(S)/KG/HR: 4 INJECTION INTRAVENOUS at 21:26

## 2023-01-01 RX ADMIN — Medication 325 MILLIGRAM(S): at 21:44

## 2023-01-01 RX ADMIN — Medication 585 MILLIGRAM(S): at 17:22

## 2023-01-01 RX ADMIN — Medication 1 PATCH: at 19:11

## 2023-01-01 RX ADMIN — REMDESIVIR 200 MILLIGRAM(S): 5 INJECTION INTRAVENOUS at 23:45

## 2023-01-01 RX ADMIN — Medication 1 PATCH: at 12:40

## 2023-01-01 RX ADMIN — Medication 6 MILLIGRAM(S): at 22:23

## 2023-01-01 RX ADMIN — Medication 1 PATCH: at 12:57

## 2023-01-01 RX ADMIN — Medication 40 MILLIGRAM(S): at 05:32

## 2023-01-01 RX ADMIN — Medication 1 PATCH: at 23:01

## 2023-01-01 RX ADMIN — LEVALBUTEROL 0.63 MILLIGRAM(S): 1.25 SOLUTION, CONCENTRATE RESPIRATORY (INHALATION) at 08:16

## 2023-01-01 RX ADMIN — BUPROPION HYDROCHLORIDE 150 MILLIGRAM(S): 150 TABLET, EXTENDED RELEASE ORAL at 17:52

## 2023-01-01 RX ADMIN — LEVALBUTEROL 0.63 MILLIGRAM(S): 1.25 SOLUTION, CONCENTRATE RESPIRATORY (INHALATION) at 08:55

## 2023-01-01 RX ADMIN — Medication 3 MILLILITER(S): at 14:42

## 2023-01-01 RX ADMIN — POLYETHYLENE GLYCOL 3350 17 GRAM(S): 17 POWDER, FOR SOLUTION ORAL at 12:00

## 2023-01-01 RX ADMIN — ALBUTEROL 2.5 MILLIGRAM(S): 90 AEROSOL, METERED ORAL at 04:48

## 2023-01-01 RX ADMIN — MIDODRINE HYDROCHLORIDE 10 MILLIGRAM(S): 2.5 TABLET ORAL at 05:14

## 2023-01-01 RX ADMIN — PANTOPRAZOLE SODIUM 40 MILLIGRAM(S): 20 TABLET, DELAYED RELEASE ORAL at 06:33

## 2023-01-01 RX ADMIN — LEVALBUTEROL 0.63 MILLIGRAM(S): 1.25 SOLUTION, CONCENTRATE RESPIRATORY (INHALATION) at 03:48

## 2023-01-01 RX ADMIN — DEXMEDETOMIDINE HYDROCHLORIDE IN 0.9% SODIUM CHLORIDE 3.9 MICROGRAM(S)/KG/HR: 4 INJECTION INTRAVENOUS at 09:48

## 2023-01-01 RX ADMIN — ZINC SULFATE TAB 220 MG (50 MG ZINC EQUIVALENT) 220 MILLIGRAM(S): 220 (50 ZN) TAB at 14:20

## 2023-01-01 RX ADMIN — BUPROPION HYDROCHLORIDE 150 MILLIGRAM(S): 150 TABLET, EXTENDED RELEASE ORAL at 13:00

## 2023-01-01 RX ADMIN — LETROZOLE 2.5 MILLIGRAM(S): 2.5 TABLET, FILM COATED ORAL at 14:20

## 2023-01-01 RX ADMIN — LEVALBUTEROL 0.63 MILLIGRAM(S): 1.25 SOLUTION, CONCENTRATE RESPIRATORY (INHALATION) at 15:23

## 2023-01-01 RX ADMIN — Medication 1 PATCH: at 12:24

## 2023-01-01 RX ADMIN — Medication 2 MILLIGRAM(S): at 22:33

## 2023-01-01 RX ADMIN — REMDESIVIR 200 MILLIGRAM(S): 5 INJECTION INTRAVENOUS at 21:45

## 2023-01-01 RX ADMIN — CEFTRIAXONE 100 MILLIGRAM(S): 500 INJECTION, POWDER, FOR SOLUTION INTRAMUSCULAR; INTRAVENOUS at 10:25

## 2023-01-01 RX ADMIN — Medication 40 MILLIEQUIVALENT(S): at 13:42

## 2023-01-01 RX ADMIN — MUPIROCIN 1 APPLICATION(S): 20 OINTMENT TOPICAL at 17:06

## 2023-01-01 RX ADMIN — Medication 1 PATCH: at 21:38

## 2023-01-01 RX ADMIN — Medication 230 MILLIGRAM(S): at 11:37

## 2023-01-01 RX ADMIN — PANTOPRAZOLE SODIUM 40 MILLIGRAM(S): 20 TABLET, DELAYED RELEASE ORAL at 11:55

## 2023-01-01 RX ADMIN — BUDESONIDE AND FORMOTEROL FUMARATE DIHYDRATE 2 PUFF(S): 160; 4.5 AEROSOL RESPIRATORY (INHALATION) at 10:07

## 2023-01-01 RX ADMIN — Medication 0.5 MILLIGRAM(S): at 09:43

## 2023-01-01 RX ADMIN — POLYETHYLENE GLYCOL 3350 17 GRAM(S): 17 POWDER, FOR SOLUTION ORAL at 11:25

## 2023-01-01 RX ADMIN — Medication 81 MILLIGRAM(S): at 10:51

## 2023-01-01 RX ADMIN — Medication 40 MILLIEQUIVALENT(S): at 11:26

## 2023-01-01 RX ADMIN — Medication 2: at 18:03

## 2023-01-01 RX ADMIN — Medication 2 GRAM(S): at 11:30

## 2023-01-01 RX ADMIN — ATORVASTATIN CALCIUM 80 MILLIGRAM(S): 80 TABLET, FILM COATED ORAL at 21:46

## 2023-01-01 RX ADMIN — Medication 2: at 05:32

## 2023-01-01 RX ADMIN — Medication 325 MILLIGRAM(S): at 11:29

## 2023-01-01 RX ADMIN — Medication 1 PATCH: at 11:39

## 2023-01-01 RX ADMIN — ENOXAPARIN SODIUM 40 MILLIGRAM(S): 100 INJECTION SUBCUTANEOUS at 17:50

## 2023-01-01 RX ADMIN — RIVAROXABAN 10 MILLIGRAM(S): KIT at 18:33

## 2023-01-01 RX ADMIN — PANTOPRAZOLE SODIUM 40 MILLIGRAM(S): 20 TABLET, DELAYED RELEASE ORAL at 11:20

## 2023-01-01 RX ADMIN — CEFTRIAXONE 100 MILLIGRAM(S): 500 INJECTION, POWDER, FOR SOLUTION INTRAMUSCULAR; INTRAVENOUS at 10:27

## 2023-01-01 RX ADMIN — Medication 1 PATCH: at 12:29

## 2023-01-01 RX ADMIN — Medication 5 MILLIGRAM(S): at 12:03

## 2023-01-01 RX ADMIN — Medication 1 MILLIGRAM(S): at 14:56

## 2023-01-01 RX ADMIN — Medication 6: at 17:06

## 2023-01-01 RX ADMIN — Medication 3 MILLILITER(S): at 06:04

## 2023-01-01 RX ADMIN — SODIUM CHLORIDE 75 MILLILITER(S): 9 INJECTION, SOLUTION INTRAVENOUS at 03:03

## 2023-01-01 RX ADMIN — BUPROPION HYDROCHLORIDE 100 MILLIGRAM(S): 150 TABLET, EXTENDED RELEASE ORAL at 21:46

## 2023-01-01 RX ADMIN — Medication 1 PATCH: at 19:09

## 2023-01-01 RX ADMIN — Medication 4: at 12:12

## 2023-01-01 RX ADMIN — Medication 1 PATCH: at 19:30

## 2023-01-01 RX ADMIN — Medication 2: at 21:15

## 2023-01-01 RX ADMIN — Medication 2 MILLIGRAM(S): at 21:09

## 2023-01-01 RX ADMIN — POLYETHYLENE GLYCOL 3350 17 GRAM(S): 17 POWDER, FOR SOLUTION ORAL at 13:08

## 2023-01-01 RX ADMIN — PANTOPRAZOLE SODIUM 40 MILLIGRAM(S): 20 TABLET, DELAYED RELEASE ORAL at 11:13

## 2023-01-01 RX ADMIN — LEVALBUTEROL 0.63 MILLIGRAM(S): 1.25 SOLUTION, CONCENTRATE RESPIRATORY (INHALATION) at 05:17

## 2023-01-01 RX ADMIN — Medication 6: at 11:11

## 2023-01-01 RX ADMIN — Medication 4: at 13:06

## 2023-01-01 RX ADMIN — Medication 40 MILLIGRAM(S): at 21:07

## 2023-01-01 RX ADMIN — LEVALBUTEROL 0.63 MILLIGRAM(S): 1.25 SOLUTION, CONCENTRATE RESPIRATORY (INHALATION) at 08:21

## 2023-01-01 RX ADMIN — SENNA PLUS 10 MILLILITER(S): 8.6 TABLET ORAL at 22:18

## 2023-01-01 RX ADMIN — Medication 40 MILLIGRAM(S): at 23:06

## 2023-01-01 RX ADMIN — Medication 0.5 MILLIGRAM(S): at 21:44

## 2023-01-01 RX ADMIN — SODIUM CHLORIDE 50 MILLILITER(S): 9 INJECTION, SOLUTION INTRAVENOUS at 17:02

## 2023-01-01 RX ADMIN — ALBUTEROL 2.5 MILLIGRAM(S): 90 AEROSOL, METERED ORAL at 21:37

## 2023-01-01 RX ADMIN — Medication 40 MILLIGRAM(S): at 17:49

## 2023-01-01 RX ADMIN — Medication 1 PATCH: at 12:07

## 2023-01-01 RX ADMIN — LEVALBUTEROL 0.63 MILLIGRAM(S): 1.25 SOLUTION, CONCENTRATE RESPIRATORY (INHALATION) at 08:31

## 2023-01-01 RX ADMIN — LEVALBUTEROL 0.63 MILLIGRAM(S): 1.25 SOLUTION, CONCENTRATE RESPIRATORY (INHALATION) at 08:37

## 2023-01-01 RX ADMIN — Medication 40 MILLIGRAM(S): at 05:41

## 2023-01-01 RX ADMIN — PANTOPRAZOLE SODIUM 40 MILLIGRAM(S): 20 TABLET, DELAYED RELEASE ORAL at 11:24

## 2023-01-01 RX ADMIN — ROFLUMILAST 500 MICROGRAM(S): 500 TABLET ORAL at 13:08

## 2023-01-01 RX ADMIN — BUPROPION HYDROCHLORIDE 300 MILLIGRAM(S): 150 TABLET, EXTENDED RELEASE ORAL at 11:25

## 2023-01-01 RX ADMIN — Medication 81 MILLIGRAM(S): at 12:00

## 2023-01-01 RX ADMIN — AZITHROMYCIN 255 MILLIGRAM(S): 500 TABLET, FILM COATED ORAL at 19:33

## 2023-01-01 RX ADMIN — CEFTRIAXONE 100 MILLIGRAM(S): 500 INJECTION, POWDER, FOR SOLUTION INTRAMUSCULAR; INTRAVENOUS at 11:35

## 2023-01-01 RX ADMIN — LETROZOLE 2.5 MILLIGRAM(S): 2.5 TABLET, FILM COATED ORAL at 12:01

## 2023-01-01 RX ADMIN — AZITHROMYCIN 500 MILLIGRAM(S): 500 TABLET, FILM COATED ORAL at 21:22

## 2023-01-01 RX ADMIN — Medication 180 MILLIGRAM(S): at 12:12

## 2023-01-01 RX ADMIN — Medication 1 PATCH: at 11:45

## 2023-01-01 RX ADMIN — Medication 1 TABLET(S): at 13:00

## 2023-01-01 RX ADMIN — LEVALBUTEROL 0.63 MILLIGRAM(S): 1.25 SOLUTION, CONCENTRATE RESPIRATORY (INHALATION) at 15:38

## 2023-01-01 RX ADMIN — LETROZOLE 2.5 MILLIGRAM(S): 2.5 TABLET, FILM COATED ORAL at 11:55

## 2023-01-01 RX ADMIN — TIOTROPIUM BROMIDE 2 PUFF(S): 18 CAPSULE ORAL; RESPIRATORY (INHALATION) at 10:07

## 2023-01-01 RX ADMIN — SODIUM CHLORIDE 50 MILLILITER(S): 9 INJECTION, SOLUTION INTRAVENOUS at 04:19

## 2023-01-01 RX ADMIN — Medication 40 MILLIGRAM(S): at 14:24

## 2023-01-01 RX ADMIN — LEVALBUTEROL 0.63 MILLIGRAM(S): 1.25 SOLUTION, CONCENTRATE RESPIRATORY (INHALATION) at 04:45

## 2023-01-01 RX ADMIN — Medication 81 MILLIGRAM(S): at 14:03

## 2023-01-01 RX ADMIN — Medication 650 MILLIGRAM(S): at 18:20

## 2023-01-01 RX ADMIN — Medication 1 PATCH: at 11:52

## 2023-01-01 RX ADMIN — LEVALBUTEROL 0.63 MILLIGRAM(S): 1.25 SOLUTION, CONCENTRATE RESPIRATORY (INHALATION) at 03:09

## 2023-01-01 RX ADMIN — LEVALBUTEROL 0.63 MILLIGRAM(S): 1.25 SOLUTION, CONCENTRATE RESPIRATORY (INHALATION) at 08:34

## 2023-01-01 RX ADMIN — Medication 40 MILLIGRAM(S): at 14:00

## 2023-01-01 RX ADMIN — Medication 81 MILLIGRAM(S): at 12:29

## 2023-01-01 RX ADMIN — Medication 1 PATCH: at 10:27

## 2023-01-01 RX ADMIN — Medication 1 PATCH: at 08:48

## 2023-01-01 RX ADMIN — SENNA PLUS 10 MILLILITER(S): 8.6 TABLET ORAL at 21:07

## 2023-01-01 RX ADMIN — Medication 2: at 05:42

## 2023-01-01 RX ADMIN — LEVALBUTEROL 0.63 MILLIGRAM(S): 1.25 SOLUTION, CONCENTRATE RESPIRATORY (INHALATION) at 22:12

## 2023-01-01 RX ADMIN — Medication 2: at 09:02

## 2023-01-01 RX ADMIN — Medication 2 MILLIGRAM(S): at 21:20

## 2023-01-01 RX ADMIN — ATORVASTATIN CALCIUM 80 MILLIGRAM(S): 80 TABLET, FILM COATED ORAL at 22:11

## 2023-01-01 RX ADMIN — Medication 1 PATCH: at 11:40

## 2023-01-01 RX ADMIN — DEXMEDETOMIDINE HYDROCHLORIDE IN 0.9% SODIUM CHLORIDE 2.93 MICROGRAM(S)/KG/HR: 4 INJECTION INTRAVENOUS at 21:08

## 2023-01-01 RX ADMIN — Medication 40 MILLIGRAM(S): at 05:08

## 2023-01-01 RX ADMIN — Medication 2 MILLIGRAM(S): at 22:54

## 2023-01-01 RX ADMIN — PANTOPRAZOLE SODIUM 40 MILLIGRAM(S): 20 TABLET, DELAYED RELEASE ORAL at 17:16

## 2023-01-01 RX ADMIN — Medication 1: at 17:20

## 2023-01-01 RX ADMIN — AZITHROMYCIN 255 MILLIGRAM(S): 500 TABLET, FILM COATED ORAL at 06:22

## 2023-01-01 RX ADMIN — Medication 0.5 MILLIGRAM(S): at 21:42

## 2023-01-01 RX ADMIN — Medication 1 PATCH: at 11:13

## 2023-01-01 RX ADMIN — Medication 650 MILLIGRAM(S): at 17:34

## 2023-01-01 RX ADMIN — PANTOPRAZOLE SODIUM 40 MILLIGRAM(S): 20 TABLET, DELAYED RELEASE ORAL at 11:28

## 2023-01-01 RX ADMIN — FENTANYL CITRATE 50 MICROGRAM(S): 50 INJECTION INTRAVENOUS at 15:16

## 2023-01-01 RX ADMIN — Medication 50 MILLILITER(S): at 03:41

## 2023-01-01 RX ADMIN — SENNA PLUS 2 TABLET(S): 8.6 TABLET ORAL at 21:24

## 2023-01-01 RX ADMIN — Medication 1 PATCH: at 12:00

## 2023-01-01 RX ADMIN — Medication 81 MILLIGRAM(S): at 17:03

## 2023-01-01 RX ADMIN — SODIUM CHLORIDE 70 MILLILITER(S): 9 INJECTION INTRAMUSCULAR; INTRAVENOUS; SUBCUTANEOUS at 09:09

## 2023-01-01 RX ADMIN — MUPIROCIN 1 APPLICATION(S): 20 OINTMENT TOPICAL at 05:40

## 2023-01-01 RX ADMIN — BUPROPION HYDROCHLORIDE 150 MILLIGRAM(S): 150 TABLET, EXTENDED RELEASE ORAL at 11:24

## 2023-01-01 RX ADMIN — Medication 1 PATCH: at 11:25

## 2023-01-01 RX ADMIN — Medication 2 MILLIGRAM(S): at 21:23

## 2023-01-01 RX ADMIN — DEXTROSE MONOHYDRATE, SODIUM CHLORIDE, AND POTASSIUM CHLORIDE 70 MILLILITER(S): 50; .745; 4.5 INJECTION, SOLUTION INTRAVENOUS at 01:46

## 2023-01-01 RX ADMIN — Medication 1 PATCH: at 12:10

## 2023-01-01 RX ADMIN — Medication 1 PATCH: at 12:28

## 2023-01-01 RX ADMIN — CEFEPIME 100 MILLIGRAM(S): 1 INJECTION, POWDER, FOR SOLUTION INTRAMUSCULAR; INTRAVENOUS at 17:04

## 2023-01-01 RX ADMIN — CHLORHEXIDINE GLUCONATE 15 MILLILITER(S): 213 SOLUTION TOPICAL at 05:39

## 2023-01-01 RX ADMIN — Medication 0.5 MILLIGRAM(S): at 08:30

## 2023-01-01 RX ADMIN — FENTANYL CITRATE 50 MICROGRAM(S): 50 INJECTION INTRAVENOUS at 15:30

## 2023-01-01 RX ADMIN — Medication 40 MILLIGRAM(S): at 21:23

## 2023-01-01 RX ADMIN — Medication 40 MILLIGRAM(S): at 06:22

## 2023-01-01 RX ADMIN — Medication 1 PATCH: at 08:09

## 2023-01-01 RX ADMIN — Medication 2 MILLIGRAM(S): at 22:16

## 2023-01-01 RX ADMIN — Medication 3 MILLILITER(S): at 21:18

## 2023-01-01 RX ADMIN — Medication 81 MILLIGRAM(S): at 12:39

## 2023-01-01 RX ADMIN — Medication 1 PATCH: at 12:27

## 2023-01-01 RX ADMIN — ATORVASTATIN CALCIUM 80 MILLIGRAM(S): 80 TABLET, FILM COATED ORAL at 21:54

## 2023-01-01 RX ADMIN — Medication 40 MILLIGRAM(S): at 05:14

## 2023-01-01 RX ADMIN — FENTANYL CITRATE 50 MICROGRAM(S): 50 INJECTION INTRAVENOUS at 18:01

## 2023-01-01 RX ADMIN — Medication 325 MILLIGRAM(S): at 12:03

## 2023-01-01 RX ADMIN — Medication 325 MILLIGRAM(S): at 12:28

## 2023-01-01 RX ADMIN — SODIUM CHLORIDE 500 MILLILITER(S): 9 INJECTION, SOLUTION INTRAVENOUS at 06:47

## 2023-01-01 RX ADMIN — ENOXAPARIN SODIUM 40 MILLIGRAM(S): 100 INJECTION SUBCUTANEOUS at 17:35

## 2023-01-01 RX ADMIN — LETROZOLE 2.5 MILLIGRAM(S): 2.5 TABLET, FILM COATED ORAL at 11:56

## 2023-01-01 RX ADMIN — DEXMEDETOMIDINE HYDROCHLORIDE IN 0.9% SODIUM CHLORIDE 1.95 MICROGRAM(S)/KG/HR: 4 INJECTION INTRAVENOUS at 02:16

## 2023-01-01 RX ADMIN — Medication 3 MILLILITER(S): at 04:30

## 2023-01-01 RX ADMIN — ENOXAPARIN SODIUM 40 MILLIGRAM(S): 100 INJECTION SUBCUTANEOUS at 05:13

## 2023-01-01 RX ADMIN — LEVALBUTEROL 0.63 MILLIGRAM(S): 1.25 SOLUTION, CONCENTRATE RESPIRATORY (INHALATION) at 04:33

## 2023-01-01 RX ADMIN — RIVAROXABAN 10 MILLIGRAM(S): KIT at 17:04

## 2023-01-01 RX ADMIN — Medication 0.25 MILLIGRAM(S): at 21:33

## 2023-01-01 RX ADMIN — ROFLUMILAST 500 MICROGRAM(S): 500 TABLET ORAL at 12:27

## 2023-01-01 RX ADMIN — Medication 650 MILLIGRAM(S): at 18:52

## 2023-01-01 RX ADMIN — Medication 40 MILLIGRAM(S): at 14:53

## 2023-01-01 RX ADMIN — Medication 40 MILLIGRAM(S): at 13:30

## 2023-01-01 RX ADMIN — Medication 1: at 08:57

## 2023-01-01 RX ADMIN — Medication 40 MILLIGRAM(S): at 13:29

## 2023-01-01 RX ADMIN — LEVALBUTEROL 0.63 MILLIGRAM(S): 1.25 SOLUTION, CONCENTRATE RESPIRATORY (INHALATION) at 04:47

## 2023-01-01 RX ADMIN — Medication 40 MILLIGRAM(S): at 17:19

## 2023-01-01 RX ADMIN — BUPROPION HYDROCHLORIDE 100 MILLIGRAM(S): 150 TABLET, EXTENDED RELEASE ORAL at 21:23

## 2023-01-01 RX ADMIN — Medication 0.25 MILLIGRAM(S): at 14:34

## 2023-01-01 RX ADMIN — DEXMEDETOMIDINE HYDROCHLORIDE IN 0.9% SODIUM CHLORIDE 2.93 MICROGRAM(S)/KG/HR: 4 INJECTION INTRAVENOUS at 23:44

## 2023-01-01 RX ADMIN — Medication 1 PATCH: at 07:56

## 2023-01-01 RX ADMIN — BUDESONIDE AND FORMOTEROL FUMARATE DIHYDRATE 2 PUFF(S): 160; 4.5 AEROSOL RESPIRATORY (INHALATION) at 08:38

## 2023-01-01 RX ADMIN — Medication 20 MILLIEQUIVALENT(S): at 13:16

## 2023-01-01 RX ADMIN — Medication 1 PATCH: at 11:20

## 2023-01-01 RX ADMIN — Medication 4: at 12:20

## 2023-01-01 RX ADMIN — ATORVASTATIN CALCIUM 80 MILLIGRAM(S): 80 TABLET, FILM COATED ORAL at 21:01

## 2023-01-01 RX ADMIN — Medication 81 MILLIGRAM(S): at 13:07

## 2023-01-01 RX ADMIN — LETROZOLE 2.5 MILLIGRAM(S): 2.5 TABLET, FILM COATED ORAL at 14:04

## 2023-01-01 RX ADMIN — POLYETHYLENE GLYCOL 3350 17 GRAM(S): 17 POWDER, FOR SOLUTION ORAL at 13:46

## 2023-01-01 RX ADMIN — PANTOPRAZOLE SODIUM 40 MILLIGRAM(S): 20 TABLET, DELAYED RELEASE ORAL at 12:05

## 2023-01-01 RX ADMIN — POLYETHYLENE GLYCOL 3350 17 GRAM(S): 17 POWDER, FOR SOLUTION ORAL at 11:29

## 2023-01-01 RX ADMIN — Medication 2: at 23:07

## 2023-01-01 RX ADMIN — Medication 50 MILLILITER(S): at 15:40

## 2023-01-01 RX ADMIN — SENNA PLUS 2 TABLET(S): 8.6 TABLET ORAL at 21:25

## 2023-01-01 RX ADMIN — Medication 1 PATCH: at 11:56

## 2023-01-01 RX ADMIN — POLYETHYLENE GLYCOL 3350 17 GRAM(S): 17 POWDER, FOR SOLUTION ORAL at 11:57

## 2023-01-01 RX ADMIN — Medication 1 PATCH: at 11:29

## 2023-01-01 RX ADMIN — Medication 81 MILLIGRAM(S): at 11:56

## 2023-01-01 RX ADMIN — Medication 1 PATCH: at 12:04

## 2023-01-01 RX ADMIN — LETROZOLE 2.5 MILLIGRAM(S): 2.5 TABLET, FILM COATED ORAL at 17:20

## 2023-01-01 RX ADMIN — ENOXAPARIN SODIUM 40 MILLIGRAM(S): 100 INJECTION SUBCUTANEOUS at 05:14

## 2023-01-01 RX ADMIN — Medication 40 MILLIGRAM(S): at 21:47

## 2023-01-01 RX ADMIN — LEVALBUTEROL 0.63 MILLIGRAM(S): 1.25 SOLUTION, CONCENTRATE RESPIRATORY (INHALATION) at 04:26

## 2023-01-01 RX ADMIN — PANTOPRAZOLE SODIUM 40 MILLIGRAM(S): 20 TABLET, DELAYED RELEASE ORAL at 15:25

## 2023-01-01 RX ADMIN — ONDANSETRON 4 MILLIGRAM(S): 8 TABLET, FILM COATED ORAL at 13:29

## 2023-01-01 RX ADMIN — Medication 100 GRAM(S): at 00:28

## 2023-01-01 RX ADMIN — ALBUTEROL 2.5 MILLIGRAM(S): 90 AEROSOL, METERED ORAL at 13:39

## 2023-01-01 RX ADMIN — Medication 10 MILLIEQUIVALENT(S): at 02:29

## 2023-01-01 RX ADMIN — CHLORHEXIDINE GLUCONATE 1 APPLICATION(S): 213 SOLUTION TOPICAL at 06:13

## 2023-01-01 RX ADMIN — BUDESONIDE AND FORMOTEROL FUMARATE DIHYDRATE 2 PUFF(S): 160; 4.5 AEROSOL RESPIRATORY (INHALATION) at 09:14

## 2023-01-01 RX ADMIN — Medication 1 PATCH: at 19:20

## 2023-01-01 RX ADMIN — FENTANYL CITRATE 25 MICROGRAM(S): 50 INJECTION INTRAVENOUS at 02:43

## 2023-01-01 RX ADMIN — Medication 1 PATCH: at 08:36

## 2023-01-01 RX ADMIN — MIDODRINE HYDROCHLORIDE 10 MILLIGRAM(S): 2.5 TABLET ORAL at 14:47

## 2023-01-01 RX ADMIN — PANTOPRAZOLE SODIUM 40 MILLIGRAM(S): 20 TABLET, DELAYED RELEASE ORAL at 06:02

## 2023-01-01 RX ADMIN — Medication 2 MILLIGRAM(S): at 21:54

## 2023-01-01 RX ADMIN — CEFTRIAXONE 100 MILLIGRAM(S): 500 INJECTION, POWDER, FOR SOLUTION INTRAMUSCULAR; INTRAVENOUS at 10:30

## 2023-01-01 RX ADMIN — ALBUTEROL 2.5 MILLIGRAM(S): 90 AEROSOL, METERED ORAL at 14:35

## 2023-01-01 RX ADMIN — Medication 1 PATCH: at 14:47

## 2023-01-01 RX ADMIN — ALBUTEROL 2.5 MILLIGRAM(S): 90 AEROSOL, METERED ORAL at 09:30

## 2023-01-01 RX ADMIN — Medication 180 MILLIGRAM(S): at 06:34

## 2023-01-01 RX ADMIN — ENOXAPARIN SODIUM 40 MILLIGRAM(S): 100 INJECTION SUBCUTANEOUS at 05:22

## 2023-01-01 RX ADMIN — Medication 180 MILLIGRAM(S): at 13:40

## 2023-01-01 RX ADMIN — Medication 40 MILLIGRAM(S): at 06:36

## 2023-01-01 RX ADMIN — RIVAROXABAN 10 MILLIGRAM(S): KIT at 17:46

## 2023-01-01 RX ADMIN — CHLORHEXIDINE GLUCONATE 15 MILLILITER(S): 213 SOLUTION TOPICAL at 17:24

## 2023-01-01 RX ADMIN — ALBUTEROL 2.5 MILLIGRAM(S): 90 AEROSOL, METERED ORAL at 03:50

## 2023-01-01 RX ADMIN — LETROZOLE 2.5 MILLIGRAM(S): 2.5 TABLET, FILM COATED ORAL at 12:41

## 2023-01-01 RX ADMIN — LEVALBUTEROL 0.63 MILLIGRAM(S): 1.25 SOLUTION, CONCENTRATE RESPIRATORY (INHALATION) at 15:12

## 2023-01-01 RX ADMIN — CHLORHEXIDINE GLUCONATE 1 APPLICATION(S): 213 SOLUTION TOPICAL at 05:17

## 2023-01-01 RX ADMIN — Medication 2: at 05:08

## 2023-01-01 RX ADMIN — LEVALBUTEROL 0.63 MILLIGRAM(S): 1.25 SOLUTION, CONCENTRATE RESPIRATORY (INHALATION) at 15:15

## 2023-01-01 RX ADMIN — Medication 0.5 MILLIGRAM(S): at 09:02

## 2023-01-01 RX ADMIN — ENOXAPARIN SODIUM 40 MILLIGRAM(S): 100 INJECTION SUBCUTANEOUS at 05:09

## 2023-01-01 RX ADMIN — Medication 2: at 05:15

## 2023-01-01 RX ADMIN — Medication 50 MILLILITER(S): at 21:02

## 2023-01-01 RX ADMIN — Medication 500 MILLIGRAM(S): at 11:30

## 2023-01-01 RX ADMIN — Medication 81 MILLIGRAM(S): at 11:23

## 2023-01-01 RX ADMIN — ALBUTEROL 2.5 MILLIGRAM(S): 90 AEROSOL, METERED ORAL at 14:34

## 2023-01-01 RX ADMIN — Medication 81 MILLIGRAM(S): at 12:59

## 2023-01-01 RX ADMIN — DEXMEDETOMIDINE HYDROCHLORIDE IN 0.9% SODIUM CHLORIDE 1.95 MICROGRAM(S)/KG/HR: 4 INJECTION INTRAVENOUS at 10:02

## 2023-01-01 RX ADMIN — Medication 0.5 MILLIGRAM(S): at 08:39

## 2023-01-01 RX ADMIN — LEVALBUTEROL 0.63 MILLIGRAM(S): 1.25 SOLUTION, CONCENTRATE RESPIRATORY (INHALATION) at 14:55

## 2023-01-01 RX ADMIN — Medication 2: at 05:05

## 2023-01-01 RX ADMIN — Medication 0.5 MILLIGRAM(S): at 17:22

## 2023-01-01 RX ADMIN — TIOTROPIUM BROMIDE 2 PUFF(S): 18 CAPSULE ORAL; RESPIRATORY (INHALATION) at 09:31

## 2023-01-01 RX ADMIN — LEVALBUTEROL 0.63 MILLIGRAM(S): 1.25 SOLUTION, CONCENTRATE RESPIRATORY (INHALATION) at 06:04

## 2023-01-01 RX ADMIN — Medication 0.5 MILLIGRAM(S): at 05:09

## 2023-01-01 RX ADMIN — Medication 2 MILLIGRAM(S): at 21:02

## 2023-01-01 RX ADMIN — LEVALBUTEROL 0.63 MILLIGRAM(S): 1.25 SOLUTION, CONCENTRATE RESPIRATORY (INHALATION) at 20:47

## 2023-01-01 RX ADMIN — SODIUM CHLORIDE 500 MILLILITER(S): 9 INJECTION, SOLUTION INTRAVENOUS at 18:59

## 2023-01-01 RX ADMIN — BUDESONIDE AND FORMOTEROL FUMARATE DIHYDRATE 2 PUFF(S): 160; 4.5 AEROSOL RESPIRATORY (INHALATION) at 08:26

## 2023-01-01 RX ADMIN — Medication 1 PATCH: at 13:10

## 2023-01-01 RX ADMIN — MIDODRINE HYDROCHLORIDE 10 MILLIGRAM(S): 2.5 TABLET ORAL at 21:19

## 2023-01-01 RX ADMIN — Medication 40 MILLIGRAM(S): at 23:32

## 2023-01-01 RX ADMIN — Medication 81 MILLIGRAM(S): at 11:10

## 2023-01-01 RX ADMIN — Medication 5 MILLIGRAM(S): at 12:40

## 2023-01-01 RX ADMIN — ROFLUMILAST 500 MICROGRAM(S): 500 TABLET ORAL at 13:23

## 2023-01-01 RX ADMIN — Medication 0.5 MILLIGRAM(S): at 09:11

## 2023-01-01 RX ADMIN — Medication 0.5 MILLIGRAM(S): at 21:31

## 2023-01-01 RX ADMIN — Medication 4: at 11:10

## 2023-01-01 RX ADMIN — Medication 1 PATCH: at 10:15

## 2023-01-01 RX ADMIN — Medication 81 MILLIGRAM(S): at 12:28

## 2023-01-01 RX ADMIN — Medication 6 MILLIGRAM(S): at 06:32

## 2023-01-01 RX ADMIN — Medication 4: at 05:15

## 2023-01-01 RX ADMIN — TIOTROPIUM BROMIDE 2 PUFF(S): 18 CAPSULE ORAL; RESPIRATORY (INHALATION) at 08:59

## 2023-01-01 RX ADMIN — CHLORHEXIDINE GLUCONATE 1 APPLICATION(S): 213 SOLUTION TOPICAL at 05:08

## 2023-01-01 RX ADMIN — Medication 3 MILLILITER(S): at 17:20

## 2023-01-01 RX ADMIN — LETROZOLE 2.5 MILLIGRAM(S): 2.5 TABLET, FILM COATED ORAL at 12:29

## 2023-01-01 RX ADMIN — ENOXAPARIN SODIUM 40 MILLIGRAM(S): 100 INJECTION SUBCUTANEOUS at 17:23

## 2023-01-01 RX ADMIN — Medication 81 MILLIGRAM(S): at 11:24

## 2023-01-01 RX ADMIN — MUPIROCIN 1 APPLICATION(S): 20 OINTMENT TOPICAL at 05:15

## 2023-01-01 RX ADMIN — ENOXAPARIN SODIUM 40 MILLIGRAM(S): 100 INJECTION SUBCUTANEOUS at 17:22

## 2023-01-01 RX ADMIN — Medication 0.5 MILLIGRAM(S): at 22:21

## 2023-01-01 RX ADMIN — LEVALBUTEROL 0.63 MILLIGRAM(S): 1.25 SOLUTION, CONCENTRATE RESPIRATORY (INHALATION) at 04:31

## 2023-01-01 RX ADMIN — SODIUM CHLORIDE 50 MILLILITER(S): 9 INJECTION, SOLUTION INTRAVENOUS at 06:01

## 2023-01-01 RX ADMIN — AZITHROMYCIN 255 MILLIGRAM(S): 500 TABLET, FILM COATED ORAL at 16:14

## 2023-01-01 RX ADMIN — CHLORHEXIDINE GLUCONATE 1 APPLICATION(S): 213 SOLUTION TOPICAL at 05:22

## 2023-01-01 RX ADMIN — REMDESIVIR 200 MILLIGRAM(S): 5 INJECTION INTRAVENOUS at 22:53

## 2023-01-01 RX ADMIN — Medication 1 PATCH: at 07:49

## 2023-01-01 RX ADMIN — Medication 81 MILLIGRAM(S): at 12:03

## 2023-01-01 RX ADMIN — BUPROPION HYDROCHLORIDE 300 MILLIGRAM(S): 150 TABLET, EXTENDED RELEASE ORAL at 12:00

## 2023-01-01 RX ADMIN — BUPROPION HYDROCHLORIDE 100 MILLIGRAM(S): 150 TABLET, EXTENDED RELEASE ORAL at 05:11

## 2023-01-01 RX ADMIN — Medication 81 MILLIGRAM(S): at 12:25

## 2023-01-01 RX ADMIN — LEVALBUTEROL 0.63 MILLIGRAM(S): 1.25 SOLUTION, CONCENTRATE RESPIRATORY (INHALATION) at 05:08

## 2023-01-01 RX ADMIN — CHLORHEXIDINE GLUCONATE 1 APPLICATION(S): 213 SOLUTION TOPICAL at 05:14

## 2023-01-01 RX ADMIN — LEVALBUTEROL 0.63 MILLIGRAM(S): 1.25 SOLUTION, CONCENTRATE RESPIRATORY (INHALATION) at 04:13

## 2023-01-01 RX ADMIN — SENNA PLUS 10 MILLILITER(S): 8.6 TABLET ORAL at 21:46

## 2023-01-01 RX ADMIN — LEVALBUTEROL 0.63 MILLIGRAM(S): 1.25 SOLUTION, CONCENTRATE RESPIRATORY (INHALATION) at 08:41

## 2023-01-01 RX ADMIN — LETROZOLE 2.5 MILLIGRAM(S): 2.5 TABLET, FILM COATED ORAL at 16:35

## 2023-01-01 RX ADMIN — Medication 650 MILLIGRAM(S): at 11:25

## 2023-01-01 RX ADMIN — Medication 40 MILLIGRAM(S): at 05:15

## 2023-01-01 RX ADMIN — BUDESONIDE AND FORMOTEROL FUMARATE DIHYDRATE 2 PUFF(S): 160; 4.5 AEROSOL RESPIRATORY (INHALATION) at 09:31

## 2023-01-01 RX ADMIN — ENOXAPARIN SODIUM 40 MILLIGRAM(S): 100 INJECTION SUBCUTANEOUS at 17:13

## 2023-01-01 RX ADMIN — Medication 1 MILLIGRAM(S): at 15:06

## 2023-01-01 RX ADMIN — LEVALBUTEROL 0.63 MILLIGRAM(S): 1.25 SOLUTION, CONCENTRATE RESPIRATORY (INHALATION) at 15:53

## 2023-01-01 RX ADMIN — CHLORHEXIDINE GLUCONATE 1 APPLICATION(S): 213 SOLUTION TOPICAL at 11:20

## 2023-01-01 RX ADMIN — Medication 0.5 MILLIGRAM(S): at 08:31

## 2023-01-01 RX ADMIN — Medication 1 PATCH: at 14:05

## 2023-01-01 RX ADMIN — FENTANYL CITRATE 25 MICROGRAM(S): 50 INJECTION INTRAVENOUS at 16:40

## 2023-01-01 RX ADMIN — Medication 2: at 11:57

## 2023-01-01 RX ADMIN — CEFEPIME 100 MILLIGRAM(S): 1 INJECTION, POWDER, FOR SOLUTION INTRAMUSCULAR; INTRAVENOUS at 06:21

## 2023-01-01 RX ADMIN — CHLORHEXIDINE GLUCONATE 15 MILLILITER(S): 213 SOLUTION TOPICAL at 17:27

## 2023-01-01 RX ADMIN — Medication 2 MILLIGRAM(S): at 22:18

## 2023-01-01 RX ADMIN — Medication 1 PATCH: at 19:54

## 2023-01-01 RX ADMIN — Medication 4: at 23:20

## 2023-01-01 RX ADMIN — DEXMEDETOMIDINE HYDROCHLORIDE IN 0.9% SODIUM CHLORIDE 3.9 MICROGRAM(S)/KG/HR: 4 INJECTION INTRAVENOUS at 03:00

## 2023-01-01 RX ADMIN — SENNA PLUS 10 MILLILITER(S): 8.6 TABLET ORAL at 21:25

## 2023-01-01 RX ADMIN — Medication 2: at 11:23

## 2023-01-01 RX ADMIN — LEVALBUTEROL 0.63 MILLIGRAM(S): 1.25 SOLUTION, CONCENTRATE RESPIRATORY (INHALATION) at 09:00

## 2023-01-01 RX ADMIN — Medication 40 MILLIGRAM(S): at 16:31

## 2023-01-01 RX ADMIN — ETOMIDATE 20 MILLIGRAM(S): 2 INJECTION INTRAVENOUS at 17:16

## 2023-01-01 RX ADMIN — Medication 325 MILLIGRAM(S): at 11:28

## 2023-01-01 RX ADMIN — SENNA PLUS 2 TABLET(S): 8.6 TABLET ORAL at 21:08

## 2023-01-01 RX ADMIN — BUDESONIDE AND FORMOTEROL FUMARATE DIHYDRATE 2 PUFF(S): 160; 4.5 AEROSOL RESPIRATORY (INHALATION) at 08:28

## 2023-01-01 RX ADMIN — ROFLUMILAST 500 MICROGRAM(S): 500 TABLET ORAL at 12:09

## 2023-01-01 RX ADMIN — BUPROPION HYDROCHLORIDE 300 MILLIGRAM(S): 150 TABLET, EXTENDED RELEASE ORAL at 11:28

## 2023-01-01 RX ADMIN — Medication 1 PATCH: at 11:30

## 2023-01-01 RX ADMIN — AZITHROMYCIN 255 MILLIGRAM(S): 500 TABLET, FILM COATED ORAL at 06:17

## 2023-01-01 RX ADMIN — Medication 2: at 11:28

## 2023-01-01 RX ADMIN — BUPROPION HYDROCHLORIDE 300 MILLIGRAM(S): 150 TABLET, EXTENDED RELEASE ORAL at 12:40

## 2023-01-01 RX ADMIN — Medication 1 PATCH: at 11:35

## 2023-01-01 RX ADMIN — Medication 40 MILLIGRAM(S): at 05:24

## 2023-01-01 RX ADMIN — Medication 125 MILLIGRAM(S): at 04:19

## 2023-01-01 RX ADMIN — Medication 1 PATCH: at 11:46

## 2023-01-01 RX ADMIN — Medication 2: at 11:26

## 2023-01-01 RX ADMIN — MUPIROCIN 1 APPLICATION(S): 20 OINTMENT TOPICAL at 05:16

## 2023-01-01 RX ADMIN — Medication 1 PATCH: at 00:00

## 2023-01-01 RX ADMIN — TIOTROPIUM BROMIDE 2 PUFF(S): 18 CAPSULE ORAL; RESPIRATORY (INHALATION) at 08:09

## 2023-01-01 RX ADMIN — SODIUM CHLORIDE 50 MILLILITER(S): 9 INJECTION, SOLUTION INTRAVENOUS at 22:58

## 2023-01-01 RX ADMIN — Medication 1 PATCH: at 20:13

## 2023-01-01 RX ADMIN — CEFEPIME 100 MILLIGRAM(S): 1 INJECTION, POWDER, FOR SOLUTION INTRAMUSCULAR; INTRAVENOUS at 06:33

## 2023-01-01 RX ADMIN — Medication 20 MILLIGRAM(S): at 05:22

## 2023-01-01 RX ADMIN — SODIUM CHLORIDE 500 MILLILITER(S): 9 INJECTION, SOLUTION INTRAVENOUS at 21:18

## 2023-01-01 RX ADMIN — LEVALBUTEROL 0.63 MILLIGRAM(S): 1.25 SOLUTION, CONCENTRATE RESPIRATORY (INHALATION) at 21:11

## 2023-01-01 RX ADMIN — Medication 40 MILLIGRAM(S): at 18:02

## 2023-01-01 RX ADMIN — BUDESONIDE AND FORMOTEROL FUMARATE DIHYDRATE 2 PUFF(S): 160; 4.5 AEROSOL RESPIRATORY (INHALATION) at 19:47

## 2023-01-01 RX ADMIN — Medication 180 MILLIGRAM(S): at 05:56

## 2023-01-01 RX ADMIN — MUPIROCIN 1 APPLICATION(S): 20 OINTMENT TOPICAL at 18:02

## 2023-01-01 RX ADMIN — Medication 40 MILLIGRAM(S): at 21:54

## 2023-01-01 RX ADMIN — PANTOPRAZOLE SODIUM 40 MILLIGRAM(S): 20 TABLET, DELAYED RELEASE ORAL at 11:25

## 2023-01-01 RX ADMIN — POLYETHYLENE GLYCOL 3350 17 GRAM(S): 17 POWDER, FOR SOLUTION ORAL at 11:12

## 2023-01-01 RX ADMIN — LEVALBUTEROL 0.63 MILLIGRAM(S): 1.25 SOLUTION, CONCENTRATE RESPIRATORY (INHALATION) at 16:23

## 2023-01-01 RX ADMIN — POLYETHYLENE GLYCOL 3350 17 GRAM(S): 17 POWDER, FOR SOLUTION ORAL at 14:04

## 2023-01-01 RX ADMIN — BUDESONIDE AND FORMOTEROL FUMARATE DIHYDRATE 2 PUFF(S): 160; 4.5 AEROSOL RESPIRATORY (INHALATION) at 21:20

## 2023-01-01 RX ADMIN — MORPHINE SULFATE 1 MILLIGRAM(S): 50 CAPSULE, EXTENDED RELEASE ORAL at 17:23

## 2023-01-01 RX ADMIN — BUPROPION HYDROCHLORIDE 300 MILLIGRAM(S): 150 TABLET, EXTENDED RELEASE ORAL at 12:11

## 2023-01-01 RX ADMIN — MEROPENEM 100 MILLIGRAM(S): 1 INJECTION INTRAVENOUS at 03:18

## 2023-01-01 RX ADMIN — SODIUM CHLORIDE 70 MILLILITER(S): 9 INJECTION INTRAMUSCULAR; INTRAVENOUS; SUBCUTANEOUS at 00:04

## 2023-01-01 RX ADMIN — POLYETHYLENE GLYCOL 3350 17 GRAM(S): 17 POWDER, FOR SOLUTION ORAL at 12:04

## 2023-01-01 RX ADMIN — Medication 6: at 12:10

## 2023-01-01 RX ADMIN — LEVALBUTEROL 0.63 MILLIGRAM(S): 1.25 SOLUTION, CONCENTRATE RESPIRATORY (INHALATION) at 15:36

## 2023-01-01 RX ADMIN — LEVALBUTEROL 0.63 MILLIGRAM(S): 1.25 SOLUTION, CONCENTRATE RESPIRATORY (INHALATION) at 15:22

## 2023-01-01 RX ADMIN — Medication 40 MILLIGRAM(S): at 11:15

## 2023-01-01 RX ADMIN — ATORVASTATIN CALCIUM 80 MILLIGRAM(S): 80 TABLET, FILM COATED ORAL at 21:07

## 2023-01-01 RX ADMIN — LETROZOLE 2.5 MILLIGRAM(S): 2.5 TABLET, FILM COATED ORAL at 12:59

## 2023-01-01 RX ADMIN — ENOXAPARIN SODIUM 40 MILLIGRAM(S): 100 INJECTION SUBCUTANEOUS at 17:04

## 2023-01-01 RX ADMIN — Medication 40 MILLIGRAM(S): at 17:24

## 2023-01-01 RX ADMIN — DEXMEDETOMIDINE HYDROCHLORIDE IN 0.9% SODIUM CHLORIDE 1.95 MICROGRAM(S)/KG/HR: 4 INJECTION INTRAVENOUS at 23:11

## 2023-01-01 RX ADMIN — CEFEPIME 100 MILLIGRAM(S): 1 INJECTION, POWDER, FOR SOLUTION INTRAMUSCULAR; INTRAVENOUS at 17:08

## 2023-01-01 RX ADMIN — PANTOPRAZOLE SODIUM 40 MILLIGRAM(S): 20 TABLET, DELAYED RELEASE ORAL at 12:00

## 2023-01-01 RX ADMIN — ENOXAPARIN SODIUM 40 MILLIGRAM(S): 100 INJECTION SUBCUTANEOUS at 17:12

## 2023-01-01 RX ADMIN — BUDESONIDE AND FORMOTEROL FUMARATE DIHYDRATE 2 PUFF(S): 160; 4.5 AEROSOL RESPIRATORY (INHALATION) at 21:03

## 2023-01-01 RX ADMIN — ENOXAPARIN SODIUM 40 MILLIGRAM(S): 100 INJECTION SUBCUTANEOUS at 17:27

## 2023-01-01 RX ADMIN — PANTOPRAZOLE SODIUM 40 MILLIGRAM(S): 20 TABLET, DELAYED RELEASE ORAL at 12:12

## 2023-01-01 RX ADMIN — Medication 0.5 MILLIGRAM(S): at 08:55

## 2023-01-01 RX ADMIN — ENOXAPARIN SODIUM 40 MILLIGRAM(S): 100 INJECTION SUBCUTANEOUS at 05:38

## 2023-01-01 RX ADMIN — BUDESONIDE AND FORMOTEROL FUMARATE DIHYDRATE 2 PUFF(S): 160; 4.5 AEROSOL RESPIRATORY (INHALATION) at 21:43

## 2023-01-01 RX ADMIN — Medication 2: at 08:33

## 2023-01-01 RX ADMIN — Medication 0.5 MILLIGRAM(S): at 08:35

## 2023-01-01 RX ADMIN — Medication 40 MILLIGRAM(S): at 05:37

## 2023-01-01 RX ADMIN — Medication 1 PATCH: at 11:32

## 2023-01-01 RX ADMIN — ENOXAPARIN SODIUM 40 MILLIGRAM(S): 100 INJECTION SUBCUTANEOUS at 18:05

## 2023-01-01 RX ADMIN — PANTOPRAZOLE SODIUM 40 MILLIGRAM(S): 20 TABLET, DELAYED RELEASE ORAL at 06:36

## 2023-01-01 RX ADMIN — PANTOPRAZOLE SODIUM 40 MILLIGRAM(S): 20 TABLET, DELAYED RELEASE ORAL at 12:40

## 2023-01-01 RX ADMIN — ROFLUMILAST 500 MICROGRAM(S): 500 TABLET ORAL at 11:21

## 2023-01-01 RX ADMIN — BUDESONIDE AND FORMOTEROL FUMARATE DIHYDRATE 2 PUFF(S): 160; 4.5 AEROSOL RESPIRATORY (INHALATION) at 09:42

## 2023-01-01 RX ADMIN — LEVALBUTEROL 0.63 MILLIGRAM(S): 1.25 SOLUTION, CONCENTRATE RESPIRATORY (INHALATION) at 21:31

## 2023-01-01 RX ADMIN — ENOXAPARIN SODIUM 40 MILLIGRAM(S): 100 INJECTION SUBCUTANEOUS at 17:11

## 2023-01-01 RX ADMIN — Medication 6 MILLIGRAM(S): at 06:35

## 2023-01-01 RX ADMIN — ATORVASTATIN CALCIUM 80 MILLIGRAM(S): 80 TABLET, FILM COATED ORAL at 21:39

## 2023-01-01 RX ADMIN — BUPROPION HYDROCHLORIDE 100 MILLIGRAM(S): 150 TABLET, EXTENDED RELEASE ORAL at 14:20

## 2023-01-01 RX ADMIN — Medication 40 MILLIGRAM(S): at 06:06

## 2023-01-01 RX ADMIN — SENNA PLUS 10 MILLILITER(S): 8.6 TABLET ORAL at 21:14

## 2023-01-01 RX ADMIN — Medication 1 PATCH: at 19:51

## 2023-01-01 RX ADMIN — Medication 2: at 11:54

## 2023-01-01 RX ADMIN — PANTOPRAZOLE SODIUM 40 MILLIGRAM(S): 20 TABLET, DELAYED RELEASE ORAL at 05:19

## 2023-01-01 RX ADMIN — Medication 40 MILLIGRAM(S): at 05:16

## 2023-01-01 RX ADMIN — SODIUM CHLORIDE 75 MILLILITER(S): 9 INJECTION, SOLUTION INTRAVENOUS at 18:44

## 2023-01-01 RX ADMIN — CHLORHEXIDINE GLUCONATE 1 APPLICATION(S): 213 SOLUTION TOPICAL at 05:39

## 2023-01-01 RX ADMIN — Medication 4: at 17:31

## 2023-01-01 RX ADMIN — LEVALBUTEROL 0.63 MILLIGRAM(S): 1.25 SOLUTION, CONCENTRATE RESPIRATORY (INHALATION) at 21:44

## 2023-01-01 RX ADMIN — Medication 1 PATCH: at 08:17

## 2023-01-01 RX ADMIN — RIVAROXABAN 10 MILLIGRAM(S): KIT at 17:17

## 2023-01-01 RX ADMIN — ATORVASTATIN CALCIUM 80 MILLIGRAM(S): 80 TABLET, FILM COATED ORAL at 21:20

## 2023-01-01 RX ADMIN — Medication 2 MILLIGRAM(S): at 21:27

## 2023-01-01 RX ADMIN — Medication 325 MILLIGRAM(S): at 11:59

## 2023-01-01 RX ADMIN — ENOXAPARIN SODIUM 40 MILLIGRAM(S): 100 INJECTION SUBCUTANEOUS at 05:08

## 2023-01-01 RX ADMIN — MUPIROCIN 1 APPLICATION(S): 20 OINTMENT TOPICAL at 17:23

## 2023-01-01 RX ADMIN — DEXMEDETOMIDINE HYDROCHLORIDE IN 0.9% SODIUM CHLORIDE 1.95 MICROGRAM(S)/KG/HR: 4 INJECTION INTRAVENOUS at 19:25

## 2023-01-01 RX ADMIN — ALBUTEROL 2.5 MILLIGRAM(S): 90 AEROSOL, METERED ORAL at 08:27

## 2023-01-01 RX ADMIN — Medication 40 MILLIGRAM(S): at 21:46

## 2023-01-01 RX ADMIN — DEXMEDETOMIDINE HYDROCHLORIDE IN 0.9% SODIUM CHLORIDE 2.93 MICROGRAM(S)/KG/HR: 4 INJECTION INTRAVENOUS at 19:45

## 2023-01-01 RX ADMIN — Medication 325 MILLIGRAM(S): at 12:22

## 2023-01-01 RX ADMIN — TIOTROPIUM BROMIDE 2 PUFF(S): 18 CAPSULE ORAL; RESPIRATORY (INHALATION) at 08:26

## 2023-01-01 RX ADMIN — Medication 4: at 23:34

## 2023-01-01 RX ADMIN — Medication 1 PATCH: at 20:56

## 2023-01-01 RX ADMIN — ROFLUMILAST 500 MICROGRAM(S): 500 TABLET ORAL at 18:19

## 2023-01-01 RX ADMIN — FENTANYL CITRATE 25 MICROGRAM(S): 50 INJECTION INTRAVENOUS at 02:58

## 2023-01-01 RX ADMIN — Medication 1 PATCH: at 07:45

## 2023-01-01 RX ADMIN — CHLORHEXIDINE GLUCONATE 1 APPLICATION(S): 213 SOLUTION TOPICAL at 11:57

## 2023-01-01 RX ADMIN — LEVALBUTEROL 0.63 MILLIGRAM(S): 1.25 SOLUTION, CONCENTRATE RESPIRATORY (INHALATION) at 21:59

## 2023-01-01 RX ADMIN — Medication 1 PATCH: at 07:28

## 2023-01-01 RX ADMIN — Medication 2: at 05:07

## 2023-01-01 RX ADMIN — Medication 30 MILLIGRAM(S): at 11:22

## 2023-01-01 RX ADMIN — MIDODRINE HYDROCHLORIDE 5 MILLIGRAM(S): 2.5 TABLET ORAL at 13:37

## 2023-01-01 RX ADMIN — BUDESONIDE AND FORMOTEROL FUMARATE DIHYDRATE 2 PUFF(S): 160; 4.5 AEROSOL RESPIRATORY (INHALATION) at 20:52

## 2023-01-01 RX ADMIN — Medication 40 MILLIGRAM(S): at 17:13

## 2023-01-01 RX ADMIN — Medication 0.5 MILLIGRAM(S): at 08:41

## 2023-01-01 RX ADMIN — Medication 0.5 MILLIGRAM(S): at 04:17

## 2023-01-01 RX ADMIN — Medication 1 PATCH: at 11:28

## 2023-01-01 RX ADMIN — LEVALBUTEROL 0.63 MILLIGRAM(S): 1.25 SOLUTION, CONCENTRATE RESPIRATORY (INHALATION) at 09:43

## 2023-01-01 RX ADMIN — Medication 1 PATCH: at 19:35

## 2023-01-01 RX ADMIN — ROFLUMILAST 500 MICROGRAM(S): 500 TABLET ORAL at 12:40

## 2023-01-01 RX ADMIN — Medication 2 MILLIGRAM(S): at 21:22

## 2023-01-01 RX ADMIN — CHLORHEXIDINE GLUCONATE 15 MILLILITER(S): 213 SOLUTION TOPICAL at 18:02

## 2023-01-01 RX ADMIN — ATORVASTATIN CALCIUM 80 MILLIGRAM(S): 80 TABLET, FILM COATED ORAL at 21:25

## 2023-01-01 RX ADMIN — RIVAROXABAN 10 MILLIGRAM(S): KIT at 17:32

## 2023-01-01 RX ADMIN — Medication 1 PATCH: at 07:34

## 2023-01-01 RX ADMIN — Medication 650 MILLIGRAM(S): at 18:10

## 2023-01-01 RX ADMIN — LEVALBUTEROL 0.63 MILLIGRAM(S): 1.25 SOLUTION, CONCENTRATE RESPIRATORY (INHALATION) at 04:17

## 2023-01-01 RX ADMIN — Medication 1 PATCH: at 19:44

## 2023-01-01 RX ADMIN — DEXMEDETOMIDINE HYDROCHLORIDE IN 0.9% SODIUM CHLORIDE 1.95 MICROGRAM(S)/KG/HR: 4 INJECTION INTRAVENOUS at 04:38

## 2023-01-01 RX ADMIN — ATORVASTATIN CALCIUM 80 MILLIGRAM(S): 80 TABLET, FILM COATED ORAL at 21:09

## 2023-01-01 RX ADMIN — POLYETHYLENE GLYCOL 3350 17 GRAM(S): 17 POWDER, FOR SOLUTION ORAL at 11:22

## 2023-01-01 RX ADMIN — SENNA PLUS 2 TABLET(S): 8.6 TABLET ORAL at 21:53

## 2023-01-01 RX ADMIN — FENTANYL CITRATE 50 MICROGRAM(S): 50 INJECTION INTRAVENOUS at 11:00

## 2023-01-01 RX ADMIN — Medication 0.5 MILLIGRAM(S): at 21:23

## 2023-01-01 RX ADMIN — Medication 40 MILLIGRAM(S): at 23:04

## 2023-01-01 RX ADMIN — Medication 81 MILLIGRAM(S): at 13:47

## 2023-01-01 RX ADMIN — Medication 0.5 MILLIGRAM(S): at 08:40

## 2023-01-01 RX ADMIN — LETROZOLE 2.5 MILLIGRAM(S): 2.5 TABLET, FILM COATED ORAL at 11:02

## 2023-01-01 RX ADMIN — MIDODRINE HYDROCHLORIDE 10 MILLIGRAM(S): 2.5 TABLET ORAL at 14:03

## 2023-01-01 RX ADMIN — ALBUTEROL 2.5 MILLIGRAM(S): 90 AEROSOL, METERED ORAL at 08:59

## 2023-01-01 RX ADMIN — Medication 1 PATCH: at 19:23

## 2023-01-01 RX ADMIN — LEVALBUTEROL 0.63 MILLIGRAM(S): 1.25 SOLUTION, CONCENTRATE RESPIRATORY (INHALATION) at 16:51

## 2023-01-01 RX ADMIN — LETROZOLE 2.5 MILLIGRAM(S): 2.5 TABLET, FILM COATED ORAL at 15:00

## 2023-01-01 RX ADMIN — Medication 1 PATCH: at 11:41

## 2023-01-01 RX ADMIN — Medication 81 MILLIGRAM(S): at 11:21

## 2023-01-01 RX ADMIN — Medication 1 PATCH: at 11:18

## 2023-01-01 RX ADMIN — SODIUM CHLORIDE 75 MILLILITER(S): 9 INJECTION, SOLUTION INTRAVENOUS at 21:33

## 2023-01-01 RX ADMIN — PANTOPRAZOLE SODIUM 40 MILLIGRAM(S): 20 TABLET, DELAYED RELEASE ORAL at 14:03

## 2023-01-01 RX ADMIN — Medication 575 MILLIGRAM(S): at 12:30

## 2023-01-01 RX ADMIN — SENNA PLUS 10 MILLILITER(S): 8.6 TABLET ORAL at 21:20

## 2023-01-01 RX ADMIN — Medication 40 MILLIEQUIVALENT(S): at 08:18

## 2023-01-01 RX ADMIN — ALBUTEROL 2.5 MILLIGRAM(S): 90 AEROSOL, METERED ORAL at 14:16

## 2023-01-01 RX ADMIN — BUPROPION HYDROCHLORIDE 150 MILLIGRAM(S): 150 TABLET, EXTENDED RELEASE ORAL at 12:09

## 2023-01-01 RX ADMIN — Medication 2 MILLIGRAM(S): at 21:25

## 2023-01-01 RX ADMIN — BUDESONIDE AND FORMOTEROL FUMARATE DIHYDRATE 2 PUFF(S): 160; 4.5 AEROSOL RESPIRATORY (INHALATION) at 21:00

## 2023-01-01 RX ADMIN — Medication 1: at 22:28

## 2023-01-01 RX ADMIN — SENNA PLUS 2 TABLET(S): 8.6 TABLET ORAL at 21:46

## 2023-01-01 RX ADMIN — Medication 2: at 12:23

## 2023-01-01 RX ADMIN — TIOTROPIUM BROMIDE 2 PUFF(S): 18 CAPSULE ORAL; RESPIRATORY (INHALATION) at 08:39

## 2023-01-01 RX ADMIN — Medication 40 MILLIGRAM(S): at 13:47

## 2023-01-01 RX ADMIN — Medication 5 MILLIGRAM(S): at 15:11

## 2023-01-01 RX ADMIN — SODIUM CHLORIDE 75 MILLILITER(S): 9 INJECTION, SOLUTION INTRAVENOUS at 21:23

## 2023-01-01 RX ADMIN — Medication 1 PATCH: at 12:01

## 2023-01-01 RX ADMIN — Medication 325 MILLIGRAM(S): at 12:11

## 2023-01-01 RX ADMIN — Medication 125 MILLIGRAM(S): at 10:14

## 2023-01-01 RX ADMIN — Medication 0.5 MILLIGRAM(S): at 05:33

## 2023-01-01 RX ADMIN — CEFTRIAXONE 100 MILLIGRAM(S): 500 INJECTION, POWDER, FOR SOLUTION INTRAMUSCULAR; INTRAVENOUS at 09:58

## 2023-01-01 RX ADMIN — Medication 4: at 17:13

## 2023-01-01 RX ADMIN — LEVALBUTEROL 0.63 MILLIGRAM(S): 1.25 SOLUTION, CONCENTRATE RESPIRATORY (INHALATION) at 15:34

## 2023-01-01 RX ADMIN — Medication 2 MILLIGRAM(S): at 21:08

## 2023-01-01 RX ADMIN — Medication 1 PATCH: at 07:41

## 2023-01-01 RX ADMIN — Medication 40 MILLIGRAM(S): at 13:13

## 2023-01-01 RX ADMIN — SODIUM CHLORIDE 50 MILLILITER(S): 9 INJECTION, SOLUTION INTRAVENOUS at 04:28

## 2023-01-01 RX ADMIN — Medication 100 MILLIEQUIVALENT(S): at 22:23

## 2023-01-01 RX ADMIN — LEVALBUTEROL 0.63 MILLIGRAM(S): 1.25 SOLUTION, CONCENTRATE RESPIRATORY (INHALATION) at 21:40

## 2023-01-01 RX ADMIN — Medication 1 PATCH: at 11:00

## 2023-01-01 RX ADMIN — BUPROPION HYDROCHLORIDE 300 MILLIGRAM(S): 150 TABLET, EXTENDED RELEASE ORAL at 12:03

## 2023-01-01 RX ADMIN — ENOXAPARIN SODIUM 40 MILLIGRAM(S): 100 INJECTION SUBCUTANEOUS at 05:16

## 2023-01-01 RX ADMIN — AZITHROMYCIN 255 MILLIGRAM(S): 500 TABLET, FILM COATED ORAL at 15:53

## 2023-01-01 RX ADMIN — Medication 4: at 11:16

## 2023-01-01 RX ADMIN — Medication 0.5 MILLIGRAM(S): at 21:20

## 2023-01-01 RX ADMIN — Medication 4: at 12:01

## 2023-01-01 RX ADMIN — Medication 2 MILLIGRAM(S): at 21:14

## 2023-01-01 RX ADMIN — CHLORHEXIDINE GLUCONATE 15 MILLILITER(S): 213 SOLUTION TOPICAL at 05:14

## 2023-01-01 RX ADMIN — ALBUTEROL 2.5 MILLIGRAM(S): 90 AEROSOL, METERED ORAL at 08:43

## 2023-01-01 RX ADMIN — SENNA PLUS 10 MILLILITER(S): 8.6 TABLET ORAL at 21:33

## 2023-01-01 RX ADMIN — Medication 0.5 MILLIGRAM(S): at 20:57

## 2023-01-01 RX ADMIN — Medication 0.5 MILLIGRAM(S): at 20:47

## 2023-01-01 RX ADMIN — Medication 1: at 17:17

## 2023-01-01 RX ADMIN — PANTOPRAZOLE SODIUM 40 MILLIGRAM(S): 20 TABLET, DELAYED RELEASE ORAL at 11:10

## 2023-01-01 RX ADMIN — Medication 180 MILLIGRAM(S): at 06:22

## 2023-01-01 RX ADMIN — ALBUTEROL 2.5 MILLIGRAM(S): 90 AEROSOL, METERED ORAL at 21:03

## 2023-01-01 RX ADMIN — Medication 81 MILLIGRAM(S): at 15:26

## 2023-01-01 RX ADMIN — BUPROPION HYDROCHLORIDE 300 MILLIGRAM(S): 150 TABLET, EXTENDED RELEASE ORAL at 12:27

## 2023-01-01 RX ADMIN — MUPIROCIN 1 APPLICATION(S): 20 OINTMENT TOPICAL at 18:05

## 2023-01-01 RX ADMIN — Medication 0.25 MILLIGRAM(S): at 22:23

## 2023-01-01 RX ADMIN — LEVALBUTEROL 0.63 MILLIGRAM(S): 1.25 SOLUTION, CONCENTRATE RESPIRATORY (INHALATION) at 18:09

## 2023-01-01 RX ADMIN — CHLORHEXIDINE GLUCONATE 1 APPLICATION(S): 213 SOLUTION TOPICAL at 05:38

## 2023-01-01 RX ADMIN — CHLORHEXIDINE GLUCONATE 1 APPLICATION(S): 213 SOLUTION TOPICAL at 05:06

## 2023-01-01 RX ADMIN — ATORVASTATIN CALCIUM 80 MILLIGRAM(S): 80 TABLET, FILM COATED ORAL at 21:23

## 2023-01-01 RX ADMIN — Medication 40 MILLIGRAM(S): at 06:34

## 2023-01-01 RX ADMIN — ALBUTEROL 2.5 MILLIGRAM(S): 90 AEROSOL, METERED ORAL at 21:00

## 2023-01-01 RX ADMIN — Medication 6: at 14:20

## 2023-01-01 RX ADMIN — DEXMEDETOMIDINE HYDROCHLORIDE IN 0.9% SODIUM CHLORIDE 3.9 MICROGRAM(S)/KG/HR: 4 INJECTION INTRAVENOUS at 20:30

## 2023-01-01 RX ADMIN — ENOXAPARIN SODIUM 40 MILLIGRAM(S): 100 INJECTION SUBCUTANEOUS at 17:06

## 2023-01-01 RX ADMIN — Medication 2: at 23:16

## 2023-01-01 RX ADMIN — DEXMEDETOMIDINE HYDROCHLORIDE IN 0.9% SODIUM CHLORIDE 1.95 MICROGRAM(S)/KG/HR: 4 INJECTION INTRAVENOUS at 17:06

## 2023-01-01 RX ADMIN — Medication 1 PATCH: at 07:10

## 2023-01-01 RX ADMIN — PROPOFOL 4.68 MICROGRAM(S)/KG/MIN: 10 INJECTION, EMULSION INTRAVENOUS at 01:55

## 2023-01-01 RX ADMIN — RIVAROXABAN 10 MILLIGRAM(S): KIT at 17:53

## 2023-01-01 RX ADMIN — Medication 4: at 12:50

## 2023-01-01 RX ADMIN — Medication 3 MILLILITER(S): at 03:54

## 2023-01-01 RX ADMIN — Medication 650 MILLIGRAM(S): at 18:50

## 2023-01-01 RX ADMIN — Medication 1 MILLIGRAM(S): at 03:37

## 2023-01-01 RX ADMIN — Medication 40 MILLIGRAM(S): at 05:48

## 2023-01-01 RX ADMIN — MUPIROCIN 1 APPLICATION(S): 20 OINTMENT TOPICAL at 17:20

## 2023-01-01 RX ADMIN — Medication 3 MILLILITER(S): at 16:55

## 2023-01-01 RX ADMIN — Medication 0.5 MILLIGRAM(S): at 18:48

## 2023-01-01 RX ADMIN — Medication 1 PATCH: at 19:52

## 2023-01-01 RX ADMIN — POLYETHYLENE GLYCOL 3350 17 GRAM(S): 17 POWDER, FOR SOLUTION ORAL at 12:26

## 2023-01-01 RX ADMIN — Medication 40 MILLIGRAM(S): at 21:08

## 2023-01-01 RX ADMIN — Medication 2: at 23:45

## 2023-01-01 RX ADMIN — Medication 40 MILLIGRAM(S): at 12:25

## 2023-01-01 RX ADMIN — LEVALBUTEROL 0.63 MILLIGRAM(S): 1.25 SOLUTION, CONCENTRATE RESPIRATORY (INHALATION) at 20:37

## 2023-01-01 RX ADMIN — LEVALBUTEROL 0.63 MILLIGRAM(S): 1.25 SOLUTION, CONCENTRATE RESPIRATORY (INHALATION) at 05:34

## 2023-01-01 RX ADMIN — Medication 81 MILLIGRAM(S): at 11:11

## 2023-01-01 RX ADMIN — ETOMIDATE 20 MILLIGRAM(S): 2 INJECTION INTRAVENOUS at 09:50

## 2023-01-01 RX ADMIN — Medication 500 MILLIGRAM(S): at 13:00

## 2023-01-01 RX ADMIN — Medication 40 MILLIGRAM(S): at 05:07

## 2023-01-01 RX ADMIN — CHLORHEXIDINE GLUCONATE 15 MILLILITER(S): 213 SOLUTION TOPICAL at 05:04

## 2023-01-01 RX ADMIN — Medication 1: at 08:47

## 2023-01-01 RX ADMIN — CHLORHEXIDINE GLUCONATE 15 MILLILITER(S): 213 SOLUTION TOPICAL at 17:06

## 2023-01-01 RX ADMIN — Medication 2 MILLIGRAM(S): at 21:47

## 2023-01-01 RX ADMIN — SENNA PLUS 1 TABLET(S): 8.6 TABLET ORAL at 22:33

## 2023-01-01 RX ADMIN — Medication 325 MILLIGRAM(S): at 11:25

## 2023-01-01 RX ADMIN — Medication 1 PATCH: at 10:03

## 2023-01-01 RX ADMIN — Medication 2 MILLIGRAM(S): at 21:46

## 2023-01-01 RX ADMIN — Medication 4: at 23:07

## 2023-01-01 RX ADMIN — Medication 40 MILLIGRAM(S): at 13:06

## 2023-01-01 RX ADMIN — CHLORHEXIDINE GLUCONATE 15 MILLILITER(S): 213 SOLUTION TOPICAL at 05:13

## 2023-01-01 RX ADMIN — Medication 40 MILLIGRAM(S): at 17:50

## 2023-01-01 RX ADMIN — LEVALBUTEROL 0.63 MILLIGRAM(S): 1.25 SOLUTION, CONCENTRATE RESPIRATORY (INHALATION) at 21:04

## 2023-01-01 RX ADMIN — Medication 0.25 MILLIGRAM(S): at 11:20

## 2023-01-01 RX ADMIN — Medication 0.5 MILLIGRAM(S): at 08:17

## 2023-01-01 RX ADMIN — ENOXAPARIN SODIUM 40 MILLIGRAM(S): 100 INJECTION SUBCUTANEOUS at 05:19

## 2023-01-01 RX ADMIN — DEXMEDETOMIDINE HYDROCHLORIDE IN 0.9% SODIUM CHLORIDE 1.95 MICROGRAM(S)/KG/HR: 4 INJECTION INTRAVENOUS at 03:55

## 2023-01-01 RX ADMIN — BUPROPION HYDROCHLORIDE 300 MILLIGRAM(S): 150 TABLET, EXTENDED RELEASE ORAL at 13:08

## 2023-01-01 RX ADMIN — LEVALBUTEROL 0.63 MILLIGRAM(S): 1.25 SOLUTION, CONCENTRATE RESPIRATORY (INHALATION) at 20:57

## 2023-01-01 RX ADMIN — Medication 40 MILLIEQUIVALENT(S): at 11:20

## 2023-01-01 RX ADMIN — Medication 0.25 MILLIGRAM(S): at 21:31

## 2023-01-01 RX ADMIN — PANTOPRAZOLE SODIUM 40 MILLIGRAM(S): 20 TABLET, DELAYED RELEASE ORAL at 07:49

## 2023-01-01 RX ADMIN — CEFEPIME 100 MILLIGRAM(S): 1 INJECTION, POWDER, FOR SOLUTION INTRAMUSCULAR; INTRAVENOUS at 19:01

## 2023-01-01 RX ADMIN — SODIUM CHLORIDE 75 MILLILITER(S): 9 INJECTION, SOLUTION INTRAVENOUS at 10:25

## 2023-01-01 RX ADMIN — Medication 0.5 MILLIGRAM(S): at 21:39

## 2023-01-01 RX ADMIN — Medication 81 MILLIGRAM(S): at 11:18

## 2023-01-01 RX ADMIN — Medication 2 MILLIGRAM(S): at 21:44

## 2023-01-01 RX ADMIN — Medication 230 MILLIGRAM(S): at 19:52

## 2023-01-01 RX ADMIN — LEVALBUTEROL 0.63 MILLIGRAM(S): 1.25 SOLUTION, CONCENTRATE RESPIRATORY (INHALATION) at 15:57

## 2023-01-01 RX ADMIN — Medication 1 PATCH: at 08:14

## 2023-01-01 RX ADMIN — Medication 40 MILLIGRAM(S): at 14:43

## 2023-01-01 RX ADMIN — Medication 1: at 17:45

## 2023-01-01 RX ADMIN — SENNA PLUS 10 MILLILITER(S): 8.6 TABLET ORAL at 22:22

## 2023-01-01 RX ADMIN — Medication 1 MILLIGRAM(S): at 13:30

## 2023-01-01 RX ADMIN — PANTOPRAZOLE SODIUM 40 MILLIGRAM(S): 20 TABLET, DELAYED RELEASE ORAL at 09:00

## 2023-01-01 RX ADMIN — Medication 0.5 MILLIGRAM(S): at 21:10

## 2023-01-01 RX ADMIN — Medication 20 MILLIEQUIVALENT(S): at 12:27

## 2023-01-01 RX ADMIN — LEVALBUTEROL 0.63 MILLIGRAM(S): 1.25 SOLUTION, CONCENTRATE RESPIRATORY (INHALATION) at 21:42

## 2023-01-01 RX ADMIN — ATORVASTATIN CALCIUM 80 MILLIGRAM(S): 80 TABLET, FILM COATED ORAL at 22:18

## 2023-01-01 RX ADMIN — Medication 40 MILLIGRAM(S): at 05:38

## 2023-01-01 RX ADMIN — Medication 40 MILLIGRAM(S): at 21:02

## 2023-01-01 RX ADMIN — ROFLUMILAST 500 MICROGRAM(S): 500 TABLET ORAL at 12:03

## 2023-01-01 RX ADMIN — Medication 4: at 05:12

## 2023-01-01 RX ADMIN — Medication 4: at 17:36

## 2023-01-01 RX ADMIN — Medication 1 PATCH: at 11:26

## 2023-01-01 RX ADMIN — Medication 0.5 MILLIGRAM(S): at 22:12

## 2023-01-01 RX ADMIN — LETROZOLE 2.5 MILLIGRAM(S): 2.5 TABLET, FILM COATED ORAL at 12:03

## 2023-01-01 RX ADMIN — PHENYLEPHRINE HYDROCHLORIDE 7.31 MICROGRAM(S)/KG/MIN: 10 INJECTION INTRAVENOUS at 02:35

## 2023-01-01 RX ADMIN — Medication 1: at 11:39

## 2023-01-01 RX ADMIN — Medication 2: at 23:09

## 2023-01-01 RX ADMIN — SODIUM CHLORIDE 75 MILLILITER(S): 9 INJECTION, SOLUTION INTRAVENOUS at 04:18

## 2023-01-01 RX ADMIN — Medication 40 MILLIGRAM(S): at 21:39

## 2023-01-01 RX ADMIN — Medication 1 PATCH: at 07:24

## 2023-01-01 RX ADMIN — PANTOPRAZOLE SODIUM 40 MILLIGRAM(S): 20 TABLET, DELAYED RELEASE ORAL at 12:24

## 2023-01-01 RX ADMIN — PROPOFOL 100 MILLIGRAM(S): 10 INJECTION, EMULSION INTRAVENOUS at 17:25

## 2023-01-01 RX ADMIN — Medication 180 MILLIGRAM(S): at 06:18

## 2023-01-01 RX ADMIN — Medication 1 PATCH: at 06:41

## 2023-01-01 RX ADMIN — TIOTROPIUM BROMIDE 2 PUFF(S): 18 CAPSULE ORAL; RESPIRATORY (INHALATION) at 09:15

## 2023-01-01 RX ADMIN — Medication 4: at 17:37

## 2023-01-01 RX ADMIN — LEVALBUTEROL 0.63 MILLIGRAM(S): 1.25 SOLUTION, CONCENTRATE RESPIRATORY (INHALATION) at 21:17

## 2023-01-01 RX ADMIN — Medication 81 MILLIGRAM(S): at 12:11

## 2023-01-01 RX ADMIN — Medication 40 MILLIGRAM(S): at 22:19

## 2023-01-01 RX ADMIN — Medication 325 MILLIGRAM(S): at 12:40

## 2023-01-01 RX ADMIN — POLYETHYLENE GLYCOL 3350 17 GRAM(S): 17 POWDER, FOR SOLUTION ORAL at 10:51

## 2023-01-01 RX ADMIN — LEVALBUTEROL 0.63 MILLIGRAM(S): 1.25 SOLUTION, CONCENTRATE RESPIRATORY (INHALATION) at 15:11

## 2023-01-01 RX ADMIN — Medication 1 PATCH: at 10:54

## 2023-01-01 RX ADMIN — MAGNESIUM OXIDE 400 MG ORAL TABLET 400 MILLIGRAM(S): 241.3 TABLET ORAL at 17:50

## 2023-01-01 RX ADMIN — LEVALBUTEROL 0.63 MILLIGRAM(S): 1.25 SOLUTION, CONCENTRATE RESPIRATORY (INHALATION) at 21:41

## 2023-01-01 RX ADMIN — LEVALBUTEROL 0.63 MILLIGRAM(S): 1.25 SOLUTION, CONCENTRATE RESPIRATORY (INHALATION) at 15:45

## 2023-01-01 RX ADMIN — MIDODRINE HYDROCHLORIDE 10 MILLIGRAM(S): 2.5 TABLET ORAL at 21:01

## 2023-01-01 RX ADMIN — ZINC SULFATE TAB 220 MG (50 MG ZINC EQUIVALENT) 220 MILLIGRAM(S): 220 (50 ZN) TAB at 11:30

## 2023-01-01 RX ADMIN — Medication 180 MILLIGRAM(S): at 06:32

## 2023-01-01 RX ADMIN — Medication 1: at 17:16

## 2023-01-01 RX ADMIN — Medication 1 PATCH: at 16:34

## 2023-01-01 RX ADMIN — Medication 0.5 MILLIGRAM(S): at 21:11

## 2023-01-01 RX ADMIN — BUPROPION HYDROCHLORIDE 100 MILLIGRAM(S): 150 TABLET, EXTENDED RELEASE ORAL at 21:02

## 2023-01-01 RX ADMIN — Medication 100 MILLIEQUIVALENT(S): at 19:00

## 2023-01-01 RX ADMIN — Medication 0.25 MILLIGRAM(S): at 21:25

## 2023-01-01 RX ADMIN — Medication 1 PATCH: at 07:25

## 2023-01-01 RX ADMIN — LEVALBUTEROL 0.63 MILLIGRAM(S): 1.25 SOLUTION, CONCENTRATE RESPIRATORY (INHALATION) at 20:51

## 2023-01-01 RX ADMIN — Medication 1 PATCH: at 07:00

## 2023-01-01 RX ADMIN — Medication 85 MILLIMOLE(S): at 10:30

## 2023-01-01 RX ADMIN — Medication 2: at 12:28

## 2023-01-01 RX ADMIN — LETROZOLE 2.5 MILLIGRAM(S): 2.5 TABLET, FILM COATED ORAL at 11:39

## 2023-01-01 RX ADMIN — Medication 6 MILLIGRAM(S): at 06:22

## 2023-01-01 RX ADMIN — ALBUTEROL 2.5 MILLIGRAM(S): 90 AEROSOL, METERED ORAL at 05:36

## 2023-01-01 RX ADMIN — MIDODRINE HYDROCHLORIDE 10 MILLIGRAM(S): 2.5 TABLET ORAL at 05:06

## 2023-01-01 RX ADMIN — Medication 1 PATCH: at 11:10

## 2023-01-01 RX ADMIN — Medication 0.5 MILLIGRAM(S): at 09:19

## 2023-01-01 RX ADMIN — Medication 4: at 18:03

## 2023-01-01 RX ADMIN — FENTANYL CITRATE 25 MICROGRAM(S): 50 INJECTION INTRAVENOUS at 22:04

## 2023-01-01 RX ADMIN — Medication 40 MILLIGRAM(S): at 17:33

## 2023-01-01 RX ADMIN — Medication 0.5 MILLIGRAM(S): at 16:35

## 2023-01-01 RX ADMIN — LETROZOLE 2.5 MILLIGRAM(S): 2.5 TABLET, FILM COATED ORAL at 13:25

## 2023-01-01 RX ADMIN — LETROZOLE 2.5 MILLIGRAM(S): 2.5 TABLET, FILM COATED ORAL at 11:26

## 2023-01-01 RX ADMIN — BUDESONIDE AND FORMOTEROL FUMARATE DIHYDRATE 2 PUFF(S): 160; 4.5 AEROSOL RESPIRATORY (INHALATION) at 21:30

## 2023-01-01 RX ADMIN — Medication 81 MILLIGRAM(S): at 11:28

## 2023-01-01 RX ADMIN — Medication 180 MILLIGRAM(S): at 06:21

## 2023-01-01 RX ADMIN — CHLORHEXIDINE GLUCONATE 1 APPLICATION(S): 213 SOLUTION TOPICAL at 05:21

## 2023-01-01 RX ADMIN — ENOXAPARIN SODIUM 40 MILLIGRAM(S): 100 INJECTION SUBCUTANEOUS at 05:05

## 2023-01-01 RX ADMIN — Medication 2: at 13:04

## 2023-01-01 RX ADMIN — Medication 3 MILLILITER(S): at 10:14

## 2023-01-01 RX ADMIN — Medication 0.5 MILLIGRAM(S): at 22:13

## 2023-01-01 RX ADMIN — ENOXAPARIN SODIUM 40 MILLIGRAM(S): 100 INJECTION SUBCUTANEOUS at 06:06

## 2023-01-01 RX ADMIN — Medication 1 PATCH: at 18:00

## 2023-01-01 RX ADMIN — Medication 2: at 21:12

## 2023-01-01 RX ADMIN — BUDESONIDE AND FORMOTEROL FUMARATE DIHYDRATE 2 PUFF(S): 160; 4.5 AEROSOL RESPIRATORY (INHALATION) at 08:09

## 2023-01-01 RX ADMIN — POLYETHYLENE GLYCOL 3350 17 GRAM(S): 17 POWDER, FOR SOLUTION ORAL at 11:55

## 2023-01-01 RX ADMIN — Medication 4: at 07:52

## 2023-01-01 RX ADMIN — TIOTROPIUM BROMIDE 2 PUFF(S): 18 CAPSULE ORAL; RESPIRATORY (INHALATION) at 08:31

## 2023-01-01 RX ADMIN — Medication 4: at 18:05

## 2023-01-01 RX ADMIN — Medication 1 PATCH: at 17:58

## 2023-01-01 RX ADMIN — Medication 4: at 11:20

## 2023-01-01 RX ADMIN — ATORVASTATIN CALCIUM 80 MILLIGRAM(S): 80 TABLET, FILM COATED ORAL at 21:47

## 2023-01-01 RX ADMIN — TIOTROPIUM BROMIDE 2 PUFF(S): 18 CAPSULE ORAL; RESPIRATORY (INHALATION) at 08:34

## 2023-01-01 RX ADMIN — Medication 585 MILLIGRAM(S): at 18:01

## 2023-01-01 RX ADMIN — Medication 4: at 05:16

## 2023-01-01 RX ADMIN — Medication 40 MILLIGRAM(S): at 11:19

## 2023-01-01 RX ADMIN — Medication 325 MILLIGRAM(S): at 12:09

## 2023-01-01 RX ADMIN — LEVALBUTEROL 0.63 MILLIGRAM(S): 1.25 SOLUTION, CONCENTRATE RESPIRATORY (INHALATION) at 08:30

## 2023-01-01 RX ADMIN — LEVALBUTEROL 0.63 MILLIGRAM(S): 1.25 SOLUTION, CONCENTRATE RESPIRATORY (INHALATION) at 04:24

## 2023-01-01 RX ADMIN — ATORVASTATIN CALCIUM 80 MILLIGRAM(S): 80 TABLET, FILM COATED ORAL at 21:14

## 2023-01-01 RX ADMIN — Medication 650 MILLIGRAM(S): at 13:41

## 2023-01-01 RX ADMIN — CHLORHEXIDINE GLUCONATE 1 APPLICATION(S): 213 SOLUTION TOPICAL at 05:13

## 2023-01-01 RX ADMIN — BUPROPION HYDROCHLORIDE 150 MILLIGRAM(S): 150 TABLET, EXTENDED RELEASE ORAL at 12:31

## 2023-01-01 RX ADMIN — CEFEPIME 100 MILLIGRAM(S): 1 INJECTION, POWDER, FOR SOLUTION INTRAMUSCULAR; INTRAVENOUS at 21:45

## 2023-01-01 RX ADMIN — ALBUTEROL 2.5 MILLIGRAM(S): 90 AEROSOL, METERED ORAL at 21:47

## 2023-01-01 RX ADMIN — SENNA PLUS 1 TABLET(S): 8.6 TABLET ORAL at 22:16

## 2023-01-01 RX ADMIN — Medication 3 MILLILITER(S): at 11:34

## 2023-01-01 RX ADMIN — Medication 180 MILLIGRAM(S): at 05:38

## 2023-01-01 RX ADMIN — Medication 40 MILLIEQUIVALENT(S): at 17:10

## 2023-01-01 RX ADMIN — TIOTROPIUM BROMIDE 2 PUFF(S): 18 CAPSULE ORAL; RESPIRATORY (INHALATION) at 08:27

## 2023-01-01 RX ADMIN — AZITHROMYCIN 255 MILLIGRAM(S): 500 TABLET, FILM COATED ORAL at 17:49

## 2023-01-01 RX ADMIN — LETROZOLE 2.5 MILLIGRAM(S): 2.5 TABLET, FILM COATED ORAL at 13:47

## 2023-01-01 RX ADMIN — DEXMEDETOMIDINE HYDROCHLORIDE IN 0.9% SODIUM CHLORIDE 1.95 MICROGRAM(S)/KG/HR: 4 INJECTION INTRAVENOUS at 17:21

## 2023-01-01 RX ADMIN — Medication 0.5 MILLIGRAM(S): at 16:39

## 2023-01-01 RX ADMIN — SENNA PLUS 10 MILLILITER(S): 8.6 TABLET ORAL at 22:11

## 2023-01-01 RX ADMIN — RIVAROXABAN 10 MILLIGRAM(S): KIT at 17:25

## 2023-01-01 RX ADMIN — Medication 40 MILLIGRAM(S): at 16:01

## 2023-01-01 RX ADMIN — BUDESONIDE AND FORMOTEROL FUMARATE DIHYDRATE 2 PUFF(S): 160; 4.5 AEROSOL RESPIRATORY (INHALATION) at 08:59

## 2023-01-01 RX ADMIN — ENOXAPARIN SODIUM 40 MILLIGRAM(S): 100 INJECTION SUBCUTANEOUS at 18:02

## 2023-01-01 RX ADMIN — BUPROPION HYDROCHLORIDE 100 MILLIGRAM(S): 150 TABLET, EXTENDED RELEASE ORAL at 13:47

## 2023-01-01 RX ADMIN — CHLORHEXIDINE GLUCONATE 1 APPLICATION(S): 213 SOLUTION TOPICAL at 12:28

## 2023-01-01 RX ADMIN — Medication 1 PATCH: at 11:22

## 2023-01-01 RX ADMIN — Medication 1 PATCH: at 12:54

## 2023-01-01 RX ADMIN — Medication 1 MILLIGRAM(S): at 19:44

## 2023-01-01 RX ADMIN — MIDODRINE HYDROCHLORIDE 5 MILLIGRAM(S): 2.5 TABLET ORAL at 07:50

## 2023-01-01 RX ADMIN — FENTANYL CITRATE 25 MICROGRAM(S): 50 INJECTION INTRAVENOUS at 21:49

## 2023-01-01 RX ADMIN — Medication 1: at 12:53

## 2023-01-01 RX ADMIN — MIDODRINE HYDROCHLORIDE 5 MILLIGRAM(S): 2.5 TABLET ORAL at 08:25

## 2023-01-01 RX ADMIN — Medication 2 MILLIGRAM(S): at 21:45

## 2023-01-01 RX ADMIN — PANTOPRAZOLE SODIUM 40 MILLIGRAM(S): 20 TABLET, DELAYED RELEASE ORAL at 10:52

## 2023-01-01 RX ADMIN — MUPIROCIN 1 APPLICATION(S): 20 OINTMENT TOPICAL at 05:06

## 2023-01-01 RX ADMIN — ALBUTEROL 2.5 MILLIGRAM(S): 90 AEROSOL, METERED ORAL at 04:02

## 2023-01-01 RX ADMIN — Medication 3 MILLILITER(S): at 15:10

## 2023-01-01 RX ADMIN — Medication 0.5 MILLIGRAM(S): at 08:33

## 2023-01-01 RX ADMIN — TIOTROPIUM BROMIDE 2 PUFF(S): 18 CAPSULE ORAL; RESPIRATORY (INHALATION) at 09:30

## 2023-01-01 RX ADMIN — ALBUTEROL 2.5 MILLIGRAM(S): 90 AEROSOL, METERED ORAL at 21:30

## 2023-01-01 RX ADMIN — LEVALBUTEROL 0.63 MILLIGRAM(S): 1.25 SOLUTION, CONCENTRATE RESPIRATORY (INHALATION) at 15:01

## 2023-01-01 RX ADMIN — PANTOPRAZOLE SODIUM 40 MILLIGRAM(S): 20 TABLET, DELAYED RELEASE ORAL at 17:03

## 2023-01-01 RX ADMIN — Medication 4: at 17:19

## 2023-01-01 RX ADMIN — PANTOPRAZOLE SODIUM 40 MILLIGRAM(S): 20 TABLET, DELAYED RELEASE ORAL at 13:08

## 2023-01-01 RX ADMIN — PANTOPRAZOLE SODIUM 40 MILLIGRAM(S): 20 TABLET, DELAYED RELEASE ORAL at 06:22

## 2023-01-01 RX ADMIN — Medication 0.25 MILLIGRAM(S): at 22:11

## 2023-01-01 RX ADMIN — Medication 1 PATCH: at 07:01

## 2023-01-01 RX ADMIN — Medication 20 MILLIEQUIVALENT(S): at 13:21

## 2023-01-01 RX ADMIN — Medication 1 PATCH: at 19:24

## 2023-01-01 RX ADMIN — Medication 1 PATCH: at 06:33

## 2023-01-01 RX ADMIN — Medication 40 MILLIGRAM(S): at 11:35

## 2023-01-01 RX ADMIN — ALBUTEROL 2.5 MILLIGRAM(S): 90 AEROSOL, METERED ORAL at 08:26

## 2023-01-01 RX ADMIN — SENNA PLUS 2 TABLET(S): 8.6 TABLET ORAL at 21:03

## 2023-01-01 RX ADMIN — Medication 3 MILLILITER(S): at 17:40

## 2023-01-01 RX ADMIN — Medication 4: at 21:51

## 2023-01-01 RX ADMIN — Medication 40 MILLIEQUIVALENT(S): at 12:28

## 2023-01-01 RX ADMIN — Medication 0.5 MILLIGRAM(S): at 20:52

## 2023-01-01 RX ADMIN — Medication 1 PATCH: at 08:26

## 2023-01-01 RX ADMIN — BUPROPION HYDROCHLORIDE 150 MILLIGRAM(S): 150 TABLET, EXTENDED RELEASE ORAL at 12:00

## 2023-01-01 RX ADMIN — FENTANYL CITRATE 25 MICROGRAM(S): 50 INJECTION INTRAVENOUS at 16:28

## 2023-01-01 RX ADMIN — MORPHINE SULFATE 1 MILLIGRAM(S): 50 CAPSULE, EXTENDED RELEASE ORAL at 17:12

## 2023-01-01 RX ADMIN — CHLORHEXIDINE GLUCONATE 1 APPLICATION(S): 213 SOLUTION TOPICAL at 05:04

## 2023-01-01 RX ADMIN — SENNA PLUS 2 TABLET(S): 8.6 TABLET ORAL at 21:02

## 2023-01-01 RX ADMIN — Medication 0.5 MILLIGRAM(S): at 14:09

## 2023-01-01 RX ADMIN — Medication 81 MILLIGRAM(S): at 11:29

## 2023-01-01 RX ADMIN — PROPOFOL 4.68 MICROGRAM(S)/KG/MIN: 10 INJECTION, EMULSION INTRAVENOUS at 05:16

## 2023-01-01 RX ADMIN — BUDESONIDE AND FORMOTEROL FUMARATE DIHYDRATE 2 PUFF(S): 160; 4.5 AEROSOL RESPIRATORY (INHALATION) at 21:47

## 2023-01-01 RX ADMIN — BUDESONIDE AND FORMOTEROL FUMARATE DIHYDRATE 2 PUFF(S): 160; 4.5 AEROSOL RESPIRATORY (INHALATION) at 21:36

## 2023-01-01 RX ADMIN — Medication 1 PATCH: at 12:12

## 2023-01-01 RX ADMIN — Medication 40 MILLIGRAM(S): at 05:12

## 2023-01-01 RX ADMIN — TIOTROPIUM BROMIDE 2 PUFF(S): 18 CAPSULE ORAL; RESPIRATORY (INHALATION) at 08:38

## 2023-01-01 RX ADMIN — ROFLUMILAST 500 MICROGRAM(S): 500 TABLET ORAL at 13:42

## 2023-01-01 RX ADMIN — ROFLUMILAST 500 MICROGRAM(S): 500 TABLET ORAL at 12:59

## 2023-01-01 RX ADMIN — LEVALBUTEROL 0.63 MILLIGRAM(S): 1.25 SOLUTION, CONCENTRATE RESPIRATORY (INHALATION) at 09:09

## 2023-01-01 RX ADMIN — Medication 40 MILLIGRAM(S): at 06:11

## 2023-01-01 RX ADMIN — BUDESONIDE AND FORMOTEROL FUMARATE DIHYDRATE 2 PUFF(S): 160; 4.5 AEROSOL RESPIRATORY (INHALATION) at 08:39

## 2023-01-01 RX ADMIN — POLYETHYLENE GLYCOL 3350 17 GRAM(S): 17 POWDER, FOR SOLUTION ORAL at 12:11

## 2023-01-01 RX ADMIN — Medication 325 MILLIGRAM(S): at 13:00

## 2023-01-01 RX ADMIN — PANTOPRAZOLE SODIUM 40 MILLIGRAM(S): 20 TABLET, DELAYED RELEASE ORAL at 06:18

## 2023-01-01 RX ADMIN — CHLORHEXIDINE GLUCONATE 1 APPLICATION(S): 213 SOLUTION TOPICAL at 05:12

## 2023-01-01 RX ADMIN — Medication 2: at 17:50

## 2023-01-01 RX ADMIN — SENNA PLUS 10 MILLILITER(S): 8.6 TABLET ORAL at 23:31

## 2023-01-01 RX ADMIN — BUDESONIDE AND FORMOTEROL FUMARATE DIHYDRATE 2 PUFF(S): 160; 4.5 AEROSOL RESPIRATORY (INHALATION) at 20:28

## 2023-01-01 RX ADMIN — Medication 6: at 17:11

## 2023-01-01 RX ADMIN — Medication 6: at 18:31

## 2023-01-01 RX ADMIN — ATORVASTATIN CALCIUM 80 MILLIGRAM(S): 80 TABLET, FILM COATED ORAL at 21:24

## 2023-01-01 RX ADMIN — LEVALBUTEROL 0.63 MILLIGRAM(S): 1.25 SOLUTION, CONCENTRATE RESPIRATORY (INHALATION) at 15:24

## 2023-01-01 RX ADMIN — Medication 40 MILLIGRAM(S): at 05:22

## 2023-01-01 RX ADMIN — Medication 4: at 17:11

## 2023-01-01 RX ADMIN — LEVALBUTEROL 0.63 MILLIGRAM(S): 1.25 SOLUTION, CONCENTRATE RESPIRATORY (INHALATION) at 08:40

## 2023-01-01 RX ADMIN — Medication 81 MILLIGRAM(S): at 11:35

## 2023-01-01 RX ADMIN — Medication 4: at 12:23

## 2023-01-01 RX ADMIN — PROPOFOL 4.68 MICROGRAM(S)/KG/MIN: 10 INJECTION, EMULSION INTRAVENOUS at 11:06

## 2023-01-01 RX ADMIN — Medication 2 MILLIGRAM(S): at 21:34

## 2023-01-01 RX ADMIN — CEFEPIME 1000 MILLIGRAM(S): 1 INJECTION, POWDER, FOR SOLUTION INTRAMUSCULAR; INTRAVENOUS at 22:16

## 2023-01-01 RX ADMIN — Medication 40 MILLIGRAM(S): at 13:51

## 2023-01-01 RX ADMIN — Medication 5 MILLIGRAM(S): at 13:15

## 2023-01-01 RX ADMIN — LEVALBUTEROL 0.63 MILLIGRAM(S): 1.25 SOLUTION, CONCENTRATE RESPIRATORY (INHALATION) at 04:04

## 2023-01-01 RX ADMIN — Medication 0.5 MILLIGRAM(S): at 21:09

## 2023-01-01 RX ADMIN — LETROZOLE 2.5 MILLIGRAM(S): 2.5 TABLET, FILM COATED ORAL at 17:53

## 2023-01-01 RX ADMIN — Medication 0.5 MILLIGRAM(S): at 11:10

## 2023-01-01 RX ADMIN — Medication 650 MILLIGRAM(S): at 14:11

## 2023-01-01 RX ADMIN — REMDESIVIR 200 MILLIGRAM(S): 5 INJECTION INTRAVENOUS at 22:34

## 2023-01-01 RX ADMIN — PANTOPRAZOLE SODIUM 40 MILLIGRAM(S): 20 TABLET, DELAYED RELEASE ORAL at 12:26

## 2023-01-01 RX ADMIN — Medication 0.5 MILLIGRAM(S): at 09:17

## 2023-01-01 RX ADMIN — Medication 1 PATCH: at 19:00

## 2023-01-01 RX ADMIN — Medication 1 PATCH: at 13:09

## 2023-01-01 RX ADMIN — Medication 575 MILLIGRAM(S): at 21:22

## 2023-01-01 RX ADMIN — LEVALBUTEROL 0.63 MILLIGRAM(S): 1.25 SOLUTION, CONCENTRATE RESPIRATORY (INHALATION) at 21:07

## 2023-01-01 RX ADMIN — Medication 650 MILLIGRAM(S): at 12:00

## 2023-01-01 RX ADMIN — ATORVASTATIN CALCIUM 80 MILLIGRAM(S): 80 TABLET, FILM COATED ORAL at 21:33

## 2023-01-01 RX ADMIN — Medication 40 MILLIGRAM(S): at 21:24

## 2023-01-01 RX ADMIN — Medication 575 MILLIGRAM(S): at 22:15

## 2023-01-01 RX ADMIN — FENTANYL CITRATE 1.95 MICROGRAM(S)/KG/HR: 50 INJECTION INTRAVENOUS at 10:25

## 2023-01-01 RX ADMIN — SODIUM CHLORIDE 250 MILLILITER(S): 9 INJECTION INTRAMUSCULAR; INTRAVENOUS; SUBCUTANEOUS at 11:14

## 2023-01-01 RX ADMIN — BUPROPION HYDROCHLORIDE 150 MILLIGRAM(S): 150 TABLET, EXTENDED RELEASE ORAL at 11:29

## 2023-01-01 RX ADMIN — FENTANYL CITRATE 50 MICROGRAM(S): 50 INJECTION INTRAVENOUS at 17:21

## 2023-01-01 RX ADMIN — PANTOPRAZOLE SODIUM 40 MILLIGRAM(S): 20 TABLET, DELAYED RELEASE ORAL at 13:47

## 2023-01-01 RX ADMIN — LETROZOLE 2.5 MILLIGRAM(S): 2.5 TABLET, FILM COATED ORAL at 12:28

## 2023-01-01 RX ADMIN — Medication 4: at 23:05

## 2023-01-01 RX ADMIN — PANTOPRAZOLE SODIUM 40 MILLIGRAM(S): 20 TABLET, DELAYED RELEASE ORAL at 09:03

## 2023-01-01 RX ADMIN — Medication 1 PATCH: at 19:39

## 2023-01-01 RX ADMIN — Medication 325 MILLIGRAM(S): at 13:08

## 2023-01-01 RX ADMIN — Medication 1 PATCH: at 13:51

## 2023-01-01 RX ADMIN — Medication 81 MILLIGRAM(S): at 11:13

## 2023-01-01 RX ADMIN — DEXMEDETOMIDINE HYDROCHLORIDE IN 0.9% SODIUM CHLORIDE 3.9 MICROGRAM(S)/KG/HR: 4 INJECTION INTRAVENOUS at 14:56

## 2023-01-01 RX ADMIN — BUPROPION HYDROCHLORIDE 150 MILLIGRAM(S): 150 TABLET, EXTENDED RELEASE ORAL at 11:22

## 2023-01-01 RX ADMIN — Medication 1 PATCH: at 19:50

## 2023-01-01 RX ADMIN — ALBUTEROL 2.5 MILLIGRAM(S): 90 AEROSOL, METERED ORAL at 09:13

## 2023-01-01 RX ADMIN — ALBUTEROL 2.5 MILLIGRAM(S): 90 AEROSOL, METERED ORAL at 15:23

## 2023-01-01 RX ADMIN — Medication 2 MILLIGRAM(S): at 22:11

## 2023-01-01 RX ADMIN — Medication 4: at 12:53

## 2023-01-01 RX ADMIN — ATORVASTATIN CALCIUM 80 MILLIGRAM(S): 80 TABLET, FILM COATED ORAL at 22:54

## 2023-01-01 RX ADMIN — BUPROPION HYDROCHLORIDE 100 MILLIGRAM(S): 150 TABLET, EXTENDED RELEASE ORAL at 05:38

## 2023-01-01 RX ADMIN — Medication 40 MILLIGRAM(S): at 23:02

## 2023-01-01 RX ADMIN — Medication 40 MILLIGRAM(S): at 11:40

## 2023-01-01 RX ADMIN — Medication 1 PATCH: at 19:06

## 2023-01-01 RX ADMIN — Medication 0.5 MILLIGRAM(S): at 08:56

## 2023-01-01 RX ADMIN — Medication 4: at 11:52

## 2023-01-01 RX ADMIN — LEVALBUTEROL 0.63 MILLIGRAM(S): 1.25 SOLUTION, CONCENTRATE RESPIRATORY (INHALATION) at 04:20

## 2023-01-01 RX ADMIN — Medication 1 PATCH: at 14:46

## 2023-01-01 RX ADMIN — BUPROPION HYDROCHLORIDE 150 MILLIGRAM(S): 150 TABLET, EXTENDED RELEASE ORAL at 11:57

## 2023-01-01 RX ADMIN — Medication 81 MILLIGRAM(S): at 11:30

## 2023-01-01 RX ADMIN — Medication 40 MILLIGRAM(S): at 05:54

## 2023-01-01 RX ADMIN — Medication 3 MILLILITER(S): at 08:55

## 2023-01-01 RX ADMIN — RIVAROXABAN 10 MILLIGRAM(S): KIT at 17:08

## 2023-01-01 RX ADMIN — Medication 40 MILLIGRAM(S): at 05:05

## 2023-01-01 RX ADMIN — ATORVASTATIN CALCIUM 80 MILLIGRAM(S): 80 TABLET, FILM COATED ORAL at 21:02

## 2023-01-01 RX ADMIN — Medication 2 MILLIGRAM(S): at 21:07

## 2023-01-01 RX ADMIN — Medication 1 PATCH: at 12:09

## 2023-01-01 RX ADMIN — Medication 40 MILLIGRAM(S): at 22:18

## 2023-01-01 RX ADMIN — Medication 0.5 MILLIGRAM(S): at 20:50

## 2023-01-01 RX ADMIN — ALBUTEROL 2.5 MILLIGRAM(S): 90 AEROSOL, METERED ORAL at 15:01

## 2023-01-01 RX ADMIN — RIVAROXABAN 10 MILLIGRAM(S): KIT at 17:21

## 2023-01-01 RX ADMIN — Medication 4: at 23:10

## 2023-01-01 RX ADMIN — FENTANYL CITRATE 25 MICROGRAM(S): 50 INJECTION INTRAVENOUS at 12:40

## 2023-01-01 RX ADMIN — ALBUTEROL 2.5 MILLIGRAM(S): 90 AEROSOL, METERED ORAL at 02:13

## 2023-01-01 RX ADMIN — SENNA PLUS 1 TABLET(S): 8.6 TABLET ORAL at 21:44

## 2023-01-01 RX ADMIN — BUPROPION HYDROCHLORIDE 100 MILLIGRAM(S): 150 TABLET, EXTENDED RELEASE ORAL at 14:48

## 2023-01-01 RX ADMIN — MIDODRINE HYDROCHLORIDE 5 MILLIGRAM(S): 2.5 TABLET ORAL at 12:23

## 2023-01-01 RX ADMIN — Medication 150 GRAM(S): at 10:13

## 2023-01-01 RX ADMIN — Medication 40 MILLIGRAM(S): at 06:02

## 2023-01-01 RX ADMIN — MAGNESIUM OXIDE 400 MG ORAL TABLET 400 MILLIGRAM(S): 241.3 TABLET ORAL at 09:20

## 2023-01-01 RX ADMIN — MUPIROCIN 1 APPLICATION(S): 20 OINTMENT TOPICAL at 05:14

## 2023-01-01 RX ADMIN — MIDODRINE HYDROCHLORIDE 10 MILLIGRAM(S): 2.5 TABLET ORAL at 05:39

## 2023-01-01 RX ADMIN — LEVALBUTEROL 0.63 MILLIGRAM(S): 1.25 SOLUTION, CONCENTRATE RESPIRATORY (INHALATION) at 09:17

## 2023-01-01 RX ADMIN — SODIUM CHLORIDE 50 MILLILITER(S): 9 INJECTION, SOLUTION INTRAVENOUS at 21:17

## 2023-01-01 RX ADMIN — LEVALBUTEROL 0.63 MILLIGRAM(S): 1.25 SOLUTION, CONCENTRATE RESPIRATORY (INHALATION) at 16:33

## 2023-01-01 RX ADMIN — DEXMEDETOMIDINE HYDROCHLORIDE IN 0.9% SODIUM CHLORIDE 1.95 MICROGRAM(S)/KG/HR: 4 INJECTION INTRAVENOUS at 11:28

## 2023-01-01 RX ADMIN — ENOXAPARIN SODIUM 40 MILLIGRAM(S): 100 INJECTION SUBCUTANEOUS at 05:39

## 2023-01-01 RX ADMIN — Medication 0.25 MILLIGRAM(S): at 21:52

## 2023-01-01 RX ADMIN — Medication 2: at 13:01

## 2023-01-01 RX ADMIN — FENTANYL CITRATE 25 MICROGRAM(S): 50 INJECTION INTRAVENOUS at 12:23

## 2023-01-01 RX ADMIN — BUPROPION HYDROCHLORIDE 100 MILLIGRAM(S): 150 TABLET, EXTENDED RELEASE ORAL at 06:13

## 2023-01-01 RX ADMIN — Medication 50 MILLILITER(S): at 10:08

## 2023-01-01 RX ADMIN — DEXMEDETOMIDINE HYDROCHLORIDE IN 0.9% SODIUM CHLORIDE 1.95 MICROGRAM(S)/KG/HR: 4 INJECTION INTRAVENOUS at 22:41

## 2023-01-01 RX ADMIN — LEVALBUTEROL 0.63 MILLIGRAM(S): 1.25 SOLUTION, CONCENTRATE RESPIRATORY (INHALATION) at 04:12

## 2023-01-01 RX ADMIN — Medication 180 MILLIGRAM(S): at 05:41

## 2023-01-01 RX ADMIN — Medication 40 MILLIGRAM(S): at 17:27

## 2023-01-01 RX ADMIN — BUDESONIDE AND FORMOTEROL FUMARATE DIHYDRATE 2 PUFF(S): 160; 4.5 AEROSOL RESPIRATORY (INHALATION) at 08:43

## 2023-01-01 RX ADMIN — Medication 40 MILLIGRAM(S): at 05:18

## 2023-01-01 RX ADMIN — Medication 50 MILLILITER(S): at 10:27

## 2023-01-01 RX ADMIN — Medication 3 MILLILITER(S): at 12:36

## 2023-01-01 RX ADMIN — Medication 0.5 MILLIGRAM(S): at 08:16

## 2023-01-01 RX ADMIN — Medication 1 PATCH: at 09:37

## 2023-01-01 RX ADMIN — LETROZOLE 2.5 MILLIGRAM(S): 2.5 TABLET, FILM COATED ORAL at 11:11

## 2023-01-01 RX ADMIN — POLYETHYLENE GLYCOL 3350 17 GRAM(S): 17 POWDER, FOR SOLUTION ORAL at 12:39

## 2023-01-01 RX ADMIN — ALBUTEROL 2.5 MILLIGRAM(S): 90 AEROSOL, METERED ORAL at 19:44

## 2023-01-01 RX ADMIN — ALBUTEROL 2.5 MILLIGRAM(S): 90 AEROSOL, METERED ORAL at 14:54

## 2023-01-01 RX ADMIN — ATORVASTATIN CALCIUM 80 MILLIGRAM(S): 80 TABLET, FILM COATED ORAL at 21:22

## 2023-01-01 RX ADMIN — Medication 0.5 MILLIGRAM(S): at 21:41

## 2023-01-01 RX ADMIN — LETROZOLE 2.5 MILLIGRAM(S): 2.5 TABLET, FILM COATED ORAL at 11:38

## 2023-01-01 RX ADMIN — LEVALBUTEROL 0.63 MILLIGRAM(S): 1.25 SOLUTION, CONCENTRATE RESPIRATORY (INHALATION) at 09:10

## 2023-01-01 RX ADMIN — Medication 0.5 MILLIGRAM(S): at 09:00

## 2023-01-01 RX ADMIN — MIDODRINE HYDROCHLORIDE 5 MILLIGRAM(S): 2.5 TABLET ORAL at 08:58

## 2023-01-01 RX ADMIN — SODIUM CHLORIDE 1050 MILLILITER(S): 9 INJECTION INTRAMUSCULAR; INTRAVENOUS; SUBCUTANEOUS at 05:42

## 2023-01-01 RX ADMIN — Medication 0.5 MILLIGRAM(S): at 17:15

## 2023-01-01 RX ADMIN — LEVALBUTEROL 0.63 MILLIGRAM(S): 1.25 SOLUTION, CONCENTRATE RESPIRATORY (INHALATION) at 15:33

## 2023-01-01 RX ADMIN — LETROZOLE 2.5 MILLIGRAM(S): 2.5 TABLET, FILM COATED ORAL at 11:29

## 2023-01-01 RX ADMIN — RIVAROXABAN 10 MILLIGRAM(S): KIT at 17:18

## 2023-01-01 RX ADMIN — Medication 1 PATCH: at 21:31

## 2023-01-01 RX ADMIN — ROFLUMILAST 500 MICROGRAM(S): 500 TABLET ORAL at 11:33

## 2023-01-01 RX ADMIN — ENOXAPARIN SODIUM 40 MILLIGRAM(S): 100 INJECTION SUBCUTANEOUS at 17:03

## 2023-01-01 RX ADMIN — AZITHROMYCIN 255 MILLIGRAM(S): 500 TABLET, FILM COATED ORAL at 06:33

## 2023-01-01 RX ADMIN — SODIUM CHLORIDE 75 MILLILITER(S): 9 INJECTION, SOLUTION INTRAVENOUS at 21:55

## 2023-01-01 RX ADMIN — Medication 81 MILLIGRAM(S): at 12:09

## 2023-01-01 RX ADMIN — Medication 0.5 MILLIGRAM(S): at 21:17

## 2023-01-01 RX ADMIN — LEVALBUTEROL 0.63 MILLIGRAM(S): 1.25 SOLUTION, CONCENTRATE RESPIRATORY (INHALATION) at 20:54

## 2023-01-01 RX ADMIN — PROPOFOL 10 MILLIGRAM(S): 10 INJECTION, EMULSION INTRAVENOUS at 09:50

## 2023-01-01 RX ADMIN — Medication 0.5 MILLIGRAM(S): at 21:07

## 2023-01-01 RX ADMIN — PANTOPRAZOLE SODIUM 40 MILLIGRAM(S): 20 TABLET, DELAYED RELEASE ORAL at 06:03

## 2023-01-01 RX ADMIN — SODIUM CHLORIDE 1050 MILLILITER(S): 9 INJECTION INTRAMUSCULAR; INTRAVENOUS; SUBCUTANEOUS at 04:42

## 2023-01-01 RX ADMIN — MIDODRINE HYDROCHLORIDE 10 MILLIGRAM(S): 2.5 TABLET ORAL at 14:53

## 2023-01-01 RX ADMIN — Medication 2: at 11:34

## 2023-01-01 RX ADMIN — ALBUTEROL 2.5 MILLIGRAM(S): 90 AEROSOL, METERED ORAL at 20:52

## 2023-01-01 RX ADMIN — ATORVASTATIN CALCIUM 80 MILLIGRAM(S): 80 TABLET, FILM COATED ORAL at 21:27

## 2023-01-01 RX ADMIN — Medication 1: at 07:55

## 2023-01-01 RX ADMIN — Medication 100 MILLIEQUIVALENT(S): at 02:29

## 2023-01-01 RX ADMIN — LEVALBUTEROL 0.63 MILLIGRAM(S): 1.25 SOLUTION, CONCENTRATE RESPIRATORY (INHALATION) at 21:23

## 2023-01-01 RX ADMIN — Medication 2 MILLIGRAM(S): at 21:39

## 2023-01-01 RX ADMIN — ENOXAPARIN SODIUM 40 MILLIGRAM(S): 100 INJECTION SUBCUTANEOUS at 06:18

## 2023-01-01 RX ADMIN — Medication 3 MILLILITER(S): at 09:58

## 2023-01-01 RX ADMIN — Medication 3 MILLILITER(S): at 10:15

## 2023-01-01 RX ADMIN — Medication 40 MILLIGRAM(S): at 13:40

## 2023-01-01 RX ADMIN — CEFEPIME 100 MILLIGRAM(S): 1 INJECTION, POWDER, FOR SOLUTION INTRAMUSCULAR; INTRAVENOUS at 13:10

## 2023-01-01 RX ADMIN — Medication 0.5 MILLIGRAM(S): at 21:58

## 2023-01-01 RX ADMIN — LETROZOLE 2.5 MILLIGRAM(S): 2.5 TABLET, FILM COATED ORAL at 11:19

## 2023-01-01 RX ADMIN — RIVAROXABAN 10 MILLIGRAM(S): KIT at 17:19

## 2023-01-01 RX ADMIN — Medication 1 TABLET(S): at 11:27

## 2023-01-01 RX ADMIN — ROFLUMILAST 500 MICROGRAM(S): 500 TABLET ORAL at 12:31

## 2023-01-01 RX ADMIN — CHLORHEXIDINE GLUCONATE 1 APPLICATION(S): 213 SOLUTION TOPICAL at 05:09

## 2023-01-01 RX ADMIN — TIOTROPIUM BROMIDE 2 PUFF(S): 18 CAPSULE ORAL; RESPIRATORY (INHALATION) at 08:43

## 2023-01-01 RX ADMIN — Medication 300 UNIT(S): at 14:19

## 2023-01-01 RX ADMIN — Medication 0.5 MILLIGRAM(S): at 20:58

## 2023-01-01 RX ADMIN — PANTOPRAZOLE SODIUM 40 MILLIGRAM(S): 20 TABLET, DELAYED RELEASE ORAL at 11:11

## 2023-01-01 RX ADMIN — MIDODRINE HYDROCHLORIDE 10 MILLIGRAM(S): 2.5 TABLET ORAL at 13:47

## 2023-01-01 RX ADMIN — Medication 1 PATCH: at 12:59

## 2023-01-01 RX ADMIN — SODIUM CHLORIDE 250 MILLILITER(S): 9 INJECTION INTRAMUSCULAR; INTRAVENOUS; SUBCUTANEOUS at 12:15

## 2023-01-06 ENCOUNTER — OUTPATIENT (OUTPATIENT)
Dept: OUTPATIENT SERVICES | Facility: HOSPITAL | Age: 68
LOS: 1 days | End: 2023-01-06
Payer: MEDICARE

## 2023-01-06 ENCOUNTER — APPOINTMENT (OUTPATIENT)
Dept: FAMILY MEDICINE | Facility: HOSPITAL | Age: 68
End: 2023-01-06

## 2023-01-06 VITALS
DIASTOLIC BLOOD PRESSURE: 58 MMHG | SYSTOLIC BLOOD PRESSURE: 107 MMHG | BODY MASS INDEX: 18.58 KG/M2 | HEART RATE: 118 BPM | OXYGEN SATURATION: 98 % | RESPIRATION RATE: 18 BRPM | WEIGHT: 92 LBS | TEMPERATURE: 98.2 F

## 2023-01-06 DIAGNOSIS — Z90.12 ACQUIRED ABSENCE OF LEFT BREAST AND NIPPLE: Chronic | ICD-10-CM

## 2023-01-06 DIAGNOSIS — J44.9 CHRONIC OBSTRUCTIVE PULMONARY DISEASE, UNSPECIFIED: ICD-10-CM

## 2023-01-06 DIAGNOSIS — Z98.890 OTHER SPECIFIED POSTPROCEDURAL STATES: Chronic | ICD-10-CM

## 2023-01-06 DIAGNOSIS — F17.210 NICOTINE DEPENDENCE, CIGARETTES, UNCOMPLICATED: ICD-10-CM

## 2023-01-06 DIAGNOSIS — Z00.00 ENCOUNTER FOR GENERAL ADULT MEDICAL EXAMINATION WITHOUT ABNORMAL FINDINGS: ICD-10-CM

## 2023-01-06 DIAGNOSIS — E11.9 TYPE 2 DIABETES MELLITUS WITHOUT COMPLICATIONS: ICD-10-CM

## 2023-01-06 DIAGNOSIS — I10 ESSENTIAL (PRIMARY) HYPERTENSION: ICD-10-CM

## 2023-01-06 PROCEDURE — G0438: CPT

## 2023-01-06 PROCEDURE — 83036 HEMOGLOBIN GLYCOSYLATED A1C: CPT

## 2023-01-06 PROCEDURE — 99406 BEHAV CHNG SMOKING 3-10 MIN: CPT

## 2023-01-06 PROCEDURE — 84443 ASSAY THYROID STIM HORMONE: CPT

## 2023-01-06 PROCEDURE — 85025 COMPLETE CBC W/AUTO DIFF WBC: CPT

## 2023-01-06 PROCEDURE — 80061 LIPID PANEL: CPT

## 2023-01-06 PROCEDURE — 80053 COMPREHEN METABOLIC PANEL: CPT

## 2023-01-06 RX ORDER — NICOTINE 14 MG/24H
14 PATCH TRANSDERMAL DAILY
Qty: 30 | Refills: 0 | Status: COMPLETED | COMMUNITY
Start: 2022-12-15 | End: 2023-01-06

## 2023-01-10 ENCOUNTER — OUTPATIENT (OUTPATIENT)
Dept: OUTPATIENT SERVICES | Facility: HOSPITAL | Age: 68
LOS: 1 days | End: 2023-01-10

## 2023-01-10 DIAGNOSIS — Z98.890 OTHER SPECIFIED POSTPROCEDURAL STATES: Chronic | ICD-10-CM

## 2023-01-10 DIAGNOSIS — E11.9 TYPE 2 DIABETES MELLITUS WITHOUT COMPLICATIONS: ICD-10-CM

## 2023-01-10 DIAGNOSIS — J44.9 CHRONIC OBSTRUCTIVE PULMONARY DISEASE, UNSPECIFIED: ICD-10-CM

## 2023-01-10 DIAGNOSIS — Z90.12 ACQUIRED ABSENCE OF LEFT BREAST AND NIPPLE: Chronic | ICD-10-CM

## 2023-01-11 NOTE — PHYSICAL EXAM
[No Respiratory Distress] : no respiratory distress  [No Accessory Muscle Use] : no accessory muscle use [Normal] : no joint swelling and grossly normal strength and tone [de-identified] : Breath sounds present, no wheezes or rales bilaterally.

## 2023-01-11 NOTE — HISTORY OF PRESENT ILLNESS
[FreeTextEntry1] : Abraham  [de-identified] : 68 YO F with a COPD, DM2, HTN, current 1 pack a day smoker presents to clinic for routine follow up. Patient found to be hypotensive, with no symptoms. Patient BP repeated and still found to be hypotensive, patient taking Lisinopril 40. Patient denies any lightheadedness, syncope. headache. Also fever, chills, chest pain,, nausea, vomiting,, cough, leg pain, or weakness. \par \par

## 2023-01-11 NOTE — HEALTH RISK ASSESSMENT
[Health Literacy] : health literacy [With Family] : lives with family [Fully functional (bathing, dressing, toileting, transferring, walking, feeding)] : Fully functional (bathing, dressing, toileting, transferring, walking, feeding) [Fully functional (using the telephone, shopping, preparing meals, housekeeping, doing laundry, using] : Fully functional and needs no help or supervision to perform IADLs (using the telephone, shopping, preparing meals, housekeeping, doing laundry, using transportation, managing medications and managing finances) [Reports changes in hearing] : Reports no changes in hearing [Reports changes in vision] : Reports no changes in vision

## 2023-01-13 ENCOUNTER — APPOINTMENT (OUTPATIENT)
Dept: FAMILY MEDICINE | Facility: HOSPITAL | Age: 68
End: 2023-01-13

## 2023-01-13 ENCOUNTER — OUTPATIENT (OUTPATIENT)
Dept: OUTPATIENT SERVICES | Facility: HOSPITAL | Age: 68
LOS: 1 days | End: 2023-01-13
Payer: MEDICARE

## 2023-01-13 VITALS
SYSTOLIC BLOOD PRESSURE: 153 MMHG | DIASTOLIC BLOOD PRESSURE: 81 MMHG | HEART RATE: 91 BPM | WEIGHT: 91 LBS | BODY MASS INDEX: 18.38 KG/M2 | OXYGEN SATURATION: 98 % | RESPIRATION RATE: 18 BRPM | TEMPERATURE: 97.9 F

## 2023-01-13 DIAGNOSIS — Z90.12 ACQUIRED ABSENCE OF LEFT BREAST AND NIPPLE: Chronic | ICD-10-CM

## 2023-01-13 DIAGNOSIS — I10 ESSENTIAL (PRIMARY) HYPERTENSION: ICD-10-CM

## 2023-01-13 DIAGNOSIS — Z98.890 OTHER SPECIFIED POSTPROCEDURAL STATES: Chronic | ICD-10-CM

## 2023-01-13 DIAGNOSIS — Z00.00 ENCOUNTER FOR GENERAL ADULT MEDICAL EXAMINATION WITHOUT ABNORMAL FINDINGS: ICD-10-CM

## 2023-01-13 PROCEDURE — G0463: CPT

## 2023-01-13 RX ORDER — ALBUTEROL SULFATE 90 UG/1
108 (90 BASE) INHALANT RESPIRATORY (INHALATION)
Qty: 2 | Refills: 4 | Status: ACTIVE | COMMUNITY
Start: 2022-12-15 | End: 1900-01-01

## 2023-01-13 RX ORDER — CHLORHEXIDINE GLUCONATE 4 %
325 (65 FE) LIQUID (ML) TOPICAL DAILY
Qty: 90 | Refills: 3 | Status: ACTIVE | COMMUNITY
Start: 2023-01-13 | End: 1900-01-01

## 2023-01-13 RX ORDER — ALBUTEROL SULFATE 90 UG/1
108 (90 BASE) INHALANT RESPIRATORY (INHALATION)
Qty: 1 | Refills: 3 | Status: ACTIVE | COMMUNITY
Start: 2022-10-21 | End: 1900-01-01

## 2023-01-14 DIAGNOSIS — F17.210 NICOTINE DEPENDENCE, CIGARETTES, UNCOMPLICATED: ICD-10-CM

## 2023-01-14 DIAGNOSIS — I10 ESSENTIAL (PRIMARY) HYPERTENSION: ICD-10-CM

## 2023-01-14 DIAGNOSIS — J44.9 CHRONIC OBSTRUCTIVE PULMONARY DISEASE, UNSPECIFIED: ICD-10-CM

## 2023-01-16 PROBLEM — I10 HYPERTENSION: Status: ACTIVE | Noted: 2019-04-12

## 2023-01-16 NOTE — COUNSELING
[Yes] : Risk of tobacco use and health benefits of smoking cessation discussed: Yes [Cessation strategies including cessation program discussed] : Cessation strategies including cessation program discussed [Use of nicotine replacement therapies and other medications discussed] : Use of nicotine replacement therapies and other medications discussed [Smoking Cessation Program Referral] : Smoking Cessation Program Referral  [No] : Not willing to quit smoking

## 2023-01-19 NOTE — HISTORY OF PRESENT ILLNESS
[de-identified] : 68 YO F with a COPD, DM2, HTN, current 1 pack a day smoker presents to clinic for follow up of blood pressure. Patient found to be hypotensive, with no symptoms on her visit of 1/6/23. Patient BP today elevated, to levels consistent with her disease process. patient taking Lisinopril 40 originally and told to hold medications Patient denies any lightheadedness, syncope. headache. Also fever, chills, chest pain, nausea, vomiting, cough, leg pain, or weakness. \par

## 2023-01-19 NOTE — PHYSICAL EXAM
[No Respiratory Distress] : no respiratory distress  [No Accessory Muscle Use] : no accessory muscle use [Normal] : no joint swelling and grossly normal strength and tone [de-identified] : Breath sounds present, minor  wheezes or rales bilaterally.

## 2023-01-19 NOTE — REVIEW OF SYSTEMS
[Shortness Of Breath] : shortness of breath [Negative] : Heme/Lymph [Chest Pain] : no chest pain [Palpitations] : no palpitations [Paroxysmal Nocturnal Dyspnea] : no paroxysmal nocturnal dyspnea [Wheezing] : no wheezing [Cough] : no cough [Dyspnea on Exertion] : no dyspnea on exertion [Abdominal Pain] : no abdominal pain [Nausea] : no nausea [Constipation] : no constipation [Vomiting] : no vomiting [Joint Pain] : no joint pain [Joint Stiffness] : no joint stiffness [Muscle Pain] : no muscle pain [Itching] : no itching [Skin Rash] : no skin rash [Headache] : no headache [Dizziness] : no dizziness [Memory Loss] : no memory loss [Unsteady Walking] : no ataxia [Suicidal] : not suicidal [FreeTextEntry6] : respiratory symptoms stable.

## 2023-01-23 LAB
ALBUMIN SERPL ELPH-MCNC: 4.5 G/DL
ALP BLD-CCNC: 67 U/L
ALT SERPL-CCNC: 44 U/L
ANION GAP SERPL CALC-SCNC: 13 MMOL/L
AST SERPL-CCNC: 41 U/L
BASOPHILS # BLD AUTO: 0.03 K/UL
BASOPHILS NFR BLD AUTO: 0.4 %
BILIRUB SERPL-MCNC: 0.2 MG/DL
BUN SERPL-MCNC: 12 MG/DL
CALCIUM SERPL-MCNC: 9.9 MG/DL
CHLORIDE SERPL-SCNC: 103 MMOL/L
CHOLEST SERPL-MCNC: 123 MG/DL
CO2 SERPL-SCNC: 24 MMOL/L
CREAT SERPL-MCNC: 0.49 MG/DL
EGFR: 105 ML/MIN/1.73M2
EOSINOPHIL # BLD AUTO: 0.05 K/UL
EOSINOPHIL NFR BLD AUTO: 0.7 %
ESTIMATED AVERAGE GLUCOSE: 123 MG/DL
GLUCOSE SERPL-MCNC: 105 MG/DL
HBA1C MFR BLD HPLC: 5.9 %
HCT VFR BLD CALC: 29.8 %
HDLC SERPL-MCNC: 41 MG/DL
HGB BLD-MCNC: 8.8 G/DL
IMM GRANULOCYTES NFR BLD AUTO: 0.3 %
LDLC SERPL CALC-MCNC: 72 MG/DL
LYMPHOCYTES # BLD AUTO: 2.09 K/UL
LYMPHOCYTES NFR BLD AUTO: 30.2 %
MAN DIFF?: NORMAL
MCHC RBC-ENTMCNC: 22.3 PG
MCHC RBC-ENTMCNC: 29.5 GM/DL
MCV RBC AUTO: 75.6 FL
MONOCYTES # BLD AUTO: 0.73 K/UL
MONOCYTES NFR BLD AUTO: 10.6 %
NEUTROPHILS # BLD AUTO: 3.99 K/UL
NEUTROPHILS NFR BLD AUTO: 57.8 %
NONHDLC SERPL-MCNC: 82 MG/DL
PLATELET # BLD AUTO: 370 K/UL
POTASSIUM SERPL-SCNC: 4.4 MMOL/L
PROT SERPL-MCNC: 7.3 G/DL
RBC # BLD: 3.94 M/UL
RBC # FLD: 19.3 %
SODIUM SERPL-SCNC: 140 MMOL/L
TRIGL SERPL-MCNC: 51 MG/DL
TSH SERPL-ACNC: 2.18 UIU/ML
WBC # FLD AUTO: 6.91 K/UL

## 2023-02-19 PROBLEM — D64.9 ANEMIA: Status: ACTIVE | Noted: 2023-01-01

## 2023-02-19 PROBLEM — Z09 HOSPITAL DISCHARGE FOLLOW-UP: Status: RESOLVED | Noted: 2022-05-23 | Resolved: 2023-01-01

## 2023-02-20 NOTE — HISTORY OF PRESENT ILLNESS
[FreeTextEntry1] : 66 y/o F  [de-identified] : 66 y/o F PMH COPD, DM2, HTN, 1 day pack smoker, hx of breast cancer presents for repeat blood work. Patient had bloodwork done last visit with Hgb 8.8. Pt currently asymptomatic. Pt started on iron supplement last visit. Pt currently on xarelto. Pt states saw her hematologist/oncologist last week who recommended she get a colonoscopy, she is scheduled to get it done. Pt with history of positive FIT 2019, colonscopy done with GI in 2019, normal rec f/u in 7-10 years. Denies sob, fatigue, dizziness, headache, palpitations, syncope, hematochezia.

## 2023-03-14 NOTE — HEALTH RISK ASSESSMENT
[No] : No [0] : 2) Feeling down, depressed, or hopeless: Not at all (0) [Current] : Current [20 or more] : 20 or more

## 2023-03-15 NOTE — PHYSICAL EXAM
[No Respiratory Distress] : no respiratory distress  [No Accessory Muscle Use] : no accessory muscle use [Normal] : no joint swelling and grossly normal strength and tone [de-identified] : Breath sounds present, minor  wheezes or rales bilaterally.

## 2023-03-15 NOTE — REVIEW OF SYSTEMS
[Shortness Of Breath] : shortness of breath [Negative] : Heme/Lymph [Chest Pain] : no chest pain [Palpitations] : no palpitations [Paroxysmal Nocturnal Dyspnea] : no paroxysmal nocturnal dyspnea [Cough] : no cough [Wheezing] : no wheezing [Dyspnea on Exertion] : no dyspnea on exertion [Abdominal Pain] : no abdominal pain [Nausea] : no nausea [Constipation] : no constipation [Vomiting] : no vomiting [Joint Pain] : no joint pain [Joint Stiffness] : no joint stiffness [Muscle Pain] : no muscle pain [Itching] : no itching [Skin Rash] : no skin rash [Headache] : no headache [Dizziness] : no dizziness [Memory Loss] : no memory loss [Unsteady Walking] : no ataxia [Suicidal] : not suicidal [FreeTextEntry6] : respiratory symptoms stable.

## 2023-03-15 NOTE — HISTORY OF PRESENT ILLNESS
[de-identified] : 66 YO F with a PMH Of COPD, Pulmonary HTN presents with follow up of shortness of breath which she feels that she has every so often. Patient previously with anemia, improved with Iron supplements. Patient states that she sees Pulmonology and Cardiology at [facility]. She claims to have an Echocardiogram from 2 weeks ago, with Dr. Roland. Patient explained that symptoms are likely due to smoking and pulmonary hypertension, but that she needs an echocardiogram to confirm. Patient states that she does not want to quit smoking at this time, 1.5 pack per day.

## 2023-03-22 NOTE — HISTORY OF PRESENT ILLNESS
"Notified Dr. Hoang of pt now report of \"spasm/contraction\" like pain midsternal of 8. VS stable. Another EKG ordered and will give nitro. Nitro helped her pain when she first arrived to [facility].  " [de-identified] : 66 YO F with a PMH of T2DM, COPD and current smoker presents with follow up of COPD. Patient is currently smoking and does not have any acute shortness of breath. Patient currently smoking about half a pack of cigarettes daily now. States that she does not want any medications to stop smoking, but that she feels depressed that she cannot do things she used to do before due to her shortness of breath. Patient reluctant to get flu shot before but convinced to get it today. Patient denies any fever, chills, chest pain, shortness of breath, nausea, vomiting, headache, cough, leg pain dizziness or weakness. \par \par

## 2023-04-13 NOTE — HISTORY OF PRESENT ILLNESS
[de-identified] : 66 YO F with a PMH of COPD, Current Smoker, following up for shortness of breath. Patient does not have any current shortness of breath and is able to speak in full sentences. She is not interested in quitting smoking, although she does understand the benefits and risks from quitting especially with a diagnosis of COPD. Patient and provider agree on monthly visits to evaluate breathing. \par Patient denies any fever, chills, chest pain, shortness of breath, nausea, vomiting, headache, cough, leg pain dizziness or weakness. \par \par \par

## 2023-04-13 NOTE — PHYSICAL EXAM
[No Respiratory Distress] : no respiratory distress  [No Accessory Muscle Use] : no accessory muscle use [Normal] : no joint swelling and grossly normal strength and tone [de-identified] : Breath sounds present, minor  wheezes or rales bilaterally. ble to speak in complete sentences

## 2023-05-15 PROBLEM — J44.9 COPD (CHRONIC OBSTRUCTIVE PULMONARY DISEASE): Status: ACTIVE | Noted: 2019-04-12

## 2023-05-15 PROBLEM — F17.210 NICOTINE DEPENDENCE, CIGARETTES, UNCOMPLICATED: Status: ACTIVE | Noted: 2022-02-18

## 2023-05-15 NOTE — HISTORY OF PRESENT ILLNESS
[de-identified] : 68 YO F with a PMH of COPD, Current Smoker, following up for shortness of breath. Patient does not have any current shortness of breath and is able to speak in full sentences. She is not interested in quitting smoking, although she does understand the benefits and risks from quitting especially with a diagnosis of COPD. Patient and provider agree on monthly visits to evaluate breathing. \par Patient denies any fever, chills, chest pain, shortness of breath, nausea, vomiting, headache, cough, leg pain dizziness or weakness. \par

## 2023-05-15 NOTE — PHYSICAL EXAM
[No Respiratory Distress] : no respiratory distress  [No Accessory Muscle Use] : no accessory muscle use [Normal] : no joint swelling and grossly normal strength and tone [de-identified] : Breath sounds present, minor  wheezes or rales bilaterally. ble to speak in complete sentences

## 2023-05-24 NOTE — HISTORY OF PRESENT ILLNESS
[FreeTextEntry1] : HM [de-identified] : 64F PMHx Breast Ca, HTN, HLD, DMII, presents for  check up. No acute complaints. Patient with Tdap and Flu vaccinations outside of the clinic with other physicians (no chart available for Pulm, Cardio, Onc.) Patient Colonoscopy done with Dr. Rashaun Calvillo, CLEMENTINE f/u in 7-10years, follows up with Pulm, Cardio as listed in the chart. Patent

## 2023-06-16 PROBLEM — I27.23 PULMONARY HYPERTENSION DUE TO COPD: Status: ACTIVE | Noted: 2023-01-01

## 2023-06-16 NOTE — REVIEW OF SYSTEMS
[Chest Pain] : no chest pain [Palpitations] : no palpitations [Paroxysmal Nocturnal Dyspnea] : no paroxysmal nocturnal dyspnea [Shortness Of Breath] : no shortness of breath [Wheezing] : no wheezing [Cough] : no cough [Dyspnea on Exertion] : no dyspnea on exertion [Abdominal Pain] : no abdominal pain [Nausea] : no nausea [Constipation] : no constipation [Vomiting] : no vomiting [Joint Pain] : no joint pain [Joint Stiffness] : no joint stiffness [Muscle Pain] : no muscle pain [Itching] : no itching [Skin Rash] : no skin rash [Headache] : no headache [Dizziness] : no dizziness [Memory Loss] : no memory loss [Unsteady Walking] : no ataxia [Suicidal] : not suicidal [Negative] : Heme/Lymph [FreeTextEntry6] : respiratory symptoms stable.

## 2023-06-16 NOTE — PHYSICAL EXAM
[No Respiratory Distress] : no respiratory distress  [No Accessory Muscle Use] : no accessory muscle use [Normal] : no joint swelling and grossly normal strength and tone [de-identified] : Breath sounds present, minor  wheezes or rales bilaterally. ble to speak in complete sentences

## 2023-06-16 NOTE — HISTORY OF PRESENT ILLNESS
[de-identified] : 68 YO F With a PMH of Current Smoker, following up for shortness of breath. Patient does not have any current shortness of breath and is able to speak in full sentences. She is not interested in quitting smoking, although she does understand the benefits and risks from quitting especially with a diagnosis of COPD. Patient and provider agree on monthly visits to evaluate breathing. \par Patient denies any fever, chills, chest pain, shortness of breath, nausea, vomiting, headache, cough, leg pain dizziness or weakness. \par

## 2023-07-21 PROBLEM — Z12.11 COLON CANCER SCREENING: Status: ACTIVE | Noted: 2023-01-01

## 2023-07-21 PROBLEM — Z12.39 BREAST CANCER SCREENING: Status: ACTIVE | Noted: 2023-01-01

## 2023-07-21 PROBLEM — F17.219 NICOTINE DEPENDENCE, CIGARETTES, WITH UNSPECIFIED NICOTINE-INDUCED DISORDERS: Status: ACTIVE | Noted: 2022-02-04

## 2023-07-21 NOTE — PHYSICAL EXAM
[No Respiratory Distress] : no respiratory distress  [Normal] : soft, non-tender, non-distended, no masses palpated, no HSM and normal bowel sounds [No CVA Tenderness] : no CVA  tenderness [Normal Gait] : normal gait [Normal Affect] : the affect was normal

## 2023-07-21 NOTE — HISTORY OF PRESENT ILLNESS
[de-identified] : 66 y/o F PMH COPD, DM2, HTN and active smoker presenting today for follow up of SOB. The SOB has remained the same since her last visit and is exacerbated with walking long distances accompanied by a cough productive of non-odorous cloudy sputum. She is currently managed with Spiriva and Symbicort, but has not taken albuterol in 2-3 months due to lack of refills. She has a >45 pack-year smoking history and has recently cut down from 2 packs/day to 1 pack/day 2 months ago. She is interested in cutting down in the contemplation stage of change and has found some relief with bupropion and taking walks or watching TV for distraction. Patch nicotine replacement has not provided any relief. She denies any recent fevers, nausea/vomiting, chest pain, abdominal pain, leg swelling, or SOB at rest.

## 2023-08-01 PROBLEM — Z87.891 HISTORY OF SMOKING: Status: ACTIVE | Noted: 2019-04-12

## 2023-08-09 PROBLEM — I51.89 ATRIAL MASS: Status: ACTIVE | Noted: 2023-01-01

## 2023-08-09 PROBLEM — Z87.891 FORMER SMOKER: Status: ACTIVE | Noted: 2019-04-24

## 2023-08-09 NOTE — HISTORY OF PRESENT ILLNESS
[TextBox_13] : Referred by Dr. Blackman  Ms. REHAN IGLESIAS is a 67 year old woman with a history of nicotine dependence   Over the telephone today we reviewed and confirmed that the patient meets screening eligibility criteria:  -Age: 67 years old  Smoking status:  -Current smoker  -Number of pack(s) per day:1-2  -Number of years smoked:20  -Number of pack years smokin    Ms. IGLESIAS denies any personal history of lung cancer. Denies any s/s of lung cancer. No lung cancer in a 1st degree relative. H/o of COPD. Denies any history of occupational exposures [PacksperDay] : 1-2 [N_Years] : 20 [PacksperYear] : 30

## 2023-08-25 PROBLEM — Z12.2 SCREENING FOR LUNG CANCER: Status: ACTIVE | Noted: 2022-07-08

## 2023-08-25 PROBLEM — E11.9 DIABETES MELLITUS, TYPE 2: Status: ACTIVE | Noted: 2019-04-12

## 2023-08-25 NOTE — PHYSICAL EXAM
[No Acute Distress] : no acute distress [Well Developed] : well developed [Normal Sclera/Conjunctiva] : normal sclera/conjunctiva [EOMI] : extraocular movements intact [No Respiratory Distress] : no respiratory distress  [No Accessory Muscle Use] : no accessory muscle use [Clear to Auscultation] : lungs were clear to auscultation bilaterally [Normal Rate] : normal rate  [Regular Rhythm] : with a regular rhythm [Normal S1, S2] : normal S1 and S2 [No Edema] : there was no peripheral edema [Soft] : abdomen soft [Non Tender] : non-tender [Non-distended] : non-distended [Normal Bowel Sounds] : normal bowel sounds [No Rash] : no rash

## 2023-08-25 NOTE — HISTORY OF PRESENT ILLNESS
[FreeTextEntry1] : f/u CT chest  [de-identified] : 66 y/o F with PMHx COPD, pulmonary hypertension, DM2, HTN, h/o artery stent who is here for f/u low dose CT chest result.  CT chest no evidence of lung CA, says 2cm calcified lesion in right atrium, stable since 2018.   Will see Cardiologist, Dr. Malcolm in next Monday.  Says echo done in March. Plan to do stress test.  Seeing pulmonologist, Dr. Jayro Nash for lung conditions.  Seeing oncologist Dr. Larios for breast cancer and seeing Dr. Clarke per Dr. Larios's recommendation Last eye clinic report shows possible glaucoma. Hasn't seen a eye doctor in 2 years, reports no eye pain, no changes in eyesight, needs to use glasses at times for blurry vision.   Last colonoscopy done in Feb, 2023 with Dr. Will Pedraza,, multiple polyps throughout the colon. Reports says pending pathology report.   Pt very overwhelmed by having to see different providers. Prefer to only focus 1 thing each visit. Declines SW referral.  Declines discussion for smoking cessation, says she couldn't do it.

## 2023-09-08 NOTE — HISTORY OF PRESENT ILLNESS
[de-identified] : 66 YO F with a PMHx COPD, pulmonary hypertension, DM2, HTN, tobacco use disorder here for coordination of complex care. Of note, pt presented for same last week but was overwhelmed at the number different doctors and specialist she needed to see and is presenting again today for the same. Today pt feels well, afebrile, vss, with no new sxs but is annoyed that she has to see a different doctor again. Pt endorses having gone to her cardiologist Dr. Roland with f/u scheduled, and knowing that she needs to see Dr. Larios for breast cancer next. She endorses understanding what specialist she needs to see and what conditions she has. Pt denies ha, dizziness, fevers, cp, SOB, palpitations, n/v/ab pain, changes in urination or bm. No other urgent sxs or concerns reported. [FreeTextEntry1] : coordination of complex care

## 2023-10-04 PROBLEM — K63.5 POLYP, COLONIC: Status: ACTIVE | Noted: 2023-01-01

## 2023-10-04 PROBLEM — Z71.89 COORDINATION OF COMPLEX CARE: Status: ACTIVE | Noted: 2023-01-01

## 2023-10-04 PROBLEM — Z23 ENCOUNTER FOR IMMUNIZATION: Status: ACTIVE | Noted: 2022-03-21

## 2023-10-27 NOTE — H&P ADULT - NSHPPHYSICALEXAM_GEN_ALL_CORE
CONSTITUTIONAL: Cachectic, + respiratory distress  EYES: PERRLA and symmetric, EOMI, No conjunctival or scleral injection, non-icteric  ENMT: Oral mucosa with moist membranes.  NECK: Supple, symmetric and without tracheal deviation   RESP: Poor air entry, bilateral wheezing   CV: RRR, +S1S2, no MRG; no JVD; no peripheral edema  GI: Soft, NT, ND, no rebound, no guarding; no palpable masses  MSK: Normal gait; No digital clubbing or cyanosis  Normal ROM without pain, no spinal tenderness, normal muscle strength/tone  SKIN: No rashes or ulcers noted; no subcutaneous nodules or induration palpable, left breast mastectomy scar, missing nipple. Right chest tunnel catheter  NEURO: CN II-XII intact; normal reflexes in upper and lower extremities  PSYCH: + Insight

## 2023-10-27 NOTE — ED PROVIDER NOTE - OBJECTIVE STATEMENT
68F PMHx of COPD/emphysema (no ICU admits, prior ED visits), HTN, HLD, DM, on xarelto for DVT?, hx of breast ca s/p left mastectomy, on chemotx last tx was 1 week ago presenting with shortness of breath, since last night. Describes mild shortness of breath, that gradually worsened overnight. Unable to sleep. Tried her treatments at home but did not help. EMS found patient hypoxic to 70% RA, placed on CPAP and given duonebs. Denies any chest pain, abdominal pain,  nausea/vomiting, headaches, fevers, chills, diarrhea ,constipation, weakness, syncope, hematuria, dysuria, urinary symptoms, subjective neurological deficits.

## 2023-10-27 NOTE — H&P ADULT - HISTORY OF PRESENT ILLNESS
68F PMHx of COPD, HTN, HLD, DM, on xarelto for DVT history, hx of breast ca s/p left mastectomy, on chemotx last tx was 1 week ago presenting with shortness of breath, since last night. She is an active smoker. Last cigarette was yesterday. Denies any fevers or chills. ICU consulted as patient needing bipap support in the ED. No chest pain or palpitations.

## 2023-10-27 NOTE — PATIENT PROFILE ADULT - FALL HARM RISK - HARM RISK INTERVENTIONS

## 2023-10-27 NOTE — ED PROVIDER NOTE - CLINICAL SUMMARY MEDICAL DECISION MAKING FREE TEXT BOX
68F PMHx of COPD/emphysema (no ICU admits, prior ED visits), HTN, HLD, DM, on xarelto for DVT?, hx of breast ca s/p left mastectomy, on chemotx last tx was 1 week ago presenting with shortness of breath, since last night. Describes mild shortness of breath, that gradually worsened overnight. Unable to sleep. Tried her treatments at home but did not help. EMS found patient hypoxic to 70% RA, placed on CPAP and given duonebs. Denies any chest pain, abdominal pain,  nausea/vomiting, headaches, fevers, chills, diarrhea ,constipation, weakness, syncope, hematuria, dysuria, urinary symptoms, subjective neurological deficits.    Likely acute COPD exacerbation. The likely precipitant is acute respiratory infection . Low suspicion for alternate etiologies such as pneumothorax, acute pulmonary embolism, ACS/NSTEMI, CHF, or cardiac effusion.  - Pseudomonas risk factors: recent hospitalizations, frequent antibiotic treatment, severe COPD, previously isolated pseudomonas.   - Maintain oxygen saturations 90-94% with supplemental oxygen PRN. BIPAP PRN worsening work of breathing.  - Trial of duonebs x 3 with solumedrol 125mg IVP, Serial re-assessments. Continuous albuterol PRN.  - Antibiotics are indicated given purulent sputum, increased sputum production, trial of BIPAP.   - CXR to evaluate for other acute cardiopulmonary processes, CBC, CMP, EKG    1239 pm consulted ICU, to be seen.

## 2023-10-27 NOTE — ED PROVIDER NOTE - CRITICAL CARE ATTENDING CONTRIBUTION TO CARE
Upon my evaluation, this patient had a high probability of imminent or lifethreatening deterioration due to COPD exacerbation requiring bipap, which required my direct attention, intervention, and personal management.     I have personally provided 31 minutes of critical care time exclusive of time spent on separately billable procedures. Time includes review of laboratory data, radiology results, discussion with consultants, and monitoring for potential decompensation. Interventions were performed as documented above.    - Shaquille Young MD, Emergency Medicine and Medical Toxicology Attending.

## 2023-10-27 NOTE — ED PROVIDER NOTE - NS ED MD DISPO ISOLATION TYPES
Plan: Fu if hyperpigmented spot enlarged or gets darker Detail Level: Zone Render In Strict Bullet Format?: No Contact/Droplet

## 2023-10-27 NOTE — H&P ADULT - ASSESSMENT
68F PMHx of COPD, CAD, HLD, DM, on xarelto for DVT history, hx of breast ca s/p left mastectomy, on chemotx last tx was 1 week ago presenting with shortness of breath, since last night. Being admitted to the hospital for respiratory failure due to COPD exacerbation in the setting of Entero/rhinovirus    Assessment:  1. Acute respiratory failure  2. COPD exacerbation  3. Entero/rhino virus  4. CAD   5. HTN  6. DM type 2  7, Breast cancer    Plan:  - Cont. Bipap support  - Bronchodilators, IV steroids  - Supportive care for enterovirus  - Azithromycin for COPD exacerbation   - Hemodynamic monitoring  - NPO while on bipap   - Check Hba1c   - Insulin sliding scale   - Cont. Aspirin and statin   - Cont. Lotrezole  - Hold antihypertensives for now  - Cont. AC with lovenox as on xarelto at home  - Will admit to ICU

## 2023-10-27 NOTE — ED PROVIDER NOTE - PHYSICAL EXAMINATION
Subjective   Patient ID: Pily is a 35 year old female.    Chief Complaint   Patient presents with   • Ear Pain     Bilateral ear pain    • Sore Throat   • Headache   • Generalized Body Aches   • Chills     Pt is 34y/o female presenting for cold symptoms that began yesterday.  Patient states headache, runny nose, nasal congestion, sore throat, bilateral ear pain and itchiness.  States that she has painful bumps behind both ears as well.  Cough only from the runny nose and nasal congestion drainage.  She denies fever but has had some chills and body aches.  She denies any shortness of breath or wheezing.  No chest pain.  No nausea, vomiting, diarrhea, abdominal pain.  She is not taking anything for symptoms.  No known exposures.        Past Medical History:   Diagnosis Date   • Cerebral aneurysm, nonruptured    • Essential (primary) hypertension        MEDICATIONS:  Current Outpatient Medications   Medication Sig   • aspirin 325 MG tablet every 24 hours.   • butalbital-acetaminophen-caffeine (Fioricet) -40 MG capsule Take 1 capsule by mouth every 4 hours as needed (For Headache.).   • amitriptyline (ELAVIL) 10 MG tablet Take 10 mg by mouth daily.   • losartan (COZAAR) 25 MG tablet Take 1 tablet by mouth daily.   • losartan (COZAAR) 25 MG tablet Take 25 mg by mouth daily.   • metoPROLOL succinate (TOPROL-XL) 25 MG 24 hr tablet Take 25 mg by mouth daily.     No current facility-administered medications for this visit.       ALLERGIES:  ALLERGIES:  No Known Allergies    PAST SURGICAL HISTORY:  Past Surgical History:   Procedure Laterality Date   • Brain aneurysm surgery         FAMILY HISTORY:  History reviewed. No pertinent family history.    SOCIAL HISTORY:  Social History     Tobacco Use   • Smoking status: Never Smoker   • Smokeless tobacco: Never Used         Patient's medications, allergies, past medical, surgical, and social history  were reviewed and updated as appropriate.    Review of Systems    Constitutional: Positive for chills. Negative for appetite change, diaphoresis, fatigue and fever.        Body aches.   HENT: Positive for congestion, ear pain, rhinorrhea and sore throat. Negative for ear discharge, postnasal drip, sinus pressure, sinus pain and sneezing.    Respiratory: Negative for cough, chest tightness, shortness of breath and wheezing.    Cardiovascular: Negative for chest pain and palpitations.   Gastrointestinal: Negative for abdominal pain, diarrhea, nausea and vomiting.   Skin: Negative for rash and wound.   Neurological: Positive for headaches. Negative for dizziness.       Objective   Physical Exam  Vitals and nursing note reviewed.   Constitutional:       General: She is not in acute distress.     Appearance: Normal appearance. She is not ill-appearing, toxic-appearing or diaphoretic.   HENT:      Head: Normocephalic and atraumatic.      Right Ear: External ear normal. Tympanic membrane is bulging. Tympanic membrane is not injected or erythematous.      Left Ear: External ear normal. Tympanic membrane is bulging. Tympanic membrane is not injected or erythematous.      Nose: Congestion present. No mucosal edema or rhinorrhea.      Right Sinus: No maxillary sinus tenderness or frontal sinus tenderness.      Left Sinus: No maxillary sinus tenderness or frontal sinus tenderness.      Mouth/Throat:      Mouth: Mucous membranes are moist.      Pharynx: Oropharynx is clear. Uvula midline. Posterior oropharyngeal erythema present. No pharyngeal swelling, oropharyngeal exudate or uvula swelling.      Tonsils: No tonsillar exudate or tonsillar abscesses.   Cardiovascular:      Rate and Rhythm: Normal rate and regular rhythm.      Pulses: Normal pulses.      Heart sounds: Normal heart sounds, S1 normal and S2 normal. No murmur heard.  Pulmonary:      Effort: Pulmonary effort is normal. No respiratory distress.      Breath sounds: Normal breath sounds. No stridor. No decreased breath sounds,  wheezing, rhonchi or rales.   Chest:      Chest wall: No tenderness.   Abdominal:      General: Abdomen is flat. Bowel sounds are normal. There is no distension.      Palpations: Abdomen is soft.      Tenderness: There is no abdominal tenderness. There is no guarding or rebound.   Lymphadenopathy:      Head:      Right side of head: Posterior auricular adenopathy present. No submental, submandibular, tonsillar, preauricular or occipital adenopathy.      Left side of head: Posterior auricular adenopathy present. No submental, submandibular, tonsillar, preauricular or occipital adenopathy.      Cervical: No cervical adenopathy.   Skin:     General: Skin is warm and dry.      Capillary Refill: Capillary refill takes less than 2 seconds.   Neurological:      General: No focal deficit present.      Mental Status: She is alert and oriented to person, place, and time.       Visit Vitals  BP (!) 142/89 (BP Location: RUE - Right upper extremity, Patient Position: Sitting, Cuff Size: Regular)   Pulse 69   Temp 98.1 °F (36.7 °C) (Temporal)   Resp 20   Wt 56.2 kg (124 lb)   LMP 02/08/2022   SpO2 100%   BMI 23.43 kg/m²       Assessment   Problem List Items Addressed This Visit    None     Visit Diagnoses     Viral URI    -  Primary    Relevant Orders    STREPTOCOCCUS GROUP A (STREPTOCOCCUS PYOGENES), BACTERIAL CULTURE    Body aches        Relevant Orders    POCT SARS-COV-2 ANTIGEN/FLU ANTIGEN PANEL (Completed)    POCT RAPID STREP A (Completed)    Otalgia of both ears        Lymphadenopathy              This is a 35 year old year-old female who presents with cold symptoms.    Rapid strep negative, will send strep culture and follow up with you in 2-3 days only if positive.    Rapid flu negative.    Rapid COVID negative.    Home for rest.    Fluids.    Tylenol and ibuprofen, may alternate every 4-6 hours as needed for pain or fever.    Nasacort and Zyrtec OTC for nose and sinus symptoms.    Change toothbrush in 2 to 3  days.    Warm salt water gargles, warm tea with honey, throat lozenges, Chloraseptic spray for sore throat.    Cool-mist humidifier at night.    Close follow-up with PCP in 3 to 5 days as needed for recheck.     Instructions provided as documented in the AVS.    Thank you for visiting [facility].  Please follow up with your PCP in 3-5 days.   VITAL SIGNS: I have reviewed nursing notes and confirm.   GEN: Well-developed; well-nourished; in severe acute distress. speaking few words per sentence.  SKIN: Warm, pink, no rash, no diaphoresis, no cyanosis, well perfused.   HEAD: Normocephalic; atraumatic. No scalp lacerations, no abrasions.  NECK: Supple; non tender.   EYES: Pupils 3mm equal, round, reactive to light and accomodation, conjunctiva and sclera clear. Extra-ocular movements intact bilaterally.  ENT: No nasal discharge; airway clear. Trachea is midline. (+) dentures  CV: (+) tachycardia, S1, S2 normal; no murmurs, gallops, or rubs. Capillary refill < 2 seconds throughout. Distal pulses intact 2+ throughout. (+) RIGHT sided port. (+) LEFT sided mastectomy.  RESP: (+) poor air entry, (+) mild wheezing, NO rhonchi/rales.  ABD: Normal bowel sounds, soft, non-distended, non-tender, no rebound, no guarding, no rigidity, no hepatosplenomegaly.  MSK: Normal range of motion and movement of all 4 extremities. No apparent joint or muscular pain throughout.    BACK: No thoracolumbar midline or paravertebral tenderness. No step-offs or obvious deformities.  NEURO: Alert & oriented x 3, Grossly unremarkable. Sensory and motor intact throughout. No focal deficits. Normal speech and coordination.

## 2023-10-27 NOTE — ED ADULT NURSE NOTE - OBJECTIVE STATEMENT
Assumed pt care for a 68 yr old female brought in by EMS complaining dyspnea. As per pt daughter pt has been short of breath for the last few days. Pt usually is short of breath because she smokes, Daughter reports she thought it was a normal episode however her saw her having a harder time than usual so she activated EMS. Pt brought in BY EMS on CPAP, Pt placed on BiPAP as per MD orders by RT. IV Established. Labs collected. Meds given as per order. Awaiting further disposition.

## 2023-10-27 NOTE — ED PROVIDER NOTE - PROGRESS NOTE DETAILS
took the patient off bipap, but began to have worsening tachypnea and poor air entry. Will restart bipap, and give more duonebs. CTA negative for PE; (+) enterovirus/rhinovirus, But will need admission for COPD exacerbation worsening, requiring BIPAP. accepted

## 2023-10-27 NOTE — ED ADULT TRIAGE NOTE - CHIEF COMPLAINT QUOTE
Patient BIB EMS for complaints of shortness of breath. PMHX: COPD, Asthma Patient BIB EMS from home for complaints of shortness of breath. PMHX: COPD, Asthma

## 2023-10-28 NOTE — DIETITIAN INITIAL EVALUATION ADULT - ADD RECOMMEND
1. Add boost glucose control BID, CCHO diet  2. Soft & bite sized (no hx dysphagia; missing dentition), can advanced as tolerated pending clinical status/dentures

## 2023-10-28 NOTE — DIETITIAN INITIAL EVALUATION ADULT - PERTINENT MEDS FT
MEDICATIONS  (STANDING):  albuterol/ipratropium for Nebulization 3 milliLiter(s) Nebulizer every 6 hours  aspirin enteric coated 81 milliGRAM(s) Oral daily  atorvastatin 80 milliGRAM(s) Oral at bedtime  azithromycin  IVPB 500 milliGRAM(s) IV Intermittent every 24 hours  chlorhexidine 2% Cloths 1 Application(s) Topical <User Schedule>  dexMEDEtomidine Infusion 0.3 MICROgram(s)/kG/Hr (2.93 mL/Hr) IV Continuous <Continuous>  dextrose 5%. 1000 milliLiter(s) (100 mL/Hr) IV Continuous <Continuous>  dextrose 5%. 1000 milliLiter(s) (50 mL/Hr) IV Continuous <Continuous>  dextrose 50% Injectable 25 Gram(s) IV Push once  dextrose 50% Injectable 25 Gram(s) IV Push once  dextrose 50% Injectable 12.5 Gram(s) IV Push once  enoxaparin Injectable 40 milliGRAM(s) SubCutaneous every 12 hours  insulin lispro (ADMELOG) corrective regimen sliding scale   SubCutaneous Before meals and at bedtime  letrozole 2.5 milliGRAM(s) Oral daily  methylPREDNISolone sodium succinate Injectable 40 milliGRAM(s) IV Push every 6 hours  nicotine - 21 mG/24Hr(s) Patch 1 Patch Transdermal daily  pantoprazole    Tablet 40 milliGRAM(s) Oral before breakfast    MEDICATIONS  (PRN):

## 2023-10-28 NOTE — PROGRESS NOTE ADULT - SUBJECTIVE AND OBJECTIVE BOX
Follow-up Critical Care Progress Note  Chief Complaint : Chronic obstructive pulmonary disease with acute exacerbation        patient on NIV overnight   taken off to n/c  patient with continued SOB, wheezing, cough  feels better  as per dtr patient wasnt able to speak yesterday today she is        Allergies :penicillin (Hives)      PAST MEDICAL & SURGICAL HISTORY:  COPD, severe    CAD (coronary artery disease)    Breast cancer    No significant past surgical history        Medications:  MEDICATIONS  (STANDING):  albuterol/ipratropium for Nebulization 3 milliLiter(s) Nebulizer every 6 hours  aspirin enteric coated 81 milliGRAM(s) Oral daily  atorvastatin 80 milliGRAM(s) Oral at bedtime  azithromycin  IVPB 500 milliGRAM(s) IV Intermittent every 24 hours  chlorhexidine 2% Cloths 1 Application(s) Topical <User Schedule>  dexMEDEtomidine Infusion 0.3 MICROgram(s)/kG/Hr (2.93 mL/Hr) IV Continuous <Continuous>  dextrose 5%. 1000 milliLiter(s) (50 mL/Hr) IV Continuous <Continuous>  dextrose 5%. 1000 milliLiter(s) (100 mL/Hr) IV Continuous <Continuous>  dextrose 50% Injectable 25 Gram(s) IV Push once  dextrose 50% Injectable 25 Gram(s) IV Push once  dextrose 50% Injectable 12.5 Gram(s) IV Push once  enoxaparin Injectable 40 milliGRAM(s) SubCutaneous every 12 hours  insulin lispro (ADMELOG) corrective regimen sliding scale   SubCutaneous Before meals and at bedtime  letrozole 2.5 milliGRAM(s) Oral daily  methylPREDNISolone sodium succinate Injectable 40 milliGRAM(s) IV Push every 12 hours  pantoprazole    Tablet 40 milliGRAM(s) Oral before breakfast    MEDICATIONS  (PRN):      Antibiotics History  azithromycin  IVPB 500 milliGRAM(s) IV Intermittent every 24 hours, 10-27-23 @ 14:42      Heme Medications   aspirin enteric coated 81 milliGRAM(s) Oral daily, 10-27-23 @ 14:57  enoxaparin Injectable 40 milliGRAM(s) SubCutaneous every 12 hours, 10-27-23 @ 14:57      GI Medications  pantoprazole    Tablet 40 milliGRAM(s) Oral before breakfast, 10-28-23 @ 09:14, Routine       Trend Cardiac Enzymes  10-27-23 @ 10:15  [id number]/Trop I - -- - --  - --  -  --  /  20.1    Trend BNP    Procalcitonin Trend    WBC Trend  10-28-23 @ 05:00   -  10.27  10-27-23 @ 10:15   -  14.91<H>    H/H Trend  10-28-23 @ 05:00   -   10.2<L>/ 31.8<L>  10-27-23 @ 10:15   -   12.5/ 39.4     Platelet Trend  10-28-23 @ 05:00   -  280  10-27-23 @ 10:15   -  330    Trend Sodium  10-28-23 @ 05:00   -  134<L>  10-27-23 @ 10:15   -  136    Trend Potassium  10-28-23 @ 05:00   -  4.4  10-27-23 @ 10:15   -  4.0    Trend Bun/Cr  10-28-23 @ 05:00  BUN/CR -  16 / 0.36<L>  10-27-23 @ 10:15  BUN/CR -  15 / 0.56    Lactic Acid Trend  10-27-23 @ 10:15   -   1.9    ABG Trend  10-27-23 @ 10:00   - 7.38/45<H>/179<H>/97.9    Trend AST/ALT/ALK Phos/Bili  10-27-23 @ 10:15   48<H>/56<H>/85/0.3       Albumin Trend  10-27-23 @ 10:15   -   4.0      PTT - PT - INR Trend  10-27-23 @ 10:15   -   51.6<H> - 13.9<H> - 1.19<H>    Glucose Trend  10-28-23 @ 06:10   -  -- -- 140<H>  10-28-23 @ 05:00   -  121<H> -- --  10-27-23 @ 23:02   -  -- -- 124<H>  10-27-23 @ 17:06   -  -- -- 281<H>  10-27-23 @ 10:15   -  129<H> -- --        LABS:                        10.2   10.27 )-----------( 280      ( 28 Oct 2023 05:00 )             31.8     10-28    134<L>  |  97  |  16  ----------------------------<  121<H>  4.4   |  27  |  0.36<L>    Ca    8.4      28 Oct 2023 05:00  Phos  3.7     10-28  Mg     2.6     10-28    TPro  8.7<H>  /  Alb  4.0  /  TBili  0.3  /  DBili  x   /  AST  48<H>  /  ALT  56<H>  /  AlkPhos  85  10-27       RADIOLOGY  CXR:    < from: Xray Chest 1 View- PORTABLE-Urgent (Xray Chest 1 View- PORTABLE-Urgent .) (10.27.23 @ 10:10) >  INTERPRETATION:  Respiratory distress.    AP chest.    Right chest port tip at the cavoatrial junction. Normal heart   mediastinum. Hyperinflated emphysematous lungs without consolidation   pneumothorax or effusion. Nodular opacity right base likely represents   nipple shadow. Status post left mastectomy. Clips left axilla.    IMPRESSION: Emphysema. No active infiltrates.    ---End of Report ---    < end of copied text >    CT:  < from: CT Angio Chest PE Protocol w/ IV Cont (10.27.23 @ 11:52) >    ACC: [id number] EXAM:  CT ANGIO CHEST PULM ART [facility]   ORDERED BY: JUAN VELAZCO     PROCEDURE DATE:  10/27/2023          INTERPRETATION:  .  Patient: REHAN IGLESIAS  : 1955  MRN: [id number]  ACC: [id number]    INDICATION, CLINICAL INFORMATION: sob x 1 day, history of breast cancer   on chemotherapy, COPD  TECHNIQUE: CT pulmonary angiogram of the chest . Uneventful   administration of 70 cc Omnipaque 350. Coronal, sagittal and 3-D/MIP   images were reconstructed/reviewed.  COMPARISON: No prior chest CT.    FINDINGS:    PULMONARY VESSELS: No pulmonary embolus. Main pulmonary artery normal in   diameter.    HEART AND VASCULATURE: Heart size is normal. No pericardial effusion. No   aortic aneurysm or dissection. Aortic and coronary atherosclerosis.    LUNGS, AIRWAYS, PLEURA: Patent central airways. Severe emphysema. Clear   lungs. No pleural effusions or pneumothorax.    MEDIASTINUM: No mass or lymphadenopathy.    UPPER ABDOMEN: Within normal limits.    BONES AND SOFT TISSUES: No aggressive osseous lesion. Left mastectomy.    LOWER NECK: Within normal limits.    IMPRESSION:    No pulmonary embolus.    Emphysema. Recommend annual lung cancer screening with low-dose chest CT   in 12 months if the patient meets the criteria.    --- End of Report ---    < end of copied text >    ECHO:      VITALS:  T(C): 36.1 (10-28-23 @ 05:00), Max: 37.1 (10-27-23 @ 09:44)  T(F): 97 (10-28-23 @ 05:00), Max: 98.7 (10-27-23 @ 09:44)  HR: 88 (10-28-23 @ 08:58) (84 - 134)  BP: 108/64 (10-28-23 @ 07:00) (88/44 - 137/85)  BP(mean): 78 (10-28-23 @ 07:00) (59 - 102)  ABP: --  ABP(mean): --  RR: 17 (10-28-23 @ 07:00) (14 - 23)  SpO2: 100% (10-28-23 @ 08:58) (95% - 100%)  CVP(mm Hg): --  CVP(cm H2O): --    Ins and Outs     10-27-23 @ 07:01  -  10-28-23 @ 07:00  --------------------------------------------------------  IN: 258.7 mL / OUT: 600 mL / NET: -341.3 mL    10-28-23 @ 07:01  -  10-28-23 @ 09:20  --------------------------------------------------------  IN: 0 mL / OUT: 625 mL / NET: -625 mL        Height (cm): 154.9 (10-27-23 @ 14:30)  Weight (kg): 39 (10-27-23 @ 14:30)  BMI (kg/m2): 16.3 (10-27-23 @ 14:30)        I&O's Detail    27 Oct 2023 07:01  -  28 Oct 2023 07:00  --------------------------------------------------------  IN:    Dexmedetomidine: 8.7 mL    IV PiggyBack: 250 mL  Total IN: 258.7 mL    OUT:    Intermittent Catheterization - Urethral (mL): 600 mL  Total OUT: 600 mL    Total NET: -341.3 mL      28 Oct 2023 07:01  -  28 Oct 2023 09:20  --------------------------------------------------------  IN:  Total IN: 0 mL    OUT:    Intermittent Catheterization - Urethral (mL): 625 mL  Total OUT: 625 mL    Total NET: -625 mL

## 2023-10-28 NOTE — DIETITIAN INITIAL EVALUATION ADULT - ORAL INTAKE PTA/DIET HISTORY
Pt lives with siblings/daughter. States that she prepares most meals herself. States that she eats everything other than most cheeses. Missing dentition, typically wears dentures. Avoids concentrated sweets at home. NKFA.

## 2023-10-28 NOTE — DIETITIAN NUTRITION RISK NOTIFICATION - TREATMENT: THE FOLLOWING DIET HAS BEEN RECOMMENDED
Diet, Soft and Bite Sized:   Consistent Carbohydrate {No Snacks}  Low Sodium  Supplement Feeding Modality:  Oral  Glucerna Shake Cans or Servings Per Day:  1       Frequency:  Two Times a day (10-28-23 @ 10:53) [Pending Verification By Attending]  Diet, Soft and Bite Sized:   Consistent Carbohydrate {No Snacks}  DASH/TLC {Sodium & Cholesterol Restricted} (10-28-23 @ 09:36) [Active]

## 2023-10-28 NOTE — DIETITIAN INITIAL EVALUATION ADULT - NS FNS DIET ORDER
Diet, Soft and Bite Sized:   Consistent Carbohydrate {No Snacks}  DASH/TLC {Sodium & Cholesterol Restricted} (10-28-23 @ 09:36)

## 2023-10-28 NOTE — DIETITIAN INITIAL EVALUATION ADULT - PERTINENT LABORATORY DATA
10-28    134<L>  |  97  |  16  ----------------------------<  121<H>  4.4   |  27  |  0.36<L>    Ca    8.4      28 Oct 2023 05:00  Phos  3.7     10-28  Mg     2.6     10-28    TPro  8.7<H>  /  Alb  4.0  /  TBili  0.3  /  DBili  x   /  AST  48<H>  /  ALT  56<H>  /  AlkPhos  85  10-27  POCT Blood Glucose.: 140 mg/dL (10-28-23 @ 06:10)  A1C with Estimated Average Glucose Result: 5.3 % (10-28-23 @ 05:00)

## 2023-10-28 NOTE — PROGRESS NOTE ADULT - ASSESSMENT
Physical Examination:  GENERAL:               Alert, Oriented, Mild acute distress.    HEENT:                    No JVD, Dry MM, bitemporal waisting  PULM:                     Bilateral air entry, Diminished to auscultation bilaterally, no significant sputum production, No Rales, No Rhonchi, ++Wheezing  CVS:                         S1, S2,  No Murmur  ABD:                        Soft, nondistended, nontender, normoactive bowel sounds,   EXT:                         No edema, nontender, No Cyanosis or Clubbing    NEURO:                  Alert, oriented, interactive, nonfocal, follows commands  PSYC:                      Calm, + Insight.        Assessment  68 year old female  PMHx of COPD, CAD, HLD, DM2, on xarelto for DVT history, hx of breast ca s/p left mastectomy, on chemotx last tx was 1 week ago presenting with shortness of breath,  Being admitted to the hospital for respiratory failure due to COPD exacerbation in the setting of Entero/rhinovirus with COPD, severe Exacerbation    Problem List  1. Acute respiratory failure due to acute COPD Exacerbation in setting of ERV  2. Active nicotine use - 1 pk/day  3. uses home o2 PRN  4. CAD   5. HTN  6. DM type 2  7, h/o Breast cancer  8. H/o DVT on xaralto -     Plan  Alternate bipap with n/c  Increase IV steroids to solumedrol 40mg Q 6 hrs  continue nebs ATC  Z pack  advance diet  Cont. Aspirin and statin   Cont. Lotrezole  Hold antihypertensives for now  Cont. AC with lovenox as on xarelto at home, will likely switch to xaralto in  am   Nicotine patch   out patient f/u with primary pulm Dr. Roland. yearly CT chest for lung cancer screaning   smoking cessation advised      PMD:				                   Notified(Date):  Family Updated: 	DTR	                                 Date:  10/28/2023  Sedation & Analgesia:	n/a  Diet/Nutrition:		 soft diet  GI PPx:			pantoprazole    Tablet 40 milliGRAM(s) Oral before breakfast  DVT Ppx:		enoxaparin Injectable 40 milliGRAM(s) SubCutaneous every 12 hours    	RISK                                                          Points  	[ x] Previous VTE                                           	3  	[ ] Thrombophilia                                        	2  	[ ] Lower limb paralysis                              	2   	[ ] Current Cancer                                       	2   	[ ] Immobilization > 24 hrs                        	1  	[ x] ICU/CCU stay > 24 hours                       	1  	[ x] Age > 60                                                   	1  		Total:[ 5]      Activity:		  Advance as tolerated  Head of Bed:               35-45 Deg  Glycemic Control:        insulin lispro (ADMELOG) corrective regimen sliding scale   SubCutaneous Before meals and at bedtime    Lines:   PIV     Restraints were deemed necessary to prevent removal of life-sustaining devices [  ] YES   [  x  ]  NO    Disposition: ICU Care    Goals of Care: Full code

## 2023-10-28 NOTE — DIETITIAN INITIAL EVALUATION ADULT - PERSON TAUGHT/METHOD
optimal nutrition/verbal instruction/teach back - (Patient repeats in own words)/patient instructed/daughter instructed

## 2023-10-28 NOTE — INPATIENT CERTIFICATION FOR MEDICARE PATIENTS - RISKS OF ADVERSE EVENTS
Concern for cardiopulmonary deterioration/Concern for delay in diagnosis and treatment Concern for cardiopulmonary deterioration/Concern for worsening infectious process/Concern for delay in diagnosis and treatment

## 2023-10-28 NOTE — DIETITIAN INITIAL EVALUATION ADULT - OTHER INFO
68 year old female  PMHx of COPD, CAD, HLD, DM2, on xarelto for DVT history, hx of breast ca s/p left mastectomy, on chemotx last tx was 1 week ago presenting with shortness of breath,  Being admitted to the hospital for respiratory failure due to COPD exacerbation in the setting of Entero/rhinovirus with COPD, severe Exacerbation. Current smoker.   Pt off NIV, on NC. Advanced to soft & bite sized diet. UBW in 80's lbs. Pt endorsed significant recent weight loss, but unsure of quantity. Height 4'11" per pt. Current weight 92.3lbs (10/28). NFPE with evidence of severe/moderate muscle wasting/fat loss. Pt receptive to CCHO diet + Boost glucose support supplements BID (190kcals, 16g protein per serving) to augment calorie/protein intake in setting of acute illness. Pt typically tolerated regular texture, may advance diet as tolerated with improvement in respiratory status. Denies GI issues, last BM 10/27. No pressure ulcers.

## 2023-10-28 NOTE — DIETITIAN INITIAL EVALUATION ADULT - ENTER FROM (ML/KG)
The arrhythmia indication for ablation is atrial fibrillation.   An atrial ablation was performed at the left atrium.   Catheter inserted under fluoroscopy and navigational guidance.   Radio frequency was used to perform the ablation.    The procedure used closed irrigation cool tip.    The geometry of the lesion is linear.   The ablation procedure was successful.   Following the ablation the patient was in sinus rhythm.   Pulmonary Vein Isolation 25

## 2023-10-29 NOTE — PROGRESS NOTE ADULT - ASSESSMENT
67 yo f pmhx COPD, HTN, HLD, Dm2, DVT on xarelto, breast ca s/p L mastectomy admitted with hypoxic/hypercapnic respiratory failure, copd exacerbation in setting of entero/rhinovirus.    NEURO: precedex for anxiolytic therapy, ativan prn for breakthrough anxiety  CV: close hd monitoring  RESP: NIPPV dependent, inh bronchodilators, solumedrol.    RENAL: monitor urine outout, bun/cr and electrolytes. replace lytes as needed  GI: npo while nippv dependent  ENDO: ISS for glycemic control   ID: ceftraixone, azithormycin  HEME: lovenox for vte ppx   DISPO: full code

## 2023-10-29 NOTE — PROGRESS NOTE ADULT - SUBJECTIVE AND OBJECTIVE BOX
Patient is a 68y old  Female who presents with a chief complaint of Chronic obstructive pulmonary disease with acute exacerbation     (28 Oct 2023 11:13)      BRIEF HOSPITAL COURSE:   69 yo f pmhx COPD, HTN, HLD, Dm2, DVT on xarelto, breast ca s/p L mastectomy admitted with hypoxic/hypercapnic respiratory failure, copd exacerbation in setting of entero/rhinovirus.    Events last 24 hours:   received patient on Bipap, patient grossly bipap dependent throughout the day.  this evening patient with severe anxiety, tripoding, some expiratory wheezing on exam.  patient ordered for additional xopenex treatment and ativan 1mg ivp for severe anxiety.         PAST MEDICAL & SURGICAL HISTORY:  COPD, severe      CAD (coronary artery disease)      Breast cancer      No significant past surgical history        Allergies    penicillin (Hives)    Intolerances      FAMILY HISTORY:      Social History:     Review of Systems:  difficult to obtain 2/2 nippv dependent      Physical Examination:    General: thin adult female, sitting in bed, moderate distress    HEENT: nc/at, bipap mask in place, no stridor     PULM: b/l air movement with good entry, +expiratory wheezing/rhonchours breath sounds    CVS: tachycardic    ABD: Soft, nondistended, nontender, +bs    EXT: No edema, nontender    SKIN: Warm    NEURO: Alert, interactive      Medications:  azithromycin  IVPB 500 milliGRAM(s) IV Intermittent every 24 hours  cefTRIAXone   IVPB 1000 milliGRAM(s) IV Intermittent every 24 hours  cefTRIAXone   IVPB      levalbuterol Inhalation 0.63 milliGRAM(s) Inhalation every 6 hours  dexMEDEtomidine Infusion 0.3 MICROgram(s)/kG/Hr IV Continuous <Continuous>  letrozole 2.5 milliGRAM(s) Oral daily  aspirin enteric coated 81 milliGRAM(s) Oral daily  enoxaparin Injectable 40 milliGRAM(s) SubCutaneous every 12 hours  pantoprazole    Tablet 40 milliGRAM(s) Oral before breakfast  atorvastatin 80 milliGRAM(s) Oral at bedtime  dextrose 50% Injectable 25 Gram(s) IV Push once  dextrose 50% Injectable 12.5 Gram(s) IV Push once  dextrose 50% Injectable 25 Gram(s) IV Push once  insulin lispro (ADMELOG) corrective regimen sliding scale   SubCutaneous Before meals and at bedtime  methylPREDNISolone sodium succinate Injectable 40 milliGRAM(s) IV Push every 6 hours  dextrose 5%. 1000 milliLiter(s) IV Continuous <Continuous>  dextrose 5%. 1000 milliLiter(s) IV Continuous <Continuous>  lactated ringers. 1000 milliLiter(s) IV Continuous <Continuous>  chlorhexidine 2% Cloths 1 Application(s) Topical <User Schedule>  nicotine - 21 mG/24Hr(s) Patch 1 Patch Transdermal daily          ICU Vital Signs Last 24 Hrs  T(C): 36.3 (30 Oct 2023 00:00), Max: 36.5 (29 Oct 2023 12:00)  T(F): 97.3 (30 Oct 2023 00:00), Max: 97.7 (29 Oct 2023 12:00)  HR: 108 (30 Oct 2023 05:00) (72 - 131)  BP: 140/78 (30 Oct 2023 05:00) (87/51 - 176/99)  BP(mean): 98 (30 Oct 2023 05:00) (62 - 122)  ABP: --  ABP(mean): --  RR: 25 (30 Oct 2023 05:00) (14 - 25)  SpO2: 99% (30 Oct 2023 05:00) (94% - 100%)    O2 Parameters below as of 30 Oct 2023 05:00  Patient On (Oxygen Delivery Method): BiPAP/CPAP          Vital Signs Last 24 Hrs  T(C): 36.3 (30 Oct 2023 00:00), Max: 36.5 (29 Oct 2023 12:00)  T(F): 97.3 (30 Oct 2023 00:00), Max: 97.7 (29 Oct 2023 12:00)  HR: 108 (30 Oct 2023 05:00) (72 - 131)  BP: 140/78 (30 Oct 2023 05:00) (87/51 - 176/99)  BP(mean): 98 (30 Oct 2023 05:00) (62 - 122)  RR: 25 (30 Oct 2023 05:00) (14 - 25)  SpO2: 99% (30 Oct 2023 05:00) (94% - 100%)    Parameters below as of 30 Oct 2023 05:00  Patient On (Oxygen Delivery Method): BiPAP/CPAP        ABG - ( 30 Oct 2023 05:20 )  pH, Arterial: 7.42  pH, Blood: x     /  pCO2: 46    /  pO2: 105   / HCO3: 30    / Base Excess: 5.3   /  SaO2: 97.5                I&O's Detail    28 Oct 2023 07:01  -  29 Oct 2023 07:00  --------------------------------------------------------  IN:    Dexmedetomidine: 97.1 mL    IV PiggyBack: 250 mL    Oral Fluid: 200 mL  Total IN: 547.1 mL    OUT:    Intermittent Catheterization - Urethral (mL): 1925 mL  Total OUT: 1925 mL    Total NET: -1377.9 mL      29 Oct 2023 07:01  -  30 Oct 2023 05:49  --------------------------------------------------------  IN:    Dexmedetomidine: 95.2 mL    IV PiggyBack: 50 mL    Lactated Ringers: 900 mL    Lactated Ringers Bolus: 500 mL  Total IN: 1545.2 mL    OUT:    Intermittent Catheterization - Urethral (mL): 700 mL  Total OUT: 700 mL    Total NET: 845.2 mL            LABS:                        10.0   7.45  )-----------( 261      ( 29 Oct 2023 05:20 )             31.0     10-29    134<L>  |  98  |  30<H>  ----------------------------<  170<H>  4.8   |  28  |  0.53    Ca    8.5      29 Oct 2023 05:20  Phos  3.8     10-29  Mg     2.8     10-29            CAPILLARY BLOOD GLUCOSE      POCT Blood Glucose.: 133 mg/dL (29 Oct 2023 21:27)      Urinalysis Basic - ( 29 Oct 2023 05:20 )    Color: x / Appearance: x / SG: x / pH: x  Gluc: 170 mg/dL / Ketone: x  / Bili: x / Urobili: x   Blood: x / Protein: x / Nitrite: x   Leuk Esterase: x / RBC: x / WBC x   Sq Epi: x / Non Sq Epi: x / Bacteria: x      CULTURES:  Culture Results:   No growth (10-27 @ 14:02)  Culture Results:   No growth at 48 Hours (10-27 @ 10:15)  Culture Results:   No growth at 48 Hours (10-27 @ 10:15)        RADIOLOGY:   < from: Xray Chest 1 View- PORTABLE-Routine (10.28.23 @ 08:36) >    ACC: [id number] EXAM:  XR CHEST PORTABLE ROUTINE 1V   ORDERED BY: HANK GREY     PROCEDURE DATE:  10/28/2023          INTERPRETATION:  AP chest semierect on October 28, 2023 at 8:09 AM.   Patient is hypoxic and has COPD.    Heart size normal.    Advanced COPD hyperexpansion of the lungs again noted. Large right   central line remains.    Clips in the left axilla again noted.    No signs of acute infiltrate. No change from October 27.    IMPRESSION: Advanced COPD hyperexpansion again noted.    --- End of Report ---            RERE TAVERAS MD; Attending Radiologist  This document has been electronically signed. Oct 29 2023  4:10PM    < end of copied text >

## 2023-10-29 NOTE — PROGRESS NOTE ADULT - SUBJECTIVE AND OBJECTIVE BOX
Follow-up Critical Care Progress Note  Chief Complaint : Chronic obstructive pulmonary disease with acute exacerbation    patient seen and examined  patient had episode of agitation over night requireid ativan  this am patient dyspneic and difficulty speaking  noted to be diffusely weezing       Allergies :penicillin (Hives)      PAST MEDICAL & SURGICAL HISTORY:  COPD, severe    CAD (coronary artery disease)    Breast cancer    No significant past surgical history        Medications:  MEDICATIONS  (STANDING):  aspirin enteric coated 81 milliGRAM(s) Oral daily  atorvastatin 80 milliGRAM(s) Oral at bedtime  azithromycin  IVPB 500 milliGRAM(s) IV Intermittent every 24 hours  chlorhexidine 2% Cloths 1 Application(s) Topical <User Schedule>  dexMEDEtomidine Infusion 0.3 MICROgram(s)/kG/Hr (2.93 mL/Hr) IV Continuous <Continuous>  dextrose 5%. 1000 milliLiter(s) (100 mL/Hr) IV Continuous <Continuous>  dextrose 5%. 1000 milliLiter(s) (50 mL/Hr) IV Continuous <Continuous>  dextrose 50% Injectable 12.5 Gram(s) IV Push once  dextrose 50% Injectable 25 Gram(s) IV Push once  dextrose 50% Injectable 25 Gram(s) IV Push once  enoxaparin Injectable 40 milliGRAM(s) SubCutaneous every 12 hours  insulin lispro (ADMELOG) corrective regimen sliding scale   SubCutaneous Before meals and at bedtime  letrozole 2.5 milliGRAM(s) Oral daily  levalbuterol Inhalation 0.63 milliGRAM(s) Inhalation every 6 hours  methylPREDNISolone sodium succinate Injectable 40 milliGRAM(s) IV Push every 6 hours  nicotine - 21 mG/24Hr(s) Patch 1 Patch Transdermal daily  pantoprazole    Tablet 40 milliGRAM(s) Oral before breakfast    MEDICATIONS  (PRN):  LORazepam   Injectable 0.5 milliGRAM(s) IV Push every 4 hours PRN Anxiety      Antibiotics History  azithromycin  IVPB 500 milliGRAM(s) IV Intermittent every 24 hours, 10-27-23 @ 14:42      Heme Medications   aspirin enteric coated 81 milliGRAM(s) Oral daily, 10-27-23 @ 14:57  enoxaparin Injectable 40 milliGRAM(s) SubCutaneous every 12 hours, 10-27-23 @ 14:57      GI Medications  pantoprazole    Tablet 40 milliGRAM(s) Oral before breakfast, 10-28-23 @ 09:14, Routine         WBC Trend  10-29-23 @ 05:20   -  7.45  10-28-23 @ 05:00   -  10.27  10-27-23 @ 10:15   -  14.91<H>    H/H Trend  10-29-23 @ 05:20   -   10.0<L>/ 31.0<L>  10-28-23 @ 05:00   -   10.2<L>/ 31.8<L>  10-27-23 @ 10:15   -   12.5/ 39.4     Platelet Trend  10-29-23 @ 05:20   -  261  10-28-23 @ 05:00   -  280  10-27-23 @ 10:15   -  330    Trend Sodium  10-29-23 @ 05:20   -  134<L>  10-28-23 @ 05:00   -  134<L>  10-27-23 @ 10:15   -  136    Trend Potassium  10-29-23 @ 05:20   -  4.8  10-28-23 @ 05:00   -  4.4  10-27-23 @ 10:15   -  4.0    Trend Bun/Cr  10-29-23 @ 05:20  BUN/CR -  30<H> / 0.53  10-28-23 @ 05:00  BUN/CR -  16 / 0.36<L>  10-27-23 @ 10:15  BUN/CR -  15 / 0.56    Lactic Acid Trend  10-27-23 @ 10:15   -   1.9    ABG Trend  10-27-23 @ 10:00   - 7.38/45<H>/179<H>/97.9    Trend AST/ALT/ALK Phos/Bili  10-27-23 @ 10:15   48<H>/56<H>/85/0.3     Albumin Trend  10-27-23 @ 10:15   -   4.0      PTT - PT - INR Trend  10-27-23 @ 10:15   -   51.6<H> - 13.9<H> - 1.19<H>    Glucose Trend  10-29-23 @ 08:34   -  -- -- 147<H>  10-29-23 @ 05:20   -  170<H> -- --  10-28-23 @ 20:56   -  -- -- 140<H>  10-28-23 @ 17:10   -  -- -- 226<H>  10-28-23 @ 11:14   -  -- -- 171<H>  10-28-23 @ 06:10   -  -- -- 140<H>  10-28-23 @ 05:00   -  121<H> -- --  10-27-23 @ 23:02   -  -- -- 124<H>  10-27-23 @ 17:06   -  -- -- 281<H>  10-27-23 @ 10:15   -  129<H> -- --    A1C with Estimated Average Glucose Result: 5.3 % [4.0 - 5.6] (10-28-23 @ 05:00)      LABS:                        10.0   7.45  )-----------( 261      ( 29 Oct 2023 05:20 )             31.0     10-29    134<L>  |  98  |  30<H>  ----------------------------<  170<H>  4.8   |  28  |  0.53    Ca    8.5      29 Oct 2023 05:20  Phos  3.8     10-29  Mg     2.8     10-29       CULTURES: (if applicable)    Culture - Urine (collected 10-27-23 @ 14:02)  Source: Clean Catch Clean Catch (Midstream)  Final Report (10-28-23 @ 15:37):    No growth    Culture - Blood (collected 10-27-23 @ 10:15)  Source: .Blood Blood-Peripheral  Preliminary Report (10-28-23 @ 17:01):    No growth at 24 hours    Culture - Blood (collected 10-27-23 @ 10:15)  Source: .Blood Blood-Peripheral  Preliminary Report (10-28-23 @ 17:01):    No growth at 24 hours      Rapid RVP Result: Detected (10-27-23 @ 10:15)    RADIOLOGY  CXR:      CT:    ECHO:      VITALS:  T(C): 36.4 (10-29-23 @ 08:00), Max: 36.4 (10-29-23 @ 08:00)  T(F): 97.5 (10-29-23 @ 08:00), Max: 97.5 (10-29-23 @ 08:00)  HR: 110 (10-29-23 @ 09:00) (75 - 150)  BP: 161/83 (10-29-23 @ 09:00) (90/56 - 221/198)  BP(mean): 107 (10-29-23 @ 09:00) (68 - 207)  ABP: --  ABP(mean): --  RR: 20 (10-29-23 @ 09:00) (12 - 23)  SpO2: 100% (10-29-23 @ 09:00) (90% - 100%)  CVP(mm Hg): --  CVP(cm H2O): --    Ins and Outs     10-28-23 @ 07:01  -  10-29-23 @ 07:00  --------------------------------------------------------  IN: 547.1 mL / OUT: 1925 mL / NET: -1377.9 mL    10-29-23 @ 07:01  -  10-29-23 @ 09:53  --------------------------------------------------------  IN: 9.8 mL / OUT: 0 mL / NET: 9.8 mL        Height (cm): 154.9 (10-27-23 @ 14:30)  Weight (kg): 39 (10-27-23 @ 14:30)  BMI (kg/m2): 16.3 (10-27-23 @ 14:30)        I&O's Detail    28 Oct 2023 07:01  -  29 Oct 2023 07:00  --------------------------------------------------------  IN:    Dexmedetomidine: 97.1 mL    IV PiggyBack: 250 mL    Oral Fluid: 200 mL  Total IN: 547.1 mL    OUT:    Intermittent Catheterization - Urethral (mL): 1925 mL  Total OUT: 1925 mL    Total NET: -1377.9 mL      29 Oct 2023 07:01  -  29 Oct 2023 09:53  --------------------------------------------------------  IN:    Dexmedetomidine: 9.8 mL  Total IN: 9.8 mL    OUT:  Total OUT: 0 mL    Total NET: 9.8 mL

## 2023-10-29 NOTE — PROGRESS NOTE ADULT - ASSESSMENT
Physical Examination:  GENERAL:               Alert, Oriented, mod acute distress.    HEENT:                    No JVD, Dry MM, bitemporal waisting  PULM:                     Bilateral air entry, Diminished to auscultation bilaterally, no significant sputum production, No Rales, No Rhonchi, ++Wheezing  CVS:                         S1, S2,  No Murmur  ABD:                        Soft, nondistended, nontender, normoactive bowel sounds,   EXT:                         No edema, nontender, No Cyanosis or Clubbing    NEURO:                  Alert, oriented, interactive, nonfocal, follows commands  PSYC:                      Calm, + Insight.        Assessment  68 year old female  PMHx of COPD, CAD, HLD, DM2, on xarelto for DVT history, hx of breast ca s/p left mastectomy, on chemotx last tx was 1 week ago presenting with shortness of breath,  Being admitted to the hospital for respiratory failure due to COPD exacerbation in the setting of Entero/rhinovirus with COPD, severe Exacerbation    Problem List  1. Acute respiratory failure due to acute COPD Exacerbation in setting of ERV  2. Active nicotine use - 1 pk/day  3. uses home o2 PRN  4. CAD   5. HTN  6. DM type 2  7, h/o Breast cancer  8. H/o DVT on xaralto -     Plan  Alternate bipap with n/c  Continue Steroids  Nebs AC  Precedex on comfort   Broaden antibiotics add ceftriaxone for superimposed bacterial infection   continue nebs ATC    advance diet  Cont. Aspirin and statin   Cont. Lotrezole  Hold antihypertensives for now  Cont. AC with lovenox as on xarelto at home, swithc to oral a/c once respiratory status improves  Nicotine patch   out patient f/u with primary pulm Dr. Roland. will need yearly CT chest for lung cancer screaming   smoking cessation advised      PMD:				                   Notified(Date):  Family Updated: 	Maximiliano 	      [phone]                           Date:  10/29/2023 msg left for call back   Sedation & Analgesia:	n/a  Diet/Nutrition:		 soft diet  GI PPx:			pantoprazole    Tablet 40 milliGRAM(s) Oral before breakfast  DVT Ppx:		enoxaparin Injectable 40 milliGRAM(s) SubCutaneous every 12 hours     Activity:		  Advance as tolerated  Head of Bed:               35-45 Deg  Glycemic Control:        insulin lispro (ADMELOG) corrective regimen sliding scale   SubCutaneous Before meals and at bedtime    Lines:   PIV     Restraints were deemed necessary to prevent removal of life-sustaining devices [  ] YES   [  x  ]  NO    Disposition: ICU Care    Goals of Care: Full code

## 2023-10-30 NOTE — CONSULT NOTE ADULT - SUBJECTIVE AND OBJECTIVE BOX
This is a year old woman with a history of COPD, CAD, Hyperlipidemia, diabetes, hx of DVT on xarelto, hx of resected left breast cancer s/p mastectomy, patient receiving Herceptin as outpatient with Dr. Larios with the [facility].  Last herceptin dose was 1 week ago.  Patient now admitted to CCU for hypoxic respiratory failure COPD exacerbation, enterovirus infection.    MEDICATIONS  (STANDING):  ALPRAZolam 0.5 milliGRAM(s) Oral every 12 hours  aspirin enteric coated 81 milliGRAM(s) Oral daily  atorvastatin 80 milliGRAM(s) Oral at bedtime  buDESOnide    Inhalation Suspension 0.5 milliGRAM(s) Inhalation every 12 hours  cefTRIAXone   IVPB      cefTRIAXone   IVPB 1000 milliGRAM(s) IV Intermittent every 24 hours  chlorhexidine 2% Cloths 1 Application(s) Topical <User Schedule>  dexMEDEtomidine Infusion 0.4 MICROgram(s)/kG/Hr (3.9 mL/Hr) IV Continuous <Continuous>  dextrose 5%. 1000 milliLiter(s) (100 mL/Hr) IV Continuous <Continuous>  dextrose 5%. 1000 milliLiter(s) (50 mL/Hr) IV Continuous <Continuous>  dextrose 50% Injectable 25 Gram(s) IV Push once  dextrose 50% Injectable 12.5 Gram(s) IV Push once  dextrose 50% Injectable 25 Gram(s) IV Push once  enoxaparin Injectable 40 milliGRAM(s) SubCutaneous every 12 hours  insulin lispro (ADMELOG) corrective regimen sliding scale   SubCutaneous Before meals and at bedtime  lactated ringers. 1000 milliLiter(s) (75 mL/Hr) IV Continuous <Continuous>  letrozole 2.5 milliGRAM(s) Oral daily  levalbuterol Inhalation 0.63 milliGRAM(s) Inhalation every 6 hours  methylPREDNISolone sodium succinate Injectable 40 milliGRAM(s) IV Push every 6 hours  nicotine - 21 mG/24Hr(s) Patch 1 Patch Transdermal daily  pantoprazole    Tablet 40 milliGRAM(s) Oral before breakfast  tamsulosin 0.4 milliGRAM(s) Oral at bedtime    10-30    136  |  100  |  31<H>  ----------------------------<  156<H>  4.6   |  30  |  0.48<L>    Ca    8.1<L>      30 Oct 2023 05:30  Phos  3.7     10-30  Mg     2.9     10-30    TPro  7.1  /  Alb  3.3  /  TBili  0.2  /  DBili  x   /  AST  26  /  ALT  35  /  AlkPhos  56  10-30                        10.3   9.19  )-----------( 295      ( 30 Oct 2023 05:30 )             32.9     Vital Signs Last 24 Hrs  T(C): 36.8 (10-30-23 @ 16:00), Max: 36.8 (10-30-23 @ 16:00)  T(F): 98.2 (10-30-23 @ 16:00), Max: 98.2 (10-30-23 @ 16:00)  HR: 75 (10-30-23 @ 17:30) (72 - 136)  BP: 100/56 (10-30-23 @ 17:00) (93/62 - 188/103)  BP(mean): 69 (10-30-23 @ 17:00) (65 - 133)  RR: 19 (10-30-23 @ 17:00) (12 - 36)  SpO2: 97% (10-30-23 @ 17:30) (94% - 100%)

## 2023-10-30 NOTE — PROGRESS NOTE ADULT - ASSESSMENT
Physical Examination:  GENERAL:               Alert, Oriented, mod acute distress.    HEENT:                    No JVD, Dry MM, bitemporal waisting  PULM:                     Bilateral air entry, Diminished to auscultation bilaterally, no significant sputum production, No Rales, No Rhonchi, ++Wheezing  CVS:                         S1, S2,  No Murmur  ABD:                        Soft, nondistended, nontender, normoactive bowel sounds,   EXT:                         No edema, nontender, No Cyanosis or Clubbing    NEURO:                  Alert, oriented, interactive, nonfocal, follows commands  PSYC:                      Calm, + Insight.        Assessment  68 year old female  PMHx of COPD, CAD, HLD, DM2, on xarelto for DVT history, hx of breast ca s/p left mastectomy, on chemotx last tx was 1 week ago presenting with shortness of breath,  Being admitted to the hospital for respiratory failure due to COPD exacerbation in the setting of Entero/rhinovirus with COPD, severe Exacerbation    Problem List  1. Acute respiratory failure due to acute COPD Exacerbation in setting of ERV  2. Active nicotine use - 1 pk/day  3. uses home o2 PRN  4. CAD   5. HTN  6. DM type 2  7, h/o Breast cancer  8. H/o DVT on xaralto -     Plan  Alternate bipap with n/c  Continue Steroids  add Pulmicort nebs  Nebs AC  noted urinary retention, off anticholinergic meds, start flomax,    monitor off precedex   start xanax prn for anxiety  Finished course zithromax, continue 5 days of ceftriaxone  if able to be off bipap oral diet     continue nebs ATC     Cont. Aspirin and statin   Cont. Lotrezole  Hold antihypertensives for now  Cont. AC with lovenox as on xarelto at home, switch to oral a/c once respiratory status improves  Nicotine patch   out patient f/u with primary pulm Dr. Roland. will need yearly CT chest for lung cancer screaming   smoking cessation advised      PMD:				                   Notified(Date):  Family Updated: 	Maximiliano 	      [phone]                           Date:  10/29/2023 updated   Sedation & Analgesia:	n/a  Diet/Nutrition:		 soft diet  GI PPx:			pantoprazole    Tablet 40 milliGRAM(s) Oral before breakfast  DVT Ppx:		enoxaparin Injectable 40 milliGRAM(s) SubCutaneous every 12 hours     Activity:		  Advance as tolerated  Head of Bed:               35-45 Deg  Glycemic Control:        insulin lispro (ADMELOG) corrective regimen sliding scale   SubCutaneous Before meals and at bedtime    Lines:   PIV     Restraints were deemed necessary to prevent removal of life-sustaining devices [  ] YES   [  x  ]  NO    Disposition: ICU Care    Goals of Care: Full code

## 2023-10-30 NOTE — CONSULT NOTE ADULT - ASSESSMENT
This is a 68  year old woman with a history of resected breast cancer s/p resection, on adjuvant therapy, unclear what hte initial therapy was but more recently on Herceptin  Patient now admitted or COPD exacerbation, enterovirus infection.      Would not recommend administering Herceptin at the current time. It can cause effects of heart failure and could only be detrimental at the current time. Noted EF 55% on echocardiogram, diastolic function could not be fully visualized.      Priority would be to recover from the infection and hypoxic respiratory failure then return to Dr. Larios as outpatient after discharge.

## 2023-10-30 NOTE — PROGRESS NOTE ADULT - SUBJECTIVE AND OBJECTIVE BOX
Follow-up Critical Care Progress Note  Chief Complaint : Chronic obstructive pulmonary disease with acute exacerbation      Patient seen and examined  was on NIV   now on n/c   wheezing continues      Allergies :penicillin (Hives)      PAST MEDICAL & SURGICAL HISTORY:  COPD, severe    CAD (coronary artery disease)    Breast cancer    No significant past surgical history        Medications:  MEDICATIONS  (STANDING):  ALPRAZolam 0.5 milliGRAM(s) Oral every 12 hours  aspirin enteric coated 81 milliGRAM(s) Oral daily  atorvastatin 80 milliGRAM(s) Oral at bedtime  buDESOnide    Inhalation Suspension 0.5 milliGRAM(s) Inhalation every 12 hours  cefTRIAXone   IVPB      cefTRIAXone   IVPB 1000 milliGRAM(s) IV Intermittent every 24 hours  chlorhexidine 2% Cloths 1 Application(s) Topical <User Schedule>  dextrose 5%. 1000 milliLiter(s) (100 mL/Hr) IV Continuous <Continuous>  dextrose 5%. 1000 milliLiter(s) (50 mL/Hr) IV Continuous <Continuous>  dextrose 50% Injectable 25 Gram(s) IV Push once  dextrose 50% Injectable 12.5 Gram(s) IV Push once  dextrose 50% Injectable 25 Gram(s) IV Push once  enoxaparin Injectable 40 milliGRAM(s) SubCutaneous every 12 hours  insulin lispro (ADMELOG) corrective regimen sliding scale   SubCutaneous Before meals and at bedtime  lactated ringers. 1000 milliLiter(s) (75 mL/Hr) IV Continuous <Continuous>  letrozole 2.5 milliGRAM(s) Oral daily  levalbuterol Inhalation 0.63 milliGRAM(s) Inhalation every 6 hours  methylPREDNISolone sodium succinate Injectable 40 milliGRAM(s) IV Push every 6 hours  nicotine - 21 mG/24Hr(s) Patch 1 Patch Transdermal daily  pantoprazole    Tablet 40 milliGRAM(s) Oral before breakfast  tamsulosin 0.4 milliGRAM(s) Oral at bedtime    MEDICATIONS  (PRN):      Antibiotics History  azithromycin  IVPB 500 milliGRAM(s) IV Intermittent every 24 hours, 10-27-23 @ 14:42  cefTRIAXone   IVPB    , 10-29-23 @ 10:09  cefTRIAXone   IVPB 1000 milliGRAM(s) IV Intermittent once, 10-29-23 @ 10:09, Stop order after: 1 Doses  cefTRIAXone   IVPB 1000 milliGRAM(s) IV Intermittent every 24 hours, 10-30-23 @ 10:09, Stop order after: 5 Days      Heme Medications   aspirin enteric coated 81 milliGRAM(s) Oral daily, 10-27-23 @ 14:57  enoxaparin Injectable 40 milliGRAM(s) SubCutaneous every 12 hours, 10-27-23 @ 14:57      GI Medications  pantoprazole    Tablet 40 milliGRAM(s) Oral before breakfast, 10-28-23 @ 09:14, Routine      COVID  10-27-23 @ 10:15  COVID -   NotDetec       WBC Trend  10-30-23 @ 05:30   -  9.19  10-29-23 @ 05:20   -  7.45  10-28-23 @ 05:00   -  10.27  10-27-23 @ 10:15   -  14.91<H>    H/H Trend  10-30-23 @ 05:30   -   10.3<L>/ 32.9<L>  10-29-23 @ 05:20   -   10.0<L>/ 31.0<L>  10-28-23 @ 05:00   -   10.2<L>/ 31.8<L>  10-27-23 @ 10:15   -   12.5/ 39.4    Stool Occult Blood    Platelet Trend  10-30-23 @ 05:30   -  295  10-29-23 @ 05:20   -  261  10-28-23 @ 05:00   -  280  10-27-23 @ 10:15   -  330    Trend Sodium  10-30-23 @ 05:30   -  136  10-29-23 @ 05:20   -  134<L>  10-28-23 @ 05:00   -  134<L>  10-27-23 @ 10:15   -  136    Trend Potassium  10-30-23 @ 05:30   -  4.6  10-29-23 @ 05:20   -  4.8  10-28-23 @ 05:00   -  4.4  10-27-23 @ 10:15   -  4.0    Trend Bun/Cr  10-30-23 @ 05:30  BUN/CR -  31<H> / 0.48<L>  10-29-23 @ 05:20  BUN/CR -  30<H> / 0.53  10-28-23 @ 05:00  BUN/CR -  16 / 0.36<L>  10-27-23 @ 10:15  BUN/CR -  15 / 0.56    Lactic Acid Trend  10-27-23 @ 10:15   -   1.9    ABG Trend  10-30-23 @ 05:20   - 7.42/46<H>/105/97.5  10-29-23 @ 10:35   - 7.41/47<H>/85/96.2  10-27-23 @ 10:00   - 7.38/45<H>/179<H>/97.9    Trend AST/ALT/ALK Phos/Bili  10-30-23 @ 05:30   26/35/56/0.2  10-27-23 @ 10:15   48<H>/56<H>/85/0.3     Albumin Trend  10-30-23 @ 05:30   -   3.3  10-27-23 @ 10:15   -   4.0      PTT - PT - INR Trend  10-27-23 @ 10:15   -   51.6<H> - 13.9<H> - 1.19<H>    Glucose Trend  10-30-23 @ 08:53   -  -- -- 158<H>  10-30-23 @ 05:30   -  156<H> -- --  10-29-23 @ 21:27   -  -- -- 133<H>  10-29-23 @ 17:56   -  -- -- 146<H>  10-29-23 @ 11:46   -  -- -- 168<H>  10-29-23 @ 08:34   -  -- -- 147<H>  10-29-23 @ 05:20   -  170<H> -- --  10-28-23 @ 20:56   -  -- -- 140<H>  10-28-23 @ 17:10   -  -- -- 226<H>  10-28-23 @ 11:14   -  -- -- 171<H>    A1C with Estimated Average Glucose Result: 5.3 % [4.0 - 5.6] (10-28-23 @ 05:00)      LABS:                        10.3   9.19  )-----------( 295      ( 30 Oct 2023 05:30 )             32.9     10-30    136  |  100  |  31<H>  ----------------------------<  156<H>  4.6   |  30  |  0.48<L>    Ca    8.1<L>      30 Oct 2023 05:30  Phos  3.7     10-30  Mg     2.9     10-30    TPro  7.1  /  Alb  3.3  /  TBili  0.2  /  DBili  x   /  AST  26  /  ALT  35  /  AlkPhos  56  10-30           CULTURES: (if applicable)    Culture - Urine (collected 10-27-23 @ 14:02)  Source: Clean Catch Clean Catch (Midstream)  Final Report (10-28-23 @ 15:37):    No growth    Culture - Blood (collected 10-27-23 @ 10:15)  Source: .Blood Blood-Peripheral  Preliminary Report (10-29-23 @ 17:01):    No growth at 48 Hours    Culture - Blood (collected 10-27-23 @ 10:15)  Source: .Blood Blood-Peripheral  Preliminary Report (10-29-23 @ 17:01):    No growth at 48 Hours      Rapid RVP Result: Detected (10-27-23 @ 10:15)      ABG - ( 30 Oct 2023 05:20 )  pH, Arterial: 7.42  pH, Blood: x     /  pCO2: 46    /  pO2: 105   / HCO3: 30    / Base Excess: 5.3   /  SaO2: 97.5              VITALS:  T(C): 36.2 (10-30-23 @ 05:00), Max: 36.5 (10-29-23 @ 12:00)  T(F): 97.1 (10-30-23 @ 05:00), Max: 97.7 (10-29-23 @ 12:00)  HR: 114 (10-30-23 @ 09:14) (72 - 131)  BP: 109/57 (10-30-23 @ 08:00) (87/51 - 176/99)  BP(mean): 72 (10-30-23 @ 08:00) (62 - 122)  ABP: --  ABP(mean): --  RR: 17 (10-30-23 @ 08:00) (14 - 25)  SpO2: 95% (10-30-23 @ 09:14) (94% - 100%)  CVP(mm Hg): --  CVP(cm H2O): --    Ins and Outs     10-29-23 @ 07:01  -  10-30-23 @ 07:00  --------------------------------------------------------  IN: 1545.2 mL / OUT: 700 mL / NET: 845.2 mL        Height (cm): 154.9 (10-27-23 @ 14:30)  Weight (kg): 39 (10-27-23 @ 14:30)  BMI (kg/m2): 16.3 (10-27-23 @ 14:30)        I&O's Detail    29 Oct 2023 07:01  -  30 Oct 2023 07:00  --------------------------------------------------------  IN:    Dexmedetomidine: 95.2 mL    IV PiggyBack: 50 mL    Lactated Ringers: 900 mL    Lactated Ringers Bolus: 500 mL  Total IN: 1545.2 mL    OUT:    Intermittent Catheterization - Urethral (mL): 700 mL  Total OUT: 700 mL    Total NET: 845.2 mL

## 2023-10-30 NOTE — PROGRESS NOTE ADULT - SUBJECTIVE AND OBJECTIVE BOX
Patient is a 68y old  Female who presents with a chief complaint of . (30 Oct 2023 17:48)    HPI:  68F PMHx of COPD, HTN, HLD, DM, on xarelto for DVT history, hx of breast ca s/p left mastectomy, on chemotx last tx was 1 week ago presenting with shortness of breath, since last night. She is an active smoker. Last cigarette was yesterday. Denies any fevers or chills. ICU consulted as patient needing bipap support in the ED.  Admitted for acute hypoxic/hypercarbic respiratory failure, COPD exacerbation, and entero/rhinovirus positive    At bedside patient is calm on Precedex gtt and NIV      Allergies: penicillin    PAST MEDICAL & SURGICAL HISTORY:  COPD, severe      CAD (coronary artery disease)      Breast cancer      No significant past surgical history        FAMILY HISTORY:    SOCIAL HISTORY:    Home Medications:    Review of Systems:  Pertinent positives noted above, all other ROS negative x10 system    T(F): 98.2 (10-30-23 @ 16:00), Max: 98.2 (10-30-23 @ 16:00)  HR: 63 (10-30-23 @ 19:00) (63 - 136)  BP: 102/57 (10-30-23 @ 19:00) (93/62 - 188/103)  RR: 17 (10-30-23 @ 19:00) (12 - 36)  SpO2: 99% (10-30-23 @ 19:00)  Wt(kg): --    CAPILLARY BLOOD GLUCOSE      POCT Blood Glucose.: 171 mg/dL (30 Oct 2023 17:48)    I&O's Summary    29 Oct 2023 07:01  -  30 Oct 2023 07:00  --------------------------------------------------------  IN: 1545.2 mL / OUT: 700 mL / NET: 845.2 mL    30 Oct 2023 07:01  -  30 Oct 2023 20:37  --------------------------------------------------------  IN: 964.2 mL / OUT: 650 mL / NET: 314.2 mL        Physical Exam:     Gen: critically ill appearing female  Neuro: somnolent  CVS: +S1S2  Resp: CTA. Faint expiratory wheeze  Abd: soft NT ND  Ext: warm dry no edema  Skin: well perfuse    Meds:  cefTRIAXone   IVPB      cefTRIAXone   IVPB 1000 milliGRAM(s) IV Intermittent every 24 hours       atorvastatin 80 milliGRAM(s) Oral at bedtime  dextrose 50% Injectable 25 Gram(s) IV Push once  dextrose 50% Injectable 25 Gram(s) IV Push once  dextrose 50% Injectable 12.5 Gram(s) IV Push once  insulin lispro (ADMELOG) corrective regimen sliding scale   SubCutaneous Before meals and at bedtime  methylPREDNISolone sodium succinate Injectable 40 milliGRAM(s) IV Push every 6 hours     buDESOnide    Inhalation Suspension 0.5 milliGRAM(s) Inhalation every 12 hours  levalbuterol Inhalation 0.63 milliGRAM(s) Inhalation every 6 hours     ALPRAZolam 0.5 milliGRAM(s) Oral every 12 hours  dexMEDEtomidine Infusion 0.4 MICROgram(s)/kG/Hr IV Continuous <Continuous>     letrozole 2.5 milliGRAM(s) Oral daily     aspirin enteric coated 81 milliGRAM(s) Oral daily  enoxaparin Injectable 40 milliGRAM(s) SubCutaneous every 12 hours     pantoprazole    Tablet 40 milliGRAM(s) Oral before breakfast     tamsulosin 0.4 milliGRAM(s) Oral at bedtime     dextrose 5%. 1000 milliLiter(s) IV Continuous <Continuous>  dextrose 5%. 1000 milliLiter(s) IV Continuous <Continuous>  lactated ringers. 1000 milliLiter(s) IV Continuous <Continuous>        chlorhexidine 2% Cloths 1 Application(s) Topical <User Schedule>     nicotine - 21 mG/24Hr(s) Patch 1 Patch Transdermal daily                           10.3   9.19  )-----------( 295      ( 30 Oct 2023 05:30 )             32.9       10-30    136  |  100  |  31<H>  ----------------------------<  156<H>  4.6   |  30  |  0.48<L>    Ca    8.1<L>      30 Oct 2023 05:30  Phos  3.7     10-30  Mg     2.9     10-30    TPro  7.1  /  Alb  3.3  /  TBili  0.2  /  DBili  x   /  AST  26  /  ALT  35  /  AlkPhos  56  10-30            Urinalysis Basic - ( 30 Oct 2023 05:30 )    Color: x / Appearance: x / SG: x / pH: x  Gluc: 156 mg/dL / Ketone: x  / Bili: x / Urobili: x   Blood: x / Protein: x / Nitrite: x   Leuk Esterase: x / RBC: x / WBC x   Sq Epi: x / Non Sq Epi: x / Bacteria: x      Clean Catch Clean Catch (Midstream)   No growth -- 10-27 @ 14:02  .Blood Blood-Peripheral   No growth at 72 Hours -- 10-27 @ 10:15      Rapid RVP Result: Detected (10-27 @ 10:15)    ABG - ( 30 Oct 2023 05:20 )  pH, Arterial: 7.42  pH, Blood: x     /  pCO2: 46    /  pO2: 105   / HCO3: 30    / Base Excess: 5.3   /  SaO2: 97.5              Radiology:   < from: Xray Chest 1 View- PORTABLE-Routine (10.30.23 @ 09:19) >    ACC: [id number] EXAM:  XR CHEST PORTABLE ROUTINE 1V   ORDERED BY: PETER VELOZ     PROCEDURE DATE:  10/30/2023          INTERPRETATION:  TIME OF EXAM: October 30, 2023 at 8:37 AM.    CLINICAL INFORMATION: Hypoxia.    COMPARISON:  October 28, 2023.    TECHNIQUE:   AP Portable chest x-ray.    INTERPRETATION:    Status post left mastectomy with left axillary surgical clips is seen.  The heart is not enlarged. The thoracic aorta is calcified.  The mediastinum and vishnu are unremarkable.  There dalton right subclavian catheter with tip in the right atrium.  The lungs are hyperinflated.  The lungs are clear.  No pleural effusion or pneumothorax is seen.  There is scoliosis of the spine.        IMPRESSION:  Hyperinflated lungs.    Clear lungs.    --- End of Report ---            KORI DE LA CRUZ MD; Attending Radiologist  This document has been electronically signed. Oct 30 2023  4:33PM    < end of copied text >      Problems  Hypoxic/Hypercarbic respiratory failure  COPD exacerbation  Rhino/Entero virus      Assessment/Plan:    -Precedex gtt for anxiety/NIV compliance. Attempt to wean down by AM.   -Patient has remained NIV dependent during most of admission. Wean down in AM and trial transition to high flow NC. Actively titrating for O2 sat >90%. High risk of further respiratory deterioration  -Solu-medrol taper with standing bronchodilators ATC  -NPO while Bipap dependent. Trial diet when able to tolerate off PO  -Entero/Rhinovirus positive. Pan cultured. Empiric abx coverage w/ Azithromycin and Ceftriaxone  -Hx DVT; full AC w/ Lovenox BID while unable to take PO    Critical care time spent on this patient encounter, which includes documenting this note in the electronic medical record, was 42 minutes including assessing the presenting problems with associated risks, reviewing the medical record to prepare for the encounter, and meeting face to face with the patient to obtain additional history.  I have also performed an appropriate physical exam, made interventions listed and ordered and interpreted appropriate diagnostic studies as documented.     To improve communication and patient safety, I have coordinated care with the multidisciplinary team including the bedside nurse, appropriate attending of record and consultants as needed.

## 2023-10-31 NOTE — CONSULT NOTE ADULT - SUBJECTIVE AND OBJECTIVE BOX
HPI: 68F PMHx of COPD, HTN, HLD, DM, on xarelto for DVT history, hx of breast ca s/p left mastectomy, on chemo herceptin   last tx was 1 week ago presenting with shortness of breath. She is an active smoker.  Denies any fevers or chills. ICU consulted as patient needing bipap support in the ED. No chest pain or palpitations.           PAST MEDICAL & SURGICAL HISTORY:  COPD, severe      CAD (coronary artery disease)      Breast cancer      No significant past surgical history          SOCIAL HISTORY:    Admitted from:    home    Substance abuse history:              Tobacco hx:                  Alcohol hx:              Home Opioid hx:  Pentec[employer]al:                                    Preferred Language:    Surrogate/HCP/:      Maximiliano       Phone#:  [phone]    FAMILY HISTORY:    Baseline ADLs (prior to admission):    Allergies    penicillin (Hives)    Intolerances      Review of Systems: [ Unable to obtain due to poor mentation]    MEDICATIONS  (STANDING):  ALPRAZolam 0.5 milliGRAM(s) Oral every 12 hours  aspirin enteric coated 81 milliGRAM(s) Oral daily  atorvastatin 80 milliGRAM(s) Oral at bedtime  buDESOnide    Inhalation Suspension 0.5 milliGRAM(s) Inhalation every 12 hours  cefTRIAXone   IVPB      cefTRIAXone   IVPB 1000 milliGRAM(s) IV Intermittent every 24 hours  chlorhexidine 0.12% Liquid 15 milliLiter(s) Oral Mucosa every 12 hours  chlorhexidine 2% Cloths 1 Application(s) Topical <User Schedule>  dexMEDEtomidine Infusion 0.4 MICROgram(s)/kG/Hr (3.9 mL/Hr) IV Continuous <Continuous>  dextrose 5%. 1000 milliLiter(s) (100 mL/Hr) IV Continuous <Continuous>  dextrose 5%. 1000 milliLiter(s) (50 mL/Hr) IV Continuous <Continuous>  dextrose 50% Injectable 25 Gram(s) IV Push once  dextrose 50% Injectable 25 Gram(s) IV Push once  dextrose 50% Injectable 12.5 Gram(s) IV Push once  enoxaparin Injectable 40 milliGRAM(s) SubCutaneous every 12 hours  etomidate Injectable 20 milliGRAM(s) IV Push once  fentaNYL   Infusion. 0.5 MICROgram(s)/kG/Hr (1.95 mL/Hr) IV Continuous <Continuous>  insulin lispro (ADMELOG) corrective regimen sliding scale   SubCutaneous every 6 hours  lactated ringers. 1000 milliLiter(s) (75 mL/Hr) IV Continuous <Continuous>  letrozole 2.5 milliGRAM(s) Oral daily  levalbuterol Inhalation 0.63 milliGRAM(s) Inhalation every 6 hours  methylPREDNISolone sodium succinate Injectable 40 milliGRAM(s) IV Push every 6 hours  mupirocin 2% Nasal 1 Application(s) Both Nostrils two times a day  nicotine - 21 mG/24Hr(s) Patch 1 Patch Transdermal daily  pantoprazole    Tablet 40 milliGRAM(s) Oral before breakfast  propofol Injectable 10 milliGRAM(s) IV Push once  tamsulosin 0.4 milliGRAM(s) Oral at bedtime    MEDICATIONS  (PRN):      PHYSICAL EXAM:    Vital Signs Last 24 Hrs  T(C): 37 (31 Oct 2023 05:00), Max: 37 (31 Oct 2023 05:00)  T(F): 98.6 (31 Oct 2023 05:00), Max: 98.6 (31 Oct 2023 05:00)  HR: 54 (31 Oct 2023 09:03) (54 - 136)  BP: 144/84 (31 Oct 2023 09:00) (100/56 - 188/103)  BP(mean): 99 (31 Oct 2023 09:00) (69 - 133)  RR: 12 (31 Oct 2023 09:00) (12 - 36)  SpO2: 100% (31 Oct 2023 09:03) (95% - 100%)    Parameters below as of 31 Oct 2023 09:00  Patient On (Oxygen Delivery Method): BiPAP/CPAP        General: in ccu on bi-pap, lethargic,  nonverbal   Karnofsky Performance Score/Palliative Performance Status Version2:  20   %  PPSV: 10-20%   HEENT: normal  dry mouth    Lungs: coarse w/ tachypnea/labored breathing  on bi-pap  CV: normal -lilian  GI: normal , abd flat       : normal  incontinent  lyle  Musculoskeletal: normal w/  weakness     Skin: normal    Neuro: + deficits , lethargic, unarousable, non verbal   Oral intake ability: unable/only mouth care   Diet: [NPO] on bi-pap/lethargy     LABS:                        10.9   10.52 )-----------( 239      ( 31 Oct 2023 05:30 )             34.5     10-31    139  |  105  |  44<H>  ----------------------------<  177<H>  4.9   |  29  |  0.50    Ca    8.1<L>      31 Oct 2023 05:30  Phos  3.7     10-31  Mg     2.9     10-31    TPro  7.1  /  Alb  3.3  /  TBili  0.2  /  DBili  x   /  AST  26  /  ALT  35  /  AlkPhos  56  10-30    Urinalysis Basic - ( 31 Oct 2023 05:30 )    Color: x / Appearance: x / SG: x / pH: x  Gluc: 177 mg/dL / Ketone: x  / Bili: x / Urobili: x   Blood: x / Protein: x / Nitrite: x   Leuk Esterase: x / RBC: x / WBC x   Sq Epi: x / Non Sq Epi: x / Bacteria: x        RADIOLOGY & ADDITIONAL STUDIES: < from: Xray Chest 1 View- PORTABLE-Routine (10.30.23 @ 09:19) >  ACC: [id number] EXAM:  XR CHEST PORTABLE ROUTINE 1V   ORDERED BY: PETER VELOZ     PROCEDURE DATE:  10/30/2023          INTERPRETATION:  TIME OF EXAM: October 30, 2023 at 8:37 AM.    CLINICAL INFORMATION: Hypoxia.    COMPARISON:  October 28, 2023.    TECHNIQUE:   AP Portable chest x-ray.    INTERPRETATION:    Status post left mastectomy with left axillary surgical clips is seen.  The heart is not enlarged. The thoracic aorta is calcified.  The mediastinum and vishnu are unremarkable.  There dalton right subclavian catheter with tip in the right atrium.  The lungs are hyperinflated.  The lungs are clear.  No pleural effusion or pneumothorax is seen.  There is scoliosis of the spine.        IMPRESSION:  Hyperinflated lungs.    Clear lungs.        ADVANCE DIRECTIVES:   no full code   Advanced Care Planning discussion total time spent:

## 2023-10-31 NOTE — PROCEDURE NOTE - NSTRACHMINLEAKTECH_RESP_A_CORE
Occupational Therapy    Pt and family not interested in OT services, for pt is at [facility] due to his paraplegia.  Wife is a nurse and assists pt PRN.   Yes

## 2023-10-31 NOTE — PROGRESS NOTE ADULT - ASSESSMENT
Physical Examination:  GENERAL:               lethargic, severeacute distress.    HEENT:                    No JVD, Dry MM, bitemporal waisting  PULM:                     Bilateral air entry, Diminished to auscultation bilaterally, no significant sputum production, No Rales, No Rhonchi, ++Wheezing  CVS:                         S1, S2,  No Murmur  ABD:                        Soft, nondistended, nontender, normoactive bowel sounds,   EXT:                         No edema, nontender, No Cyanosis or Clubbing    NEURO:                   lethargic, maoning, moving all 4 extremeties  PSYC:                      Calm, limited Insight.        Assessment  68 year old female  PMHx of COPD, CAD, HLD, DM2, on xarelto for DVT history, hx of breast ca s/p left mastectomy, on chemotx last tx was 1 week ago presenting with shortness of breath,  Being admitted to the hospital for respiratory failure due to COPD exacerbation in the setting of Entero/rhinovirus with COPD, severe Exacerbation    Problem List  1. Acute respiratory failure due to acute COPD Exacerbation in setting of ERV  2. Active nicotine use - 1 pk/day  3. uses home o2 PRN  4. CAD   5. HTN  6. DM type 2  7, h/o Breast cancer  8. H/o DVT on xaralto -     Plan  clinically not improving with current therapy   case d/w family above and agreed to intubation  start mechanical ventilation  sedation with precedex and fentanyl  lyle placed for urinary retention, start cardura  finishing course of antibiotics.     continue nebs ATC     Cont. Aspirin and statin   Cont. Lotrezole  Hold antihypertensives for now  Cont. AC with lovenox as on xarelto at home, switch to oral a/c once respiratory status improves  Nicotine patch     out patient f/u with primary pulm Dr. Roland. will need yearly CT chest for lung cancer screaming        PMD:				                   Notified(Date):  Family Updated: 	Maximiliano 	      [phone]                           Date:  10/31/2023 updated   Sedation & Analgesia:	n/a  Diet/Nutrition:		 soft diet  GI PPx:			pantoprazole    Tablet 40 milliGRAM(s) Oral before breakfast  DVT Ppx:		enoxaparin Injectable 40 milliGRAM(s) SubCutaneous every 12 hours     Activity:		  Advance as tolerated  Head of Bed:               35-45 Deg  Glycemic Control:        insulin lispro (ADMELOG) corrective regimen sliding scale   SubCutaneous Before meals and at bedtime    Lines:   PIV     Restraints were deemed necessary to prevent removal of life-sustaining devices [  ] YES   [  x  ]  NO    Disposition: ICU Care    Goals of Care: Full code

## 2023-10-31 NOTE — PROGRESS NOTE ADULT - SUBJECTIVE AND OBJECTIVE BOX
Follow-up Critical Care Progress Note  Chief Complaint : Chronic obstructive pulmonary disease with acute exacerbation      patient seen and examined  today noted to be in respiratory distress despite being on NIV  noted to be diffusely wheezing   on precedex again for anxiety as removing NIV, and worsening mental status.       Allergies :penicillin (Hives)      PAST MEDICAL & SURGICAL HISTORY:  COPD, severe    CAD (coronary artery disease)    Breast cancer    No significant past surgical history        Medications:  MEDICATIONS  (STANDING):  ALPRAZolam 0.5 milliGRAM(s) Oral every 12 hours  aspirin enteric coated 81 milliGRAM(s) Oral daily  atorvastatin 80 milliGRAM(s) Oral at bedtime  buDESOnide    Inhalation Suspension 0.5 milliGRAM(s) Inhalation every 12 hours  cefTRIAXone   IVPB      cefTRIAXone   IVPB 1000 milliGRAM(s) IV Intermittent every 24 hours  chlorhexidine 2% Cloths 1 Application(s) Topical <User Schedule>  dexMEDEtomidine Infusion 0.4 MICROgram(s)/kG/Hr (3.9 mL/Hr) IV Continuous <Continuous>  dextrose 5%. 1000 milliLiter(s) (100 mL/Hr) IV Continuous <Continuous>  dextrose 5%. 1000 milliLiter(s) (50 mL/Hr) IV Continuous <Continuous>  dextrose 50% Injectable 25 Gram(s) IV Push once  dextrose 50% Injectable 12.5 Gram(s) IV Push once  dextrose 50% Injectable 25 Gram(s) IV Push once  enoxaparin Injectable 40 milliGRAM(s) SubCutaneous every 12 hours  insulin lispro (ADMELOG) corrective regimen sliding scale   SubCutaneous every 6 hours  lactated ringers. 1000 milliLiter(s) (75 mL/Hr) IV Continuous <Continuous>  letrozole 2.5 milliGRAM(s) Oral daily  levalbuterol Inhalation 0.63 milliGRAM(s) Inhalation every 6 hours  methylPREDNISolone sodium succinate Injectable 40 milliGRAM(s) IV Push every 6 hours  nicotine - 21 mG/24Hr(s) Patch 1 Patch Transdermal daily  pantoprazole    Tablet 40 milliGRAM(s) Oral before breakfast  tamsulosin 0.4 milliGRAM(s) Oral at bedtime    MEDICATIONS  (PRN):      Antibiotics History  azithromycin  IVPB 500 milliGRAM(s) IV Intermittent every 24 hours, 10-27-23 @ 14:42  cefTRIAXone   IVPB    , 10-29-23 @ 10:09  cefTRIAXone   IVPB 1000 milliGRAM(s) IV Intermittent once, 10-29-23 @ 10:09, Stop order after: 1 Doses  cefTRIAXone   IVPB 1000 milliGRAM(s) IV Intermittent every 24 hours, 10-30-23 @ 10:09, Stop order after: 5 Days      Heme Medications   aspirin enteric coated 81 milliGRAM(s) Oral daily, 10-27-23 @ 14:57  enoxaparin Injectable 40 milliGRAM(s) SubCutaneous every 12 hours, 10-27-23 @ 14:57      GI Medications  pantoprazole    Tablet 40 milliGRAM(s) Oral before breakfast, 10-28-23 @ 09:14, Routine      COVID  10-27-23 @ 10:15  COVID -   NotDetec         WBC Trend  10-31-23 @ 05:30   -  10.52<H>  10-30-23 @ 05:30   -  9.19  10-29-23 @ 05:20   -  7.45    H/H Trend  10-31-23 @ 05:30   -   10.9<L>/ 34.5  10-30-23 @ 05:30   -   10.3<L>/ 32.9<L>  10-29-23 @ 05:20   -   10.0<L>/ 31.0<L>  10-28-23 @ 05:00   -   10.2<L>/ 31.8<L>  10-27-23 @ 10:15   -   12.5/ 39.4    Stool Occult Blood    Platelet Trend  10-31-23 @ 05:30   -  239  10-30-23 @ 05:30   -  295  10-29-23 @ 05:20   -  261    Trend Sodium  10-31-23 @ 05:30   -  139  10-30-23 @ 05:30   -  136  10-29-23 @ 05:20   -  134<L>    Trend Potassium  10-31-23 @ 05:30   -  4.9  10-30-23 @ 05:30   -  4.6  10-29-23 @ 05:20   -  4.8    Trend Bun/Cr  10-31-23 @ 05:30  BUN/CR -  44<H> / 0.50  10-30-23 @ 05:30  BUN/CR -  31<H> / 0.48<L>  10-29-23 @ 05:20  BUN/CR -  30<H> / 0.53    Lactic Acid Trend  10-27-23 @ 10:15   -   1.9    ABG Trend  10-31-23 @ 08:10   - 7.35/50<H>/111<H>/97.1  10-30-23 @ 05:20   - 7.42/46<H>/105/97.5  10-29-23 @ 10:35   - 7.41/47<H>/85/96.2  10-27-23 @ 10:00   - 7.38/45<H>/179<H>/97.9    Trend AST/ALT/ALK Phos/Bili  10-30-23 @ 05:30   26/35/56/0.2  10-27-23 @ 10:15   48<H>/56<H>/85/0.3         Albumin Trend  10-30-23 @ 05:30   -   3.3  10-27-23 @ 10:15   -   4.0      PTT - PT - INR Trend  10-27-23 @ 10:15   -   51.6<H> - 13.9<H> - 1.19<H>    Glucose Trend  10-31-23 @ 08:45   -  -- -- 163<H>  10-31-23 @ 05:30   -  177<H> -- --  10-30-23 @ 21:09   -  -- -- 153<H>  10-30-23 @ 17:48   -  -- -- 171<H>  10-30-23 @ 11:34   -  -- -- 137<H>  10-30-23 @ 08:53   -  -- -- 158<H>  10-30-23 @ 05:30   -  156<H> -- --  10-29-23 @ 21:27   -  -- -- 133<H>  10-29-23 @ 17:56   -  -- -- 146<H>  10-29-23 @ 11:46   -  -- -- 168<H>    A1C with Estimated Average Glucose Result: 5.3 % [4.0 - 5.6] (10-28-23 @ 05:00)      LABS:                        10.9   10.52 )-----------( 239      ( 31 Oct 2023 05:30 )             34.5     10-31    139  |  105  |  44<H>  ----------------------------<  177<H>  4.9   |  29  |  0.50    Ca    8.1<L>      31 Oct 2023 05:30  Phos  3.7     10-31  Mg     2.9     10-31    TPro  7.1  /  Alb  3.3  /  TBili  0.2  /  DBili  x   /  AST  26  /  ALT  35  /  AlkPhos  56  10-30                 CULTURES: (if applicable)    Culture - Urine (collected 10-27-23 @ 14:02)  Source: Clean Catch Clean Catch (Midstream)  Final Report (10-28-23 @ 15:37):    No growth    Culture - Blood (collected 10-27-23 @ 10:15)  Source: .Blood Blood-Peripheral  Preliminary Report (10-30-23 @ 17:01):    No growth at 72 Hours    Culture - Blood (collected 10-27-23 @ 10:15)  Source: .Blood Blood-Peripheral  Preliminary Report (10-30-23 @ 17:01):    No growth at 72 Hours      Rapid RVP Result: Detected (10-27-23 @ 10:15)      ABG - ( 31 Oct 2023 08:10 )  pH, Arterial: 7.35  pH, Blood: x     /  pCO2: 50    /  pO2: 111   / HCO3: 28    / Base Excess: 2.0   /  SaO2: 97.1             VITALS:  T(C): 37 (10-31-23 @ 05:00), Max: 37 (10-31-23 @ 05:00)  T(F): 98.6 (10-31-23 @ 05:00), Max: 98.6 (10-31-23 @ 05:00)  HR: 54 (10-31-23 @ 09:03) (54 - 136)  BP: 154/77 (10-31-23 @ 08:00) (100/56 - 188/103)  BP(mean): 102 (10-31-23 @ 08:00) (69 - 133)  ABP: --  ABP(mean): --  RR: 13 (10-31-23 @ 08:00) (12 - 36)  SpO2: 100% (10-31-23 @ 09:03) (94% - 100%)  CVP(mm Hg): --  CVP(cm H2O): --    Ins and Outs     10-30-23 @ 07:01  -  10-31-23 @ 07:00  --------------------------------------------------------  IN: 2443.8 mL / OUT: 835 mL / NET: 1608.8 mL                I&O's Detail    30 Oct 2023 07:01  -  31 Oct 2023 07:00  --------------------------------------------------------  IN:    Dexmedetomidine: 218.8 mL    IV PiggyBack: 500 mL    Lactated Ringers: 1725 mL  Total IN: 2443.8 mL    OUT:    Indwelling Catheter - Urethral (mL): 835 mL  Total OUT: 835 mL    Total NET: 1608.8 mL

## 2023-10-31 NOTE — CONSULT NOTE ADULT - ASSESSMENT
A/P 68F PMHx of COPD, CAD, HLD, DM, on xarelto for DVT history, hx of breast ca s/p left mastectomy, on chemo , w/ herceptin ,  last tx was 1 week ago presenting with shortness of breath.   Being admitted to the hospital for respiratory failure due to COPD exacerbation in the setting of Entero/rhinovirus  with COPD, severe Exacerbation.      CCU:  on rounds,  noted to be in respiratory distress despite being on NIV  w/ diffuse wheezing   on precedex again for anxiety was removing NI overnight , and now w/ worsening mental status.    Problem List   Acute respiratory failure due to acute COPD Exacerbation in setting of ERV   Active nicotine use - 1 pk/day    CAD    HTN   DM type 2   h/o Breast cancer     - on herceptin weekly          Plan  clinically not improving  today on current therapy   CCU discussed case w/  family today after rounds,  and they agreed to intubation  ccu intubated and on mechanical ventilation   finishing course of antibiotics      Palliative :  Was asked today for goc assist  /support, pt reviewed/discussed  on rounds, and chart reviewed.  Pt 67 yo F w/ hx COPD , on home o2 , breast CA, DM, CAD , and is still actively smoking.   Now here w/ acute sob w/ exac COPD, and  + ERV . Pt  chronically ill appearing , very weak, was non verbal, and appears older than stated age . Pt was intubated this morning after rounds.    Upon chart review , there are previously documented GOC  discussions from this admission c/w full code status . Family actively involved as pt came from her home.   Pt now intubated, will cont to follow w/ ccu team, supportive care , w/ recs pending clinical course.

## 2023-10-31 NOTE — PROGRESS NOTE ADULT - SUBJECTIVE AND OBJECTIVE BOX
. 68F PMHx of COPD, HTN, HLD, DM, on xarelto for DVT history, hx of breast ca s/p left mastectomy, on chemotx last tx was 1 week ago presenting with shortness of breath, since last night. She is an active smoker. Last cigarette was yesterday. Denies any fevers or chills. ICU consulted as patient needing bipap support in the ED.  Admitted for acute hypoxic/hypercarbic respiratory failure, COPD exacerbation, and entero/rhinovirus positive    Contacted by RN that patient ripping off Bipap mask. At bedside patient tachypneic and labored breathing. Increased Precedex gtt to 1.5 mcg/kg/hr and ordered for additional 1mg IVP Ativan. Stressed compliance with patient.  High risk of further respiratory decompensation    Critical care time spent on this patient encounter, which includes documenting this note in the electronic medical record, was 36 minutes including assessing the presenting problems with associated risks, reviewing the medical record to prepare for the encounter, and meeting face to face with the patient to obtain additional history.  I have also performed an appropriate physical exam, made interventions listed and ordered and interpreted appropriate diagnostic studies as documented.     To improve communication and patient safety, I have coordinated care with the multidisciplinary team including the bedside nurse, appropriate attending of record and consultants as needed.

## 2023-10-31 NOTE — CHART NOTE - NSCHARTNOTEFT_GEN_A_CORE
Nutrition Follow Up Note  Hospital Course (Per Electronic Medical Record):   Source: Medical Record [X] Patient [X] Family [X] Nursing Staff [X]     Diet: NPO    Patient continues on BIPAP , requested po diet to be discontinued due to same , patient unable to be removed & consume po diet temporally. Patient on Precedex , noted very anxious as per nursing , as per MD patient ? need for intubation  today , Labs reviewed , hx of DM recent A1c 5.3% , insulin coverage noted , suggest initiate Vital 1. 5 @ 25 ml/hr x 18 hrs & will advance as medically indicated     Current Weight: (10/31) 90.3/41kg                          (10/30) 80.9/36.7kg                          (10/29) 80/36.3kg     Pertinent Medications: MEDICATIONS  (STANDING):  ALPRAZolam 0.5 milliGRAM(s) Oral every 12 hours  aspirin enteric coated 81 milliGRAM(s) Oral daily  atorvastatin 80 milliGRAM(s) Oral at bedtime  buDESOnide    Inhalation Suspension 0.5 milliGRAM(s) Inhalation every 12 hours  cefTRIAXone   IVPB      cefTRIAXone   IVPB 1000 milliGRAM(s) IV Intermittent every 24 hours  chlorhexidine 2% Cloths 1 Application(s) Topical <User Schedule>  dexMEDEtomidine Infusion 0.4 MICROgram(s)/kG/Hr (3.9 mL/Hr) IV Continuous <Continuous>  dextrose 5%. 1000 milliLiter(s) (100 mL/Hr) IV Continuous <Continuous>  dextrose 5%. 1000 milliLiter(s) (50 mL/Hr) IV Continuous <Continuous>  dextrose 50% Injectable 25 Gram(s) IV Push once  dextrose 50% Injectable 12.5 Gram(s) IV Push once  dextrose 50% Injectable 25 Gram(s) IV Push once  enoxaparin Injectable 40 milliGRAM(s) SubCutaneous every 12 hours  insulin lispro (ADMELOG) corrective regimen sliding scale   SubCutaneous every 6 hours  lactated ringers. 1000 milliLiter(s) (75 mL/Hr) IV Continuous <Continuous>  letrozole 2.5 milliGRAM(s) Oral daily  levalbuterol Inhalation 0.63 milliGRAM(s) Inhalation every 6 hours  methylPREDNISolone sodium succinate Injectable 40 milliGRAM(s) IV Push every 6 hours  nicotine - 21 mG/24Hr(s) Patch 1 Patch Transdermal daily  pantoprazole    Tablet 40 milliGRAM(s) Oral before breakfast  tamsulosin 0.4 milliGRAM(s) Oral at bedtime    MEDICATIONS  (PRN):      Pertinent Labs:  10-31 Na139 mmol/L Glu 177 mg/dL<H> K+ 4.9 mmol/L Cr  0.50 mg/dL BUN 44 mg/dL<H> 10-31 Phos 3.7 mg/dL 10-30 Alb 3.3 g/dL      Skin: ecchymotic     Edema:none    Last BM: (10/27)     Estimated Needs:   [X] No Change since Previous Assessment  [X] Recalculated:     Previous Nutrition Diagnosis: severe protein calorie malnutrition     Nutrition Diagnosis is [X] Ongoing       New Nutrition Diagnosis: [X] Not Applicable    Interventions:   1 initiate EN feeds of Vital 1.5b @ 25ml/hr x 18 hrs     Monitoring & Evaluation: will monitor:  [X] Weights   [X] Follow Up (Per Protocol)        RD to follow as per Nutrition protocol  Maude Maldonado RD, CDN Nutrition Follow Up Note  Hospital Course (Per Electronic Medical Record):   Source: Medical Record [X] Patient [X] Family [X] Nursing Staff [X]     Diet: NPO    Patient continues on BIPAP , requested po diet to be discontinued due to same , patient unable to be removed & consume po diet temporally. Patient on Precedex , noted very anxious as per nursing , as per MD patient ? need for intubation  today , Labs reviewed , hx of DM recent A1c 5.3% , insulin coverage noted , suggest initiate Vital 1. 5 @ 25 ml/hr x 18 hrs & will advance as medically indicated     Current Weight: (10/31) 90.3/41kg                          (10/30) 80.9/36.7kg                          (10/29) 80/36.3kg     Pertinent Medications: MEDICATIONS  (STANDING):  ALPRAZolam 0.5 milliGRAM(s) Oral every 12 hours  aspirin enteric coated 81 milliGRAM(s) Oral daily  atorvastatin 80 milliGRAM(s) Oral at bedtime  buDESOnide    Inhalation Suspension 0.5 milliGRAM(s) Inhalation every 12 hours  cefTRIAXone   IVPB      cefTRIAXone   IVPB 1000 milliGRAM(s) IV Intermittent every 24 hours  chlorhexidine 2% Cloths 1 Application(s) Topical <User Schedule>  dexMEDEtomidine Infusion 0.4 MICROgram(s)/kG/Hr (3.9 mL/Hr) IV Continuous <Continuous>  dextrose 5%. 1000 milliLiter(s) (100 mL/Hr) IV Continuous <Continuous>  dextrose 5%. 1000 milliLiter(s) (50 mL/Hr) IV Continuous <Continuous>  dextrose 50% Injectable 25 Gram(s) IV Push once  dextrose 50% Injectable 12.5 Gram(s) IV Push once  dextrose 50% Injectable 25 Gram(s) IV Push once  enoxaparin Injectable 40 milliGRAM(s) SubCutaneous every 12 hours  insulin lispro (ADMELOG) corrective regimen sliding scale   SubCutaneous every 6 hours  lactated ringers. 1000 milliLiter(s) (75 mL/Hr) IV Continuous <Continuous>  letrozole 2.5 milliGRAM(s) Oral daily  levalbuterol Inhalation 0.63 milliGRAM(s) Inhalation every 6 hours  methylPREDNISolone sodium succinate Injectable 40 milliGRAM(s) IV Push every 6 hours  nicotine - 21 mG/24Hr(s) Patch 1 Patch Transdermal daily  pantoprazole    Tablet 40 milliGRAM(s) Oral before breakfast  tamsulosin 0.4 milliGRAM(s) Oral at bedtime    MEDICATIONS  (PRN):      Pertinent Labs:  10-31 Na139 mmol/L Glu 177 mg/dL<H> K+ 4.9 mmol/L Cr  0.50 mg/dL BUN 44 mg/dL<H> 10-31 Phos 3.7 mg/dL 10-30 Alb 3.3 g/dL Hgb 10.9g/dl<L> < Hct 34.5%, Mg 2.9mg/dl<L>   POCT 163,153,171,137      Skin: ecchymotic     Edema:none    Last BM: (10/27)     Estimated Needs:   [X] No Change since Previous Assessment  [X] Recalculated:     Previous Nutrition Diagnosis: severe protein calorie malnutrition     Nutrition Diagnosis is [X] Ongoing       New Nutrition Diagnosis: [X] Not Applicable    Interventions:   1 initiate EN feeds of Vital 1.5b @ 25ml/hr x 18 hrs     Monitoring & Evaluation: will monitor:  [X] Weights   [X] Follow Up (Per Protocol)        RD to follow as per Nutrition protocol  Maude Maldonado RD, CDN

## 2023-11-01 NOTE — CHART NOTE - NSCHARTNOTEFT_GEN_A_CORE
Nutrition Follow Up Note  Hospital Course (Per Electronic Medical Record):   Source: Medical Record [X] MD[X] Nursing Staff [X]     Diet: Vital 1.5 @ 25 ml/hr x 18 hrs via NGT     Patient presently intubated Propofol infusing @ 3.5ml/hr which is providing 92non protein calories . , EN  feeds of Vital 1.5 @ 25ml/hr x 18 hrs & , LR @ 75ml/hr infusing ,Patient tolerating current EN feeds , suggest increase rate to 45ml/hr x which will provide will provide 1215kcals, 55gmsprotein 113dtb69 (29kcal/1.3g/kg , current body weight (41.8kg) , Labs reviewed , hx of DM  noted A1c 5.3% , POCT reviewed , insulin coverage noted . will follow clinical course     Current Weight: (11/1) 92.1/41.8kg                          (10/31) 90.3/41kg                         (10/30) 80.9/36.7kg   admit weight noted 86/39gms     Pertinent Medications: MEDICATIONS  (STANDING):  aspirin enteric coated 81 milliGRAM(s) Oral daily  atorvastatin 80 milliGRAM(s) Oral at bedtime  buDESOnide    Inhalation Suspension 0.5 milliGRAM(s) Inhalation every 12 hours  cefTRIAXone   IVPB      cefTRIAXone   IVPB 1000 milliGRAM(s) IV Intermittent every 24 hours  chlorhexidine 0.12% Liquid 15 milliLiter(s) Oral Mucosa every 12 hours  chlorhexidine 2% Cloths 1 Application(s) Topical <User Schedule>  dextrose 5%. 1000 milliLiter(s) (100 mL/Hr) IV Continuous <Continuous>  dextrose 5%. 1000 milliLiter(s) (50 mL/Hr) IV Continuous <Continuous>  dextrose 50% Injectable 25 Gram(s) IV Push once  dextrose 50% Injectable 25 Gram(s) IV Push once  dextrose 50% Injectable 12.5 Gram(s) IV Push once  doxazosin 2 milliGRAM(s) Oral at bedtime  enoxaparin Injectable 40 milliGRAM(s) SubCutaneous every 12 hours  fentaNYL   Infusion. 0.5 MICROgram(s)/kG/Hr (1.95 mL/Hr) IV Continuous <Continuous>  insulin lispro (ADMELOG) corrective regimen sliding scale   SubCutaneous every 6 hours  lactated ringers. 1000 milliLiter(s) (75 mL/Hr) IV Continuous <Continuous>  letrozole 2.5 milliGRAM(s) Oral daily  levalbuterol Inhalation 0.63 milliGRAM(s) Inhalation every 6 hours  methylPREDNISolone sodium succinate Injectable 40 milliGRAM(s) IV Push every 6 hours  mupirocin 2% Nasal 1 Application(s) Both Nostrils two times a day  nicotine - 21 mG/24Hr(s) Patch 1 Patch Transdermal daily  pantoprazole   Suspension 40 milliGRAM(s) Enteral Tube daily  propofol Infusion 20 MICROgram(s)/kG/Min (4.68 mL/Hr) IV Continuous <Continuous>    MEDICATIONS  (PRN):      Pertinent Labs:  11-01 Na138 mmol/L Glu 191 mg/dL<H> K+ 5.0 mmol/L Cr  0.36 mg/dL<L> BUN 32 mg/dL<H> 11-01 Phos 2.7 mg/dL 10-30 Alb 3.3 g/dL, Hgb 9.8g/dl<L. , Hct 30.9% <L> , Mg 2.4mg/dl  POCT 205,198,177,136      Skin: red blanchable , intact    Edema: generalized nonpitting (10/31)     Last BM: (10/27)     Estimated Needs:   [X] No Change since Previous Assessment      Previous Nutrition Diagnosis: Severe protein calore malnutrition     Nutrition Diagnosis is [X] Ongoing & addressed          New Nutrition Diagnosis: [X] Not Applicable      Interventions:   1. Recommend advance EN feeds to 45ml/hr as tolerated       Monitoring & Evaluation: will monitor:  [X] Weights   [X] Follow Up (Per Protocol)  [X] Tolerance to Diet Prescription       RD to follow as per Nutrition protocol  Maude Maldonado RD, CDN

## 2023-11-01 NOTE — PROGRESS NOTE ADULT - ASSESSMENT
69 y/o Female with PMHx of COPD, HTN, HLD, DM, DVT (On Xarelto), Breast Cancer (s/p Left Mastectomy, On Chemo) presents to  ED complaining of shortness of breath with:         1. Acute Hypoxic/ Hypercapnic Respiratory Failure  2. Acute COPD Exacerbation   3. Entero/Rhinovirus        Neuro: Currently sedated on Propofol and Fentanyl, titrate to RASS of 0 to -1. Avoid delirogenic medications.   Resp: Acute hypoxic/ hypercapnic secondary to COPD exacerbation, failed NIPPV. Mechanically ventilated, goal TV 5-7cc/IDW. Actively titrating FiO2 as tolerated. pPlat goal <30. Aggressive pulmonary toileting. C/W steroids.   CV: Hemodynamically stable. Continue to hold anti-HTN regimen. C/W Lipitor. Monitor for clinical and laboratory end points of perfusion, maintain MAP >65.    GI: NPO w/ tube feeds. NGT in place. GI prophylaxis with Protonix.   Renal: SAVANNA, likely pre-renal. Vega catheter in place. Adequate urine output. Gentle hydration w/ LR@75/hr. Trends labs, replete lytes as needed to maintain K>4, Mg>2, and Phos >2.   ID: Leukocytosis, afebrile. RVP +Entero/rhinovirus. Empiric coverage for superimposed bacterial infection with Ceftriaxone. Trend WBC and fever.    Endo: BG Q6hrs while NPO. Goal -180 while critically ill.   Heme: H/H stable. Trend H/H, transfuse for hgb <7.0 if necessary. Full AC w/ Lovenox. SCDs in place.

## 2023-11-01 NOTE — PROGRESS NOTE ADULT - SUBJECTIVE AND OBJECTIVE BOX
Follow-up Critical Care Progress Note  Chief Complaint : Chronic obstructive pulmonary disease with acute exacerbation      patient seen and examined  continues to wheezing   on Vent noted auto peeping present  RR decreased      Allergies :penicillin (Hives)      PAST MEDICAL & SURGICAL HISTORY:  COPD, severe    CAD (coronary artery disease)    Breast cancer    No significant past surgical history        Medications:  MEDICATIONS  (STANDING):  aspirin enteric coated 81 milliGRAM(s) Oral daily  atorvastatin 80 milliGRAM(s) Oral at bedtime  buDESOnide    Inhalation Suspension 0.5 milliGRAM(s) Inhalation every 12 hours  cefTRIAXone   IVPB      cefTRIAXone   IVPB 1000 milliGRAM(s) IV Intermittent every 24 hours  chlorhexidine 0.12% Liquid 15 milliLiter(s) Oral Mucosa every 12 hours  chlorhexidine 2% Cloths 1 Application(s) Topical <User Schedule>  dextrose 5%. 1000 milliLiter(s) (100 mL/Hr) IV Continuous <Continuous>  dextrose 5%. 1000 milliLiter(s) (50 mL/Hr) IV Continuous <Continuous>  dextrose 50% Injectable 25 Gram(s) IV Push once  dextrose 50% Injectable 12.5 Gram(s) IV Push once  dextrose 50% Injectable 25 Gram(s) IV Push once  doxazosin 2 milliGRAM(s) Oral at bedtime  enoxaparin Injectable 40 milliGRAM(s) SubCutaneous every 12 hours  fentaNYL   Infusion. 0.5 MICROgram(s)/kG/Hr (1.95 mL/Hr) IV Continuous <Continuous>  insulin lispro (ADMELOG) corrective regimen sliding scale   SubCutaneous every 6 hours  lactated ringers. 1000 milliLiter(s) (75 mL/Hr) IV Continuous <Continuous>  letrozole 2.5 milliGRAM(s) Oral daily  levalbuterol Inhalation 0.63 milliGRAM(s) Inhalation every 6 hours  methylPREDNISolone sodium succinate Injectable 40 milliGRAM(s) IV Push every 6 hours  mupirocin 2% Nasal 1 Application(s) Both Nostrils two times a day  nicotine - 21 mG/24Hr(s) Patch 1 Patch Transdermal daily  pantoprazole   Suspension 40 milliGRAM(s) Enteral Tube daily  propofol Infusion 20 MICROgram(s)/kG/Min (4.68 mL/Hr) IV Continuous <Continuous>    MEDICATIONS  (PRN):      Antibiotics History  azithromycin  IVPB 500 milliGRAM(s) IV Intermittent every 24 hours, 10-27-23 @ 14:42  cefTRIAXone   IVPB    , 10-29-23 @ 10:09  cefTRIAXone   IVPB 1000 milliGRAM(s) IV Intermittent once, 10-29-23 @ 10:09, Stop order after: 1 Doses  cefTRIAXone   IVPB 1000 milliGRAM(s) IV Intermittent every 24 hours, 10-30-23 @ 10:09, Stop order after: 5 Days      Heme Medications   aspirin enteric coated 81 milliGRAM(s) Oral daily, 10-27-23 @ 14:57  enoxaparin Injectable 40 milliGRAM(s) SubCutaneous every 12 hours, 10-27-23 @ 14:57      GI Medications  pantoprazole   Suspension 40 milliGRAM(s) Enteral Tube daily, 11-01-23 @ 08:08, Routine      COVID  10-27-23 @ 10:15  COVID -   NotDetec       WBC Trend  11-01-23 @ 05:00   -  9.65  10-31-23 @ 05:30   -  10.52<H>  10-30-23 @ 05:30   -  9.19    H/H Trend  11-01-23 @ 05:00   -   9.8<L>/ 30.9<L>  10-31-23 @ 05:30   -   10.9<L>/ 34.5  10-30-23 @ 05:30   -   10.3<L>/ 32.9<L>  10-29-23 @ 05:20   -   10.0<L>/ 31.0<L>  10-28-23 @ 05:00   -   10.2<L>/ 31.8<L>  10-27-23 @ 10:15   -   12.5/ 39.4    Stool Occult Blood    Platelet Trend  11-01-23 @ 05:00   -  216  10-31-23 @ 05:30   -  239  10-30-23 @ 05:30   -  295    Trend Sodium  11-01-23 @ 06:55   -  138  10-31-23 @ 05:30   -  139  10-30-23 @ 05:30   -  136    Trend Potassium  11-01-23 @ 06:55   -  5.0  10-31-23 @ 05:30   -  4.9  10-30-23 @ 05:30   -  4.6    Trend Bun/Cr  11-01-23 @ 06:55  BUN/CR -  32<H> / 0.36<L>  10-31-23 @ 05:30  BUN/CR -  44<H> / 0.50  10-30-23 @ 05:30  BUN/CR -  31<H> / 0.48<L>    Lactic Acid Trend  10-27-23 @ 10:15   -   1.9    ABG Trend  10-31-23 @ 11:40   - 7.43/39<H>/163<H>/98.2<H>  10-31-23 @ 08:10   - 7.35/50<H>/111<H>/97.1  10-30-23 @ 05:20   - 7.42/46<H>/105/97.5  10-29-23 @ 10:35   - 7.41/47<H>/85/96.2  10-27-23 @ 10:00   - 7.38/45<H>/179<H>/97.9    Trend AST/ALT/ALK Phos/Bili  10-30-23 @ 05:30   26/35/56/0.2  10-27-23 @ 10:15   48<H>/56<H>/85/0.3         Albumin Trend  10-30-23 @ 05:30   -   3.3  10-27-23 @ 10:15   -   4.0      PTT - PT - INR Trend  10-27-23 @ 10:15   -   51.6<H> - 13.9<H> - 1.19<H>    Glucose Trend  11-01-23 @ 06:55   -  191<H> -- --  11-01-23 @ 05:37   -  -- -- 198<H>  10-31-23 @ 23:03   -  -- -- 177<H>  10-31-23 @ 17:18   -  -- -- 136<H>  10-31-23 @ 11:18   -  -- -- 202<H>  10-31-23 @ 08:45   -  -- -- 163<H>  10-31-23 @ 05:30   -  177<H> -- --  10-30-23 @ 21:09   -  -- -- 153<H>  10-30-23 @ 17:48   -  -- -- 171<H>  10-30-23 @ 11:34   -  -- -- 137<H>    A1C with Estimated Average Glucose Result: 5.3 % [4.0 - 5.6] (10-28-23 @ 05:00)      LABS:                        9.8    9.65  )-----------( 216      ( 01 Nov 2023 05:00 )             30.9     11-01    138  |  106  |  32<H>  ----------------------------<  191<H>  5.0   |  29  |  0.36<L>    Ca    7.8<L>      01 Nov 2023 06:55  Phos  2.7     11-01  Mg     2.4     11-01         VITALS:  T(C): 37.3 (11-01-23 @ 08:00), Max: 37.9 (10-31-23 @ 21:00)  T(F): 99.1 (11-01-23 @ 08:00), Max: 100.2 (10-31-23 @ 21:00)  HR: 88 (11-01-23 @ 08:00) (46 - 126)  BP: 130/63 (11-01-23 @ 08:00) (86/46 - 163/65)  BP(mean): 80 (11-01-23 @ 08:00) (59 - 103)  ABP: --  ABP(mean): --  RR: 24 (11-01-23 @ 08:00) (14 - 24)  SpO2: 97% (11-01-23 @ 08:00) (93% - 100%)  CVP(mm Hg): --  CVP(cm H2O): --    Ins and Outs     10-31-23 @ 07:01  -  11-01-23 @ 07:00  --------------------------------------------------------  IN: 2440.6 mL / OUT: 530 mL / NET: 1910.6 mL    11-01-23 @ 07:01  -  11-01-23 @ 09:03  --------------------------------------------------------  IN: 162.9 mL / OUT: 75 mL / NET: 87.9 mL            Device: Avea, Mode: AC/ CMV (Assist Control/ Continuous Mandatory Ventilation), RR (machine): 24, RR (patient): 24, TV (machine): 320, TV (patient): 280, FiO2: 30, PEEP: 5, ITime: 0.6, MAP: 10, PIP: 31    I&O's Detail    31 Oct 2023 07:01  -  01 Nov 2023 07:00  --------------------------------------------------------  IN:    Dexmedetomidine: 43.8 mL    FentaNYL: 113.7 mL    Jevity 1.5: 350 mL    Lactated Ringers: 1800 mL    Propofol: 133.1 mL  Total IN: 2440.6 mL    OUT:    Indwelling Catheter - Urethral (mL): 530 mL  Total OUT: 530 mL    Total NET: 1910.6 mL      01 Nov 2023 07:01  -  01 Nov 2023 09:03  --------------------------------------------------------  IN:    FentaNYL: 5.9 mL    Jevity 1.5: 75 mL    Lactated Ringers: 75 mL    Propofol: 7 mL  Total IN: 162.9 mL    OUT:    Indwelling Catheter - Urethral (mL): 75 mL  Total OUT: 75 mL    Total NET: 87.9 mL

## 2023-11-01 NOTE — PROGRESS NOTE ADULT - ASSESSMENT
Physical Examination:  GENERAL:               Intubated sedated  HEENT:                    No JVD, Dry MM, bitemporal waisting  PULM:                     Bilateral air entry, Diminished to auscultation bilaterally, no significant sputum production, No Rales, No Rhonchi, ++Wheezing prolonged expiratory phase  CVS:                         S1, S2,  No Murmur  ABD:                        Soft, nondistended, nontender, normoactive bowel sounds,   EXT:                         No edema, nontender, No Cyanosis or Clubbing    NEURO:                   Intubated sedated  PSYC:                      Calm, limited Insight.        Assessment  68 year old female  PMHx of COPD, CAD, HLD, DM2, on xarelto for DVT history, hx of breast ca s/p left mastectomy, on chemotx last tx was 1 week ago presenting with shortness of breath,  Being admitted to the hospital for respiratory failure due to COPD exacerbation in the setting of Entero/rhinovirus with COPD, severe Exacerbation    Problem List  1. Acute respiratory failure due to acute COPD Exacerbation in setting of ERV requiring intubation 10/31/23  2. Active nicotine use - 1 pk/day  3. uses home o2 PRN  4. CAD   5. HTN  6. DM type 2  7, h/o Breast cancer  8. H/o DVT on xaralto -     Plan  Continue mechanical ventilation today  SAT to be done, but not ready for full SBT today due to continued wheezing  decreased RR in setting of autopeeping  continue steroids, nebs.  continue fentanyl, precedex  tube feeding    lyle placed for urinary retention, continue cardura  finishing course of antibiotics.     continue nebs ATC     Cont. Aspirin and statin   Cont. Lotrezole  Hold antihypertensives for now  Cont. AC with lovenox as on xarelto at home, switch to oral a/c once respiratory status improves  Nicotine patch     out patient f/u with primary pulm Dr. Roland. will need yearly CT chest for lung cancer screaming        PMD:				                   Notified(Date):  Family Updated: 	Maximiliano 	      [phone]                           Date:  11/1/2023   Sedation & Analgesia:	n/a  Diet/Nutrition:		 soft diet  GI PPx:			pantoprazole    Tablet 40 milliGRAM(s) Oral before breakfast  DVT Ppx:		enoxaparin Injectable 40 milliGRAM(s) SubCutaneous every 12 hours     Activity:		  Advance as tolerated  Head of Bed:               35-45 Deg  Glycemic Control:        insulin lispro (ADMELOG) corrective regimen sliding scale   SubCutaneous Before meals and at bedtime    Lines:   PIV     Restraints were deemed necessary to prevent removal of life-sustaining devices [  ] YES   [  x  ]  NO    Disposition: ICU Care    Goals of Care: Full code

## 2023-11-01 NOTE — PROGRESS NOTE ADULT - SUBJECTIVE AND OBJECTIVE BOX
Patient is a 68y old  Female who presents with a chief complaint of sob (31 Oct 2023 10:02)      BRIEF HOSPITAL COURSE: ***    Events last 24 hours: ***    Review of Systems:  CONSTITUTIONAL: No fever, chills, or fatigue  EYES: No eye pain, visual disturbances, or discharge  ENMT:  No difficulty hearing, tinnitus, vertigo; No sinus or throat pain  NECK: No pain or stiffness  RESPIRATORY: No cough, wheezing, chills or hemoptysis; No shortness of breath  CARDIOVASCULAR: No chest pain, palpitations, dizziness, or leg swelling  GASTROINTESTINAL: No abdominal or epigastric pain. No nausea, vomiting, or hematemesis; No diarrhea or constipation. No melena or hematochezia.  GENITOURINARY: No dysuria, frequency, hematuria, or incontinence  NEUROLOGICAL: No headaches, memory loss, loss of strength, numbness, or tremors  SKIN: No itching, burning, rashes, or lesions   MUSCULOSKELETAL: No joint pain or swelling; No muscle, back, or extremity pain  PSYCHIATRIC: No depression, anxiety, mood swings, or difficulty sleeping    PAST MEDICAL & SURGICAL HISTORY:  COPD, severe      CAD (coronary artery disease)      Breast cancer      No significant past surgical history          Medications:  cefTRIAXone   IVPB      cefTRIAXone   IVPB 1000 milliGRAM(s) IV Intermittent every 24 hours    doxazosin 2 milliGRAM(s) Oral at bedtime    buDESOnide    Inhalation Suspension 0.5 milliGRAM(s) Inhalation every 12 hours  levalbuterol Inhalation 0.63 milliGRAM(s) Inhalation every 6 hours    ALPRAZolam 0.5 milliGRAM(s) Oral every 12 hours  fentaNYL   Infusion. 0.5 MICROgram(s)/kG/Hr IV Continuous <Continuous>  propofol Infusion 20 MICROgram(s)/kG/Min IV Continuous <Continuous>    letrozole 2.5 milliGRAM(s) Oral daily    aspirin enteric coated 81 milliGRAM(s) Oral daily  enoxaparin Injectable 40 milliGRAM(s) SubCutaneous every 12 hours    pantoprazole  Injectable 40 milliGRAM(s) IV Push daily      atorvastatin 80 milliGRAM(s) Oral at bedtime  dextrose 50% Injectable 25 Gram(s) IV Push once  dextrose 50% Injectable 12.5 Gram(s) IV Push once  dextrose 50% Injectable 25 Gram(s) IV Push once  insulin lispro (ADMELOG) corrective regimen sliding scale   SubCutaneous every 6 hours  methylPREDNISolone sodium succinate Injectable 40 milliGRAM(s) IV Push every 6 hours    dextrose 5%. 1000 milliLiter(s) IV Continuous <Continuous>  dextrose 5%. 1000 milliLiter(s) IV Continuous <Continuous>  lactated ringers. 1000 milliLiter(s) IV Continuous <Continuous>      chlorhexidine 0.12% Liquid 15 milliLiter(s) Oral Mucosa every 12 hours  chlorhexidine 2% Cloths 1 Application(s) Topical <User Schedule>  mupirocin 2% Nasal 1 Application(s) Both Nostrils two times a day    nicotine - 21 mG/24Hr(s) Patch 1 Patch Transdermal daily      Mode: AC/ CMV (Assist Control/ Continuous Mandatory Ventilation)  RR (machine): 24  TV (machine): 320  FiO2: 30  PEEP: 5  ITime: 0.6  MAP: 10  PIP: 26      ICU Vital Signs Last 24 Hrs  T(C): 37.9 (31 Oct 2023 21:00), Max: 37.9 (31 Oct 2023 21:00)  T(F): 100.2 (31 Oct 2023 21:00), Max: 100.2 (31 Oct 2023 21:00)  HR: 94 (31 Oct 2023 23:45) (46 - 126)  BP: 100/52 (31 Oct 2023 23:45) (86/46 - 163/65)  BP(mean): 68 (31 Oct 2023 23:45) (59 - 103)  ABP: --  ABP(mean): --  RR: 24 (31 Oct 2023 23:45) (12 - 24)  SpO2: 95% (31 Oct 2023 23:45) (93% - 100%)    O2 Parameters below as of 31 Oct 2023 21:40  Patient On (Oxygen Delivery Method): ventilator            ABG - ( 31 Oct 2023 11:40 )  pH, Arterial: 7.43  pH, Blood: x     /  pCO2: 39    /  pO2: 163   / HCO3: 26    / Base Excess: 1.6   /  SaO2: 98.2                I&O's Detail    30 Oct 2023 07:01  -  31 Oct 2023 07:00  --------------------------------------------------------  IN:    Dexmedetomidine: 218.8 mL    IV PiggyBack: 500 mL    Lactated Ringers: 1725 mL  Total IN: 2443.8 mL    OUT:    Indwelling Catheter - Urethral (mL): 835 mL  Total OUT: 835 mL    Total NET: 1608.8 mL      31 Oct 2023 07:01  -  01 Nov 2023 01:45  --------------------------------------------------------  IN:    Dexmedetomidine: 43.8 mL    FentaNYL: 66.5 mL    Jevity 1.5: 225 mL    Lactated Ringers: 1200 mL    Propofol: 77.1 mL  Total IN: 1612.4 mL    OUT:    Indwelling Catheter - Urethral (mL): 360 mL  Total OUT: 360 mL    Total NET: 1252.4 mL          LABS:                        10.9   10.52 )-----------( 239      ( 31 Oct 2023 05:30 )             34.5     10-31    139  |  105  |  44<H>  ----------------------------<  177<H>  4.9   |  29  |  0.50    Ca    8.1<L>      31 Oct 2023 05:30  Phos  3.7     10-31  Mg     2.9     10-31    TPro  7.1  /  Alb  3.3  /  TBili  0.2  /  DBili  x   /  AST  26  /  ALT  35  /  AlkPhos  56  10-30          CAPILLARY BLOOD GLUCOSE      POCT Blood Glucose.: 177 mg/dL (31 Oct 2023 23:03)      Urinalysis Basic - ( 31 Oct 2023 05:30 )    Color: x / Appearance: x / SG: x / pH: x  Gluc: 177 mg/dL / Ketone: x  / Bili: x / Urobili: x   Blood: x / Protein: x / Nitrite: x   Leuk Esterase: x / RBC: x / WBC x   Sq Epi: x / Non Sq Epi: x / Bacteria: x      CULTURES:  Culture Results:   No growth (10-27 @ 14:02)  Culture Results:   No growth at 4 days (10-27 @ 10:15)  Culture Results:   No growth at 4 days (10-27 @ 10:15)  Rapid RVP Result: Detected (10-27 @ 10:15)      Physical Examination:  General: No acute distress.    HEENT: Pupils equal, reactive to light.  Symmetric.  PULM: Clear to auscultation bilaterally, no significant sputum production  NECK: Supple, no lymphadenopathy, trachea midline  CVS: Regular rate and rhythm, no murmurs, rubs, or gallops  ABD: Soft, nondistended, nontender, normoactive bowel sounds, no masses  EXT: No edema, nontender  SKIN: Warm and well perfused, no rashes noted.  NEURO: Alert, oriented, interactive, nonfocal  DEVICES:     RADIOLOGY: ***    CRITICAL CARE TIME SPENT: ** minutes assessing presenting problems of acute illness, which pose high probability of life threatening deterioration or end organ damage/dysfunction, as well as medical decision making including initiating plan of care, reviewing data, reviewing radiologic exams, discussing with multidisciplinary team,  discussing goals of care with patient/family, and writing this note.  Non-inclusive of procedures performed,   Patient is a 68y old  Female who presents with a chief complaint of sob (31 Oct 2023 10:02)      BRIEF HOSPITAL COURSE-  67 y/o Female with     Events last 24 hours: ***    Review of Systems:  CONSTITUTIONAL: No fever, chills, or fatigue  EYES: No eye pain, visual disturbances, or discharge  ENMT:  No difficulty hearing, tinnitus, vertigo; No sinus or throat pain  NECK: No pain or stiffness  RESPIRATORY: No cough, wheezing, chills or hemoptysis; No shortness of breath  CARDIOVASCULAR: No chest pain, palpitations, dizziness, or leg swelling  GASTROINTESTINAL: No abdominal or epigastric pain. No nausea, vomiting, or hematemesis; No diarrhea or constipation. No melena or hematochezia.  GENITOURINARY: No dysuria, frequency, hematuria, or incontinence  NEUROLOGICAL: No headaches, memory loss, loss of strength, numbness, or tremors  SKIN: No itching, burning, rashes, or lesions   MUSCULOSKELETAL: No joint pain or swelling; No muscle, back, or extremity pain  PSYCHIATRIC: No depression, anxiety, mood swings, or difficulty sleeping    PAST MEDICAL & SURGICAL HISTORY:  COPD, severe      CAD (coronary artery disease)      Breast cancer      No significant past surgical history          Medications:  cefTRIAXone   IVPB      cefTRIAXone   IVPB 1000 milliGRAM(s) IV Intermittent every 24 hours    doxazosin 2 milliGRAM(s) Oral at bedtime    buDESOnide    Inhalation Suspension 0.5 milliGRAM(s) Inhalation every 12 hours  levalbuterol Inhalation 0.63 milliGRAM(s) Inhalation every 6 hours    ALPRAZolam 0.5 milliGRAM(s) Oral every 12 hours  fentaNYL   Infusion. 0.5 MICROgram(s)/kG/Hr IV Continuous <Continuous>  propofol Infusion 20 MICROgram(s)/kG/Min IV Continuous <Continuous>    letrozole 2.5 milliGRAM(s) Oral daily    aspirin enteric coated 81 milliGRAM(s) Oral daily  enoxaparin Injectable 40 milliGRAM(s) SubCutaneous every 12 hours    pantoprazole  Injectable 40 milliGRAM(s) IV Push daily      atorvastatin 80 milliGRAM(s) Oral at bedtime  dextrose 50% Injectable 25 Gram(s) IV Push once  dextrose 50% Injectable 12.5 Gram(s) IV Push once  dextrose 50% Injectable 25 Gram(s) IV Push once  insulin lispro (ADMELOG) corrective regimen sliding scale   SubCutaneous every 6 hours  methylPREDNISolone sodium succinate Injectable 40 milliGRAM(s) IV Push every 6 hours    dextrose 5%. 1000 milliLiter(s) IV Continuous <Continuous>  dextrose 5%. 1000 milliLiter(s) IV Continuous <Continuous>  lactated ringers. 1000 milliLiter(s) IV Continuous <Continuous>      chlorhexidine 0.12% Liquid 15 milliLiter(s) Oral Mucosa every 12 hours  chlorhexidine 2% Cloths 1 Application(s) Topical <User Schedule>  mupirocin 2% Nasal 1 Application(s) Both Nostrils two times a day    nicotine - 21 mG/24Hr(s) Patch 1 Patch Transdermal daily      Mode: AC/ CMV (Assist Control/ Continuous Mandatory Ventilation)  RR (machine): 24  TV (machine): 320  FiO2: 30  PEEP: 5  ITime: 0.6  MAP: 10  PIP: 26      ICU Vital Signs Last 24 Hrs  T(C): 37.9 (31 Oct 2023 21:00), Max: 37.9 (31 Oct 2023 21:00)  T(F): 100.2 (31 Oct 2023 21:00), Max: 100.2 (31 Oct 2023 21:00)  HR: 94 (31 Oct 2023 23:45) (46 - 126)  BP: 100/52 (31 Oct 2023 23:45) (86/46 - 163/65)  BP(mean): 68 (31 Oct 2023 23:45) (59 - 103)  ABP: --  ABP(mean): --  RR: 24 (31 Oct 2023 23:45) (12 - 24)  SpO2: 95% (31 Oct 2023 23:45) (93% - 100%)    O2 Parameters below as of 31 Oct 2023 21:40  Patient On (Oxygen Delivery Method): ventilator            ABG - ( 31 Oct 2023 11:40 )  pH, Arterial: 7.43  pH, Blood: x     /  pCO2: 39    /  pO2: 163   / HCO3: 26    / Base Excess: 1.6   /  SaO2: 98.2                I&O's Detail    30 Oct 2023 07:01  -  31 Oct 2023 07:00  --------------------------------------------------------  IN:    Dexmedetomidine: 218.8 mL    IV PiggyBack: 500 mL    Lactated Ringers: 1725 mL  Total IN: 2443.8 mL    OUT:    Indwelling Catheter - Urethral (mL): 835 mL  Total OUT: 835 mL    Total NET: 1608.8 mL      31 Oct 2023 07:01  -  01 Nov 2023 01:45  --------------------------------------------------------  IN:    Dexmedetomidine: 43.8 mL    FentaNYL: 66.5 mL    Jevity 1.5: 225 mL    Lactated Ringers: 1200 mL    Propofol: 77.1 mL  Total IN: 1612.4 mL    OUT:    Indwelling Catheter - Urethral (mL): 360 mL  Total OUT: 360 mL    Total NET: 1252.4 mL          LABS:                        10.9   10.52 )-----------( 239      ( 31 Oct 2023 05:30 )             34.5     10-31    139  |  105  |  44<H>  ----------------------------<  177<H>  4.9   |  29  |  0.50    Ca    8.1<L>      31 Oct 2023 05:30  Phos  3.7     10-31  Mg     2.9     10-31    TPro  7.1  /  Alb  3.3  /  TBili  0.2  /  DBili  x   /  AST  26  /  ALT  35  /  AlkPhos  56  10-30          CAPILLARY BLOOD GLUCOSE      POCT Blood Glucose.: 177 mg/dL (31 Oct 2023 23:03)      Urinalysis Basic - ( 31 Oct 2023 05:30 )    Color: x / Appearance: x / SG: x / pH: x  Gluc: 177 mg/dL / Ketone: x  / Bili: x / Urobili: x   Blood: x / Protein: x / Nitrite: x   Leuk Esterase: x / RBC: x / WBC x   Sq Epi: x / Non Sq Epi: x / Bacteria: x      CULTURES:  Culture Results:   No growth (10-27 @ 14:02)  Culture Results:   No growth at 4 days (10-27 @ 10:15)  Culture Results:   No growth at 4 days (10-27 @ 10:15)  Rapid RVP Result: Detected (10-27 @ 10:15)      Physical Examination:  General: No acute distress.    HEENT: Pupils equal, reactive to light.  Symmetric.  PULM: Clear to auscultation bilaterally, no significant sputum production  NECK: Supple, no lymphadenopathy, trachea midline  CVS: Regular rate and rhythm, no murmurs, rubs, or gallops  ABD: Soft, nondistended, nontender, normoactive bowel sounds, no masses  EXT: No edema, nontender  SKIN: Warm and well perfused, no rashes noted.  NEURO: Alert, oriented, interactive, nonfocal  DEVICES:     RADIOLOGY: ***    CRITICAL CARE TIME SPENT: ** minutes assessing presenting problems of acute illness, which pose high probability of life threatening deterioration or end organ damage/dysfunction, as well as medical decision making including initiating plan of care, reviewing data, reviewing radiologic exams, discussing with multidisciplinary team,  discussing goals of care with patient/family, and writing this note.  Non-inclusive of procedures performed,   Patient is a 68y old  Female who presents with a chief complaint of sob (31 Oct 2023 10:02)      BRIEF HOSPITAL COURSE-  69 y/o Female with PMHx of COPD, HTN, HLD, DM, DVT (On Xarelto), Breast Cancer (s/p Left Mastectomy, On Chemo) presents to  ED complaining of shortness of breath. Admitted to ICU for acute hypoxic respiratory failure in setting of COPD exacerbation secondary to Entero/rhinovirus.     Events last 24 hours:   Worsening respiratory status and tachypnea, despite NIPPV. Decision made to intubate patient.       Review of Systems:    [X] Unable to obtain secondary to current mental status/ medical condition.       PAST MEDICAL & SURGICAL HISTORY-  COPD, severe      CAD (coronary artery disease)      Breast cancer      No significant past surgical history          Medications:  cefTRIAXone   IVPB      cefTRIAXone   IVPB 1000 milliGRAM(s) IV Intermittent every 24 hours    doxazosin 2 milliGRAM(s) Oral at bedtime    buDESOnide    Inhalation Suspension 0.5 milliGRAM(s) Inhalation every 12 hours  levalbuterol Inhalation 0.63 milliGRAM(s) Inhalation every 6 hours    ALPRAZolam 0.5 milliGRAM(s) Oral every 12 hours  fentaNYL   Infusion. 0.5 MICROgram(s)/kG/Hr IV Continuous <Continuous>  propofol Infusion 20 MICROgram(s)/kG/Min IV Continuous <Continuous>    letrozole 2.5 milliGRAM(s) Oral daily    aspirin enteric coated 81 milliGRAM(s) Oral daily  enoxaparin Injectable 40 milliGRAM(s) SubCutaneous every 12 hours    pantoprazole  Injectable 40 milliGRAM(s) IV Push daily    atorvastatin 80 milliGRAM(s) Oral at bedtime  dextrose 50% Injectable 25 Gram(s) IV Push once  dextrose 50% Injectable 12.5 Gram(s) IV Push once  dextrose 50% Injectable 25 Gram(s) IV Push once  insulin lispro (ADMELOG) corrective regimen sliding scale   SubCutaneous every 6 hours  methylPREDNISolone sodium succinate Injectable 40 milliGRAM(s) IV Push every 6 hours    dextrose 5%. 1000 milliLiter(s) IV Continuous <Continuous>  dextrose 5%. 1000 milliLiter(s) IV Continuous <Continuous>  lactated ringers. 1000 milliLiter(s) IV Continuous <Continuous>      chlorhexidine 0.12% Liquid 15 milliLiter(s) Oral Mucosa every 12 hours  chlorhexidine 2% Cloths 1 Application(s) Topical <User Schedule>  mupirocin 2% Nasal 1 Application(s) Both Nostrils two times a day    nicotine - 21 mG/24Hr(s) Patch 1 Patch Transdermal daily      Mode: AC/ CMV (Assist Control/ Continuous Mandatory Ventilation)  RR (machine): 24  TV (machine): 320  FiO2: 30  PEEP: 5  ITime: 0.6  MAP: 10  PIP: 26      ICU Vital Signs Last 24 Hrs  T(C): 37.9 (31 Oct 2023 21:00), Max: 37.9 (31 Oct 2023 21:00)  T(F): 100.2 (31 Oct 2023 21:00), Max: 100.2 (31 Oct 2023 21:00)  HR: 94 (31 Oct 2023 23:45) (46 - 126)  BP: 100/52 (31 Oct 2023 23:45) (86/46 - 163/65)  BP(mean): 68 (31 Oct 2023 23:45) (59 - 103)  ABP: --  ABP(mean): --  RR: 24 (31 Oct 2023 23:45) (12 - 24)  SpO2: 95% (31 Oct 2023 23:45) (93% - 100%)    O2 Parameters below as of 31 Oct 2023 21:40  Patient On (Oxygen Delivery Method): ventilator      ABG - ( 31 Oct 2023 11:40 )  pH, Arterial: 7.43  pH, Blood: x     /  pCO2: 39    /  pO2: 163   / HCO3: 26    / Base Excess: 1.6   /  SaO2: 98.2        I&O's Detail    30 Oct 2023 07:01  -  31 Oct 2023 07:00  --------------------------------------------------------  IN:    Dexmedetomidine: 218.8 mL    IV PiggyBack: 500 mL    Lactated Ringers: 1725 mL  Total IN: 2443.8 mL    OUT:    Indwelling Catheter - Urethral (mL): 835 mL  Total OUT: 835 mL    Total NET: 1608.8 mL      31 Oct 2023 07:01  -  01 Nov 2023 01:45  --------------------------------------------------------  IN:    Dexmedetomidine: 43.8 mL    FentaNYL: 66.5 mL    Jevity 1.5: 225 mL    Lactated Ringers: 1200 mL    Propofol: 77.1 mL  Total IN: 1612.4 mL    OUT:    Indwelling Catheter - Urethral (mL): 360 mL  Total OUT: 360 mL    Total NET: 1252.4 mL      LABS:                        10.9   10.52 )-----------( 239      ( 31 Oct 2023 05:30 )             34.5     10-31    139  |  105  |  44<H>  ----------------------------<  177<H>  4.9   |  29  |  0.50    Ca    8.1<L>      31 Oct 2023 05:30  Phos  3.7     10-31  Mg     2.9     10-31    TPro  7.1  /  Alb  3.3  /  TBili  0.2  /  DBili  x   /  AST  26  /  ALT  35  /  AlkPhos  56  10-30      CAPILLARY BLOOD GLUCOSE  POCT Blood Glucose.: 177 mg/dL (31 Oct 2023 23:03)      Urinalysis Basic - ( 31 Oct 2023 05:30 )  Color: x / Appearance: x / SG: x / pH: x  Gluc: 177 mg/dL / Ketone: x  / Bili: x / Urobili: x   Blood: x / Protein: x / Nitrite: x   Leuk Esterase: x / RBC: x / WBC x   Sq Epi: x / Non Sq Epi: x / Bacteria: x      CULTURES:  Culture Results:   No growth (10-27 @ 14:02)  Culture Results:   No growth at 4 days (10-27 @ 10:15)  Culture Results:   No growth at 4 days (10-27 @ 10:15)  Rapid RVP Result: Detected (10-27 @ 10:15)      Physical Examination:  General: Middle aged female, chronically ill appearing. In no acute distress.    HEENT: Pupils equal, reactive to light. Symmetric.  PULM: Clear to auscultation bilaterally, no adventitious sounds. No significant sputum production.  NECK: Supple, no lymphadenopathy, trachea midline.  CVS: Regular rate and rhythm. No murmurs, rubs, or gallops. S1, S2 intact.   ABD: Soft, nondistended, nontender, normoactive bowel sounds. No masses palpable.   EXT: No edema, nontender.  SKIN: Warm and well perfused, no rashes noted.  NEURO: Sedated, cough and gag intact, unable to follow commands. Nonfocal.   DEVICES:       RADIOLOGY-    < from: Xray Chest 1 View- PORTABLE-Routine (Xray Chest 1 View- PORTABLE-Routine .) (10.31.23 @ 10:57) >    ACC: [id number] EXAM:  XR CHEST PORTABLE ROUTINE 1V   ORDERED BY: RANDALL MACHADO     PROCEDURE DATE:  10/31/2023      INTERPRETATION:  AP chest on October 31, 2023 at 10:19 AM. Patient was   intubated.    Heart size normal. Left mastectomy andclips in the left axilla again   noted. Right-sided port again seen. COPD hyperexpansion of the lungs   again noted.    Above findings are similar to October 30.    Present film shows endotracheal tube inserted and nasogastric tube   inserted with tip in distal antrum.    IMPRESSION: Tubes inserted as above. No acute chest finding.    --- End of Report ---    RERE TAVERAS MD; Attending Radiologist  This document has been electronically signed. Oct 31 2023  2:48PM    < end of copied text >      CRITICAL CARE TIME SPENT: 45 minutes assessing presenting problems of acute illness, which pose high probability of life threatening deterioration or end organ damage/dysfunction, as well as medical decision making including initiating plan of care, reviewing data, reviewing radiologic exams, discussing with multidisciplinary team,  discussing goals of care with patient/family, and writing this note.  Non-inclusive of procedures performed,

## 2023-11-02 NOTE — PROGRESS NOTE ADULT - SUBJECTIVE AND OBJECTIVE BOX
Follow-up Critical Care Progress Note  Chief Complaint : Chronic obstructive pulmonary disease with acute exacerbation    patient started on sedation vacation and CPAP  now 5/5  RR 8, TV > 500  pt alert, eyes open, following commands.        Allergies :penicillin (Hives)      PAST MEDICAL & SURGICAL HISTORY:  COPD, severe    CAD (coronary artery disease)    Breast cancer    No significant past surgical history        Medications:  MEDICATIONS  (STANDING):  aspirin enteric coated 81 milliGRAM(s) Oral daily  atorvastatin 80 milliGRAM(s) Oral at bedtime  buDESOnide    Inhalation Suspension 0.5 milliGRAM(s) Inhalation every 12 hours  cefTRIAXone   IVPB      cefTRIAXone   IVPB 1000 milliGRAM(s) IV Intermittent every 24 hours  chlorhexidine 0.12% Liquid 15 milliLiter(s) Oral Mucosa every 12 hours  chlorhexidine 2% Cloths 1 Application(s) Topical <User Schedule>  dextrose 5%. 1000 milliLiter(s) (100 mL/Hr) IV Continuous <Continuous>  dextrose 5%. 1000 milliLiter(s) (50 mL/Hr) IV Continuous <Continuous>  dextrose 50% Injectable 25 Gram(s) IV Push once  dextrose 50% Injectable 25 Gram(s) IV Push once  dextrose 50% Injectable 12.5 Gram(s) IV Push once  doxazosin 2 milliGRAM(s) Oral at bedtime  enoxaparin Injectable 40 milliGRAM(s) SubCutaneous every 12 hours  fentaNYL   Infusion. 0.5 MICROgram(s)/kG/Hr (1.95 mL/Hr) IV Continuous <Continuous>  insulin lispro (ADMELOG) corrective regimen sliding scale   SubCutaneous every 6 hours  lactated ringers. 1000 milliLiter(s) (75 mL/Hr) IV Continuous <Continuous>  letrozole 2.5 milliGRAM(s) Oral daily  levalbuterol Inhalation 0.63 milliGRAM(s) Inhalation every 6 hours  methylPREDNISolone sodium succinate Injectable 40 milliGRAM(s) IV Push every 6 hours  mupirocin 2% Nasal 1 Application(s) Both Nostrils two times a day  nicotine - 21 mG/24Hr(s) Patch 1 Patch Transdermal daily  pantoprazole   Suspension 40 milliGRAM(s) Enteral Tube daily  polyethylene glycol 3350 17 Gram(s) Oral daily  propofol Infusion 20 MICROgram(s)/kG/Min (4.68 mL/Hr) IV Continuous <Continuous>  senna 2 Tablet(s) Oral at bedtime    MEDICATIONS  (PRN):  bisacodyl Suppository 10 milliGRAM(s) Rectal daily PRN Constipation      Antibiotics History  azithromycin  IVPB 500 milliGRAM(s) IV Intermittent every 24 hours, 10-27-23 @ 14:42  cefTRIAXone   IVPB    , 10-29-23 @ 10:09  cefTRIAXone   IVPB 1000 milliGRAM(s) IV Intermittent once, 10-29-23 @ 10:09, Stop order after: 1 Doses  cefTRIAXone   IVPB 1000 milliGRAM(s) IV Intermittent every 24 hours, 10-30-23 @ 10:09, Stop order after: 5 Days      Heme Medications   aspirin enteric coated 81 milliGRAM(s) Oral daily, 10-27-23 @ 14:57  enoxaparin Injectable 40 milliGRAM(s) SubCutaneous every 12 hours, 10-27-23 @ 14:57      GI Medications  bisacodyl Suppository 10 milliGRAM(s) Rectal daily, 11-02-23 @ 08:24, Routine PRN  pantoprazole   Suspension 40 milliGRAM(s) Enteral Tube daily, 11-01-23 @ 08:08, Routine  polyethylene glycol 3350 17 Gram(s) Oral daily, 11-02-23 @ 08:23, Routine  senna 2 Tablet(s) Oral at bedtime, 11-02-23 @ 08:25, Routine      COVID  10-27-23 @ 10:15  COVID -   [facility]       WBC Trend  11-02-23 @ 06:00   -  12.21<H>  11-01-23 @ 05:00   -  9.65  10-31-23 @ 05:30   -  10.52<H>    H/H Trend  11-02-23 @ 06:00   -   9.9<L>/ 31.3<L>  11-01-23 @ 05:00   -   9.8<L>/ 30.9<L>  10-31-23 @ 05:30   -   10.9<L>/ 34.5  10-30-23 @ 05:30   -   10.3<L>/ 32.9<L>  10-29-23 @ 05:20   -   10.0<L>/ 31.0<L>  10-28-23 @ 05:00   -   10.2<L>/ 31.8<L>    Stool Occult Blood    Platelet Trend  11-02-23 @ 06:00   -  226  11-01-23 @ 05:00   -  216  10-31-23 @ 05:30   -  239    Trend Sodium  11-02-23 @ 06:00   -  138  11-01-23 @ 06:55   -  138  10-31-23 @ 05:30   -  139    Trend Potassium  11-02-23 @ 06:00   -  5.0  11-01-23 @ 06:55   -  5.0  10-31-23 @ 05:30   -  4.9    Trend Bun/Cr  11-02-23 @ 06:00  BUN/CR -  22 / 0.39<L>  11-01-23 @ 06:55  BUN/CR -  32<H> / 0.36<L>  10-31-23 @ 05:30  BUN/CR -  44<H> / 0.50    Lactic Acid Trend  10-27-23 @ 10:15   -   1.9    ABG Trend  11-02-23 @ 04:00   - 7.46<H>/47<H>/103/97.2  10-31-23 @ 11:40   - 7.43/39<H>/163<H>/98.2<H>  10-31-23 @ 08:10   - 7.35/50<H>/111<H>/97.1  10-30-23 @ 05:20   - 7.42/46<H>/105/97.5  10-29-23 @ 10:35   - 7.41/47<H>/85/96.2  10-27-23 @ 10:00   - 7.38/45<H>/179<H>/97.9    Trend AST/ALT/ALK Phos/Bili  11-02-23 @ 06:00   28/24/50/0.3  10-30-23 @ 05:30   26/35/56/0.2  10-27-23 @ 10:15   48<H>/56<H>/85/0.3     Albumin Trend  11-02-23 @ 06:00   -   2.8<L>  10-30-23 @ 05:30   -   3.3  10-27-23 @ 10:15   -   4.0      PTT - PT - INR Trend  10-27-23 @ 10:15   -   51.6<H> - 13.9<H> - 1.19<H>    Glucose Trend  11-02-23 @ 06:00   -  188<H> -- --  11-02-23 @ 05:11   -  -- -- 205<H>  11-01-23 @ 23:02   -  -- -- 207<H>  11-01-23 @ 17:57   -  -- -- 213<H>  11-01-23 @ 12:17   -  -- -- 205<H>  11-01-23 @ 06:55   -  191<H> -- --  11-01-23 @ 05:37   -  -- -- 198<H>  10-31-23 @ 23:03   -  -- -- 177<H>  10-31-23 @ 17:18   -  -- -- 136<H>  10-31-23 @ 11:18   -  -- -- 202<H>    A1C with Estimated Average Glucose Result: 5.3 % [4.0 - 5.6] (10-28-23 @ 05:00)      LABS:                        9.9    12.21 )-----------( 226      ( 02 Nov 2023 06:00 )             31.3     11-02    138  |  104  |  22  ----------------------------<  188<H>  5.0   |  31  |  0.39<L>    Ca    8.0<L>      02 Nov 2023 06:00  Phos  2.6     11-02  Mg     2.3     11-02    TPro  6.0  /  Alb  2.8<L>  /  TBili  0.3  /  DBili  x   /  AST  28  /  ALT  24  /  AlkPhos  50  11-02         CULTURES: (if applicable)    Culture - Urine (collected 10-27-23 @ 14:02)  Source: Clean Catch Clean Catch (Midstream)  Final Report (10-28-23 @ 15:37):    No growth    Culture - Blood (collected 10-27-23 @ 10:15)  Source: .Blood Blood-Peripheral  Final Report (11-01-23 @ 17:00):    No growth at 5 days    Culture - Blood (collected 10-27-23 @ 10:15)  Source: .Blood Blood-Peripheral  Final Report (11-01-23 @ 17:00):    No growth at 5 days      Rapid RVP Result: Detected (10-27-23 @ 10:15)         VITALS:  T(C): 36.8 (11-02-23 @ 05:00), Max: 37.7 (11-01-23 @ 16:00)  T(F): 98.2 (11-02-23 @ 05:00), Max: 99.9 (11-01-23 @ 16:00)  HR: 92 (11-02-23 @ 09:00) (83 - 121)  BP: 148/65 (11-02-23 @ 09:00) (126/70 - 174/85)  BP(mean): 88 (11-02-23 @ 09:00) (79 - 108)  ABP: --  ABP(mean): --  RR: 7 (11-02-23 @ 09:00) (7 - 21)  SpO2: 100% (11-02-23 @ 09:00) (94% - 100%)  CVP(mm Hg): --  CVP(cm H2O): --    Ins and Outs     11-01-23 @ 07:01  -  11-02-23 @ 07:00  --------------------------------------------------------  IN: 2788.7 mL / OUT: 930 mL / NET: 1858.7 mL    11-02-23 @ 07:01  -  11-02-23 @ 09:41  --------------------------------------------------------  IN: 0 mL / OUT: 0 mL / NET: 0 mL            Device: Avea, Mode: AC/ CMV (Assist Control/ Continuous Mandatory Ventilation), RR (machine): 20, RR (patient): 20, TV (machine): 320, TV (patient): 290, FiO2: 30, PEEP: 5, ITime: 0.6, MAP: 9, PIP: 16    I&O's Detail    01 Nov 2023 07:01  -  02 Nov 2023 07:00  --------------------------------------------------------  IN:    Enteral Tube Flush: 30 mL    FentaNYL: 97.6 mL    IV PiggyBack: 50 mL    Lactated Ringers: 1800 mL    Oral Fluid: 240 mL    Propofol: 96.1 mL    Vital1.5: 475 mL  Total IN: 2788.7 mL    OUT:    Indwelling Catheter - Urethral (mL): 930 mL  Total OUT: 930 mL    Total NET: 1858.7 mL      02 Nov 2023 07:01  -  02 Nov 2023 09:41  --------------------------------------------------------  IN:  Total IN: 0 mL    OUT:    FentaNYL: 0 mL    Propofol: 0 mL  Total OUT: 0 mL    Total NET: 0 mL

## 2023-11-02 NOTE — PROGRESS NOTE ADULT - ASSESSMENT
Physical Examination:  GENERAL:               Intubated Arousable  HEENT:                    No JVD, Dry MM, bitemporal waisting  PULM:                     Bilateral air entry, Diminished to auscultation bilaterally, no significant sputum production, No Rales, No Rhonchi, no Wheezing normal expiratory phase  CVS:                         S1, S2,  No Murmur  ABD:                        Soft, nondistended, nontender, normoactive bowel sounds,   EXT:                         No edema, nontender, No Cyanosis or Clubbing    NEURO:                   Intubated sedated  PSYC:                      Calm, limited Insight.        Assessment  68 year old female  PMHx of COPD, CAD, HLD, DM2, on xarelto for DVT history, hx of breast ca s/p left mastectomy, on chemotx last tx was 1 week ago presenting with shortness of breath,  Being admitted to the hospital for respiratory failure due to COPD exacerbation in the setting of Entero/rhinovirus with COPD, severe Exacerbation    Problem List  1. Acute respiratory failure due to acute COPD Exacerbation in setting of ERV requiring intubation 10/31/23  2. Active nicotine use - 1 pk/day  3. uses home o2 PRN  4. CAD   5. HTN  6. DM type 2  7, h/o Breast cancer  8. H/o DVT on xaralto -     Plan  Tolerating cpap trial, and sedation vacation  check ABG   possible extubation    taper steroids  Continue nebs.  tube feeding    lyle placed for urinary retention, continue cardura  voiding trial with in next 24 hrs     finishing course of antibiotics.     continue nebs ATC     Cont. Aspirin and statin   Cont. Lotrezole  Hold antihypertensives for now  Cont. AC with lovenox as on xarelto at home, switch to oral a/c once respiratory status improves  Nicotine patch     out patient f/u with primary pulm Dr. Roland. will need yearly CT chest for lung cancer screaming        PMD:				                   Notified(Date):  Family Updated: 	Maximiliano 	      [phone]                           Date:  11/2/2023   Sedation & Analgesia:	n/a  Diet/Nutrition:		 soft diet  GI PPx:			pantoprazole    Tablet 40 milliGRAM(s) Oral before breakfast  DVT Ppx:		enoxaparin Injectable 40 milliGRAM(s) SubCutaneous every 12 hours     Activity:		  Advance as tolerated  Head of Bed:               35-45 Deg  Glycemic Control:        insulin lispro (ADMELOG) corrective regimen sliding scale   SubCutaneous Before meals and at bedtime    Lines:   PIV     Restraints were deemed necessary to prevent removal of life-sustaining devices [  ] YES   [  x  ]  NO    Disposition: ICU Care    Goals of Care: Full code

## 2023-11-02 NOTE — PROGRESS NOTE ADULT - SUBJECTIVE AND OBJECTIVE BOX
Progress:  pt extubated this am, opens eyes, nods head, drowsy/weak       Review of Systems: [ Unable to obtain due to poor mentation/drowsy]    MEDICATIONS  (STANDING):  aspirin enteric coated 81 milliGRAM(s) Oral daily  atorvastatin 80 milliGRAM(s) Oral at bedtime  buDESOnide    Inhalation Suspension 0.5 milliGRAM(s) Inhalation every 12 hours  cefTRIAXone   IVPB      cefTRIAXone   IVPB 1000 milliGRAM(s) IV Intermittent every 24 hours  chlorhexidine 2% Cloths 1 Application(s) Topical <User Schedule>  dextrose 5%. 1000 milliLiter(s) (100 mL/Hr) IV Continuous <Continuous>  dextrose 5%. 1000 milliLiter(s) (50 mL/Hr) IV Continuous <Continuous>  dextrose 50% Injectable 25 Gram(s) IV Push once  dextrose 50% Injectable 12.5 Gram(s) IV Push once  dextrose 50% Injectable 25 Gram(s) IV Push once  doxazosin 2 milliGRAM(s) Oral at bedtime  enoxaparin Injectable 40 milliGRAM(s) SubCutaneous every 12 hours  fentaNYL   Infusion. 0.5 MICROgram(s)/kG/Hr (1.95 mL/Hr) IV Continuous <Continuous>  insulin lispro (ADMELOG) corrective regimen sliding scale   SubCutaneous every 6 hours  lactated ringers. 1000 milliLiter(s) (75 mL/Hr) IV Continuous <Continuous>  letrozole 2.5 milliGRAM(s) Oral daily  levalbuterol Inhalation 0.63 milliGRAM(s) Inhalation every 6 hours  methylPREDNISolone sodium succinate Injectable 40 milliGRAM(s) IV Push every 8 hours  mupirocin 2% Nasal 1 Application(s) Both Nostrils two times a day  nicotine - 21 mG/24Hr(s) Patch 1 Patch Transdermal daily  pantoprazole   Suspension 40 milliGRAM(s) Enteral Tube daily  polyethylene glycol 3350 17 Gram(s) Oral daily  propofol Infusion 20 MICROgram(s)/kG/Min (4.68 mL/Hr) IV Continuous <Continuous>  senna 2 Tablet(s) Oral at bedtime    MEDICATIONS  (PRN):  bisacodyl Suppository 10 milliGRAM(s) Rectal daily PRN Constipation      PHYSICAL EXAM:  Vital Signs Last 24 Hrs  T(C): 36.8 (02 Nov 2023 05:00), Max: 37.7 (01 Nov 2023 16:00)  T(F): 98.2 (02 Nov 2023 05:00), Max: 99.9 (01 Nov 2023 16:00)  HR: 112 (02 Nov 2023 10:45) (83 - 121)  BP: 149/65 (02 Nov 2023 10:00) (126/70 - 174/85)  BP(mean): 88 (02 Nov 2023 10:00) (79 - 108)  RR: 9 (02 Nov 2023 10:45) (7 - 21)  SpO2: 98% (02 Nov 2023 10:45) (94% - 100%)    Parameters below as of 02 Nov 2023 10:45  Patient On (Oxygen Delivery Method): mask, aerosol    O2 Concentration (%): 30  General: alert  , just extubated, non verbal at this time       HEENT: n/c, a/t mask , ng     Lungs: few coarse bs, upper rhonchi    CV: normal  -tachy  GI: abd flat nml    : normal/lyle    Musculoskeletal: cachectic, weakened,    Skin: pale     Neuro: just extubated, awake, opens eyes, very weak, just nods head    Oral intake ability:  not now    Diet: NPO, has Ng     LABS:                          9.9    12.21 )-----------( 226      ( 02 Nov 2023 06:00 )             31.3     11-02    138  |  104  |  22  ----------------------------<  188<H>  5.0   |  31  |  0.39<L>    Ca    8.0<L>      02 Nov 2023 06:00  Phos  2.6     11-02  Mg     2.3     11-02    TPro  6.0  /  Alb  2.8<L>  /  TBili  0.3  /  DBili  x   /  AST  28  /  ALT  24  /  AlkPhos  50  11-02    Urinalysis Basic - ( 02 Nov 2023 06:00 )    Color: x / Appearance: x / SG: x / pH: x  Gluc: 188 mg/dL / Ketone: x  / Bili: x / Urobili: x   Blood: x / Protein: x / Nitrite: x   Leuk Esterase: x / RBC: x / WBC x   Sq Epi: x / Non Sq Epi: x / Bacteria: x        RADIOLOGY & ADDITIONAL STUDIES: < from: Xray Chest 1 View- PORTABLE-Routine (11.02.23 @ 06:16) >  ACC: [id number] EXAM:  XR CHEST PORTABLE ROUTINE 1V   ORDERED BY: PETER VELOZ     PROCEDURE DATE:  11/02/2023          INTERPRETATION:  AP chest on November 2, 2023 at 6:14 AM. Patient   requires intubation and is hypoxic.    Heart size normal.    Endotracheal tube, nasogastric tube, and right subclavian line again   noted.    COPD hyperexpansion of the lungs clips in the left axilla again seen.    There may have been left mastectomy.    No infiltrate. No change from November 1.    IMPRESSION: No acute lung finding or change. Tubes remain. COPD.          ADVANCE DIRECTIVES:  no  full code   Advanced Care Planning discussion total time spent:     Progress:  pt extubated this am, opens eyes, nods head, drowsy/weak , minimally verbal       Review of Systems: [ Unable to obtain due to poor mentation/drowsy]    MEDICATIONS  (STANDING):  aspirin enteric coated 81 milliGRAM(s) Oral daily  atorvastatin 80 milliGRAM(s) Oral at bedtime  buDESOnide    Inhalation Suspension 0.5 milliGRAM(s) Inhalation every 12 hours  cefTRIAXone   IVPB      cefTRIAXone   IVPB 1000 milliGRAM(s) IV Intermittent every 24 hours  chlorhexidine 2% Cloths 1 Application(s) Topical <User Schedule>  dextrose 5%. 1000 milliLiter(s) (100 mL/Hr) IV Continuous <Continuous>  dextrose 5%. 1000 milliLiter(s) (50 mL/Hr) IV Continuous <Continuous>  dextrose 50% Injectable 25 Gram(s) IV Push once  dextrose 50% Injectable 12.5 Gram(s) IV Push once  dextrose 50% Injectable 25 Gram(s) IV Push once  doxazosin 2 milliGRAM(s) Oral at bedtime  enoxaparin Injectable 40 milliGRAM(s) SubCutaneous every 12 hours  fentaNYL   Infusion. 0.5 MICROgram(s)/kG/Hr (1.95 mL/Hr) IV Continuous <Continuous>  insulin lispro (ADMELOG) corrective regimen sliding scale   SubCutaneous every 6 hours  lactated ringers. 1000 milliLiter(s) (75 mL/Hr) IV Continuous <Continuous>  letrozole 2.5 milliGRAM(s) Oral daily  levalbuterol Inhalation 0.63 milliGRAM(s) Inhalation every 6 hours  methylPREDNISolone sodium succinate Injectable 40 milliGRAM(s) IV Push every 8 hours  mupirocin 2% Nasal 1 Application(s) Both Nostrils two times a day  nicotine - 21 mG/24Hr(s) Patch 1 Patch Transdermal daily  pantoprazole   Suspension 40 milliGRAM(s) Enteral Tube daily  polyethylene glycol 3350 17 Gram(s) Oral daily  propofol Infusion 20 MICROgram(s)/kG/Min (4.68 mL/Hr) IV Continuous <Continuous>  senna 2 Tablet(s) Oral at bedtime    MEDICATIONS  (PRN):  bisacodyl Suppository 10 milliGRAM(s) Rectal daily PRN Constipation      PHYSICAL EXAM:  Vital Signs Last 24 Hrs  T(C): 36.8 (02 Nov 2023 05:00), Max: 37.7 (01 Nov 2023 16:00)  T(F): 98.2 (02 Nov 2023 05:00), Max: 99.9 (01 Nov 2023 16:00)  HR: 112 (02 Nov 2023 10:45) (83 - 121)  BP: 149/65 (02 Nov 2023 10:00) (126/70 - 174/85)  BP(mean): 88 (02 Nov 2023 10:00) (79 - 108)  RR: 9 (02 Nov 2023 10:45) (7 - 21)  SpO2: 98% (02 Nov 2023 10:45) (94% - 100%)    Parameters below as of 02 Nov 2023 10:45  Patient On (Oxygen Delivery Method): mask, aerosol    O2 Concentration (%): 30  General: alert  , just extubated, non verbal at this time       HEENT: n/c, a/t mask , ng     Lungs: few coarse bs, upper rhonchi    CV: normal  -tachy  GI: abd flat nml    : normal/lyle    Musculoskeletal: cachectic, weakened,    Skin: pale     Neuro: just extubated, awake, opens eyes, very weak, just nods head    Oral intake ability:  not now    Diet: NPO, has Ng     LABS:                          9.9    12.21 )-----------( 226      ( 02 Nov 2023 06:00 )             31.3     11-02    138  |  104  |  22  ----------------------------<  188<H>  5.0   |  31  |  0.39<L>    Ca    8.0<L>      02 Nov 2023 06:00  Phos  2.6     11-02  Mg     2.3     11-02    TPro  6.0  /  Alb  2.8<L>  /  TBili  0.3  /  DBili  x   /  AST  28  /  ALT  24  /  AlkPhos  50  11-02    Urinalysis Basic - ( 02 Nov 2023 06:00 )    Color: x / Appearance: x / SG: x / pH: x  Gluc: 188 mg/dL / Ketone: x  / Bili: x / Urobili: x   Blood: x / Protein: x / Nitrite: x   Leuk Esterase: x / RBC: x / WBC x   Sq Epi: x / Non Sq Epi: x / Bacteria: x        RADIOLOGY & ADDITIONAL STUDIES: < from: Xray Chest 1 View- PORTABLE-Routine (11.02.23 @ 06:16) >  ACC: [id number] EXAM:  XR CHEST PORTABLE ROUTINE 1V   ORDERED BY: PETER VELOZ     PROCEDURE DATE:  11/02/2023          INTERPRETATION:  AP chest on November 2, 2023 at 6:14 AM. Patient   requires intubation and is hypoxic.    Heart size normal.    Endotracheal tube, nasogastric tube, and right subclavian line again   noted.    COPD hyperexpansion of the lungs clips in the left axilla again seen.    There may have been left mastectomy.    No infiltrate. No change from November 1.    IMPRESSION: No acute lung finding or change. Tubes remain. COPD.          ADVANCE DIRECTIVES:  no  full code   Advanced Care Planning discussion total time spent:

## 2023-11-02 NOTE — PROGRESS NOTE ADULT - ASSESSMENT
A/P 68 year old female  PMHx of COPD, CAD, HLD, DM2, on xarelto for DVT history, hx of breast ca s/p left mastectomy, on chemotx last tx was 1 week ago presenting with shortness of breath,  Being admitted to the hospital for respiratory failure due to COPD exacerbation in the setting of Entero/rhinovirus with COPD, severe Exacerbation      Problem List   Acute respiratory failure due to acute COPD Exacerbation in setting of ERV requiring intubation 10/31/23   Active nicotine use - 1 pk/day   CAD    HTN   DM type 2   h/o Breast cancer      Plan  Tolerating cpap trial, and sedation vacation   extubation today   finish course of antibiotics   continue nebs ATC  Cont. Aspirin and statin   Cont  Lotrazole      ** for out patient f/u with primary pulm Dr. Roland,  will need yearly CT chest for lung cancer screaming         Palliative :  A/P 68 year old female  PMHx of COPD, CAD, HLD, DM2, on xarelto for DVT history, hx of breast ca s/p left mastectomy, on chemotx last tx was 1 week ago presenting with shortness of breath,  Being admitted to the hospital for respiratory failure due to COPD exacerbation in the setting of Entero/rhinovirus with COPD, severe Exacerbation      Problem List   Acute respiratory failure due to acute COPD Exacerbation in setting of ERV requiring intubation 10/31/23   Active nicotine use - 1 pk/day   CAD    HTN   DM type 2   h/o Breast cancer- still on treatments       Plan  Tolerating cpap trial, and sedation vacation   extubation today   finish course of antibiotics   continue nebs ATC  Cont. Aspirin and statin   Cont  Lotrazole   - f/b doctor at [facility]    ** for out patient f/u with primary pulm Dr. Roland,  will need yearly CT chest for lung cancer screaming         Palliative :  as a follow up today ,  pt d/w ccu team today and chart reviewed. pt successfully  extubated today, but was still sl drowsy , keeping eyes closed and only minimally verbal.     Had d/w pt brother bedside today , see above.  Remains full  code at this time.  Plan to follow pts clinical course w/ ccu team .   will attempt  to f/u goc d/w patient  when more alert/awake .

## 2023-11-03 NOTE — CHART NOTE - NSCHARTNOTEFT_GEN_A_CORE
Nutrition Follow Up Note  Hospital Course (Per Electronic Medical Record):   Source: Medical Record [X]  MD[X] Nursing Staff [X]     Diet: Vital 1.5 @ 45ml to 55ml x 13 hrs via NGT     Patient extubated (11/2) , tolerating NGT feeds patient requiring BIPAP , EN feeds to modified to 15 hrs due to same , EN feeds will provide 1237kcals, 56gms protein [id number] , Labs reviewed , suggest addition of free water flushes with EN feeds to meet hydration needs ,No BM since (10/27) bowel meds provided , Labs /POCT reviewed , (+) steroids , insulin coverage noted prn , SLP to evaluate for advancement to po diet , as per RN patient reports patient with poor po intake PTA , Will await SLP recommendations .     Current Weight: (11/3) 105.3/47.8kg                          (11/2) 104.9/47,6kg                          (11/1) 92.1/41.8kg               Pertinent Medications: MEDICATIONS  (STANDING):  acetaminophen   IVPB .. 575 milliGRAM(s) IV Intermittent once  aspirin enteric coated 81 milliGRAM(s) Oral daily  atorvastatin 80 milliGRAM(s) Oral at bedtime  buDESOnide    Inhalation Suspension 0.5 milliGRAM(s) Inhalation every 12 hours  chlorhexidine 2% Cloths 1 Application(s) Topical <User Schedule>  dextrose 5%. 1000 milliLiter(s) (50 mL/Hr) IV Continuous <Continuous>  dextrose 5%. 1000 milliLiter(s) (100 mL/Hr) IV Continuous <Continuous>  dextrose 50% Injectable 25 Gram(s) IV Push once  dextrose 50% Injectable 25 Gram(s) IV Push once  dextrose 50% Injectable 12.5 Gram(s) IV Push once  doxazosin 2 milliGRAM(s) Oral at bedtime  enoxaparin Injectable 40 milliGRAM(s) SubCutaneous every 12 hours  insulin lispro (ADMELOG) corrective regimen sliding scale   SubCutaneous every 6 hours  letrozole 2.5 milliGRAM(s) Oral daily  levalbuterol Inhalation 0.63 milliGRAM(s) Inhalation every 6 hours  methylPREDNISolone sodium succinate Injectable 40 milliGRAM(s) IV Push every 8 hours  mupirocin 2% Nasal 1 Application(s) Both Nostrils two times a day  nicotine - 21 mG/24Hr(s) Patch 1 Patch Transdermal daily  pantoprazole   Suspension 40 milliGRAM(s) Enteral Tube daily  polyethylene glycol 3350 17 Gram(s) Oral daily  senna 2 Tablet(s) Oral at bedtime    MEDICATIONS  (PRN):  bisacodyl Suppository 10 milliGRAM(s) Rectal daily PRN Constipation  ondansetron Injectable 4 milliGRAM(s) IV Push every 6 hours PRN Nausea and/or Vomiting      Pertinent Labs:  11-03 Na140 mmol/L Glu 213 mg/dL<H> K+ 4.6 mmol/L Cr  0.20 mg/dL<L> BUN 14 mg/dL 11-03 Phos 2.4 mg/dL<L> 11-03 Alb 2.6 g/dL<L>Hct9.3g/dl<L> , Hct30.0% <L>   POCT 216,233,213,166      Skin: ecchymotic     Edema: (+2) arm / hands     Last BM: (10/27) ,    Estimated Needs:   [X] No Change since Previous Assessment  [X] Recalculated:     Previous Nutrition Diagnosis: Severe protein calorie malnutrition     Nutrition Diagnosis is [X] Ongoing & addressed with EN feeds          New Nutrition Diagnosis: [X] Not Applicable    Interventions:   1. suggest addition of free water with EN  feeds       Monitoring & Evaluation: will monitor:  [X] Weights   [X] Follow Up (Per Protocol)  [X] Tolerance to Diet Prescription       RD to follow as per Nutrition protocol  Maude Maldonado RD, CDN

## 2023-11-03 NOTE — CHART NOTE - NSCHARTNOTEFT_GEN_A_CORE
Pt noted to have increased WOB with accessory muscle use and desaturations to 80s. Pt is audibly wheezing and placed on face mask with Xopenex neb treatment.  ABG obtained:   ABG - ( 03 Nov 2023 15:11 )  pH, Arterial: 7.31  pH, Blood: x     /  pCO2: 75    /  pO2: 115   / HCO3: 38    / Base Excess: 11.5  /  SaO2: 98.1      No improvement after neb, mental status declining and pt not longer responding,. Decision made to intubate for worsening acute on chronic hypercapnic resp failure. Brother Maximiliano Gonzales contacted and agreeable to another trial of intubation. Pt noted to have increased WOB with accessory muscle use and desaturations to 80s. Pt is audibly wheezing and placed on face mask with Xopenex neb treatment.  ABG obtained:   ABG - ( 03 Nov 2023 15:11 )  pH, Arterial: 7.31  pH, Blood: x     /  pCO2: 75    /  pO2: 115   / HCO3: 38    / Base Excess: 11.5  /  SaO2: 98.1      No improvement after neb, mental status declining and pt not longer responding,. Decision made to intubate for worsening acute on chronic hypoxic and hypercapnic resp failure. Brother Maximiliano Gonzales contacted and agreeable to another trial of intubation.

## 2023-11-03 NOTE — PROGRESS NOTE ADULT - SUBJECTIVE AND OBJECTIVE BOX
Patient is a 68y old  Female who presents with a chief complaint of COPD exacerbation (03 Nov 2023 09:58)      BRIEF HOSPITAL COURSE:   69 yo f pmhx COPD, HTN, HLD, Dm2, DVT on xarelto, breast ca s/p L mastectomy admitted with hypoxic/hypercapnic respiratory failure, copd exacerbation in setting of entero/rhinovirus ultimately requiring intubation with failed trial of extubation reintubated this afternoon.      Events last 24 hours:   received patient on prvc, post intubation abg with respiratory alkalosis, rr decreased.  patient sedated on precedex.  hypotensive on phenylephrine.  ordered for ivf bolus and midodrine for bp support.        PAST MEDICAL & SURGICAL HISTORY:  COPD, severe  CAD (coronary artery disease)  Breast cancer  No significant past surgical history      Allergies  penicillin (Hives)      FAMILY HISTORY:  unknown       Social History:   from home      Review of Systems:  unable to obtain 2/2 intubated/sedated      Physical Examination:    General: adult female, lying in bed, nad    HEENT: ett and ngt in place    PULM: diminished b/l    CVS: rrr    ABD: Soft, nondistended, nontender, +bs    EXT: Nonpitting edema    SKIN: Warm     NEURO: sedated      Medications:    doxazosin 2 milliGRAM(s) Oral at bedtime  midodrine 10 milliGRAM(s) Oral every 8 hours  phenylephrine    Infusion 0.5 MICROgram(s)/kG/Min IV Continuous <Continuous>    buDESOnide    Inhalation Suspension 0.5 milliGRAM(s) Inhalation every 12 hours  levalbuterol Inhalation 0.63 milliGRAM(s) Inhalation every 6 hours    acetaminophen   Oral Liquid .. 585 milliGRAM(s) Oral every 6 hours PRN  ALPRAZolam 0.5 milliGRAM(s) Oral every 12 hours PRN  dexMEDEtomidine Infusion 0.2 MICROgram(s)/kG/Hr IV Continuous <Continuous>  ondansetron Injectable 4 milliGRAM(s) IV Push every 6 hours PRN    letrozole 2.5 milliGRAM(s) Oral daily    aspirin enteric coated 81 milliGRAM(s) Oral daily  enoxaparin Injectable 40 milliGRAM(s) SubCutaneous every 12 hours    bisacodyl Suppository 10 milliGRAM(s) Rectal daily PRN  pantoprazole   Suspension 40 milliGRAM(s) Enteral Tube daily  polyethylene glycol 3350 17 Gram(s) Oral daily  senna 2 Tablet(s) Oral at bedtime    atorvastatin 80 milliGRAM(s) Oral at bedtime  dextrose 50% Injectable 25 Gram(s) IV Push once  dextrose 50% Injectable 12.5 Gram(s) IV Push once  dextrose 50% Injectable 25 Gram(s) IV Push once  insulin lispro (ADMELOG) corrective regimen sliding scale   SubCutaneous every 6 hours  methylPREDNISolone sodium succinate Injectable 40 milliGRAM(s) IV Push every 8 hours    dextrose 5%. 1000 milliLiter(s) IV Continuous <Continuous>  dextrose 5%. 1000 milliLiter(s) IV Continuous <Continuous>  lactated ringers Bolus 500 milliLiter(s) IV Bolus once  lactated ringers. 1000 milliLiter(s) IV Continuous <Continuous>  chlorhexidine 0.12% Liquid 15 milliLiter(s) Oral Mucosa every 12 hours  chlorhexidine 2% Cloths 1 Application(s) Topical <User Schedule>  mupirocin 2% Nasal 1 Application(s) Both Nostrils two times a day    nicotine - 21 mG/24Hr(s) Patch 1 Patch Transdermal daily      Mode: AC/ CMV (Assist Control/ Continuous Mandatory Ventilation)  RR (machine): 24  TV (machine): 400  FiO2: 40  PEEP: 5  ITime: 0.75  MAP: 15  PIP: 37      ICU Vital Signs Last 24 Hrs  T(C): 38.4 (03 Nov 2023 16:07), Max: 38.4 (03 Nov 2023 16:07)  T(F): 101.2 (03 Nov 2023 16:07), Max: 101.2 (03 Nov 2023 16:07)  HR: 63 (03 Nov 2023 20:15) (62 - 129)  BP: 85/50 (03 Nov 2023 20:15) (67/47 - 183/78)  BP(mean): 61 (03 Nov 2023 20:15) (54 - 113)  ABP: --  ABP(mean): --  RR: 24 (03 Nov 2023 20:15) (8 - 26)  SpO2: 100% (03 Nov 2023 20:15) (94% - 100%)    O2 Parameters below as of 03 Nov 2023 21:00  O2 Concentration (%): 30      Vital Signs Last 24 Hrs  T(C): 38.4 (03 Nov 2023 16:07), Max: 38.4 (03 Nov 2023 16:07)  T(F): 101.2 (03 Nov 2023 16:07), Max: 101.2 (03 Nov 2023 16:07)  HR: 63 (03 Nov 2023 20:15) (62 - 129)  BP: 85/50 (03 Nov 2023 20:15) (67/47 - 183/78)  BP(mean): 61 (03 Nov 2023 20:15) (54 - 113)  RR: 24 (03 Nov 2023 20:15) (8 - 26)  SpO2: 100% (03 Nov 2023 20:15) (94% - 100%)    Parameters below as of 03 Nov 2023 21:00      O2 Concentration (%): 30    ABG - ( 03 Nov 2023 17:53 )  pH, Arterial: 7.50  pH, Blood: x     /  pCO2: 47    /  pO2: 148   / HCO3: 37    / Base Excess: 13.5  /  SaO2: 98.4        I&O's Detail    02 Nov 2023 07:01  -  03 Nov 2023 07:00  --------------------------------------------------------  IN:    Lactated Ringers: 1800 mL    Oral Fluid: 240 mL    Vital1.5: 640 mL  Total IN: 2680 mL    OUT:    FentaNYL: 0 mL    Indwelling Catheter - Urethral (mL): 1860 mL    Propofol: 0 mL  Total OUT: 1860 mL  Total NET: 820 mL      03 Nov 2023 07:01  -  03 Nov 2023 20:22  --------------------------------------------------------  IN:    Dexmedetomidine: 15.7 mL    IV PiggyBack: 50 mL    IV PiggyBack: 416.5 mL    Lactated Ringers: 225 mL    Phenylephrine: 76 mL    Propofol: 4.7 mL    Vital1.5: 580 mL  Total IN: 1367.9 mL    OUT:    Indwelling Catheter - Urethral (mL): 1160 mL  Total OUT: 1160 mL  Total NET: 207.9 mL        LABS:                        9.3    14.57 )-----------( 197      ( 03 Nov 2023 05:15 )             30.0     11-03    140  |  103  |  14  ----------------------------<  213<H>  4.6   |  35<H>  |  0.20<L>    Ca    8.3<L>      03 Nov 2023 05:15  Phos  2.4     11-03  Mg     2.3     11-03    TPro  5.8<L>  /  Alb  2.6<L>  /  TBili  0.3  /  DBili  x   /  AST  30  /  ALT  27  /  AlkPhos  51  11-03        CAPILLARY BLOOD GLUCOSE  POCT Blood Glucose.: 231 mg/dL (03 Nov 2023 17:36)      Urinalysis Basic - ( 03 Nov 2023 05:15 )  Color: x / Appearance: x / SG: x / pH: x  Gluc: 213 mg/dL / Ketone: x  / Bili: x / Urobili: x   Blood: x / Protein: x / Nitrite: x   Leuk Esterase: x / RBC: x / WBC x   Sq Epi: x / Non Sq Epi: x / Bacteria: x      RADIOLOGY:   < from: Xray Chest 1 View- PORTABLE-Urgent (Xray Chest 1 View- PORTABLE-Urgent .) (11.03.23 @ 16:30) >    ACC: [id number] EXAM:  XR CHEST PORTABLE URGENT 1V   ORDERED BY:  NIKITA RODRIGUEZ     PROCEDURE DATE:  11/03/2023        INTERPRETATION:  EXAM:XR CHEST URGENT.     CLINICAL INDICATION: confirm ETT .     TECHNIQUE: Portable upright AP view.     PRIOR EXAM: 11/03/2023 at 6:12 AM.     FINDINGS:     Interval placement of an endotracheal tube in satisfactory position. A   nodular opacity overlying the right lower lung is likely related to the   nipple shadow. No other significant change.      IMPRESSION:    Satisfactory placement of endotracheal tube.    --- End of Report ---    HARSHIL ISABEL MD; Attending Radiologist  This document has been electronically signed. Nov  3 2023  4:33PM    < end of copied text >

## 2023-11-03 NOTE — PROGRESS NOTE ADULT - ASSESSMENT
Physical Examination:  GENERAL:               alert, verbal, mild distress  HEENT:                    No JVD, Dry MM, bitemporal waisting  PULM:                     Bilateral air entry, Diminished to auscultation bilaterally, no significant sputum production, No Rales, No Rhonchi, no Wheezing normal expiratory phase  CVS:                         S1, S2,  No Murmur  ABD:                        Soft, nondistended, nontender, normoactive bowel sounds,   EXT:                         No edema, nontender, No Cyanosis or Clubbing    NEURO:                  alert, verbal , nad  PSYC:                      Calm, limited Insight.        Assessment  68 year old female  PMHx of COPD, CAD, HLD, DM2, on xarelto for DVT history, hx of breast ca s/p left mastectomy, on chemotx last tx was 1 week ago presenting with shortness of breath,  Being admitted to the hospital for respiratory failure due to COPD exacerbation in the setting of Entero/rhinovirus with COPD, severe Exacerbation    Problem List  1. Acute respiratory failure due to acute COPD Exacerbation in setting of ERV requiring intubation 10/31/23  2. Active nicotine use - 1 pk/day  3. uses home o2 PRN  4. CAD   5. HTN  6. DM type 2  7, h/o Breast cancer  8. H/o DVT on xaralto -     Plan  patient extubated 11/2/23  remains to have poor affect   possible extubation  Continue Steroids and nebs    tube feeding  Continue cardura  voiding trial today   hold anticholinergic   D/C IVF    finishing course of antibiotics.  continue nebs ATC     Cont. Aspirin and statin   Cont. Lotrezole  Hold antihypertensives for now  Cont. AC with lovenox as on xarelto at home, switch to oral a/c once respiratory status improves  Nicotine patch     out patient f/u with primary pulm Dr. Roland. will need yearly CT chest for lung cancer screaming        PMD:				                   Notified(Date):  Family Updated: 	Maximiliano 	      [phone]                           Date:  11/3/2023   Sedation & Analgesia:	n/a  Diet/Nutrition:		 soft diet  GI PPx:			pantoprazole    Tablet 40 milliGRAM(s) Oral before breakfast  DVT Ppx:		enoxaparin Injectable 40 milliGRAM(s) SubCutaneous every 12 hours     Activity:		  Advance as tolerated  Head of Bed:               35-45 Deg  Glycemic Control:        insulin lispro (ADMELOG) corrective regimen sliding scale   SubCutaneous Before meals and at bedtime    Lines:   PIV     Restraints were deemed necessary to prevent removal of life-sustaining devices [  ] YES   [  x  ]  NO    Disposition: ICU Care    Goals of Care: Full code

## 2023-11-03 NOTE — PROGRESS NOTE ADULT - SUBJECTIVE AND OBJECTIVE BOX
Follow-up Critical Care Progress Note  Chief Complaint : Chronic obstructive pulmonary disease with acute exacerbation      patient seen and examined  on aersol mask  not weezing  mild sob  does not want to eat.         Allergies :penicillin (Hives)      PAST MEDICAL & SURGICAL HISTORY:  COPD, severe    CAD (coronary artery disease)    Breast cancer    No significant past surgical history        Medications:  MEDICATIONS  (STANDING):  aspirin enteric coated 81 milliGRAM(s) Oral daily  atorvastatin 80 milliGRAM(s) Oral at bedtime  buDESOnide    Inhalation Suspension 0.5 milliGRAM(s) Inhalation every 12 hours  cefTRIAXone   IVPB      cefTRIAXone   IVPB 1000 milliGRAM(s) IV Intermittent every 24 hours  chlorhexidine 2% Cloths 1 Application(s) Topical <User Schedule>  dextrose 5%. 1000 milliLiter(s) (100 mL/Hr) IV Continuous <Continuous>  dextrose 5%. 1000 milliLiter(s) (50 mL/Hr) IV Continuous <Continuous>  dextrose 50% Injectable 25 Gram(s) IV Push once  dextrose 50% Injectable 12.5 Gram(s) IV Push once  dextrose 50% Injectable 25 Gram(s) IV Push once  doxazosin 2 milliGRAM(s) Oral at bedtime  enoxaparin Injectable 40 milliGRAM(s) SubCutaneous every 12 hours  insulin lispro (ADMELOG) corrective regimen sliding scale   SubCutaneous every 6 hours  lactated ringers. 1000 milliLiter(s) (75 mL/Hr) IV Continuous <Continuous>  letrozole 2.5 milliGRAM(s) Oral daily  levalbuterol Inhalation 0.63 milliGRAM(s) Inhalation every 6 hours  methylPREDNISolone sodium succinate Injectable 40 milliGRAM(s) IV Push every 8 hours  mupirocin 2% Nasal 1 Application(s) Both Nostrils two times a day  nicotine - 21 mG/24Hr(s) Patch 1 Patch Transdermal daily  pantoprazole   Suspension 40 milliGRAM(s) Enteral Tube daily  polyethylene glycol 3350 17 Gram(s) Oral daily  senna 2 Tablet(s) Oral at bedtime  sodium phosphate 30 milliMole(s)/500 mL IVPB 30 milliMole(s) IV Intermittent once    MEDICATIONS  (PRN):  bisacodyl Suppository 10 milliGRAM(s) Rectal daily PRN Constipation  ondansetron Injectable 4 milliGRAM(s) IV Push every 6 hours PRN Nausea and/or Vomiting      Antibiotics History  azithromycin  IVPB 500 milliGRAM(s) IV Intermittent every 24 hours, 10-27-23 @ 14:42  cefTRIAXone   IVPB    , 10-29-23 @ 10:09  cefTRIAXone   IVPB 1000 milliGRAM(s) IV Intermittent once, 10-29-23 @ 10:09, Stop order after: 1 Doses  cefTRIAXone   IVPB 1000 milliGRAM(s) IV Intermittent every 24 hours, 10-30-23 @ 10:09, Stop order after: 5 Days      Heme Medications   aspirin enteric coated 81 milliGRAM(s) Oral daily, 10-27-23 @ 14:57  enoxaparin Injectable 40 milliGRAM(s) SubCutaneous every 12 hours, 10-27-23 @ 14:57      GI Medications  bisacodyl Suppository 10 milliGRAM(s) Rectal daily, 11-02-23 @ 08:24, Routine PRN  pantoprazole   Suspension 40 milliGRAM(s) Enteral Tube daily, 11-01-23 @ 08:08, Routine  polyethylene glycol 3350 17 Gram(s) Oral daily, 11-02-23 @ 08:23, Routine  senna 2 Tablet(s) Oral at bedtime, 11-02-23 @ 08:25, Routine      COVID  10-27-23 @ 10:15  COVID -   NotDetec       WBC Trend  11-03-23 @ 05:15   -  14.57<H>  11-02-23 @ 06:00   -  12.21<H>  11-01-23 @ 05:00   -  9.65    H/H Trend  11-03-23 @ 05:15   -   9.3<L>/ 30.0<L>  11-02-23 @ 06:00   -   9.9<L>/ 31.3<L>  11-01-23 @ 05:00   -   9.8<L>/ 30.9<L>  10-31-23 @ 05:30   -   10.9<L>/ 34.5  10-30-23 @ 05:30   -   10.3<L>/ 32.9<L>  10-29-23 @ 05:20   -   10.0<L>/ 31.0<L>    Stool Occult Blood    Platelet Trend  11-03-23 @ 05:15   -  197  11-02-23 @ 06:00   -  226  11-01-23 @ 05:00   -  216    Trend Sodium  11-03-23 @ 05:15   -  140  11-02-23 @ 06:00   -  138  11-01-23 @ 06:55   -  138    Trend Potassium  11-03-23 @ 05:15   -  4.6  11-02-23 @ 06:00   -  5.0  11-01-23 @ 06:55   -  5.0    Trend Bun/Cr  11-03-23 @ 05:15  BUN/CR -  14 / 0.20<L>  11-02-23 @ 06:00  BUN/CR -  22 / 0.39<L>  11-01-23 @ 06:55  BUN/CR -  32<H> / 0.36<L>    Lactic Acid Trend  10-27-23 @ 10:15   -   1.9    ABG Trend  11-03-23 @ 04:00   - 7.41/61<H>/216<H>/98.0  11-02-23 @ 12:18   - 7.44/51<H>/85/97.2  11-02-23 @ 10:09   - 7.46<H>/50<H>/102/98.0  11-02-23 @ 04:00   - 7.46<H>/47<H>/103/97.2  10-31-23 @ 11:40   - 7.43/39<H>/163<H>/98.2<H>  10-31-23 @ 08:10   - 7.35/50<H>/111<H>/97.1  10-30-23 @ 05:20   - 7.42/46<H>/105/97.5  10-29-23 @ 10:35   - 7.41/47<H>/85/96.2  10-27-23 @ 10:00   - 7.38/45<H>/179<H>/97.9    Trend AST/ALT/ALK Phos/Bili  11-03-23 @ 05:15   30/27/51/0.3  11-02-23 @ 06:00   28/24/50/0.3  10-30-23 @ 05:30   26/35/56/0.2  10-27-23 @ 10:15   48<H>/56<H>/85/0.3     Albumin Trend  11-03-23 @ 05:15   -   2.6<L>  11-02-23 @ 06:00   -   2.8<L>  10-30-23 @ 05:30   -   3.3  10-27-23 @ 10:15   -   4.0      PTT - PT - INR Trend  10-27-23 @ 10:15   -   51.6<H> - 13.9<H> - 1.19<H>    Glucose Trend  11-03-23 @ 05:15   -  213<H> -- --  11-03-23 @ 05:13   -  -- -- 216<H>  11-02-23 @ 23:07   -  -- -- 233<H>  11-02-23 @ 18:04   -  -- -- 213<H>  11-02-23 @ 11:53   -  -- -- 166<H>  11-02-23 @ 06:00   -  188<H> -- --  11-02-23 @ 05:11   -  -- -- 205<H>  11-01-23 @ 23:02   -  -- -- 207<H>  11-01-23 @ 17:57   -  -- -- 213<H>  11-01-23 @ 12:17   -  -- -- 205<H>    A1C with Estimated Average Glucose Result: 5.3 % [4.0 - 5.6] (10-28-23 @ 05:00)      LABS:                        9.3    14.57 )-----------( 197      ( 03 Nov 2023 05:15 )             30.0     11-03    140  |  103  |  14  ----------------------------<  213<H>  4.6   |  35<H>  |  0.20<L>    Ca    8.3<L>      03 Nov 2023 05:15  Phos  2.4     11-03  Mg     2.3     11-03    TPro  5.8<L>  /  Alb  2.6<L>  /  TBili  0.3  /  DBili  x   /  AST  30  /  ALT  27  /  AlkPhos  51  11-03         CULTURES: (if applicable)    Culture - Urine (collected 10-27-23 @ 14:02)  Source: Clean Catch Clean Catch (Midstream)  Final Report (10-28-23 @ 15:37):    No growth    Culture - Blood (collected 10-27-23 @ 10:15)  Source: .Blood Blood-Peripheral  Final Report (11-01-23 @ 17:00):    No growth at 5 days    Culture - Blood (collected 10-27-23 @ 10:15)  Source: .Blood Blood-Peripheral  Final Report (11-01-23 @ 17:00):    No growth at 5 days      Rapid RVP Result: Detected (10-27-23 @ 10:15)       CXR:      < from: Xray Chest 1 View- PORTABLE-Routine (11.02.23 @ 06:16) >    IMPRESSION: No acute lung finding or change. Tubes remain. COPD.    < end of copied text >         VITALS:  T(C): 36.6 (11-03-23 @ 05:00), Max: 37.6 (11-02-23 @ 16:00)  T(F): 97.8 (11-03-23 @ 05:00), Max: 99.6 (11-02-23 @ 16:00)  HR: 108 (11-03-23 @ 09:00) (100 - 127)  BP: 152/67 (11-03-23 @ 09:00) (140/66 - 167/78)  BP(mean): 90 (11-03-23 @ 09:00) (82 - 101)  ABP: --  ABP(mean): --  RR: 15 (11-03-23 @ 09:00) (7 - 26)  SpO2: 99% (11-03-23 @ 09:00) (95% - 100%)  CVP(mm Hg): --  CVP(cm H2O): --    Ins and Outs     11-02-23 @ 07:01  -  11-03-23 @ 07:00  --------------------------------------------------------  IN: 2680 mL / OUT: 1780 mL / NET: 900 mL    11-03-23 @ 07:01  -  11-03-23 @ 09:59  --------------------------------------------------------  IN: 225 mL / OUT: 0 mL / NET: 225 mL                I&O's Detail    02 Nov 2023 07:01  -  03 Nov 2023 07:00  --------------------------------------------------------  IN:    Lactated Ringers: 1800 mL    Oral Fluid: 240 mL    Vital1.5: 640 mL  Total IN: 2680 mL    OUT:    FentaNYL: 0 mL    Indwelling Catheter - Urethral (mL): 1780 mL    Propofol: 0 mL  Total OUT: 1780 mL    Total NET: 900 mL      03 Nov 2023 07:01  -  03 Nov 2023 09:59  --------------------------------------------------------  IN:    Lactated Ringers: 225 mL  Total IN: 225 mL    OUT:  Total OUT: 0 mL    Total NET: 225 mL

## 2023-11-03 NOTE — AIRWAY PLACEMENT NOTE ADULT - AIRWAY COMMENTS:
Patient intubated as per MORIAH Bolton's order. Patient intubated with ET tube 7.5 At 22 cm at the lip with the help of glydescope infornt of MORIAH Bolton. No complications during intubation. Positive color changed and bilateral breath sound confirmed. Patient placed on PRVC 24/450/40%+5.  Chest x-ray is pending.

## 2023-11-03 NOTE — GOALS OF CARE CONVERSATION - ADVANCED CARE PLANNING - CONVERSATION DETAILS
met with pt and her brother Maximiliano at bedside today . Pt was awake, alert, able to speak , was  c/o generalized discomfort , and a feeling of fullness.  Brother confirms she says she feels full often, tim when she tries to eat, but says her appetite is very poor.   I spoke w/ pt and asked her,  if she was unable to speak for herself, who could help us make decisions for her, and pt pointed to her brother Maximiliano,  and confirmed that it is  him. But said now that she will make her own decisions. Pt says she has been following up with all of her doctors, says she sees a cardiologist, an oncologist, and a pulmonary doctor, and that she has upcoming appointments she was worried about missing. Family at bedside re assured her they will speak with those doctors, and re-schedule her appointments.   Brother confirmed Pt currently is undergoing treatment for her stage 4 breast ca., she is taking herceptin and letrozole  under the care of Dr Larios.  Pt took last treatment on 10/24. Her plan is to continue all treatments. Discussed w/ pt and brother I would cont to follow pt w/ ccu team .

## 2023-11-03 NOTE — PROGRESS NOTE ADULT - ASSESSMENT
67 yo f pmhx COPD, HTN, HLD, Dm2, DVT on xarelto, breast ca s/p L mastectomy admitted with hypoxic/hypercapnic respiratory failure, copd exacerbation in setting of entero/rhinovirus ultimately requiring intubation with failed trial of extubation reintubated this afternoon.      NEURO: sedated on precedex, xanax q12hr for anxiety  CV: shock state likely 2/2 sedation, actively titrating phenylephrine for MAP >65, midodrine as adjunctive therapy  RESP: prvc, on minimal settings.  chest pt, pulm lavage/suctioning as needed.  inh bronchodilators.  steroid therapy   RENAL: monitor lytes, replace as needed  GI: npo with tf  ENDO: iss for glycemic control   ID: off abx therapy   HEME: Lovenox for vte ppx   DISPO: full code.      Critical Care time: 45 mins assessing presenting problems of acute illness that poses high probability of life threatening deterioration or end organ damage/dysfunction.  Medical decision making inculding Initiating plan of care, reviewing data, reviewing radiology, discussing with multidisciplinary team, non inclusive of procedures, discussing goals of care with patient/family

## 2023-11-04 NOTE — PROGRESS NOTE ADULT - SUBJECTIVE AND OBJECTIVE BOX
Follow-up Critical Care Progress Note  Chief Complaint : Chronic obstructive pulmonary disease with acute exacerbation      patient seen and examined  patient re intubated yesterday for co2 retention and sob  today no secretions  no wheezing  of note since re intuabtion hypotensive  lyel placed overnight        Allergies :penicillin (Hives)      PAST MEDICAL & SURGICAL HISTORY:  COPD, severe    CAD (coronary artery disease)    Breast cancer    No significant past surgical history        Medications:  MEDICATIONS  (STANDING):  aspirin enteric coated 81 milliGRAM(s) Oral daily  atorvastatin 80 milliGRAM(s) Oral at bedtime  buDESOnide    Inhalation Suspension 0.5 milliGRAM(s) Inhalation every 12 hours  chlorhexidine 0.12% Liquid 15 milliLiter(s) Oral Mucosa every 12 hours  chlorhexidine 2% Cloths 1 Application(s) Topical <User Schedule>  dexMEDEtomidine Infusion 0.2 MICROgram(s)/kG/Hr (1.95 mL/Hr) IV Continuous <Continuous>  dextrose 5%. 1000 milliLiter(s) (100 mL/Hr) IV Continuous <Continuous>  dextrose 5%. 1000 milliLiter(s) (50 mL/Hr) IV Continuous <Continuous>  dextrose 50% Injectable 25 Gram(s) IV Push once  dextrose 50% Injectable 12.5 Gram(s) IV Push once  dextrose 50% Injectable 25 Gram(s) IV Push once  doxazosin 2 milliGRAM(s) Oral at bedtime  enoxaparin Injectable 40 milliGRAM(s) SubCutaneous every 12 hours  insulin lispro (ADMELOG) corrective regimen sliding scale   SubCutaneous every 6 hours  lactated ringers. 1000 milliLiter(s) (50 mL/Hr) IV Continuous <Continuous>  letrozole 2.5 milliGRAM(s) Oral daily  levalbuterol Inhalation 0.63 milliGRAM(s) Inhalation every 6 hours  methylPREDNISolone sodium succinate Injectable 40 milliGRAM(s) IV Push every 8 hours  midodrine 10 milliGRAM(s) Oral every 8 hours  mupirocin 2% Nasal 1 Application(s) Both Nostrils two times a day  nicotine - 21 mG/24Hr(s) Patch 1 Patch Transdermal daily  pantoprazole   Suspension 40 milliGRAM(s) Enteral Tube daily  phenylephrine    Infusion 0.5 MICROgram(s)/kG/Min (7.31 mL/Hr) IV Continuous <Continuous>  polyethylene glycol 3350 17 Gram(s) Oral daily  senna 2 Tablet(s) Oral at bedtime    MEDICATIONS  (PRN):  acetaminophen   Oral Liquid .. 585 milliGRAM(s) Oral every 6 hours PRN Temp greater or equal to 38C (100.4F), Mild Pain (1 - 3)  ALPRAZolam 0.5 milliGRAM(s) Oral every 12 hours PRN anxiety  bisacodyl Suppository 10 milliGRAM(s) Rectal daily PRN Constipation  fentaNYL    Injectable 25 MICROGram(s) IV Push every 4 hours PRN pain/vent synchrony  ondansetron Injectable 4 milliGRAM(s) IV Push every 6 hours PRN Nausea and/or Vomiting      Antibiotics History  azithromycin  IVPB 500 milliGRAM(s) IV Intermittent every 24 hours, 10-27-23 @ 14:42  cefTRIAXone   IVPB    , 10-29-23 @ 10:09  cefTRIAXone   IVPB 1000 milliGRAM(s) IV Intermittent once, 10-29-23 @ 10:09, Stop order after: 1 Doses  cefTRIAXone   IVPB 1000 milliGRAM(s) IV Intermittent every 24 hours, 10-30-23 @ 10:09, Stop order after: 5 Days      Heme Medications   aspirin enteric coated 81 milliGRAM(s) Oral daily, 10-27-23 @ 14:57  enoxaparin Injectable 40 milliGRAM(s) SubCutaneous every 12 hours, 10-27-23 @ 14:57      GI Medications  bisacodyl Suppository 10 milliGRAM(s) Rectal daily, 11-02-23 @ 08:24, Routine PRN  pantoprazole   Suspension 40 milliGRAM(s) Enteral Tube daily, 11-01-23 @ 08:08, Routine  polyethylene glycol 3350 17 Gram(s) Oral daily, 11-02-23 @ 08:23, Routine  senna 2 Tablet(s) Oral at bedtime, 11-02-23 @ 08:25, Routine         WBC Trend  11-04-23 @ 06:30   -  16.07<H>  11-03-23 @ 05:15   -  14.57<H>  11-02-23 @ 06:00   -  12.21<H>    H/H Trend  11-04-23 @ 06:30   -   9.2<L>/ 29.0<L>  11-03-23 @ 05:15   -   9.3<L>/ 30.0<L>  11-02-23 @ 06:00   -   9.9<L>/ 31.3<L>  11-01-23 @ 05:00   -   9.8<L>/ 30.9<L>  10-31-23 @ 05:30   -   10.9<L>/ 34.5  10-30-23 @ 05:30   -   10.3<L>/ 32.9<L>       Platelet Trend  11-04-23 @ 06:30   -  202  11-03-23 @ 05:15   -  197  11-02-23 @ 06:00   -  226    Trend Sodium  11-04-23 @ 06:30   -  139  11-03-23 @ 05:15   -  140  11-02-23 @ 06:00   -  138    Trend Potassium  11-04-23 @ 06:30   -  4.0  11-03-23 @ 05:15   -  4.6  11-02-23 @ 06:00   -  5.0    Trend Bun/Cr  11-04-23 @ 06:30  BUN/CR -  16 / 0.27<L>  11-03-23 @ 05:15  BUN/CR -  14 / 0.20<L>  11-02-23 @ 06:00  BUN/CR -  22 / 0.39<L>    Lactic Acid Trend  10-27-23 @ 10:15   -   1.9    ABG Trend  11-04-23 @ 05:00   - 7.51<H>/48<H>/107/96.7  11-03-23 @ 17:53   - 7.50<H>/47<H>/148<H>/98.4<H>  11-03-23 @ 15:11   - 7.31<L>/75<HH>/115<H>/98.1<H>  11-03-23 @ 04:00   - 7.41/61<H>/216<H>/98.0  11-02-23 @ 12:18   - 7.44/51<H>/85/97.2  11-02-23 @ 10:09   - 7.46<H>/50<H>/102/98.0  11-02-23 @ 04:00   - 7.46<H>/47<H>/103/97.2  10-31-23 @ 11:40   - 7.43/39<H>/163<H>/98.2<H>  10-31-23 @ 08:10   - 7.35/50<H>/111<H>/97.1  10-30-23 @ 05:20   - 7.42/46<H>/105/97.5  10-29-23 @ 10:35   - 7.41/47<H>/85/96.2  10-27-23 @ 10:00   - 7.38/45<H>/179<H>/97.9    Trend AST/ALT/ALK Phos/Bili  11-04-23 @ 06:30   29/43/43/0.2  11-03-23 @ 05:15   30/27/51/0.3  11-02-23 @ 06:00   28/24/50/0.3  10-30-23 @ 05:30   26/35/56/0.2  10-27-23 @ 10:15   48<H>/56<H>/85/0.3       Albumin Trend  11-04-23 @ 06:30   -   2.3<L>  11-03-23 @ 05:15   -   2.6<L>  11-02-23 @ 06:00   -   2.8<L>  10-30-23 @ 05:30   -   3.3  10-27-23 @ 10:15   -   4.0      PTT - PT - INR Trend  10-27-23 @ 10:15   -   51.6<H> - 13.9<H> - 1.19<H>    Glucose Trend  11-04-23 @ 06:30   -  166<H> -- --  11-04-23 @ 05:29   -  -- -- 182<H>  11-03-23 @ 23:43   -  -- -- 200<H>  11-03-23 @ 17:36   -  -- -- 231<H>  11-03-23 @ 11:11   -  -- -- 246<H>  11-03-23 @ 05:15   -  213<H> -- --  11-03-23 @ 05:13   -  -- -- 216<H>  11-02-23 @ 23:07   -  -- -- 233<H>  11-02-23 @ 18:04   -  -- -- 213<H>  11-02-23 @ 11:53   -  -- -- 166<H>    A1C with Estimated Average Glucose Result: 5.3 % [4.0 - 5.6] (10-28-23 @ 05:00)      LABS:                        9.2    16.07 )-----------( 202      ( 04 Nov 2023 06:30 )             29.0     11-04    139  |  104  |  16  ----------------------------<  166<H>  4.0   |  33<H>  |  0.27<L>    Ca    7.8<L>      04 Nov 2023 06:30  Phos  2.4     11-04  Mg     2.1     11-04    TPro  5.2<L>  /  Alb  2.3<L>  /  TBili  0.2  /  DBili  x   /  AST  29  /  ALT  43  /  AlkPhos  43  11-04             CULTURES: (if applicable)    Culture - Urine (collected 10-27-23 @ 14:02)  Source: Clean Catch Clean Catch (Midstream)  Final Report (10-28-23 @ 15:37):    No growth    Culture - Blood (collected 10-27-23 @ 10:15)  Source: .Blood Blood-Peripheral  Final Report (11-01-23 @ 17:00):    No growth at 5 days    Culture - Blood (collected 10-27-23 @ 10:15)  Source: .Blood Blood-Peripheral  Final Report (11-01-23 @ 17:00):    No growth at 5 days      Rapid RVP Result: Detected (10-27-23 @ 10:15)      RADIOLOGY  CXR:  ET tube ngt in place  hyperinflated lungs  no infiltrate      VITALS:  T(C): 37.3 (11-04-23 @ 05:00), Max: 38.4 (11-03-23 @ 16:07)  T(F): 99.2 (11-04-23 @ 05:00), Max: 101.2 (11-03-23 @ 16:07)  HR: 60 (11-04-23 @ 09:15) (57 - 129)  BP: 123/49 (11-04-23 @ 08:00) (67/47 - 183/78)  BP(mean): 70 (11-04-23 @ 08:00) (54 - 113)  ABP: --  ABP(mean): --  RR: 15 (11-04-23 @ 08:00) (13 - 26)  SpO2: 98% (11-04-23 @ 09:15) (94% - 100%)  CVP(mm Hg): --  CVP(cm H2O): --    Ins and Outs     11-03-23 @ 07:01  -  11-04-23 @ 07:00  --------------------------------------------------------  IN: 2758.5 mL / OUT: 1530 mL / NET: 1228.6 mL    11-04-23 @ 08:01  -  11-04-23 @ 09:31  --------------------------------------------------------  IN: 25 mL / OUT: 0 mL / NET: 25 mL            Mode: CPAP with PS, RR (patient): 20, TV (patient): 0.32, FiO2: 30, PEEP: 5, PS: 10    I&O's Detail    03 Nov 2023 07:01  -  04 Nov 2023 07:00  --------------------------------------------------------  IN:    Dexmedetomidine: 93.7 mL    Enteral Tube Flush: 90 mL    IV PiggyBack: 50 mL    IV PiggyBack: 416.5 mL    Lactated Ringers: 225 mL    Lactated Ringers: 500 mL    Lactated Ringers Bolus: 500 mL    Phenylephrine: 278.7 mL    Propofol: 4.7 mL    Vital1.5: 600 mL  Total IN: 2758.5 mL    OUT:    Indwelling Catheter - Urethral (mL): 1530 mL  Total OUT: 1530 mL    Total NET: 1228.6 mL      04 Nov 2023 08:01  -  04 Nov 2023 09:31  --------------------------------------------------------  IN:    Vital1.5: 25 mL  Total IN: 25 mL    OUT:  Total OUT: 0 mL    Total NET: 25 mL

## 2023-11-04 NOTE — PROGRESS NOTE ADULT - ASSESSMENT
Physical Examination:  GENERAL:               alert, verbal, mild distress  HEENT:                    No JVD, Dry MM, bitemporal waisting  PULM:                     Bilateral air entry, Diminished to auscultation bilaterally, no significant sputum production, No Rales, No Rhonchi, no Wheezing normal expiratory phase  CVS:                         S1, S2,  No Murmur  ABD:                        Soft, nondistended, nontender, normoactive bowel sounds,   EXT:                         No edema, nontender, No Cyanosis or Clubbing    NEURO:                  alert, verbal , nad  PSYC:                      Calm, limited Insight.        Assessment  68 year old female  PMHx of COPD, CAD, HLD, DM2, on xarelto for DVT history, hx of breast ca s/p left mastectomy, on chemotx last tx was 1 week ago presenting with shortness of breath,  Being admitted to the hospital for respiratory failure due to COPD exacerbation in the setting of Entero/rhinovirus with COPD, severe Exacerbation    Problem List  1. Acute respiratory failure due to acute COPD Exacerbation in setting of ERV requiring intubation 10/31/23 Extubated 11/2/2023, Re intubated 11/3/23  2. Active nicotine use - 1 pk/day  3. uses home o2 PRN  4. CAD   5. HTN  6. DM type 2  7, h/o Breast cancer  8. H/o DVT on xaralto -     Plan  CPAP started PS 10    pt tolerating, does have prolonged expiratory phase   New hypotension requiring pressors, unsure of etiology     recently stopped antibiotics , will check UA and blood culture , if able sputum culture (but pt has no secretions) but no source of acute infection found    will give albumin   Right Upper Extremity swelling will check Duplex  Continue IV steroids, Pulmicort and xopenex nebs    tube feeding  Continue cardura  voiding trial today , lyle replaced overnight, but no documentation of retention  Continue hold anticholinergic        finishing course of antibiotics.  continue nebs ATC     Cont. Aspirin and statin   Cont. Lotrezole  Hold antihypertensives for now  Cont. AC with lovenox as on xarelto at home, switch to oral a/c once respiratory status improves  Nicotine patch     out patient f/u with primary pulm Dr. Roland. will need yearly CT chest for lung cancer screaming        PMD:				                   Notified(Date):  Family Updated: 	Maximiliano 	      [phone]                           Date:  11/4/2023 called   Sedation & Analgesia:	n/a  Diet/Nutrition:		 tube feeding  GI PPx:			pantoprazole    Tablet 40 milliGRAM(s) Oral before breakfast  DVT Ppx:		enoxaparin Injectable 40 milliGRAM(s) SubCutaneous every 12 hours     Activity:		  Advance as tolerated  Head of Bed:               35-45 Deg  Glycemic Control:        insulin lispro (ADMELOG) corrective regimen sliding scale   SubCutaneous Before meals and at bedtime    Lines:   PIV     Restraints were deemed necessary to prevent removal of life-sustaining devices [  ] YES   [  x  ]  NO    Disposition: ICU Care    Goals of Care: Full code Physical Examination:  GENERAL:               alert, verbal, mild distress  HEENT:                    No JVD, Dry MM, bitemporal waisting  PULM:                     Bilateral air entry, Diminished to auscultation bilaterally, no significant sputum production, No Rales, No Rhonchi, no Wheezing normal expiratory phase  CVS:                         S1, S2,  No Murmur  ABD:                        Soft, nondistended, nontender, normoactive bowel sounds,   EXT:                         No edema, nontender, No Cyanosis or Clubbing    NEURO:                  alert, verbal , nad  PSYC:                      Calm, limited Insight.        Assessment  68 year old female  PMHx of COPD, CAD, HLD, DM2, on xarelto for DVT history, hx of breast ca s/p left mastectomy, on chemotx last tx was 1 week ago presenting with shortness of breath,  Being admitted to the hospital for respiratory failure due to COPD exacerbation in the setting of Entero/rhinovirus with COPD, severe Exacerbation    Problem List  1. Acute respiratory failure due to acute COPD Exacerbation in setting of ERV requiring intubation 10/31/23 Extubated 11/2/2023, Re intubated 11/3/23  2. Active nicotine use - 1 pk/day  3. uses home o2 PRN  4. CAD   5. HTN  6. DM type 2  7, h/o Breast cancer  8. H/o DVT on xaralto -     Plan  CPAP started PS 10    pt tolerating, does have prolonged expiratory phase   New hypotension requiring pressors, unsure of etiology     recently stopped antibiotics , will check UA and blood culture , if able sputum culture (but pt has no secretions) but no source of acute infection found    will give albumin   Right Upper Extremity swelling will check Duplex  Continue IV steroids, Pulmicort and xopenex nebs  Suspect leukocytosis due to reactive with steroids, will monitor off antibiotics for now as finished course     tube feeding  Continue cardura  voiding trial today , lyle replaced overnight, but no documentation of retention  Continue hold anticholinergic        finishing course of antibiotics.  continue nebs ATC     Cont. Aspirin and statin   Cont. Lotrezole  Hold antihypertensives for now  Cont. AC with lovenox as on xarelto at home, switch to oral a/c once respiratory status improves  Nicotine patch     out patient f/u with primary pulm Dr. Roland. will need yearly CT chest for lung cancer screaming        PMD:				                   Notified(Date):  Family Updated: 	Maximiliano 	      [phone]                           Date:  11/4/2023 called   Sedation & Analgesia:	n/a  Diet/Nutrition:		 tube feeding  GI PPx:			pantoprazole    Tablet 40 milliGRAM(s) Oral before breakfast  DVT Ppx:		enoxaparin Injectable 40 milliGRAM(s) SubCutaneous every 12 hours     Activity:		  Advance as tolerated  Head of Bed:               35-45 Deg  Glycemic Control:        insulin lispro (ADMELOG) corrective regimen sliding scale   SubCutaneous Before meals and at bedtime    Lines:   PIV     Restraints were deemed necessary to prevent removal of life-sustaining devices [  ] YES   [  x  ]  NO    Disposition: ICU Care    Goals of Care: Full code Physical Examination:  GENERAL:               alert, verbal, mild distress  HEENT:                    No JVD, Dry MM, bitemporal waisting  PULM:                     Bilateral air entry, Diminished to auscultation bilaterally, no significant sputum production, No Rales, No Rhonchi, no Wheezing normal expiratory phase  CVS:                         S1, S2,  No Murmur  ABD:                        Soft, nondistended, nontender, normoactive bowel sounds,   EXT:                         No edema, nontender, No Cyanosis or Clubbing    NEURO:                  alert, verbal , nad  PSYC:                      Calm, limited Insight.        Assessment  68 year old female  PMHx of COPD, CAD, HLD, DM2, on xarelto for DVT history, hx of breast ca s/p left mastectomy, on chemotx last tx was 1 week ago presenting with shortness of breath,  Being admitted to the hospital for respiratory failure due to COPD exacerbation in the setting of Entero/rhinovirus with COPD, severe Exacerbation    Problem List  1. Acute respiratory failure due to acute COPD Exacerbation in setting of ERV requiring intubation 10/31/23 Extubated 11/2/2023, Re intubated 11/3/23  2. Active nicotine use - 1 pk/day  3. uses home o2 PRN  4. CAD   5. HTN  6. DM type 2  7, h/o Breast cancer  8. H/o DVT on xaralto -     Plan  CPAP started PS 10    pt tolerating, does have prolonged expiratory phase   New hypotension requiring pressors, unsure of etiology     recently stopped antibiotics , will check UA and blood culture , if able sputum culture (but pt has no secretions) but no source of acute infection found    will give albumin   Right Upper Extremity swelling will check Duplex  Continue IV steroids, Pulmicort and xopenex nebs  Suspect leukocytosis due to reactive with steroids, will monitor off antibiotics for now as finished course     tube feeding  Continue cardura  voiding trial today , lyle replaced overnight, but no documentation of retention  Continue hold anticholinergic        finishing course of antibiotics.  continue nebs ATC     Cont. Aspirin and statin   Cont. Lotrezole  Hold antihypertensives for now  Cont. AC with lovenox as on xarelto at home, switch to oral a/c once respiratory status improves  Nicotine patch     out patient f/u with primary pulm Dr. Roland. will need yearly CT chest for lung cancer screaming        PMD:				                   Notified(Date):  Family Updated: 	Maximiliano 	      [phone]                           Date:  11/4/2023 called   Sedation & Analgesia:	n/a  Diet/Nutrition:		 tube feeding  GI PPx:			pantoprazole    Tablet 40 milliGRAM(s) Oral before breakfast  DVT Ppx:		enoxaparin Injectable 40 milliGRAM(s) SubCutaneous every 12 hours     Activity:		  Advance as tolerated  Head of Bed:               35-45 Deg  Glycemic Control:        insulin lispro (ADMELOG) corrective regimen sliding scale   SubCutaneous Before meals and at bedtime    Lines:   PIV     Restraints may be deemed necessary to prevent removal of life-sustaining devices [ x ] YES   [    ]  NO    Disposition: ICU Care    Goals of Care: Full code

## 2023-11-04 NOTE — PROCEDURE NOTE - NSPROCNAME_GEN_A_CORE
Point of Care Ultrasound Vascular Access
General
Point of Care Ultrasound Vascular Access
Tracheal Intubation

## 2023-11-04 NOTE — PROCEDURE NOTE - NSUS ED ADDITIONAL DETAIL1 FT
#20 G placed under US guidance in R wrist, tip of catheter directly visualized in center of vessel, + blood return noted, flushes well
#20 G PIV placed under US guidance in R forearm, attempts:1, tolerated well, tip of vessel directly visualized in the vessel  and advanced with no resistance, + blood return noted , flushes easily

## 2023-11-04 NOTE — PROGRESS NOTE ADULT - ASSESSMENT
Pt is a 69 y/o F pmhx of COPD, HLD, HTN, DM2, DVT on xarelto, breast CA s/p L mastectomy who was presented to [facility] ED w/ complaints of acute SOB. Admitted w/ hypoxic/hypercapnic respiratory failure secondary to COPD exacerbation in setting of + entero/rhinovirus. Intubated for respiratory failure, extubated and subsequently reintubated yesterday.    1. Acute hypoxic/hypercapnic respiratory failure   2. Enterovirus/rhinovirus   3. Shock   4. COPD exacerbation     Plan:   Neuro: Sedated on vent w/ precedex, xanax for anxiety. Fent PRN for vent synchrony.     CV: Shock state likely secondary to sedation, on phenylephrine for HD support, actively titrating to maintain MAP>65.     Pulm: Acute hypoxic/hypercapnic respiratory failure secondary to COPD exacerbation, on vent Low TV ventilation using 4-6cc/kg of IBW for lung protective strategies, will titrate FiO2 to maintain SpO2>92%, PEEP to facilitate ventilation. Solumedrol Q8hr for COPD exacerbation.     GI: Diet: NPO on tube feeds. protonix for GI PPX     Renal: No active issues, continue to monitor and avoid nephrotoxic meds.     Endo: Glucose<180, mag>2, K>4 for arrythmia suppression.     Heme: Lovenox for DVT PPX     ID: Rhinovirus and enterovirus +, continue supportive measures, trend markers of infection daily.

## 2023-11-04 NOTE — PROGRESS NOTE ADULT - SUBJECTIVE AND OBJECTIVE BOX
Patient is a 68y old  Female who presents with a chief complaint of SOB (2023 09:31)      BRIEF HOSPITAL COURSE: Pt is a 69 y/o F pmhx of COPD, HLD, HTN, DM2, DVT on xarelto, breast CA s/p L mastectomy who was presented to  ED w/ complaints of acute SOB. Admitted w/ hypoxic/hypercapnic respiratory failure secondary to COPD exacerbation in setting of + entero/rhinovirus. Intubated for respiratory failure, extubated and subsequently reintubated yesterday.     Events last 24 hours: Pt remains on vent satting well sedated on precedex. On German for HD support.     PAST MEDICAL & SURGICAL HISTORY:  COPD, severe      CAD (coronary artery disease)      Breast cancer      No significant past surgical history          Review of Systems:  Unable to assess secondary to mentation/sedation on vent       Medications:    doxazosin 2 milliGRAM(s) Oral at bedtime  midodrine 10 milliGRAM(s) Oral every 8 hours  phenylephrine    Infusion 0.5 MICROgram(s)/kG/Min IV Continuous <Continuous>    buDESOnide    Inhalation Suspension 0.5 milliGRAM(s) Inhalation every 12 hours  levalbuterol Inhalation 0.63 milliGRAM(s) Inhalation every 6 hours    acetaminophen   Oral Liquid .. 650 milliGRAM(s) Oral every 6 hours PRN  ALPRAZolam 0.5 milliGRAM(s) Oral every 12 hours PRN  dexMEDEtomidine Infusion 0.2 MICROgram(s)/kG/Hr IV Continuous <Continuous>  fentaNYL    Injectable 25 MICROGram(s) IV Push every 4 hours PRN  ondansetron Injectable 4 milliGRAM(s) IV Push every 6 hours PRN    letrozole 2.5 milliGRAM(s) Oral daily    aspirin enteric coated 81 milliGRAM(s) Oral daily  enoxaparin Injectable 40 milliGRAM(s) SubCutaneous every 12 hours    bisacodyl Suppository 10 milliGRAM(s) Rectal daily PRN  pantoprazole   Suspension 40 milliGRAM(s) Enteral Tube daily  polyethylene glycol 3350 17 Gram(s) Oral daily  senna 2 Tablet(s) Oral at bedtime      atorvastatin 80 milliGRAM(s) Oral at bedtime  dextrose 50% Injectable 25 Gram(s) IV Push once  dextrose 50% Injectable 12.5 Gram(s) IV Push once  dextrose 50% Injectable 25 Gram(s) IV Push once  insulin lispro (ADMELOG) corrective regimen sliding scale   SubCutaneous every 6 hours  methylPREDNISolone sodium succinate Injectable 40 milliGRAM(s) IV Push every 8 hours    albumin human 25% IVPB 50 milliLiter(s) IV Intermittent every 6 hours  dextrose 5%. 1000 milliLiter(s) IV Continuous <Continuous>  dextrose 5%. 1000 milliLiter(s) IV Continuous <Continuous>  lactated ringers. 1000 milliLiter(s) IV Continuous <Continuous>  sodium chloride 0.9% lock flush 10 milliLiter(s) IV Push every 1 hour PRN      chlorhexidine 0.12% Liquid 15 milliLiter(s) Oral Mucosa every 12 hours  chlorhexidine 2% Cloths 1 Application(s) Topical <User Schedule>  chlorhexidine 4% Liquid 1 Application(s) Topical <User Schedule>  mupirocin 2% Nasal 1 Application(s) Both Nostrils two times a day    nicotine - 21 mG/24Hr(s) Patch 1 Patch Transdermal daily      Mode: AC/ CMV (Assist Control/ Continuous Mandatory Ventilation)  RR (machine): 18  TV (machine): 400  FiO2: 30  PEEP: 5  ITime: 0.75  PIP: 22      ICU Vital Signs Last 24 Hrs  T(C): 37.8 (2023 16:00), Max: 37.8 (2023 16:00)  T(F): 100 (2023 16:00), Max: 100 (2023 16:00)  HR: 96 (2023 19:00) (57 - 100)  BP: 124/63 (2023 19:00) (79/45 - 137/60)  BP(mean): 81 (2023 19:00) (56 - 83)  ABP: --  ABP(mean): --  RR: 25 (2023 19:00) (14 - 25)  SpO2: 100% (2023 19:00) (98% - 100%)    O2 Parameters below as of 2023 19:00  Patient On (Oxygen Delivery Method): ventilator    O2 Concentration (%): 30        ABG - ( 2023 05:00 )  pH, Arterial: 7.51  pH, Blood: x     /  pCO2: 48    /  pO2: 107   / HCO3: 38    / Base Excess: 15.3  /  SaO2: 96.7                I&O's Detail    2023 07:01  -  2023 07:00  --------------------------------------------------------  IN:    Dexmedetomidine: 101.5 mL    Enteral Tube Flush: 90 mL    IV PiggyBack: 50 mL    IV PiggyBack: 416.5 mL    Lactated Ringers: 225 mL    Lactated Ringers: 550 mL    Lactated Ringers Bolus: 500 mL    Phenylephrine: 281.6 mL    Propofol: 4.7 mL    Vital1.5: 580 mL  Total IN: 2799.3 mL    OUT:    Indwelling Catheter - Urethral (mL): 1550 mL  Total OUT: 1550 mL    Total NET: 1249.3 mL      2023 08:01  -  2023 20:00  --------------------------------------------------------  IN:    Dexmedetomidine: 93.6 mL    Lactated Ringers: 600 mL    Phenylephrine: 118.3 mL    Vital1.5: 660 mL  Total IN: 1471.9 mL    OUT:    Indwelling Catheter - Urethral (mL): 335 mL  Total OUT: 335 mL    Total NET: 1136.9 mL            LABS:                        9.2    16.07 )-----------(       ( 2023 06:30 )             29.0     11-04    139  |  104  |  16  ----------------------------<  166<H>  4.0   |  33<H>  |  0.27<L>    Ca    7.8<L>      2023 06:30  Phos  2.4     11-04  Mg     2.1     11-04    TPro  5.2<L>  /  Alb  2.3<L>  /  TBili  0.2  /  DBili  x   /  AST  29  /  ALT  43  /  AlkPhos  43  11-04          CAPILLARY BLOOD GLUCOSE      POCT Blood Glucose.: 277 mg/dL (2023 17:02)      Urinalysis Basic - ( 2023 11:20 )    Color: Yellow / Appearance: Clear / S.020 / pH: x  Gluc: x / Ketone: Negative mg/dL  / Bili: Negative / Urobili: 0.2 mg/dL   Blood: x / Protein: Trace mg/dL / Nitrite: Negative   Leuk Esterase: Trace / RBC: 20 /HPF / WBC 3 /HPF   Sq Epi: x / Non Sq Epi: x / Bacteria: Occasional /HPF      CULTURES:      Physical Examination:    General: Pt laying in hospital bed in no acute distress. Sedated on vent w/ precedex.     HEENT: Pupils equal, reactive to light.  Symmetric.    PULM: Clear to auscultation bilaterally, no significant sputum production    CVS: Regular rate and rhythm, no murmurs, rubs, or gallops    ABD: Soft, nondistended, nontender, normoactive bowel sounds, no masses    EXT: No edema, nontender    SKIN: Warm and well perfused.       RADIOLOGY:   < from: US Duplex Venous Upper Ext Ltd, Right (23 @ 11:30) >  ACC: [id number] EXAM:  US DPLX UPR EXT VEINS LTD RT   ORDERED BY: RANDALL MACHADO     PROCEDURE DATE:  2023          INTERPRETATION:  CLINICAL INFORMATION: Right upper extremity swelling.    COMPARISON: None available.    TECHNIQUE: Duplex sonography of the RIGHT UPPER extremity veins with   color and spectral Doppler, with and without compression.    FINDINGS:    The right internal jugular, subclavian, axillary and brachial veins are   patent and compressible where applicable.  There is superficial   thrombophlebitis of the right basilic vein. The cephalic vein   (superficial vein) is patent and without thrombus.    Doppler examination shows normal spontaneous and phasic flow.    Subcutaneous edema.    IMPRESSION:  No evidence of right upper extremity deep venous thrombosis.    Right basilic vein superficial thrombophlebitis.    --- End of Report ---            TJ LI MD; Attending Radiologist  This document has been electronically signed. 2023 11:35AM          CRITICAL CARE TIME SPENT: 36 minutes assessing presenting problems of acute illness, which pose high probability of life threatening deterioration or end organ damage/dysfunction, as well as medical decision making including initiating plan of care, reviewing data, reviewing radiologic exams, discussing with multidisciplinary team,  discussing goals of care with patient/family, and writing this note.  Non-inclusive of procedures performed,

## 2023-11-05 NOTE — AIRWAY REMOVAL NOTE  ADULT & PEDS - ARTIFICAL AIRWAY REMOVAL COMMENTS
Patient was extubated at per  request. Patient is stable on BIPAP  AVAPS MODE for comfort and tolerance. No distress noted, no stridor  and vitals signs are stable at this present time.

## 2023-11-05 NOTE — PROGRESS NOTE ADULT - ASSESSMENT
Physical Examination:  GENERAL:               alert, verbal, mild distress  HEENT:                    No JVD, Dry MM, bitemporal waisting  PULM:                     Bilateral air entry, Diminished to auscultation bilaterally, no significant sputum production, No Rales, No Rhonchi, no Wheezing normal expiratory phase  CVS:                         S1, S2,  No Murmur  ABD:                        Soft, nondistended, nontender, normoactive bowel sounds,   EXT:                         No edema, nontender, No Cyanosis or Clubbing    NEURO:                  alert, verbal , nad  PSYC:                      Calm, limited Insight.        Assessment  68 year old female  PMHx of COPD, CAD, HLD, DM2, on xarelto for DVT history, hx of breast ca s/p left mastectomy, on chemotx last tx was 1 week ago presenting with shortness of breath,  Being admitted to the hospital for respiratory failure due to COPD exacerbation in the setting of Entero/rhinovirus with COPD, severe Exacerbation    Problem List  1. Acute respiratory failure due to acute COPD Exacerbation in setting of ERV requiring intubation 10/31/23 Extubated 11/2/2023, Re intubated 11/3/23  2. Active nicotine use - 1 pk/day  3. uses home o2 PRN  4. CAD   5. HTN  6. DM type 2  7, h/o Breast cancer  8. H/o DVT on xaralto -     Plan  CPAP started PS 5  off pressors today, doing well  Awaiting CPAP ABG    noted anemia suspect from albumin dosing, edema,  f/u pan culture, currently off antibiotics, wbc improving, suspect leukocytosis is reactive    Continue IV steroids, Pulmicort and xopenex nebs  tube feeding  Continue cardura, doing well with voiding trial  Continue hold anticholinergic        continue nebs ATC    Cont. Aspirin and statin   Cont. Lotrezole  Hold antihypertensives for now  Cont. AC with lovenox as on xarelto at home, switch to oral a/c once respiratory status improves  Nicotine patch     out patient f/u with primary pulm Dr. Roland. will need yearly CT chest for lung cancer screaming        PMD:				                   Notified(Date):  Family Updated: 	Maximiliano 	      [phone]                           Date:  11/5/2023 msg left for call back  Sedation & Analgesia:	n/a  Diet/Nutrition:		 tube feeding  GI PPx:			pantoprazole    Tablet 40 milliGRAM(s) Oral before breakfast  DVT Ppx:		enoxaparin Injectable 40 milliGRAM(s) SubCutaneous every 12 hours     Activity:		  Advance as tolerated  Head of Bed:               35-45 Deg  Glycemic Control:        insulin lispro (ADMELOG) corrective regimen sliding scale   SubCutaneous Before meals and at bedtime    Lines:   PIV     Restraints may be deemed necessary to prevent removal of life-sustaining devices [ x ] YES   [   ]  NO    Disposition: ICU Care    Goals of Care: Full code

## 2023-11-05 NOTE — PROGRESS NOTE ADULT - ASSESSMENT
68 year old female  PMHx of COPD, CAD, HLD, DM2, on xarelto for DVT history, hx of breast ca s/p left mastectomy, on chemotx last tx last week, admitted to the ICU for    # Acute Mixed Respiratory Failure  # AECOPD  # Viral PNA: Rhino/Entero    Neuro:  - Precedex for Bipap compliance  - Xanax Q12H for anxiety    CV:  - Avoiding fluid overload, maintain MAP > 65  - asa, Statin  - Midodrine 10mg TID, remains off pressors  - 11/4 doppler no DVT, Superficial right basilic thrombophlebitis    Pulm:  - continue Solumedrol for enhanced anti-inflammatory effect  - Intubated x2 now extubated today 11/5:  Pt desating while on nasal cannula Pt but on Bipap. Actively titrating to maintain spo2 > 94%. High risk for reintubation  - Pulmicort/Xopenox  - Aggressive Chest Pt  - CXR ordered for am                  GI:  - Dulcolax, Senna and Miralax for bowel regimen  - PPI  - Enteral feeds as tolerated to avoid Stress ulceration and optimize nutritional state    Renal:  - Even to net negative fluid balance as tolerated by hemodynamics and renal fx.    - Preserved Renal Function Continue to monitor Bun/Cr and UOP  - Replacing electrolytes as needed with Goal K> 4, PO> 3, Mg> 2               - Strict I&O's  - Avoid Nephro toxic medication  - Cardura    Heme:  - Lovenox BID    ID:  - MRSA PCR Staph Pos s/p Mupiricon  - Microbiology and Radiology reviewed   - trend CBC with diff, CMP  and fever curve  - Avoiding antibiotics unless there is a concern for a bacterial/fungal infection    Endo:  - ISS for aggressive glycemic control to limit FS glucose to < 180mg/dl.    Critical Care Time (EXCLUSIVE of any non bundled procedures) :  42 minutes were spent assessing the patient's presenting problems of acute illness that pose a high probability of life threatening  deterioration or end organ damage / dysfunction.  Medical desicion making includes initiation / continuation of plan or care review data/ labwork/ radiographic study, direct patient care bedside ,  discussions with  consultants regarding care,  evaluation and interpretation of vital signs, any necessary ventilator management,   NIV or BIPAP changes  or initiation,    discussions with multidisipliary team,  am or pm rounds, discussions of goals of care with patient and family all non-inclusive of procedures.

## 2023-11-05 NOTE — PROGRESS NOTE ADULT - SUBJECTIVE AND OBJECTIVE BOX
Follow-up Critical Care Progress Note  Chief Complaint : Chronic obstructive pulmonary disease with acute exacerbation    patient seen and examined  sedation vacation done  pt alert responsive  started on cpap  PS 5, peep 5, RR 8 TV ~500  following commands  off pressors this am      Allergies :penicillin (Hives)      PAST MEDICAL & SURGICAL HISTORY:  COPD, severe    CAD (coronary artery disease)    Breast cancer    No significant past surgical history        Medications:  MEDICATIONS  (STANDING):  albumin human 25% IVPB 50 milliLiter(s) IV Intermittent every 6 hours  aspirin enteric coated 81 milliGRAM(s) Oral daily  atorvastatin 80 milliGRAM(s) Oral at bedtime  buDESOnide    Inhalation Suspension 0.5 milliGRAM(s) Inhalation every 12 hours  chlorhexidine 0.12% Liquid 15 milliLiter(s) Oral Mucosa every 12 hours  chlorhexidine 2% Cloths 1 Application(s) Topical <User Schedule>  chlorhexidine 4% Liquid 1 Application(s) Topical <User Schedule>  dexMEDEtomidine Infusion 0.2 MICROgram(s)/kG/Hr (1.95 mL/Hr) IV Continuous <Continuous>  dextrose 5%. 1000 milliLiter(s) (100 mL/Hr) IV Continuous <Continuous>  dextrose 5%. 1000 milliLiter(s) (50 mL/Hr) IV Continuous <Continuous>  dextrose 50% Injectable 25 Gram(s) IV Push once  dextrose 50% Injectable 12.5 Gram(s) IV Push once  dextrose 50% Injectable 25 Gram(s) IV Push once  doxazosin 2 milliGRAM(s) Oral at bedtime  enoxaparin Injectable 40 milliGRAM(s) SubCutaneous every 12 hours  insulin lispro (ADMELOG) corrective regimen sliding scale   SubCutaneous every 6 hours  lactated ringers. 1000 milliLiter(s) (50 mL/Hr) IV Continuous <Continuous>  letrozole 2.5 milliGRAM(s) Oral daily  levalbuterol Inhalation 0.63 milliGRAM(s) Inhalation every 6 hours  methylPREDNISolone sodium succinate Injectable 40 milliGRAM(s) IV Push every 8 hours  midodrine 10 milliGRAM(s) Oral every 8 hours  nicotine - 21 mG/24Hr(s) Patch 1 Patch Transdermal daily  pantoprazole   Suspension 40 milliGRAM(s) Enteral Tube daily  phenylephrine    Infusion 0.5 MICROgram(s)/kG/Min (7.31 mL/Hr) IV Continuous <Continuous>  polyethylene glycol 3350 17 Gram(s) Oral daily  senna 2 Tablet(s) Oral at bedtime    MEDICATIONS  (PRN):  acetaminophen   Oral Liquid .. 650 milliGRAM(s) Oral every 6 hours PRN Mild Pain (1 - 3)  ALPRAZolam 0.5 milliGRAM(s) Oral every 12 hours PRN anxiety  bisacodyl Suppository 10 milliGRAM(s) Rectal daily PRN Constipation  fentaNYL    Injectable 25 MICROGram(s) IV Push every 4 hours PRN pain/vent synchrony  ondansetron Injectable 4 milliGRAM(s) IV Push every 6 hours PRN Nausea and/or Vomiting  sodium chloride 0.9% lock flush 10 milliLiter(s) IV Push every 1 hour PRN Pre/post blood products, medications, blood draw, and to maintain line patency      Antibiotics History  azithromycin  IVPB 500 milliGRAM(s) IV Intermittent every 24 hours, 10-27-23 @ 14:42  cefTRIAXone   IVPB    , 10-29-23 @ 10:09  cefTRIAXone   IVPB 1000 milliGRAM(s) IV Intermittent once, 10-29-23 @ 10:09, Stop order after: 1 Doses  cefTRIAXone   IVPB 1000 milliGRAM(s) IV Intermittent every 24 hours, 10-30-23 @ 10:09, Stop order after: 5 Days      Heme Medications   aspirin enteric coated 81 milliGRAM(s) Oral daily, 10-27-23 @ 14:57  enoxaparin Injectable 40 milliGRAM(s) SubCutaneous every 12 hours, 10-27-23 @ 14:57      GI Medications  bisacodyl Suppository 10 milliGRAM(s) Rectal daily, 11-02-23 @ 08:24, Routine PRN  pantoprazole   Suspension 40 milliGRAM(s) Enteral Tube daily, 11-01-23 @ 08:08, Routine  polyethylene glycol 3350 17 Gram(s) Oral daily, 11-02-23 @ 08:23, Routine  senna 2 Tablet(s) Oral at bedtime, 11-02-23 @ 08:25, Routine      COVID  10-27-23 @ 10:15  COVID -   NotDetec       WBC Trend  11-05-23 @ 06:30   -  14.37<H>  11-04-23 @ 06:30   -  16.07<H>  11-03-23 @ 05:15   -  14.57<H>    H/H Trend  11-05-23 @ 06:30   -   7.6<L>/ 24.9<L>  11-04-23 @ 06:30   -   9.2<L>/ 29.0<L>  11-03-23 @ 05:15   -   9.3<L>/ 30.0<L>  11-02-23 @ 06:00   -   9.9<L>/ 31.3<L>  11-01-23 @ 05:00   -   9.8<L>/ 30.9<L>  10-31-23 @ 05:30   -   10.9<L>/ 34.5    Stool Occult Blood    Platelet Trend  11-05-23 @ 06:30   -  163  11-04-23 @ 06:30   -  202  11-03-23 @ 05:15   -  197    Trend Sodium  11-05-23 @ 06:30   -  143  11-04-23 @ 06:30   -  139  11-03-23 @ 05:15   -  140    Trend Potassium  11-05-23 @ 06:30   -  3.8  11-04-23 @ 06:30   -  4.0  11-03-23 @ 05:15   -  4.6    Trend Bun/Cr  11-05-23 @ 06:30  BUN/CR -  13 / 0.23<L>  11-04-23 @ 06:30  BUN/CR -  16 / 0.27<L>  11-03-23 @ 05:15  BUN/CR -  14 / 0.20<L>    Lactic Acid Trend  10-27-23 @ 10:15   -   1.9    ABG Trend  11-04-23 @ 05:00   - 7.51<H>/48<H>/107/96.7  11-03-23 @ 17:53   - 7.50<H>/47<H>/148<H>/98.4<H>  11-03-23 @ 15:11   - 7.31<L>/75<HH>/115<H>/98.1<H>  11-03-23 @ 04:00   - 7.41/61<H>/216<H>/98.0  11-02-23 @ 12:18   - 7.44/51<H>/85/97.2     Trend AST/ALT/ALK Phos/Bili  11-04-23 @ 06:30   29/43/43/0.2  11-03-23 @ 05:15   30/27/51/0.3  11-02-23 @ 06:00   28/24/50/0.3  10-30-23 @ 05:30   26/35/56/0.2  10-27-23 @ 10:15   48<H>/56<H>/85/0.3       Albumin Trend  11-04-23 @ 06:30   -   2.3<L>  11-03-23 @ 05:15   -   2.6<L>  11-02-23 @ 06:00   -   2.8<L>  10-30-23 @ 05:30   -   3.3  10-27-23 @ 10:15   -   4.0      PTT - PT - INR Trend  10-27-23 @ 10:15   -   51.6<H> - 13.9<H> - 1.19<H>    Glucose Trend  11-05-23 @ 06:30   -  184<H> -- --  11-05-23 @ 05:02   -  -- -- 193<H>  11-04-23 @ 23:15   -  -- -- 194<H>  11-04-23 @ 17:02   -  -- -- 277<H>  11-04-23 @ 11:08   -  -- -- 277<H>  11-04-23 @ 06:30   -  166<H> -- --  11-04-23 @ 05:29   -  -- -- 182<H>  11-03-23 @ 23:43   -  -- -- 200<H>  11-03-23 @ 17:36   -  -- -- 231<H>  11-03-23 @ 11:11   -  -- -- 246<H>    A1C with Estimated Average Glucose Result: 5.3 % [4.0 - 5.6] (10-28-23 @ 05:00)      LABS:                        7.6    14.37 )-----------( 163      ( 05 Nov 2023 06:30 )             24.9     11-05    143  |  105  |  13  ----------------------------<  184<H>  3.8   |  36<H>  |  0.23<L>    Ca    8.0<L>      05 Nov 2023 06:30  Phos  2.4     11-05  Mg     2.2     11-05    TPro  5.2<L>  /  Alb  2.3<L>  /  TBili  0.2  /  DBili  x   /  AST  29  /  ALT  43  /  AlkPhos  43  11-04           CULTURES: (if applicable)    Culture - Sputum (collected 11-04-23 @ 19:00)  Source: ET Tube ET Tube  Gram Stain (11-05-23 @ 06:21):    Few polymorphonuclear leukocytes per low power field    No Squamous epithelial cells per low power field    No organisms seen per oil power field    Culture - Urine (collected 10-27-23 @ 14:02)  Source: Clean Catch Clean Catch (Midstream)  Final Report (10-28-23 @ 15:37):    No growth    Culture - Blood (collected 10-27-23 @ 10:15)  Source: .Blood Blood-Peripheral  Final Report (11-01-23 @ 17:00):    No growth at 5 days    Culture - Blood (collected 10-27-23 @ 10:15)  Source: .Blood Blood-Peripheral  Final Report (11-01-23 @ 17:00):    No growth at 5 days      Rapid RVP Result: Detected (10-27-23 @ 10:15)            VITALS:  T(C): 37.3 (11-05-23 @ 05:00), Max: 37.8 (11-04-23 @ 16:00)  T(F): 99.1 (11-05-23 @ 05:00), Max: 100 (11-04-23 @ 16:00)  HR: 72 (11-05-23 @ 08:35) (62 - 100)  BP: 119/58 (11-05-23 @ 08:20) (87/43 - 146/66)  BP(mean): 76 (11-05-23 @ 08:20) (57 - 88)  ABP: --  ABP(mean): --  RR: 10 (11-05-23 @ 08:20) (10 - 26)  SpO2: 100% (11-05-23 @ 08:35) (98% - 100%)  CVP(mm Hg): --  CVP(cm H2O): --    Ins and Outs     11-04-23 @ 08:01  -  11-05-23 @ 07:00  --------------------------------------------------------  IN: 2301 mL / OUT: 735 mL / NET: 1566 mL            Device: Avea, Mode: CPAP with PS, RR (patient): 10, TV (patient): 440, FiO2: 30, PEEP: 5, PS: 5, MAP: 8, PIP: 10    I&O's Detail    04 Nov 2023 08:01  -  05 Nov 2023 07:00  --------------------------------------------------------  IN:    Dexmedetomidine: 171.6 mL    IV PiggyBack: 100 mL    Lactated Ringers: 1100 mL    Phenylephrine: 159.4 mL    Vital1.5: 770 mL  Total IN: 2301 mL    OUT:    Indwelling Catheter - Urethral (mL): 335 mL    Voided (mL): 400 mL  Total OUT: 735 mL    Total NET: 1566 mL

## 2023-11-05 NOTE — PROGRESS NOTE ADULT - SUBJECTIVE AND OBJECTIVE BOX
Patient is a 68y old  Female who presents with a chief complaint of SOB (05 Nov 2023 09:19)    Events last 24 hours: extubated, requiring bipap for hypoxia    PAST MEDICAL & SURGICAL HISTORY:  COPD, severe      CAD (coronary artery disease)      Breast cancer      No significant past surgical history          Review of Systems:  CORKY due to clinical condition    Medications:    doxazosin 2 milliGRAM(s) Oral at bedtime  midodrine 10 milliGRAM(s) Oral every 8 hours  phenylephrine    Infusion 0.5 MICROgram(s)/kG/Min IV Continuous <Continuous>    buDESOnide    Inhalation Suspension 0.5 milliGRAM(s) Inhalation every 12 hours  levalbuterol Inhalation 0.63 milliGRAM(s) Inhalation every 6 hours    acetaminophen   Oral Liquid .. 650 milliGRAM(s) Oral every 6 hours PRN  ALPRAZolam 0.5 milliGRAM(s) Oral every 12 hours PRN  dexMEDEtomidine Infusion 0.2 MICROgram(s)/kG/Hr IV Continuous <Continuous>  fentaNYL    Injectable 25 MICROGram(s) IV Push every 4 hours PRN  ondansetron Injectable 4 milliGRAM(s) IV Push every 6 hours PRN    letrozole 2.5 milliGRAM(s) Oral daily    aspirin enteric coated 81 milliGRAM(s) Oral daily  enoxaparin Injectable 40 milliGRAM(s) SubCutaneous every 12 hours    bisacodyl Suppository 10 milliGRAM(s) Rectal daily PRN  pantoprazole   Suspension 40 milliGRAM(s) Enteral Tube daily  polyethylene glycol 3350 17 Gram(s) Oral daily  senna 2 Tablet(s) Oral at bedtime      atorvastatin 80 milliGRAM(s) Oral at bedtime  dextrose 50% Injectable 25 Gram(s) IV Push once  dextrose 50% Injectable 12.5 Gram(s) IV Push once  dextrose 50% Injectable 25 Gram(s) IV Push once  insulin lispro (ADMELOG) corrective regimen sliding scale   SubCutaneous every 6 hours  methylPREDNISolone sodium succinate Injectable 40 milliGRAM(s) IV Push every 8 hours    dextrose 5%. 1000 milliLiter(s) IV Continuous <Continuous>  dextrose 5%. 1000 milliLiter(s) IV Continuous <Continuous>  lactated ringers. 1000 milliLiter(s) IV Continuous <Continuous>  sodium chloride 0.9% lock flush 10 milliLiter(s) IV Push every 1 hour PRN      chlorhexidine 2% Cloths 1 Application(s) Topical <User Schedule>  chlorhexidine 4% Liquid 1 Application(s) Topical <User Schedule>    nicotine - 21 mG/24Hr(s) Patch 1 Patch Transdermal daily      Mode: CPAP with PS  FiO2: 30  PEEP: 5  PS: 5  MAP: 8  PIP: 10      ICU Vital Signs Last 24 Hrs  T(C): 37.4 (05 Nov 2023 16:00), Max: 37.4 (05 Nov 2023 01:00)  T(F): 99.3 (05 Nov 2023 16:00), Max: 99.3 (05 Nov 2023 01:00)  HR: 122 (05 Nov 2023 21:01) (62 - 122)  BP: 145/54 (05 Nov 2023 19:00) (116/54 - 152/65)  BP(mean): 79 (05 Nov 2023 19:00) (66 - 91)  ABP: --  ABP(mean): --  RR: 10 (05 Nov 2023 19:00) (9 - 22)  SpO2: 97% (05 Nov 2023 21:01) (97% - 100%)    O2 Parameters below as of 05 Nov 2023 19:00  Patient On (Oxygen Delivery Method): nasal cannula  O2 Flow (L/min): 4          ABG - ( 05 Nov 2023 13:27 )  pH, Arterial: 7.49  pH, Blood: x     /  pCO2: 44    /  pO2: 110   / HCO3: 34    / Base Excess: 10.2  /  SaO2: 97.2                I&O's Detail    04 Nov 2023 08:01  -  05 Nov 2023 07:00  --------------------------------------------------------  IN:    Dexmedetomidine: 171.6 mL    IV PiggyBack: 100 mL    Lactated Ringers: 1150 mL    Phenylephrine: 159.4 mL    Vital1.5: 770 mL  Total IN: 2351 mL    OUT:    Indwelling Catheter - Urethral (mL): 335 mL    Voided (mL): 400 mL  Total OUT: 735 mL    Total NET: 1616 mL      05 Nov 2023 07:01  -  05 Nov 2023 21:24  --------------------------------------------------------  IN:    Dexmedetomidine: 6 mL    Lactated Ringers: 600 mL    Vital1.5: 75 mL  Total IN: 681 mL    OUT:    Phenylephrine: 0 mL    Voided (mL): 1200 mL  Total OUT: 1200 mL    Total NET: -519 mL          LABS:                        7.6    14.37 )-----------( 163      ( 05 Nov 2023 06:30 )             24.9     11-05    143  |  105  |  13  ----------------------------<  184<H>  3.8   |  36<H>  |  0.23<L>    Ca    8.0<L>      05 Nov 2023 06:30  Phos  2.4     11-05  Mg     2.2     11-05    TPro  5.2<L>  /  Alb  2.3<L>  /  TBili  0.2  /  DBili  x   /  AST  29  /  ALT  43  /  AlkPhos  43  11-04          CAPILLARY BLOOD GLUCOSE      POCT Blood Glucose.: 123 mg/dL (05 Nov 2023 17:01)      Urinalysis Basic - ( 05 Nov 2023 06:30 )    Color: x / Appearance: x / SG: x / pH: x  Gluc: 184 mg/dL / Ketone: x  / Bili: x / Urobili: x   Blood: x / Protein: x / Nitrite: x   Leuk Esterase: x / RBC: x / WBC x   Sq Epi: x / Non Sq Epi: x / Bacteria: x      CULTURES:      Physical Examination:    General: Ill appearing    HEENT: Pupils equal, reactive to light. Symmetric. No scleral icterus or injection.    PULM: Diminished to auscultation B/L. No wheezes, rales, or rhonchi apprecaited. No significant sputum production or increased respiratory effort.    NECK: Supple, no lymphadenopathy, trachea midline.    CVS: Regular rate and rhythm, no murmurs appreciated, +s1/s2.    ABD: Soft, nondistended, nontender, normoactive bowel sounds.    EXT: No edema, nontender.    SKIN: Warm and well perfused, no rashes noted.    NEURO: Alert, interactive, nonfocal.    RADIOLOGY: < from: Xray Chest 1 View- PORTABLE-Urgent (Xray Chest 1 View- PORTABLE-Urgent .) (11.03.23 @ 16:30) >    PROCEDURE DATE:  11/03/2023          INTERPRETATION:  EXAM:XR CHEST URGENT.     CLINICAL INDICATION: confirm ETT .     TECHNIQUE: Portable upright AP view.     PRIOR EXAM: 11/03/2023 at 6:12 AM.     FINDINGS:     Interval placement of an endotracheal tube in satisfactory position. A   nodular opacity overlying the right lower lung is likely related to the   nipple shadow. No other significant change.      IMPRESSION:    Satisfactory placement of endotracheal tube.    --- End of Report ---      < end of copied text >

## 2023-11-06 NOTE — PROGRESS NOTE ADULT - SUBJECTIVE AND OBJECTIVE BOX
F/U Note:    68F PMHx of COPD, HTN, HLD, DM, on xarelto for DVT history, hx of breast ca s/p left mastectomy, on chemotx last tx was 1 week ago presenting with shortness of breath. ADmitted for worsening SOB, PNA, required intubation and mechanical vent support.    Interval Hx:  -patient has been intubated, extubated multiple times  -currently EXTUBATED, but needing BiPAp intermittently     Vital Signs Last 24 Hrs  T(C): 37.1 (06 Nov 2023 16:00), Max: 37.2 (05 Nov 2023 21:00)  T(F): 98.8 (06 Nov 2023 16:00), Max: 99 (05 Nov 2023 21:00)  HR: 108 (06 Nov 2023 19:00) (73 - 126)  BP: 134/57 (06 Nov 2023 19:00) (99/65 - 162/70)  BP(mean): 79 (06 Nov 2023 19:00) (72 - 108)  RR: 28 (06 Nov 2023 19:00) (12 - 32)  SpO2: 100% (06 Nov 2023 19:00) (96% - 100%)    Parameters below as of 06 Nov 2023 19:00  Patient On (Oxygen Delivery Method): nasal cannula  O2 Flow (L/min): 4                              8.6    12.51 )-----------( 186      ( 06 Nov 2023 05:15 )             27.3         11-06    143  |  106  |  13  ----------------------------<  168<H>  3.8   |  34<H>  |  0.28<L>    Ca    8.4      06 Nov 2023 05:15  Phos  3.0     11-06  Mg     2.1     11-06          ROS:  -unable to obtain        NEURO: no gross deficits  CV: (+) S1/S2, rrr, no mrg  RESP: crackels b/l  GI: soft, non tender

## 2023-11-06 NOTE — PROGRESS NOTE ADULT - SUBJECTIVE AND OBJECTIVE BOX
REHAN IGLESIAS  68y  Female  Admitting: EVA Stark    HPI:  68F PMHx of COPD, HTN, HLD, DM, on xarelto for DVT history, hx of breast ca s/p left mastectomy, on chemotx last tx was 1 week PTA presenting with shortness of breath, x 1 day. She is an active smoker. Denied any fevers or chills. ICU consulted as patient needed bipap support in the ED.  Oncology consulted for breast cancer history.    PAST MEDICAL & SURGICAL HISTORY:  COPD, severe      CAD (coronary artery disease)      Breast cancer      MEDICATIONS  (STANDING):  aspirin enteric coated 81 milliGRAM(s) Oral daily  atorvastatin 80 milliGRAM(s) Oral at bedtime  buDESOnide    Inhalation Suspension 0.5 milliGRAM(s) Inhalation every 12 hours  chlorhexidine 2% Cloths 1 Application(s) Topical <User Schedule>  dexMEDEtomidine Infusion 0.2 MICROgram(s)/kG/Hr (1.95 mL/Hr) IV Continuous <Continuous>  dextrose 5%. 1000 milliLiter(s) (50 mL/Hr) IV Continuous <Continuous>  dextrose 5%. 1000 milliLiter(s) (100 mL/Hr) IV Continuous <Continuous>  dextrose 50% Injectable 25 Gram(s) IV Push once  dextrose 50% Injectable 12.5 Gram(s) IV Push once  dextrose 50% Injectable 25 Gram(s) IV Push once  doxazosin 2 milliGRAM(s) Oral at bedtime  enoxaparin Injectable 40 milliGRAM(s) SubCutaneous every 12 hours  insulin lispro (ADMELOG) corrective regimen sliding scale   SubCutaneous every 6 hours  lactated ringers. 1000 milliLiter(s) (50 mL/Hr) IV Continuous <Continuous>  letrozole 2.5 milliGRAM(s) Oral daily  levalbuterol Inhalation 0.63 milliGRAM(s) Inhalation every 6 hours  meropenem  IVPB 1000 milliGRAM(s) IV Intermittent every 8 hours  meropenem  IVPB      methylPREDNISolone sodium succinate Injectable 40 milliGRAM(s) IV Push every 8 hours  midodrine 10 milliGRAM(s) Oral every 8 hours  nicotine - 21 mG/24Hr(s) Patch 1 Patch Transdermal daily  pantoprazole   Suspension 40 milliGRAM(s) Enteral Tube daily  polyethylene glycol 3350 17 Gram(s) Oral daily  senna 2 Tablet(s) Oral at bedtime    MEDICATIONS  (PRN):  acetaminophen   Oral Liquid .. 650 milliGRAM(s) Oral every 6 hours PRN Mild Pain (1 - 3)  ALPRAZolam 0.5 milliGRAM(s) Oral every 12 hours PRN anxiety  bisacodyl Suppository 10 milliGRAM(s) Rectal daily PRN Constipation  fentaNYL    Injectable 25 MICROGram(s) IV Push every 4 hours PRN pain/vent synchrony  ondansetron Injectable 4 milliGRAM(s) IV Push every 6 hours PRN Nausea and/or Vomiting  sodium chloride 0.9% lock flush 10 milliLiter(s) IV Push every 1 hour PRN Pre/post blood products, medications, blood draw, and to maintain line patency    Allergies    penicillin (Hives)    FAMILY HISTORY: NC in first degree relatives      SOCIAL HISTORY: No EtOH, + tobacco    REVIEW OF SYSTEMS:    CONSTITUTIONAL: +weakness  EYES/ENT: No visual changes;  No vertigo or throat pain   NECK: No pain or stiffness  RESPIRATORY: No hemoptysis  CARDIOVASCULAR: No palpitations  GASTROINTESTINAL: No hematemesis; No melena or hematochezia.  GENITOURINARY: No hematuria  NEUROLOGICAL: +weakness  SKIN: No itching   All other review of systems is negative unless indicated above.    T(F): 97.8 (11-06-23 @ 05:00), Max: 99.3 (11-05-23 @ 16:00)  HR: 86 (11-06-23 @ 06:00)  BP: 141/94 (11-06-23 @ 06:00)  RR: 21 (11-06-23 @ 06:00)  SpO2: 100% (11-06-23 @ 06:00)    GENERAL: NAD, well-developed  HEAD:  Atraumatic, Normocephalic  EYES: EOMI, PERRLA, conjunctiva and sclera clear  NECK: Supple, No JVD  CHEST/LUNG: decreased BS ant., scattered crackles  HEART: Regular rate and rhythm; No murmurs, rubs, or gallops  ABDOMEN: Soft, Nontender, Nondistended; Bowel sounds present  EXTREMITIES:  no calf tenderness  NEUROLOGY: awake, alert    Labs:             8.6    12.51 )-----------( 186      ( 11-06 @ 05:15 )             27.3                7.6    14.37 )-----------( 163      ( 11-05 @ 06:30 )             24.9                9.2    16.07 )-----------( 202      ( 11-04 @ 06:30 )             29.0       11-06    143  |  106  |  13  ----------------------------<  168<H>  3.8   |  34<H>  |  0.28<L>    Ca    8.4      06 Nov 2023 05:15  Phos  3.0     11-06  Mg     2.1     11-06      Magnesium: 2.1 mg/dL [1.6 - 2.6] (11-06 @ 05:15)  Phosphorus: 3.0 mg/dL [2.5 - 4.5] (11-06 @ 05:15)      Occult Blood, Feces: Negative (11-05-23 @ 16:51)      Culture - Sputum (collected 04 Nov 2023 19:00)  Source: ET Tube ET Tube  Gram Stain (05 Nov 2023 06:21):    Few polymorphonuclear leukocytes per low power field    No Squamous epithelial cells per low power field    No organisms seen per oil power field  Preliminary Report (05 Nov 2023 22:30):    No growth    Culture - Blood (collected 04 Nov 2023 12:20)  Source: .Blood Blood  Preliminary Report (05 Nov 2023 23:03):    No growth at 24 hours    Culture - Blood (collected 04 Nov 2023 12:10)  Source: .Blood Blood  Gram Stain (06 Nov 2023 02:43):    Growth in aerobic bottle: Gram Positive Cocci in Clusters  Preliminary Report (06 Nov 2023 02:43):    Growth in aerobic bottle: Gram Positive Cocci in Clusters    Direct identification is available within approximately 3-5    hours either by Blood Panel Multiplexed PCR or Direct    MALDI-TOF. Details: [email or web address]  Organism: Blood Culture PCR (06 Nov 2023 04:02)  Organism: Blood Culture PCR (06 Nov 2023 04:02)      Consultant notes reviewed : YES [ x] ; NO [ ]      Radiology and additional tests:

## 2023-11-06 NOTE — CONSULT NOTE ADULT - SUBJECTIVE AND OBJECTIVE BOX
HPI:   Patient is a 68y female with a past history of HTN, HLD,DM, COPD, DVT on xarelto at home, breast cancer s/p mastectomy and receiving Herceptin, who was admitted 10/27 with shortness of breath.  She was felt to have a COPD exacerbation and was treated with steroids and BIPAP, she required intubation.  She was extubated after a few days on vent, required repeat intubation for a short time and was extubated 11/5.  She is still smoking, had blood cultures sent a few days with coag negative staph in 1 set. Meropenem was started.  She was restless and agitated earlier, has been sedated, is on BIPAP . Minimal secretions.She is unable to give a history at this point, recently sedated.    REVIEW OF SYSTEMS:  All other review of systems negative (Comprehensive ROS)    PAST MEDICAL & SURGICAL HISTORY:  COPD, severe      CAD (coronary artery disease)      Breast cancer      No significant past surgical history          Allergies    penicillin (Hives)    Intolerances        Antimicrobials Day #  :day 1  meropenem  IVPB 1000 milliGRAM(s) IV Intermittent every 8 hours  meropenem  IVPB        Other Medications:  acetaminophen   Oral Liquid .. 650 milliGRAM(s) Oral every 6 hours PRN  ALPRAZolam 0.5 milliGRAM(s) Oral every 8 hours PRN  aspirin enteric coated 81 milliGRAM(s) Oral daily  atorvastatin 80 milliGRAM(s) Oral at bedtime  bisacodyl Suppository 10 milliGRAM(s) Rectal daily PRN  buDESOnide    Inhalation Suspension 0.5 milliGRAM(s) Inhalation every 12 hours  chlorhexidine 2% Cloths 1 Application(s) Topical <User Schedule>  dexMEDEtomidine Infusion 0.2 MICROgram(s)/kG/Hr IV Continuous <Continuous>  dextrose 5%. 1000 milliLiter(s) IV Continuous <Continuous>  dextrose 5%. 1000 milliLiter(s) IV Continuous <Continuous>  dextrose 50% Injectable 25 Gram(s) IV Push once  dextrose 50% Injectable 12.5 Gram(s) IV Push once  dextrose 50% Injectable 25 Gram(s) IV Push once  doxazosin 2 milliGRAM(s) Oral at bedtime  enoxaparin Injectable 40 milliGRAM(s) SubCutaneous every 12 hours  fentaNYL    Injectable 25 MICROGram(s) IV Push every 4 hours PRN  insulin lispro (ADMELOG) corrective regimen sliding scale   SubCutaneous every 6 hours  lactated ringers. 1000 milliLiter(s) IV Continuous <Continuous>  letrozole 2.5 milliGRAM(s) Oral daily  levalbuterol Inhalation 0.63 milliGRAM(s) Inhalation every 6 hours  methylPREDNISolone sodium succinate Injectable 40 milliGRAM(s) IV Push every 8 hours  midodrine 10 milliGRAM(s) Oral every 8 hours  nicotine - 21 mG/24Hr(s) Patch 1 Patch Transdermal daily  ondansetron Injectable 4 milliGRAM(s) IV Push every 6 hours PRN  pantoprazole   Suspension 40 milliGRAM(s) Enteral Tube daily  polyethylene glycol 3350 17 Gram(s) Oral daily  senna 2 Tablet(s) Oral at bedtime  sodium chloride 0.9% lock flush 10 milliLiter(s) IV Push every 1 hour PRN      FAMILY HISTORY: N/A      SOCIAL HISTORY:  Smoking:  y   ETOH:  x   Drug Use:x    Single     T(F): 97.8 (11-06-23 @ 05:00), Max: 99.3 (11-05-23 @ 16:00)  HR: 104 (11-06-23 @ 09:09)  BP: 144/63 (11-06-23 @ 09:00)  RR: 16 (11-06-23 @ 09:00)  SpO2: 98% (11-06-23 @ 09:09)  Wt(kg): --    PHYSICAL EXAM:  General: sedated on BIPAP, mild respiratory distress  Eyes:  anicteric, no conjunctival injection, no discharge  Oropharynx: no lesions or injection 	  Neck: supple, without adenopathy  Lungs: distant BS  Heart: regular rate and rhythm; no murmur, rubs or gallops  Abdomen: soft, nondistended, nontender, without mass or organomegaly  Skin: no rash   Extremities: no clubbing, cyanosis, or edema  Neurologic: sedated, poorly interactive    LAB RESULTS:                        8.6    12.51 )-----------( 186      ( 06 Nov 2023 05:15 )             27.3     11-06    143  |  106  |  13  ----------------------------<  168<H>  3.8   |  34<H>  |  0.28<L>    Ca    8.4      06 Nov 2023 05:15  Phos  3.0     11-06  Mg     2.1     11-06                MICROBIOLOGY:  RECENT CULTURES:  11-04 @ 19:00 ET Tube ET Tube     No growth    Few polymorphonuclear leukocytes per low power field  No Squamous epithelial cells per low power field  No organisms seen per oil power field    11-04 @ 12:20 .Blood Blood     No growth at 24 hours      11-04 @ 12:10 .Blood Blood Blood Culture PCR    Growth in aerobic bottle: Gram Positive Cocci in Clusters  Direct identification is available within approximately 3-5  hours either by Blood Panel Multiplexed PCR or Direct  MALDI-TOF. Details: [email or web address]    Growth in aerobic bottle: Gram Positive Cocci in Clusters          RADIOLOGY REVIEWED:  < from: US Duplex Venous Upper Ext Ltd, Right (11.04.23 @ 11:30) >    IMPRESSION:  No evidence of right upper extremity deep venous thrombosis.    Right basilic vein superficial thrombophlebitis.    --- End of Report ---    < end of copied text >  < from: Xray Chest 1 View- PORTABLE-Urgent (Xray Chest 1 View- PORTABLE-Urgent .) (11.03.23 @ 16:30) >  INTERPRETATION:  EXAM:XR CHEST URGENT.     CLINICAL INDICATION: confirm ETT .     TECHNIQUE: Portable upright AP view.     PRIOR EXAM: 11/03/2023 at 6:12 AM.     FINDINGS:     Interval placement of an endotracheal tube in satisfactory position. A   nodular opacity overlying the right lower lung is likely related to the   nipple shadow. No other significant change.      IMPRESSION:    Satisfactory placement of endotracheal tube.    --- End of Report ---    < end of copied text >  < from: CT Angio Chest PE Protocol w/ IV Cont (10.27.23 @ 11:52) >  IMPRESSION:    No pulmonary embolus.    Emphysema. Recommend annual lung cancer screening with low-dose chest CT   in 12 months if the patient meets the criteria.    --- End of Report ---    < end of copied text >

## 2023-11-06 NOTE — PROGRESS NOTE ADULT - ASSESSMENT
68F PMHx of COPD, HTN, HLD, DM, on xarelto for DVT history, hx of breast ca s/p left mastectomy, on chemotx last tx was 1 week PTA presenting with shortness of breath, x 1 day. She is an active smoker. Denied any fevers or chills. ICU consulted as patient needed bipap support in the ED.  Oncology consulted for breast cancer history.

## 2023-11-06 NOTE — PROGRESS NOTE ADULT - ASSESSMENT
68F PMHx of COPD, HTN, HLD, DM, on xarelto for DVT history, hx of breast ca s/p left mastectomy, on chemotx last tx was 1 week ago presenting with shortness of breath. ADmitted for worsening SOB, PNA, required intubation and mechanical vent support.    Interval Hx:  -patient has been intubated, extubated multiple times  -currently EXTUBATED, but needing BiPAp intermittently           PLAN:  -patient is currently extubated, will use BiPAP overnight, NPO while on BiPAP  -currently on prececdex for agitation, actively titrate for goal RASS 0  -finishec course of anbx  -will need daily goals of care discussions, if requires re-intuabtion likely will need trach placement   -started on tube feeds  -DVt prophylaxis  -AM labs          TIME SPENT:  35 minutes of  time spent providing medical care for patient's acute illness/conditions that impairs at least one vital organ system and/or poses a high risk of imminent or life threatening deterioration in the patient's condition. It includes time spent evaluating and treating the patient's acute illness as well as time spent reviewing labs, radiology, discussing goals of care with patient and/or patient's family, and discussing the case with a multidisciplinary team, including the eICU, in an effort to prevent further life threatening deterioration or end organ damage. This time is independent of any procedures performed.    DATE OF ENTRY OF THIS NOTE IS EQUAL TO [facility] DATE OF SERVICES RENDERED

## 2023-11-06 NOTE — PROGRESS NOTE ADULT - SUBJECTIVE AND OBJECTIVE BOX
HPI:  68F PMHx of COPD, HTN, HLD, DM, on xarelto for DVT history, hx of breast ca s/p left mastectomy, on chemotx last tx was 1 week ago presenting with shortness of breath, since last night. She is an active smoker. Last cigarette was yesterday. Denies any fevers or chills. ICU consulted as patient needing bipap support in the ED. No chest pain or palpitations. (27 Oct 2023 15:03)      24 hr events: extubated yesterday to NIV, was able to wean off to NC but then became anxious/ tachypneic requiring NIV overnight again       ## ROS: + SOB     ## Labs:  CBC:                        8.6    12.51 )-----------( 186      ( 06 Nov 2023 05:15 )             27.3     Chem:  11-06    143  |  106  |  13  ----------------------------<  168<H>  3.8   |  34<H>  |  0.28<L>    Ca    8.4      06 Nov 2023 05:15  Phos  3.0     11-06  Mg     2.1     11-06      ## Medications:  doxazosin 2 milliGRAM(s) Oral at bedtime  midodrine 10 milliGRAM(s) Oral every 8 hours  buDESOnide    Inhalation Suspension 0.5 milliGRAM(s) Inhalation every 12 hours  levalbuterol Inhalation 0.63 milliGRAM(s) Inhalation every 6 hours  atorvastatin 80 milliGRAM(s) Oral at bedtime  dextrose 50% Injectable 25 Gram(s) IV Push once  dextrose 50% Injectable 12.5 Gram(s) IV Push once  dextrose 50% Injectable 25 Gram(s) IV Push once  insulin lispro (ADMELOG) corrective regimen sliding scale   SubCutaneous every 6 hours  methylPREDNISolone sodium succinate Injectable 40 milliGRAM(s) IV Push every 8 hours  aspirin enteric coated 81 milliGRAM(s) Oral daily  enoxaparin Injectable 40 milliGRAM(s) SubCutaneous every 12 hours  bisacodyl Suppository 10 milliGRAM(s) Rectal daily PRN  pantoprazole   Suspension 40 milliGRAM(s) Enteral Tube daily  polyethylene glycol 3350 17 Gram(s) Oral daily  senna 2 Tablet(s) Oral at bedtime  acetaminophen   Oral Liquid .. 650 milliGRAM(s) Oral every 6 hours PRN  ALPRAZolam 0.5 milliGRAM(s) Oral every 8 hours PRN  dexMEDEtomidine Infusion 0.2 MICROgram(s)/kG/Hr IV Continuous <Continuous>  fentaNYL    Injectable 25 MICROGram(s) IV Push every 4 hours PRN  ondansetron Injectable 4 milliGRAM(s) IV Push every 6 hours PRN      ## Vitals:  T(C): 37.2 (11-06-23 @ 07:00), Max: 37.4 (11-05-23 @ 16:00)  HR: 76 (11-06-23 @ 11:00) (73 - 126)  BP: 136/59 (11-06-23 @ 11:00) (99/65 - 162/70)  BP(mean): 81 (11-06-23 @ 11:00) (72 - 108)  RR: 22 (11-06-23 @ 11:00) (9 - 26)  SpO2: 100% (11-06-23 @ 11:00) (96% - 100%)    ABG: ABG - ( 05 Nov 2023 13:27 )  pH, Arterial: 7.49  pH, Blood: x     /  pCO2: 44    /  pO2: 110   / HCO3: 34    / Base Excess: 10.2  /  SaO2: 97.2          11-05 @ 07:01  -  11-06 @ 07:00  --------------------------------------------------------  IN: 1301 mL / OUT: 2200 mL / NET: -899 mL    ## P/E:  Gen: lying comfortably in bed in no apparent distress  HEENT: PERRL, EOMI  Resp: CTA B/L no c/r/w  CVS: S1S2 no m/r/g  Abd: soft NT/ND +BS  Ext: no c/c/e  Neuro: A&Ox3    CENTRAL LINE: [x ] YES [ ] NO  LOCATION: R chest wall port   DATE ACCESSED: 11/4/23 I    QUITA: [ ] YES [x ] NO        A-LINE:  [ ] YES [ x] NO     CODE STATUS: [x ] full code  [ ] DNR  [ ] DNI  [ ] MOLST  Goals of care discussion: [ ] yes

## 2023-11-06 NOTE — PROGRESS NOTE ADULT - ASSESSMENT
68 year old female  PMHx of COPD, CAD, HLD, DM2, on xarelto for DVT history, hx of breast ca s/p left mastectomy, on chemotx presented with shortness of breath. Admitted to ICU for respiratory failure due to COPD exacerbation in the setting of Entero/rhinovirus with COPD, severe Exacerbation    Problem List  1. Acute respiratory failure due to acute COPD Exacerbation in setting of ERV requiring intubation 10/31/23 Extubated 11/2/2023, Re intubated 11/3/23  2. Active nicotine use - 1 pk/day  3. uses home o2 PRN  4. CAD   5. HTN  6. DM type 2  7, h/o Breast cancer  8. H/o DVT on xaralto -     Plan  Off pressors since 2 pm yesterday  NIV prn/ NC  Titrate FiO2 to maintain SPo2 > 92%  Encouragecoughing and deep breathing  EMotional support for anxiety  Pt was on wellbutrin at home for anxiety- unable to give via NG tube  Continue xanax prn, changed to TID   Restart wellbutrin once NG tube out and can take PO  If patient able to tolerate off NIV consider bedside swallow eval  Anemia noted, occult blood negative could be due to edema / albumin  currently off antibiotics, wbc improving, follow fever curve  Continue IV steroids, Pulmicort and xopenex nebs  Continue tub feeds when NIV off  Continue cardura for urinary retention voiding well   Cont. Aspirin and statin   Hold antihypertensives for now  Cont. AC with lovenox ws on xarelto at home, switch to oral a/c once respiratory status improves  Nicotine patch       FULL CODE  Patient seen and evaluated with Dr Santa who agrees with above stated plan  Brother/ HCP at bedside this am, up dated on plan of care

## 2023-11-06 NOTE — CHART NOTE - NSCHARTNOTEFT_GEN_A_CORE
Nutrition Follow Up Note  Hospital Course (Per Electronic Medical Record):   Source: Medical Record [X]  Nursing Staff [X]     Diet: Vital 1.5@ 25ml/hr from 8:00- 2100. via NGT     Recent events noted patient intubated (11/4) , extubated (11/5) , currently on BIPAP , suggest discontinue EN feeds due to inability to provide due to BIPAP , Labs reviewed , LR infusing @ 50ml/hr , POCT reviewed , insulin coverage noted . Suggest restart EN feeds as medically indicated Will follow clinical course .     Current Weight:(11/6) 104.4/47.4kg                         (11/5) 100.7/45.7kg                         (11/4) 97/44kg     Pertinent Medications: MEDICATIONS  (STANDING):  aspirin enteric coated 81 milliGRAM(s) Oral daily  atorvastatin 80 milliGRAM(s) Oral at bedtime  buDESOnide    Inhalation Suspension 0.5 milliGRAM(s) Inhalation every 12 hours  chlorhexidine 2% Cloths 1 Application(s) Topical <User Schedule>  dexMEDEtomidine Infusion 0.2 MICROgram(s)/kG/Hr (1.95 mL/Hr) IV Continuous <Continuous>  dextrose 5%. 1000 milliLiter(s) (50 mL/Hr) IV Continuous <Continuous>  dextrose 5%. 1000 milliLiter(s) (100 mL/Hr) IV Continuous <Continuous>  dextrose 50% Injectable 25 Gram(s) IV Push once  dextrose 50% Injectable 12.5 Gram(s) IV Push once  dextrose 50% Injectable 25 Gram(s) IV Push once  doxazosin 2 milliGRAM(s) Oral at bedtime  enoxaparin Injectable 40 milliGRAM(s) SubCutaneous every 12 hours  insulin lispro (ADMELOG) corrective regimen sliding scale   SubCutaneous every 6 hours  lactated ringers. 1000 milliLiter(s) (50 mL/Hr) IV Continuous <Continuous>  letrozole 2.5 milliGRAM(s) Oral daily  levalbuterol Inhalation 0.63 milliGRAM(s) Inhalation every 6 hours  methylPREDNISolone sodium succinate Injectable 40 milliGRAM(s) IV Push every 8 hours  midodrine 10 milliGRAM(s) Oral every 8 hours  nicotine - 21 mG/24Hr(s) Patch 1 Patch Transdermal daily  pantoprazole   Suspension 40 milliGRAM(s) Enteral Tube daily  polyethylene glycol 3350 17 Gram(s) Oral daily  senna 2 Tablet(s) Oral at bedtime    MEDICATIONS  (PRN):  acetaminophen   Oral Liquid .. 650 milliGRAM(s) Oral every 6 hours PRN Mild Pain (1 - 3)  ALPRAZolam 0.5 milliGRAM(s) Oral every 8 hours PRN anxiety  bisacodyl Suppository 10 milliGRAM(s) Rectal daily PRN Constipation  fentaNYL    Injectable 25 MICROGram(s) IV Push every 4 hours PRN pain/vent synchrony  ondansetron Injectable 4 milliGRAM(s) IV Push every 6 hours PRN Nausea and/or Vomiting  sodium chloride 0.9% lock flush 10 milliLiter(s) IV Push every 1 hour PRN Pre/post blood products, medications, blood draw, and to maintain line patency      Pertinent Labs:  11-06 Na143 mmol/L Glu 168 mg/dL<H> K+ 3.8 mmol/L Cr  0.28 mg/dL<L> BUN 13 mg/dL 11-06 Phos 3.0 mg/dL 11-04 Alb 2.3 g/dL<L>Hgb 8.6g/dl<L> , Hct 27.3% <L> Mag 2.1mg/dl        Skin: ecchymotic     Edema: (+1) arm     Last BM: (11/5)     Estimated Needs:   [X] No Change since Previous Assessment    Previous Nutrition Diagnosis: Severe Protein Calorie Malnutrition     Nutrition Diagnosis is [X] Ongoing , addressed with EN feeds , however requesting NPO          New Nutrition Diagnosis: [X] Not Applicable    Interventions:   1. Recommend NPO       Monitoring & Evaluation: will monitor:  [X] Weights    [X] Follow Up (Per Protocol)  [X] Tolerance to Diet Prescription       RD to follow as per Nutrition protocol  Maude Maldonado RD, CDN

## 2023-11-07 NOTE — PROGRESS NOTE ADULT - SUBJECTIVE AND OBJECTIVE BOX
Progress:  pt more awake, alert, today , converses     Present Symptoms:   Dyspnea: a little   Nausea/Vomiting: no  Anxiety:  yes  Depressed Mood:   Fatigue: yes  Loss of appetite: yes  Pain:     no              location:   Review of Systems: [All others negative     MEDICATIONS  (STANDING):  aspirin enteric coated 81 milliGRAM(s) Oral daily  atorvastatin 80 milliGRAM(s) Oral at bedtime  buDESOnide    Inhalation Suspension 0.5 milliGRAM(s) Inhalation every 12 hours  chlorhexidine 2% Cloths 1 Application(s) Topical <User Schedule>  dexMEDEtomidine Infusion 0.2 MICROgram(s)/kG/Hr (1.95 mL/Hr) IV Continuous <Continuous>  dextrose 5%. 1000 milliLiter(s) (100 mL/Hr) IV Continuous <Continuous>  dextrose 5%. 1000 milliLiter(s) (50 mL/Hr) IV Continuous <Continuous>  dextrose 50% Injectable 25 Gram(s) IV Push once  dextrose 50% Injectable 12.5 Gram(s) IV Push once  dextrose 50% Injectable 25 Gram(s) IV Push once  doxazosin 2 milliGRAM(s) Oral at bedtime  enoxaparin Injectable 40 milliGRAM(s) SubCutaneous every 12 hours  insulin lispro (ADMELOG) corrective regimen sliding scale   SubCutaneous every 6 hours  lactated ringers. 1000 milliLiter(s) (50 mL/Hr) IV Continuous <Continuous>  letrozole 2.5 milliGRAM(s) Oral daily  levalbuterol Inhalation 0.63 milliGRAM(s) Inhalation every 6 hours  methylPREDNISolone sodium succinate Injectable 40 milliGRAM(s) IV Push every 8 hours  midodrine 10 milliGRAM(s) Oral every 8 hours  nicotine - 21 mG/24Hr(s) Patch 1 Patch Transdermal daily  pantoprazole   Suspension 40 milliGRAM(s) Enteral Tube daily  polyethylene glycol 3350 17 Gram(s) Oral daily  senna 2 Tablet(s) Oral at bedtime    MEDICATIONS  (PRN):  acetaminophen   Oral Liquid .. 650 milliGRAM(s) Oral every 6 hours PRN Mild Pain (1 - 3)  ALPRAZolam 0.5 milliGRAM(s) Oral every 8 hours PRN anxiety  bisacodyl Suppository 10 milliGRAM(s) Rectal daily PRN Constipation  fentaNYL    Injectable 25 MICROGram(s) IV Push every 4 hours PRN pain/vent synchrony  ondansetron Injectable 4 milliGRAM(s) IV Push every 6 hours PRN Nausea and/or Vomiting  sodium chloride 0.9% lock flush 10 milliLiter(s) IV Push every 1 hour PRN Pre/post blood products, medications, blood draw, and to maintain line patency      PHYSICAL EXAM:  Vital Signs Last 24 Hrs  T(C): 37.2 (07 Nov 2023 05:00), Max: 37.2 (07 Nov 2023 05:00)  T(F): 99 (07 Nov 2023 05:00), Max: 99 (07 Nov 2023 05:00)  HR: 102 (07 Nov 2023 10:00) (74 - 115)  BP: 129/54 (07 Nov 2023 10:00) (117/54 - 149/70)  BP(mean): 76 (07 Nov 2023 10:00) (73 - 92)  RR: 20 (07 Nov 2023 10:00) (11 - 32)  SpO2: 100% (07 Nov 2023 10:00) (98% - 100%)    Parameters below as of 07 Nov 2023 06:00  Patient On (Oxygen Delivery Method): nasal cannula    O2 Concentration (%): 4  General: alert  oriented x 3, converses       HEENT: n/c, a/t, Ng in place , dry mouth , poor dentition      Lungs: few coarse bs,    CV: normal -tachy   GI: abd soft, n/t     : normal  , prima fit   Musculoskeletal: normal , w/ weakness, tr edema ue's   Skin: pale, w/d     Neuro: awake, alert, oriented, converses    Oral intake ability:  oral feeding- TBD   Diet: NPO, w/ Ng feeds     LABS:                          7.1    12.06 )-----------( 177      ( 07 Nov 2023 09:00 )             23.5     11-07    143  |  106  |  19  ----------------------------<  187<H>  3.7   |  33<H>  |  <0.20<L>    Ca    7.9<L>      07 Nov 2023 05:00  Phos  2.4     11-07  Mg     2.1     11-07      Urinalysis Basic - ( 07 Nov 2023 05:00 )    Color: x / Appearance: x / SG: x / pH: x  Gluc: 187 mg/dL / Ketone: x  / Bili: x / Urobili: x   Blood: x / Protein: x / Nitrite: x   Leuk Esterase: x / RBC: x / WBC x   Sq Epi: x / Non Sq Epi: x / Bacteria: x        RADIOLOGY & ADDITIONAL STUDIES:     ADVANCE DIRECTIVES: full code   Advanced Care Planning discussion total time spent:

## 2023-11-07 NOTE — PROGRESS NOTE ADULT - SUBJECTIVE AND OBJECTIVE BOX
HPI:  68F PMHx of COPD, HTN, HLD, DM, on xarelto for DVT history, hx of breast ca s/p left mastectomy, on chemotx last tx was 1 week ago presenting with shortness of breath, since last night. She is an active smoker. Last cigarette was yesterday. Denies any fevers or chills. ICU consulted as patient needing bipap support in the ED. No chest pain or palpitations. (27 Oct 2023 15:03)      24 hr events: tolerated NIV overnight now on nasal cannula, remains weak       ## ROS: no fever chills nv/d, no chest pain         ## Labs:  CBC:                        7.1    12.06 )-----------( 177      ( 07 Nov 2023 09:00 )             23.5     Chem:  11-07    143  |  106  |  19  ----------------------------<  187<H>  3.7   |  33<H>  |  <0.20<L>    Ca    7.9<L>      07 Nov 2023 05:00  Phos  2.4     11-07  Mg     2.1     11-07      ## Medications:  doxazosin 2 milliGRAM(s) Oral at bedtime  midodrine 10 milliGRAM(s) Oral every 8 hours  buDESOnide    Inhalation Suspension 0.5 milliGRAM(s) Inhalation every 12 hours  levalbuterol Inhalation 0.63 milliGRAM(s) Inhalation every 6 hours  atorvastatin 80 milliGRAM(s) Oral at bedtime  dextrose 50% Injectable 25 Gram(s) IV Push once  dextrose 50% Injectable 12.5 Gram(s) IV Push once  dextrose 50% Injectable 25 Gram(s) IV Push once  insulin lispro (ADMELOG) corrective regimen sliding scale   SubCutaneous every 6 hours  methylPREDNISolone sodium succinate Injectable 40 milliGRAM(s) IV Push every 8 hours  aspirin enteric coated 81 milliGRAM(s) Oral daily  enoxaparin Injectable 40 milliGRAM(s) SubCutaneous every 12 hours  bisacodyl Suppository 10 milliGRAM(s) Rectal daily PRN  pantoprazole   Suspension 40 milliGRAM(s) Enteral Tube daily  polyethylene glycol 3350 17 Gram(s) Oral daily  senna 2 Tablet(s) Oral at bedtime  acetaminophen   Oral Liquid .. 650 milliGRAM(s) Oral every 6 hours PRN  ALPRAZolam 0.5 milliGRAM(s) Oral every 8 hours PRN  dexMEDEtomidine Infusion 0.2 MICROgram(s)/kG/Hr IV Continuous <Continuous>  fentaNYL    Injectable 25 MICROGram(s) IV Push every 4 hours PRN  ondansetron Injectable 4 milliGRAM(s) IV Push every 6 hours PRN      ## Vitals:  T(C): 37.2 (11-07-23 @ 05:00), Max: 37.2 (11-07-23 @ 05:00)  HR: 103 (11-07-23 @ 14:00) (74 - 115)  BP: 121/46 (11-07-23 @ 14:00) (114/56 - 149/70)  BP(mean): 66 (11-07-23 @ 14:00) (66 - 92)  RR: 24 (11-07-23 @ 14:00) (11 - 36)  SpO2: 100% 3 L NC  (11-07-23 @ 14:00) (98% - 100%)      11-06 @ 07:01  -  11-07 @ 07:00  --------------------------------------------------------  IN: 1519.6 mL / OUT: 1100 mL / NET: 419.6 mL    11-07 @ 07:01  -  11-07 @ 15:32  --------------------------------------------------------  IN: 891.1 mL / OUT: 1000 mL / NET: -108.9 mL      ## P/E:  Gen: lying comfortably in bed in no apparent distress  HEENT: PERRL, EOMI  Resp: diminished lung sounds bilat,  CVS: S1S2 no m/r/g  Abd: soft NT/ND +BS  Ext: no clubbing or cyanosis, trace edema   Neuro: A&Ox3      CODE STATUS: [x] full code  [ ] DNR  [ ] DNI  [ ] MOLST  Goals of care discussion: [ ] yes

## 2023-11-07 NOTE — PROGRESS NOTE ADULT - ASSESSMENT
68 year old female  PMHx of COPD, CAD, HLD, DM2, on xarelto for DVT history, hx of breast ca s/p left mastectomy, on chemotx presented with shortness of breath. Admitted to ICU for respiratory failure due to COPD exacerbation in the setting of Entero/rhinovirus with COPD, severe Exacerbation    Problem List  1. Acute respiratory failure due to acute COPD Exacerbation in setting of ERV requiring intubation 10/31/23 Extubated 11/2/2023, Re intubated 11/3/23  2. Active nicotine use - 1 pk/day  3. uses home o2 PRN  4. CAD   5. HTN  6. DM type 2  7, h/o Breast cancer  8. H/o DVT on xaralto -     Plan  No longer requiring pressor support   NIV prn/ NC  Titrate FiO2 to maintain SPo2 > 92%  Encouragecoughing and deep breathing  Emotional support for anxiety  Pt was on wellbutrin at home for anxiety- unable to give via NG tube   Continue xanax TID prn , noticeable difference today   Restart wellbutrin once can take PO  Failed bedside swallow today, speech following, continue tube feeds   Anemia noted, occult blood negative  Give 1 unit PRBC and repeat CBC at 2000  currently off antibiotics, wbc improving, follow fever curve  Continue IV steroids, Pulmicort and xopenex nebs  Continue cardura for urinary retention voiding well   Cont. Aspirin and statin   Hold antihypertensives for now  Cont. AC with lovenox ws on xarelto at home, switch to oral a/c once respiratory status improves  Nicotine patch       FULL CODE  Patient seen and evaluated with Dr Santa who agrees with above stated plan  Brother/ HCP at bedside this am, updated on plan of care

## 2023-11-07 NOTE — SWALLOW BEDSIDE ASSESSMENT ADULT - COMMENTS
This service was consulted earlier in this admission; however, unable to see due to increasing oxygen requirements.     WBC: 12.06  Chest xr 11/3: "Satisfactory placement of endotracheal tube."  CT Chest angio 10/27: "No pulmonary embolus. Emphysema. Recommend annual lung cancer screening with low-dose chest CT in 12 months if the patient meets the criteria."

## 2023-11-07 NOTE — PROGRESS NOTE ADULT - ASSESSMENT
Pt is a 67 y/o F pmhx of COPD, HLD, HTN, DM2, DVT on xarelto, breast CA s/p L mastectomy who was presented to [facility] ED w/ complaints of acute SOB. Admitted w/ hypoxic/hypercapnic respiratory failure secondary to COPD exacerbation in setting of + entero/rhinovirus. Intubated for respiratory failure, extubated and subsequently reintubated yesterday.    1. Acute hypoxic/hypercapnic respiratory failure   2. Enterovirus/rhinovirus   3. Shock   4. COPD exacerbation     Plan:   Neuro: Remains on precedex, and PRN xanax for anxiety.     CV: Shock state resolved remains off phenylephrine continue to monitor and maintain MAP>65.     Pulm: Acute hypoxic/hypercapnic respiratory failure secondary to COPD exacerbation, Extubated, satting well on NC w/ intermittent BiPAP usage. Pt remains high risk for decompensation requiring intubation, continue Solumedrol Q8hr for COPD exacerbation.     GI: Diet: NPO on tube feeds. protonix for GI PPX     Renal: No active issues, continue to monitor and avoid nephrotoxic meds.     Endo: Glucose<180, mag>2, K>4 for arrythmia suppression.     Heme: Lovenox for DVT PPX     ID: Rhinovirus and enterovirus +, continue supportive measures, trend markers of infection daily. Pt is a 69 y/o F pmhx of COPD, HLD, HTN, DM2, DVT on xarelto, breast CA s/p L mastectomy who was presented to [facility] ED w/ complaints of acute SOB. Admitted w/ hypoxic/hypercapnic respiratory failure secondary to COPD exacerbation in setting of + entero/rhinovirus. Intubated for respiratory failure, extubated and subsequently reintubated yesterday.    1. Acute hypoxic/hypercapnic respiratory failure   2. Enterovirus/rhinovirus   3. Shock   4. COPD exacerbation     Plan:   Neuro: Remains on precedex, and PRN xanax for anxiety.     CV: Shock state resolved remains off phenylephrine continue to monitor and maintain MAP>65.     Pulm: Acute hypoxic/hypercapnic respiratory failure secondary to COPD exacerbation, Extubated, satting well on NC w/ intermittent BiPAP usage. Pt remains high risk for decompensation requiring intubation, continue Solumedrol Q8hr for COPD exacerbation.     GI: Diet: NPO on tube feeds. protonix for GI PPX     Renal: No active issues, continue to monitor and avoid nephrotoxic meds.     Endo: Glucose<180, mag>2, K>4 for arrythmia suppression.     Heme: Lovenox for DVT PPX     ID: Rhinovirus and enterovirus +, continue supportive measures, trend markers of infection daily.      DATE OF ENTRY OF THIS NOTE IS EQUAL TO THE DATE OF SERVICES RENDERED

## 2023-11-07 NOTE — PROGRESS NOTE ADULT - CONVERSATION DETAILS
Full code
discussed need for intubation  discussed possibility of difficult extubation and need for trach peg   risks/benefits/alternatives to intubation discussed  agreed for intubation
Met with pt and her brother at bedside today. Pt  more awake, and interactive, conversant  today. Pt reports she feels better but still appears to have sl labored resp.  Reports still feels anxious at times . Pt hopes  to be cleared for oral intake, she was assessed today by speech but was not cleared yet, as she became tachy and sob w/ ice chips, so Speech will re eval daily. Discussed w/ pt how much she has been through,  we discussed her breast ca and her on going treatments for that , her copd/smoking, as well as her 2 intubations this admission . Pt agrees , but says  she wishes to get better from this event and  get back home . She plans to cont to  go to her dr appointments , and wants to cont her cancer treatments.  These are  presently her wishes and her goals. I asked about if she needed intubation or the breathing tube again , would she want it , and she hopes not to need it again , but said she would do it again if needed. I spoke about if she did and we were unable to successfully remove the tube, would she want a tracheostomy placed, and pt stated No she would not. Brother Maximiliano present for the conversation.
Met with pts brothmike Viera at bedside today , explained role of palliative care . Maximiliano says he lives with pt and pts  daughter Stephanie. He and daughter are main caretakers.  brother says pt is normally oriented and was fairly active at home , walks, and was driving until recently.  Brother says pt is aware of her conditions, but yet continues to smoke and says she does not have a good diet. Brother says her eating is very poor , and she has lost a lot of weight .He also says pt does go to dr centeno, and she is presently still on " treatments" for her breast cancer but was not exactly sure what it was , a pill vs infusion . I asked him if he was pts decision maker or Hcp, he says he thinks he is and he has been in the role,  for her for years . He does not know where any papers are. he said daughters are informed and do try to help in her care.  I asked if he ever spoke to pt about how much intervention she would want if she became sicker, brother said in recent past pt stated she wanted everything to be kept alive, so that is why he agreed to intubation this time.  He said he will d/w pts daughters as well.,

## 2023-11-07 NOTE — PROGRESS NOTE ADULT - SUBJECTIVE AND OBJECTIVE BOX
Patient is a 68y old  Female who presents with a chief complaint of shortness of breath (07 Nov 2023 16:19)      BRIEF HOSPITAL COURSE: Pt is a 69 y/o F pmhx of COPD, HLD, HTN, DM2, DVT on xarelto, breast CA s/p L mastectomy who was presented to  ED w/ complaints of acute SOB. Admitted w/ hypoxic/hypercapnic respiratory failure secondary to COPD exacerbation in setting of + entero/rhinovirus. Intubated for respiratory failure, extubated and subsequently reintubated yesterday.     Events last 24 hours: Pt remains extubated, satting well on NC, requires BiPAP intermittently.     PAST MEDICAL & SURGICAL HISTORY:  COPD, severe      CAD (coronary artery disease)      Breast cancer      No significant past surgical history          Review of Systems:  CONSTITUTIONAL: No fever, chills, or fatigue  EYES: No eye pain, visual disturbances, or discharge  ENMT:  No difficulty hearing, tinnitus, vertigo; No sinus or throat pain  NECK: No pain or stiffness  RESPIRATORY: No cough, wheezing, chills or hemoptysis; No shortness of breath  CARDIOVASCULAR: No chest pain, palpitations, dizziness, or leg swelling  GASTROINTESTINAL: No abdominal or epigastric pain. No nausea, vomiting, or hematemesis; No diarrhea or constipation. No melena or hematochezia.  GENITOURINARY: No dysuria, frequency, hematuria, or incontinence  NEUROLOGICAL: No headaches, memory loss, loss of strength, numbness, or tremors  SKIN: No itching, burning, rashes, or lesions   MUSCULOSKELETAL: No joint pain or swelling; No muscle, back, or extremity pain  PSYCHIATRIC: No depression, anxiety, mood swings, or difficulty sleeping      Medications:    doxazosin 2 milliGRAM(s) Oral at bedtime  midodrine 10 milliGRAM(s) Oral every 8 hours    buDESOnide    Inhalation Suspension 0.5 milliGRAM(s) Inhalation every 12 hours  levalbuterol Inhalation 0.63 milliGRAM(s) Inhalation every 6 hours    acetaminophen   Oral Liquid .. 650 milliGRAM(s) Oral every 6 hours PRN  ALPRAZolam 0.5 milliGRAM(s) Oral every 8 hours PRN  dexMEDEtomidine Infusion 0.2 MICROgram(s)/kG/Hr IV Continuous <Continuous>  fentaNYL    Injectable 25 MICROGram(s) IV Push every 4 hours PRN  ondansetron Injectable 4 milliGRAM(s) IV Push every 6 hours PRN    letrozole 2.5 milliGRAM(s) Oral daily    aspirin enteric coated 81 milliGRAM(s) Oral daily  enoxaparin Injectable 40 milliGRAM(s) SubCutaneous every 12 hours    bisacodyl Suppository 10 milliGRAM(s) Rectal daily PRN  pantoprazole   Suspension 40 milliGRAM(s) Enteral Tube daily  polyethylene glycol 3350 17 Gram(s) Oral daily  senna 2 Tablet(s) Oral at bedtime      atorvastatin 80 milliGRAM(s) Oral at bedtime  dextrose 50% Injectable 25 Gram(s) IV Push once  dextrose 50% Injectable 12.5 Gram(s) IV Push once  dextrose 50% Injectable 25 Gram(s) IV Push once  insulin lispro (ADMELOG) corrective regimen sliding scale   SubCutaneous every 6 hours  methylPREDNISolone sodium succinate Injectable 40 milliGRAM(s) IV Push every 8 hours    dextrose 5%. 1000 milliLiter(s) IV Continuous <Continuous>  dextrose 5%. 1000 milliLiter(s) IV Continuous <Continuous>  lactated ringers. 1000 milliLiter(s) IV Continuous <Continuous>  sodium chloride 0.9% lock flush 10 milliLiter(s) IV Push every 1 hour PRN      chlorhexidine 2% Cloths 1 Application(s) Topical <User Schedule>    nicotine - 21 mG/24Hr(s) Patch 1 Patch Transdermal daily          ICU Vital Signs Last 24 Hrs  T(C): 37.1 (07 Nov 2023 21:00), Max: 37.2 (07 Nov 2023 05:00)  T(F): 98.8 (07 Nov 2023 21:00), Max: 99 (07 Nov 2023 05:00)  HR: 110 (07 Nov 2023 21:00) (74 - 118)  BP: 145/67 (07 Nov 2023 21:00) (114/56 - 154/74)  BP(mean): 88 (07 Nov 2023 21:00) (66 - 96)  ABP: --  ABP(mean): --  RR: 24 (07 Nov 2023 21:00) (11 - 36)  SpO2: 100% (07 Nov 2023 21:00) (98% - 100%)    O2 Parameters below as of 07 Nov 2023 19:00  Patient On (Oxygen Delivery Method): nasal cannula  O2 Flow (L/min): 4              I&O's Detail    06 Nov 2023 07:01  -  07 Nov 2023 07:00  --------------------------------------------------------  IN:    Dexmedetomidine: 69.6 mL    Lactated Ringers: 1200 mL    Vital1.5: 250 mL  Total IN: 1519.6 mL    OUT:    Voided (mL): 1100 mL  Total OUT: 1100 mL    Total NET: 419.6 mL      07 Nov 2023 07:01  -  07 Nov 2023 21:45  --------------------------------------------------------  IN:    Dexmedetomidine: 34.8 mL    Lactated Ringers: 600 mL    Vital1.5: 580 mL  Total IN: 1214.8 mL    OUT:    Voided (mL): 1300 mL  Total OUT: 1300 mL    Total NET: -85.2 mL            LABS:                        7.1    12.06 )-----------( 177      ( 07 Nov 2023 09:00 )             23.5     11-07    143  |  106  |  19  ----------------------------<  187<H>  3.7   |  33<H>  |  <0.20<L>    Ca    7.9<L>      07 Nov 2023 05:00  Phos  2.4     11-07  Mg     2.1     11-07            CAPILLARY BLOOD GLUCOSE      POCT Blood Glucose.: 263 mg/dL (07 Nov 2023 18:29)      Urinalysis Basic - ( 07 Nov 2023 05:00 )    Color: x / Appearance: x / SG: x / pH: x  Gluc: 187 mg/dL / Ketone: x  / Bili: x / Urobili: x   Blood: x / Protein: x / Nitrite: x   Leuk Esterase: x / RBC: x / WBC x   Sq Epi: x / Non Sq Epi: x / Bacteria: x      CULTURES:  Culture Results:   Normal Respiratory Daria present (11-04-23 @ 19:00)  Culture Results:   No growth at 48 Hours (11-04-23 @ 12:20)  Culture Results:   Growth in aerobic bottle: Staphylococcus epidermidis  Coagulase Negative Staphylococci isolated from a single blood culture set  may represent contamination.  Contact the Microbiology Department at [phone] if susceptibility  testing is clinically indicated.  Direct identification is available within approximately 3-5  hours either by Blood Panel Multiplexed PCR or Direct  MALDI-TOF. Details: [email or web address] (11-04-23 @ 12:10)      Physical Examination:    General: Pt laying in hospital bed in no acute distress.  Alert, oriented, interactive, nonfocal    HEENT: Pupils equal, reactive to light.  Symmetric.    PULM: Clear to auscultation bilaterally, no significant sputum production    CVS: Regular rate and rhythm, no murmurs, rubs, or gallops    ABD: Soft, nondistended, nontender, normoactive bowel sounds, no masses    EXT: No edema, nontender    SKIN: Warm and well perfused.       RADIOLOGY: < from: Xray Chest 1 View- PORTABLE-Routine (Xray Chest 1 View- PORTABLE-Routine in AM.) (11.06.23 @ 09:47) >  ACC: [id number] EXAM:  XR CHEST PORTABLE ROUTINE 1V   ORDERED BY: RANDALL MACHADO     PROCEDURE DATE:  11/06/2023          INTERPRETATION:  TIME OF EXAM: November 6, 2023 at 8:55 AM.    CLINICAL INFORMATION: COPD. Evaluate for pneumonia.    COMPARISON:  November 3, 2023 at 4:12 PM.    TECHNIQUE:   AP Portable chest x-ray. Limited by rotation.    INTERPRETATION:    The heart is not enlarged. The thoracic aorta is calcified.  The mediastinum is not accurately evaluated on this image.  Previous ETtube is not seen.  Enteric tube extends into left hemiabdomen. Tip not included on image.  An accessed right subclavian approach port with tip in the right atrium   again seen.  There is post left mastectomy with left axillary surgical clips is again  seen.  The lungs are hyperinflated.  A nodular opacity projecting over the periphery of the right lower chest   is likely the nipple. The lungs are clear.  Continued right costophrenic angle blunting which could be due to pleural   thickening or trace right pleural effusion.  No left pleural effusion.  No pneumothorax is noted.  There is no acute bony abnormality.      IMPRESSION:  Hyperinflated lungs. No focal lung consolidation.    Continued right costophrenic angle blunting, possibly pleuralthickening   or trace right pleural effusion.    --- End of Report ---            KORI DE LA CRUZ MD; Attending Radiologist  This document has been electronically signed. Nov 7 2023  3:26PM          CRITICAL CARE TIME SPENT: minutes assessing presenting problems of acute illness, which pose high probability of life threatening deterioration or end organ damage/dysfunction, as well as medical decision making including initiating plan of care, reviewing data, reviewing radiologic exams, discussing with multidisciplinary team,  discussing goals of care with patient/family, and writing this note.  Non-inclusive of procedures performed,    Patient is a 68y old  Female who presents with a chief complaint of shortness of breath (07 Nov 2023 16:19)      BRIEF HOSPITAL COURSE: Pt is a 67 y/o F pmhx of COPD, HLD, HTN, DM2, DVT on xarelto, breast CA s/p L mastectomy who was presented to  ED w/ complaints of acute SOB. Admitted w/ hypoxic/hypercapnic respiratory failure secondary to COPD exacerbation in setting of + entero/rhinovirus. Intubated for respiratory failure, extubated and subsequently reintubated yesterday.     Events last 24 hours: Pt remains extubated, satting well on NC, requires BiPAP intermittently.     PAST MEDICAL & SURGICAL HISTORY:  COPD, severe      CAD (coronary artery disease)      Breast cancer      No significant past surgical history          Review of Systems:  CONSTITUTIONAL: No fever, chills, or fatigue  EYES: No eye pain, visual disturbances, or discharge  ENMT:  No difficulty hearing, tinnitus, vertigo; No sinus or throat pain  NECK: No pain or stiffness  RESPIRATORY: No cough, wheezing, chills or hemoptysis; No shortness of breath  CARDIOVASCULAR: No chest pain, palpitations, dizziness, or leg swelling  GASTROINTESTINAL: No abdominal or epigastric pain. No nausea, vomiting, or hematemesis; No diarrhea or constipation. No melena or hematochezia.  GENITOURINARY: No dysuria, frequency, hematuria, or incontinence  NEUROLOGICAL: No headaches, memory loss, loss of strength, numbness, or tremors  SKIN: No itching, burning, rashes, or lesions   MUSCULOSKELETAL: No joint pain or swelling; No muscle, back, or extremity pain  PSYCHIATRIC: No depression, anxiety, mood swings, or difficulty sleeping      Medications:    doxazosin 2 milliGRAM(s) Oral at bedtime  midodrine 10 milliGRAM(s) Oral every 8 hours    buDESOnide    Inhalation Suspension 0.5 milliGRAM(s) Inhalation every 12 hours  levalbuterol Inhalation 0.63 milliGRAM(s) Inhalation every 6 hours    acetaminophen   Oral Liquid .. 650 milliGRAM(s) Oral every 6 hours PRN  ALPRAZolam 0.5 milliGRAM(s) Oral every 8 hours PRN  dexMEDEtomidine Infusion 0.2 MICROgram(s)/kG/Hr IV Continuous <Continuous>  fentaNYL    Injectable 25 MICROGram(s) IV Push every 4 hours PRN  ondansetron Injectable 4 milliGRAM(s) IV Push every 6 hours PRN    letrozole 2.5 milliGRAM(s) Oral daily    aspirin enteric coated 81 milliGRAM(s) Oral daily  enoxaparin Injectable 40 milliGRAM(s) SubCutaneous every 12 hours    bisacodyl Suppository 10 milliGRAM(s) Rectal daily PRN  pantoprazole   Suspension 40 milliGRAM(s) Enteral Tube daily  polyethylene glycol 3350 17 Gram(s) Oral daily  senna 2 Tablet(s) Oral at bedtime      atorvastatin 80 milliGRAM(s) Oral at bedtime  dextrose 50% Injectable 25 Gram(s) IV Push once  dextrose 50% Injectable 12.5 Gram(s) IV Push once  dextrose 50% Injectable 25 Gram(s) IV Push once  insulin lispro (ADMELOG) corrective regimen sliding scale   SubCutaneous every 6 hours  methylPREDNISolone sodium succinate Injectable 40 milliGRAM(s) IV Push every 8 hours    dextrose 5%. 1000 milliLiter(s) IV Continuous <Continuous>  dextrose 5%. 1000 milliLiter(s) IV Continuous <Continuous>  lactated ringers. 1000 milliLiter(s) IV Continuous <Continuous>  sodium chloride 0.9% lock flush 10 milliLiter(s) IV Push every 1 hour PRN      chlorhexidine 2% Cloths 1 Application(s) Topical <User Schedule>    nicotine - 21 mG/24Hr(s) Patch 1 Patch Transdermal daily          ICU Vital Signs Last 24 Hrs  T(C): 37.1 (07 Nov 2023 21:00), Max: 37.2 (07 Nov 2023 05:00)  T(F): 98.8 (07 Nov 2023 21:00), Max: 99 (07 Nov 2023 05:00)  HR: 110 (07 Nov 2023 21:00) (74 - 118)  BP: 145/67 (07 Nov 2023 21:00) (114/56 - 154/74)  BP(mean): 88 (07 Nov 2023 21:00) (66 - 96)  ABP: --  ABP(mean): --  RR: 24 (07 Nov 2023 21:00) (11 - 36)  SpO2: 100% (07 Nov 2023 21:00) (98% - 100%)    O2 Parameters below as of 07 Nov 2023 19:00  Patient On (Oxygen Delivery Method): nasal cannula  O2 Flow (L/min): 4              I&O's Detail    06 Nov 2023 07:01  -  07 Nov 2023 07:00  --------------------------------------------------------  IN:    Dexmedetomidine: 69.6 mL    Lactated Ringers: 1200 mL    Vital1.5: 250 mL  Total IN: 1519.6 mL    OUT:    Voided (mL): 1100 mL  Total OUT: 1100 mL    Total NET: 419.6 mL      07 Nov 2023 07:01  -  07 Nov 2023 21:45  --------------------------------------------------------  IN:    Dexmedetomidine: 34.8 mL    Lactated Ringers: 600 mL    Vital1.5: 580 mL  Total IN: 1214.8 mL    OUT:    Voided (mL): 1300 mL  Total OUT: 1300 mL    Total NET: -85.2 mL            LABS:                        7.1    12.06 )-----------( 177      ( 07 Nov 2023 09:00 )             23.5     11-07    143  |  106  |  19  ----------------------------<  187<H>  3.7   |  33<H>  |  <0.20<L>    Ca    7.9<L>      07 Nov 2023 05:00  Phos  2.4     11-07  Mg     2.1     11-07            CAPILLARY BLOOD GLUCOSE      POCT Blood Glucose.: 263 mg/dL (07 Nov 2023 18:29)      Urinalysis Basic - ( 07 Nov 2023 05:00 )    Color: x / Appearance: x / SG: x / pH: x  Gluc: 187 mg/dL / Ketone: x  / Bili: x / Urobili: x   Blood: x / Protein: x / Nitrite: x   Leuk Esterase: x / RBC: x / WBC x   Sq Epi: x / Non Sq Epi: x / Bacteria: x      CULTURES:  Culture Results:   Normal Respiratory Daria present (11-04-23 @ 19:00)  Culture Results:   No growth at 48 Hours (11-04-23 @ 12:20)  Culture Results:   Growth in aerobic bottle: Staphylococcus epidermidis  Coagulase Negative Staphylococci isolated from a single blood culture set  may represent contamination.  Contact the Microbiology Department at [phone] if susceptibility  testing is clinically indicated.  Direct identification is available within approximately 3-5  hours either by Blood Panel Multiplexed PCR or Direct  MALDI-TOF. Details: [email or web address] (11-04-23 @ 12:10)      Physical Examination:    General: Pt laying in hospital bed in no acute distress.  Alert, oriented, interactive, nonfocal    HEENT: Pupils equal, reactive to light.  Symmetric.    PULM: Clear to auscultation bilaterally, no significant sputum production    CVS: Regular rate and rhythm, no murmurs, rubs, or gallops    ABD: Soft, nondistended, nontender, normoactive bowel sounds, no masses    EXT: No edema, nontender    SKIN: Warm and well perfused.       RADIOLOGY: < from: Xray Chest 1 View- PORTABLE-Routine (Xray Chest 1 View- PORTABLE-Routine in AM.) (11.06.23 @ 09:47) >  ACC: [id number] EXAM:  XR CHEST PORTABLE ROUTINE 1V   ORDERED BY: RANDALL MACHADO     PROCEDURE DATE:  11/06/2023          INTERPRETATION:  TIME OF EXAM: November 6, 2023 at 8:55 AM.    CLINICAL INFORMATION: COPD. Evaluate for pneumonia.    COMPARISON:  November 3, 2023 at 4:12 PM.    TECHNIQUE:   AP Portable chest x-ray. Limited by rotation.    INTERPRETATION:    The heart is not enlarged. The thoracic aorta is calcified.  The mediastinum is not accurately evaluated on this image.  Previous ETtube is not seen.  Enteric tube extends into left hemiabdomen. Tip not included on image.  An accessed right subclavian approach port with tip in the right atrium   again seen.  There is post left mastectomy with left axillary surgical clips is again  seen.  The lungs are hyperinflated.  A nodular opacity projecting over the periphery of the right lower chest   is likely the nipple. The lungs are clear.  Continued right costophrenic angle blunting which could be due to pleural   thickening or trace right pleural effusion.  No left pleural effusion.  No pneumothorax is noted.  There is no acute bony abnormality.      IMPRESSION:  Hyperinflated lungs. No focal lung consolidation.    Continued right costophrenic angle blunting, possibly pleuralthickening   or trace right pleural effusion.    --- End of Report ---            KORI DE LA CRUZ MD; Attending Radiologist  This document has been electronically signed. Nov 7 2023  3:26PM          CRITICAL CARE TIME SPENT: 38 minutes assessing presenting problems of acute illness, which pose high probability of life threatening deterioration or end organ damage/dysfunction, as well as medical decision making including initiating plan of care, reviewing data, reviewing radiologic exams, discussing with multidisciplinary team,  discussing goals of care with patient/family, and writing this note.  Non-inclusive of procedures performed,

## 2023-11-07 NOTE — SWALLOW BEDSIDE ASSESSMENT ADULT - SWALLOW EVAL: DIAGNOSIS
Patient presents with evidence of oropharyngeal dysphagia s/p extubation. Oral care provided via suction with baseline wet cough appreciated. Provided patient with ice chips x3 with adequate acceptance via tsp. Adequate breakdown of ice chips with suspected multiple swallows per bolus. Pharyngeal motor response appreciated via digital palpation with increased HR (up to 120) and RR (up to 30) during management of bolus. Delayed weak cough appreciated.

## 2023-11-07 NOTE — SWALLOW BEDSIDE ASSESSMENT ADULT - SLP GENERAL OBSERVATIONS
Patient received awake and alert, oriented x3-4. Receiving 4L O2 via NC with vitals monitored throughout session. Baseline vitals include: 101 HR, 21 RR, 100 SpO2, 128/56 BP. Vitals at end of session: 117 HR, 24 RR, 100% SpO2, 138/64 BP.

## 2023-11-07 NOTE — SWALLOW BEDSIDE ASSESSMENT ADULT - SLP PERTINENT HISTORY OF CURRENT PROBLEM
68F PMHx of COPD, HTN, HLD, DM, on xarelto for DVT history, hx of breast ca s/p left mastectomy, on chemotx last tx was 1 week ago presenting with shortness of breath. ADmitted for worsening SOB, PNA, required intubation and mechanical vent support. ETT 11/3-11/5

## 2023-11-07 NOTE — PROGRESS NOTE ADULT - ASSESSMENT
Patient is a 68y female with a past history of HTN, HLD,DM, COPD, DVT on xarelto at home, breast cancer s/p mastectomy and receiving Herceptin, who was admitted 10/27 with shortness of breath.  She was felt to have a COPD exacerbation and was treated with steroids and BIPAP, she required intubation.  She was extubated after a few days on vent, required repeat intubation for a short time and was extubated 11/5.  She is still smoking, had blood cultures sent a few days with coag negative staph in 1 set. Meropenem was started.  She was restless and agitated earlier, has been sedated, is on BIPAP . Minimal secretions.She is unable to give a history at this point, recently sedated.  Her CXR's appear clear, sputum culture when intubated is negative, and she is growing a staph epi in 1 of 2 sets of blood cultures sent 11/4.  The positive blood cultures are most likely procurement contaminant.  I do not see clear indication for antibiotics.Meropenem stopped 11/6.  She is less short of breath today, CXR is without focal infiltrate and second set of 11/4 blood cultures remain negative  Suggest:  1 will observe off antibiotics  2 If coverage for pneumonia becomes evident will advise CTX 1 gram q24  3 I think she can be observed off antibiotics at this point.Low grade leukocytosis may be a steroid effect  4 Goals of care discussions.

## 2023-11-07 NOTE — PROGRESS NOTE ADULT - SUBJECTIVE AND OBJECTIVE BOX
CC: f/u for staph epi in blood  date of service 11/7  Patient reports: she is more comfortable today, on nasal oxygen    REVIEW OF SYSTEMS:  All other review of systems negative (Comprehensive ROS): limited, very weak and debilitated    Antimicrobials Day #  :off    Other Medications Reviewed  MEDICATIONS  (STANDING):  aspirin enteric coated 81 milliGRAM(s) Oral daily  atorvastatin 80 milliGRAM(s) Oral at bedtime  buDESOnide    Inhalation Suspension 0.5 milliGRAM(s) Inhalation every 12 hours  chlorhexidine 2% Cloths 1 Application(s) Topical <User Schedule>  dexMEDEtomidine Infusion 0.2 MICROgram(s)/kG/Hr (1.95 mL/Hr) IV Continuous <Continuous>  dextrose 5%. 1000 milliLiter(s) (100 mL/Hr) IV Continuous <Continuous>  dextrose 5%. 1000 milliLiter(s) (50 mL/Hr) IV Continuous <Continuous>  dextrose 50% Injectable 25 Gram(s) IV Push once  dextrose 50% Injectable 12.5 Gram(s) IV Push once  dextrose 50% Injectable 25 Gram(s) IV Push once  doxazosin 2 milliGRAM(s) Oral at bedtime  enoxaparin Injectable 40 milliGRAM(s) SubCutaneous every 12 hours  insulin lispro (ADMELOG) corrective regimen sliding scale   SubCutaneous every 6 hours  lactated ringers. 1000 milliLiter(s) (50 mL/Hr) IV Continuous <Continuous>  letrozole 2.5 milliGRAM(s) Oral daily  levalbuterol Inhalation 0.63 milliGRAM(s) Inhalation every 6 hours  methylPREDNISolone sodium succinate Injectable 40 milliGRAM(s) IV Push every 8 hours  midodrine 10 milliGRAM(s) Oral every 8 hours  nicotine - 21 mG/24Hr(s) Patch 1 Patch Transdermal daily  pantoprazole   Suspension 40 milliGRAM(s) Enteral Tube daily  polyethylene glycol 3350 17 Gram(s) Oral daily  senna 2 Tablet(s) Oral at bedtime    T(F): 99 (11-07-23 @ 05:00), Max: 99 (11-07-23 @ 05:00)  HR: 111 (11-07-23 @ 16:00)  BP: 132/57 (11-07-23 @ 16:00)  RR: 18 (11-07-23 @ 16:00)  SpO2: 100% (11-07-23 @ 16:00)  Wt(kg): --    PHYSICAL EXAM:  General: alert, no acute distress, frail and chronically ill appearing  Eyes:  anicteric, no conjunctival injection, no discharge  Oropharynx: no lesions or injection 	  Neck: supple, without adenopathy  Lungs: clear to auscultation, distant BS  Heart: regular rate and rhythm; no murmur, rubs or gallops  Abdomen: soft, nondistended, nontender, without mass or organomegaly  Skin: no lesions  Extremities: no clubbing, cyanosis, or edema  Neurologic: attentive, moves all extremities  Rt chest mediport  LAB RESULTS:                        7.1    12.06 )-----------( 177      ( 07 Nov 2023 09:00 )             23.5     11-07    143  |  106  |  19  ----------------------------<  187<H>  3.7   |  33<H>  |  <0.20<L>    Ca    7.9<L>      07 Nov 2023 05:00  Phos  2.4     11-07  Mg     2.1     11-07            MICROBIOLOGY:  RECENT CULTURES:  11-04 @ 19:00 ET Tube ET Tube     Normal Respiratory Daria present    Few polymorphonuclear leukocytes per low power field  No Squamous epithelial cells per low power field  No organisms seen per oil power field    11-04 @ 12:20 .Blood Blood     No growth at 48 Hours      11-04 @ 12:10 .Blood Blood Blood Culture PCR    Growth in aerobic bottle: Staphylococcus epidermidis  Coagulase Negative Staphylococci isolated from a single blood culture set  may represent contamination.  Contact the Microbiology Department at [phone] if susceptibility  testing is clinically indicated.  Direct identification is available within approximately 3-5  hours either by Blood Panel Multiplexed PCR or Direct  MALDI-TOF. Details: [email or web address]    Growth in aerobic bottle: Gram Positive Cocci in Clusters        RADIOLOGY REVIEWED:    < from: Xray Chest 1 View- PORTABLE-Routine (Xray Chest 1 View- PORTABLE-Routine in AM.) (11.06.23 @ 09:47) >  IMPRESSION:  Hyperinflated lungs. No focal lung consolidation.    Continued right costophrenic angle blunting, possibly pleuralthickening   or trace right pleural effusion.    --- End of Report ---    < end of copied text >

## 2023-11-07 NOTE — PROGRESS NOTE ADULT - ASSESSMENT
A/p 68F PMHx of COPD, HTN, HLD, DM, on xarelto for DVT history, hx of breast ca s/p left mastectomy, on chemotx last tx was 1 week ago presenting with shortness of breath. ADmitted for worsening SOB, PNA, required intubation and mechanical vent support.    Interval Hx:  -patient has been intubated, extubated multiple times  -currently EXTUBATED, but needing BiPAP   prn    - Off pressors since  yesterday    Problem List   Acute respiratory failure due to acute COPD Exacerbation in setting of ERV requiring intubation 10/31/23 Extubated 11/2/2023, Re intubated 11/3/23   Active nicotine use - 1 pk/day   uses home o2 PRN    Underlying:    CAD    HTN   DM type 2   h/o Breast cancer   H/o DVT on xarelto       Plan  NIV prn/ NC  Encourage  coughing and deep breathing  support for anxiety    - Continue xanax prn, changed to TID   - Restart wellbutrin  when able   when patient able to tolerate off NIV have  bedside swallow eval- done today 11/7 - armida ice chips, but became sob, so cont npo for now   currently off antibiotics, wbc improving, follow fever curve         A/p 68F PMHx of COPD, HTN, HLD, DM, on xarelto for DVT history, hx of breast ca s/p left mastectomy, on chemotx last tx was 1 week ago presenting with shortness of breath. Admitted  for worsening SOB, PNA, required intubation and mechanical vent support.    Interval Hx:  -patient has been intubated, extubated multiple times  -currently EXTUBATED, but needing BiPAP   prn    - Off pressors since  yesterday    Problem List   Acute respiratory failure due to acute COPD Exacerbation in setting of ERV requiring intubation 10/31/23 Extubated 11/2/2023, Re intubated 11/3/23   Active nicotine use - 1 pk/day   uses home o2 PRN    Underlying:    CAD    HTN   DM type 2   h/o Breast cancer - presently on treatment w/ herceptin  and letrozole    H/o DVT on xarelto       Plan  NIV prn/ NC  Encourage  coughing and deep breathing  cont support for anxiety    - Continue xanax prn, changed to inc the frequency      - Restart wellbutrin  when able   when patient able to tolerate off NIV have  bedside swallow eval- done today 11/7 - armida ice chips, but became sob, and tachy ,so cont npo for now   currently off antibiotics, wbc improving, follow fever curve       Palliative :  as a follow up today , case d/w ccu team today , and chart reviewed.  Pt more awake, alert, conversant  today.      Met with pt and brother at bedside , pt says brother would be her proxy for decisions if needed . See [facility] note above.  Pt cont to wish for full code status , discussed possible need for trach in future, pt stated she would Not want that .   Brother Maximiliano part of this conversation.   Will follow  clinical  course .

## 2023-11-07 NOTE — PROGRESS NOTE ADULT - CONVERSATION/DISCUSSION
Diagnosis/Prognosis/Treatment Options
Diagnosis/Prognosis
Diagnosis/MOLST Discussed/Palliative Care Referral
Diagnosis/MOLST Discussed/Palliative Care Referral

## 2023-11-08 NOTE — CONSULT NOTE ADULT - ASSESSMENT
68 year old female  PMHx of COPD, CAD, HLD, DM2, on xarelto for DVT history, hx of breast ca s/p left mastectomy, on chemotx presented with shortness of breath. Admitted to ICU for respiratory failure due to COPD exacerbation in the setting of Entero/rhinovirus with COPD, severe Exacerbation    Currently, no s/s of active bleeding, sp 1 unit of PRBC on 11/7, Vital tube feeds thru NGT tolerating well, on lovenox, Xarelto on hold    GI consult: occult + stools anemia 68 year old female  PMHx of COPD, CAD, HLD, DM2, on xarelto for DVT history, hx of breast ca s/p left mastectomy, on chemotx presented with shortness of breath. Admitted to ICU for respiratory failure due to COPD exacerbation in the setting of Entero/rhinovirus with COPD, severe Exacerbation    Currently, sp 1 unit of PRBC on 11/7, Vital tube feeds thru NGT tolerating well, on Lovenox, Xarelto on hold,. one large BM 11/8 DARK BROWN     GI consult: occult + stools anemia 68 year old female  PMHx of COPD, CAD, HLD, DM2, on xarelto for DVT history, hx of breast ca s/p left mastectomy, on chemotx presented with shortness of breath, admitted to ICU for respiratory failure due to COPD exacerbation in the setting of Entero/rhinovirus with COPD severe exacerbation  Currently, sp 1 unit of PRBC on 11/7, Vital tube feeds thru NGT tolerating well, on Lovenox, Xarelto on hold,. one large BM 11/8 DARK BROWN

## 2023-11-08 NOTE — PROGRESS NOTE ADULT - ASSESSMENT
68y female with a past history of HTN, HLD,DM, COPD, DVT on xarelto at home, breast cancer s/p mastectomy and receiving Herceptin, who was admitted 10/27 with shortness of breath.  She was felt to have a COPD exacerbation and was treated with steroids and BIPAP, she required intubation.  She was extubated after a few days on vent, required repeat intubation for a short time and was extubated 11/5.  She is still smoking, had blood cultures sent a few days with coag negative staph in 1 set. Meropenem was started.  CXRs appear clear, sputum culture when intubated is negative, and she is growing a staph epi in 1 of 2 sets of blood cultures sent 11/4.  The positive blood cultures are most likely procurement contaminant.  Empiric Meropenem was stopped 11/6.  CXR is without focal infiltrate and second set of 11/4 blood cultures remain negative.    Suggest:  1 will observe off antibiotics  2 If coverage for pneumonia becomes evident will advise CTX 1 gram q24  3 Low grade leukocytosis may be a steroid effect  4 Goals of care discussions.

## 2023-11-08 NOTE — PROGRESS NOTE ADULT - SUBJECTIVE AND OBJECTIVE BOX
HPI:  68F PMHx of COPD, HTN, HLD, DM, on xarelto for DVT history, hx of breast ca s/p left mastectomy, on chemotx last tx was 1 week ago presenting with shortness of breath, since last night. She is an active smoker. Last cigarette was yesterday. Denies any fevers or chills. ICU consulted as patient needing bipap support in the ED. No chest pain or palpitations. (27 Oct 2023 15:03)      24 hr events: tolerated AVAPS overnight, received 1 unit PRBC for hgb 7.1 now 8.8, HD stable, remains on precedex       ## ROS: some SOB, mild cough,       ## Labs:  CBC:                        8.8    12.32 )-----------( 190      ( 08 Nov 2023 06:00 )             26.7     Chem:  11-08    138  |  102  |  15  ----------------------------<  205<H>  3.7   |  34<H>  |  <0.20<L>    Ca    8.3<L>      08 Nov 2023 06:00  Phos  2.7     11-08  Mg     2.0     11-08        ## Medications:  doxazosin 2 milliGRAM(s) Oral at bedtime  midodrine 10 milliGRAM(s) Oral every 8 hours  buDESOnide    Inhalation Suspension 0.5 milliGRAM(s) Inhalation every 12 hours  levalbuterol Inhalation 0.63 milliGRAM(s) Inhalation every 6 hours  atorvastatin 80 milliGRAM(s) Oral at bedtime  dextrose 50% Injectable 25 Gram(s) IV Push once  dextrose 50% Injectable 12.5 Gram(s) IV Push once  dextrose 50% Injectable 25 Gram(s) IV Push once  insulin lispro (ADMELOG) corrective regimen sliding scale   SubCutaneous every 6 hours  methylPREDNISolone sodium succinate Injectable 40 milliGRAM(s) IV Push every 8 hours  aspirin enteric coated 81 milliGRAM(s) Oral daily  enoxaparin Injectable 40 milliGRAM(s) SubCutaneous every 12 hours  bisacodyl Suppository 10 milliGRAM(s) Rectal daily PRN  pantoprazole   Suspension 40 milliGRAM(s) Enteral Tube daily  polyethylene glycol 3350 17 Gram(s) Oral daily  senna 2 Tablet(s) Oral at bedtime  acetaminophen   Oral Liquid .. 650 milliGRAM(s) Oral every 6 hours PRN  ALPRAZolam 0.5 milliGRAM(s) Oral every 8 hours PRN  buPROPion . 100 milliGRAM(s) Oral three times a day  dexMEDEtomidine Infusion 0.2 MICROgram(s)/kG/Hr IV Continuous <Continuous>  ondansetron Injectable 4 milliGRAM(s) IV Push every 6 hours PRN      ## Vitals:  T(C): 36.7 (11-08-23 @ 08:00), Max: 37.2 (11-07-23 @ 18:00)  HR: 98 (11-08-23 @ 09:00) (78 - 124)  BP: 145/70 (11-08-23 @ 09:00) (98/41 - 157/71)  BP(mean): 92 (11-08-23 @ 09:00) (50 - 126)  RR: 23 (11-08-23 @ 09:00) (12 - 36)  SpO2: 100% (11-08-23 @ 09:00) (98% - 100%)      11-07 @ 07:01  -  11-08 @ 07:00  --------------------------------------------------------  IN: 2234.6 mL / OUT: 2600 mL / NET: -365.4 mL    11-08 @ 07:01  -  11-08 @ 10:48  --------------------------------------------------------  IN: 107.9 mL / OUT: 0 mL / NET: 107.9 mL      ## P/E:  Gen: lying comfortably in bed in no apparent distress  HEENT: PERRL, EOMI  Resp: tachcypneic, shallow breathing, wet cough, diminished   CVS: S1S2 no m/r/g  Abd: soft NT/ND +BS  Ext: no c/c/e  Neuro: A&Ox3      CODE STATUS: [x ] full code  [ ] DNR  [ ] DNI  [ ] MOLST  Goals of care discussion: [ ] yes

## 2023-11-08 NOTE — PROGRESS NOTE ADULT - SUBJECTIVE AND OBJECTIVE BOX
Patient is a 68y old  Female who presents with a chief complaint of COPD exacerbation (08 Nov 2023 10:46)    BRIEF HOSPITAL COURSE:   69 y/o F pmhx of COPD, HLD, HTN, DM2, DVT on xarelto, breast CA s/p L mastectomy who was presented to  ED w/ complaints of acute SOB. Admitted w/ hypoxic/hypercapnic respiratory failure secondary to COPD exacerbation in setting of + entero/rhinovirus. Intubated for respiratory failure, extubated and subsequently reintubated. Extubated 11/5 with intermittent NIPPV use.     Events last 24 hours:   -Remains on Precedex Infusion for anxiolysis despite addition of Wellbutrin.  -Satting well on NC prior to transition to nocturnal NIPPV.  -Hemodynamics stable off IV vasopressor therapy.  -Afebrile.     PAST MEDICAL & SURGICAL HISTORY:  COPD, severe  CAD (coronary artery disease)  Breast cancer  No significant past surgical history    Review of Systems:  CONSTITUTIONAL: No fever, chills, or fatigue  EYES: No eye pain, visual disturbances, or discharge  ENMT:  No difficulty hearing, tinnitus, vertigo; No sinus or throat pain  NECK: No pain or stiffness  RESPIRATORY: No cough, wheezing, chills or hemoptysis; No shortness of breath  CARDIOVASCULAR: No chest pain, palpitations, dizziness, or leg swelling  GASTROINTESTINAL: No abdominal or epigastric pain. No nausea, vomiting, or hematemesis; No diarrhea or constipation. No melena or hematochezia.  GENITOURINARY: No dysuria, frequency, hematuria, or incontinence  NEUROLOGICAL: No headaches, memory loss, loss of strength, numbness, or tremors  SKIN: No itching, burning, rashes, or lesions   MUSCULOSKELETAL: No joint pain or swelling; No muscle, back, or extremity pain  PSYCHIATRIC: No depression, anxiety, mood swings, or difficulty sleeping    Medications:  doxazosin 2 milliGRAM(s) Oral at bedtime  midodrine 10 milliGRAM(s) Oral every 8 hours  buDESOnide    Inhalation Suspension 0.5 milliGRAM(s) Inhalation every 12 hours  levalbuterol Inhalation 0.63 milliGRAM(s) Inhalation every 6 hours  acetaminophen   Oral Liquid .. 650 milliGRAMs) Oral every 6 hours PRN  ALPRAZolam 0.5 milliGRAM(s) Oral every 8 hours PRN  buPROPion . 100 milliGRAM(s) Oral three times a day  dexMEDEtomidine Infusion 0.2 MICROgram(s)/kG/Hr IV Continuous <Continuous>  ondansetron Injectable 4 milliGRAM(s) IV Push every 6 hours PRN  letrozole 2.5 milliGRAM(s) Oral daily  aspirin enteric coated 81 milliGRAM(s) Oral daily  enoxaparin Injectable 40 milliGRAM(s) SubCutaneous every 12 hours  bisacodyl Suppository 10 milliGRAM(s) Rectal daily PRN  pantoprazole   Suspension 40 milliGRAM(s) Enteral Tube daily  polyethylene glycol 3350 17 Gram(s) Oral daily  senna 2 Tablet(s) Oral at bedtime  atorvastatin 80 milliGRAM(s) Oral at bedtime  dextrose 50% Injectable 25 Gram(s) IV Push once  dextrose 50% Injectable 25 Gram(s) IV Push once  dextrose 50% Injectable 12.5 Gram(s) IV Push once  insulin lispro (ADMELOG) corrective regimen sliding scale   SubCutaneous every 6 hours  methylPREDNISolone sodium succinate Injectable 40 milliGRAM(s) IV Push every 8 hours  dextrose 5%. 1000 milliLiter(s) IV Continuous <Continuous>  dextrose 5%. 1000 milliLiter(s) IV Continuous <Continuous>  sodium chloride 0.9% lock flush 10 milliLiter(s) IV Push every 1 hour PRN  chlorhexidine 2% Cloths 1 Application(s) Topical <User Schedule>  nicotine - 21 mG/24Hr(s) Patch 1 Patch Transdermal daily    ICU Vital Signs Last 24 Hrs  T(C): 36.7 (08 Nov 2023 16:00), Max: 37.2 (08 Nov 2023 05:00)  T(F): 98 (08 Nov 2023 16:00), Max: 99 (08 Nov 2023 05:00)  HR: 100 (08 Nov 2023 18:00) (78 - 116)  BP: 149/69 (08 Nov 2023 18:00) (96/48 - 157/71)  BP(mean): 88 (08 Nov 2023 18:00) (50 - 126)  ABP: --  ABP(mean): --  RR: 16 (08 Nov 2023 18:00) (12 - 28)  SpO2: 100% (08 Nov 2023 18:00) (98% - 100%)  O2 Parameters below as of 08 Nov 2023 08:34  Patient On (Oxygen Delivery Method): nasal cannula  O2 Flow (L/min): 4    I&O's Detail  07 Nov 2023 07:01  -  08 Nov 2023 07:00  --------------------------------------------------------  IN:    Dexmedetomidine: 69.6 mL    Lactated Ringers: 1200 mL    PRBCs (Packed Red Blood Cells): 275 mL    Vital1.5: 690 mL  Total IN: 2234.6 mL  OUT:    Voided (mL): 2600 mL  Total OUT: 2600 mL  Total NET: -365.4 mL    08 Nov 2023 07:01  -  08 Nov 2023 21:09  --------------------------------------------------------  IN:    Dexmedetomidine: 31.9 mL    Lactated Ringers: 50 mL    Vital1.5: 645 mL  Total IN: 726.9 mL  OUT:    Voided (mL): 500 mL  Total OUT: 500 mL  Total NET: 226.9 mL    LABS:                      8.8    12.32 )-----------( 190      ( 08 Nov 2023 06:00 )             26.7     11-08  138  |  102  |  15  ----------------------------<  205<H>  3.7   |  34<H>  |  <0.20<L>  Ca    8.3<L>      08 Nov 2023 06:00  Phos  2.7     11-08  Mg     2.0     11-08    CAPILLARY BLOOD GLUCOSE  POCT Blood Glucose.: 236 mg/dL (08 Nov 2023 17:30)    Urinalysis Basic - ( 08 Nov 2023 06:00 )  Color: x / Appearance: x / SG: x / pH: x  Gluc: 205 mg/dL / Ketone: x  / Bili: x / Urobili: x   Blood: x / Protein: x / Nitrite: x   Leuk Esterase: x / RBC: x / WBC x   Sq Epi: x / Non Sq Epi: x / Bacteria: x    CULTURES:  Culture Results:   Normal Respiratory Daria present (11-04-23 @ 19:00)  Culture Results:   No growth at 72 Hours (11-04-23 @ 12:20)  Culture Results:   Growth in aerobic bottle: Staphylococcus epidermidis  Coagulase Negative Staphylococci isolated from a single blood culture set  may represent contamination.  Contact the Microbiology Department at [phone] if susceptibility  testing is clinically indicated.  Direct identification is available within approximately 3-5  hours either by Blood Panel Multiplexed PCR or Direct  MALDI-TOF. Details: [email or web address] (11-04-23 @ 12:10)    Physical Examination:  General: Chronically ill-appearing adult female.   HEENT: PERRL.  NECK: Supple.   PULM: Clear to auscultation bilaterally.  CVS: s1/s2.  ABD: Soft, nondistended, nontender, normoactive bowel sounds.  EXT: No edema, nontender.  SKIN: Warm.    RADIOLOGY:   < from: Xray Chest 1 View- PORTABLE-Routine (Xray Chest 1 View- PORTABLE-Routine in AM.) (11.06.23 @ 09:47) >  ACC: [id number] EXAM:  XR CHEST PORTABLE ROUTINE 1V   ORDERED BY: RANDALL POWELL DATE:  11/06/2023    IMPRESSION:  Hyperinflated lungs. No focal lung consolidation.  Continued right costophrenic angle blunting, possibly pleural thickening   or trace right pleural effusion.    Time spent on this patient encounter, which includes documenting this note in the electronic medical record, was >50 minutes including assessing the presenting problems with associated risks, reviewing the medical record to prepare for the encounter, and meeting face to face with the patient to obtain additional history. I have also performed an appropriate physical exam, made interventions listed and ordered and interpreted appropriate diagnostic studies as documented. To improve communication and patient safety, I have coordinated care with the multidisciplinary team including the bedside nurse, appropriate attending of record and consultants as needed. This time is independent of any procedures performed.    Date of entry of this note is equal to the date of services rendered.

## 2023-11-08 NOTE — CONSULT NOTE ADULT - SUBJECTIVE AND OBJECTIVE BOX
INTERVAL HPI/OVERNIGHT EVENTS:  HPI:  Consult:  68F PMHx of COPD, HTN, HLD, DM, on xarelto for DVT history, hx of breast ca s/p left mastectomy, on chemo tx last tx was 1 week ago presenting with shortness of breath, since last night. She is an active smoker. Last cigarette was yesterday. Denies any fevers or chills. ICU consulted as patient needing BIPAP support in the ED. No chest pain or palpitations. (27 Oct 2023 15:03)    Patient on Xarelto at home, HBG dropped to 7.1 sp 1 unit of PRCBC on , HGB to 8.8 g/dL, denies hematochezia and hematemesis, no NDV, no abdominal pain        MEDICATIONS  (STANDING):  aspirin enteric coated 81 milliGRAM(s) Oral daily  atorvastatin 80 milliGRAM(s) Oral at bedtime  buDESOnide    Inhalation Suspension 0.5 milliGRAM(s) Inhalation every 12 hours  buPROPion . 100 milliGRAM(s) Oral three times a day  chlorhexidine 2% Cloths 1 Application(s) Topical <User Schedule>  dexMEDEtomidine Infusion 0.2 MICROgram(s)/kG/Hr (1.95 mL/Hr) IV Continuous <Continuous>  dextrose 5%. 1000 milliLiter(s) (100 mL/Hr) IV Continuous <Continuous>  dextrose 5%. 1000 milliLiter(s) (50 mL/Hr) IV Continuous <Continuous>  dextrose 50% Injectable 25 Gram(s) IV Push once  dextrose 50% Injectable 12.5 Gram(s) IV Push once  dextrose 50% Injectable 25 Gram(s) IV Push once  doxazosin 2 milliGRAM(s) Oral at bedtime  enoxaparin Injectable 40 milliGRAM(s) SubCutaneous every 12 hours  insulin lispro (ADMELOG) corrective regimen sliding scale   SubCutaneous every 6 hours  letrozole 2.5 milliGRAM(s) Oral daily  levalbuterol Inhalation 0.63 milliGRAM(s) Inhalation every 6 hours  methylPREDNISolone sodium succinate Injectable 40 milliGRAM(s) IV Push every 8 hours  midodrine 10 milliGRAM(s) Oral every 8 hours  nicotine - 21 mG/24Hr(s) Patch 1 Patch Transdermal daily  pantoprazole   Suspension 40 milliGRAM(s) Enteral Tube daily  polyethylene glycol 3350 17 Gram(s) Oral daily  senna 2 Tablet(s) Oral at bedtime    MEDICATIONS  (PRN):  acetaminophen   Oral Liquid .. 650 milliGRAM(s) Oral every 6 hours PRN Mild Pain (1 - 3)  ALPRAZolam 0.5 milliGRAM(s) Oral every 8 hours PRN anxiety  bisacodyl Suppository 10 milliGRAM(s) Rectal daily PRN Constipation  ondansetron Injectable 4 milliGRAM(s) IV Push every 6 hours PRN Nausea and/or Vomiting  sodium chloride 0.9% lock flush 10 milliLiter(s) IV Push every 1 hour PRN Pre/post blood products, medications, blood draw, and to maintain line patency      Allergies    penicillin (Hives)    Intolerances        PAST MEDICAL & SURGICAL HISTORY:  COPD, severe      CAD (coronary artery disease)      Breast cancer      No significant past surgical history        REVIEW OF SYSTEMS- SEE HPI       PHYSICAL EXAM:   Vital Signs:  Vital Signs Last 24 Hrs  T(C): 36.7 (2023 12:00), Max: 37.2 (2023 18:00)  T(F): 98.1 (2023 12:00), Max: 99 (2023 18:00)  HR: 80 (2023 12:00) (78 - 124)  BP: 96/48 (2023 12:00) (96/48 - 157/71)  BP(mean): 63 (2023 12:00) (50 - 126)  RR: 15 (2023 12:00) (12 - 32)  SpO2: 100% (2023 12:00) (98% - 100%)    Parameters below as of 2023 08:34  Patient On (Oxygen Delivery Method): nasal cannula  O2 Flow (L/min): 4    Daily     Daily Weight in k.3 (2023 05:00)I&O's Summary    2023 07:01  -  2023 07:00  --------------------------------------------------------  IN: 2234.6 mL / OUT: 2600 mL / NET: -365.4 mL    2023 07:01  -  2023 12:23  --------------------------------------------------------  IN: 339.5 mL / OUT: 500 mL / NET: -160.5 mL        GENERAL: No distress  HEENT: moist and clear sclera and conjunctivae, NGT with tube feeds  CHEST:  Full & symmetric excursion, no increased effort, breath sounds clear  HEART:  Regular rhythm, S1, S2  ABDOMEN:  Soft, non-tender, non-distended, normoactive bowel sounds, no masses,   EXTREMITIES:  no edema  SKIN:  No rash  NEURO:  Alert, oriented, no asterixis, no tremor, no encephalopathy      LABS:                        8.8    12.32 )-----------( 190      ( 2023 06:00 )             26.7     11-08    138  |  102  |  15  ----------------------------<  205<H>  3.7   |  34<H>  |  <0.20<L>    Ca    8.3<L>      2023 06:00  Phos  2.7     11-08  Mg     2.0     11-08        Urinalysis Basic - ( 2023 06:00 )    Color: x / Appearance: x / SG: x / pH: x  Gluc: 205 mg/dL / Ketone: x  / Bili: x / Urobili: x   Blood: x / Protein: x / Nitrite: x   Leuk Esterase: x / RBC: x / WBC x   Sq Epi: x / Non Sq Epi: x / Bacteria: x      amylase   lipase  RADIOLOGY & ADDITIONAL TESTS:   INTERVAL HPI / OVERNIGHT EVENTS:  HPI:  Consult:  68F PMHx of COPD, HTN, HLD, DM, on xarelto for DVT history, hx of breast ca s/p left mastectomy, on chemo tx last tx was 1 week ago presenting with shortness of breath, since last night. She is an active smoker. Last cigarette was yesterday. Denies any fevers or chills. ICU consulted as patient needing BIPAP support in the ED. No chest pain or palpitations. (27 Oct 2023 15:03)    Patient on Xarelto at home, HBG dropped to 7.1 sp 1 unit of PRCBC on , HGB to 8.8 g/dL, denies hematochezia and hematemesis, no NDV, no abdominal pain        MEDICATIONS  (STANDING):  aspirin enteric coated 81 milliGRAM(s) Oral daily  atorvastatin 80 milliGRAM(s) Oral at bedtime  buDESOnide    Inhalation Suspension 0.5 milliGRAM(s) Inhalation every 12 hours  buPROPion . 100 milliGRAM(s) Oral three times a day  chlorhexidine 2% Cloths 1 Application(s) Topical <User Schedule>  dexMEDEtomidine Infusion 0.2 MICROgram(s)/kG/Hr (1.95 mL/Hr) IV Continuous <Continuous>  dextrose 5%. 1000 milliLiter(s) (100 mL/Hr) IV Continuous <Continuous>  dextrose 5%. 1000 milliLiter(s) (50 mL/Hr) IV Continuous <Continuous>  dextrose 50% Injectable 25 Gram(s) IV Push once  dextrose 50% Injectable 12.5 Gram(s) IV Push once  dextrose 50% Injectable 25 Gram(s) IV Push once  doxazosin 2 milliGRAM(s) Oral at bedtime  enoxaparin Injectable 40 milliGRAM(s) SubCutaneous every 12 hours  insulin lispro (ADMELOG) corrective regimen sliding scale   SubCutaneous every 6 hours  letrozole 2.5 milliGRAM(s) Oral daily  levalbuterol Inhalation 0.63 milliGRAM(s) Inhalation every 6 hours  methylPREDNISolone sodium succinate Injectable 40 milliGRAM(s) IV Push every 8 hours  midodrine 10 milliGRAM(s) Oral every 8 hours  nicotine - 21 mG/24Hr(s) Patch 1 Patch Transdermal daily  pantoprazole   Suspension 40 milliGRAM(s) Enteral Tube daily  polyethylene glycol 3350 17 Gram(s) Oral daily  senna 2 Tablet(s) Oral at bedtime    MEDICATIONS  (PRN):  acetaminophen   Oral Liquid .. 650 milliGRAM(s) Oral every 6 hours PRN Mild Pain (1 - 3)  ALPRAZolam 0.5 milliGRAM(s) Oral every 8 hours PRN anxiety  bisacodyl Suppository 10 milliGRAM(s) Rectal daily PRN Constipation  ondansetron Injectable 4 milliGRAM(s) IV Push every 6 hours PRN Nausea and/or Vomiting  sodium chloride 0.9% lock flush 10 milliLiter(s) IV Push every 1 hour PRN Pre/post blood products, medications, blood draw, and to maintain line patency      Allergies    penicillin (Hives)    Intolerances        PAST MEDICAL & SURGICAL HISTORY:  COPD, severe      CAD (coronary artery disease)      Breast cancer      No significant past surgical history        REVIEW OF SYSTEMS- SEE HPI       PHYSICAL EXAM:   Vital Signs:  Vital Signs Last 24 Hrs  T(C): 36.7 (2023 12:00), Max: 37.2 (2023 18:00)  T(F): 98.1 (2023 12:00), Max: 99 (2023 18:00)  HR: 80 (2023 12:00) (78 - 124)  BP: 96/48 (2023 12:00) (96/48 - 157/71)  BP(mean): 63 (2023 12:00) (50 - 126)  RR: 15 (2023 12:00) (12 - 32)  SpO2: 100% (2023 12:00) (98% - 100%)    Parameters below as of 2023 08:34  Patient On (Oxygen Delivery Method): nasal cannula  O2 Flow (L/min): 4    Daily     Daily Weight in k.3 (2023 05:00)I&O's Summary    2023 07:01  -  2023 07:00  --------------------------------------------------------  IN: 2234.6 mL / OUT: 2600 mL / NET: -365.4 mL    2023 07:01  -  2023 12:23  --------------------------------------------------------  IN: 339.5 mL / OUT: 500 mL / NET: -160.5 mL        GENERAL: No distress  HEENT: moist and clear sclera and conjunctivae, NGT with tube feeds  CHEST:  Full & symmetric excursion, no increased effort, breath sounds clear  HEART:  Regular rhythm, S1, S2  ABDOMEN:  Soft, non-tender, non-distended, normoactive bowel sounds, no masses,   EXTREMITIES:  no edema  SKIN:  No rash  NEURO:  Alert, oriented, no asterixis, no tremor, no encephalopathy      LABS:                        8.8    12.32 )-----------( 190      ( 2023 06:00 )             26.7     11-08    138  |  102  |  15  ----------------------------<  205<H>  3.7   |  34<H>  |  <0.20<L>    Ca    8.3<L>      2023 06:00  Phos  2.7     11-08  Mg     2.0     11-08        Urinalysis Basic - ( 2023 06:00 )    Color: x / Appearance: x / SG: x / pH: x  Gluc: 205 mg/dL / Ketone: x  / Bili: x / Urobili: x   Blood: x / Protein: x / Nitrite: x   Leuk Esterase: x / RBC: x / WBC x   Sq Epi: x / Non Sq Epi: x / Bacteria: x      amylase   lipase  RADIOLOGY & ADDITIONAL TESTS:   GI Consult    68F PMHx of COPD, HTN, HLD, DM, on xarelto for DVT history, hx of breast ca s/p left mastectomy, on chemo tx (last tx was 1 week prior to presentation) presenting with shortness of breath since the night before. She is an active smoker. Last cigarette was day before presentation. Denied any fevers or chills. Patient needed BIPAP support in the ED and ICU consulted.  GI consulted for anemia. Patient on Xarelto at home, Hb noted to be low (7.1 g/dL),  s/p 1 unit of PRBC on  with improvement to 8.8 g/dL. She denied hematochezia and hematemesis, no N/V, no abdominal pain  Her last colonoscopy was reportedly 1 year ago (Dr. Luís Pedraza @[facility]). Report not available but per son at bedside this was reportedly unremarkable. Unknown if she ever underwent EGD.      MEDICATIONS  (STANDING):  aspirin enteric coated 81 milliGRAM(s) Oral daily  atorvastatin 80 milliGRAM(s) Oral at bedtime  buDESOnide    Inhalation Suspension 0.5 milliGRAM(s) Inhalation every 12 hours  buPROPion . 100 milliGRAM(s) Oral three times a day  chlorhexidine 2% Cloths 1 Application(s) Topical <User Schedule>  dexMEDEtomidine Infusion 0.2 MICROgram(s)/kG/Hr (1.95 mL/Hr) IV Continuous <Continuous>  dextrose 5%. 1000 milliLiter(s) (100 mL/Hr) IV Continuous <Continuous>  dextrose 5%. 1000 milliLiter(s) (50 mL/Hr) IV Continuous <Continuous>  dextrose 50% Injectable 25 Gram(s) IV Push once  dextrose 50% Injectable 12.5 Gram(s) IV Push once  dextrose 50% Injectable 25 Gram(s) IV Push once  doxazosin 2 milliGRAM(s) Oral at bedtime  enoxaparin Injectable 40 milliGRAM(s) SubCutaneous every 12 hours  insulin lispro (ADMELOG) corrective regimen sliding scale   SubCutaneous every 6 hours  letrozole 2.5 milliGRAM(s) Oral daily  levalbuterol Inhalation 0.63 milliGRAM(s) Inhalation every 6 hours  methylPREDNISolone sodium succinate Injectable 40 milliGRAM(s) IV Push every 8 hours  midodrine 10 milliGRAM(s) Oral every 8 hours  nicotine - 21 mG/24Hr(s) Patch 1 Patch Transdermal daily  pantoprazole   Suspension 40 milliGRAM(s) Enteral Tube daily  polyethylene glycol 3350 17 Gram(s) Oral daily  senna 2 Tablet(s) Oral at bedtime    MEDICATIONS  (PRN):  acetaminophen   Oral Liquid .. 650 milliGRAM(s) Oral every 6 hours PRN Mild Pain (1 - 3)  ALPRAZolam 0.5 milliGRAM(s) Oral every 8 hours PRN anxiety  bisacodyl Suppository 10 milliGRAM(s) Rectal daily PRN Constipation  ondansetron Injectable 4 milliGRAM(s) IV Push every 6 hours PRN Nausea and/or Vomiting  sodium chloride 0.9% lock flush 10 milliLiter(s) IV Push every 1 hour PRN Pre/post blood products, medications, blood draw, and to maintain line patency      Allergies    penicillin (Hives)    Intolerances        PAST MEDICAL & SURGICAL HISTORY:  COPD, severe      CAD (coronary artery disease)      Breast cancer      No significant past surgical history        REVIEW OF SYSTEMS- SEE HPI       PHYSICAL EXAM:   Vital Signs:  Vital Signs Last 24 Hrs  T(C): 36.7 (2023 12:00), Max: 37.2 (2023 18:00)  T(F): 98.1 (2023 12:00), Max: 99 (2023 18:00)  HR: 80 (2023 12:00) (78 - 124)  BP: 96/48 (2023 12:00) (96/48 - 157/71)  BP(mean): 63 (2023 12:00) (50 - 126)  RR: 15 (2023 12:00) (12 - 32)  SpO2: 100% (2023 12:00) (98% - 100%)    Parameters below as of 2023 08:34  Patient On (Oxygen Delivery Method): nasal cannula  O2 Flow (L/min): 4    Daily     Daily Weight in k.3 (2023 05:00)I&O's Summary    2023 07:01  -  2023 07:00  --------------------------------------------------------  IN: 2234.6 mL / OUT: 2600 mL / NET: -365.4 mL    2023 07:01  -  2023 12:23  --------------------------------------------------------  IN: 339.5 mL / OUT: 500 mL / NET: -160.5 mL        GENERAL: No acute distress  HEENT: moist and clear sclera and conjunctivae, NGT with tube feeds  CHEST:  clear, decreased BS  HEART:  Regular rhythm  ABDOMEN:  Soft, non-tender, non-distended, normoactive bowel sounds, no mass palpable,   EXTREMITIES:  no edema  SKIN:  No rash  NEURO:  Alert, oriented, no asterixis, no tremor, no encephalopathy      LABS:                        8.8    12.32 )-----------( 190      ( 2023 06:00 )             26.7     Hemoglobin: 8.8 g/dL (11.08.23 @ 06:00)   Hemoglobin: 7.1 g/dL (11.07.23 @ 09:00)   Hemoglobin: 7.1 g/dL (11.07.23 @ 05:00)   Hemoglobin: 8.6 g/dL (11.06.23 @ 05:15)   Hemoglobin: 7.6 g/dL (11.05.23 @ 06:30)   Hemoglobin: 9.2 g/dL (11.04.23 @ 06:30)   Hemoglobin: 9.3 g/dL (11.03.23 @ 05:15)   Hemoglobin: 9.9 g/dL (11.02.23 @ 06:00)   Hemoglobin: 9.8 g/dL (11.01.23 @ 05:00)   Hemoglobin: 10.9 g/dL (10.31.23 @ 05:30)   Hemoglobin: 10.3 g/dL (10.30.23 @ 05:30)   Hemoglobin: 10.0 g/dL (10.29.23 @ 05:20)   Hemoglobin: 10.2 g/dL (10.28.23 @ 05:00)   Hemoglobin: 12.5 g/dL (10.27.23 @ 10:15)           138  |  102  |  15  ----------------------------<  205<H>  3.7   |  34<H>  |  <0.20<L>    Ca    8.3<L>      2023 06:00  Phos  2.7       Mg     2.0             Urinalysis Basic - ( 2023 06:00 )    Color: x / Appearance: x / SG: x / pH: x  Gluc: 205 mg/dL / Ketone: x  / Bili: x / Urobili: x   Blood: x / Protein: x / Nitrite: x   Leuk Esterase: x / RBC: x / WBC x   Sq Epi: x / Non Sq Epi: x / Bacteria: x        ACC: [id number] EXAM:  CT ANGIO CHEST PULM ART [facility]   ORDERED BY: JUAN VELAZCO     PROCEDURE DATE:  10/27/2023          INTERPRETATION:  .  Patient: REHAN IGLESIAS  : 1955  MRN: [id number]  ACC: [id number]    INDICATION, CLINICAL INFORMATION: sob x 1 day, history of breast cancer   on chemotherapy, COPD  TECHNIQUE: CT pulmonary angiogram of the chest . Uneventful   administration of 70 cc Omnipaque 350. Coronal, sagittal and 3-D/MIP   images were reconstructed/reviewed.  COMPARISON: No prior chest CT.    FINDINGS:    PULMONARY VESSELS: No pulmonary embolus. Main pulmonary artery normal in   diameter.    HEART AND VASCULATURE: Heart size is normal. No pericardial effusion. No   aortic aneurysm or dissection. Aortic and coronary atherosclerosis.    LUNGS, AIRWAYS, PLEURA: Patent central airways. Severe emphysema. Clear   lungs. No pleural effusions or pneumothorax.    MEDIASTINUM: No mass or lymphadenopathy.    UPPER ABDOMEN: Within normal limits.    BONES AND SOFT TISSUES: No aggressive osseous lesion. Left mastectomy.    LOWER NECK: Within normal limits.    IMPRESSION:    No pulmonary embolus.    Emphysema. Recommend annual lung cancer screening with low-dose chest CT   in 12 months if the patient meets the criteria.    --- End of Report ---            WILLIAM CORTEZ MD; Attending Radiologist  This document has been electronically signed. Oct 27 2023 12:13PM

## 2023-11-08 NOTE — CONSULT NOTE ADULT - PROBLEM SELECTOR RECOMMENDATION 9
Anemia possible related to ?chemotherapy   Monitor HH  Transfuse if HH is less than 7  Monitor to ss of active bleed  Monitor HH   Keep T/S active   Continue PPI Anemia possible related to ?chemotherapy   Monitor HH  Transfuse if HH is less than 7  Monitor to ss of active bleed  Monitor HH   Keep T/S active   Continue PPI  Possible EGD on 11/9 *Friday pending respiratory status (tolerating off bipap during the day 11/ 8) Anemia possible related to ?chemotherapy   Monitor HH  Transfuse if HH is less than 7  Monitor to ss of active bleed  Monitor HH   Keep T/S active   Continue PPI  Possible EGD on 11/9 (Friday) pending respiratory status (First time tolerating off Bipap during the day 11/8) Anemia possible related to ?chemotherapy, chronic illness  Monitor Hb, transfuse if Hb is less than 7 g/dL  Monitor for ss of active bleed  Monitor HH   Keep T/S active   Continue PPI  Possible EGD on 11/9 (Friday) pending respiratory status (first time tolerating off Bipap during the day 11/8)

## 2023-11-08 NOTE — PROGRESS NOTE ADULT - ASSESSMENT
68 year old female  PMHx of COPD, CAD, HLD, DM2, on xarelto for DVT history, hx of breast ca s/p left mastectomy, on chemotx presented with shortness of breath. Admitted to ICU for respiratory failure due to COPD exacerbation in the setting of Entero/rhinovirus with COPD, severe Exacerbation    Problem List  1. Acute respiratory failure due to acute COPD Exacerbation in setting of ERV requiring intubation 10/31/23 Extubated 11/2/2023, Re intubated 11/3/23  2. Active nicotine use - 1 pk/day  3. uses home o2 PRN  4. CAD   5. HTN  6. DM type 2  7, h/o Breast cancer  8. H/o DVT on xaralto -     Plan  NIV prn/ NC   Titrate FiO2 to maintain SPo2 > 92%  Encourage coughing and deep breathing  Emotional support for anxiety, remains on precedex for anxiety, hope to titrate down with addition of Wellbutrin   Restart Wellbutrin for anxiety ( home med)   Continue xanax TID prn  Failed bedside swallow yesterday, speech following, continue tube feeds   Anemia noted, occult blood POSITIVE  GI consulted  s/p 1 unit PRBC overnight, hgb 8.8 today  CBC @ noon  currently off antibiotics, wbc improving, follow fever curve  Continue IV steroids, Pulmicort and xopenex nebs  Continue cardura for urinary retention voiding well   Cont. Aspirin and statin   Hold antihypertensives for now  Cont. AC with lovenox ws on xarelto at home, switch to oral a/c once respiratory status improves  Nicotine patch.

## 2023-11-08 NOTE — CHART NOTE - NSCHARTNOTEFT_GEN_A_CORE
Nutrition Follow Up Note  Hospital Course (Per Electronic Medical Record):   Source: Medical Record [X] Patient [X] Family [X] Nursing Staff [X]     Diet: Vital 1.5 @ 55ml/hr x 13 hrs via NGT     Patient continues on EN feeds x 13 hrs due to BIPAP , received 1 unit PRBC's , (+) occult stool noted , Current EN feeds are providing 1072kcals, 48gms protein , suggest increase rate to 65ml/hr x 13 hrs with  200ml free water q 6 hrs which will  provide 1267kcals, 57gms protein [id number] with additional 800ml free water , Patient noted to have failed swallow eval as per SLP recommends continue EN feeds , Weight appears stable , Labs reviewed , s/o 1 unit PRBC's insulin regimen noted , pmh  of DM .     Current Weight: (11/8) 102/46.3kg                          (11/7) 102.7/46.6kg                          (11/6) 104.4/47.4kg     Pertinent Medications: MEDICATIONS  (STANDING):  aspirin enteric coated 81 milliGRAM(s) Oral daily  atorvastatin 80 milliGRAM(s) Oral at bedtime  buDESOnide    Inhalation Suspension 0.5 milliGRAM(s) Inhalation every 12 hours  buPROPion . 100 milliGRAM(s) Oral three times a day  chlorhexidine 2% Cloths 1 Application(s) Topical <User Schedule>  dexMEDEtomidine Infusion 0.2 MICROgram(s)/kG/Hr (1.95 mL/Hr) IV Continuous <Continuous>  dextrose 5%. 1000 milliLiter(s) (100 mL/Hr) IV Continuous <Continuous>  dextrose 5%. 1000 milliLiter(s) (50 mL/Hr) IV Continuous <Continuous>  dextrose 50% Injectable 25 Gram(s) IV Push once  dextrose 50% Injectable 12.5 Gram(s) IV Push once  dextrose 50% Injectable 25 Gram(s) IV Push once  doxazosin 2 milliGRAM(s) Oral at bedtime  enoxaparin Injectable 40 milliGRAM(s) SubCutaneous every 12 hours  insulin lispro (ADMELOG) corrective regimen sliding scale   SubCutaneous every 6 hours  letrozole 2.5 milliGRAM(s) Oral daily  levalbuterol Inhalation 0.63 milliGRAM(s) Inhalation every 6 hours  methylPREDNISolone sodium succinate Injectable 40 milliGRAM(s) IV Push every 8 hours  midodrine 10 milliGRAM(s) Oral every 8 hours  nicotine - 21 mG/24Hr(s) Patch 1 Patch Transdermal daily  pantoprazole   Suspension 40 milliGRAM(s) Enteral Tube daily  polyethylene glycol 3350 17 Gram(s) Oral daily  senna 2 Tablet(s) Oral at bedtime    MEDICATIONS  (PRN):  acetaminophen   Oral Liquid .. 650 milliGRAM(s) Oral every 6 hours PRN Mild Pain (1 - 3)  ALPRAZolam 0.5 milliGRAM(s) Oral every 8 hours PRN anxiety  bisacodyl Suppository 10 milliGRAM(s) Rectal daily PRN Constipation  ondansetron Injectable 4 milliGRAM(s) IV Push every 6 hours PRN Nausea and/or Vomiting  sodium chloride 0.9% lock flush 10 milliLiter(s) IV Push every 1 hour PRN Pre/post blood products, medications, blood draw, and to maintain line patency      Pertinent Labs:  11-08 Na138 mmol/L Glu 205 mg/dL<H> K+ 3.7 mmol/L Cr  <0.20 mg/dL<L> BUN 15 mg/dL 11-08 Phos 2.7 mg/dL 11-04 Alb 2.3 g/dL<L>Hgb 8.8g/dl<L> Hct 26.7% <L>   POCT 202,190,186,263        Skin: Intact    Edema: (+2) (R) arm     Last BM: (11/6)     Estimated Needs:   [X] No Change since Previous Assessment      Previous Nutrition Diagnosis: Severe Protein Calorie Malnutrition    Nutrition Diagnosis is [X] Ongoing         New Nutrition Diagnosis: [X] Not Applicable      Interventions:   1. Recommend increase rate to 65ml/hr x 13 hrs with 200ml free water q 6 hrs       Monitoring & Evaluation: will monitor:  [X] Weights   [X] Follow Up (Per Protocol)  [X] Tolerance to Diet Prescription       RD to follow as per Nutrition protocol  Maude Maldonado RDN

## 2023-11-08 NOTE — PROGRESS NOTE ADULT - ASSESSMENT
69 y/o F pmhx of COPD, HLD, HTN, DM2, DVT on xarelto, breast CA s/p L mastectomy who was presented to  ED w/ complaints of acute SOB. Admitted w/ hypoxic/hypercapnic respiratory failure secondary to COPD exacerbation in setting of + entero/rhinovirus. Intubated for respiratory failure, extubated and subsequently reintubated. Extubated 11/5 with intermittent NIPPV use.   Assessment:  1. Acute Hypoxic / Hypercapnic Respiratory Failure  2. AECOPD  3. EnteroRhinovirus  4. Anxiety    Plan:  NEURO:   -Remains on Precedex Infusion for anxiolysis. Goal RASS 0. Unable to wean off despite addition of Wellbutrin; will attempt to wean off as able by AM.   -Wellbutrin TID. Xanax PRN.   -Serial neurologic assessments.     CV:   -Maintain goal MAP >65. Hemodynamics stable off IV vasopressor therapy. Low threshold to reinstitute to maintain goal MAP. Midodrine 10mg q8h to facilitate staying off IV vasopressors.  -Keep K~4 and Mg>2 for optimal arrhythmia suppression.  -ASA/Lipitor QD.   -TTE with LVEF 50-55%.     PULM:   -Satting well on NC, will utilize supplemental O2 PRN to maintain goal SpO2 >88%.    -Incentive spirometry, Chest PT/Pulmonary toilet, HOB >30'. Xopenex q6h, Pulmicort q12h. Solu-medrol 40mg q8h.   -NIPPV PRN and QHS.    GI:   -TF via OGT.   -Protonix QD.    RENAL:   -Renal function currently WNL.  -trend lytes/Scr daily with BMP  -I's and O's, goal UOP 0.5 cc/kg/hr  -renal dose meds and avoid nephrotoxins     ENDO:   -Aggressive glycemic control to limit FS glucose to <180mg/dl. ISS.   -Keep Euthyroid.     ID:   -Afebrile with mild leukocytosis. BloodCx likely contaminant. ID following, recs appreciated. Monitor off abx.     HEME:   -Pharmacologic DVT Prophylaxis in addition to SCD's with Lovenox.     GOC: Full Code.

## 2023-11-08 NOTE — PROGRESS NOTE ADULT - SUBJECTIVE AND OBJECTIVE BOX
CC: f/u for + blood cx  DOS - 11/08/23  Patient reports nothing     REVIEW OF SYSTEMS:  other review of systems negative - limited     Antimicrobials Day #  :    Other Medications Reviewed    T(F): 98 (11-08-23 @ 08:00), Max: 99 (11-07-23 @ 18:00)  HR: 97 (11-08-23 @ 10:00)  BP: 147/65 (11-08-23 @ 10:00)  RR: 16 (11-08-23 @ 10:00)  SpO2: 100% (11-08-23 @ 10:00)  Wt(kg): --    PHYSICAL EXAM:  General: alert, no acute distress  Eyes:  anicteric, no conjunctival injection  Oropharynx: NGT + 	  Neck: supple  Lungs: clear to auscultation  Heart: regular rate and rhythm; no murmurs  Abdomen: soft, nondistended, nontender, without mass or organomegaly  Skin: no lesions  Extremities: no edema  Neurologic: alert, moves all extremities    LAB RESULTS:                        8.8    12.32 )-----------( 190      ( 08 Nov 2023 06:00 )             26.7     11-08    138  |  102  |  15  ----------------------------<  205<H>  3.7   |  34<H>  |  <0.20<L>    Ca    8.3<L>      08 Nov 2023 06:00  Phos  2.7     11-08  Mg     2.0     11-08        Urinalysis Basic - ( 08 Nov 2023 06:00 )    Color: x / Appearance: x / SG: x / pH: x  Gluc: 205 mg/dL / Ketone: x  / Bili: x / Urobili: x   Blood: x / Protein: x / Nitrite: x   Leuk Esterase: x / RBC: x / WBC x   Sq Epi: x / Non Sq Epi: x / Bacteria: x      MICROBIOLOGY:  RECENT CULTURES:  11-04 @ 19:00 ET Tube ET Tube     Normal Respiratory Daria present    Few polymorphonuclear leukocytes per low power field  No Squamous epithelial cells per low power field  No organisms seen per oil power field    11-04 @ 12:20 .Blood Blood     No growth at 72 Hours      11-04 @ 12:10 .Blood Blood Blood Culture PCR    Growth in aerobic bottle: Staphylococcus epidermidis  Coagulase Negative Staphylococci isolated from a single blood culture set  may represent contamination.  Contact the Microbiology Department at [phone] if susceptibility  testing is clinically indicated.  Direct identification is available within approximately 3-5  hours either by Blood Panel Multiplexed PCR or Direct  MALDI-TOF. Details: [email or web address]    Growth in aerobic bottle: Gram Positive Cocci in Clusters      RADIOLOGY REVIEWED:

## 2023-11-09 NOTE — PHYSICAL THERAPY INITIAL EVALUATION ADULT - NSPTDISCHREC_GEN_A_CORE
DATE OF SERVICE:  06/06/2017 



ATTENDING NOTE: 



The patient is seen and examined by me on 6/06/2017.  I reviewed the 

note of my nurse practitioner, Ms. Vance. Discussed additional 

findings below. Patient admitted with COPD exacerbation and acute 

non-Q-wave MI. Breathing is better. Still visibly short-winded.  

Patient has been up to the bathroom.  Anxiety somewhat better.  



On exam, lungs decreased breath sounds, prolonged expiration, pulse ox 

97% on 3 L.  

CARDIOVASCULAR: First and second sounds normal. 



ASSESSMENT: 

1. Acute severe chronic obstructive pulmonary disease exacerbation, 

improving.  

2. Acute non-ST elevation myocardial infarction. 



PLAN: Patient encouraged to be out bed.  Will see how she does.  Care 

was discussed with the patient.  Will follow. Acute Inpatient Rehab

## 2023-11-09 NOTE — PHYSICAL THERAPY INITIAL EVALUATION ADULT - ADDITIONAL COMMENTS
pt lives with daughter in private home. 5-6 steps to enter. pt lives on first floor. pt was independent with amb without device and independent with ADLS

## 2023-11-09 NOTE — PROGRESS NOTE ADULT - SUBJECTIVE AND OBJECTIVE BOX
Patient is a 68y old  Female who presents with a chief complaint of COPD exacerbation (08 Nov 2023 10:46)    BRIEF HOSPITAL COURSE:   69 y/o F pmhx of COPD, HLD, HTN, DM2, DVT on xarelto, breast CA s/p L mastectomy who was presented to  ED w/ complaints of acute SOB. Admitted w/ hypoxic/hypercapnic respiratory failure secondary to COPD exacerbation in setting of + entero/rhinovirus. Intubated for respiratory failure, extubated and subsequently reintubated. Extubated 11/5 with intermittent NIPPV use.     Events last 24 hours:   - precedex weaned off  - serial H/H stable   - BP stable today   - BP with melena earlier today  Ngtube clamped  pt for EGD in am. \  pt for planned AVAPS tonight,       PAST MEDICAL & SURGICAL HISTORY:  COPD, severe  CAD (coronary artery disease)  Breast cancer  No significant past surgical history    Review of Systems:  CONSTITUTIONAL: No fever, chills, or fatigue  EYES: No eye pain, visual disturbances, or discharge  ENMT:  No difficulty hearing, tinnitus, vertigo; No sinus or throat pain  NECK: No pain or stiffness  RESPIRATORY: No cough, wheezing, chills or hemoptysis; No shortness of breath  CARDIOVASCULAR: No chest pain, palpitations, dizziness, or leg swelling  GASTROINTESTINAL: No abdominal or epigastric pain. No nausea, vomiting, or hematemesis; No diarrhea or constipation. No melena or hematochezia.  GENITOURINARY: No dysuria, frequency, hematuria, or incontinence  NEUROLOGICAL: No headaches, memory loss, loss of strength, numbness, or tremors  SKIN: No itching, burning, rashes, or lesions   MUSCULOSKELETAL: No joint pain or swelling; No muscle, back, or extremity pain  PSYCHIATRIC: No depression, anxiety, mood swings, or difficulty sleeping             acetaminophen   Oral Liquid .. 650 milliGRAM(s) Oral every 6 hours PRN  ALPRAZolam 0.5 milliGRAM(s) Oral every 8 hours PRN  aspirin enteric coated 81 milliGRAM(s) Oral daily  atorvastatin 80 milliGRAM(s) Oral at bedtime  bisacodyl Suppository 10 milliGRAM(s) Rectal daily PRN  buDESOnide    Inhalation Suspension 0.5 milliGRAM(s) Inhalation every 12 hours  buPROPion . 100 milliGRAM(s) Oral three times a day  chlorhexidine 2% Cloths 1 Application(s) Topical <User Schedule>  dexMEDEtomidine Infusion 0.2 MICROgram(s)/kG/Hr IV Continuous <Continuous>  dextrose 5%. 1000 milliLiter(s) IV Continuous <Continuous>  dextrose 5%. 1000 milliLiter(s) IV Continuous <Continuous>  dextrose 50% Injectable 25 Gram(s) IV Push once  dextrose 50% Injectable 25 Gram(s) IV Push once  dextrose 50% Injectable 12.5 Gram(s) IV Push once  doxazosin 2 milliGRAM(s) Oral at bedtime  enoxaparin Injectable 40 milliGRAM(s) SubCutaneous every 12 hours  insulin lispro (ADMELOG) corrective regimen sliding scale   SubCutaneous four times a day before meals  letrozole 2.5 milliGRAM(s) Oral daily  levalbuterol Inhalation 0.63 milliGRAM(s) Inhalation every 6 hours  methylPREDNISolone sodium succinate Injectable 40 milliGRAM(s) IV Push every 8 hours  midodrine 10 milliGRAM(s) Oral every 8 hours  nicotine - 21 mG/24Hr(s) Patch 1 Patch Transdermal daily  ondansetron Injectable 4 milliGRAM(s) IV Push every 6 hours PRN  pantoprazole   Suspension 40 milliGRAM(s) Enteral Tube daily  polyethylene glycol 3350 17 Gram(s) Oral daily  senna 2 Tablet(s) Oral at bedtime  sodium chloride 0.9% lock flush 10 milliLiter(s) IV Push every 1 hour PRN                            8.4    13.08 )-----------( 201      ( 09 Nov 2023 05:00 )             26.4       Hemoglobin: 8.4 g/dL (11-09 @ 05:00)  Hemoglobin: 8.8 g/dL (11-08 @ 06:00)  Hemoglobin: 7.1 g/dL (11-07 @ 09:00)  Hemoglobin: 7.1 g/dL (11-07 @ 05:00)  Hemoglobin: 8.6 g/dL (11-06 @ 05:15)      11-09    141  |  105  |  15  ----------------------------<  198<H>  3.9   |  35<H>  |  0.21<L>    Ca    8.2<L>      09 Nov 2023 05:00  Phos  2.4     11-09  Mg     2.2     11-09      Creatinine Trend: 0.21<--, <0.20<--, <0.20<--, 0.28<--, 0.23<--, 0.27<--    COAGS:           T(C): 37.2 (11-09-23 @ 19:00), Max: 37.2 (11-09-23 @ 19:00)  HR: 107 (11-09-23 @ 19:00) (73 - 127)  BP: 139/69 (11-09-23 @ 19:00) (114/49 - 156/68)  RR: 20 (11-09-23 @ 19:00) (10 - 29)  SpO2: 100% (11-09-23 @ 19:00) (96% - 100%)  Wt(kg): --    I&O's Summary    08 Nov 2023 07:01  -  09 Nov 2023 07:00  --------------------------------------------------------  IN: 894.6 mL / OUT: 1500 mL / NET: -605.4 mL    09 Nov 2023 07:01  -  09 Nov 2023 21:32  --------------------------------------------------------  IN: 562.5 mL / OUT: 1200 mL / NET: -637.5 mL         CULTURES:  Culture Results:   Normal Respiratory Daria present (11-04-23 @ 19:00)  Culture Results:   No growth at 72 Hours (11-04-23 @ 12:20)  Culture Results:   Growth in aerobic bottle: Staphylococcus epidermidis  Coagulase Negative Staphylococci isolated from a single blood culture set  may represent contamination.  Contact the Microbiology Department at [phone] if susceptibility  testing is clinically indicated.  Direct identification is available within approximately 3-5  hours either by Blood Panel Multiplexed PCR or Direct  MALDI-TOF. Details: [email or web address] (11-04-23 @ 12:10)    Physical Examination:  General: Chronically ill-appearing adult female.   HEENT: PERRL.  NECK: Supple.   PULM: Clear to auscultation bilaterally.  CVS: s1/s2.  ABD: Soft, nondistended, nontender, normoactive bowel sounds.  EXT: No edema, nontender.  SKIN: Warm.    RADIOLOGY:   < from: Xray Chest 1 View- PORTABLE-Routine (Xray Chest 1 View- PORTABLE-Routine in AM.) (11.06.23 @ 09:47) >  ACC: [id number] EXAM:  XR CHEST PORTABLE ROUTINE 1V   ORDERED BY: RANDALL MONTEZURE DATE:  11/06/2023    IMPRESSION:  Hyperinflated lungs. No focal lung consolidation.  Continued right costophrenic angle blunting, possibly pleural thickening   or trace right pleural effusion.

## 2023-11-09 NOTE — PROGRESS NOTE ADULT - SUBJECTIVE AND OBJECTIVE BOX
INTERVAL HPI/OVERNIGHT EVENTS:  Patient seen examined at bed side, Denies abdominal pain, nausea, vomiting. She had dark bowel movement this morning. Denies chest pain or SOB.     MEDICATIONS  (STANDING):  aspirin enteric coated 81 milliGRAM(s) Oral daily  atorvastatin 80 milliGRAM(s) Oral at bedtime  buDESOnide    Inhalation Suspension 0.5 milliGRAM(s) Inhalation every 12 hours  buPROPion . 100 milliGRAM(s) Oral three times a day  chlorhexidine 2% Cloths 1 Application(s) Topical <User Schedule>  dexMEDEtomidine Infusion 0.2 MICROgram(s)/kG/Hr (1.95 mL/Hr) IV Continuous <Continuous>  dextrose 5%. 1000 milliLiter(s) (100 mL/Hr) IV Continuous <Continuous>  dextrose 5%. 1000 milliLiter(s) (50 mL/Hr) IV Continuous <Continuous>  dextrose 50% Injectable 25 Gram(s) IV Push once  dextrose 50% Injectable 25 Gram(s) IV Push once  dextrose 50% Injectable 12.5 Gram(s) IV Push once  doxazosin 2 milliGRAM(s) Oral at bedtime  enoxaparin Injectable 40 milliGRAM(s) SubCutaneous every 12 hours  insulin lispro (ADMELOG) corrective regimen sliding scale   SubCutaneous every 6 hours  letrozole 2.5 milliGRAM(s) Oral daily  levalbuterol Inhalation 0.63 milliGRAM(s) Inhalation every 6 hours  methylPREDNISolone sodium succinate Injectable 40 milliGRAM(s) IV Push every 8 hours  midodrine 10 milliGRAM(s) Oral every 8 hours  nicotine - 21 mG/24Hr(s) Patch 1 Patch Transdermal daily  pantoprazole   Suspension 40 milliGRAM(s) Enteral Tube daily  polyethylene glycol 3350 17 Gram(s) Oral daily  senna 2 Tablet(s) Oral at bedtime    MEDICATIONS  (PRN):  acetaminophen   Oral Liquid .. 650 milliGRAM(s) Oral every 6 hours PRN Mild Pain (1 - 3)  ALPRAZolam 0.5 milliGRAM(s) Oral every 8 hours PRN anxiety  bisacodyl Suppository 10 milliGRAM(s) Rectal daily PRN Constipation  ondansetron Injectable 4 milliGRAM(s) IV Push every 6 hours PRN Nausea and/or Vomiting  sodium chloride 0.9% lock flush 10 milliLiter(s) IV Push every 1 hour PRN Pre/post blood products, medications, blood draw, and to maintain line patency      Allergies    penicillin (Hives)    Intolerances        PAST MEDICAL & SURGICAL HISTORY:  COPD, severe      CAD (coronary artery disease)      Breast cancer      No significant past surgical history      PHYSICAL EXAM:   Vital Signs:  Vital Signs Last 24 Hrs  T(C): 36.9 (2023 15:00), Max: 37.1 (2023 07:00)  T(F): 98.4 (2023 15:00), Max: 98.7 (2023 07:00)  HR: 127 (2023 15:00) (73 - 127)  BP: 141/67 (2023 14:00) (114/49 - 151/70)  BP(mean): 88 (2023 14:00) (67 - 97)  RR: 16 (2023 15:00) (10 - 29)  SpO2: 99% (2023 15:00) (96% - 100%)    Parameters below as of 2023 09:13  Patient On (Oxygen Delivery Method): nasal cannula      Daily     Daily Weight in k.9 (2023 05:00)I&O's Summary    2023 07:01  -  2023 07:00  --------------------------------------------------------  IN: 894.6 mL / OUT: 1500 mL / NET: -605.4 mL    2023 07:01  -  2023 15:17  --------------------------------------------------------  IN: 484.2 mL / OUT: 500 mL / NET: -15.8 mL        GENERAL:  Appears stated age,  HEENT:  NC/AT,  conjunctivae clear and pink  CHEST:  Full & symmetric excursion  HEART:  Regular rhythm, S1, S2  ABDOMEN:  Soft, non-tender, non-distended, normoactive bowel sounds ,NGT feed   EXTEREMITIES:  no cyanosis, clubbing or edema  SKIN:  No rash/warm/dry  NEURO:  Alert, oriented    LABS:                        8.4    13.08 )-----------( 201      ( 2023 05:00 )             26.4     11    141  |  105  |  15  ----------------------------<  198<H>  3.9   |  35<H>  |  0.21<L>    Ca    8.2<L>      2023 05:00  Phos  2.4       Mg     2.2             Urinalysis Basic - ( 2023 05:00 )    Color: x / Appearance: x / SG: x / pH: x  Gluc: 198 mg/dL / Ketone: x  / Bili: x / Urobili: x   Blood: x / Protein: x / Nitrite: x   Leuk Esterase: x / RBC: x / WBC x   Sq Epi: x / Non Sq Epi: x / Bacteria: x      amylase   lipase  RADIOLOGY & ADDITIONAL TESTS:   Patient seen examined at bedside. She denies abdominal pain, nausea, vomiting. She had dark bowel movement this morning. Denies chest pain or SOB.     MEDICATIONS  (STANDING):  aspirin enteric coated 81 milliGRAM(s) Oral daily  atorvastatin 80 milliGRAM(s) Oral at bedtime  buDESOnide    Inhalation Suspension 0.5 milliGRAM(s) Inhalation every 12 hours  buPROPion . 100 milliGRAM(s) Oral three times a day  chlorhexidine 2% Cloths 1 Application(s) Topical <User Schedule>  dexMEDEtomidine Infusion 0.2 MICROgram(s)/kG/Hr (1.95 mL/Hr) IV Continuous <Continuous>  dextrose 5%. 1000 milliLiter(s) (100 mL/Hr) IV Continuous <Continuous>  dextrose 5%. 1000 milliLiter(s) (50 mL/Hr) IV Continuous <Continuous>  dextrose 50% Injectable 25 Gram(s) IV Push once  dextrose 50% Injectable 25 Gram(s) IV Push once  dextrose 50% Injectable 12.5 Gram(s) IV Push once  doxazosin 2 milliGRAM(s) Oral at bedtime  enoxaparin Injectable 40 milliGRAM(s) SubCutaneous every 12 hours  insulin lispro (ADMELOG) corrective regimen sliding scale   SubCutaneous every 6 hours  letrozole 2.5 milliGRAM(s) Oral daily  levalbuterol Inhalation 0.63 milliGRAM(s) Inhalation every 6 hours  methylPREDNISolone sodium succinate Injectable 40 milliGRAM(s) IV Push every 8 hours  midodrine 10 milliGRAM(s) Oral every 8 hours  nicotine - 21 mG/24Hr(s) Patch 1 Patch Transdermal daily  pantoprazole   Suspension 40 milliGRAM(s) Enteral Tube daily  polyethylene glycol 3350 17 Gram(s) Oral daily  senna 2 Tablet(s) Oral at bedtime    MEDICATIONS  (PRN):  acetaminophen   Oral Liquid .. 650 milliGRAM(s) Oral every 6 hours PRN Mild Pain (1 - 3)  ALPRAZolam 0.5 milliGRAM(s) Oral every 8 hours PRN anxiety  bisacodyl Suppository 10 milliGRAM(s) Rectal daily PRN Constipation  ondansetron Injectable 4 milliGRAM(s) IV Push every 6 hours PRN Nausea and/or Vomiting  sodium chloride 0.9% lock flush 10 milliLiter(s) IV Push every 1 hour PRN Pre/post blood products, medications, blood draw, and to maintain line patency      Allergies    penicillin (Hives)    Intolerances            PHYSICAL EXAM:   Vital Signs:  Vital Signs Last 24 Hrs  T(C): 36.9 (2023 15:00), Max: 37.1 (2023 07:00)  T(F): 98.4 (2023 15:00), Max: 98.7 (2023 07:00)  HR: 127 (2023 15:00) (73 - 127)  BP: 141/67 (2023 14:00) (114/49 - 151/70)  BP(mean): 88 (2023 14:00) (67 - 97)  RR: 16 (2023 15:00) (10 - 29)  SpO2: 99% (2023 15:00) (96% - 100%)    Parameters below as of 2023 09:13  Patient On (Oxygen Delivery Method): nasal cannula      Daily     Daily Weight in k.9 (2023 05:00)I&O's Summary    2023 07:01  -  2023 07:00  --------------------------------------------------------  IN: 894.6 mL / OUT: 1500 mL / NET: -605.4 mL    2023 07:01  -  2023 15:17  --------------------------------------------------------  IN: 484.2 mL / OUT: 500 mL / NET: -15.8 mL        GENERAL:  Appears stated age, NAD  HEENT:  NC/AT,  conjunctivae clear and pink  CHEST:  Full & symmetric excursion  HEART:  S1, S2  ABDOMEN:  Soft, non-tender, non-distended, normoactive bowel sounds ,NGT feed   EXTREMITIES:  no cyanosis, clubbing or edema  SKIN:  No rash/warm/dry  NEURO:  Alert, oriented    LABS:                        8.4    13.08 )-----------( 201      ( 2023 05:00 )             26.4         141  |  105  |  15  ----------------------------<  198<H>  3.9   |  35<H>  |  0.21<L>    Ca    8.2<L>      2023 05:00  Phos  2.4       Mg     2.2

## 2023-11-09 NOTE — PROGRESS NOTE ADULT - ASSESSMENT
68 year old female  PMHx of COPD, CAD, HLD, DM2, on xarelto for DVT history, hx of breast ca s/p left mastectomy, on chemotx presented with shortness of breath. Admitted to ICU for respiratory failure due to COPD exacerbation in the setting of Entero/rhinovirus with COPD, severe Exacerbation    Problem List  1. Acute respiratory failure due to acute COPD Exacerbation in setting of ERV requiring intubation 10/31/23 Extubated 11/2/2023, Re intubated 11/3/23  2. Active nicotine use - 1 pk/day  3. uses home o2 PRN  4. CAD   5. HTN  6. DM type 2  7, h/o Breast cancer  8. H/o DVT on xaralto -     Plan  Doing well on NC  Try to use NIV nocturnally only   Titrate FiO2 to maintain SPo2 > 92%, on 4L now will titrate down   Encourage coughing and deep breathing  Encourage incentive spirometer  Emotional support for anxiety, remains on precedex for anxiety, hope to titrate down with addition of Wellbutrin   Continue xanax TID prn  Failed bedside swallow yesterday, speech following > re-eval today  continue tube feeds for now  occult blood POSITIVE,  GI plan for EGD tomorrow if resp status allows   trend CBC and transfuse for hgb less than 7  monitor stools   monitor off abx, ID following   Continue IV steroids, Pulmicort and xopenex nebs  Continue cardura for urinary retention voiding well   Cont. Aspirin and statin   Cont. AC with lovenox was on xarelto at home, switch to oral a/c once respiratory status improves  Nicotine patch  PT eval > OOB    patient seen and evaluated with Dr Santa who agrees with above stated plan

## 2023-11-09 NOTE — PROGRESS NOTE ADULT - ASSESSMENT
69 y/o F pmhx of COPD, HLD, HTN, DM2, DVT on xarelto, breast CA s/p L mastectomy who was presented to  ED w/ complaints of acute SOB. Admitted w/ hypoxic/hypercapnic respiratory failure secondary to COPD exacerbation in setting of + entero/rhinovirus. Intubated for respiratory failure, extubated and subsequently reintubated. Extubated 11/5 with intermittent NIPPV use.   Assessment:  1. Acute Hypoxic / Hypercapnic Respiratory Failure  2.  GI bleed   3. EnteroRhinovirus  4. Anxiety    Plan:  NEURO:   - weaned off precedex   -Wellbutrin TID. Xanax PRN.   -Serial neurologic assessments.     CV:   -Maintain goal MAP >65.    - monitor off pressor   - h/h stable with approp MAP.   - cont Midodrine 10mg q8h to facilitate staying off IV vasopressors.  -Keep K~4 and Mg>2 for optimal arrhythmia suppression.  -ASA/Lipitor QD.   -TTE with LVEF 50-55%.     PULM:   -Satting well on NC, will utilize supplemental O2 PRN to maintain goal SpO2 >88%.    -Incentive spirometry, Chest PT/Pulmonary toilet, HOB >30'. Xopenex q6h, Pulmicort q12h.   - steroid taper   -NIPPV PRN and QHS.    GI:   - oral intake   - npo for EGD in am post MN   -Protonix QD.    RENAL:   -Renal function currently WNL.  -trend lytes/Scr daily with BMP  -I's and O's, goal UOP 0.5 cc/kg/hr  -renal dose meds and avoid nephrotoxins     ENDO:   -Aggressive glycemic control to limit FS glucose to <180mg/dl. ISS.   -Keep Euthyroid.     ID:   -Afebrile with mild leukocytosis. BloodCx likely contaminant. ID following, recs appreciated. Monitor off abx.     HEME:   -Pharmacologic DVT Prophylaxis in addition to SCD's with Lovenox.     GOC: Full Code.        Patient requires continuous monitoring with bedside rhythm monitoring, pulse oximetry monitoring; and intermittent blood gas analysis.  Care plan discussed with ICU care team.  Patient remained critical and required more than usual post op care.   I have spent 35 minutes providing non-routine post op care with multiple re-evalutions throughout the evening.

## 2023-11-09 NOTE — PROGRESS NOTE ADULT - SUBJECTIVE AND OBJECTIVE BOX
CC: f/u for weakness and shortness of breath and a positive blood culture  Date of service 11/9/23  Patient reports: she is weak and debilitated, appears comfortable on nasal cannula    REVIEW OF SYSTEMS:  All other review of systems negative (Comprehensive ROS): weak and essentially bedridden    Antimicrobials Day #  :off    Other Medications Reviewed  MEDICATIONS  (STANDING):  aspirin enteric coated 81 milliGRAM(s) Oral daily  atorvastatin 80 milliGRAM(s) Oral at bedtime  buDESOnide    Inhalation Suspension 0.5 milliGRAM(s) Inhalation every 12 hours  buPROPion . 100 milliGRAM(s) Oral three times a day  chlorhexidine 2% Cloths 1 Application(s) Topical <User Schedule>  dexMEDEtomidine Infusion 0.2 MICROgram(s)/kG/Hr (1.95 mL/Hr) IV Continuous <Continuous>  dextrose 5%. 1000 milliLiter(s) (50 mL/Hr) IV Continuous <Continuous>  dextrose 5%. 1000 milliLiter(s) (100 mL/Hr) IV Continuous <Continuous>  dextrose 50% Injectable 25 Gram(s) IV Push once  dextrose 50% Injectable 12.5 Gram(s) IV Push once  dextrose 50% Injectable 25 Gram(s) IV Push once  doxazosin 2 milliGRAM(s) Oral at bedtime  enoxaparin Injectable 40 milliGRAM(s) SubCutaneous every 12 hours  insulin lispro (ADMELOG) corrective regimen sliding scale   SubCutaneous every 6 hours  letrozole 2.5 milliGRAM(s) Oral daily  levalbuterol Inhalation 0.63 milliGRAM(s) Inhalation every 6 hours  methylPREDNISolone sodium succinate Injectable 40 milliGRAM(s) IV Push every 8 hours  midodrine 10 milliGRAM(s) Oral every 8 hours  nicotine - 21 mG/24Hr(s) Patch 1 Patch Transdermal daily  pantoprazole   Suspension 40 milliGRAM(s) Enteral Tube daily  polyethylene glycol 3350 17 Gram(s) Oral daily  senna 2 Tablet(s) Oral at bedtime    T(F): 98.5 (11-09-23 @ 11:00), Max: 98.7 (11-09-23 @ 07:00)  HR: 108 (11-09-23 @ 12:00)  BP: 137/65 (11-09-23 @ 12:00)  RR: 21 (11-09-23 @ 12:00)  SpO2: 100% (11-09-23 @ 12:00)  Wt(kg): --    PHYSICAL EXAM:  General: alert, mild respiratory distress, on nasal oxygen, frail and chronically ill appearing  Eyes:  anicteric, no conjunctival injection, no discharge  Oropharynx: no lesions or injection 	  Neck: supple, without adenopathy  Lungs:distant BS  Heart: regular rate and rhythm; no murmur, rubs or gallops  Abdomen: soft, nondistended, nontender, without mass or organomegaly  Skin: no lesions  Extremities: no clubbing, cyanosis, or edema  Neurologic: alert, oriented, moves all extremities    LAB RESULTS:                        8.4    13.08 )-----------( 201      ( 09 Nov 2023 05:00 )             26.4     11-09    141  |  105  |  15  ----------------------------<  198<H>  3.9   |  35<H>  |  0.21<L>    Ca    8.2<L>      09 Nov 2023 05:00  Phos  2.4     11-09  Mg     2.2     11-09        Urinalysis Basic - ( 09 Nov 2023 05:00 )    Color: x / Appearance: x / SG: x / pH: x  Gluc: 198 mg/dL / Ketone: x  / Bili: x / Urobili: x   Blood: x / Protein: x / Nitrite: x   Leuk Esterase: x / RBC: x / WBC x   Sq Epi: x / Non Sq Epi: x / Bacteria: x      MICROBIOLOGY:  RECENT CULTURES:  11-04 @ 19:00 ET Tube ET Tube     Normal Respiratory Daria present    Few polymorphonuclear leukocytes per low power field  No Squamous epithelial cells per low power field  No organisms seen per oil power field        RADIOLOGY REVIEWED:    < from: US Duplex Venous Upper Ext Ltd, Right (11.04.23 @ 11:30) >  IMPRESSION:  No evidence of right upper extremity deep venous thrombosis.    Right basilic vein superficial thrombophlebitis.    --- End of Report ---    < end of copied text >  < from: Xray Chest 1 View- PORTABLE-Routine (Xray Chest 1 View- PORTABLE-Routine in AM.) (11.06.23 @ 09:47) >  IMPRESSION:  Hyperinflated lungs. No focal lung consolidation.    Continued right costophrenic angle blunting, possibly pleuralthickening   or trace right pleural effusion.    --- End of Report --    < end of copied text >

## 2023-11-09 NOTE — PROGRESS NOTE ADULT - SUBJECTIVE AND OBJECTIVE BOX
HPI:  68F PMHx of COPD, HTN, HLD, DM, on xarelto for DVT history, hx of breast ca s/p left mastectomy, on chemotx last tx was 1 week ago presenting with shortness of breath, since last night. She is an active smoker. Last cigarette was yesterday. Denies any fevers or chills. ICU consulted as patient needing bipap support in the ED. No chest pain or palpitations. (27 Oct 2023 15:03)      24 hr events: remains on NC all day yesterday, tolerated NIV overnight, 2 bloody BM overnight HD stable       ## ROS: some SOB otherwise no n/v/d, no pain    ## Labs:  CBC:                        8.4    13.08 )-----------( 201      ( 09 Nov 2023 05:00 )             26.4     Chem:  11-09    141  |  105  |  15  ----------------------------<  198<H>  3.9   |  35<H>  |  0.21<L>    Ca    8.2<L>      09 Nov 2023 05:00  Phos  2.4     11-09  Mg     2.2     11-09        ## Medications:  doxazosin 2 milliGRAM(s) Oral at bedtime  midodrine 10 milliGRAM(s) Oral every 8 hours  buDESOnide    Inhalation Suspension 0.5 milliGRAM(s) Inhalation every 12 hours  levalbuterol Inhalation 0.63 milliGRAM(s) Inhalation every 6 hours  atorvastatin 80 milliGRAM(s) Oral at bedtime  dextrose 50% Injectable 25 Gram(s) IV Push once  dextrose 50% Injectable 12.5 Gram(s) IV Push once  dextrose 50% Injectable 25 Gram(s) IV Push once  insulin lispro (ADMELOG) corrective regimen sliding scale   SubCutaneous every 6 hours  methylPREDNISolone sodium succinate Injectable 40 milliGRAM(s) IV Push every 8 hours  aspirin enteric coated 81 milliGRAM(s) Oral daily  enoxaparin Injectable 40 milliGRAM(s) SubCutaneous every 12 hours  bisacodyl Suppository 10 milliGRAM(s) Rectal daily PRN  pantoprazole   Suspension 40 milliGRAM(s) Enteral Tube daily  polyethylene glycol 3350 17 Gram(s) Oral daily  senna 2 Tablet(s) Oral at bedtime  acetaminophen   Oral Liquid .. 650 milliGRAM(s) Oral every 6 hours PRN  ALPRAZolam 0.5 milliGRAM(s) Oral every 8 hours PRN  buPROPion . 100 milliGRAM(s) Oral three times a day  dexMEDEtomidine Infusion 0.2 MICROgram(s)/kG/Hr IV Continuous <Continuous>  ondansetron Injectable 4 milliGRAM(s) IV Push every 6 hours PRN      ## Vitals:  T(C): 37.1 (11-09-23 @ 07:00), Max: 37.1 (11-09-23 @ 07:00)  HR: 104 (11-09-23 @ 09:13) (73 - 116)  BP: 147/76 (11-09-23 @ 09:00) (96/48 - 155/78)  BP(mean): 97 (11-09-23 @ 09:00) (63 - 101)  RR: 19 (11-09-23 @ 09:00) (10 - 23)  SpO2: 100% on 4 L NC (11-09-23 @ 09:13) (97% - 100%)      11-08 @ 07:01  -  11-09 @ 07:00  --------------------------------------------------------  IN: 894.6 mL / OUT: 1500 mL / NET: -605.4 mL      ## P/E:  Gen: lying comfortably in bed in no apparent distress  HEENT: PERRL, EOMI  Resp: slight expiratory wheeze bilat, shallow, no distress  CVS: S1S2 no m/r/g  Abd: soft NT/ND +BS  Ext: no c/c/e  Neuro: A&Ox3        CODE STATUS: [x ] full code  [ ] DNR  [ ] DNI  [ ] [facility]  Goals of care discussion: [ ] yes

## 2023-11-09 NOTE — PROGRESS NOTE ADULT - ASSESSMENT
68 year old female  PM Hx of COPD, CAD, HLD, DM2, on Xarelto for DVT history, hx of breast ca s/p left mastectomy, on chemo tx presented with shortness of breath, admitted to ICU for respiratory failure due to COPD exacerbation in the setting of Entero/rhinovirus with COPD severe exacerbation    GI Consultation for anemia, dark stool. Currently, sp 1 unit of PRBC on 11/7, Vital tube feeds thru NGT tolerating well, on Lovenox, Xarelto on hold   11/9 DARK bowel movement.    68 year old female  PM Hx of COPD, CAD, HLD, DM2, on Xarelto for DVT history, hx of breast ca s/p left mastectomy, on chemo tx presented with shortness of breath, admitted to ICU for respiratory failure due to COPD exacerbation in the setting of Entero/rhinovirus with COPD severe exacerbation  GI Consultation for anemia, dark stool. Currently, sp 1 unit of PRBC on 11/7, Vital tube feeds thru NGT tolerating well, on Lovenox, Xarelto on hold. Had dark bowel movement today.

## 2023-11-09 NOTE — PHYSICAL THERAPY INITIAL EVALUATION ADULT - PERTINENT HX OF CURRENT PROBLEM, REHAB EVAL
68F PMHx of COPD, HTN, HLD, DM, on xarelto for DVT history, hx of breast ca s/p left mastectomy, on chemotx last tx was 1 week ago presenting with shortness of breath, since last night. She is an active smoker. Last cigarette was yesterday. Denies any fevers or chills. ICU consulted as patient needing bipap support in the ED. No chest pain or palpitations. (27 Oct 2023 15:03)

## 2023-11-09 NOTE — PROGRESS NOTE ADULT - PROBLEM SELECTOR PLAN 1
Anemia possible related to ?chemotherapy, chronic illness  Monitor Hb, transfuse if Hb is less than 7 g/dL  Monitor for ss of active bleed  Monitor HH   Keep T/S active   Continue PPI  Plan for EGD tomorrow pending respiratory status Anemia possible related to ?chemotherapy, chronic illness. Need to r/o upper GI source of bleed.  Monitor Hb, transfuse if Hb is less than 7 g/dL  Monitor for ss of active bleed  Monitor HH   Keep T/S active   Continue PPI  Plan for EGD tomorrow pending respiratory status

## 2023-11-09 NOTE — PROGRESS NOTE ADULT - ASSESSMENT
68y female with a past history of HTN, HLD,DM, COPD, DVT on xarelto at home, breast cancer s/p mastectomy and receiving Herceptin, who was admitted 10/27 with shortness of breath.  She was felt to have a COPD exacerbation and was treated with steroids and BIPAP, she required intubation.  She was extubated after a few days on vent, required repeat intubation for a short time and was extubated 11/5.  She is still smoking, had blood cultures sent a few days with coag negative staph in 1 set. Meropenem was started.  CXRs appear clear, sputum culture when intubated is negative, and she is growing a staph epi in 1 of 2 sets of blood cultures sent 11/4.  The positive blood cultures are most likely procurement contaminant.  Empiric Meropenem was stopped 11/6.  CXR is without focal infiltrate and second set of 11/4 blood cultures remain negative.  She is making slow favorable progress.  Suggest:  1 will observe off antibiotics  2 If coverage for pneumonia becomes evident will advise CTX 1 gram q24  3 Low grade leukocytosis may be a steroid effect  4 Goals of care discussions.  5 With stable course and no additional ID w/u planned we will stop actively following. Please call if ID issues arise.

## 2023-11-10 NOTE — PROGRESS NOTE ADULT - SUBJECTIVE AND OBJECTIVE BOX
INTERVAL HPI/OVERNIGHT EVENTS: pat is lying in bed comfortable       Antimicrobial:    Cardiovascular:  doxazosin 2 milliGRAM(s) Oral at bedtime  midodrine 10 milliGRAM(s) Oral every 8 hours    Pulmonary:  buDESOnide    Inhalation Suspension 0.5 milliGRAM(s) Inhalation every 12 hours  levalbuterol Inhalation 0.63 milliGRAM(s) Inhalation every 6 hours    Hematalogic:  aspirin enteric coated 81 milliGRAM(s) Oral daily  enoxaparin Injectable 40 milliGRAM(s) SubCutaneous every 12 hours    Other:  acetaminophen   Oral Liquid .. 650 milliGRAM(s) Oral every 6 hours PRN  ALPRAZolam 0.5 milliGRAM(s) Oral every 8 hours PRN  atorvastatin 80 milliGRAM(s) Oral at bedtime  bisacodyl Suppository 10 milliGRAM(s) Rectal daily PRN  buPROPion . 100 milliGRAM(s) Oral three times a day  chlorhexidine 2% Cloths 1 Application(s) Topical <User Schedule>  dextrose 5%. 1000 milliLiter(s) IV Continuous <Continuous>  dextrose 5%. 1000 milliLiter(s) IV Continuous <Continuous>  dextrose 50% Injectable 25 Gram(s) IV Push once  dextrose 50% Injectable 25 Gram(s) IV Push once  dextrose 50% Injectable 12.5 Gram(s) IV Push once  insulin lispro (ADMELOG) corrective regimen sliding scale   SubCutaneous Before meals and at bedtime  letrozole 2.5 milliGRAM(s) Oral daily  methylPREDNISolone sodium succinate Injectable 40 milliGRAM(s) IV Push every 8 hours  nicotine - 21 mG/24Hr(s) Patch 1 Patch Transdermal daily  ondansetron Injectable 4 milliGRAM(s) IV Push every 6 hours PRN  pantoprazole   Suspension 40 milliGRAM(s) Enteral Tube daily  polyethylene glycol 3350 17 Gram(s) Oral daily  senna 2 Tablet(s) Oral at bedtime  sodium chloride 0.9% lock flush 10 milliLiter(s) IV Push every 1 hour PRN      Drug Dosing Weight  Height (cm): 154.9 (10 Nov 2023 07:34)  Weight (kg): 45.1 (10 Nov 2023 07:34)  BMI (kg/m2): 18.8 (10 Nov 2023 07:34)  BSA (m2): 1.4 (10 Nov 2023 07:34)    CENTRAL LINE: [ ] YES [ ] NO  LOCATION:   DATE INSERTED:    DEVLIN: [ ] YES [ ] NO    DATE INSERTED:    A-LINE:  [ ] YES [ ] NO  LOCATION:   DATE INSERTED:    PMH/Social Hx/Fam Hx -reviewed admission note, no change since admission  PAST MEDICAL & SURGICAL HISTORY:  COPD, severe      CAD (coronary artery disease)      Breast cancer      No significant past surgical history          T(C): 37.3 (11-10-23 @ 16:00), Max: 37.3 (11-10-23 @ 16:00)  HR: 101 (11-10-23 @ 16:00)  BP: 140/63 (11-10-23 @ 16:00)  BP(mean): 85 (11-10-23 @ 16:00)  ABP: --  ABP(mean): --  RR: 14 (11-10-23 @ 16:00)  SpO2: 100% (11-10-23 @ 16:00)  Wt(kg): --          11-09 @ 07:01  -  11-10 @ 07:00  --------------------------------------------------------  IN: 587.5 mL / OUT: 2000 mL / NET: -1412.5 mL            PHYSICAL EXAM:    GENERAL: No signs of distress, comfortable  HEAD: Atraumatic, Normocephalic  EYES: EOMI, PERRLA  ENMT: No erythema, exudates, or enlargement, Moist mucous membranes  NECK: Supple, normal appearance, No JVD; [  ] central line (if applicable)  CHEST/LUNG: No chest deformity, fair bilateral air entry; No rales, rhonchi, wheezing; crackles  HEART: Regular rate and rhythm; No murmurs, rubs, or gallops;   ABDOMEN: Soft, Nontender, Nondistended; Bowel sounds present  EXTREMITIES:  + Peripheral Pulses, No clubbing, cyanosis, or edema  NERVOUS SYSTEM: awake and alert x 3, follows commands, upper and lower extremities  LYMPH: No lymphadenopathy noted  SKIN: No rashes or lesions; good turgor, warm, dry      LABS:  CBC Full  -  ( 10 Nov 2023 06:20 )  WBC Count : 13.02 K/uL  RBC Count : 2.42 M/uL  Hemoglobin : 7.6 g/dL  Hematocrit : 23.8 %  Platelet Count - Automated : 186 K/uL  Mean Cell Volume : 98.3 fl  Mean Cell Hemoglobin : 31.4 pg  Mean Cell Hemoglobin Concentration : 31.9 gm/dL  Auto Neutrophil # : x  Auto Lymphocyte # : x  Auto Monocyte # : x  Auto Eosinophil # : x  Auto Basophil # : x  Auto Neutrophil % : x  Auto Lymphocyte % : x  Auto Monocyte % : x  Auto Eosinophil % : x  Auto Basophil % : x    11-10    140  |  106  |  12  ----------------------------<  202<H>  3.7   |  34<H>  |  <0.20<L>    Ca    7.9<L>      10 Nov 2023 06:20  Phos  2.6     11-10  Mg     2.0     11-10        Urinalysis Basic - ( 10 Nov 2023 06:20 )    Color: x / Appearance: x / SG: x / pH: x  Gluc: 202 mg/dL / Ketone: x  / Bili: x / Urobili: x   Blood: x / Protein: x / Nitrite: x   Leuk Esterase: x / RBC: x / WBC x   Sq Epi: x / Non Sq Epi: x / Bacteria: x          RADIOLOGY & ADDITIONAL STUDIES REVIEWED         IMPRESSION:  PAST MEDICAL & SURGICAL HISTORY:  COPD, severe      CAD (coronary artery disease)      Breast cancer      No significant past surgical history       p/w         IMP: This is a 68 year old woman with  COPD, CAD, HLD, DM2, on xarelto for DVT history, hx of breast ca s/p left mastectomy, on chemotx presented with shortness of breath. Admitted to ICU for respiratory failure due to COPD exacerbation in the setting of Entero/rhinovirus with COPD, severe Exacerbation    Problem List  1. Acute respiratory failure due to acute COPD Exacerbation in setting of ERV requiring intubation 10/31/23 Extubated 11/2/2023, Re intubated 11/3/23  2. Active nicotine use - 1 pk/day  3. uses home o2 PRN  4. CAD   5. HTN  6. DM type 2  7, h/o Breast cancer  8. H/o DVT on xaralto -     Plan  - Doing well on NC  - Refused BiPaP at night   - S/P EGD today .. angioectasia . no active bleed. refer to report for more detail   - CBC q12h   - Protonix bid   - Encourage coughing and deep breathing  - Encourage incentive spirometer  - Wellbutrin   - Continue xanax TID prn  - S/S .. spoon feed only   - Trend CBC and transfuse for hgb less than 7  - Monitor stools   - Monitor off abx, ID following   - Continue cardura for urinary retention voiding well   - Cont. Aspirin and statin   - Off anticoag    - Nicotine patch  - PT eval > OOB.

## 2023-11-10 NOTE — CHART NOTE - NSCHARTNOTEFT_GEN_A_CORE
Nutrition Follow Up Note  Hospital Course (Per Electronic Medical Record):   Source: Medical Record [X] Patient [X] Family [X]  Nursing Staff [X]     Diet: Consistent Carbohydrate , pureed     Patient s/p endoscopy , diet advanced , patient tolerating well , consuming >75% of meal with total assistance , POCT noted insulin coverage noted , Patient continues on steroids . Patient with missing teeth however reports she has dentures , consumes regular consistency at home, Dx of severe malnutrition noted , suggest add Glucerna shake BID , Labs reviewed ,     Current Weight: (11/10) 94.4/45.1kg                          (11/9) 101.1/45.9kg                          (11/8) 102/46.3kg     Pertinent Medications: MEDICATIONS  (STANDING):  albuterol/ipratropium for Nebulization. 3 milliLiter(s) Nebulizer once  aspirin enteric coated 81 milliGRAM(s) Oral daily  atorvastatin 80 milliGRAM(s) Oral at bedtime  buDESOnide    Inhalation Suspension 0.5 milliGRAM(s) Inhalation every 12 hours  buPROPion . 100 milliGRAM(s) Oral three times a day  chlorhexidine 2% Cloths 1 Application(s) Topical <User Schedule>  dextrose 5%. 1000 milliLiter(s) (100 mL/Hr) IV Continuous <Continuous>  dextrose 5%. 1000 milliLiter(s) (50 mL/Hr) IV Continuous <Continuous>  dextrose 50% Injectable 25 Gram(s) IV Push once  dextrose 50% Injectable 25 Gram(s) IV Push once  dextrose 50% Injectable 12.5 Gram(s) IV Push once  doxazosin 2 milliGRAM(s) Oral at bedtime  enoxaparin Injectable 40 milliGRAM(s) SubCutaneous every 12 hours  insulin lispro (ADMELOG) corrective regimen sliding scale   SubCutaneous Before meals and at bedtime  letrozole 2.5 milliGRAM(s) Oral daily  levalbuterol Inhalation 0.63 milliGRAM(s) Inhalation every 6 hours  methylPREDNISolone sodium succinate Injectable 40 milliGRAM(s) IV Push every 8 hours  midodrine 10 milliGRAM(s) Oral every 8 hours  nicotine - 21 mG/24Hr(s) Patch 1 Patch Transdermal daily  pantoprazole   Suspension 40 milliGRAM(s) Enteral Tube daily  polyethylene glycol 3350 17 Gram(s) Oral daily  senna 2 Tablet(s) Oral at bedtime    MEDICATIONS  (PRN):  acetaminophen   Oral Liquid .. 650 milliGRAM(s) Oral every 6 hours PRN Mild Pain (1 - 3)  ALPRAZolam 0.5 milliGRAM(s) Oral every 8 hours PRN anxiety  bisacodyl Suppository 10 milliGRAM(s) Rectal daily PRN Constipation  ondansetron Injectable 4 milliGRAM(s) IV Push every 6 hours PRN Nausea and/or Vomiting  sodium chloride 0.9% lock flush 10 milliLiter(s) IV Push every 1 hour PRN Pre/post blood products, medications, blood draw, and to maintain line patency      Pertinent Labs:  11-10 Na140 mmol/L Glu 202 mg/dL<H> K+ 3.7 mmol/L Cr  <0.20 mg/dL<L> BUN 12 mg/dL 11-10 Phos 2.6 mg/dL 11-04 Alb 2.3 g/dL<L>Hgb 7.6g/dl<L> , Hct 23.8% <L>   POCT 228,203,184,158      Skin: IAD     Edema: (+3) (R) hand     Last BM: (11/9) - bowel meds noted     Estimated Needs:   [X] No Change since Previous Assessment      Previous Nutrition Diagnosis: Severe protein calorie malnutrition     Nutrition Diagnosis is [X] Ongoing & addressed          New Nutrition Diagnosis: [X] Not Applicable    Interventions:   1. Recommend add Glucerna shake BID       Monitoring & Evaluation: will monitor:  [X] Weights   [X] PO Intake   [X] Follow Up (Per Protocol)  [X] Tolerance to Diet Prescription       RD to follow as per Nutrition protocol  Maude Maldonado RD, CDN

## 2023-11-11 NOTE — PROGRESS NOTE ADULT - ASSESSMENT
68F PMHx of COPD, HTN, HLD, DM, on xarelto for DVT history, hx of breast ca s/p left mastectomy.  Patient seen and examined at bedside. No overnight events reported. Patient denies any N/V/D/abdominal pain/hematemesis/hematochezia. Tolerating clear liquid diet.  S/P EGD 11/10/23 Impression:  Normal mucosa in the whole esophagus.  Normal mucosa in the duodenal bulb and second part of the duodenum.  Angioectasia in the pre-pyloric region. (Injection, Thermal Therapy).  Otherwise normal stomach. Retroflexed view normal. .       68F PMHx of COPD, HTN, HLD, DM, on xarelto for DVT history, hx of breast ca s/p left mastectomy.  Patient seen and examined at bedside. No overnight events reported. Patient denies any N/V/D/abdominal pain/hematemesis/hematochezia. Tolerating clear liquid diet.  S/P EGD 11/10/23 Impression:  Normal mucosa in the whole esophagus.  Normal mucosa in the duodenal bulb and second part of the duodenum.  Angioectasia in the pre-pyloric region. (Injection, Thermal Therapy).  Otherwise normal stomach. Retroflexed view normal.     H&H trending upward in a positive direction stable. will continue monitor.       68F PMHx of COPD, HTN, HLD, DM, on xarelto for DVT history, hx of breast ca s/p left mastectomy, seen by GI for melena and anemia, s/p EGD w/hemostasis.  CBC improved.

## 2023-11-11 NOTE — CHART NOTE - NSCHARTNOTEFT_GEN_A_CORE
Attempted to place PIV with sonographic guidance x3, unable to thread catheter into vessels, catheters removed and hemostasis achieved with manual pressure.  No viable vessels for PICC or midline insertion identified in upper extremity.      Continue vascular port access until steroid taper complete.

## 2023-11-11 NOTE — PROGRESS NOTE ADULT - SUBJECTIVE AND OBJECTIVE BOX
INTERVAL HPI/OVERNIGHT EVENTS: pat is lying in bed comfortable : refused BiPaP over night       Antimicrobial:    Cardiovascular:  doxazosin 2 milliGRAM(s) Oral at bedtime  midodrine 5 milliGRAM(s) Oral every 8 hours    Pulmonary:  buDESOnide    Inhalation Suspension 0.5 milliGRAM(s) Inhalation every 12 hours  levalbuterol Inhalation 0.63 milliGRAM(s) Inhalation every 6 hours    Hematalogic:  aspirin enteric coated 81 milliGRAM(s) Oral daily    Other:  acetaminophen     Tablet .. 650 milliGRAM(s) Oral every 6 hours PRN  ALPRAZolam 0.5 milliGRAM(s) Oral every 8 hours PRN  atorvastatin 80 milliGRAM(s) Oral at bedtime  bisacodyl Suppository 10 milliGRAM(s) Rectal daily PRN  buPROPion XL (24-Hour) . 150 milliGRAM(s) Oral daily  chlorhexidine 2% Cloths 1 Application(s) Topical <User Schedule>  dextrose 5%. 1000 milliLiter(s) IV Continuous <Continuous>  dextrose 5%. 1000 milliLiter(s) IV Continuous <Continuous>  dextrose 50% Injectable 25 Gram(s) IV Push once  dextrose 50% Injectable 25 Gram(s) IV Push once  dextrose 50% Injectable 12.5 Gram(s) IV Push once  insulin lispro (ADMELOG) corrective regimen sliding scale   SubCutaneous Before meals and at bedtime  letrozole 2.5 milliGRAM(s) Oral daily  methylPREDNISolone sodium succinate Injectable 40 milliGRAM(s) IV Push every 8 hours  nicotine - 21 mG/24Hr(s) Patch 1 Patch Transdermal daily  ondansetron Injectable 4 milliGRAM(s) IV Push every 6 hours PRN  pantoprazole   Suspension 40 milliGRAM(s) Oral before breakfast  polyethylene glycol 3350 17 Gram(s) Oral daily  senna Syrup 10 milliLiter(s) Oral at bedtime      Drug Dosing Weight  Height (cm): 154.9 (10 Nov 2023 07:34)  Weight (kg): 45.1 (10 Nov 2023 07:34)  BMI (kg/m2): 18.8 (10 Nov 2023 07:34)  BSA (m2): 1.4 (10 Nov 2023 07:34)    CENTRAL LINE: [ ] YES [ ] NO  LOCATION:   DATE INSERTED:    DEVLIN: [ ] YES [ ] NO    DATE INSERTED:    A-LINE:  [ ] YES [ ] NO  LOCATION:   DATE INSERTED:    PMH/Social Hx/Fam Hx -reviewed admission note, no change since admission  PAST MEDICAL & SURGICAL HISTORY:  COPD, severe  CAD (coronary artery disease)  Breast cancer  No significant past surgical history    T(C): 37.1 (11-11-23 @ 06:00), Max: 37.3 (11-10-23 @ 16:00)  HR: 98 (11-11-23 @ 11:00)  BP: 143/71 (11-11-23 @ 11:00)  BP(mean): 87 (11-11-23 @ 11:00)  ABP: --  ABP(mean): --  RR: 16 (11-11-23 @ 11:00)  SpO2: 100% (11-11-23 @ 11:00)  Wt(kg): --          11-10 @ 07:01  -  11-11 @ 07:00  --------------------------------------------------------  IN: 0 mL / OUT: 1500 mL / NET: -1500 mL            PHYSICAL EXAM:    GENERAL: No signs of distress, comfortable  HEAD: Atraumatic, Normocephalic  EYES: EOMI, PERRLA  ENMT: No erythema, exudates, or enlargement, Moist mucous membranes  NECK: Supple, normal appearance, No JVD; [  ] central line (if applicable)  CHEST/LUNG: No chest deformity, fair bilateral air entry; No rales, rhonchi, wheezing; crackles  HEART: Regular rate and rhythm; No murmurs, rubs, or gallops;   ABDOMEN: Soft, Nontender, Nondistended; Bowel sounds present  EXTREMITIES:  + Peripheral Pulses, No clubbing, cyanosis, or edema  NERVOUS SYSTEM: awake and alert x 3, follows commands, upper and lower extremities  LYMPH: No lymphadenopathy noted  SKIN: No rashes or lesions; good turgor, warm, dry      LABS:  CBC Full  -  ( 11 Nov 2023 06:00 )  WBC Count : 12.64 K/uL  RBC Count : 2.57 M/uL  Hemoglobin : 8.0 g/dL  Hematocrit : 25.3 %  Platelet Count - Automated : 216 K/uL  Mean Cell Volume : 98.4 fl  Mean Cell Hemoglobin : 31.1 pg  Mean Cell Hemoglobin Concentration : 31.6 gm/dL  Auto Neutrophil # : x  Auto Lymphocyte # : x  Auto Monocyte # : x  Auto Eosinophil # : x  Auto Basophil # : x  Auto Neutrophil % : x  Auto Lymphocyte % : x  Auto Monocyte % : x  Auto Eosinophil % : x  Auto Basophil % : x    11-11    142  |  105  |  11  ----------------------------<  211<H>  3.6   |  33<H>  |  0.29<L>    Ca    8.1<L>      11 Nov 2023 06:00  Phos  2.7     11-11  Mg     2.0     11-11    TPro  5.8<L>  /  Alb  3.1<L>  /  TBili  0.6  /  DBili  x   /  AST  22  /  ALT  57<H>  /  AlkPhos  49  11-11      Urinalysis Basic - ( 11 Nov 2023 06:00 )    Color: x / Appearance: x / SG: x / pH: x  Gluc: 211 mg/dL / Ketone: x  / Bili: x / Urobili: x   Blood: x / Protein: x / Nitrite: x   Leuk Esterase: x / RBC: x / WBC x   Sq Epi: x / Non Sq Epi: x / Bacteria: x    RADIOLOGY & ADDITIONAL STUDIES REVIEWED         IMPRESSION:  PAST MEDICAL & SURGICAL HISTORY:  COPD, severe  CAD (coronary artery disease)  Breast cancer  No significant past surgical history       p/w         IMP: This is a 68 year old woman with  COPD, CAD, HLD, DM2, on xarelto for DVT history, hx of breast ca s/p left mastectomy, on chemotx presented with shortness of breath. Admitted to ICU for respiratory failure due to COPD exacerbation in the setting of Entero/rhinovirus with COPD, severe Exacerbation    Problem List  1. Acute respiratory failure due to acute COPD Exacerbation in setting of ERV requiring intubation 10/31/23 Extubated 11/2/2023, Re intubated 11/3/23  2. Active nicotine use - 1 pk/day  3. uses home o2 PRN  4. CAD   5. HTN  6. DM type 2  7, h/o Breast cancer  8. H/o DVT on xaralto -     Plan  - Doing well on NC  - Refused BiPaP at night   - S/P EGD today .. angioectasia . no active bleed. refer to report for more detail   - CBC qd  - Protonix bid   - Encourage coughing and deep breathing  - Encourage incentive spirometer  - Wellbutrin   - Continue xanax TID prn  - S/S .. spoon feed only   - Trend CBC and transfuse for hgb less than 7  - Monitor stools   - Monitor off abx, ID following   - Continue cardura for urinary retention voiding well   - Cont. Aspirin and statin   - Off anticoag    - Nicotine patch  - PT eval > OOB.

## 2023-11-11 NOTE — PROGRESS NOTE ADULT - SUBJECTIVE AND OBJECTIVE BOX
INTERVAL HPI/OVERNIGHT EVENTS:  HPI:  68F PMHx of COPD, HTN, HLD, DM, on xarelto for DVT history, hx of breast ca s/p left mastectomy, on chemotx last tx was 1 week ago presenting with shortness of breath, since last night. She is an active smoker. Last cigarette was yesterday. Denies any fevers or chills. ICU consulted as patient needing bipap support in the ED. No chest pain or palpitations. (27 Oct 2023 15:03)      GI Progress note:  Patient seen and examined at bedside. No overnight events reported. Patient denies any N/V/D/abdominal pain/hematemesis/hematochezia. Tolerating clear liquid diet.    MEDICATIONS  (STANDING):  aspirin enteric coated 81 milliGRAM(s) Oral daily  atorvastatin 80 milliGRAM(s) Oral at bedtime  buDESOnide    Inhalation Suspension 0.5 milliGRAM(s) Inhalation every 12 hours  buPROPion XL (24-Hour) . 150 milliGRAM(s) Oral daily  chlorhexidine 2% Cloths 1 Application(s) Topical <User Schedule>  dextrose 5%. 1000 milliLiter(s) (100 mL/Hr) IV Continuous <Continuous>  dextrose 5%. 1000 milliLiter(s) (50 mL/Hr) IV Continuous <Continuous>  dextrose 50% Injectable 25 Gram(s) IV Push once  dextrose 50% Injectable 25 Gram(s) IV Push once  dextrose 50% Injectable 12.5 Gram(s) IV Push once  doxazosin 2 milliGRAM(s) Oral at bedtime  insulin lispro (ADMELOG) corrective regimen sliding scale   SubCutaneous Before meals and at bedtime  letrozole 2.5 milliGRAM(s) Oral daily  levalbuterol Inhalation 0.63 milliGRAM(s) Inhalation every 6 hours  methylPREDNISolone sodium succinate Injectable 40 milliGRAM(s) IV Push every 8 hours  midodrine 5 milliGRAM(s) Oral every 8 hours  nicotine - 21 mG/24Hr(s) Patch 1 Patch Transdermal daily  pantoprazole    Tablet 40 milliGRAM(s) Oral before breakfast  polyethylene glycol 3350 17 Gram(s) Oral daily  senna 2 Tablet(s) Oral at bedtime    MEDICATIONS  (PRN):  acetaminophen     Tablet .. 650 milliGRAM(s) Oral every 6 hours PRN Temp greater or equal to 38C (100.4F), Mild Pain (1 - 3)  ALPRAZolam 0.5 milliGRAM(s) Oral every 8 hours PRN anxiety  bisacodyl Suppository 10 milliGRAM(s) Rectal daily PRN Constipation  ondansetron Injectable 4 milliGRAM(s) IV Push every 6 hours PRN Nausea and/or Vomiting      Allergies    penicillin (Hives)    Intolerances        PAST MEDICAL & SURGICAL HISTORY:  COPD, severe      CAD (coronary artery disease)      Breast cancer      No significant past surgical history          REVIEW OF SYSTEMS  Negative unless indicated in HPI      PHYSICAL EXAM:   Vital Signs:  Vital Signs Last 24 Hrs  T(C): 37.1 (2023 06:00), Max: 37.3 (10 Nov 2023 16:00)  T(F): 98.7 (2023 06:00), Max: 99.1 (10 Nov 2023 16:00)  HR: 107 (2023 09:30) (91 - 116)  BP: 147/71 (2023 09:00) (118/74 - 156/69)  BP(mean): 92 (2023 09:00) (76 - 95)  RR: 17 (2023 09:00) (13 - 21)  SpO2: 100% (2023 09:30) (98% - 100%)    Parameters below as of 2023 09:30  Patient On (Oxygen Delivery Method): nasal cannula      Daily     Daily Weight in k.8 (2023 06:00)I&O's Summary    10 Nov 2023 07:01  -  2023 07:00  --------------------------------------------------------  IN: 0 mL / OUT: 1500 mL / NET: -1500 mL    GENERAL:  Appears stated age, NAD  HEENT:  NC/AT,  conjunctivae clear and pink  CHEST:  Full & symmetric excursion  HEART:  S1, S2  ABDOMEN:  Soft, non-tender, non-distended, normoactive bowel sounds ,NGT feed   EXTREMITIES:  no cyanosis, clubbing or edema  SKIN:  No rash/warm/dry  NEURO:  Alert, oriented        LABS:                        8.0    12.64 )-----------( 216      ( 2023 06:00 )             25.3     11-    142  |  105  |  11  ----------------------------<  211<H>  3.6   |  33<H>  |  0.29<L>    Ca    8.1<L>      2023 06:00  Phos  2.7       Mg     2.0         TPro  5.8<L>  /  Alb  3.1<L>  /  TBili  0.6  /  DBili  x   /  AST  22  /  ALT  57<H>  /  AlkPhos  49        Urinalysis Basic - ( 2023 06:00 )    Color: x / Appearance: x / SG: x / pH: x  Gluc: 211 mg/dL / Ketone: x  / Bili: x / Urobili: x   Blood: x / Protein: x / Nitrite: x   Leuk Esterase: x / RBC: x / WBC x   Sq Epi: x / Non Sq Epi: x / Bacteria: x      amylase   lipase  RADIOLOGY & ADDITIONAL TESTS:  < from: EGD (11.10.23 @ 09:30) >  EGD Report        Date: 11/10/2023 9:30 AM        Patient Name: REHAN IGLESIAS        MRN: [id number]        Account Number:        [id number]        Gender: Female         (age): 1955 (68)        Instrument(s):        [id number] - GIF [id number])        Attending/Fellow:        Oliver Bacon DO        Technician:        Susanne Campos RN        Procedure Room #:        Procedure Room 1        Referring Physician:        Gabrielle Rodgers MD        [address] [place] [address] [id number] [phone] (phone)        [phone] (fax)        PCP:        Gabrielle Rodgers MD        Anesthesia Provider:        Marilee Blake MD        Nurse(s):        Maru Charles RN        ASA Class:        P4 - 11/10/2023 9:43 AM Marilee Blake MD, 4e        Administered Medications:        As per Anesthesiology Record        Indications:        Anemia: 285.9 - D64.9        Melena: 578.1 - K92.1        Procedure:        The procedure, indications, preparation and potential complications were    explained to the patient, who indicated understanding and signed the    corresponding consent forms. MAC was administered by anesthesiologist.    Continuous pulse oximetry and blood pressure monitoring were used throughout the    procedure. Supplemental oxygen was used. Patient was placed in the left lateral    decubitus position. The endoscope was introduced through the mouth and advanced    under direct visualization until the second part of the duodenum was reached.    The Z-line was noted at 35 centimeters. Patient tolerance to the procedure was    good. The procedure was not difficult. Blood loss was minimal.        Limitations/Complications:        There were no apparent limitations or complications        Findings:        Esophagus Mucosa Normal mucosa was noted in the whole esophagus.        Stomach Flat lesions A single small bleeding localized angioectasia was seen in    the pre-pyloric region. The angioectasia was ablated completely.2.5 ml    epinephrine 1/73062 injection was successfully applied for hemostasis. Bi-cap    electrocautery was successfully applied for hemostasis. BiCAP setting 20W,    Effect 2.        Additional stomach findings Otherwise normal stomach. Retroflexed view normal..        Duodenum Mucosa Normal mucosa was noted in the duodenal bulb and second part of    the duodenum.        Impressions:        Normal mucosa in the whole esophagus.        Normal mucosa in the duodenal bulb and second part of the duodenum.        Angioectasia in the pre-pyloric region. (Injection, Thermal Therapy).        Otherwise normal stomach. Retroflexed view normal. .        Plan:        Clear Liquid Diet        Discontinue NSAIDs. If aspirin is needed, patient will need PPI for    gastroprotection.        Pantoprazole 40 mg PO BID        Return to floor for further management        Follow CBC every 12h x 1 day. If no rebleeding, CBC daily while admitted.        Additional Notes:        NG Tube tip noted to be coiled in the mouth after completion of the procedure.    The NG tube was then removed from the patient.                Oliver Bacon DO        Version 1, Electronically signed on 11/10/2023 10:00:52 AM by Oliver Bacon DO    < end of copied text >     INTERVAL HPI/OVERNIGHT EVENTS:  HPI:  68F PMHx of COPD, HTN, HLD, DM, on xarelto for DVT history, hx of breast ca s/p left mastectomy, on chemotx last tx was 1 week ago presenting with shortness of breath, since last night. She is an active smoker. Last cigarette was yesterday. Denies any fevers or chills. ICU consulted as patient needing bipap support in the ED. No chest pain or palpitations. (27 Oct 2023 15:03)      GI Progress note:  Patient seen and examined at bedside. No overnight events reported. Patient denies any N/V/D/abdominal pain/hematemesis/hematochezia. Tolerating clear liquid diet.  S/P EGD 11/10/2023    MEDICATIONS  (STANDING):  aspirin enteric coated 81 milliGRAM(s) Oral daily  atorvastatin 80 milliGRAM(s) Oral at bedtime  buDESOnide    Inhalation Suspension 0.5 milliGRAM(s) Inhalation every 12 hours  buPROPion XL (24-Hour) . 150 milliGRAM(s) Oral daily  chlorhexidine 2% Cloths 1 Application(s) Topical <User Schedule>  dextrose 5%. 1000 milliLiter(s) (100 mL/Hr) IV Continuous <Continuous>  dextrose 5%. 1000 milliLiter(s) (50 mL/Hr) IV Continuous <Continuous>  dextrose 50% Injectable 25 Gram(s) IV Push once  dextrose 50% Injectable 25 Gram(s) IV Push once  dextrose 50% Injectable 12.5 Gram(s) IV Push once  doxazosin 2 milliGRAM(s) Oral at bedtime  insulin lispro (ADMELOG) corrective regimen sliding scale   SubCutaneous Before meals and at bedtime  letrozole 2.5 milliGRAM(s) Oral daily  levalbuterol Inhalation 0.63 milliGRAM(s) Inhalation every 6 hours  methylPREDNISolone sodium succinate Injectable 40 milliGRAM(s) IV Push every 8 hours  midodrine 5 milliGRAM(s) Oral every 8 hours  nicotine - 21 mG/24Hr(s) Patch 1 Patch Transdermal daily  pantoprazole    Tablet 40 milliGRAM(s) Oral before breakfast  polyethylene glycol 3350 17 Gram(s) Oral daily  senna 2 Tablet(s) Oral at bedtime    MEDICATIONS  (PRN):  acetaminophen     Tablet .. 650 milliGRAM(s) Oral every 6 hours PRN Temp greater or equal to 38C (100.4F), Mild Pain (1 - 3)  ALPRAZolam 0.5 milliGRAM(s) Oral every 8 hours PRN anxiety  bisacodyl Suppository 10 milliGRAM(s) Rectal daily PRN Constipation  ondansetron Injectable 4 milliGRAM(s) IV Push every 6 hours PRN Nausea and/or Vomiting      Allergies    penicillin (Hives)    Intolerances        PAST MEDICAL & SURGICAL HISTORY:  COPD, severe      CAD (coronary artery disease)      Breast cancer      No significant past surgical history          REVIEW OF SYSTEMS  Negative unless indicated in HPI      PHYSICAL EXAM:   Vital Signs:  Vital Signs Last 24 Hrs  T(C): 37.1 (2023 06:00), Max: 37.3 (10 Nov 2023 16:00)  T(F): 98.7 (2023 06:00), Max: 99.1 (10 Nov 2023 16:00)  HR: 107 (2023 09:30) (91 - 116)  BP: 147/71 (2023 09:00) (118/74 - 156/69)  BP(mean): 92 (2023 09:00) (76 - 95)  RR: 17 (2023 09:00) (13 - 21)  SpO2: 100% (2023 09:30) (98% - 100%)    Parameters below as of 2023 09:30  Patient On (Oxygen Delivery Method): nasal cannula      Daily     Daily Weight in k.8 (2023 06:00)I&O's Summary    10 Nov 2023 07:01  -  2023 07:00  --------------------------------------------------------  IN: 0 mL / OUT: 1500 mL / NET: -1500 mL    GENERAL:  Appears stated age, NAD  HEENT:  NC/AT,  conjunctivae clear and pink  CHEST:  Full & symmetric excursion  HEART:  S1, S2  ABDOMEN:  Soft, non-tender, non-distended, normoactive bowel sounds ,NGT feed   EXTREMITIES:  no cyanosis, clubbing or edema  SKIN:  No rash/warm/dry  NEURO:  Alert, oriented        LABS:                        8.0    12.64 )-----------( 216      ( 2023 06:00 )             25.3     11-    142  |  105  |  11  ----------------------------<  211<H>  3.6   |  33<H>  |  0.29<L>    Ca    8.1<L>      2023 06:00  Phos  2.7       Mg     2.0         TPro  5.8<L>  /  Alb  3.1<L>  /  TBili  0.6  /  DBili  x   /  AST  22  /  ALT  57<H>  /  AlkPhos  49        Urinalysis Basic - ( 2023 06:00 )    Color: x / Appearance: x / SG: x / pH: x  Gluc: 211 mg/dL / Ketone: x  / Bili: x / Urobili: x   Blood: x / Protein: x / Nitrite: x   Leuk Esterase: x / RBC: x / WBC x   Sq Epi: x / Non Sq Epi: x / Bacteria: x      amylase   lipase  RADIOLOGY & ADDITIONAL TESTS:  < from: EGD (11.10.23 @ 09:30) >  EGD Report        Date: 11/10/2023 9:30 AM        Patient Name: REHAN IGLESIAS        MRN: [id number]        Account Number:        [id number]        Gender: Female         (age): 1955 (68)        Instrument(s):        [id number] - GIF [id number])        Attending/Fellow:        Oliver Bacon DO        Technician:        Susanne Campos RN        Procedure Room #:        Procedure Room 1        Referring Physician:        Gabrielle Rodgers MD        [address] [place] [id number]        [phone] (phone)        [phone] (fax)        PCP:        Gabrielle Rodgers MD        Anesthesia Provider:        Marilee Blake MD        Nurse(s):        Maru Charles RN        ASA Class:        P4 - 11/10/2023 9:43 AM Marilee Blake MD, 4e        Administered Medications:        As per Anesthesiology Record        Indications:        Anemia: 285.9 - D64.9        Melena: 578.1 - K92.1        Procedure:        The procedure, indications, preparation and potential complications were    explained to the patient, who indicated understanding and signed the    corresponding consent forms. MAC was administered by anesthesiologist.    Continuous pulse oximetry and blood pressure monitoring were used throughout the    procedure. Supplemental oxygen was used. Patient was placed in the left lateral    decubitus position. The endoscope was introduced through the mouth and advanced    under direct visualization until the second part of the duodenum was reached.    The Z-line was noted at 35 centimeters. Patient tolerance to the procedure was    good. The procedure was not difficult. Blood loss was minimal.        Limitations/Complications:        There were no apparent limitations or complications        Findings:        Esophagus Mucosa Normal mucosa was noted in the whole esophagus.        Stomach Flat lesions A single small bleeding localized angioectasia was seen in    the pre-pyloric region. The angioectasia was ablated completely.2.5 ml    epinephrine 1/74584 injection was successfully applied for hemostasis. Bi-cap    electrocautery was successfully applied for hemostasis. BiCAP setting 20W,    Effect 2.        Additional stomach findings Otherwise normal stomach. Retroflexed view normal..        Duodenum Mucosa Normal mucosa was noted in the duodenal bulb and second part of    the duodenum.        Impressions:        Normal mucosa in the whole esophagus.        Normal mucosa in the duodenal bulb and second part of the duodenum.        Angioectasia in the pre-pyloric region. (Injection, Thermal Therapy).        Otherwise normal stomach. Retroflexed view normal. .        Plan:        Clear Liquid Diet        Discontinue NSAIDs. If aspirin is needed, patient will need PPI for    gastroprotection.        Pantoprazole 40 mg PO BID        Return to floor for further management        Follow CBC every 12h x 1 day. If no rebleeding, CBC daily while admitted.        Additional Notes:        NG Tube tip noted to be coiled in the mouth after completion of the procedure.    The NG tube was then removed from the patient.                Oliver Bacon DO        Version 1, Electronically signed on 11/10/2023 10:00:52 AM by Oliver Bacon DO    < end of copied text >     GI Follow up    Patient seen and examined at bedside. S/P EGD yesterday with prepyloric ?AVM with some heme noted, treated with epi and BiCAP thermal therapy with apparent hemostasis.  No overnight events reported. Patient denies any N/V/D/abdominal pain/hematemesis/hematochezia. NG tube was removed yesterday after EGD. Patient is tolerating clear liquid diet.      MEDICATIONS  (STANDING):  aspirin enteric coated 81 milliGRAM(s) Oral daily  atorvastatin 80 milliGRAM(s) Oral at bedtime  buDESOnide    Inhalation Suspension 0.5 milliGRAM(s) Inhalation every 12 hours  buPROPion XL (24-Hour) . 150 milliGRAM(s) Oral daily  chlorhexidine 2% Cloths 1 Application(s) Topical <User Schedule>  dextrose 5%. 1000 milliLiter(s) (100 mL/Hr) IV Continuous <Continuous>  dextrose 5%. 1000 milliLiter(s) (50 mL/Hr) IV Continuous <Continuous>  dextrose 50% Injectable 25 Gram(s) IV Push once  dextrose 50% Injectable 25 Gram(s) IV Push once  dextrose 50% Injectable 12.5 Gram(s) IV Push once  doxazosin 2 milliGRAM(s) Oral at bedtime  insulin lispro (ADMELOG) corrective regimen sliding scale   SubCutaneous Before meals and at bedtime  letrozole 2.5 milliGRAM(s) Oral daily  levalbuterol Inhalation 0.63 milliGRAM(s) Inhalation every 6 hours  methylPREDNISolone sodium succinate Injectable 40 milliGRAM(s) IV Push every 8 hours  midodrine 5 milliGRAM(s) Oral every 8 hours  nicotine - 21 mG/24Hr(s) Patch 1 Patch Transdermal daily  pantoprazole    Tablet 40 milliGRAM(s) Oral before breakfast  polyethylene glycol 3350 17 Gram(s) Oral daily  senna 2 Tablet(s) Oral at bedtime    MEDICATIONS  (PRN):  acetaminophen     Tablet .. 650 milliGRAM(s) Oral every 6 hours PRN Temp greater or equal to 38C (100.4F), Mild Pain (1 - 3)  ALPRAZolam 0.5 milliGRAM(s) Oral every 8 hours PRN anxiety  bisacodyl Suppository 10 milliGRAM(s) Rectal daily PRN Constipation  ondansetron Injectable 4 milliGRAM(s) IV Push every 6 hours PRN Nausea and/or Vomiting      Allergies    penicillin (Hives)    Intolerances      PHYSICAL EXAM:   Vital Signs:  Vital Signs Last 24 Hrs  T(C): 37.1 (2023 06:00), Max: 37.3 (10 Nov 2023 16:00)  T(F): 98.7 (:00), Max: 99.1 (10 Nov 2023 16:00)  HR: 107 (:30) (91 - 116)  BP: 147/71 () (118/74 - 156/69)  BP(mean): 92 () (76 - 95)  RR: 17 () (13 - 21)  SpO2: 100% () (98% - 100%)    Parameters below as of :30  Patient On (Oxygen Delivery Method): nasal cannula      Daily     Daily Weight in k.8 (:00)I&O's Summary    10 Nov 2023 07:01  -  2023 07:00  --------------------------------------------------------  IN: 0 mL / OUT: 1500 mL / NET: -1500 mL    GENERAL:  Appears older than stated age, NAD  HEENT:  NC/AT,  conjunctivae clear and pink  CHEST:  Full & symmetric excursion  HEART:  S1, S2  ABDOMEN:  Soft, non-tender, non-distended, normoactive bowel sounds  EXTREMITIES:  no cyanosis, clubbing or edema  SKIN:  No rash or jaundice  NEURO:  Alert, oriented        LABS:                        8.0    12.64 )-----------( 216      ( 2023 06:00 )             25.3     Hemoglobin: 8.0 g/dL (23 @ 06:00)   Hemoglobin: 7.6 g/dL (11.10.23 @ 06:20)   Hemoglobin: 8.4 g/dL (23 @ 05:00)   Hemoglobin: 8.8 g/dL (23 @ 06:00)         11-11    142  |  105  |  11  ----------------------------<  211<H>  3.6   |  33<H>  |  0.29<L>    Ca    8.1<L>      2023 06:00  Phos  2.7       Mg     2.0         TPro  5.8<L>  /  Alb  3.1<L>  /  TBili  0.6  /  DBili  x   /  AST  22  /  ALT  57<H>  /  AlkPhos  49        Urinalysis Basic - ( 2023 06:00 )    Color: x / Appearance: x / SG: x / pH: x  Gluc: 211 mg/dL / Ketone: x  / Bili: x / Urobili: x   Blood: x / Protein: x / Nitrite: x   Leuk Esterase: x / RBC: x / WBC x   Sq Epi: x / Non Sq Epi: x / Bacteria: x        EGD Report    Date: 11/10/2023 9:30 AM  Patient Name: REHAN IGLESIAS  Impressions:    Normal mucosa in the whole esophagus.  Normal mucosa in the duodenal bulb and second part of the duodenum.  Angioectasia in the pre-pyloric region. (Injection, Thermal Therapy).  Otherwise normal stomach. Retroflexed view normal. .    Plan:  Clear Liquid Diet  Discontinue NSAIDs. If aspirin is needed, patient will need PPI for gastroprotection.  Pantoprazole 40 mg PO BID  Return to floor for further management  Follow CBC every 12h x 1 day. If no rebleeding, CBC daily while admitted.

## 2023-11-11 NOTE — PROGRESS NOTE ADULT - PROBLEM SELECTOR PLAN 1
Monitor labs (H/H)  Keep active T&S  Transfuse <7 or symptomatic  Monitor stools and any emesis for bleeding  PPI twice daily  EGD completed 11/10 impressions as stated above, will continue to monitor  can advance diet Monitor CBC  Keep active T&S  Transfuse <7 or symptomatic  Monitor stools and any emesis for bleeding  Oral PPI daily  Advance to solid diet

## 2023-11-12 NOTE — PROGRESS NOTE ADULT - SUBJECTIVE AND OBJECTIVE BOX
Patient is a 68y old  Female who presents with a chief complaint of respiratory failure (09 Nov 2023 12:46)    No events overnight  Crying this AM; was scared about the transition from ICU to [facility]  Denies chest pain, SOB  Tolerating diet  Patient seen and examined at bedside.    ALLERGIES:  penicillin (Hives)    MEDICATIONS  (STANDING):  aspirin enteric coated 81 milliGRAM(s) Oral daily  atorvastatin 80 milliGRAM(s) Oral at bedtime  buDESOnide    Inhalation Suspension 0.5 milliGRAM(s) Inhalation every 12 hours  buPROPion XL (24-Hour) . 150 milliGRAM(s) Oral daily  dextrose 5%. 1000 milliLiter(s) (100 mL/Hr) IV Continuous <Continuous>  dextrose 5%. 1000 milliLiter(s) (50 mL/Hr) IV Continuous <Continuous>  dextrose 50% Injectable 25 Gram(s) IV Push once  dextrose 50% Injectable 25 Gram(s) IV Push once  dextrose 50% Injectable 12.5 Gram(s) IV Push once  doxazosin 2 milliGRAM(s) Oral at bedtime  insulin lispro (ADMELOG) corrective regimen sliding scale   SubCutaneous Before meals and at bedtime  letrozole 2.5 milliGRAM(s) Oral daily  levalbuterol Inhalation 0.63 milliGRAM(s) Inhalation every 6 hours  methylPREDNISolone sodium succinate Injectable 40 milliGRAM(s) IV Push every 8 hours  midodrine 5 milliGRAM(s) Oral every 8 hours  nicotine - 21 mG/24Hr(s) Patch 1 Patch Transdermal daily  pantoprazole   Suspension 40 milliGRAM(s) Oral before breakfast  polyethylene glycol 3350 17 Gram(s) Oral daily  senna Syrup 10 milliLiter(s) Oral at bedtime    MEDICATIONS  (PRN):  acetaminophen     Tablet .. 650 milliGRAM(s) Oral every 6 hours PRN Temp greater or equal to 38C (100.4F), Mild Pain (1 - 3)  ALPRAZolam 0.5 milliGRAM(s) Oral every 8 hours PRN anxiety  bisacodyl Suppository 10 milliGRAM(s) Rectal daily PRN Constipation  ondansetron Injectable 4 milliGRAM(s) IV Push every 6 hours PRN Nausea and/or Vomiting  sodium chloride 0.9% lock flush 10 milliLiter(s) IV Push every 1 hour PRN Pre/post blood products, medications, blood draw, and to maintain line patency    Vital Signs Last 24 Hrs  T(F): 97.9 (12 Nov 2023 05:30), Max: 98.8 (11 Nov 2023 18:00)  HR: 119 (12 Nov 2023 05:30) (98 - 122)  BP: 170/85 (12 Nov 2023 05:30) (128/116 - 171/88)  RR: 20 (12 Nov 2023 05:30) (14 - 23)  SpO2: 99% (12 Nov 2023 05:30) (99% - 100%)  I&O's Summary    11 Nov 2023 07:01  -  12 Nov 2023 07:00  --------------------------------------------------------  IN: 0 mL / OUT: 500 mL / NET: -500 mL      BMI (kg/m2): 18.8 (11-10-23 @ 07:34)  PHYSICAL EXAM:  General: NAD, A/O x 3, oxygen via NC, speaking in full sentences  ENT: MMM, no scleral icterus  Neck: Supple, No JVD, no thyroidomegaly  Lungs: Clear to auscultation bilaterally, no wheezes, no rales, no rhonchi, good inspiratory effort  Cardio: RRR, S1/S2, No murmurs  Abdomen: Soft, Nontender, Nondistended; Bowel sounds present  Extremities: No calf tenderness, No pitting edema, no skin changes    LABS:                        8.2    13.14 )-----------( 219      ( 12 Nov 2023 06:50 )             26.1       11-12    141  |  104  |  15  ----------------------------<  204  3.6   |  33  |  0.34    Ca    8.4      12 Nov 2023 06:50  Phos  2.6     11-12  Mg     2.1     11-12    TPro  6.2  /  Alb  3.4  /  TBili  0.6  /  DBili  x   /  AST  22  /  ALT  56  /  AlkPhos  52  11-12     POCT Blood Glucose.: 196 mg/dL (12 Nov 2023 07:37)  POCT Blood Glucose.: 145 mg/dL (11 Nov 2023 22:16)  POCT Blood Glucose.: 208 mg/dL (11 Nov 2023 17:07)  POCT Blood Glucose.: 196 mg/dL (11 Nov 2023 11:21)    Urinalysis Basic - ( 12 Nov 2023 06:50 )    Color: x / Appearance: x / SG: x / pH: x  Gluc: 204 mg/dL / Ketone: x  / Bili: x / Urobili: x   Blood: x / Protein: x / Nitrite: x   Leuk Esterase: x / RBC: x / WBC x   Sq Epi: x / Non Sq Epi: x / Bacteria: x

## 2023-11-12 NOTE — PROGRESS NOTE ADULT - SUBJECTIVE AND OBJECTIVE BOX
Time of Visit:  Patient seen and examined. pat is lying in bed comfortable     MEDICATIONS  (STANDING):  aspirin enteric coated 81 milliGRAM(s) Oral daily  atorvastatin 80 milliGRAM(s) Oral at bedtime  buDESOnide    Inhalation Suspension 0.5 milliGRAM(s) Inhalation every 12 hours  buPROPion XL (24-Hour) . 150 milliGRAM(s) Oral daily  dextrose 5%. 1000 milliLiter(s) (100 mL/Hr) IV Continuous <Continuous>  dextrose 5%. 1000 milliLiter(s) (50 mL/Hr) IV Continuous <Continuous>  dextrose 50% Injectable 25 Gram(s) IV Push once  dextrose 50% Injectable 25 Gram(s) IV Push once  dextrose 50% Injectable 12.5 Gram(s) IV Push once  doxazosin 2 milliGRAM(s) Oral at bedtime  hydrocortisone sodium succinate Injectable 40 milliGRAM(s) IV Push daily  insulin lispro (ADMELOG) corrective regimen sliding scale   SubCutaneous Before meals and at bedtime  letrozole 2.5 milliGRAM(s) Oral daily  levalbuterol Inhalation 0.63 milliGRAM(s) Inhalation every 6 hours  midodrine 5 milliGRAM(s) Oral every 8 hours  nicotine - 21 mG/24Hr(s) Patch 1 Patch Transdermal daily  pantoprazole   Suspension 40 milliGRAM(s) Oral before breakfast  polyethylene glycol 3350 17 Gram(s) Oral daily  senna Syrup 10 milliLiter(s) Oral at bedtime      MEDICATIONS  (PRN):  acetaminophen     Tablet .. 650 milliGRAM(s) Oral every 6 hours PRN Temp greater or equal to 38C (100.4F), Mild Pain (1 - 3)  ALPRAZolam 0.5 milliGRAM(s) Oral every 8 hours PRN anxiety  bisacodyl Suppository 10 milliGRAM(s) Rectal daily PRN Constipation  ondansetron Injectable 4 milliGRAM(s) IV Push every 6 hours PRN Nausea and/or Vomiting  sodium chloride 0.9% lock flush 10 milliLiter(s) IV Push every 1 hour PRN Pre/post blood products, medications, blood draw, and to maintain line patency       Medications up to date at time of exam.      PHYSICAL EXAMINATION:  Patient has no new complaints.  GENERAL: The patient is a well-developed, well-nourished, in no apparent distress.     Vital Signs Last 24 Hrs  T(C): 36.7 (12 Nov 2023 16:00), Max: 37.1 (11 Nov 2023 18:00)  T(F): 98.1 (12 Nov 2023 16:00), Max: 98.8 (11 Nov 2023 18:00)  HR: 96 (12 Nov 2023 16:23) (88 - 119)  BP: 162/82 (12 Nov 2023 16:00) (146/70 - 171/88)  BP(mean): 93 (11 Nov 2023 20:00) (88 - 93)  RR: 20 (12 Nov 2023 16:00) (15 - 23)  SpO2: 100% (12 Nov 2023 16:23) (98% - 100%)    Parameters below as of 12 Nov 2023 16:23  Patient On (Oxygen Delivery Method): nasal cannula, 2L       (if applicable)    Chest Tube (if applicable)    HEENT: Head is normocephalic and atraumatic. Extraocular muscles are intact. Mucous membranes are moist. + NC     NECK: Supple, no palpable adenopathy.    LUNGS: Clear to auscultation, no wheezing, rales, or rhonchi.    HEART: Regular rate and rhythm without murmur.    ABDOMEN: Soft, nontender, and nondistended.  No hepatosplenomegaly is noted.    : No painful voiding, no pelvic pain    EXTREMITIES: Without any cyanosis, clubbing, rash, lesions or edema.    NEUROLOGIC: Awake, alert,     SKIN: Warm, dry, good turgor.      LABS:                        8.2    13.14 )-----------( 219      ( 12 Nov 2023 06:50 )             26.1     11-12    141  |  104  |  15  ----------------------------<  204<H>  3.6   |  33<H>  |  0.34<L>    Ca    8.4      12 Nov 2023 06:50  Phos  2.6     11-12  Mg     2.1     11-12    TPro  6.2  /  Alb  3.4  /  TBili  0.6  /  DBili  x   /  AST  22  /  ALT  56<H>  /  AlkPhos  52  11-12      Urinalysis Basic - ( 12 Nov 2023 06:50 )    Color: x / Appearance: x / SG: x / pH: x  Gluc: 204 mg/dL / Ketone: x  / Bili: x / Urobili: x   Blood: x / Protein: x / Nitrite: x   Leuk Esterase: x / RBC: x / WBC x   Sq Epi: x / Non Sq Epi: x / Bacteria: x    MICROBIOLOGY: (if applicable)    RADIOLOGY & ADDITIONAL STUDIES:  EKG:   CXR:  ECHO:    IMPRESSION: 68y Female PAST MEDICAL & SURGICAL HISTORY:  COPD, severe      CAD (coronary artery disease)      Breast cancer      No significant past surgical history       p/w         IMP: This is a 68 year old woman with  COPD, CAD, HLD, DM2, on xarelto for DVT history, hx of breast ca s/p left mastectomy, on chemotx presented with shortness of breath. Admitted to ICU for respiratory failure due to COPD exacerbation in the setting of Entero/rhinovirus with COPD, severe Exacerbation    Problem List  1. Acute respiratory failure due to acute COPD Exacerbation in setting of ERV requiring intubation 10/31/23 Extubated 11/2/2023, Re intubated 11/3/23  2. Active nicotine use - 1 pk/day  3. uses home o2 PRN  4. CAD   5. HTN  6. DM type 2  7, h/o Breast cancer  8. H/o DVT on xaralto -     Plan  - Doing well on NC  - Refused BiPaP at night , will d/c   - S/P EGD  .. angioectasia . no active bleed. refer to report for more detail   - CBC qd  - Protonix bid   - Encourage coughing and deep breathing  - Encourage incentive spirometer  - Wellbutrin   - Continue xanax TID prn  - S/S .. spoon feed only   - Trend CBC and transfuse for hgb less than 7  - Monitor stools   - Continue cardura for urinary retention voiding well   - Cont. Aspirin and statin   - Off anticoag    - Nicotine patch  - PT eval > OOB.

## 2023-11-12 NOTE — PROGRESS NOTE ADULT - PROBLEM SELECTOR PLAN 1
Monitor CBC  Keep active T&S  Transfuse <7 or symptomatic  Monitor stools and any emesis for bleeding  Oral PPI daily  Advance to solid diet. Monitor CBC  Keep active T&S  Transfuse <7 or symptomatic  Monitor stools and any emesis for bleeding  Oral PPI daily  Solid diet.

## 2023-11-12 NOTE — PROGRESS NOTE ADULT - ASSESSMENT
68F PMHx of COPD, HTN, HLD, DM, on xarelto for DVT history, hx of breast ca s/p left mastectomy, seen by GI for melena and anemia, s/p EGD w/hemostasis.  CBC improved and remains stable.

## 2023-11-12 NOTE — PROGRESS NOTE ADULT - SUBJECTIVE AND OBJECTIVE BOX
INTERVAL HPI/OVERNIGHT EVENTS:  HPI:  68F PMHx of COPD, HTN, HLD, DM, on xarelto for DVT history, hx of breast ca s/p left mastectomy, on chemotx last tx was 1 week ago presenting with shortness of breath, since last night. She is an active smoker. Last cigarette was yesterday. Denies any fevers or chills. ICU consulted as patient needing bipap support in the ED. No chest pain or palpitations. (27 Oct 2023 15:03)    GI progress note: patient seen and examined at bedside. No overnight events reported. Patient denies any N/V/D/abdominal pain/hematemesis/hematochezia. Tolerating diet    MEDICATIONS  (STANDING):  aspirin enteric coated 81 milliGRAM(s) Oral daily  atorvastatin 80 milliGRAM(s) Oral at bedtime  buDESOnide    Inhalation Suspension 0.5 milliGRAM(s) Inhalation every 12 hours  buPROPion XL (24-Hour) . 150 milliGRAM(s) Oral daily  dextrose 5%. 1000 milliLiter(s) (100 mL/Hr) IV Continuous <Continuous>  dextrose 5%. 1000 milliLiter(s) (50 mL/Hr) IV Continuous <Continuous>  dextrose 50% Injectable 25 Gram(s) IV Push once  dextrose 50% Injectable 12.5 Gram(s) IV Push once  dextrose 50% Injectable 25 Gram(s) IV Push once  doxazosin 2 milliGRAM(s) Oral at bedtime  insulin lispro (ADMELOG) corrective regimen sliding scale   SubCutaneous Before meals and at bedtime  letrozole 2.5 milliGRAM(s) Oral daily  levalbuterol Inhalation 0.63 milliGRAM(s) Inhalation every 6 hours  methylPREDNISolone sodium succinate Injectable 40 milliGRAM(s) IV Push every 8 hours  midodrine 5 milliGRAM(s) Oral every 8 hours  nicotine - 21 mG/24Hr(s) Patch 1 Patch Transdermal daily  pantoprazole   Suspension 40 milliGRAM(s) Oral before breakfast  polyethylene glycol 3350 17 Gram(s) Oral daily  senna Syrup 10 milliLiter(s) Oral at bedtime    MEDICATIONS  (PRN):  acetaminophen     Tablet .. 650 milliGRAM(s) Oral every 6 hours PRN Temp greater or equal to 38C (100.4F), Mild Pain (1 - 3)  ALPRAZolam 0.5 milliGRAM(s) Oral every 8 hours PRN anxiety  bisacodyl Suppository 10 milliGRAM(s) Rectal daily PRN Constipation  ondansetron Injectable 4 milliGRAM(s) IV Push every 6 hours PRN Nausea and/or Vomiting  sodium chloride 0.9% lock flush 10 milliLiter(s) IV Push every 1 hour PRN Pre/post blood products, medications, blood draw, and to maintain line patency      Allergies    penicillin (Hives)    Intolerances        PAST MEDICAL & SURGICAL HISTORY:  COPD, severe      CAD (coronary artery disease)      Breast cancer      No significant past surgical history          REVIEW OF SYSTEMS  Negative unless indicated in HPI        PHYSICAL EXAM:   Vital Signs:  Vital Signs Last 24 Hrs  T(C): 36.6 (12 Nov 2023 05:30), Max: 37.1 (11 Nov 2023 18:00)  T(F): 97.9 (12 Nov 2023 05:30), Max: 98.8 (11 Nov 2023 18:00)  HR: 88 (12 Nov 2023 09:50) (88 - 122)  BP: 170/85 (12 Nov 2023 05:30) (128/116 - 171/88)  BP(mean): 93 (11 Nov 2023 20:00) (88 - 122)  RR: 20 (12 Nov 2023 05:30) (14 - 23)  SpO2: 98% (12 Nov 2023 09:50) (98% - 100%)    Parameters below as of 12 Nov 2023 09:50  Patient On (Oxygen Delivery Method): nasal cannula, 2L      Daily     Daily I&O's Summary    11 Nov 2023 07:01  -  12 Nov 2023 07:00  --------------------------------------------------------  IN: 0 mL / OUT: 500 mL / NET: -500 mL      GENERAL:  Appears older than stated age, NAD  HEENT:  NC/AT,  conjunctivae clear and pink  CHEST:  Full & symmetric excursion  HEART:  S1, S2  ABDOMEN:  Soft, non-tender, non-distended, normoactive bowel sounds  EXTREMITIES:  no cyanosis, clubbing or edema  SKIN:  No rash or jaundice  NEURO:  Alert, oriented      LABS:                        8.2    13.14 )-----------( 219      ( 12 Nov 2023 06:50 )             26.1     11-12    141  |  104  |  15  ----------------------------<  204<H>  3.6   |  33<H>  |  0.34<L>    Ca    8.4      12 Nov 2023 06:50  Phos  2.6     11-12  Mg     2.1     11-12    TPro  6.2  /  Alb  3.4  /  TBili  0.6  /  DBili  x   /  AST  22  /  ALT  56<H>  /  AlkPhos  52  11-12      Urinalysis Basic - ( 12 Nov 2023 06:50 )    Color: x / Appearance: x / SG: x / pH: x  Gluc: 204 mg/dL / Ketone: x  / Bili: x / Urobili: x   Blood: x / Protein: x / Nitrite: x   Leuk Esterase: x / RBC: x / WBC x   Sq Epi: x / Non Sq Epi: x / Bacteria: x      amylase   lipase  RADIOLOGY & ADDITIONAL TESTS:  Hemoglobin: 8.2 g/dL (11.12.23 @ 06:50)  Hemoglobin: 8.0 g/dL (11.11.23 @ 06:00)  Hemoglobin: 7.6 g/dL (11.10.23 @ 06:20)           GI Follow up    Patient seen and examined at bedside. No overnight events reported. Downgraded from CCU.  Patient denies any N/V/D/abdominal pain/hematemesis/hematochezia. Tolerating diet.    MEDICATIONS  (STANDING):  aspirin enteric coated 81 milliGRAM(s) Oral daily  atorvastatin 80 milliGRAM(s) Oral at bedtime  buDESOnide    Inhalation Suspension 0.5 milliGRAM(s) Inhalation every 12 hours  buPROPion XL (24-Hour) . 150 milliGRAM(s) Oral daily  dextrose 5%. 1000 milliLiter(s) (100 mL/Hr) IV Continuous <Continuous>  dextrose 5%. 1000 milliLiter(s) (50 mL/Hr) IV Continuous <Continuous>  dextrose 50% Injectable 25 Gram(s) IV Push once  dextrose 50% Injectable 12.5 Gram(s) IV Push once  dextrose 50% Injectable 25 Gram(s) IV Push once  doxazosin 2 milliGRAM(s) Oral at bedtime  insulin lispro (ADMELOG) corrective regimen sliding scale   SubCutaneous Before meals and at bedtime  letrozole 2.5 milliGRAM(s) Oral daily  levalbuterol Inhalation 0.63 milliGRAM(s) Inhalation every 6 hours  methylPREDNISolone sodium succinate Injectable 40 milliGRAM(s) IV Push every 8 hours  midodrine 5 milliGRAM(s) Oral every 8 hours  nicotine - 21 mG/24Hr(s) Patch 1 Patch Transdermal daily  pantoprazole   Suspension 40 milliGRAM(s) Oral before breakfast  polyethylene glycol 3350 17 Gram(s) Oral daily  senna Syrup 10 milliLiter(s) Oral at bedtime    MEDICATIONS  (PRN):  acetaminophen     Tablet .. 650 milliGRAM(s) Oral every 6 hours PRN Temp greater or equal to 38C (100.4F), Mild Pain (1 - 3)  ALPRAZolam 0.5 milliGRAM(s) Oral every 8 hours PRN anxiety  bisacodyl Suppository 10 milliGRAM(s) Rectal daily PRN Constipation  ondansetron Injectable 4 milliGRAM(s) IV Push every 6 hours PRN Nausea and/or Vomiting  sodium chloride 0.9% lock flush 10 milliLiter(s) IV Push every 1 hour PRN Pre/post blood products, medications, blood draw, and to maintain line patency      Allergies    penicillin (Hives)    Intolerances        PHYSICAL EXAM:   Vital Signs:  Vital Signs Last 24 Hrs  T(C): 36.6 (12 Nov 2023 05:30), Max: 37.1 (11 Nov 2023 18:00)  T(F): 97.9 (12 Nov 2023 05:30), Max: 98.8 (11 Nov 2023 18:00)  HR: 88 (12 Nov 2023 09:50) (88 - 122)  BP: 170/85 (12 Nov 2023 05:30) (128/116 - 171/88)  BP(mean): 93 (11 Nov 2023 20:00) (88 - 122)  RR: 20 (12 Nov 2023 05:30) (14 - 23)  SpO2: 98% (12 Nov 2023 09:50) (98% - 100%)    Parameters below as of 12 Nov 2023 09:50  Patient On (Oxygen Delivery Method): nasal cannula, 2L      Daily     Daily I&O's Summary    11 Nov 2023 07:01  -  12 Nov 2023 07:00  --------------------------------------------------------  IN: 0 mL / OUT: 500 mL / NET: -500 mL      GENERAL:  Appears older than stated age, NAD  HEENT:  NC/AT,  conjunctivae clear and pink  CHEST:  Full & symmetric excursion  HEART:  S1, S2  ABDOMEN:  Soft, non-tender, non-distended, normoactive bowel sounds  EXTREMITIES:  no cyanosis, clubbing or edema  SKIN:  No rash or jaundice  NEURO:  Alert, oriented      LABS:                        8.2    13.14 )-----------( 219      ( 12 Nov 2023 06:50 )             26.1     Hemoglobin: 8.2 g/dL (11.12.23 @ 06:50)  Hemoglobin: 8.0 g/dL (11.11.23 @ 06:00)  Hemoglobin: 7.6 g/dL (11.10.23 @ 06:20)      11-12    141  |  104  |  15  ----------------------------<  204<H>  3.6   |  33<H>  |  0.34<L>    Ca    8.4      12 Nov 2023 06:50  Phos  2.6     11-12  Mg     2.1     11-12    TPro  6.2  /  Alb  3.4  /  TBili  0.6  /  DBili  x   /  AST  22  /  ALT  56<H>  /  AlkPhos  52  11-12      Urinalysis Basic - ( 12 Nov 2023 06:50 )    Color: x / Appearance: x / SG: x / pH: x  Gluc: 204 mg/dL / Ketone: x  / Bili: x / Urobili: x   Blood: x / Protein: x / Nitrite: x   Leuk Esterase: x / RBC: x / WBC x   Sq Epi: x / Non Sq Epi: x / Bacteria:

## 2023-11-12 NOTE — PROGRESS NOTE ADULT - ASSESSMENT
68 year old woman with  COPD, CAD, HLD, DM2, on xarelto for DVT history, hx of breast ca s/p left mastectomy, on chemotx presented with shortness of breath. Admitted to ICU for respiratory failure due to COPD exacerbation in the setting of Entero/rhinovirus with COPD, severe Exacerbation    DOWNGRADED FROM MICU 11/12/23    #Acute on chronic respiratory failure due to acute COPD Exacerbation in setting of ERV requiring intubation 10/31/23 Extubated 11/2/2023, Re intubated 11/3/23, extubated  #COPD, chronic respiratory failure, on home oxygen  #Active nicotine use - 1 pk/day  - ICU appreciated; Will monitor on tele and pulse oximetry  - C/w oxygen supplementation; titrate if possible, incentive spiromter  - C/w solumedrol 40mg IV q 8hrs; will titrate    #Acute blood loss anemia, possible GI bleed, s/p one unit PRBC  - Appreciate GI consult team, s/p EGD  - Will monitor Hg/Hct, transfuse if Hg<7  - C/w protonix    #CAD   #HTN    #DM type 2    #h/o Breast cancer    #H/o DVT on xaralto -       - Continue xanax TID prn    - Monitor off abx, ID following   - Continue cardura for urinary retention voiding well   - Cont. Aspirin and statin   - Off anticoag    - Nicotine patch  - PT eval > OOB.         68 year old woman with  COPD, CAD, HLD, DM2, on xarelto for DVT history, hx of breast ca s/p left mastectomy, on chemotx presented with shortness of breath. Admitted to ICU for respiratory failure due to COPD exacerbation in the setting of Entero/rhinovirus with COPD, severe Exacerbation    DOWNGRADED FROM MICU 11/12/23    #Acute on chronic respiratory failure due to acute COPD Exacerbation in setting of ERV requiring intubation 10/31/23 Extubated 11/2/2023, Re intubated 11/3/23, extubated  #COPD, chronic respiratory failure, on home oxygen  #Active nicotine use - 1 pk/day  - ICU appreciated; Will monitor on tele and pulse oximetry  - C/w oxygen supplementation; titrate if possible, incentive spirometer  - C/w solumedrol 40mg IV q 8hrs; will titrate  - C/w xopenex, pulmicort  - C/w nicotine patch    #Acute blood loss anemia, possible GI bleed, s/p one unit PRBC  - Appreciate GI consult team, s/p EGD  - Will monitor Hg/Hct, transfuse if Hg<7  - C/w protonix po daily    #Leukocytosis  - Will monitor for now; likely related to steroid    #Dysphagia  - Currently on pureed diet; MBS in AM    #CAD   #HTN  #Orthostatic hypotension  - Will aim to titrate down midodrine 5mg every 8hrs  - C/w ASA, lipitor     #DM type 2  - ISS, A1c noted to be well controlled    #h/o Breast cancer  - C/w femara 2.5mg daily    #H/o DVT  - HOLDING xaralto due to acute blood loss anemia  - Will check LE ULT     #Anxiety  - Continue xanax TID prn    #Acute urinary retention, improved  - Continue cardura for urinary retention voiding well     DVT PPX: ICD  AM labs  PT recommended acute rehab  Will order OT/PMR    11/12: Spoke with brother Amador on phone  Spoke with cousin Enid in person  All questions answered, in agreement with care plan

## 2023-11-13 NOTE — OCCUPATIONAL THERAPY INITIAL EVALUATION ADULT - ADDITIONAL COMMENTS
Pt. is a 68yr old Right hand dominant female, with exacerbation of COPD. Pt. lives in private home with daughter and a brother, with [address], 1st floor space, with step-in Shower w/ curtain, bench, and hand held shower.  Pt. reports not using any other devices at home.  Pt. reports Driving at baseline, while wearing glasses.

## 2023-11-13 NOTE — PROGRESS NOTE ADULT - ASSESSMENT
68F PMHx of COPD, HTN, HLD, DM, on xarelto for DVT history, hx of breast ca s/p left mastectomy, seen by GI for melena and anemia, s/p EGD w/hemostasis.

## 2023-11-13 NOTE — PROGRESS NOTE ADULT - SUBJECTIVE AND OBJECTIVE BOX
Time of Visit:  Patient seen and examined.     MEDICATIONS  (STANDING):  aspirin enteric coated 81 milliGRAM(s) Oral daily  atorvastatin 80 milliGRAM(s) Oral at bedtime  buDESOnide    Inhalation Suspension 0.5 milliGRAM(s) Inhalation every 12 hours  buPROPion XL (24-Hour) . 150 milliGRAM(s) Oral daily  chlorhexidine 2% Cloths 1 Application(s) Topical daily  dextrose 5%. 1000 milliLiter(s) (50 mL/Hr) IV Continuous <Continuous>  dextrose 5%. 1000 milliLiter(s) (100 mL/Hr) IV Continuous <Continuous>  dextrose 50% Injectable 12.5 Gram(s) IV Push once  dextrose 50% Injectable 25 Gram(s) IV Push once  dextrose 50% Injectable 25 Gram(s) IV Push once  doxazosin 2 milliGRAM(s) Oral at bedtime  hydrocortisone sodium succinate Injectable 40 milliGRAM(s) IV Push daily  insulin lispro (ADMELOG) corrective regimen sliding scale   SubCutaneous Before meals and at bedtime  letrozole 2.5 milliGRAM(s) Oral daily  levalbuterol Inhalation 0.63 milliGRAM(s) Inhalation every 6 hours  midodrine 5 milliGRAM(s) Oral <User Schedule>  nicotine - 21 mG/24Hr(s) Patch 1 Patch Transdermal daily  pantoprazole   Suspension 40 milliGRAM(s) Oral before breakfast  polyethylene glycol 3350 17 Gram(s) Oral daily  senna Syrup 10 milliLiter(s) Oral at bedtime  tiotropium 2.5 MICROgram(s) Inhaler 2 Puff(s) Inhalation daily      MEDICATIONS  (PRN):  acetaminophen     Tablet .. 650 milliGRAM(s) Oral every 6 hours PRN Temp greater or equal to 38C (100.4F), Mild Pain (1 - 3)  ALPRAZolam 0.5 milliGRAM(s) Oral every 8 hours PRN anxiety  bisacodyl Suppository 10 milliGRAM(s) Rectal daily PRN Constipation  ondansetron Injectable 4 milliGRAM(s) IV Push every 6 hours PRN Nausea and/or Vomiting  sodium chloride 0.9% lock flush 10 milliLiter(s) IV Push every 1 hour PRN Pre/post blood products, medications, blood draw, and to maintain line patency       Medications up to date at time of exam.      PHYSICAL EXAMINATION:  Patient has no new complaints.  GENERAL: The patient is a well-developed, well-nourished, in no apparent distress.     Vital Signs Last 24 Hrs  T(C): 37 (13 Nov 2023 04:58), Max: 37 (12 Nov 2023 21:00)  T(F): 98.6 (13 Nov 2023 04:58), Max: 98.6 (12 Nov 2023 21:00)  HR: 100 (13 Nov 2023 15:23) (96 - 117)  BP: 158/83 (13 Nov 2023 14:40) (153/78 - 168/76)  BP(mean): --  RR: 19 (13 Nov 2023 04:58) (19 - 20)  SpO2: 100% (13 Nov 2023 15:23) (99% - 100%)    Parameters below as of 13 Nov 2023 15:23  Patient On (Oxygen Delivery Method): nasal cannula       (if applicable)    Chest Tube (if applicable)    HEENT: Head is normocephalic and atraumatic. Extraocular muscles are intact. Mucous membranes are moist.     NECK: Supple, no palpable adenopathy.    LUNGS: Clear to auscultation, no wheezing, rales, or rhonchi.    HEART: Regular rate and rhythm without murmur.    ABDOMEN: Soft, nontender, and nondistended.  No hepatosplenomegaly is noted.    : No painful voiding, no pelvic pain    EXTREMITIES: Without any cyanosis, clubbing, rash, lesions or edema.    NEUROLOGIC: Awake, alert, oriented, grossly intact    SKIN: Warm, dry, good turgor.      LABS:                        7.6    11.50 )-----------( 199      ( 13 Nov 2023 12:27 )             23.8     11-13    140  |  103  |  15  ----------------------------<  183<H>  3.1<L>   |  37<H>  |  0.58    Ca    7.9<L>      13 Nov 2023 12:27  Phos  2.6     11-12  Mg     2.2     11-13    TPro  5.8<L>  /  Alb  3.1<L>  /  TBili  0.5  /  DBili  x   /  AST  25  /  ALT  51<H>  /  AlkPhos  52  11-13      Urinalysis Basic - ( 13 Nov 2023 12:27 )    Color: x / Appearance: x / SG: x / pH: x  Gluc: 183 mg/dL / Ketone: x  / Bili: x / Urobili: x   Blood: x / Protein: x / Nitrite: x   Leuk Esterase: x / RBC: x / WBC x   Sq Epi: x / Non Sq Epi: x / Bacteria: x    MICROBIOLOGY: (if applicable)    RADIOLOGY & ADDITIONAL STUDIES:  EKG:   CXR:  ECHO:    IMPRESSION: 68y Female PAST MEDICAL & SURGICAL HISTORY:  COPD, severe  CAD (coronary artery disease)  Breast cancer  No significant past surgical history        IMP: This is a 68 year old woman with  COPD, CAD, HLD, DM2, on xarelto for DVT history, hx of breast ca s/p left mastectomy, on chemotx presented with shortness of breath. Admitted to ICU for respiratory failure due to COPD exacerbation in the setting of Entero/rhinovirus with COPD, severe Exacerbation    Problem List  1. Acute respiratory failure due to acute COPD Exacerbation in setting of ERV requiring intubation 10/31/23 Extubated 11/2/2023, Re intubated 11/3/23  2. Active nicotine use - 1 pk/day  3. uses home o2 PRN  4. CAD   5. HTN  6. DM type 2  7, h/o Breast cancer  8. H/o DVT on xaralto -     Plan  - Doing well on NC  - Off BiPaP  - S/P EGD  .. angioectasia . no active bleed. refer to report for more detail   - CBC qd  - Protonix bid   - Encourage coughing and deep breathing  - Encourage incentive spirometer  - Wellbutrin   - Continue xanax TID prn  - Add Symbicort 160/ 4.5 q12h   - Add Daliresp 500 mg qd   - Monitor stools  - Cont. Aspirin and statin   - Off anticoag    - Nicotine patch  - PT eval > OOB.

## 2023-11-13 NOTE — PROGRESS NOTE ADULT - SUBJECTIVE AND OBJECTIVE BOX
INTERVAL HPI/OVERNIGHT EVENTS:  Patient seen examined  at bed side, Family at bed side. As per RN no event over night. Denies abdominal pain, nausea, vomiting diarrhea or constipation.     MEDICATIONS  (STANDING):  aspirin enteric coated 81 milliGRAM(s) Oral daily  atorvastatin 80 milliGRAM(s) Oral at bedtime  buDESOnide    Inhalation Suspension 0.5 milliGRAM(s) Inhalation every 12 hours  buPROPion XL (24-Hour) . 150 milliGRAM(s) Oral daily  chlorhexidine 2% Cloths 1 Application(s) Topical daily  dextrose 5%. 1000 milliLiter(s) (100 mL/Hr) IV Continuous <Continuous>  dextrose 5%. 1000 milliLiter(s) (50 mL/Hr) IV Continuous <Continuous>  dextrose 50% Injectable 25 Gram(s) IV Push once  dextrose 50% Injectable 25 Gram(s) IV Push once  dextrose 50% Injectable 12.5 Gram(s) IV Push once  doxazosin 2 milliGRAM(s) Oral at bedtime  hydrocortisone sodium succinate Injectable 40 milliGRAM(s) IV Push daily  insulin lispro (ADMELOG) corrective regimen sliding scale   SubCutaneous Before meals and at bedtime  letrozole 2.5 milliGRAM(s) Oral daily  levalbuterol Inhalation 0.63 milliGRAM(s) Inhalation every 6 hours  midodrine 5 milliGRAM(s) Oral <User Schedule>  nicotine - 21 mG/24Hr(s) Patch 1 Patch Transdermal daily  pantoprazole   Suspension 40 milliGRAM(s) Oral before breakfast  polyethylene glycol 3350 17 Gram(s) Oral daily  senna Syrup 10 milliLiter(s) Oral at bedtime  tiotropium 2.5 MICROgram(s) Inhaler 2 Puff(s) Inhalation daily    MEDICATIONS  (PRN):  acetaminophen     Tablet .. 650 milliGRAM(s) Oral every 6 hours PRN Temp greater or equal to 38C (100.4F), Mild Pain (1 - 3)  ALPRAZolam 0.5 milliGRAM(s) Oral every 8 hours PRN anxiety  bisacodyl Suppository 10 milliGRAM(s) Rectal daily PRN Constipation  ondansetron Injectable 4 milliGRAM(s) IV Push every 6 hours PRN Nausea and/or Vomiting  sodium chloride 0.9% lock flush 10 milliLiter(s) IV Push every 1 hour PRN Pre/post blood products, medications, blood draw, and to maintain line patency      Allergies    penicillin (Hives)    Intolerances        PAST MEDICAL & SURGICAL HISTORY:  COPD, severe      CAD (coronary artery disease)      Breast cancer      No significant past surgical history    PHYSICAL EXAM:   Vital Signs:  Vital Signs Last 24 Hrs  T(C): 37 (2023 04:58), Max: 37 (2023 21:00)  T(F): 98.6 (2023 04:58), Max: 98.6 (2023 21:00)  HR: 101 (2023 10:37) (96 - 117)  BP: 167/77 (2023 10:37) (153/78 - 168/76)  BP(mean): --  RR: 19 (2023 04:58) (19 - 20)  SpO2: 99% (2023 10:37) (99% - 100%)    Parameters below as of 2023 11:36  Patient On (Oxygen Delivery Method): mask, nonrebreather      Daily     Daily Weight in k.9 (2023 07:00)I&O's Summary    2023 07:01  -  2023 07:00  --------------------------------------------------------  IN: 1040 mL / OUT: 1425 mL / NET: -385 mL    2023 07:01  -  2023 14:11  --------------------------------------------------------  IN: 420 mL / OUT: 0 mL / NET: 420 mL        GENERAL:  Appears stated age  HEENT:  NC/AT,  conjunctivae clear and pink  CHEST:  Full & symmetric excursion  HEART:  Regular rhythm, S1, S2  ABDOMEN:  Soft, non-tender, non-distended, normoactive bowel sounds  EXTREMITIES:  no cyanosis, clubbing or edema  SKIN:  No rash/warm/dry  NEURO:  Alert, oriented       LABS:                        7.6    11.50 )-----------( 199      ( 2023 12:27 )             23.8     11-13    140  |  103  |  15  ----------------------------<  183<H>  3.1<L>   |  37<H>  |  0.58    Ca    7.9<L>      2023 12:27  Phos  2.6       Mg     2.2         TPro  5.8<L>  /  Alb  3.1<L>  /  TBili  0.5  /  DBili  x   /  AST  25  /  ALT  51<H>  /  AlkPhos  52  11-13      Urinalysis Basic - ( 2023 12:27 )    Color: x / Appearance: x / SG: x / pH: x  Gluc: 183 mg/dL / Ketone: x  / Bili: x / Urobili: x   Blood: x / Protein: x / Nitrite: x   Leuk Esterase: x / RBC: x / WBC x   Sq Epi: x / Non Sq Epi: x / Bacteria: x      amylase   lipase  RADIOLOGY & ADDITIONAL TESTS:   GI Follow up    Patient seen and examined at the bedside. Family present.  As per RN no event over night. Denies abdominal pain, nausea, vomiting diarrhea or constipation, hematochezia or melena.       MEDICATIONS  (STANDING):  aspirin enteric coated 81 milliGRAM(s) Oral daily  atorvastatin 80 milliGRAM(s) Oral at bedtime  buDESOnide    Inhalation Suspension 0.5 milliGRAM(s) Inhalation every 12 hours  buPROPion XL (24-Hour) . 150 milliGRAM(s) Oral daily  chlorhexidine 2% Cloths 1 Application(s) Topical daily  dextrose 5%. 1000 milliLiter(s) (100 mL/Hr) IV Continuous <Continuous>  dextrose 5%. 1000 milliLiter(s) (50 mL/Hr) IV Continuous <Continuous>  dextrose 50% Injectable 25 Gram(s) IV Push once  dextrose 50% Injectable 25 Gram(s) IV Push once  dextrose 50% Injectable 12.5 Gram(s) IV Push once  doxazosin 2 milliGRAM(s) Oral at bedtime  hydrocortisone sodium succinate Injectable 40 milliGRAM(s) IV Push daily  insulin lispro (ADMELOG) corrective regimen sliding scale   SubCutaneous Before meals and at bedtime  letrozole 2.5 milliGRAM(s) Oral daily  levalbuterol Inhalation 0.63 milliGRAM(s) Inhalation every 6 hours  midodrine 5 milliGRAM(s) Oral <User Schedule>  nicotine - 21 mG/24Hr(s) Patch 1 Patch Transdermal daily  pantoprazole   Suspension 40 milliGRAM(s) Oral before breakfast  polyethylene glycol 3350 17 Gram(s) Oral daily  senna Syrup 10 milliLiter(s) Oral at bedtime  tiotropium 2.5 MICROgram(s) Inhaler 2 Puff(s) Inhalation daily    MEDICATIONS  (PRN):  acetaminophen     Tablet .. 650 milliGRAM(s) Oral every 6 hours PRN Temp greater or equal to 38C (100.4F), Mild Pain (1 - 3)  ALPRAZolam 0.5 milliGRAM(s) Oral every 8 hours PRN anxiety  bisacodyl Suppository 10 milliGRAM(s) Rectal daily PRN Constipation  ondansetron Injectable 4 milliGRAM(s) IV Push every 6 hours PRN Nausea and/or Vomiting  sodium chloride 0.9% lock flush 10 milliLiter(s) IV Push every 1 hour PRN Pre/post blood products, medications, blood draw, and to maintain line patency      Allergies    penicillin (Hives)    Intolerances          PHYSICAL EXAM:   Vital Signs:  Vital Signs Last 24 Hrs  T(C): 37 (2023 04:58), Max: 37 (2023 21:00)  T(F): 98.6 (2023 04:58), Max: 98.6 (2023 21:00)  HR: 101 (2023 10:37) (96 - 117)  BP: 167/77 (2023 10:37) (153/78 - 168/76)  BP(mean): --  RR: 19 (2023 04:58) (19 - 20)  SpO2: 99% (2023 10:37) (99% - 100%)    Parameters below as of 2023 11:36  Patient On (Oxygen Delivery Method): mask, nonrebreather      Daily     Daily Weight in k.9 (2023 07:00)I&O's Summary    2023 07:01  -  2023 07:00  --------------------------------------------------------  IN: 1040 mL / OUT: 1425 mL / NET: -385 mL    2023 07:01  -  2023 14:11  --------------------------------------------------------  IN: 420 mL / OUT: 0 mL / NET: 420 mL        GENERAL:  Appears stated age  HEENT:  NC/AT,  conjunctivae clear and pink  CHEST:  Full & symmetric excursion  HEART:  Regular rhythm, S1, S2  ABDOMEN:  Soft, non-tender, non-distended, normoactive bowel sounds  EXTREMITIES:  no cyanosis, clubbing or edema  SKIN:  No rash/warm/dry  NEURO:  Alert, oriented       LABS:                        7.6    11.50 )-----------( 199      ( 2023 12:27 )             23.8         140  |  103  |  15  ----------------------------<  183<H>  3.1<L>   |  37<H>  |  0.58    Ca    7.9<L>      2023 12:27  Phos  2.6       Mg     2.2         TPro  5.8<L>  /  Alb  3.1<L>  /  TBili  0.5  /  DBili  x   /  AST  25  /  ALT  51<H>  /  AlkPhos  52  13      Urinalysis Basic - ( 2023 12:27 )    Color: x / Appearance: x / SG: x / pH: x  Gluc: 183 mg/dL / Ketone: x  / Bili: x / Urobili: x   Blood: x / Protein: x / Nitrite: x   Leuk Esterase: x / RBC: x / WBC x   Sq Epi: x / Non Sq Epi: x / Bacteria: x

## 2023-11-13 NOTE — OCCUPATIONAL THERAPY INITIAL EVALUATION ADULT - NSACTIVITYREC_GEN_A_OT
1 assist with ADL's.   1 assist with stand-pivot transfers.   Cue patient to participate as much as possible.   Cue patient for positioning of Right hand for edema management.

## 2023-11-13 NOTE — OCCUPATIONAL THERAPY INITIAL EVALUATION ADULT - RANGE OF MOTION EXAMINATION, UPPER EXTREMITY
Some edema noted in dorsum of right hand. Pt. hand repositioned and educated on good positioning to facilitate decrease in edema./bilateral UE Active ROM was WFL  (within functional limits)

## 2023-11-13 NOTE — CONSULT NOTE ADULT - ASSESSMENT
------------------------------------------------------------------------------------------------  ASSESSMENT/PLAN  68yFemale with functional deficits after resp failure/ exac COPD  Proximal Muscle weakness BLEs- Likely Critical illness/? steroid myopathy   Hx Breast Ca-  Anemia/ GI Bleed    Electrolyte Abnormality  Orthostasis  DVT PPX - SCDs  Rehab -    Recommend MARK, patient DOES NOT meet acute inpatient rehabilitation criteria- Will not tolerate three hours rehab daily due to respiratory requirements as well as medical status at present  Continue OOB/ Bedside PT while hospitalized to prevent further decompensation    Will sign off, please reconsult if needed for rehab dispo recommendations.

## 2023-11-13 NOTE — PROGRESS NOTE ADULT - PROBLEM SELECTOR PLAN 1
Monitor CBC  Keep active T&S  Transfuse <7 or symptomatic  Monitor stools and any emesis for bleeding  Oral PPI daily  Solid diet.

## 2023-11-13 NOTE — OCCUPATIONAL THERAPY INITIAL EVALUATION ADULT - PERTINENT HX OF CURRENT PROBLEM, REHAB EVAL
68F PMHx of COPD, HTN, HLD, DM, on xarelto for DVT history, hx of breast ca s/p left mastectomy, on chemotx last tx was 10/20, presenting with shortness of breath, since 10/26. She is an active smoker. Last cigarette was 10/26. Denies any fevers or chills. ICU consulted as patient needing bipap support in the ED. No chest pain or palpitations.

## 2023-11-13 NOTE — PROGRESS NOTE ADULT - ASSESSMENT
68 year old woman with  COPD, CAD, HLD, DM2, on xarelto for DVT history, hx of breast ca s/p left mastectomy, on chemotx presented with shortness of breath. Admitted to ICU for respiratory failure due to COPD exacerbation in the setting of Entero/rhinovirus with COPD, severe Exacerbation    DOWNGRADED FROM MICU 11/12/23    #Acute on chronic respiratory failure due to acute COPD Exacerbation in setting of ERV requiring intubation 10/31/23 Extubated 11/2/2023, Re intubated 11/3/23, extubated  #COPD, chronic respiratory failure, on home oxygen  #Active nicotine use - 1 pk/day  - ICU appreciated; Will monitor on tele and pulse oximetry  - C/w oxygen supplementation; titrate if possible, incentive spirometer  - C/w solucortef 40mg IV daily x 4days  - C/w xopenex, pulmicort  - C/w nicotine patch    #Acute blood loss anemia, possible GI bleed, s/p one unit PRBC  - Appreciate GI consult team, s/p EGD  - Will monitor Hg/Hct, transfuse if Hg<7  - C/w protonix po daily    #Leukocytosis  - Will monitor for now; likely related to steroid    #Dysphagia  - Currently on pureed diet; MBS in AM    #CAD   #HTN  #Orthostatic hypotension  - Will aim to titrate down midodrine; today 5mg at 8AM and 1pm  - C/w ASA, lipitor     #DM type 2  - ISS, A1c noted to be well controlled    #h/o Breast cancer  - C/w femara 2.5mg daily    #H/o DVT  - HOLDING xaralto due to acute blood loss anemia  - Will check LE ULT     #Anxiety  - Continue xanax TID prn    #Acute urinary retention, improved  - Continue cardura for urinary retention voiding well     DVT PPX: ICD  AM labs  PT recommended acute rehab  Will order OT/PMR    11/13: Spoke with brother Amador on phone  Spoke with cousin Enid in person  All questions answered, in agreement with care plan

## 2023-11-13 NOTE — OCCUPATIONAL THERAPY INITIAL EVALUATION ADULT - GENERAL OBSERVATIONS, REHAB EVAL
Pt received incline supine in bed, +Vega, +IV, +2LPM O2 via NC, + Edema in right UE. Pt. agreeable to OT evaluation. Vitals running high, pt. repositioned in bed, with all lines in place.

## 2023-11-13 NOTE — PHARMACOTHERAPY INTERVENTION NOTE - COMMENTS
met with patient and brother and reviewed patient's current meds and home meds. All questions/concerns addressed. Patient is interested in obtaining nebulizer for home use if appropriate.

## 2023-11-13 NOTE — PROGRESS NOTE ADULT - SUBJECTIVE AND OBJECTIVE BOX
Patient is a 68y old  Female who presents with a chief complaint of respiratory failure (09 Nov 2023 12:46)    Reports still has cough and SOB but has improved  Did not get up out of bed yesterday  BM yesterday, urinating  Tolerating diet  Has episodes of anxiety; xanax helps    Patient seen and examined at bedside.    ALLERGIES:  penicillin (Hives)    MEDICATIONS  (STANDING):  aspirin enteric coated 81 milliGRAM(s) Oral daily  atorvastatin 80 milliGRAM(s) Oral at bedtime  buDESOnide    Inhalation Suspension 0.5 milliGRAM(s) Inhalation every 12 hours  buPROPion XL (24-Hour) . 150 milliGRAM(s) Oral daily  chlorhexidine 2% Cloths 1 Application(s) Topical daily  dextrose 5%. 1000 milliLiter(s) (100 mL/Hr) IV Continuous <Continuous>  dextrose 5%. 1000 milliLiter(s) (50 mL/Hr) IV Continuous <Continuous>  dextrose 50% Injectable 25 Gram(s) IV Push once  dextrose 50% Injectable 12.5 Gram(s) IV Push once  dextrose 50% Injectable 25 Gram(s) IV Push once  doxazosin 2 milliGRAM(s) Oral at bedtime  hydrocortisone sodium succinate Injectable 40 milliGRAM(s) IV Push daily  insulin lispro (ADMELOG) corrective regimen sliding scale   SubCutaneous Before meals and at bedtime  letrozole 2.5 milliGRAM(s) Oral daily  levalbuterol Inhalation 0.63 milliGRAM(s) Inhalation every 6 hours  midodrine 5 milliGRAM(s) Oral <User Schedule>  nicotine - 21 mG/24Hr(s) Patch 1 Patch Transdermal daily  pantoprazole   Suspension 40 milliGRAM(s) Oral before breakfast  polyethylene glycol 3350 17 Gram(s) Oral daily  senna Syrup 10 milliLiter(s) Oral at bedtime    MEDICATIONS  (PRN):  acetaminophen     Tablet .. 650 milliGRAM(s) Oral every 6 hours PRN Temp greater or equal to 38C (100.4F), Mild Pain (1 - 3)  ALPRAZolam 0.5 milliGRAM(s) Oral every 8 hours PRN anxiety  bisacodyl Suppository 10 milliGRAM(s) Rectal daily PRN Constipation  ondansetron Injectable 4 milliGRAM(s) IV Push every 6 hours PRN Nausea and/or Vomiting  sodium chloride 0.9% lock flush 10 milliLiter(s) IV Push every 1 hour PRN Pre/post blood products, medications, blood draw, and to maintain line patency    Vital Signs Last 24 Hrs  T(F): 98.6 (13 Nov 2023 04:58), Max: 98.6 (12 Nov 2023 21:00)  HR: 102 (13 Nov 2023 04:58) (88 - 117)  BP: 153/78 (13 Nov 2023 04:58) (153/78 - 168/76)  RR: 19 (13 Nov 2023 04:58) (19 - 20)  SpO2: 100% (13 Nov 2023 04:58) (98% - 100%)  I&O's Summary    12 Nov 2023 07:01  -  13 Nov 2023 07:00  --------------------------------------------------------  IN: 1040 mL / OUT: 1425 mL / NET: -385 mL      BMI (kg/m2): 18.8 (11-10-23 @ 07:34)  PHYSICAL EXAM:  General: NAD, A/O x 3, oxygen via NC,speaking in short sentences  ENT: MMM, no scleral icterus  Neck: Supple, No JVD, no thyroidomegaly  Lungs: Shallow breaths, decreased air entry anterior lung fields, rhonchi/wheezing noted  Cardio: RRR, S1/S2, No murmurs  Abdomen: Soft, Nontender, Nondistended; Bowel sounds present  Extremities: No calf tenderness, No pitting edema, no skin changes    LABS:                        8.2    13.14 )-----------( 219      ( 12 Nov 2023 06:50 )             26.1       11-12    141  |  104  |  15  ----------------------------<  204  3.6   |  33  |  0.34    Ca    8.4      12 Nov 2023 06:50  Phos  2.6     11-12  Mg     2.1     11-12    TPro  6.2  /  Alb  3.4  /  TBili  0.6  /  DBili  x   /  AST  22  /  ALT  56  /  AlkPhos  52  11-12     POCT Blood Glucose.: 147 mg/dL (13 Nov 2023 07:33)  POCT Blood Glucose.: 168 mg/dL (12 Nov 2023 21:08)  POCT Blood Glucose.: 210 mg/dL (12 Nov 2023 16:56)  POCT Blood Glucose.: 220 mg/dL (12 Nov 2023 11:58)      Urinalysis Basic - ( 12 Nov 2023 06:50 )    Color: x / Appearance: x / SG: x / pH: x  Gluc: 204 mg/dL / Ketone: x  / Bili: x / Urobili: x   Blood: x / Protein: x / Nitrite: x   Leuk Esterase: x / RBC: x / WBC x   Sq Epi: x / Non Sq Epi: x / Bacteria: x

## 2023-11-13 NOTE — CONSULT NOTE ADULT - SUBJECTIVE AND OBJECTIVE BOX
Patient is a 68y old  Female who presents with a chief complaint of respiratory failure (09 Nov 2023 12:46)      HPI:  68F PMHx of COPD, HTN, HLD, DM, on xarelto for DVT history, hx of breast ca s/p left mastectomy, on chemotx last tx was 1 week ago presenting with shortness of breath, since last night. She is an active smoker. Last cigarette was yesterday. Denies any fevers or chills. ICU consulted as patient needing bipap support in the ED. No chest pain or palpitations. (27 Oct 2023 15:03)  Acute on chronic respiratory failure due to acute COPD Exacerbation in setting of ERV requiring intubation 10/31/23 Extubated 11/2/2023, Re intubated 11/3/23, extubated  + GI Bleed requiring transfusion of PRBCs- SP EGD  +Dysphagia- seen by Speech - On puree  Orthostasis - on Midodrine  boeing titrated down    REVIEW OF SYSTEMS  Constitutional: No fever, No Chills, No fatigue  HEENT: No eye pain, No visual disturbances, No difficulty hearing  Pulm:+ cough, + shortness of breath  Cardio: No chest pain, No palpitations  GI:  No abdominal pain, No nausea, No vomiting, No diarrhea, No constipation  : No dysuria, No frequency, No hematuria  Neuro: No headaches, No memory loss, +weakness, No numbness, No tremors  Skin: No itching, No rashes, No lesions   Endo: No temperature intolerance  MSK: No joint pain, No joint swelling, No muscle pain, No Neck or back pain  Psych:  No depression, No anxiety         PAST MEDICAL & SURGICAL HISTORY  COPD, severe    CAD (coronary artery disease)    Breast cancer    No significant past surgical history        SOCIAL HISTORY  Smoking -Active, EtOH - Denied, Drugs - Denied    FUNCTIONAL HISTORY:   Lives with daughter and brother in private house 5 steps  Independent PTA    CURRENT FUNCTIONAL STATUS:  PT 11/9  Max A 1-2 Transfers  Poor balance  + GANDHI      FAMILY HISTORY non contributory      RECENT LABS/IMAGING  CBC Full  -  ( 13 Nov 2023 12:27 )  WBC Count : 11.50 K/uL  RBC Count : 2.39 M/uL  Hemoglobin : 7.6 g/dL  Hematocrit : 23.8 %  Platelet Count - Automated : 199 K/uL  Mean Cell Volume : 99.6 fl  Mean Cell Hemoglobin : 31.8 pg  Mean Cell Hemoglobin Concentration : 31.9 gm/dL  Auto Neutrophil # : x  Auto Lymphocyte # : x  Auto Monocyte # : x  Auto Eosinophil # : x  Auto Basophil # : x  Auto Neutrophil % : x  Auto Lymphocyte % : x  Auto Monocyte % : x  Auto Eosinophil % : x  Auto Basophil % : x    11-13    140  |  103  |  15  ----------------------------<  183<H>  3.1<L>   |  37<H>  |  0.58    Ca    7.9<L>      13 Nov 2023 12:27  Phos  2.6     11-12  Mg     2.2     11-13    TPro  5.8<L>  /  Alb  3.1<L>  /  TBili  0.5  /  DBili  x   /  AST  25  /  ALT  51<H>  /  AlkPhos  52  11-13    Urinalysis Basic - ( 13 Nov 2023 12:27 )    Color: x / Appearance: x / SG: x / pH: x  Gluc: 183 mg/dL / Ketone: x  / Bili: x / Urobili: x   Blood: x / Protein: x / Nitrite: x   Leuk Esterase: x / RBC: x / WBC x   Sq Epi: x / Non Sq Epi: x / Bacteria: x        VITALS  T(C): 37 (11-13-23 @ 04:58), Max: 37 (11-12-23 @ 21:00)  HR: 101 (11-13-23 @ 10:37) (96 - 117)  BP: 167/77 (11-13-23 @ 10:37) (153/78 - 168/76)  RR: 19 (11-13-23 @ 04:58) (19 - 20)  SpO2: 99% (11-13-23 @ 10:37) (99% - 100%)  Wt(kg): --    ALLERGIES  penicillin (Hives)      MEDICATIONS   acetaminophen     Tablet .. 650 milliGRAM(s) Oral every 6 hours PRN  ALPRAZolam 0.5 milliGRAM(s) Oral every 8 hours PRN  aspirin enteric coated 81 milliGRAM(s) Oral daily  atorvastatin 80 milliGRAM(s) Oral at bedtime  bisacodyl Suppository 10 milliGRAM(s) Rectal daily PRN  buDESOnide    Inhalation Suspension 0.5 milliGRAM(s) Inhalation every 12 hours  buPROPion XL (24-Hour) . 150 milliGRAM(s) Oral daily  chlorhexidine 2% Cloths 1 Application(s) Topical daily  dextrose 5%. 1000 milliLiter(s) IV Continuous <Continuous>  dextrose 5%. 1000 milliLiter(s) IV Continuous <Continuous>  dextrose 50% Injectable 25 Gram(s) IV Push once  dextrose 50% Injectable 25 Gram(s) IV Push once  dextrose 50% Injectable 12.5 Gram(s) IV Push once  doxazosin 2 milliGRAM(s) Oral at bedtime  hydrocortisone sodium succinate Injectable 40 milliGRAM(s) IV Push daily  insulin lispro (ADMELOG) corrective regimen sliding scale   SubCutaneous Before meals and at bedtime  letrozole 2.5 milliGRAM(s) Oral daily  levalbuterol Inhalation 0.63 milliGRAM(s) Inhalation every 6 hours  midodrine 5 milliGRAM(s) Oral <User Schedule>  nicotine - 21 mG/24Hr(s) Patch 1 Patch Transdermal daily  ondansetron Injectable 4 milliGRAM(s) IV Push every 6 hours PRN  pantoprazole   Suspension 40 milliGRAM(s) Oral before breakfast  polyethylene glycol 3350 17 Gram(s) Oral daily  senna Syrup 10 milliLiter(s) Oral at bedtime  sodium chloride 0.9% lock flush 10 milliLiter(s) IV Push every 1 hour PRN  tiotropium 2.5 MICROgram(s) Inhaler 2 Puff(s) Inhalation daily      ----------------------------------------------------------------------------------------  PHYSICAL EXAM  Constitutional - NAD, Comfortable in Bed with O2 via Nasal cannula  HEENT - NCAT, EOMI  Neck - Supple, No limited ROM  Chest - diminished BS bilaterally, +wheeze, +rhonchi,  Cardiovascular - RRR, S1S2, No murmurs  Abdomen - BS+, Soft, NTND  Extremities - No C/C/E, No calf tenderness   Neurologic Exam -                    Cognitive - Awake, Alert, AAO x3     Communication - Fluent, No dysarthria, no aphasia     Cranial Nerves - CN 2-12 intact       Motor    LEFT    UE: SF [5/5], EF [5/5], EE [5/5], WE [5/5],  [wnl]  RIGHT UE: SF [5/5], EF [5/5], EE [5/5], WE [5/5],  [wnl]  LEFT    LE:  HF [2/5], KE [4/5], DF [5/5], EHL [5/5],  PF [5/5]  RIGHT LE:  HF [2/5], KE [4/5], DF [5/5], EHL [5/5],  PF [5/5]                    Sensory - Intact to LT     Reflexes - DTR Intact, No primitive reflexive     Balance - Poor long sitting balance in bed  Psychiatric - Mood stable, Affect WNL

## 2023-11-14 NOTE — PROGRESS NOTE ADULT - ASSESSMENT
Assessment     68 year old woman with  COPD, CAD, HLD, DM2, on xarelto for DVT history, hx of breast ca s/p left mastectomy, on chemotx presented with shortness of breath. Admitted to ICU for respiratory failure due to COPD exacerbation in the setting of Entero/rhinovirus with COPD, severe Exacerbation    Problem List  1. Acute respiratory failure due to acute COPD Exacerbation in setting of ERV requiring intubation 10/31/23 Extubated 11/2/2023, Re intubated 11/3/23 Extubated 11/5/2023  2. Active nicotine use - 1 pk/day  3. uses home o2 PRN  4. CAD   5. HTN  6. DM type 2  7, h/o Breast cancer  8. H/o DVT on xaralto -     Plan  - Doing well on NC  - Off BiPaP  - CBC qd  - Protonix bid   - Encourage coughing and deep breathing  - Encourage incentive spirometer  - Wellbutrin   - Continue xanax TID prn  - Symbicort 160/ 4.5 q12h   - Daliresp 500 mg qd   - Monitor stools  - Cont. Aspirin and statin   - Off anticoag    - Nicotine patch, smoking cessation advised  - PT eval > OOB.  - Dispo planning may need home o2

## 2023-11-14 NOTE — PROGRESS NOTE ADULT - SUBJECTIVE AND OBJECTIVE BOX
Follow-up Pulmonary Progress Note  Chief Complaint : Chronic obstructive pulmonary disease with acute exacerbation          No new events overnight.  Denies SOB/CP.       Allergies :penicillin (Hives)      PAST MEDICAL & SURGICAL HISTORY:  COPD, severe    CAD (coronary artery disease)    Breast cancer    No significant past surgical history        Medications:  MEDICATIONS  (STANDING):  aspirin enteric coated 81 milliGRAM(s) Oral daily  atorvastatin 80 milliGRAM(s) Oral at bedtime  buDESOnide    Inhalation Suspension 0.5 milliGRAM(s) Inhalation every 12 hours  buPROPion XL (24-Hour) . 150 milliGRAM(s) Oral daily  chlorhexidine 2% Cloths 1 Application(s) Topical daily  dextrose 5%. 1000 milliLiter(s) (100 mL/Hr) IV Continuous <Continuous>  dextrose 5%. 1000 milliLiter(s) (50 mL/Hr) IV Continuous <Continuous>  dextrose 50% Injectable 25 Gram(s) IV Push once  dextrose 50% Injectable 12.5 Gram(s) IV Push once  dextrose 50% Injectable 25 Gram(s) IV Push once  doxazosin 2 milliGRAM(s) Oral at bedtime  hydrocortisone sodium succinate Injectable 40 milliGRAM(s) IV Push daily  insulin lispro (ADMELOG) corrective regimen sliding scale   SubCutaneous Before meals and at bedtime  letrozole 2.5 milliGRAM(s) Oral daily  levalbuterol Inhalation 0.63 milliGRAM(s) Inhalation every 6 hours  midodrine 5 milliGRAM(s) Oral <User Schedule>  nicotine - 21 mG/24Hr(s) Patch 1 Patch Transdermal daily  pantoprazole   Suspension 40 milliGRAM(s) Oral before breakfast  polyethylene glycol 3350 17 Gram(s) Oral daily  roflumilast 500 MICROGram(s) Oral daily  senna Syrup 10 milliLiter(s) Oral at bedtime  tiotropium 2.5 MICROgram(s) Inhaler 2 Puff(s) Inhalation daily    MEDICATIONS  (PRN):  acetaminophen     Tablet .. 650 milliGRAM(s) Oral every 6 hours PRN Temp greater or equal to 38C (100.4F), Mild Pain (1 - 3)  ALPRAZolam 0.5 milliGRAM(s) Oral every 8 hours PRN anxiety  bisacodyl Suppository 10 milliGRAM(s) Rectal daily PRN Constipation  ondansetron Injectable 4 milliGRAM(s) IV Push every 6 hours PRN Nausea and/or Vomiting  sodium chloride 0.9% lock flush 10 milliLiter(s) IV Push every 1 hour PRN Pre/post blood products, medications, blood draw, and to maintain line patency      Antibiotics History  azithromycin  IVPB 500 milliGRAM(s) IV Intermittent every 24 hours, 10-27-23 @ 14:42  cefTRIAXone   IVPB 1000 milliGRAM(s) IV Intermittent once, 10-29-23 @ 10:09, Stop order after: 1 Doses  cefTRIAXone   IVPB 1000 milliGRAM(s) IV Intermittent every 24 hours, 10-30-23 @ 10:09, Stop order after: 5 Days  cefTRIAXone   IVPB    , 10-29-23 @ 10:09  meropenem  IVPB 1000 milliGRAM(s) IV Intermittent every 8 hours, 11-06-23 @ 14:00, Stop order after: 8 Days  meropenem  IVPB 1000 milliGRAM(s) IV Intermittent once, 11-06-23 @ 02:59, Stop order after: 1 Doses  meropenem  IVPB    , 11-06-23 @ 03:18      Heme Medications   aspirin enteric coated 81 milliGRAM(s) Oral daily, 10-27-23 @ 14:57      GI Medications  bisacodyl Suppository 10 milliGRAM(s) Rectal daily, 11-02-23 @ 08:24, Routine PRN  pantoprazole   Suspension 40 milliGRAM(s) Oral before breakfast, 11-11-23 @ 10:58, Routine  polyethylene glycol 3350 17 Gram(s) Oral daily, 11-11-23 @ 08:42,   senna Syrup 10 milliLiter(s) Oral at bedtime, 11-11-23 @ 10:59, Routine        LABS:                        8.0    10.03 )-----------( 188      ( 14 Nov 2023 05:38 )             25.7     11-14    141  |  103  |  11  ----------------------------<  111<H>  3.2<L>   |  36<H>  |  0.23<L>    Ca    8.3<L>      14 Nov 2023 05:38  Mg     2.1     11-14    TPro  5.8<L>  /  Alb  3.1<L>  /  TBili  0.5  /  DBili  x   /  AST  25  /  ALT  51<H>  /  AlkPhos  52  11-13              Urinalysis Basic - ( 14 Nov 2023 05:38 )    Color: x / Appearance: x / SG: x / pH: x  Gluc: 111 mg/dL / Ketone: x  / Bili: x / Urobili: x   Blood: x / Protein: x / Nitrite: x   Leuk Esterase: x / RBC: x / WBC x   Sq Epi: x / Non Sq Epi: x / Bacteria: x              CULTURES: (if applicable)    Culture - Sputum (collected 11-04-23 @ 19:00)  Source: ET Tube ET Tube  Gram Stain (11-05-23 @ 06:21):    Few polymorphonuclear leukocytes per low power field    No Squamous epithelial cells per low power field    No organisms seen per oil power field  Final Report (11-06-23 @ 18:17):    Normal Respiratory Daria present    Culture - Blood (collected 11-04-23 @ 12:20)  Source: .Blood Blood  Final Report (11-09-23 @ 23:00):    No growth at 5 days    Culture - Blood (collected 11-04-23 @ 12:10)  Source: .Blood Blood  Gram Stain (11-06-23 @ 02:43):    Growth in aerobic bottle: Gram Positive Cocci in Clusters  Final Report (11-06-23 @ 20:38):    Growth in aerobic bottle: Staphylococcus epidermidis    Coagulase Negative Staphylococci isolated from a single blood culture set    may represent contamination.    Contact the Microbiology Department at [phone] if susceptibility    testing is clinically indicated.    Direct identification is available within approximately 3-5    hours either by Blood Panel Multiplexed PCR or Direct    MALDI-TOF. Details: [email or web address]  Organism: Blood Culture PCR (11-06-23 @ 20:38)  Organism: Blood Culture PCR (11-06-23 @ 20:38)      Method Type: PCR      -  Staphylococcus epidermidis: Detec            CAPILLARY BLOOD GLUCOSE      POCT Blood Glucose.: 97 mg/dL (14 Nov 2023 16:54)      RADIOLOGY  CXR:      CT:    ECHO:      VITALS:  T(C): 37.1 (11-14-23 @ 15:27), Max: 37.1 (11-14-23 @ 15:27)  T(F): 98.7 (11-14-23 @ 15:27), Max: 98.7 (11-14-23 @ 15:27)  HR: 103 (11-14-23 @ 15:39) (72 - 106)  BP: 133/70 (11-14-23 @ 15:27) (127/78 - 160/74)  BP(mean): --  ABP: --  ABP(mean): --  RR: 16 (11-14-23 @ 15:27) (16 - 18)  SpO2: 100% (11-14-23 @ 15:39) (97% - 100%)  CVP(mm Hg): --  CVP(cm H2O): --    Ins and Outs     11-13-23 @ 07:01  -  11-14-23 @ 07:00  --------------------------------------------------------  IN: 900 mL / OUT: 600 mL / NET: 300 mL    11-14-23 @ 07:01  -  11-14-23 @ 17:00  --------------------------------------------------------  IN: 520 mL / OUT: 925 mL / NET: -405 mL                I&O's Detail    13 Nov 2023 07:01  -  14 Nov 2023 07:00  --------------------------------------------------------  IN:    Oral Fluid: 900 mL  Total IN: 900 mL    OUT:    Voided (mL): 600 mL  Total OUT: 600 mL    Total NET: 300 mL      14 Nov 2023 07:01  -  14 Nov 2023 17:00  --------------------------------------------------------  IN:    Oral Fluid: 520 mL  Total IN: 520 mL    OUT:    Voided (mL): 925 mL  Total OUT: 925 mL    Total NET: -405 mL          Physical Examination:  GENERAL:               Alert, Oriented, No acute distress.    HEENT:                     No JVD, Moist MM  PULM:                     Bilateral air entry, Clear to auscultation bilaterally, no significant sputum production, +Rales, No Rhonchi, No Wheezing  CVS:                         S1, S2,  +Murmur  ABD:                        Soft, nondistended, nontender, normoactive bowel sounds,    NEURO:                  Alert, oriented, interactive, nonfocal, follows commands  PSYC:                      Calm, + Insight.

## 2023-11-14 NOTE — PROGRESS NOTE ADULT - SUBJECTIVE AND OBJECTIVE BOX
Patient is a 68y old  Female who presents with a chief complaint of COPD (13 Nov 2023 14:10)    No events overnight  Reports cough improved  BM yesterday, urinating  Patient seen and examined at bedside.    ALLERGIES:  penicillin (Hives)    MEDICATIONS  (STANDING):  aspirin enteric coated 81 milliGRAM(s) Oral daily  atorvastatin 80 milliGRAM(s) Oral at bedtime  buDESOnide    Inhalation Suspension 0.5 milliGRAM(s) Inhalation every 12 hours  buPROPion XL (24-Hour) . 150 milliGRAM(s) Oral daily  chlorhexidine 2% Cloths 1 Application(s) Topical daily  dextrose 5%. 1000 milliLiter(s) (100 mL/Hr) IV Continuous <Continuous>  dextrose 5%. 1000 milliLiter(s) (50 mL/Hr) IV Continuous <Continuous>  dextrose 50% Injectable 25 Gram(s) IV Push once  dextrose 50% Injectable 12.5 Gram(s) IV Push once  dextrose 50% Injectable 25 Gram(s) IV Push once  doxazosin 2 milliGRAM(s) Oral at bedtime  hydrocortisone sodium succinate Injectable 40 milliGRAM(s) IV Push daily  insulin lispro (ADMELOG) corrective regimen sliding scale   SubCutaneous Before meals and at bedtime  letrozole 2.5 milliGRAM(s) Oral daily  levalbuterol Inhalation 0.63 milliGRAM(s) Inhalation every 6 hours  midodrine 5 milliGRAM(s) Oral <User Schedule>  nicotine - 21 mG/24Hr(s) Patch 1 Patch Transdermal daily  pantoprazole   Suspension 40 milliGRAM(s) Oral before breakfast  polyethylene glycol 3350 17 Gram(s) Oral daily  roflumilast 500 MICROGram(s) Oral daily  senna Syrup 10 milliLiter(s) Oral at bedtime  tiotropium 2.5 MICROgram(s) Inhaler 2 Puff(s) Inhalation daily    MEDICATIONS  (PRN):  acetaminophen     Tablet .. 650 milliGRAM(s) Oral every 6 hours PRN Temp greater or equal to 38C (100.4F), Mild Pain (1 - 3)  ALPRAZolam 0.5 milliGRAM(s) Oral every 8 hours PRN anxiety  bisacodyl Suppository 10 milliGRAM(s) Rectal daily PRN Constipation  ondansetron Injectable 4 milliGRAM(s) IV Push every 6 hours PRN Nausea and/or Vomiting  sodium chloride 0.9% lock flush 10 milliLiter(s) IV Push every 1 hour PRN Pre/post blood products, medications, blood draw, and to maintain line patency    Vital Signs Last 24 Hrs  T(F): 97.6 (14 Nov 2023 05:25), Max: 99.7 (13 Nov 2023 16:03)  HR: 106 (14 Nov 2023 09:20) (72 - 106)  BP: 127/78 (14 Nov 2023 08:19) (127/78 - 160/74)  RR: 16 (14 Nov 2023 08:19) (16 - 18)  SpO2: 100% (14 Nov 2023 09:20) (97% - 100%)  I&O's Summary    13 Nov 2023 07:01  -  14 Nov 2023 07:00  --------------------------------------------------------  IN: 900 mL / OUT: 600 mL / NET: 300 mL    14 Nov 2023 07:01  -  14 Nov 2023 12:41  --------------------------------------------------------  IN: 520 mL / OUT: 0 mL / NET: 520 mL      BMI (kg/m2): 18.8 (11-10-23 @ 07:34)  PHYSICAL EXAM:  General: NAD, A/O x 3, oxygen via NC  Speaking in full sentences  ENT: MMM, no scleral icterus  Neck: Supple, No JVD, no thyroidomegaly  Lungs: Coarse breath sounds appreciated  Cardio: RRR, S1/S2, No murmurs  Abdomen: Soft, Nontender, Nondistended; Bowel sounds present  Extremities: No calf tenderness, No pitting edema, no skin changes    LABS:                        8.0    10.03 )-----------( 188      ( 14 Nov 2023 05:38 )             25.7       11-14    141  |  103  |  11  ----------------------------<  111  3.2   |  36  |  0.23    Ca    8.3      14 Nov 2023 05:38  Phos  2.6     11-12  Mg     2.1     11-14    TPro  5.8  /  Alb  3.1  /  TBili  0.5  /  DBili  x   /  AST  25  /  ALT  51  /  AlkPhos  52  11-13     POCT Blood Glucose.: 188 mg/dL (14 Nov 2023 12:26)  POCT Blood Glucose.: 130 mg/dL (14 Nov 2023 07:56)  POCT Blood Glucose.: 113 mg/dL (13 Nov 2023 21:14)  POCT Blood Glucose.: 112 mg/dL (13 Nov 2023 16:45)  Urinalysis Basic - ( 14 Nov 2023 05:38 )    Color: x / Appearance: x / SG: x / pH: x  Gluc: 111 mg/dL / Ketone: x  / Bili: x / Urobili: x   Blood: x / Protein: x / Nitrite: x   Leuk Esterase: x / RBC: x / WBC x   Sq Epi: x / Non Sq Epi: x / Bacteria: x

## 2023-11-14 NOTE — PROGRESS NOTE ADULT - SUBJECTIVE AND OBJECTIVE BOX
INTERVAL HPI/OVERNIGHT EVENTS:  Patient seen examied at bed side, family with her. She is feeling better  Denies abdominal pain, nausea, vomiting diarrhea or constipation.    MEDICATIONS  (STANDING):  aspirin enteric coated 81 milliGRAM(s) Oral daily  atorvastatin 80 milliGRAM(s) Oral at bedtime  buDESOnide    Inhalation Suspension 0.5 milliGRAM(s) Inhalation every 12 hours  buPROPion XL (24-Hour) . 150 milliGRAM(s) Oral daily  chlorhexidine 2% Cloths 1 Application(s) Topical daily  dextrose 5%. 1000 milliLiter(s) (100 mL/Hr) IV Continuous <Continuous>  dextrose 5%. 1000 milliLiter(s) (50 mL/Hr) IV Continuous <Continuous>  dextrose 50% Injectable 25 Gram(s) IV Push once  dextrose 50% Injectable 25 Gram(s) IV Push once  dextrose 50% Injectable 12.5 Gram(s) IV Push once  doxazosin 2 milliGRAM(s) Oral at bedtime  hydrocortisone sodium succinate Injectable 40 milliGRAM(s) IV Push daily  insulin lispro (ADMELOG) corrective regimen sliding scale   SubCutaneous Before meals and at bedtime  letrozole 2.5 milliGRAM(s) Oral daily  levalbuterol Inhalation 0.63 milliGRAM(s) Inhalation every 6 hours  midodrine 5 milliGRAM(s) Oral <User Schedule>  nicotine - 21 mG/24Hr(s) Patch 1 Patch Transdermal daily  pantoprazole   Suspension 40 milliGRAM(s) Oral before breakfast  polyethylene glycol 3350 17 Gram(s) Oral daily  roflumilast 500 MICROGram(s) Oral daily  senna Syrup 10 milliLiter(s) Oral at bedtime  tiotropium 2.5 MICROgram(s) Inhaler 2 Puff(s) Inhalation daily    MEDICATIONS  (PRN):  acetaminophen     Tablet .. 650 milliGRAM(s) Oral every 6 hours PRN Temp greater or equal to 38C (100.4F), Mild Pain (1 - 3)  ALPRAZolam 0.5 milliGRAM(s) Oral every 8 hours PRN anxiety  bisacodyl Suppository 10 milliGRAM(s) Rectal daily PRN Constipation  ondansetron Injectable 4 milliGRAM(s) IV Push every 6 hours PRN Nausea and/or Vomiting  sodium chloride 0.9% lock flush 10 milliLiter(s) IV Push every 1 hour PRN Pre/post blood products, medications, blood draw, and to maintain line patency      Allergies    penicillin (Hives)    Intolerances        PAST MEDICAL & SURGICAL HISTORY:  COPD, severe      CAD (coronary artery disease)      Breast cancer      No significant past surgical history    PHYSICAL EXAM:   Vital Signs:  Vital Signs Last 24 Hrs  T(C): 36.4 (14 Nov 2023 05:25), Max: 37.6 (13 Nov 2023 16:03)  T(F): 97.6 (14 Nov 2023 05:25), Max: 99.7 (13 Nov 2023 16:03)  HR: 106 (14 Nov 2023 09:20) (72 - 106)  BP: 127/78 (14 Nov 2023 08:19) (127/78 - 160/74)  BP(mean): --  RR: 16 (14 Nov 2023 08:19) (16 - 18)  SpO2: 100% (14 Nov 2023 09:20) (97% - 100%)    Parameters below as of 14 Nov 2023 09:20  Patient On (Oxygen Delivery Method): nasal cannula, 3L      Daily     Daily I&O's Summary    13 Nov 2023 07:01  -  14 Nov 2023 07:00  --------------------------------------------------------  IN: 900 mL / OUT: 600 mL / NET: 300 mL    14 Nov 2023 07:01  -  14 Nov 2023 13:27  --------------------------------------------------------  IN: 520 mL / OUT: 0 mL / NET: 520 mL        GENERAL:  Appears stated age  HEENT:  NC/AT,  conjunctivae clear and pink  CHEST:  Full & symmetric excursion  HEART:  Regular rhythm, S1, S2  ABDOMEN:  Soft, non-tender, non-distended, normoactive bowel sounds  EXTREMITIES:  no cyanosis, clubbing or edema  SKIN:  No rash/warm/dry  NEURO:  Alert, oriented      LABS:                        8.0    10.03 )-----------( 188      ( 14 Nov 2023 05:38 )             25.7     11-14    141  |  103  |  11  ----------------------------<  111<H>  3.2<L>   |  36<H>  |  0.23<L>    Ca    8.3<L>      14 Nov 2023 05:38  Mg     2.1     11-14    TPro  5.8<L>  /  Alb  3.1<L>  /  TBili  0.5  /  DBili  x   /  AST  25  /  ALT  51<H>  /  AlkPhos  52  11-13      Urinalysis Basic - ( 14 Nov 2023 05:38 )    Color: x / Appearance: x / SG: x / pH: x  Gluc: 111 mg/dL / Ketone: x  / Bili: x / Urobili: x   Blood: x / Protein: x / Nitrite: x   Leuk Esterase: x / RBC: x / WBC x   Sq Epi: x / Non Sq Epi: x / Bacteria: x      amylase   lipase  RADIOLOGY & ADDITIONAL TESTS:   GI Follow up    Patient seen and examined at bedside, family with her. She is feeling better.  Denies abdominal pain, nausea, vomiting, diarrhea or constipation, BRBPR or melena.    MEDICATIONS  (STANDING):  aspirin enteric coated 81 milliGRAM(s) Oral daily  atorvastatin 80 milliGRAM(s) Oral at bedtime  buDESOnide    Inhalation Suspension 0.5 milliGRAM(s) Inhalation every 12 hours  buPROPion XL (24-Hour) . 150 milliGRAM(s) Oral daily  chlorhexidine 2% Cloths 1 Application(s) Topical daily  dextrose 5%. 1000 milliLiter(s) (100 mL/Hr) IV Continuous <Continuous>  dextrose 5%. 1000 milliLiter(s) (50 mL/Hr) IV Continuous <Continuous>  dextrose 50% Injectable 25 Gram(s) IV Push once  dextrose 50% Injectable 25 Gram(s) IV Push once  dextrose 50% Injectable 12.5 Gram(s) IV Push once  doxazosin 2 milliGRAM(s) Oral at bedtime  hydrocortisone sodium succinate Injectable 40 milliGRAM(s) IV Push daily  insulin lispro (ADMELOG) corrective regimen sliding scale   SubCutaneous Before meals and at bedtime  letrozole 2.5 milliGRAM(s) Oral daily  levalbuterol Inhalation 0.63 milliGRAM(s) Inhalation every 6 hours  midodrine 5 milliGRAM(s) Oral <User Schedule>  nicotine - 21 mG/24Hr(s) Patch 1 Patch Transdermal daily  pantoprazole   Suspension 40 milliGRAM(s) Oral before breakfast  polyethylene glycol 3350 17 Gram(s) Oral daily  roflumilast 500 MICROGram(s) Oral daily  senna Syrup 10 milliLiter(s) Oral at bedtime  tiotropium 2.5 MICROgram(s) Inhaler 2 Puff(s) Inhalation daily    MEDICATIONS  (PRN):  acetaminophen     Tablet .. 650 milliGRAM(s) Oral every 6 hours PRN Temp greater or equal to 38C (100.4F), Mild Pain (1 - 3)  ALPRAZolam 0.5 milliGRAM(s) Oral every 8 hours PRN anxiety  bisacodyl Suppository 10 milliGRAM(s) Rectal daily PRN Constipation  ondansetron Injectable 4 milliGRAM(s) IV Push every 6 hours PRN Nausea and/or Vomiting  sodium chloride 0.9% lock flush 10 milliLiter(s) IV Push every 1 hour PRN Pre/post blood products, medications, blood draw, and to maintain line patency      Allergies    penicillin (Hives)    Intolerances          PHYSICAL EXAM:   Vital Signs:  Vital Signs Last 24 Hrs  T(C): 36.4 (14 Nov 2023 05:25), Max: 37.6 (13 Nov 2023 16:03)  T(F): 97.6 (14 Nov 2023 05:25), Max: 99.7 (13 Nov 2023 16:03)  HR: 106 (14 Nov 2023 09:20) (72 - 106)  BP: 127/78 (14 Nov 2023 08:19) (127/78 - 160/74)  BP(mean): --  RR: 16 (14 Nov 2023 08:19) (16 - 18)  SpO2: 100% (14 Nov 2023 09:20) (97% - 100%)    Parameters below as of 14 Nov 2023 09:20  Patient On (Oxygen Delivery Method): nasal cannula, 3L      Daily     Daily I&O's Summary    13 Nov 2023 07:01  -  14 Nov 2023 07:00  --------------------------------------------------------  IN: 900 mL / OUT: 600 mL / NET: 300 mL    14 Nov 2023 07:01  -  14 Nov 2023 13:27  --------------------------------------------------------  IN: 520 mL / OUT: 0 mL / NET: 520 mL        GENERAL:  Appears stated age  HEENT:  NC/AT,  conjunctivae clear and pink  CHEST:  Full & symmetric excursion  HEART:  Regular rhythm, S1, S2  ABDOMEN:  Soft, non-tender, non-distended, normoactive bowel sounds  EXTREMITIES:  no cyanosis, clubbing or edema  SKIN:  No rash/warm/dry  NEURO:  Alert, oriented      LABS:                        8.0    10.03 )-----------( 188      ( 14 Nov 2023 05:38 )             25.7     11-14    141  |  103  |  11  ----------------------------<  111<H>  3.2<L>   |  36<H>  |  0.23<L>    Ca    8.3<L>      14 Nov 2023 05:38  Mg     2.1     11-14    TPro  5.8<L>  /  Alb  3.1<L>  /  TBili  0.5  /  DBili  x   /  AST  25  /  ALT  51<H>  /  AlkPhos  52  11-13      Urinalysis Basic - ( 14 Nov 2023 05:38 )    Color: x / Appearance: x / SG: x / pH: x  Gluc: 111 mg/dL / Ketone: x  / Bili: x / Urobili: x   Blood: x / Protein: x / Nitrite: x   Leuk Esterase: x / RBC: x / WBC x   Sq Epi: x / Non Sq Epi: x / Bacteria: x

## 2023-11-14 NOTE — PROGRESS NOTE ADULT - ASSESSMENT
68 year old woman with  COPD, CAD, HLD, DM2, on xarelto for DVT history, hx of breast ca s/p left mastectomy, on chemotx presented with shortness of breath. Admitted to ICU for respiratory failure due to COPD exacerbation in the setting of Entero/rhinovirus with COPD, severe Exacerbation    DOWNGRADED FROM MICU 11/12/23    #Acute on chronic respiratory failure due to acute COPD Exacerbation in setting of ERV requiring intubation 10/31/23 Extubated 11/2/2023, Re intubated 11/3/23, extubated  #COPD, chronic respiratory failure, on home oxygen  #Active nicotine use - 1 pk/day  - ICU appreciated; Will monitor on tele and pulse oximetry  - C/w oxygen supplementation; titrate if possible, incentive spirometer  - C/w solucortef 40mg IV daily day 2/4  - C/w xopenex, pulmicort  - C/w nicotine patch    #Acute blood loss anemia, possible GI bleed, s/p one unit PRBC  - Appreciate GI consult team, s/p EGD  - Will monitor Hg/Hct, transfuse if Hg<7  - C/w protonix po daily    #Hypokalemia  - Replaced, will monitor    #Leukocytosis, resolved  - Will monitor for now; likely related to steroid    #Dysphagia  - Currently on pureed diet; MBS today    #CAD   #HTN  #Orthostatic hypotension  - Will aim to titrate down midodrine; today 5mg at 8AM and 1pm  - C/w ASA, lipitor     #DM type 2  - ISS, A1c noted to be well controlled    #h/o Breast cancer  - C/w femara 2.5mg daily    #H/o DVT  - HOLDING xaralto due to acute blood loss anemia  - LE ULT negative for DVT  - Will consult oncology; o/p oncology is Dr Larios    #Anxiety  - Continue xanax TID prn    #Acute urinary retention, improved  - Continue cardura for urinary retention voiding well     DVT PPX: ICD  AM labs  DISP: Possibly to acute rehab vs [facility]    11/13: Spoke with brother Perry on phone  Spoke with cousin Enid in person  All questions answered, in agreement with care plan

## 2023-11-14 NOTE — PROGRESS NOTE ADULT - NS ATTEND AMEND GEN_ALL_CORE FT
68 year old female  PMHx of COPD, CAD, HLD, DM2, on xarelto for DVT history, hx of breast ca s/p left mastectomy, on chemotx presented with shortness of breath. Admitted to ICU for respiratory failure due to COPD exacerbation in the setting of Entero/rhinovirus with COPD, severe Exacerbation    Problem List  1. Acute respiratory failure due to acute COPD Exacerbation in setting of ERV requiring intubation 10/31/23 Extubated 11/2/2023, Re intubated 11/3/23  2. Active nicotine use - 1 pk/day  3. uses home o2 PRN  4. CAD   5. HTN  6. DM type 2  7, h/o Breast cancer  8. H/o DVT on xaralto -     Plan  Doing well on NC  Try to use NIV nocturnally only   Titrate FiO2 to maintain SPo2 > 92%, on 4L now will titrate down   Encourage coughing and deep breathing  Encourage incentive spirometer  Emotional support for anxiety, remains on precedex for anxiety, hope to titrate down with addition of Wellbutrin   Continue xanax TID prn  Failed bedside swallow yesterday, speech following > re-eval today  continue tube feeds for now  occult blood POSITIVE,  GI plan for EGD tomorrow if resp status allows   trend CBC and transfuse for hgb less than 7  monitor stools   monitor off abx, ID following   Continue IV steroids, Pulmicort and xopenex nebs  Continue cardura for urinary retention voiding well   Cont. Aspirin and statin   Cont. AC with lovenox was on xarelto at home, switch to oral a/c once respiratory status improves  Nicotine patch  PT eval > OOB
68 year old female  PMHx of COPD, CAD, HLD, DM2, on xarelto for DVT history, hx of breast ca s/p left mastectomy, on chemotx presented with shortness of breath. Admitted to ICU for respiratory failure due to COPD exacerbation in the setting of Entero/rhinovirus with COPD, severe Exacerbation    Problem List  1. Acute respiratory failure due to acute COPD Exacerbation in setting of ERV requiring intubation 10/31/23 Extubated 11/2/2023, Re intubated 11/3/23  2. Active nicotine use - 1 pk/day  3. uses home o2 PRN  4. CAD   5. HTN  6. DM type 2  7, h/o Breast cancer  8. H/o DVT on xaralto -     Plan  No longer requiring pressor support   NIV prn/ NC  Titrate FiO2 to maintain SPo2 > 92%  Encourage coughing and deep breathing  Emotional support for anxiety  Pt was on wellbutrin at home for anxiety- unable to give via NG tube   Continue xanax TID prn , noticeable difference today   Restart wellbutrin once can take PO  Failed bedside swallow today, speech following, continue tube feeds   Anemia noted, occult blood negative  Give 1 unit PRBC and repeat CBC at 2000  currently off antibiotics, wbc improving, follow fever curve  Continue IV steroids, Pulmicort and xopenex nebs  Continue cardura for urinary retention voiding well   Cont. Aspirin and statin   Hold antihypertensives for now  Cont. AC with lovenox ws on xarelto at home, switch to oral a/c once respiratory status improves  Nicotine patch
68 year old female  PMHx of COPD, CAD, HLD, DM2, on xarelto for DVT history, hx of breast ca s/p left mastectomy, on chemotx presented with shortness of breath. Admitted to ICU for respiratory failure due to COPD exacerbation in the setting of Entero/rhinovirus with COPD, severe Exacerbation    Problem List  1. Acute respiratory failure due to acute COPD Exacerbation in setting of ERV requiring intubation 10/31/23 Extubated 11/2/2023, Re intubated 11/3/23  2. Active nicotine use - 1 pk/day  3. uses home o2 PRN  4. CAD   5. HTN  6. DM type 2  7, h/o Breast cancer  8. H/o DVT on xaralto -     Plan  Off pressors since 2 pm yesterday  NIV prn/ NC  Titrate FiO2 to maintain SPo2 > 92%  Encouragecoughing and deep breathing  EMotional support for anxiety  Pt was on wellbutrin at home for anxiety- unable to give via NG tube  Continue xanax prn, changed to TID   Restart wellbutrin once NG tube out and can take PO  If patient able to tolerate off NIV consider bedside swallow eval  Anemia noted, occult blood negative could be due to edema / albumin  currently off antibiotics, wbc improving, follow fever curve  Continue IV steroids, Pulmicort and xopenex nebs  Continue tub feeds when NIV off  Continue cardura for urinary retention voiding well   Cont. Aspirin and statin   Hold antihypertensives for now  Cont. AC with lovenox ws on xarelto at home, switch to oral a/c once respiratory status improves  Nicotine patch     spoke with Sing ( brother and HCP) at bedside
IMP: This is a 68 year old woman with  COPD, CAD, HLD, DM2, on xarelto for DVT history, hx of breast ca s/p left mastectomy, on chemotx presented with shortness of breath. Admitted to ICU for respiratory failure due to COPD exacerbation in the setting of Entero/rhinovirus with COPD, severe Exacerbation    Problem List  1. Acute respiratory failure due to acute COPD Exacerbation in setting of ERV requiring intubation 10/31/23 Extubated 11/2/2023, Re intubated 11/3/23  2. Active nicotine use - 1 pk/day  3. uses home o2 PRN  4. CAD   5. HTN  6. DM type 2  7, h/o Breast cancer  8. H/o DVT on xaralto -     Plan  NIV prn/ NC   Titrate FiO2 to maintain SPo2 > 92%  Encourage coughing and deep breathing  Emotional support for anxiety, remains on precedex for anxiety, hope to titrate down with addition of Wellbutrin   Restart Wellbutrin for anxiety ( home med)   Continue xanax TID prn  Failed bedside swallow yesterday, speech following, continue tube feeds   Anemia noted, occult blood POSITIVE  GI consulted  s/p 1 unit PRBC overnight, hgb 8.8 today  CBC @ noon  currently off antibiotics, wbc improving, follow fever curve  Continue IV steroids, Pulmicort and xopenex nebs  Continue cardura for urinary retention voiding well   Cont. Aspirin and statin   Hold antihypertensives for now  Cont. AC with lovenox ws on xarelto at home, switch to oral a/c once respiratory status improves  Nicotine patch.
Patient seen and examined at the bedside. Agree with the assessment and plan of KEANU Barber.
Patient seen and examined at the bedside. Agree with the assessment and plan of KEANU Barber.  No recurrent bleeding, overall improving.  Hb stable.    GI will sign off. Please reconsult if needed. Thank you.
Patient seen and examined at the bedside. Agree with the assessment and plan of MAXWELL Jiménez.  Hb stable and no evidence of bleeding at present. Tolerating diet.  Continue medical management.
Patient seen and examined at the bedside. Agree with the assessment and plan of MAXWELL Jiménez.   Can advance diet from GI standpoint as CBC stable and no melena.  Monitor CBC, stools.  D/w MORIAH Johns
Agree with the assessment and plan of KEANU Barber.  No apparent rebleeding. Doing well from GI standpoint. Continue PPI, continue diet. Monitor CBC, bowel movements while inpatient.

## 2023-11-15 NOTE — PROGRESS NOTE ADULT - PROBLEM SELECTOR PLAN 1
Patient with h/o breast cancer, receiving adjuvant herceptin therapy. Admitted with COPD exacerbation, enterovirus infection. Herceptin on hold due to patient's acute illness/hospitalization.   Patient with reported h/o DVT receiving anticoagulation PTA. Xarelto has been held due to GIB. 10/27/23 CTA chest-negative for PE; 11/13/23 LE venous duplex study negative for DVT-patient should f/u with her primary oncologist Dr. Larios within 1 week post discharge for further anticoag. decisions, and oncology f/u.

## 2023-11-15 NOTE — PROGRESS NOTE ADULT - SUBJECTIVE AND OBJECTIVE BOX
REHAN IGLESIAS   68y   Female    Admitting: EVA Stark  HPI:    68F PMHx of COPD, HTN, HLD, DM, on xarelto for DVT history, hx of breast ca s/p left mastectomy, on chemotx last tx was 1 week PTA presenting with shortness of breath, x 1 day. She is an active smoker. Denied any fevers or chills. ICU consulted as patient needed bipap support in the ED.  Oncology consulted for breast cancer history.    PAST MEDICAL & SURGICAL HISTORY:  COPD, severe      CAD (coronary artery disease)      Breast cancer      No significant past surgical history      HEALTH ISSUES - PROBLEM Dx:  Breast cancer, female    Breast cancer, female    Drop in hemoglobin    Anemia      MEDICATIONS  (STANDING):  aspirin enteric coated 81 milliGRAM(s) Oral daily  atorvastatin 80 milliGRAM(s) Oral at bedtime  buDESOnide    Inhalation Suspension 0.5 milliGRAM(s) Inhalation every 12 hours  buPROPion XL (24-Hour) . 150 milliGRAM(s) Oral daily  chlorhexidine 2% Cloths 1 Application(s) Topical daily  dextrose 5%. 1000 milliLiter(s) (100 mL/Hr) IV Continuous <Continuous>  dextrose 5%. 1000 milliLiter(s) (50 mL/Hr) IV Continuous <Continuous>  dextrose 50% Injectable 25 Gram(s) IV Push once  dextrose 50% Injectable 25 Gram(s) IV Push once  dextrose 50% Injectable 12.5 Gram(s) IV Push once  doxazosin 2 milliGRAM(s) Oral at bedtime  hydrocortisone sodium succinate Injectable 40 milliGRAM(s) IV Push daily  insulin lispro (ADMELOG) corrective regimen sliding scale   SubCutaneous Before meals and at bedtime  letrozole 2.5 milliGRAM(s) Oral daily  levalbuterol Inhalation 0.63 milliGRAM(s) Inhalation every 6 hours  midodrine 5 milliGRAM(s) Oral <User Schedule>  nicotine - 21 mG/24Hr(s) Patch 1 Patch Transdermal daily  pantoprazole   Suspension 40 milliGRAM(s) Oral before breakfast  polyethylene glycol 3350 17 Gram(s) Oral daily  roflumilast 500 MICROGram(s) Oral daily  senna Syrup 10 milliLiter(s) Oral at bedtime  tiotropium 2.5 MICROgram(s) Inhaler 2 Puff(s) Inhalation daily    MEDICATIONS  (PRN):  acetaminophen     Tablet .. 650 milliGRAM(s) Oral every 6 hours PRN Temp greater or equal to 38C (100.4F), Mild Pain (1 - 3)  ALPRAZolam 0.5 milliGRAM(s) Oral every 8 hours PRN anxiety  bisacodyl Suppository 10 milliGRAM(s) Rectal daily PRN Constipation  ondansetron Injectable 4 milliGRAM(s) IV Push every 6 hours PRN Nausea and/or Vomiting  sodium chloride 0.9% lock flush 10 milliLiter(s) IV Push every 1 hour PRN Pre/post blood products, medications, blood draw, and to maintain line patency    Allergies    penicillin (Hives)    INTERVAL HPI/OVERNIGHT EVENTS:  Patient S&E at bedside. No current c/o CP or SOB.    VITAL SIGNS:  T(F): 97.8 (11-15-23 @ 05:45)  HR: 101 (11-15-23 @ 07:55)  BP: 129/75 (11-15-23 @ 07:55)  RR: 16 (11-15-23 @ 07:55)  SpO2: 100% (11-15-23 @ 07:55)      PHYSICAL EXAM:  Constitutional: NAD  Eyes: sclera non-icteric  Neck: no JVD  Respiratory: decreased BS bases ant.  Cardiovascular: RRR, no M/R/G  Gastrointestinal: soft, NTND, no masses palpable  Extremities: no calf tenderness  Neurological: Awake, alert.    Labs:             8.2    13.15 )-----------( 194      ( 11-15 @ 07:03 )             25.6                8.0    10.03 )-----------( 188      ( 11-14 @ 05:38 )             25.7                7.6    11.50 )-----------( 199      ( 11-13 @ 12:27 )             23.8       11-15    138  |  101  |  8   ----------------------------<  149<H>  3.7   |  34<H>  |  0.22<L>    Ca    8.1<L>      15 Nov 2023 07:03  Mg     1.7     11-15    TPro  5.8<L>  /  Alb  3.1<L>  /  TBili  0.5  /  DBili  x   /  AST  25  /  ALT  51<H>  /  AlkPhos  52  11-13      RADIOLOGY & ADDITIONAL TESTS:  < from: US Duplex Venous Lower Ext Complete, Bilateral (11.13.23 @ 14:23) >  IMPRESSION:  No evidence of deep venous thrombosis in either lower extremity.      < end of copied text >      Consultant notes reviewed : YES [x ] ; NO [ ]

## 2023-11-15 NOTE — PROGRESS NOTE ADULT - SUBJECTIVE AND OBJECTIVE BOX
Patient is a 68y old  Female who presents with a chief complaint of SOB (15 Nov 2023 08:24)    REports breathing improved  Denies chest pain, SOB  Did not get out of bed yesterday  Tolerating diet  Waiting for MBS  Patient seen and examined at bedside.    ALLERGIES:  penicillin (Hives)    MEDICATIONS  (STANDING):  aspirin enteric coated 81 milliGRAM(s) Oral daily  atorvastatin 80 milliGRAM(s) Oral at bedtime  buDESOnide    Inhalation Suspension 0.5 milliGRAM(s) Inhalation every 12 hours  buPROPion XL (24-Hour) . 150 milliGRAM(s) Oral daily  chlorhexidine 2% Cloths 1 Application(s) Topical daily  dextrose 5%. 1000 milliLiter(s) (100 mL/Hr) IV Continuous <Continuous>  dextrose 5%. 1000 milliLiter(s) (50 mL/Hr) IV Continuous <Continuous>  dextrose 50% Injectable 25 Gram(s) IV Push once  dextrose 50% Injectable 25 Gram(s) IV Push once  dextrose 50% Injectable 12.5 Gram(s) IV Push once  doxazosin 2 milliGRAM(s) Oral at bedtime  hydrocortisone sodium succinate Injectable 40 milliGRAM(s) IV Push daily  insulin lispro (ADMELOG) corrective regimen sliding scale   SubCutaneous Before meals and at bedtime  letrozole 2.5 milliGRAM(s) Oral daily  levalbuterol Inhalation 0.63 milliGRAM(s) Inhalation every 6 hours  midodrine 5 milliGRAM(s) Oral <User Schedule>  nicotine -   7 mG/24Hr(s) Patch 1 Patch Transdermal daily  pantoprazole   Suspension 40 milliGRAM(s) Oral before breakfast  polyethylene glycol 3350 17 Gram(s) Oral daily  roflumilast 500 MICROGram(s) Oral daily  senna Syrup 10 milliLiter(s) Oral at bedtime  tiotropium 2.5 MICROgram(s) Inhaler 2 Puff(s) Inhalation daily    MEDICATIONS  (PRN):  acetaminophen     Tablet .. 650 milliGRAM(s) Oral every 6 hours PRN Temp greater or equal to 38C (100.4F), Mild Pain (1 - 3)  ALPRAZolam 0.5 milliGRAM(s) Oral every 8 hours PRN anxiety  bisacodyl Suppository 10 milliGRAM(s) Rectal daily PRN Constipation  ondansetron Injectable 4 milliGRAM(s) IV Push every 6 hours PRN Nausea and/or Vomiting  sodium chloride 0.9% lock flush 10 milliLiter(s) IV Push every 1 hour PRN Pre/post blood products, medications, blood draw, and to maintain line patency    Vital Signs Last 24 Hrs  T(F): 98.5 (15 Nov 2023 11:00), Max: 98.7 (14 Nov 2023 15:27)  HR: 105 (15 Nov 2023 11:00) (98 - 105)  BP: 121/70 (15 Nov 2023 11:00) (121/70 - 148/68)  RR: 21 (15 Nov 2023 11:00) (16 - 21)  SpO2: 99% (15 Nov 2023 11:00) (98% - 100%)  I&O's Summary    14 Nov 2023 07:01  -  15 Nov 2023 07:00  --------------------------------------------------------  IN: 1000 mL / OUT: 1325 mL / NET: -325 mL    PHYSICAL EXAM:  General: NAD, A/O x 3, oxygen via NC, speaking in full sentences  ENT: MMM, no scleral icterus  Neck: Supple, No JVD, no thyroidomegaly  Lungs: Decreased air entry however unchanged, rhonchi noted bilateral  Cardio: RRR, S1/S2, No murmurs  Abdomen: Soft, Nontender, Nondistended; Bowel sounds present  Extremities: No calf tenderness, No pitting edema, no skin changes    LABS:                        8.2    13.15 )-----------( 194      ( 15 Nov 2023 07:03 )             25.6       11-15    138  |  101  |  8   ----------------------------<  149  3.7   |  34  |  0.22    Ca    8.1      15 Nov 2023 07:03  Mg     1.7     11-15    TPro  5.8  /  Alb  3.1  /  TBili  0.5  /  DBili  x   /  AST  25  /  ALT  51  /  AlkPhos  52  11-13     POCT Blood Glucose.: 149 mg/dL (15 Nov 2023 07:47)  POCT Blood Glucose.: 117 mg/dL (14 Nov 2023 21:48)  POCT Blood Glucose.: 97 mg/dL (14 Nov 2023 16:54)  POCT Blood Glucose.: 188 mg/dL (14 Nov 2023 12:26)      Urinalysis Basic - ( 15 Nov 2023 07:03 )    Color: x / Appearance: x / SG: x / pH: x  Gluc: 149 mg/dL / Ketone: x  / Bili: x / Urobili: x   Blood: x / Protein: x / Nitrite: x   Leuk Esterase: x / RBC: x / WBC x   Sq Epi: x / Non Sq Epi: x / Bacteria: x

## 2023-11-15 NOTE — PROGRESS NOTE ADULT - ASSESSMENT
68 year old woman with  COPD, CAD, HLD, DM2, on xarelto for DVT history, hx of breast ca s/p left mastectomy, on chemotx presented with shortness of breath. Admitted to ICU for respiratory failure due to COPD exacerbation in the setting of Entero/rhinovirus with COPD, severe Exacerbation    DOWNGRADED FROM MICU 11/12/23    #Acute on chronic respiratory failure due to acute COPD Exacerbation in setting of ERV requiring intubation 10/31/23 Extubated 11/2/2023, Re intubated 11/3/23, extubated  #COPD, chronic respiratory failure, on home oxygen  #Active nicotine use - 1 pk/day  - ICU appreciated; Will monitor on tele and pulse oximetry  - C/w oxygen supplementation; titrate if possible, incentive spirometer  - C/w solucortef 40mg IV daily day 3/4  - C/w xopenex, pulmicort  - C/w nicotine patch; decreased dose today    #Acute blood loss anemia, possible GI bleed, s/p one unit PRBC  - Appreciate GI consult team, s/p EGD  - Noted Hg 8.2 today  - Will monitor Hg/Hct, transfuse if Hg<7  - C/w protonix po daily    #Hypokalemia  - Replaced, will monitor    #Leukocytosis, resolved  - Will monitor for now; likely related to steroid    #Dysphagia  - Currently on pureed diet; MBS today    #CAD   #HTN  #Orthostatic hypotension  - Will aim to titrate down midodrine; today 5mg at 8AM; if blood pressure stable, will d/c in AM  - C/w ASA, lipitor     #DM type 2  - ISS, A1c noted to be well controlled    #h/o Breast cancer  - C/w femara 2.5mg daily    #H/o DVT/PE 2018  - HOLDING xaralto due to acute blood loss anemia, however, now stable  - LE ULT negative for DVT  - O/p oncology is Dr Larios; called and left message to call me back [phone]  - I wanted to discuss need for anticoagulation at this time......  - Will hold xarelto until we get clarification  - GI cleared patient to re-start xarelto; will wait for Dr Larios recommendations    #Anxiety  - Continue xanax TID prn    #Acute urinary retention, improved  - Continue cardura for urinary retention voiding well     DVT PPX: ICD  AM labs  PT/OT both recommend Acute rehab  Waiting on PMR evaluation  DISP: Possibly to acute rehab vs [facility]; will speak with acute rehab re: case  D/c plan by Friday this week    11/14: Spoke with brother Maximiliano on phone [phone]  He was able to send me notes from Dr Larios but none of the notes talk about xarelto  All questions answered, in agreement with care plan to discharge by the end of the week        68 year old woman with  COPD, CAD, HLD, DM2, on xarelto for DVT history, hx of breast ca s/p left mastectomy, on chemotx presented with shortness of breath. Admitted to ICU for respiratory failure due to COPD exacerbation in the setting of Entero/rhinovirus with COPD, severe Exacerbation    DOWNGRADED FROM MICU 11/12/23    #Acute on chronic respiratory failure due to acute COPD Exacerbation in setting of ERV requiring intubation 10/31/23 Extubated 11/2/2023, Re intubated 11/3/23, extubated  #COPD, chronic respiratory failure, on home oxygen  #Active nicotine use - 1 pk/day  - ICU appreciated; Will monitor on tele and pulse oximetry  - C/w oxygen supplementation; titrate if possible, incentive spirometer  - C/w solucortef 40mg IV daily day 3/4  - C/w xopenex, pulmicort  - C/w nicotine patch; decreased dose today    #Acute blood loss anemia, possible GI bleed, s/p one unit PRBC  - Appreciate GI consult team, s/p EGD  - Noted Hg 8.2 today  - Will monitor Hg/Hct, transfuse if Hg<7  - C/w protonix po daily    #Hypokalemia  - Replaced, will monitor    #Leukocytosis, resolved  - Will monitor for now; likely related to steroid    #Dysphagia  - Currently on pureed diet; MBS today    #CAD   #HTN  #Orthostatic hypotension  - Will aim to titrate down midodrine; today 5mg at 8AM; if blood pressure stable, will d/c in AM  - C/w ASA, lipitor     #DM type 2  - ISS, A1c noted to be well controlled    #h/o Breast cancer  - C/w femara 2.5mg daily    #H/o DVT/PE 2018  - HOLDING xaralto due to acute blood loss anemia, however, now stable  - LE ULT negative for DVT  - O/p oncology is Dr Larios; called him [phone]; is ok with us to restart xarelto   - GI cleared patient to re-start xarelto  - At this time, will start xarelto    #Anxiety  - Continue xanax TID prn    #Acute urinary retention, improved  - Continue cardura for urinary retention voiding well     DVT PPX: xarelto  AM labs  PT/OT both recommend Acute rehab  Waiting on PMR re-evaluation  DISP: Possibly to acute rehab vs [facility]; Dr Childs to re-eval  D/c plan by Friday this week    11/14: Spoke with brother Maximiliano on phone [phone]  He was able to send me notes from Dr Larios   All questions answered, in agreement with care plan to discharge by the end of the week

## 2023-11-15 NOTE — PROGRESS NOTE ADULT - SUBJECTIVE AND OBJECTIVE BOX
I was asked to explain reasons for decision regarding not being a candidate for intensive rehab at [facility]    Patient currently requires 2 person Max A OOB with significant Weakness noted at hips yue   She Becomes Dyspneic with activity  Patient will not tolerate three hours rehab given Hx COPD and respiratory requirements  She will not achieve functional independence with short course of intensive rehab at [facility]  Therefore would admit to [facility] for [facility] Rehab over longer period of time to restore functional independence

## 2023-11-15 NOTE — CHART NOTE - NSCHARTNOTESELECT_GEN_ALL_CORE
Event Note
Nutrition Services
Nutrition Services
Attempted PIV/Event Note
Nutrition Services

## 2023-11-15 NOTE — CHART NOTE - NSCHARTNOTEFT_GEN_A_CORE
NUTRITION FOLLOW UP    SOURCE: Patient [X)   Family [ ]    Medical Record (X)    Diet, Consistent Carbohydrate/No Snacks:   Pureed (PUREED) (11-11-23 @ 13:24) [Active]    Pt seen this am during bfast, consumed 100% of oatmeal, 50% of puree eggs. Dislikes puree texture. As per discussion with speech therapy, plan for MBS tomorrow morning. Pt was tolerating regular diet PTA (+missing dentition at present). POCT reviewed: 97-188mg/dl last 24 hrs, +steroids. SSI. Pt receptive to addition of glucerna supplements with meals. Additional preferences obtained. Last BM 11/14 per RN.       CURRENT WEIGHT: (11/13) 92.3lbs                            (11/10) 94.4/45.1kg                          (11/9) 101.1/45.9kg                          (11/8) 102/46.3kg     PERTINENT MEDS:   Pertinent Medications: MEDICATIONS  (STANDING):  aspirin enteric coated 81 milliGRAM(s) Oral daily  atorvastatin 80 milliGRAM(s) Oral at bedtime  buDESOnide    Inhalation Suspension 0.5 milliGRAM(s) Inhalation every 12 hours  buPROPion XL (24-Hour) . 150 milliGRAM(s) Oral daily  chlorhexidine 2% Cloths 1 Application(s) Topical daily  dextrose 5%. 1000 milliLiter(s) (100 mL/Hr) IV Continuous <Continuous>  dextrose 5%. 1000 milliLiter(s) (50 mL/Hr) IV Continuous <Continuous>  dextrose 50% Injectable 25 Gram(s) IV Push once  dextrose 50% Injectable 12.5 Gram(s) IV Push once  dextrose 50% Injectable 25 Gram(s) IV Push once  doxazosin 2 milliGRAM(s) Oral at bedtime  hydrocortisone sodium succinate Injectable 40 milliGRAM(s) IV Push daily  insulin lispro (ADMELOG) corrective regimen sliding scale   SubCutaneous Before meals and at bedtime  letrozole 2.5 milliGRAM(s) Oral daily  levalbuterol Inhalation 0.63 milliGRAM(s) Inhalation every 6 hours  midodrine 5 milliGRAM(s) Oral <User Schedule>  nicotine -   7 mG/24Hr(s) Patch 1 Patch Transdermal daily  pantoprazole   Suspension 40 milliGRAM(s) Oral before breakfast  polyethylene glycol 3350 17 Gram(s) Oral daily  roflumilast 500 MICROGram(s) Oral daily  senna Syrup 10 milliLiter(s) Oral at bedtime  tiotropium 2.5 MICROgram(s) Inhaler 2 Puff(s) Inhalation daily    MEDICATIONS  (PRN):  acetaminophen     Tablet .. 650 milliGRAM(s) Oral every 6 hours PRN Temp greater or equal to 38C (100.4F), Mild Pain (1 - 3)  ALPRAZolam 0.5 milliGRAM(s) Oral every 8 hours PRN anxiety  bisacodyl Suppository 10 milliGRAM(s) Rectal daily PRN Constipation  ondansetron Injectable 4 milliGRAM(s) IV Push every 6 hours PRN Nausea and/or Vomiting  sodium chloride 0.9% lock flush 10 milliLiter(s) IV Push every 1 hour PRN Pre/post blood products, medications, blood draw, and to maintain line patency      PERTINENT LABS:  11-15 Na138 mmol/L Glu 149 mg/dL<H> K+ 3.7 mmol/L Cr  0.22 mg/dL<L> BUN 8 mg/dL 11-12 Phos 2.6 mg/dL 11-13 Alb 3.1 g/dL<L>    CAPILLARY BLOOD GLUCOSE      POCT Blood Glucose.: 223 mg/dL (15 Nov 2023 11:27)  POCT Blood Glucose.: 149 mg/dL (15 Nov 2023 07:47)  POCT Blood Glucose.: 117 mg/dL (14 Nov 2023 21:48)  POCT Blood Glucose.: 97 mg/dL (14 Nov 2023 16:54)  POCT Blood Glucose.: 188 mg/dL (14 Nov 2023 12:26)      SKIN:  no pressure ulcers  EDEMA: +2 right arm   LAST BM: 11/14 per RN    ESTIMATED NEEDS:   [X] no change since previous assessment  [ ] recalculated:     PREVIOUS NUTRITION DIAGNOSIS:    1. Severe malnutrition- add glucerna TID    NUTRITION DIAGNOSIS is :  (x)  Ongoing         NEW NUTRITION DIAGNOSIS: N/A    NUTRITION RECOMMENDATIONS:   1. Add glucerna TID  2. Pending MBS tomorrow am    MONITORING AND EVALUATION:   1. Tolerance to diet prescription   2. PO intake  3. Weights  4. Labs  5. Follow Up per protocol     RD to remain available   Desiree Aguilar RDN   [phone] or [id number]

## 2023-11-15 NOTE — PROGRESS NOTE ADULT - SUBJECTIVE AND OBJECTIVE BOX
Follow-up Pulmonary Progress Note  Chief Complaint : Chronic obstructive pulmonary disease with acute exacerbation    patient seen and examined   comfortable  on n/c  no sob, cough, wheezing    Allergies :penicillin (Hives)      PAST MEDICAL & SURGICAL HISTORY:  COPD, severe    CAD (coronary artery disease)    Breast cancer    No significant past surgical history        Medications:  MEDICATIONS  (STANDING):  aspirin enteric coated 81 milliGRAM(s) Oral daily  atorvastatin 80 milliGRAM(s) Oral at bedtime  buDESOnide    Inhalation Suspension 0.5 milliGRAM(s) Inhalation every 12 hours  buPROPion XL (24-Hour) . 150 milliGRAM(s) Oral daily  chlorhexidine 2% Cloths 1 Application(s) Topical daily  dextrose 5%. 1000 milliLiter(s) (50 mL/Hr) IV Continuous <Continuous>  dextrose 5%. 1000 milliLiter(s) (100 mL/Hr) IV Continuous <Continuous>  dextrose 50% Injectable 25 Gram(s) IV Push once  dextrose 50% Injectable 25 Gram(s) IV Push once  dextrose 50% Injectable 12.5 Gram(s) IV Push once  doxazosin 2 milliGRAM(s) Oral at bedtime  ferrous    sulfate 325 milliGRAM(s) Oral daily  hydrocortisone sodium succinate Injectable 40 milliGRAM(s) IV Push daily  insulin lispro (ADMELOG) corrective regimen sliding scale   SubCutaneous Before meals and at bedtime  letrozole 2.5 milliGRAM(s) Oral daily  levalbuterol Inhalation 0.63 milliGRAM(s) Inhalation every 6 hours  midodrine 5 milliGRAM(s) Oral <User Schedule>  nicotine -   7 mG/24Hr(s) Patch 1 Patch Transdermal daily  pantoprazole   Suspension 40 milliGRAM(s) Oral before breakfast  polyethylene glycol 3350 17 Gram(s) Oral daily  rivaroxaban 10 milliGRAM(s) Oral with dinner  roflumilast 500 MICROGram(s) Oral daily  senna Syrup 10 milliLiter(s) Oral at bedtime  tiotropium 2.5 MICROgram(s) Inhaler 2 Puff(s) Inhalation daily    MEDICATIONS  (PRN):  acetaminophen     Tablet .. 650 milliGRAM(s) Oral every 6 hours PRN Temp greater or equal to 38C (100.4F), Mild Pain (1 - 3)  ALPRAZolam 0.5 milliGRAM(s) Oral every 8 hours PRN anxiety  bisacodyl Suppository 10 milliGRAM(s) Rectal daily PRN Constipation  ondansetron Injectable 4 milliGRAM(s) IV Push every 6 hours PRN Nausea and/or Vomiting  sodium chloride 0.9% lock flush 10 milliLiter(s) IV Push every 1 hour PRN Pre/post blood products, medications, blood draw, and to maintain line patency      Antibiotics History  azithromycin  IVPB 500 milliGRAM(s) IV Intermittent every 24 hours, 10-27-23 @ 14:42  cefTRIAXone   IVPB    , 10-29-23 @ 10:09  cefTRIAXone   IVPB 1000 milliGRAM(s) IV Intermittent once, 10-29-23 @ 10:09, Stop order after: 1 Doses  cefTRIAXone   IVPB 1000 milliGRAM(s) IV Intermittent every 24 hours, 10-30-23 @ 10:09, Stop order after: 5 Days  meropenem  IVPB 1000 milliGRAM(s) IV Intermittent every 8 hours, 11-06-23 @ 14:00, Stop order after: 8 Days  meropenem  IVPB 1000 milliGRAM(s) IV Intermittent once, 11-06-23 @ 02:59, Stop order after: 1 Doses  meropenem  IVPB    , 11-06-23 @ 03:18      Heme Medications   aspirin enteric coated 81 milliGRAM(s) Oral daily, 10-27-23 @ 14:57  rivaroxaban 10 milliGRAM(s) Oral with dinner, 11-15-23 @ 11:45      GI Medications  bisacodyl Suppository 10 milliGRAM(s) Rectal daily, 11-02-23 @ 08:24, Routine PRN  pantoprazole   Suspension 40 milliGRAM(s) Oral before breakfast, 11-11-23 @ 10:58, Routine  polyethylene glycol 3350 17 Gram(s) Oral daily, 11-11-23 @ 08:42,   senna Syrup 10 milliLiter(s) Oral at bedtime, 11-11-23 @ 10:59, Routine        LABS:                        8.2    13.15 )-----------( 194      ( 15 Nov 2023 07:03 )             25.6     11-15    138  |  101  |  8   ----------------------------<  149<H>  3.7   |  34<H>  |  0.22<L>    Ca    8.1<L>      15 Nov 2023 07:03  Mg     1.7     11-15           CULTURES: (if applicable)    Culture - Sputum (collected 11-04-23 @ 19:00)  Source: ET Tube ET Tube  Gram Stain (11-05-23 @ 06:21):    Few polymorphonuclear leukocytes per low power field    No Squamous epithelial cells per low power field    No organisms seen per oil power field  Final Report (11-06-23 @ 18:17):    Normal Respiratory Daria present    Culture - Blood (collected 11-04-23 @ 12:20)  Source: .Blood Blood  Final Report (11-09-23 @ 23:00):    No growth at 5 days    Culture - Blood (collected 11-04-23 @ 12:10)  Source: .Blood Blood  Gram Stain (11-06-23 @ 02:43):    Growth in aerobic bottle: Gram Positive Cocci in Clusters  Final Report (11-06-23 @ 20:38):    Growth in aerobic bottle: Staphylococcus epidermidis    Coagulase Negative Staphylococci isolated from a single blood culture set    may represent contamination.    Contact the Microbiology Department at [phone] if susceptibility    testing is clinically indicated.    Direct identification is available within approximately 3-5    hours either by Blood Panel Multiplexed PCR or Direct    MALDI-TOF. Details: [email or web address]  Organism: Blood Culture PCR (11-06-23 @ 20:38)  Organism: Blood Culture PCR (11-06-23 @ 20:38)      Method Type: PCR      -  Staphylococcus epidermidis: Detec         VITALS:  T(C): 36.9 (11-15-23 @ 11:00), Max: 37.1 (11-14-23 @ 15:27)  T(F): 98.5 (11-15-23 @ 11:00), Max: 98.7 (11-14-23 @ 15:27)  HR: 120 (11-15-23 @ 11:11) (98 - 120)  BP: 121/70 (11-15-23 @ 11:00) (121/70 - 148/68)  BP(mean): --  ABP: --  ABP(mean): --  RR: 21 (11-15-23 @ 11:00) (16 - 21)  SpO2: 99% (11-15-23 @ 11:00) (98% - 100%)  CVP(mm Hg): --  CVP(cm H2O): --    Ins and Outs     11-14-23 @ 07:01  -  11-15-23 @ 07:00  --------------------------------------------------------  IN: 1000 mL / OUT: 1325 mL / NET: -325 mL    11-15-23 @ 07:01  -  11-15-23 @ 15:25  --------------------------------------------------------  IN: 520 mL / OUT: 0 mL / NET: 520 mL         I&O's Detail    14 Nov 2023 07:01  -  15 Nov 2023 07:00  --------------------------------------------------------  IN:    Oral Fluid: 1000 mL  Total IN: 1000 mL    OUT:    Voided (mL): 1325 mL  Total OUT: 1325 mL    Total NET: -325 mL      15 Nov 2023 07:01  -  15 Nov 2023 15:25  --------------------------------------------------------  IN:    Oral Fluid: 520 mL  Total IN: 520 mL    OUT:  Total OUT: 0 mL    Total NET: 520 mL

## 2023-11-15 NOTE — PROGRESS NOTE ADULT - ASSESSMENT
Physical Examination:  GENERAL:               Alert, Oriented, No acute distress.    HEENT:                     No JVD, Moist MM  PULM:                     Bilateral air entry, diminished to auscultation bilaterally, no significant sputum production, trace Rales, No Rhonchi, No Wheezing  CVS:                         S1, S2,  +Murmur  ABD:                        Soft, nondistended, nontender, normoactive bowel sounds,    NEURO:                  Alert, oriented, interactive, nonfocal, follows commands  PSYC:                      Calm, + Insight.    Assessment     68 year old woman with  COPD, CAD, HLD, DM2, on xarelto for DVT history, hx of breast ca s/p left mastectomy, on chemotx presented with shortness of breath. Admitted to ICU for respiratory failure due to COPD exacerbation in the setting of Entero/rhinovirus with COPD, severe Exacerbation    Problem List  1. Acute respiratory failure due to acute COPD Exacerbation in setting of ERV requiring intubation 10/31/23 Extubated 11/2/2023, Re intubated 11/3/23 Extubated 11/5/2023  2. Active nicotine use - 1 pk/day  3. uses home o2 PRN  4. CAD   5. HTN  6. DM type 2  7, h/o Breast cancer  8. H/o DVT on xaralto -     Plan  - Doing well on NC  - Off BiPaP  - CBC qd  - Protonix bid   - Encourage coughing and deep breathing  - Encourage incentive spirometer  - Wellbutrin   - Continue xanax TID prn  - Symbicort 160/ 4.5 q12h   - Daliresp 500 mg qd   - Steroid taper ordered   - Cont. Aspirin and statin   - Off anticoag    - Nicotine patch, smoking cessation advised  - PT eval > OOB.  - Dispo planning may need home o2

## 2023-11-16 NOTE — DISCHARGE NOTE PROVIDER - NSDCDCMDCOMP_GEN_ALL_CORE
This document is complete and the patient is ready for discharge. PROVIDER:[TOKEN:[[id number]],FOLLOWUP:[1-3 Days]]

## 2023-11-16 NOTE — PROGRESS NOTE ADULT - ASSESSMENT
Physical Examination:  GENERAL:               Alert, Oriented, No acute distress.    HEENT:                     No JVD, Moist MM  PULM:                     Bilateral air entry, diminished to auscultation bilaterally, no significant sputum production, trace Rales, No Rhonchi, No Wheezing  CVS:                         S1, S2,  +Murmur  ABD:                        Soft, nondistended, nontender, normoactive bowel sounds,    NEURO:                  Alert, oriented, interactive, nonfocal, follows commands  PSYC:                      Calm, + Insight.    Assessment     68 year old woman with  COPD, CAD, HLD, DM2, on xarelto for DVT history, hx of breast ca s/p left mastectomy, on chemotx presented with shortness of breath. Admitted to ICU for respiratory failure due to COPD exacerbation in the setting of Entero/rhinovirus with COPD, severe Exacerbation    Problem List  1. Acute respiratory failure due to acute COPD Exacerbation in setting of ERV requiring intubation 10/31/23 Extubated 11/2/2023, Re intubated 11/3/23 Extubated 11/5/2023  2. Active nicotine use - 1 pk/day  3. uses home o2 PRN  4. CAD   5. HTN  6. DM type 2  7, h/o Breast cancer  8. H/o DVT on xaralto -     Plan  - Doing well on NC  - Protonix bid   - Encourage coughing and deep breathing  - Encourage incentive spirometer  - Wellbutrin   - Continue xanax TID prn  - Symbicort 160/ 4.5 q12h   - Daliresp 500 mg qd   - Steroid taper ordered   - Cont. Aspirin and statin   - Off anticoag    - Nicotine patch, smoking cessation advised  - PT eval > OOB.  - Dispo planning may need home o2, need check o2 on ambulation

## 2023-11-16 NOTE — SWALLOW VFSS/MBS ASSESSMENT ADULT - SLP GENERAL OBSERVATIONS
Patient received in bed, on 2L of O2 via NC. Patient received in radiology suite in bed, on 2L of O2 via NC. Patient currently on diet of soft and bite sized with thin liquids.

## 2023-11-16 NOTE — PROGRESS NOTE ADULT - PROBLEM SELECTOR PLAN 1
Patient with h/o breast cancer, receiving adjuvant herceptin therapy. Admitted with COPD exacerbation, enterovirus infection. Herceptin on hold due to patient's acute illness/hospitalization.   Patient with reported h/o DVT receiving anticoagulation PTA. Xarelto was held due to GIB. 10/27/23 CTA chest-negative for PE; 11/13/23 LE venous duplex study negative for DVT. Prophylactic dosing of xarelto has been resumed-no overt bleeding noted clinically at present. Patient stated she will f/u with her primary oncologist Dr. Larios in ambulatory.

## 2023-11-16 NOTE — DISCHARGE NOTE PROVIDER - HOSPITAL COURSE
68 year old woman with  COPD, CAD, HLD, DM2, on xarelto for DVT history, hx of breast ca s/p left mastectomy, on chemotx presented with shortness of breath. Admitted to ICU for respiratory failure due to COPD exacerbation in the setting of Entero/rhinovirus with severe exacerbation of COPD. Pt was intubated on 10/31, Extubated 11/2/2023, Re intubated 11/3/23 and then Extubated 11/5/2023. Hospital course was complicated by acute on chronic anemia. Pt had melena. GI evaluated and performed an EGD on 11/11, which showed Normal mucosa in the whole esophagus. Normal mucosa in the duodenal bulb and second part of the duodenum. Angioectasia in the pre-pyloric region. (Injection, Thermal Therapy performed). Post procedure, GI recommending daily PPI.     Pt evaluated by PT, OT and PMR team. Pt recommended for [facility]. Pt followed by SLP team, had MBS performed on 11/16/23, tolerated well. Recommended soft and bite sized food consistency on discharge. Over hospital course, pt weaned off iv steroids and transitioned to PO steroids. Pt on prednisone taper, 20mg daily 11/16-11/18, then 10mg daily 11/19-11/22, then stop. Pt medically stable for discharge to [facility]. Will f/u outpt with PCP, GI and Hematology. 68 year old woman with  COPD, CAD, HLD, DM2, on xarelto for DVT history, hx of breast ca s/p left mastectomy, on chemo Tx presented with shortness of breath. Admitted to ICU for respiratory failure due to COPD exacerbation in the setting of Entero/rhinovirus with severe exacerbation of COPD. Pt was intubated on 10/31, Extubated 11/2/2023, Re intubated 11/3/23 and then Extubated 11/5/2023. Hospital course was complicated by acute on chronic anemia. Pt had melena. GI evaluated and performed an EGD on 11/11, which showed Normal mucosa in the whole esophagus. Normal mucosa in the duodenal bulb and second part of the duodenum. Angioectasia in the pre-pyloric region. (Injection, Thermal Therapy performed). Post procedure, GI recommending daily PPI.     Pt evaluated by PT, OT and PMR team. Pt recommended for [facility]. Pt followed by SLP team, had MBS performed on 11/16/23, tolerated well. Recommended soft and bite sized food consistency on discharge. Over hospital course, pt weaned off iv steroids and transitioned to PO steroids. Pt on prednisone taper, 20mg daily 11/16-11/18, then 10mg daily 11/19-11/22, then stop. Pt had lingering leukocytosis likely 2/2 steroid use that trended to normal by day of discharge. Pt should also continue with incentive spirometer usage, hourly while awake. Over hospital course, pt treated for urinary retention with doxazosin with good effect, as well as low dose xanax for anxiety.     Pt medically stable for discharge to [facility]. Will f/u outpt with PCP, GI, Pulmonology and Hematology. 68 year old woman with  COPD, CAD, HLD, DM2, on xarelto for DVT history, hx of breast ca s/p left mastectomy, on chemo Tx presented with shortness of breath. Admitted to ICU for respiratory failure due to COPD exacerbation in the setting of Entero/rhinovirus with severe exacerbation of COPD. Pt was intubated on 10/31, Extubated 11/2/2023, Re intubated 11/3/23 and then Extubated 11/5/2023. Hospital course was complicated by acute on chronic anemia. Pt had melena. GI evaluated and performed an EGD on 11/11, which showed Normal mucosa in the whole esophagus. Normal mucosa in the duodenal bulb and second part of the duodenum. Angioectasia in the pre-pyloric region. (Injection, Thermal Therapy performed). Post procedure, GI recommending daily PPI.     Pt evaluated by PT, OT and PMR team. Pt recommended for [facility]. Pt followed by SLP team, had MBS performed on 11/16/23, tolerated well. Recommended easy to chew food consistency on discharge. Over hospital course, pt weaned off iv steroids and transitioned to PO steroids. Pt on prednisone taper, 20mg daily 11/16-11/18, then 10mg daily 11/19-11/22, then stop. Pt had lingering leukocytosis likely 2/2 steroid use that trended to normal by day of discharge. Pt should also continue with incentive spirometer usage, hourly while awake. Over hospital course, pt treated for urinary retention with doxazosin with good effect, as well as low dose xanax for anxiety.     Pt medically stable for discharge to [facility]. Will f/u outpt with PCP, GI, Pulmonology and Hematology.

## 2023-11-16 NOTE — PROGRESS NOTE ADULT - PROBLEM SELECTOR PROBLEM 1
Breast cancer, female
Anemia
Breast cancer, female
Breast cancer, female

## 2023-11-16 NOTE — PROGRESS NOTE ADULT - SUBJECTIVE AND OBJECTIVE BOX
REHAN IGLESIAS   68y   Female    Admitting: EVA Stark  HPI:  68F PMHx of COPD, HTN, HLD, DM, on xarelto for DVT history, hx of breast ca s/p left mastectomy, on chemotx last tx was 1 week PTA presenting with shortness of breath, x 1 day. She is an active smoker. Denied any fevers or chills. ICU consulted as patient needed bipap support in the ED.  Oncology consulted for breast cancer history.      PAST MEDICAL & SURGICAL HISTORY:  COPD, severe      CAD (coronary artery disease)      Breast cancer      HEALTH ISSUES - PROBLEM Dx:  Breast cancer, female    Breast cancer, female    Drop in hemoglobin    Anemia      MEDICATIONS  (STANDING):  aspirin enteric coated 81 milliGRAM(s) Oral daily  atorvastatin 80 milliGRAM(s) Oral at bedtime  buDESOnide    Inhalation Suspension 0.5 milliGRAM(s) Inhalation every 12 hours  buPROPion XL (24-Hour) . 150 milliGRAM(s) Oral daily  chlorhexidine 2% Cloths 1 Application(s) Topical daily  dextrose 5%. 1000 milliLiter(s) (50 mL/Hr) IV Continuous <Continuous>  dextrose 5%. 1000 milliLiter(s) (100 mL/Hr) IV Continuous <Continuous>  dextrose 50% Injectable 25 Gram(s) IV Push once  dextrose 50% Injectable 12.5 Gram(s) IV Push once  dextrose 50% Injectable 25 Gram(s) IV Push once  doxazosin 2 milliGRAM(s) Oral at bedtime  ferrous    sulfate 325 milliGRAM(s) Oral daily  insulin lispro (ADMELOG) corrective regimen sliding scale   SubCutaneous Before meals and at bedtime  letrozole 2.5 milliGRAM(s) Oral daily  levalbuterol Inhalation 0.63 milliGRAM(s) Inhalation every 6 hours  midodrine 5 milliGRAM(s) Oral <User Schedule>  nicotine -   7 mG/24Hr(s) Patch 1 Patch Transdermal daily  pantoprazole   Suspension 40 milliGRAM(s) Oral before breakfast  polyethylene glycol 3350 17 Gram(s) Oral daily  predniSONE   Tablet 20 milliGRAM(s) Oral daily  predniSONE   Tablet   Oral   rivaroxaban 10 milliGRAM(s) Oral with dinner  roflumilast 500 MICROGram(s) Oral daily  senna Syrup 10 milliLiter(s) Oral at bedtime  tiotropium 2.5 MICROgram(s) Inhaler 2 Puff(s) Inhalation daily    MEDICATIONS  (PRN):  acetaminophen     Tablet .. 650 milliGRAM(s) Oral every 6 hours PRN Temp greater or equal to 38C (100.4F), Mild Pain (1 - 3)  ALPRAZolam 0.5 milliGRAM(s) Oral every 8 hours PRN anxiety  bisacodyl Suppository 10 milliGRAM(s) Rectal daily PRN Constipation  ondansetron Injectable 4 milliGRAM(s) IV Push every 6 hours PRN Nausea and/or Vomiting  sodium chloride 0.9% lock flush 10 milliLiter(s) IV Push every 1 hour PRN Pre/post blood products, medications, blood draw, and to maintain line patency    Allergies    penicillin (Hives)    INTERVAL HPI/OVERNIGHT EVENTS:  Patient S&E at bedside. No c/o CP, SOB, LE pain    VITAL SIGNS:  T(F): 98 (11-16-23 @ 05:00)  HR: 106 (11-16-23 @ 05:17)  BP: 134/75 (11-16-23 @ 05:00)  RR: 18 (11-16-23 @ 05:00)  SpO2: 100% (11-16-23 @ 05:17)      PHYSICAL EXAM:  Constitutional: NAD  Eyes: sclera non-icteric  Neck: no JVD  Respiratory: decreased BS bases ant.  Cardiovascular: RRR, no M/R/G  Gastrointestinal: soft, NTND, no masses palpable  Extremities: no calf tenderness  Neurological: Awake, alert.    Labs:             8.2    13.15 )-----------( 194      ( 11-15 @ 07:03 )             25.6                8.0    10.03 )-----------( 188      ( 11-14 @ 05:38 )             25.7                7.6    11.50 )-----------( 199      ( 11-13 @ 12:27 )             23.8       11-15    138  |  101  |  8   ----------------------------<  149<H>  3.7   |  34<H>  |  0.22<L>      Consultant notes reviewed : YES [x ] ; NO [ ]

## 2023-11-16 NOTE — SWALLOW VFSS/MBS ASSESSMENT ADULT - DIAGNOSTIC IMPRESSIONS
Patient was seen for a MBSS to assess pathophysiology of swallowing function and to assess tolerance of current diet of soft and bite sized solids and thin liquids.  Patient was received in radiology suite in [facility] with lateral view of oral, pharyngeal and proximal esophageal phases obtained via C-arm. Patient accepted trials of Varibar of the following consistencies: thin liquids via cup/straw, puree, soft and bite sized, and regular solids. Nonobstructive cervical prominence observed at C5-C6 suggestive of osteophytes.       Patient presents with mild oropharyngeal dysphagia. Oral phase characterized by prolonged mastication and disorganized bolus formation with regular solids. Reduced lingual to palatal contact resulting in mild oral residue on lingual surface with thins via straw improving to trace with independent use of secondary swallow. Pharyngeal phase of swallow characterized by initiation of swallow at the level of the valleculae. Adequate hyolaryngeal elevation however partial hyolaryngeal excursion and reduced epiglottic deflection resulted in penetration with retrieval of thin liquids via consecutive sips (PAS 2). Trace valleculae residue with thins via straw secondary to reduced base of tongue contact to posterior pharyngeal wall. No aspiration/penetration with thins via cup, puree, soft and bite sized, or regular solids (PAS 1). Adequate UES duration/distention for clearance of contrast into the upper esophagus. No evidence of esophageal retention with adequate flow through upper esophagus.     Impressions: Patient presents with mild oropharyngeal dysphagia secondary to acute illness. Primary deficits significant for prolonged oral management of advanced consistencies and reduced airway protection. Patient at increased risk for fatigue during meals secondary to debility and the impact of respiratory related comorbidities on respiratory function and fluctuations.

## 2023-11-16 NOTE — DISCHARGE NOTE NURSING/CASE MANAGEMENT/SOCIAL WORK - PATIENT PORTAL LINK FT
You can access the FollowMyHealth Patient Portal offered by [facility] by registering at the following website: [email or web address]. By joining [facility]’s FollowMyHealth portal, you will also be able to view your health information using other applications (apps) compatible with our system.

## 2023-11-16 NOTE — DISCHARGE NOTE PROVIDER - CARE PROVIDER_API CALL
Ronan Larios  Hematology  [address] [place] [address]  Phone: [phone]  Fax: [phone]  Established Patient  Follow Up Time: 2 weeks    Oliver Bacon  Gastroenterology  [address] [place] [address], [address]  [place] [address]  Phone: [phone]  Fax: [phone]  Follow Up Time: 1 month   Ronan Larios  Hematology  [address] [place] [address]  Phone: [phone]  Fax: [phone]  Established Patient  Follow Up Time: 1 week    Oliver Bacon  Gastroenterology  [address] [place] [address], [address]  [place] [address]  Phone: [phone]  Fax: [phone]  Follow Up Time: 1 month

## 2023-11-16 NOTE — PROGRESS NOTE ADULT - REASON FOR ADMISSION
COPD
COPD Exacerbation
SOB
COPD exacerbation
COPD exacerbation.
SOB
SOB
shortness of breath
COPD
SOB
respiratory failure
Acute hypoxic respiratory failure
COPD
COPD exac
COPD exacerbation
SOB
resp failure
COPD exacerbation
COPD exacerbation
SOB

## 2023-11-16 NOTE — DISCHARGE NOTE PROVIDER - PROVIDER TOKENS
PROVIDER:[TOKEN:[[id number]],FOLLOWUP:[2 weeks],ESTABLISHEDPATIENT:[T]],PROVIDER:[TOKEN:[[id number]],FOLLOWUP:[1 month]] PROVIDER:[TOKEN:[[id number]],FOLLOWUP:[1 week],ESTABLISHEDPATIENT:[T]],PROVIDER:[TOKEN:[[id number]],FOLLOWUP:[1 month]]

## 2023-11-16 NOTE — SWALLOW VFSS/MBS ASSESSMENT ADULT - RECOMMENDED CONSISTENCY
Recommendations: 1) Easy to chew solids with thin liquids 2) General aspiration precautions 3) Therapeutic meal trials to determine candidacy for diet upgrade with SLP only 4) Pacing during meals to prevent fatigue 5) Assistance with meals 6) This service to f/u to assess tolerance of diet

## 2023-11-16 NOTE — PROGRESS NOTE ADULT - NUTRITIONAL ASSESSMENT
This patient has been assessed with a concern for Malnutrition and has been determined to have a diagnosis/diagnoses of Severe protein-calorie malnutrition and Underweight (BMI < 19).    This patient is being managed with:   Diet Clear Liquid-  Entered: Nov 10 2023  1:48PM  
This patient has been assessed with a concern for Malnutrition and has been determined to have a diagnosis/diagnoses of Severe protein-calorie malnutrition and Underweight (BMI < 19).    This patient is being managed with:   Diet Consistent Carbohydrate/No Snacks-  Pureed (PUREED)  Entered: Nov 11 2023  1:23PM  
This patient has been assessed with a concern for Malnutrition and has been determined to have a diagnosis/diagnoses of Severe protein-calorie malnutrition and Underweight (BMI < 19).    This patient is being managed with:   Diet Consistent Carbohydrate/No Snacks-  Pureed (PUREED)  Entered: Nov 11 2023  1:23PM  
This patient has been assessed with a concern for Malnutrition and has been determined to have a diagnosis/diagnoses of Severe protein-calorie malnutrition and Underweight (BMI < 19).    This patient is being managed with:   Diet Consistent Carbohydrate/No Snacks-  Pureed (PUREED)  Entered: Nov 11 2023  1:23PM    This patient has been assessed with a concern for Malnutrition and has been determined to have a diagnosis/diagnoses of Severe protein-calorie malnutrition and Underweight (BMI < 19).    This patient is being managed with:   Diet Consistent Carbohydrate/No Snacks-  Pureed (PUREED)  Entered: Nov 11 2023  1:23PM    This patient has been assessed with a concern for Malnutrition and has been determined to have a diagnosis/diagnoses of Severe protein-calorie malnutrition and Underweight (BMI < 19).    This patient is being managed with:   Diet Consistent Carbohydrate/No Snacks-  Pureed (PUREED)  Entered: Nov 11 2023  1:23PM  
This patient has been assessed with a concern for Malnutrition and has been determined to have a diagnosis/diagnoses of Severe protein-calorie malnutrition and Underweight (BMI < 19).    This patient is being managed with:   Diet NPO with Tube Feed-  Tube Feeding Modality: Nasogastric  Vital 1.5 Tom  Total Volume for 24 Hours (mL): 715  Continuous  Starting Tube Feed Rate {mL per Hour}: 45  Increase Tube Feed Rate by (mL): 10     Every 8 hours  Until Goal Tube Feed Rate (mL per Hour): 55  Tube Feed Duration (in Hours): 13  Tube Feed Start Time: 08:00  Tube Feed Stop Time: 21:00  Entered: Nov  3 2023  9:55AM  
This patient has been assessed with a concern for Malnutrition and has been determined to have a diagnosis/diagnoses of Severe protein-calorie malnutrition and Underweight (BMI < 19).    This patient is being managed with:   Diet NPO with Tube Feed-  Tube Feeding Modality: Nasogastric  Vital 1.5 Tom  Total Volume for 24 Hours (mL): 715  Continuous  Starting Tube Feed Rate {mL per Hour}: 45  Increase Tube Feed Rate by (mL): 10     Every 8 hours  Until Goal Tube Feed Rate (mL per Hour): 55  Tube Feed Duration (in Hours): 13  Tube Feed Start Time: 08:00  Tube Feed Stop Time: 21:00  Entered: Nov  3 2023  9:55AM  
This patient has been assessed with a concern for Malnutrition and has been determined to have a diagnosis/diagnoses of Severe protein-calorie malnutrition and Underweight (BMI < 19).    This patient is being managed with:   Diet NPO with Tube Feed-  Tube Feeding Modality: Nasogastric  Vital 1.5 Tom  Total Volume for 24 Hours (mL): 975  Continuous  Starting Tube Feed Rate {mL per Hour}: 55  Increase Tube Feed Rate by (mL): 10     Every 4 hours  Until Goal Tube Feed Rate (mL per Hour): 65  Tube Feed Duration (in Hours): 15  Tube Feed Start Time: 08:00  Tube Feed Stop Time: 21:00  Free Water Flush  Bolus   Total Volume per Flush (mL): 200   Frequency: Every 8 Hours  Entered: Nov 8 2023  2:18PM    Diet NPO with Tube Feed-  Tube Feeding Modality: Nasogastric  Vital 1.5 Tom  Total Volume for 24 Hours (mL): 715  Continuous  Starting Tube Feed Rate {mL per Hour}: 45  Increase Tube Feed Rate by (mL): 10     Every 8 hours  Until Goal Tube Feed Rate (mL per Hour): 55  Tube Feed Duration (in Hours): 13  Tube Feed Start Time: 08:00  Tube Feed Stop Time: 21:00  Entered: Nov 7 2023  7:42AM    The following pending diet order is being considered for treatment of Severe protein-calorie malnutrition and Underweight (BMI < 19):null
This patient has been assessed with a concern for Malnutrition and has been determined to have a diagnosis/diagnoses of Severe protein-calorie malnutrition and Underweight (BMI < 19).    This patient is being managed with:   Diet Soft and Bite Sized-  Consistent Carbohydrate {No Snacks}  Low Sodium  Supplement Feeding Modality:  Oral  Glucerna Shake Cans or Servings Per Day:  1       Frequency:  Two Times a day  Entered: Oct 28 2023 10:53AM  
This patient has been assessed with a concern for Malnutrition and has been determined to have a diagnosis/diagnoses of Severe protein-calorie malnutrition and Underweight (BMI < 19).    This patient is being managed with:   Diet Soft and Bite Sized-  Consistent Carbohydrate {No Snacks}  Low Sodium  Supplement Feeding Modality:  Oral  Glucerna Shake Cans or Servings Per Day:  1       Frequency:  Two Times a day  Entered: Oct 28 2023 10:53AM  
This patient has been assessed with a concern for Malnutrition and has been determined to have a diagnosis/diagnoses of Underweight (BMI < 19) and Severe protein-calorie malnutrition.    This patient is being managed with:   Diet NPO after Midnight-     NPO Start Date: 09-Nov-2023   NPO Start Time: 23:59  Entered: Nov 9 2023  9:00AM    The following pending diet order is being considered for treatment of Underweight (BMI < 19) and Severe protein-calorie malnutrition:  Diet NPO with Tube Feed-  Tube Feeding Modality: Nasogastric  Vital 1.5 Tom  Total Volume for 24 Hours (mL): 975  Continuous  Starting Tube Feed Rate {mL per Hour}: 55  Increase Tube Feed Rate by (mL): 10     Every 4 hours  Until Goal Tube Feed Rate (mL per Hour): 65  Tube Feed Duration (in Hours): 15  Tube Feed Start Time: 08:00  Tube Feed Stop Time: 21:00  Free Water Flush  Bolus   Total Volume per Flush (mL): 200   Frequency: Every 8 Hours  Entered: Nov 8 2023  2:18PM  
This patient has been assessed with a concern for Malnutrition and has been determined to have a diagnosis/diagnoses of Underweight (BMI < 19) and Severe protein-calorie malnutrition.    This patient is being managed with:   Diet NPO with Tube Feed-  Tube Feeding Modality: Nasogastric  Vital 1.5 Tom  Total Volume for 24 Hours (mL): 715  Continuous  Starting Tube Feed Rate {mL per Hour}: 45  Increase Tube Feed Rate by (mL): 10     Every 8 hours  Until Goal Tube Feed Rate (mL per Hour): 55  Tube Feed Duration (in Hours): 13  Tube Feed Start Time: 08:00  Tube Feed Stop Time: 21:00  Entered: Nov 7 2023  7:42AM  
This patient has been assessed with a concern for Malnutrition and has been determined to have a diagnosis/diagnoses of Severe protein-calorie malnutrition and Underweight (BMI < 19).    This patient is being managed with:   Diet Consistent Carbohydrate/No Snacks-  Pureed (PUREED)  Entered: Nov 11 2023  1:23PM  
This patient has been assessed with a concern for Malnutrition and has been determined to have a diagnosis/diagnoses of Severe protein-calorie malnutrition and Underweight (BMI < 19).    This patient is being managed with:   Diet NPO with Tube Feed-  Tube Feeding Modality: Nasogastric  Vital 1.5 Tom  Continuous  Starting Tube Feed Rate {mL per Hour}: 25  Until Goal Tube Feed Rate (mL per Hour): 25  Tube Feed Duration (in Hours): 24  Tube Feed Start Time: 10:00  Tube Feed Stop Time: 04:00  Entered: Oct 31 2023  6:17PM  
This patient has been assessed with a concern for Malnutrition and has been determined to have a diagnosis/diagnoses of Severe protein-calorie malnutrition and Underweight (BMI < 19).    This patient is being managed with:   Diet NPO with Tube Feed-  Tube Feeding Modality: Nasogastric  Vital 1.5 Tom  Total Volume for 24 Hours (mL): 715  Continuous  Starting Tube Feed Rate {mL per Hour}: 45  Increase Tube Feed Rate by (mL): 10     Every 8 hours  Until Goal Tube Feed Rate (mL per Hour): 55  Tube Feed Duration (in Hours): 13  Tube Feed Start Time: 08:00  Tube Feed Stop Time: 21:00  Entered: Nov 7 2023  7:42AM  
This patient has been assessed with a concern for Malnutrition and has been determined to have a diagnosis/diagnoses of Severe protein-calorie malnutrition and Underweight (BMI < 19).    This patient is being managed with:   Diet NPO with Tube Feed-  Tube Feeding Modality: Nasogastric  Vital 1.5 Tom  Continuous  Starting Tube Feed Rate {mL per Hour}: 25  Until Goal Tube Feed Rate (mL per Hour): 25  Tube Feed Duration (in Hours): 24  Tube Feed Start Time: 10:00  Tube Feed Stop Time: 04:00  Entered: Oct 31 2023  6:17PM  
This patient has been assessed with a concern for Malnutrition and has been determined to have a diagnosis/diagnoses of Severe protein-calorie malnutrition and Underweight (BMI < 19).    This patient is being managed with:   Diet NPO with Tube Feed-  Tube Feeding Modality: Nasogastric  Vital 1.5 Tom  Continuous  Starting Tube Feed Rate {mL per Hour}: 25  Until Goal Tube Feed Rate (mL per Hour): 25  Tube Feed Start Time: 08:00  Tube Feed Stop Time: 21:00  Entered: Nov 5 2023  4:59PM  
This patient has been assessed with a concern for Malnutrition and has been determined to have a diagnosis/diagnoses of Severe protein-calorie malnutrition and Underweight (BMI < 19).    This patient is being managed with:   Diet NPO with Tube Feed-  Tube Feeding Modality: Nasogastric  Vital 1.5 Tom  Total Volume for 24 Hours (mL): 715  Continuous  Starting Tube Feed Rate {mL per Hour}: 45  Increase Tube Feed Rate by (mL): 10     Every 8 hours  Until Goal Tube Feed Rate (mL per Hour): 55  Tube Feed Duration (in Hours): 13  Tube Feed Start Time: 08:00  Tube Feed Stop Time: 21:00  Entered: Nov  3 2023  9:55AM  
This patient has been assessed with a concern for Malnutrition and has been determined to have a diagnosis/diagnoses of Severe protein-calorie malnutrition and Underweight (BMI < 19).    This patient is being managed with:   Diet NPO with Tube Feed-  Tube Feeding Modality: Nasogastric  Vital 1.5 Tom  Total Volume for 24 Hours (mL): 810  Continuous  Starting Tube Feed Rate {mL per Hour}: 25  Increase Tube Feed Rate by (mL): 10     Every 8 hours  Until Goal Tube Feed Rate (mL per Hour): 45  Tube Feed Duration (in Hours): 18  Tube Feed Start Time: 10:00  Tube Feed Stop Time: 04:00  Entered: Nov 1 2023  1:31PM  
This patient has been assessed with a concern for Malnutrition and has been determined to have a diagnosis/diagnoses of Severe protein-calorie malnutrition and Underweight (BMI < 19).    This patient is being managed with:   Diet Soft and Bite Sized-  Consistent Carbohydrate {No Snacks}  Low Sodium  Supplement Feeding Modality:  Oral  Glucerna Shake Cans or Servings Per Day:  1       Frequency:  Two Times a day  Entered: Oct 28 2023 10:53AM  
This patient has been assessed with a concern for Malnutrition and has been determined to have a diagnosis/diagnoses of Severe protein-calorie malnutrition and Underweight (BMI < 19).    This patient is being managed with:   Diet Soft and Bite Sized-  Consistent Carbohydrate {No Snacks}  Low Sodium  Supplement Feeding Modality:  Oral  Glucerna Shake Cans or Servings Per Day:  1       Frequency:  Two Times a day  Entered: Oct 28 2023 10:53AM  
This patient has been assessed with a concern for Malnutrition and has been determined to have a diagnosis/diagnoses of Severe protein-calorie malnutrition and Underweight (BMI < 19).    This patient is being managed with:   Diet Clear Liquid-  Entered: Nov 10 2023  1:48PM  
This patient has been assessed with a concern for Malnutrition and has been determined to have a diagnosis/diagnoses of Severe protein-calorie malnutrition and Underweight (BMI < 19).    This patient is being managed with:   Diet Consistent Carbohydrate w/Evening Snack-  Pureed (PUREED)  Entered: Nov 9 2023  3:17PM    Diet NPO after Midnight-     NPO Start Date: 09-Nov-2023   NPO Start Time: 23:59  Entered: Nov 9 2023  9:00AM  
This patient has been assessed with a concern for Malnutrition and has been determined to have a diagnosis/diagnoses of Severe protein-calorie malnutrition and Underweight (BMI < 19).    This patient is being managed with:   Diet Consistent Carbohydrate/No Snacks-  Soft and Bite Sized (SOFTBTSZ)  Supplement Feeding Modality:  Oral  Glucerna Shake Cans or Servings Per Day:  1       Frequency:  Two Times a day  Entered: Nov 15 2023  2:17PM  
This patient has been assessed with a concern for Malnutrition and has been determined to have a diagnosis/diagnoses of Severe protein-calorie malnutrition and Underweight (BMI < 19).    This patient is being managed with:   Diet NPO with Tube Feed-  Tube Feeding Modality: Nasogastric  Vital 1.5 Tmo  Continuous  Starting Tube Feed Rate {mL per Hour}: 25  Until Goal Tube Feed Rate (mL per Hour): 25  Tube Feed Start Time: 08:00  Tube Feed Stop Time: 21:00  Entered: Nov 5 2023  4:59PM  
This patient has been assessed with a concern for Malnutrition and has been determined to have a diagnosis/diagnoses of Severe protein-calorie malnutrition and Underweight (BMI < 19).    This patient is being managed with:   Diet NPO-  Entered: Oct 31 2023  8:34AM  
This patient has been assessed with a concern for Malnutrition and has been determined to have a diagnosis/diagnoses of Severe protein-calorie malnutrition and Underweight (BMI < 19).    This patient is being managed with:   Diet Clear Liquid-  Entered: Nov 10 2023  1:48PM  
This patient has been assessed with a concern for Malnutrition and has been determined to have a diagnosis/diagnoses of Severe protein-calorie malnutrition and Underweight (BMI < 19).    This patient is being managed with:   Diet NPO with Tube Feed-  Tube Feeding Modality: Nasogastric  Vital 1.5 Tom  Total Volume for 24 Hours (mL): 715  Continuous  Starting Tube Feed Rate {mL per Hour}: 45  Increase Tube Feed Rate by (mL): 10     Every 8 hours  Until Goal Tube Feed Rate (mL per Hour): 55  Tube Feed Duration (in Hours): 13  Tube Feed Start Time: 08:00  Tube Feed Stop Time: 21:00  Entered: Nov  3 2023  9:55AM  
This patient has been assessed with a concern for Malnutrition and has been determined to have a diagnosis/diagnoses of Severe protein-calorie malnutrition and Underweight (BMI < 19).    This patient is being managed with:   Diet NPO with Tube Feed-  Tube Feeding Modality: Nasogastric  Vital 1.5 Tom  Total Volume for 24 Hours (mL): 715  Continuous  Starting Tube Feed Rate {mL per Hour}: 45  Increase Tube Feed Rate by (mL): 10     Every 8 hours  Until Goal Tube Feed Rate (mL per Hour): 55  Tube Feed Duration (in Hours): 13  Tube Feed Start Time: 08:00  Tube Feed Stop Time: 21:00  Entered: Nov 7 2023  7:42AM  
This patient has been assessed with a concern for Malnutrition and has been determined to have a diagnosis/diagnoses of Severe protein-calorie malnutrition and Underweight (BMI < 19).    This patient is being managed with:   Diet NPO with Tube Feed-  Tube Feeding Modality: Nasogastric  Vital 1.5 Tom  Total Volume for 24 Hours (mL): 715  Continuous  Starting Tube Feed Rate {mL per Hour}: 45  Increase Tube Feed Rate by (mL): 10     Every 8 hours  Until Goal Tube Feed Rate (mL per Hour): 55  Tube Feed Duration (in Hours): 13  Tube Feed Start Time: 08:00  Tube Feed Stop Time: 21:00  Entered: Nov 7 2023  7:42AM    This patient has been assessed with a concern for Malnutrition and has been determined to have a diagnosis/diagnoses of Severe protein-calorie malnutrition and Underweight (BMI < 19).    This patient is being managed with:   Diet NPO with Tube Feed-  Tube Feeding Modality: Nasogastric  Vital 1.5 Tom  Total Volume for 24 Hours (mL): 715  Continuous  Starting Tube Feed Rate {mL per Hour}: 45  Increase Tube Feed Rate by (mL): 10     Every 8 hours  Until Goal Tube Feed Rate (mL per Hour): 55  Tube Feed Duration (in Hours): 13  Tube Feed Start Time: 08:00  Tube Feed Stop Time: 21:00  Entered: Nov 7 2023  7:42AM  
This patient has been assessed with a concern for Malnutrition and has been determined to have a diagnosis/diagnoses of Severe protein-calorie malnutrition and Underweight (BMI < 19).    This patient is being managed with:   Diet NPO with Tube Feed-  Tube Feeding Modality: Nasogastric  Vital 1.5 Tom  Total Volume for 24 Hours (mL): 810  Continuous  Starting Tube Feed Rate {mL per Hour}: 25  Increase Tube Feed Rate by (mL): 10     Every 8 hours  Until Goal Tube Feed Rate (mL per Hour): 45  Tube Feed Duration (in Hours): 18  Tube Feed Start Time: 10:00  Tube Feed Stop Time: 04:00  Entered: Nov 1 2023  1:31PM  
This patient has been assessed with a concern for Malnutrition and has been determined to have a diagnosis/diagnoses of Severe protein-calorie malnutrition and Underweight (BMI < 19).    This patient is being managed with:   Diet Soft and Bite Sized-  Consistent Carbohydrate {No Snacks}  Low Sodium  Supplement Feeding Modality:  Oral  Glucerna Shake Cans or Servings Per Day:  1       Frequency:  Two Times a day  Entered: Oct 28 2023 10:53AM  
This patient has been assessed with a concern for Malnutrition and has been determined to have a diagnosis/diagnoses of Severe protein-calorie malnutrition and Underweight (BMI < 19).    This patient is being managed with:   Diet NPO with Tube Feed-  Tube Feeding Modality: Nasogastric  Vital 1.5 Tom  Total Volume for 24 Hours (mL): 715  Continuous  Starting Tube Feed Rate {mL per Hour}: 45  Increase Tube Feed Rate by (mL): 10     Every 8 hours  Until Goal Tube Feed Rate (mL per Hour): 55  Tube Feed Duration (in Hours): 13  Tube Feed Start Time: 08:00  Tube Feed Stop Time: 21:00  Entered: Nov  3 2023  9:55AM

## 2023-11-16 NOTE — PROGRESS NOTE ADULT - SUBJECTIVE AND OBJECTIVE BOX
Follow-up Pulmonary Progress Note  Chief Complaint : Chronic obstructive pulmonary disease with acute exacerbation      patient seen and examined  comfortable   on 2 L n/c sat 100%      Allergies :penicillin (Hives)      PAST MEDICAL & SURGICAL HISTORY:  COPD, severe    CAD (coronary artery disease)    Breast cancer    No significant past surgical history        Medications:  MEDICATIONS  (STANDING):  aspirin enteric coated 81 milliGRAM(s) Oral daily  atorvastatin 80 milliGRAM(s) Oral at bedtime  buDESOnide    Inhalation Suspension 0.5 milliGRAM(s) Inhalation every 12 hours  buPROPion XL (24-Hour) . 150 milliGRAM(s) Oral daily  chlorhexidine 2% Cloths 1 Application(s) Topical daily  dextrose 5%. 1000 milliLiter(s) (100 mL/Hr) IV Continuous <Continuous>  dextrose 5%. 1000 milliLiter(s) (50 mL/Hr) IV Continuous <Continuous>  dextrose 50% Injectable 25 Gram(s) IV Push once  dextrose 50% Injectable 12.5 Gram(s) IV Push once  dextrose 50% Injectable 25 Gram(s) IV Push once  doxazosin 2 milliGRAM(s) Oral at bedtime  ferrous    sulfate 325 milliGRAM(s) Oral daily  insulin lispro (ADMELOG) corrective regimen sliding scale   SubCutaneous Before meals and at bedtime  letrozole 2.5 milliGRAM(s) Oral daily  levalbuterol Inhalation 0.63 milliGRAM(s) Inhalation every 6 hours  midodrine 5 milliGRAM(s) Oral <User Schedule>  nicotine -   7 mG/24Hr(s) Patch 1 Patch Transdermal daily  pantoprazole   Suspension 40 milliGRAM(s) Oral before breakfast  polyethylene glycol 3350 17 Gram(s) Oral daily  predniSONE   Tablet 20 milliGRAM(s) Oral daily  predniSONE   Tablet   Oral   rivaroxaban 10 milliGRAM(s) Oral with dinner  roflumilast 500 MICROGram(s) Oral daily  senna Syrup 10 milliLiter(s) Oral at bedtime  tiotropium 2.5 MICROgram(s) Inhaler 2 Puff(s) Inhalation daily    MEDICATIONS  (PRN):  acetaminophen     Tablet .. 650 milliGRAM(s) Oral every 6 hours PRN Temp greater or equal to 38C (100.4F), Mild Pain (1 - 3)  ALPRAZolam 0.5 milliGRAM(s) Oral every 8 hours PRN anxiety  bisacodyl Suppository 10 milliGRAM(s) Rectal daily PRN Constipation  ondansetron Injectable 4 milliGRAM(s) IV Push every 6 hours PRN Nausea and/or Vomiting  sodium chloride 0.9% lock flush 10 milliLiter(s) IV Push every 1 hour PRN Pre/post blood products, medications, blood draw, and to maintain line patency      Antibiotics History  azithromycin  IVPB 500 milliGRAM(s) IV Intermittent every 24 hours, 10-27-23 @ 14:42  cefTRIAXone   IVPB    , 10-29-23 @ 10:09  cefTRIAXone   IVPB 1000 milliGRAM(s) IV Intermittent once, 10-29-23 @ 10:09, Stop order after: 1 Doses  cefTRIAXone   IVPB 1000 milliGRAM(s) IV Intermittent every 24 hours, 10-30-23 @ 10:09, Stop order after: 5 Days  meropenem  IVPB 1000 milliGRAM(s) IV Intermittent once, 11-06-23 @ 02:59, Stop order after: 1 Doses  meropenem  IVPB 1000 milliGRAM(s) IV Intermittent every 8 hours, 11-06-23 @ 14:00, Stop order after: 8 Days  meropenem  IVPB    , 11-06-23 @ 03:18      Heme Medications   aspirin enteric coated 81 milliGRAM(s) Oral daily, 10-27-23 @ 14:57  rivaroxaban 10 milliGRAM(s) Oral with dinner, 11-15-23 @ 11:45      GI Medications  bisacodyl Suppository 10 milliGRAM(s) Rectal daily, 11-02-23 @ 08:24, Routine PRN  pantoprazole   Suspension 40 milliGRAM(s) Oral before breakfast, 11-11-23 @ 10:58, Routine  polyethylene glycol 3350 17 Gram(s) Oral daily, 11-11-23 @ 08:42,   senna Syrup 10 milliLiter(s) Oral at bedtime, 11-11-23 @ 10:59, Routine        LABS:                        8.2    10.18 )-----------( 154      ( 16 Nov 2023 07:37 )             25.5     11-16    138  |  101  |  7   ----------------------------<  130<H>  3.4<L>   |  30  |  0.25<L>    Ca    8.6      16 Nov 2023 07:37  Phos  3.2     11-16  Mg     1.9     11-16           CULTURES: (if applicable)    Culture - Sputum (collected 11-04-23 @ 19:00)  Source: ET Tube ET Tube  Gram Stain (11-05-23 @ 06:21):    Few polymorphonuclear leukocytes per low power field    No Squamous epithelial cells per low power field    No organisms seen per oil power field  Final Report (11-06-23 @ 18:17):    Normal Respiratory Daria present    Culture - Blood (collected 11-04-23 @ 12:20)  Source: .Blood Blood  Final Report (11-09-23 @ 23:00):    No growth at 5 days    Culture - Blood (collected 11-04-23 @ 12:10)  Source: .Blood Blood  Gram Stain (11-06-23 @ 02:43):    Growth in aerobic bottle: Gram Positive Cocci in Clusters  Final Report (11-06-23 @ 20:38):    Growth in aerobic bottle: Staphylococcus epidermidis    Coagulase Negative Staphylococci isolated from a single blood culture set    may represent contamination.    Contact the Microbiology Department at [phone] if susceptibility    testing is clinically indicated.    Direct identification is available within approximately 3-5    hours either by Blood Panel Multiplexed PCR or Direct    MALDI-TOF. Details: [email or web address]  Organism: Blood Culture PCR (11-06-23 @ 20:38)  Organism: Blood Culture PCR (11-06-23 @ 20:38)      -  Staphylococcus epidermidis: Detec      Method Type: PCR             VITALS:  T(C): 36.7 (11-16-23 @ 05:00), Max: 37.1 (11-15-23 @ 20:57)  T(F): 98 (11-16-23 @ 05:00), Max: 98.7 (11-15-23 @ 20:57)  HR: 96 (11-16-23 @ 08:30) (96 - 108)  BP: 134/75 (11-16-23 @ 05:00) (126/75 - 134/75)  BP(mean): --  ABP: --  ABP(mean): --  RR: 18 (11-16-23 @ 05:00) (18 - 18)  SpO2: 100% (11-16-23 @ 08:30) (99% - 100%)  CVP(mm Hg): --  CVP(cm H2O): --    Ins and Outs     11-15-23 @ 07:01  -  11-16-23 @ 07:00  --------------------------------------------------------  IN: 520 mL / OUT: 1000 mL / NET: -480 mL                I&O's Detail    15 Nov 2023 07:01  -  16 Nov 2023 07:00  --------------------------------------------------------  IN:    Oral Fluid: 520 mL  Total IN: 520 mL    OUT:    Voided (mL): 1000 mL  Total OUT: 1000 mL    Total NET: -480 mL

## 2023-11-16 NOTE — DISCHARGE NOTE PROVIDER - ATTENDING DISCHARGE PHYSICAL EXAMINATION:
GENERAL: NAD,   NERVOUS SYSTEM:  CN II - XII intact; Sensation intact; follows commands  CHEST/LUNG: No rales, rhonchi, wheezing, or rubs; normal respiratory effort, no intercostal retractions. diminished BS at bases. NC in place  HEART: Regular rate and rhythm; No murmurs, rubs, or gallops; No pitting edema  ABDOMEN: Soft, Nontender, Nondistended; Bowel sounds present; No HSM or masses  MUSCULOSKELETAL/EXTREMITIES:  2+ Peripheral Pulses, No clubbing or digital cyanosis; FROM of extremities  PSYCH: Appropriate affect, Alert & Oriented x 3

## 2023-11-16 NOTE — SWALLOW VFSS/MBS ASSESSMENT ADULT - SLP PERTINENT HISTORY OF CURRENT PROBLEM
68 year old woman with  COPD, CAD, HLD, DM2, on xarelto for DVT history, hx of breast ca s/p left mastectomy, on chemo Tx presented with shortness of breath. Admitted to ICU for respiratory failure due to COPD exacerbation in the setting of Entero/rhinovirus with severe exacerbation of COPD. Pt was intubated on 10/31, Extubated 11/2/2023, Re intubated 11/3/23 and then Extubated 11/5/2023. Hospital course was complicated by acute on chronic anemia. Pt had melena. GI evaluated and performed an EGD on 11/11, which showed Normal mucosa in the whole esophagus. Normal mucosa in the duodenal bulb and second part of the duodenum. Angioectasia in the pre-pyloric region. (Injection, Thermal Therapy performed). Post procedure, GI recommending daily PPI.

## 2023-11-16 NOTE — DISCHARGE NOTE PROVIDER - NSDCCPCAREPLAN_GEN_ALL_CORE_FT
PRINCIPAL DISCHARGE DIAGNOSIS  Diagnosis: Acute on chronic respiratory failure with hypoxia  Assessment and Plan of Treatment: continue supplemental O2. continue prednisone taper as prescribed.   f/u pulm outpt. hourly incentive spirometer.      SECONDARY DISCHARGE DIAGNOSES  Diagnosis: Anemia due to GI blood loss  Assessment and Plan of Treatment: continue PPI daily. f/u with GI in 2-4 weeks.   repeat CBC within 1 week.    Diagnosis: History of DVT (deep vein thrombosis)  Assessment and Plan of Treatment: Continue Xarelto at 10mg daily. f/u heme/onc as outpt for Anti-coagulation follow-up and hx of breast cancer.

## 2023-11-16 NOTE — PROGRESS NOTE ADULT - ASSESSMENT
#Acute on chronic respiratory failure due to acute COPD Exacerbation in setting of ERV requiring intubation 10/31/23 Extubated 11/2/2023, Re intubated 11/3/23, extubated  #COPD, chronic respiratory failure, on home oxygen  #Active nicotine use - 1 pk/day  - has been stable on tele since ICU downgrade. d/c tele.  - C/w oxygen supplementation; titrate if possible, incentive spirometer  - switch to PO prednisone  - C/w xopenex, pulmicort  - C/w nicotine patch; decreased dose today    #Acute blood loss anemia, possible GI bleed, s/p one unit PRBC  - Appreciate GI consult team, s/p EGD  - Hgb 8.2-->8.2  - Will monitor Hg/Hct, transfuse if Hg<7  - C/w protonix po daily    #Hypokalemia  - Replaced, will monitor    #Leukocytosis, resolved  - Will monitor for now; likely related to steroid    #Dysphagia  - Currently on pureed diet; MBS today at 11AM. f/u results    #CAD   #HTN  #Orthostatic hypotension  - Will aim to titrate down midodrine; today 5mg at 8AM; if blood pressure stable, will d/c in AM  - C/w ASA, lipitor     #DM type 2  - ISS, A1c noted to be well controlled    #h/o Breast cancer  - C/w femara 2.5mg daily    #H/o DVT/PE 2018  - previously holding xarelto due to acute blood loss anemia, however, now stable  - LE ULT negative for DVT  - O/p oncology is Dr Larios; called him [phone]; is ok with us to restart xarelto   - GI cleared patient to re-start xarelto  - resumed Xarelto on 11/15    #Anxiety  - Continue xanax TID prn    #Acute urinary retention, improved  - Continue cardura for urinary retention voiding well     DVT PPX: xarelto  AM labs  PT/OT both recommend Acute rehab  PMR re-eval declines acute rehab. recs [facility].     DISP: d/c to [facility] later today    11/16: Spoke with brother Maximiliano ([phone]) at bedside, all questions answered.

## 2023-11-16 NOTE — PROGRESS NOTE ADULT - SUBJECTIVE AND OBJECTIVE BOX
CC: Patient is a 68y old  Female who presents with a chief complaint of SOB (16 Nov 2023 08:14)      Interval History:  Patient seen and examined at bedside. Doing well. No new complaints. Pt's brother at bedside, all questions answered.      ALLERGIES:  penicillin (Hives)    MEDICATIONS  (STANDING):  aspirin enteric coated 81 milliGRAM(s) Oral daily  atorvastatin 80 milliGRAM(s) Oral at bedtime  buDESOnide    Inhalation Suspension 0.5 milliGRAM(s) Inhalation every 12 hours  buPROPion XL (24-Hour) . 150 milliGRAM(s) Oral daily  chlorhexidine 2% Cloths 1 Application(s) Topical daily  dextrose 5%. 1000 milliLiter(s) (50 mL/Hr) IV Continuous <Continuous>  dextrose 5%. 1000 milliLiter(s) (100 mL/Hr) IV Continuous <Continuous>  dextrose 50% Injectable 25 Gram(s) IV Push once  dextrose 50% Injectable 12.5 Gram(s) IV Push once  dextrose 50% Injectable 25 Gram(s) IV Push once  doxazosin 2 milliGRAM(s) Oral at bedtime  ferrous    sulfate 325 milliGRAM(s) Oral daily  insulin lispro (ADMELOG) corrective regimen sliding scale   SubCutaneous Before meals and at bedtime  letrozole 2.5 milliGRAM(s) Oral daily  levalbuterol Inhalation 0.63 milliGRAM(s) Inhalation every 6 hours  midodrine 5 milliGRAM(s) Oral <User Schedule>  nicotine -   7 mG/24Hr(s) Patch 1 Patch Transdermal daily  pantoprazole   Suspension 40 milliGRAM(s) Oral before breakfast  polyethylene glycol 3350 17 Gram(s) Oral daily  potassium chloride   Solution 20 milliEquivalent(s) Oral once  predniSONE   Tablet 20 milliGRAM(s) Oral daily  predniSONE   Tablet   Oral   rivaroxaban 10 milliGRAM(s) Oral with dinner  roflumilast 500 MICROGram(s) Oral daily  senna Syrup 10 milliLiter(s) Oral at bedtime  tiotropium 2.5 MICROgram(s) Inhaler 2 Puff(s) Inhalation daily    MEDICATIONS  (PRN):  acetaminophen     Tablet .. 650 milliGRAM(s) Oral every 6 hours PRN Temp greater or equal to 38C (100.4F), Mild Pain (1 - 3)  ALPRAZolam 0.5 milliGRAM(s) Oral every 8 hours PRN anxiety  bisacodyl Suppository 10 milliGRAM(s) Rectal daily PRN Constipation  ondansetron Injectable 4 milliGRAM(s) IV Push every 6 hours PRN Nausea and/or Vomiting  sodium chloride 0.9% lock flush 10 milliLiter(s) IV Push every 1 hour PRN Pre/post blood products, medications, blood draw, and to maintain line patency    Vital Signs Last 24 Hrs  T(F): 98 (16 Nov 2023 05:00), Max: 98.7 (15 Nov 2023 20:57)  HR: 96 (16 Nov 2023 08:30) (96 - 108)  BP: 134/75 (16 Nov 2023 05:00) (126/75 - 134/75)  RR: 18 (16 Nov 2023 05:00) (18 - 18)  SpO2: 100% (16 Nov 2023 08:30) (99% - 100%)  I&O's Summary    15 Nov 2023 07:01  -  16 Nov 2023 07:00  --------------------------------------------------------  IN: 520 mL / OUT: 1000 mL / NET: -480 mL          PHYSICAL EXAM:  GENERAL: NAD,   NERVOUS SYSTEM:  CN II - XII intact; Sensation intact; follows commands  CHEST/LUNG: No rales, rhonchi, wheezing, or rubs; normal respiratory effort, no intercostal retractions  HEART: Regular rate and rhythm; No murmurs, rubs, or gallops; No pitting edema  ABDOMEN: Soft, Nontender, Nondistended; Bowel sounds present; No HSM or masses  MUSCULOSKELETAL/EXTREMITIES:  2+ Peripheral Pulses, No clubbing or digital cyanosis; FROM of extremeties (pain, crepitation or contracture)  PSYCH: Appropriate affect, Alert & Oriented x 3, Good Memory; Good insight    LABS:                        8.2    10.18 )-----------( 154      ( 16 Nov 2023 07:37 )             25.5       11-16    138  |  101  |  7   ----------------------------<  130  3.4   |  30  |  0.25    Ca    8.6      16 Nov 2023 07:37  Phos  3.2     11-16  Mg     1.9     11-16    TPro  5.8  /  Alb  3.1  /  TBili  0.5  /  DBili  x   /  AST  25  /  ALT  51  /  AlkPhos  52  11-13           POCT Blood Glucose.: 125 mg/dL (16 Nov 2023 07:37)  POCT Blood Glucose.: 144 mg/dL (15 Nov 2023 21:06)  POCT Blood Glucose.: 82 mg/dL (15 Nov 2023 17:17)      Urinalysis Basic - ( 16 Nov 2023 07:37 )    Color: x / Appearance: x / SG: x / pH: x  Gluc: 130 mg/dL / Ketone: x  / Bili: x / Urobili: x   Blood: x / Protein: x / Nitrite: x   Leuk Esterase: x / RBC: x / WBC x   Sq Epi: x / Non Sq Epi: x / Bacteria: x            Care Discussed with Consultants/Other Providers: Yes

## 2023-11-16 NOTE — DISCHARGE NOTE PROVIDER - NSDCCPTREATMENT_GEN_ALL_CORE_FT
PRINCIPAL PROCEDURE  Procedure: EGD, with thermal cautery  Findings and Treatment: 11/11/23  Normal mucosa in the whole esophagus.  Normal mucosa in the duodenal bulb and second part of the duodenum.  Angioectasia in the pre-pyloric region. (Injection, Thermal Therapy).  Otherwise normal stomach. Retroflexed view normal. .  Treatment: PPI daily.

## 2023-11-16 NOTE — SWALLOW VFSS/MBS ASSESSMENT ADULT - RECOMMENDED FEEDING/EATING TECHNIQUES
maintain upright posture during/after eating for 30 mins/oral hygiene/position upright (90 degrees)/provide rest periods between swallows

## 2023-11-16 NOTE — DISCHARGE NOTE PROVIDER - NSDCMRMEDTOKEN_GEN_ALL_CORE_FT
ALPRAZolam 0.5 mg oral tablet: 1 tab(s) orally every 8 hours As needed anxiety  Aspir 81 oral delayed release tablet: 1 tab(s) orally  atorvastatin 80 mg oral tablet: 1 tab(s) orally once a day  bisacodyl 10 mg rectal suppository: 1 suppository(ies) rectal once a day As needed Constipation  buPROPion 150 mg/24 hours (XL) oral tablet, extended release: 2 tab(s) orally once a day  doxazosin 2 mg oral tablet: 1 tab(s) orally once a day (at bedtime)  ferrous sulfate 325 mg (65 mg elemental iron) oral tablet: 1 tab(s) orally once a day  letrozole 2.5 mg oral tablet: 1 tab(s) orally once a day  levalbuterol 0.63 mg/3 mL inhalation solution: 3 milliliter(s) inhaled every 6 hours as needed for  shortness of breath and/or wheezing  metFORMIN 500 mg oral tablet: 1 tab(s) orally 2 times a day  MiraLax oral powder for reconstitution: 17 gram(s) orally once a day  nicotine 7 mg/24 hr transdermal film, extended release: 1 film(s) transdermal  predniSONE: 20 milligram(s) once a day through 11/18. Then taper to 10 mg daily for 3 more days, then stop  Protonix 40 mg oral granule, delayed release: 1 tab(s) orally once a day  rivaroxaban 10 mg oral tablet: 1 tab(s) orally once a day (before a meal)  senna (sennosides) 8.8 mg/5 mL oral syrup: 10 milliliter(s) orally once a day (at bedtime)  Spiriva Respimat 10 ACT 2.5 mcg/inh inhalation aerosol: 2 puff(s) inhaled once a day  Symbicort 160 mcg-4.5 mcg/inh inhalation aerosol: 2 puff(s) inhaled 2 times a day  Tylenol 325 mg oral tablet: 2 tab(s) orally every 6 hours as needed for  mild pain   ALPRAZolam 0.5 mg oral tablet: 1 tab(s) orally every 8 hours As needed anxiety  Aspir 81 oral delayed release tablet: 1 tab(s) orally once a day  atorvastatin 80 mg oral tablet: 1 tab(s) orally once a day  bisacodyl 10 mg rectal suppository: 1 suppository(ies) rectal once a day As needed Constipation  buPROPion 150 mg/24 hours (XL) oral tablet, extended release: 2 tab(s) orally once a day  doxazosin 2 mg oral tablet: 1 tab(s) orally once a day (at bedtime)  ferrous sulfate 325 mg (65 mg elemental iron) oral tablet: 1 tab(s) orally once a day  letrozole 2.5 mg oral tablet: 1 tab(s) orally once a day  levalbuterol 0.63 mg/3 mL inhalation solution: 3 milliliter(s) inhaled every 6 hours as needed for  shortness of breath and/or wheezing  metFORMIN 500 mg oral tablet: 1 tab(s) orally 2 times a day  MiraLax oral powder for reconstitution: 17 gram(s) orally once a day  nicotine 7 mg/24 hr transdermal film, extended release: 1 film(s) transdermal once a day  predniSONE: 20 milligram(s) once a day through 11/18. Then taper to 10 mg daily for 3 more days, then stop  Protonix 40 mg oral granule, delayed release: 1 tab(s) orally once a day  rivaroxaban 10 mg oral tablet: 1 tab(s) orally once a day (before a meal)  roflumilast 500 mcg oral tablet: 1 tab(s) orally once a day  senna (sennosides) 8.8 mg/5 mL oral syrup: 10 milliliter(s) orally once a day (at bedtime)  Spiriva Respimat 10 ACT 2.5 mcg/inh inhalation aerosol: 2 puff(s) inhaled once a day  Symbicort 160 mcg-4.5 mcg/inh inhalation aerosol: 2 puff(s) inhaled 2 times a day  Tylenol 325 mg oral tablet: 2 tab(s) orally every 6 hours as needed for  mild pain

## 2023-12-04 NOTE — PATIENT PROFILE ADULT - FUNCTIONAL ASSESSMENT - BASIC MOBILITY 6.
3-calculated by average/Not able to assess (calculate score using [facility] averaging method) 3-calculated by average/Not able to assess (calculate score using [facility] averaging method)

## 2023-12-04 NOTE — ED ADULT NURSE NOTE - FINAL NURSING ELECTRONIC SIGNATURE
How Severe Is Your Psoriasis?: mild Is This A New Presentation, Or A Follow-Up?: Follow Up Psoriasis 04-Dec-2023 08:00

## 2023-12-04 NOTE — H&P ADULT - ASSESSMENT
68y year old Female with PMH of COPD on 2L home oxygen, former smoker, DVT (on Xarelto), Anemia, CAD, Breast CA s/p left mastectomy who presents to the ER from [facility] for shortness of breath and cough since last night.     Acute on chronic hypoxic respiratory failure   Acute COPD exacerbation   Former smoker  - Critical care consulted in ER. Dr. Edgar to follow for pulm  - Continue solumedrol 40mg q8h; nebulizers   - stable at 2L nc currently   - s/p Levaquin in ER, unclear role for continued abx     Anemia  - s/p EGD on last admission  - monitor H/H  - continue PPI    Leukocytosis   - monitor     CAD  - continue ASA, statin    Breast CA  - continue femara     Hx of DVT (2018)  - Xarelto   - outpatient oncologist is Dr. Larios [phone]    Anxiety   - xanax prn    DVT Prophylaxis:  - Xalelto    Unsuccessful attempt to reach patient's brother Maximiliano ([phone])    IMPROVE VTE Individual Risk Assessment          RISK                                                          Points  [ X ] Previous VTE                                                3  [  ] Thrombophilia                                             2  [ X ] Lower limb paralysis                                   2        (unable to hold up >15 seconds)    [  ] Current Cancer                                             2         (within 6 months)  [  ] Immobilization > 24 hrs                              1  [  ] ICU/CCU stay > 24 hours                             1  [ X ] Age > 60                                                         1    IMPROVE VTE Score:         [    6     ]   68y year old Female with PMH of COPD on 2L home oxygen, former smoker, DVT (on Xarelto), Anemia, CAD, Breast CA s/p left mastectomy who presents to the ER from [facility] for shortness of breath and cough since last night.     Acute on chronic hypoxic respiratory failure   Acute COPD exacerbation   Former smoker  - Critical care consulted in ER. Dr. Edgar to follow for pulm  - Continue solumedrol 40mg q8h; nebulizers   - stable at 2L nc currently   - s/p Levaquin in ER, unclear role for continued abx     Anemia  - s/p EGD on last admission  - monitor H/H  - continue PPI    Leukocytosis   - monitor     CAD  - continue ASA, statin    Breast CA  - continue femara     Hx of DVT (2018)  - Xarelto   - outpatient oncologist is Dr. Larios [phone]    Anxiety   - xanax prn    DVT Prophylaxis:  - Xalelto    Unsuccessful attempt to reach patient's brother Maximiliano ([phone])    IMPROVE VTE Individual Risk Assessment          RISK                                                          Points  [ X ] Previous VTE                                                3  [  ] Thrombophilia                                             2  [ X ] Lower limb paralysis                                   2        (unable to hold up >15 seconds)    [  ] Current Cancer                                             2         (within 6 months)  [  ] Immobilization > 24 hrs                              1  [  ] ICU/CCU stay > 24 hours                             1  [ X ] Age > 60                                                         1    IMPROVE VTE Score:         [    6     ]

## 2023-12-04 NOTE — ED ADULT NURSE NOTE - NSFALLHARMRISKINTERV_ED_ALL_ED
Assistance OOB with selected safe patient handling equipment if applicable/Assistance with ambulation/Communicate risk of Fall with Harm to all staff, patient, and family/Monitor gait and stability/Provide patient with walking aids/Provide visual cue: red socks, yellow wristband, yellow gown, etc/Reinforce activity limits and safety measures with patient and family/Bed in lowest position, wheels locked, appropriate side rails in place/Call bell, personal items and telephone in reach/Instruct patient to call for assistance before getting out of bed/chair/stretcher/Non-slip footwear applied when patient is off stretcher/[place] to call system/Physically safe environment - no spills, clutter or unnecessary equipment/Purposeful Proactive Rounding/Room/bathroom lighting operational, light cord in reach Assistance OOB with selected safe patient handling equipment if applicable/Assistance with ambulation/Communicate risk of Fall with Harm to all staff, patient, and family/Monitor gait and stability/Provide patient with walking aids/Provide visual cue: red socks, yellow wristband, yellow gown, etc/Reinforce activity limits and safety measures with patient and family/Bed in lowest position, wheels locked, appropriate side rails in place/Call bell, personal items and telephone in reach/Instruct patient to call for assistance before getting out of bed/chair/stretcher/Non-slip footwear applied when patient is off stretcher/[place] to call system/Physically safe environment - no spills, clutter or unnecessary equipment/Purposeful Proactive Rounding/Room/bathroom lighting operational, light cord in reach

## 2023-12-04 NOTE — ED ADULT NURSE REASSESSMENT NOTE - NS ED NURSE REASSESS COMMENT FT1
ICU team at bedside and transitioned off of BIPAP at this time. Placed on nasal cannula. NAD noted. Remains on cardiac monitor.

## 2023-12-04 NOTE — ED PROVIDER NOTE - OBJECTIVE STATEMENT
69 yo F h/o COPD, HTN, DM, CAD, DVT on xarelto, breast ca s/p L mastectomy, s/p recent admission to ICU 10/31 to 11/16 for resp failure and intubation, sent from NH for diff breathing tonight  assoc c cough and SpO2 90% RA per EMS. pt denies fever/chills. no chest pain/palpitations

## 2023-12-04 NOTE — ED PROVIDER NOTE - CLINICAL SUMMARY MEDICAL DECISION MAKING FREE TEXT BOX
67 yo F h/o COPD, HTN, DM, CAD, DVT on xarelto, breast ca s/p L mastectomy, s/p recent admission to ICU 10/31 to 11/16 for resp failure and intubation, sent from NH for diff breathing tonight  assoc c cough and SpO2 90% RA per EMS. pt denies fever/chills. no chest pain/palpitations    pt with moderately labored breathing with decreased breath sounds and expir wheeze. likely copd exacerbation. r/o pna, acs, chf. less likely PE as pt on xarelto. check labs, abg, cxr. give duoneb, solumedrol.. empiric abxs for pna. consider bipap or intubation for worsening 69 yo F h/o COPD, HTN, DM, CAD, DVT on xarelto, breast ca s/p L mastectomy, s/p recent admission to ICU 10/31 to 11/16 for resp failure and intubation, sent from NH for diff breathing tonight  assoc c cough and SpO2 90% RA per EMS. pt denies fever/chills. no chest pain/palpitations    pt with moderately labored breathing with decreased breath sounds and expir wheeze. likely copd exacerbation. r/o pna, acs, chf. less likely PE as pt on xarelto. check labs, abg, cxr. give duoneb, solumedrol.. empiric abxs for pna. consider bipap or intubation for worsening 67 yo F h/o COPD, HTN, DM, CAD, DVT on xarelto, breast ca s/p L mastectomy, s/p recent admission to ICU 10/31 to 11/16 for resp failure and intubation, sent from NH for diff breathing tonight  assoc c cough and SpO2 90% RA per EMS. pt denies fever/chills. no chest pain/palpitations    pt with moderately labored breathing with decreased breath sounds and expir wheeze. likely copd exacerbation. r/o pna, acs, chf. less likely PE as pt on xarelto. check labs, abg, cxr. give duoneb, solumedrol.. empiric abxs for pna. consider bipap or intubation for worsening    0445: pt with some improvement p duonebs x 3, subjectively less SOB, appears less distressed but RR still ~40. improved breath sounds/air mvmt. will start bipap . icu consulted 67 yo F h/o COPD, HTN, DM, CAD, DVT on xarelto, breast ca s/p L mastectomy, s/p recent admission to ICU 10/31 to 11/16 for resp failure and intubation, sent from NH for diff breathing tonight  assoc c cough and SpO2 90% RA per EMS. pt denies fever/chills. no chest pain/palpitations    pt with moderately labored breathing with decreased breath sounds and expir wheeze. likely copd exacerbation. r/o pna, acs, chf. less likely PE as pt on xarelto. check labs, abg, cxr. give duoneb, solumedrol.. empiric abxs for pna. consider bipap or intubation for worsening    0445: pt with some improvement p duonebs x 3, subjectively less SOB, appears less distressed but RR still ~40. improved breath sounds/air mvmt. will start bipap . icu consulted    0635: pt much improved on bipap, breathing comfortably.  ICU dc'ed bipap, reassessed, stable on NC o2. pt appears comfortable. RR ~20. ICU declining admission.

## 2023-12-04 NOTE — DISCHARGE NOTE NURSING/CASE MANAGEMENT/SOCIAL WORK - PATIENT PORTAL LINK FT
You can access the FollowMyHealth Patient Portal offered by [facility] by registering at the following website: [email or web address]. By joining [facility]’s FollowMyHealth portal, you will also be able to view your health information using other applications (apps) compatible with our system. You can access the FollowMyHealth Patient Portal offered by [facility] by registering at the following website: [email or web address]. By joining [facility]’s FollowMyHealth portal, you will also be able to view your health information using other applications (apps) compatible with our system.

## 2023-12-04 NOTE — CONSULT NOTE ADULT - SUBJECTIVE AND OBJECTIVE BOX
Patient is a 68y old  Female who presents with a chief complaint of     BRIEF HOSPITAL COURSE: 67 y/o female with pmhx of COPD active smoker 1 ppd recently admitted for acute COPD exacerbation due to entero/rhinovirus requiring intubation discharged to rehab on 11/16 now presents to ER with acute onset shortness of breath from rehab. given nebs x 3 in ED but remained tachypneic. ICU consulted. advised to place on bipap.     seen on bipap 14/6 with significant improvement in respiratory status. she is taking TV of 600 and satting 100% on 30% fio2. fio2 weaned t o25% sats remain stable. left on bipap for about 90 minutes then transitioned to 2 liters nasal cannula successfully. patient states she sometimes feels anxious.         PAST MEDICAL & SURGICAL HISTORY:  COPD, severe      CAD (coronary artery disease)      Breast cancer      No significant past surgical history        Allergies    penicillin (Hives)    Intolerances      FAMILY HISTORY:      Family history otherwise noncontributory.      Review of Systems:  CONSTITUTIONAL: No fever, chills, or fatigue  EYES: No eye pain, visual disturbances, or discharge  ENMT:  No difficulty hearing, tinnitus, vertigo; No sinus or throat pain  NECK: No pain or stiffness  RESPIRATORY: No cough, wheezing, chills or hemoptysis; +shortness of breath  CARDIOVASCULAR: No chest pain, palpitations, dizziness, or leg swelling  GASTROINTESTINAL: No abdominal or epigastric pain. No nausea, vomiting, or hematemesis; No diarrhea or constipation. No melena or hematochezia.  GENITOURINARY: No dysuria, frequency, hematuria, or incontinence  NEUROLOGICAL: No headaches, memory loss, loss of strength, numbness, or tremors  SKIN: No itching, burning, rashes, or lesions   MUSCULOSKELETAL: No joint pain or swelling; No muscle, back, or extremity pain  PSYCHIATRIC: No depression, anxiety, mood swings, or difficulty sleeping    All other review of systems negative except as mentioned in HPI.      Social History: active smoker      Medications:          ICU Vital Signs Last 24 Hrs  T(C): 37.4 (04 Dec 2023 03:13), Max: 37.4 (04 Dec 2023 03:13)  T(F): 99.3 (04 Dec 2023 03:13), Max: 99.4 (04 Dec 2023 03:13)  HR: 105 (04 Dec 2023 04:15) (105 - 123)  BP: 140/72 (04 Dec 2023 04:15) (133/74 - 164/72)  BP(mean): --  ABP: --  ABP(mean): --  RR: 21 (04 Dec 2023 04:15) (21 - 32)  SpO2: 100% (04 Dec 2023 04:15) (100% - 100%)    O2 Parameters below as of 04 Dec 2023 04:15  Patient On (Oxygen Delivery Method): mask, nonrebreather  O2 Flow (L/min): 6        Vital Signs Last 24 Hrs  T(C): 37.4 (04 Dec 2023 03:13), Max: 37.4 (04 Dec 2023 03:13)  T(F): 99.3 (04 Dec 2023 03:13), Max: 99.4 (04 Dec 2023 03:13)  HR: 105 (04 Dec 2023 04:15) (105 - 123)  BP: 140/72 (04 Dec 2023 04:15) (133/74 - 164/72)  BP(mean): --  RR: 21 (04 Dec 2023 04:15) (21 - 32)  SpO2: 100% (04 Dec 2023 04:15) (100% - 100%)    Parameters below as of 04 Dec 2023 04:15  Patient On (Oxygen Delivery Method): mask, nonrebreather  O2 Flow (L/min): 6      ABG - ( 04 Dec 2023 03:20 )  pH, Arterial: 7.44  pH, Blood: x     /  pCO2: 43    /  pO2: 349   / HCO3: 29    / Base Excess: 5.0   /  SaO2: 99.4                I&O's Detail        LABS:                        9.3    14.79 )-----------( 467      ( 04 Dec 2023 04:00 )             29.4     12-04    132<L>  |  95<L>  |  9   ----------------------------<  129<H>  4.3   |  30  |  0.26<L>    Ca    8.6      04 Dec 2023 04:00    TPro  6.8  /  Alb  2.7<L>  /  TBili  0.5  /  DBili  x   /  AST  78<H>  /  ALT  39  /  AlkPhos  91  12-04          CAPILLARY BLOOD GLUCOSE        PT/INR - ( 04 Dec 2023 04:00 )   PT: 21.8 sec;   INR: 1.95 ratio         PTT - ( 04 Dec 2023 04:00 )  PTT:30.5 sec  Urinalysis Basic - ( 04 Dec 2023 04:00 )    Color: x / Appearance: x / SG: x / pH: x  Gluc: 129 mg/dL / Ketone: x  / Bili: x / Urobili: x   Blood: x / Protein: x / Nitrite: x   Leuk Esterase: x / RBC: x / WBC x   Sq Epi: x / Non Sq Epi: x / Bacteria: x      CULTURES:      Physical Examination:    GENERAL: No acute distress.      EYES: Pupils equal, reactive to light.  Symmetric.    EARS, NOSE, THROAT: Normal; supple neck, no lymphadenopathy; trachea midline    PULM: Clear to auscultation bilaterally, no significant sputum production. No use of accessory respiratory muscles.    CVS: Regular rate and rhythm, no murmurs, rubs, or gallops    GI: Soft, nondistended, nontender, normoactive bowel sounds, no masses, no guarding    EXTREMITIES: No edema. DP and radial pulses 2+ bilaterally.    SKIN: Warm and well perfused, no rashes noted.    NEURO: Alert, oriented, interactive, nonfocal    DEVICES:     RADIOLOGY: cxr clear    TIME SPENT:    Time spent on this patient encounter--which includes documenting this note in the electronic medical record-- was 75 minutes including assessing the presenting problems with associated risks, reviewing the medical record to prepare for the encounter, and meeting face to face with the patient to obtain additional history. I have also performed an appropriate physical exam, made interventions listed and ordered and interpreted appropriate diagnostic studies as documented. To improve communication and patient safety, I have coordinated care with the multidisciplinary team including the bedside nurse, appropriate attending of record and consultants as needed. [id number]    Please note: Date of entry of this note is equal to the date of services rendered. Patient is a 68y old  Female who presents with a chief complaint of     BRIEF HOSPITAL COURSE: 69 y/o female with pmhx of COPD active smoker 1 ppd recently admitted for acute COPD exacerbation due to entero/rhinovirus requiring intubation discharged to rehab on 11/16 now presents to ER with acute onset shortness of breath from rehab. given nebs x 3 in ED but remained tachypneic. ICU consulted. advised to place on bipap.     seen on bipap 14/6 with significant improvement in respiratory status. she is taking TV of 600 and satting 100% on 30% fio2. fio2 weaned t o25% sats remain stable. left on bipap for about 90 minutes then transitioned to 2 liters nasal cannula successfully. patient states she sometimes feels anxious.         PAST MEDICAL & SURGICAL HISTORY:  COPD, severe      CAD (coronary artery disease)      Breast cancer      No significant past surgical history        Allergies    penicillin (Hives)    Intolerances      FAMILY HISTORY:      Family history otherwise noncontributory.      Review of Systems:  CONSTITUTIONAL: No fever, chills, or fatigue  EYES: No eye pain, visual disturbances, or discharge  ENMT:  No difficulty hearing, tinnitus, vertigo; No sinus or throat pain  NECK: No pain or stiffness  RESPIRATORY: No cough, wheezing, chills or hemoptysis; +shortness of breath  CARDIOVASCULAR: No chest pain, palpitations, dizziness, or leg swelling  GASTROINTESTINAL: No abdominal or epigastric pain. No nausea, vomiting, or hematemesis; No diarrhea or constipation. No melena or hematochezia.  GENITOURINARY: No dysuria, frequency, hematuria, or incontinence  NEUROLOGICAL: No headaches, memory loss, loss of strength, numbness, or tremors  SKIN: No itching, burning, rashes, or lesions   MUSCULOSKELETAL: No joint pain or swelling; No muscle, back, or extremity pain  PSYCHIATRIC: No depression, anxiety, mood swings, or difficulty sleeping    All other review of systems negative except as mentioned in HPI.      Social History: active smoker      Medications:          ICU Vital Signs Last 24 Hrs  T(C): 37.4 (04 Dec 2023 03:13), Max: 37.4 (04 Dec 2023 03:13)  T(F): 99.3 (04 Dec 2023 03:13), Max: 99.4 (04 Dec 2023 03:13)  HR: 105 (04 Dec 2023 04:15) (105 - 123)  BP: 140/72 (04 Dec 2023 04:15) (133/74 - 164/72)  BP(mean): --  ABP: --  ABP(mean): --  RR: 21 (04 Dec 2023 04:15) (21 - 32)  SpO2: 100% (04 Dec 2023 04:15) (100% - 100%)    O2 Parameters below as of 04 Dec 2023 04:15  Patient On (Oxygen Delivery Method): mask, nonrebreather  O2 Flow (L/min): 6        Vital Signs Last 24 Hrs  T(C): 37.4 (04 Dec 2023 03:13), Max: 37.4 (04 Dec 2023 03:13)  T(F): 99.3 (04 Dec 2023 03:13), Max: 99.4 (04 Dec 2023 03:13)  HR: 105 (04 Dec 2023 04:15) (105 - 123)  BP: 140/72 (04 Dec 2023 04:15) (133/74 - 164/72)  BP(mean): --  RR: 21 (04 Dec 2023 04:15) (21 - 32)  SpO2: 100% (04 Dec 2023 04:15) (100% - 100%)    Parameters below as of 04 Dec 2023 04:15  Patient On (Oxygen Delivery Method): mask, nonrebreather  O2 Flow (L/min): 6      ABG - ( 04 Dec 2023 03:20 )  pH, Arterial: 7.44  pH, Blood: x     /  pCO2: 43    /  pO2: 349   / HCO3: 29    / Base Excess: 5.0   /  SaO2: 99.4                I&O's Detail        LABS:                        9.3    14.79 )-----------( 467      ( 04 Dec 2023 04:00 )             29.4     12-04    132<L>  |  95<L>  |  9   ----------------------------<  129<H>  4.3   |  30  |  0.26<L>    Ca    8.6      04 Dec 2023 04:00    TPro  6.8  /  Alb  2.7<L>  /  TBili  0.5  /  DBili  x   /  AST  78<H>  /  ALT  39  /  AlkPhos  91  12-04          CAPILLARY BLOOD GLUCOSE        PT/INR - ( 04 Dec 2023 04:00 )   PT: 21.8 sec;   INR: 1.95 ratio         PTT - ( 04 Dec 2023 04:00 )  PTT:30.5 sec  Urinalysis Basic - ( 04 Dec 2023 04:00 )    Color: x / Appearance: x / SG: x / pH: x  Gluc: 129 mg/dL / Ketone: x  / Bili: x / Urobili: x   Blood: x / Protein: x / Nitrite: x   Leuk Esterase: x / RBC: x / WBC x   Sq Epi: x / Non Sq Epi: x / Bacteria: x      CULTURES:      Physical Examination:    GENERAL: No acute distress.      EYES: Pupils equal, reactive to light.  Symmetric.    EARS, NOSE, THROAT: Normal; supple neck, no lymphadenopathy; trachea midline    PULM: Clear to auscultation bilaterally, no significant sputum production. No use of accessory respiratory muscles.    CVS: Regular rate and rhythm, no murmurs, rubs, or gallops    GI: Soft, nondistended, nontender, normoactive bowel sounds, no masses, no guarding    EXTREMITIES: No edema. DP and radial pulses 2+ bilaterally.    SKIN: Warm and well perfused, no rashes noted.    NEURO: Alert, oriented, interactive, nonfocal    DEVICES:     RADIOLOGY: cxr clear    TIME SPENT:    Time spent on this patient encounter--which includes documenting this note in the electronic medical record-- was 75 minutes including assessing the presenting problems with associated risks, reviewing the medical record to prepare for the encounter, and meeting face to face with the patient to obtain additional history. I have also performed an appropriate physical exam, made interventions listed and ordered and interpreted appropriate diagnostic studies as documented. To improve communication and patient safety, I have coordinated care with the multidisciplinary team including the bedside nurse, appropriate attending of record and consultants as needed. [id number]    Please note: Date of entry of this note is equal to the date of services rendered.

## 2023-12-04 NOTE — PATIENT PROFILE ADULT - LOCATION #2
Impression: Zoster: B02.9. - right forehead and upper lid Plan: Valtrex and antibiotic - finish
Left sacrum

## 2023-12-04 NOTE — ED PROVIDER NOTE - CARE PLAN
Principal Discharge DX:	COPD exacerbation  Secondary Diagnosis:	Bronchitis  Secondary Diagnosis:	Sepsis   1

## 2023-12-04 NOTE — CONSULT NOTE ADULT - ASSESSMENT
Assessment  1. Chronic COPD, with acute dyspnea, now back to baseline with steroids, possible COPD exacerbation vs Anxiety attack, RVP negative   2. Recent stopped smoking, - 1pk/day  3. Chronic Hypoxia on home o2  4. h/o DVT on xaralto  5. Undelrying CAD, HTN, DM2    Plan  N/C o2 to [facility] sat  Steroids, Symbicort, Spiriva, Albuterol    Restart home daliresp if able  continue nicotine patch  OOB, PT  monitor sat     d/w Dr. Damico  Assessment  1. Chronic COPD, with acute dyspnea, now back to baseline with steroids, possible COPD exacerbation vs Anxiety attack, RVP negative   2. Recent stopped smoking, - 1pk/day  3. Chronic Hypoxia on home o2  4. h/o DVT on xaralto  5. Undelrying CAD, HTN, DM2    Plan  N/C o2 to [facility] sat  Steroids, Symbicort, Spiriva, Albuterol    Restart home daliresp if able  continue nicotine patch  OOB, PT  monitor sat     d/w Dr. Damico

## 2023-12-04 NOTE — DISCHARGE NOTE NURSING/CASE MANAGEMENT/SOCIAL WORK - NSDCPEFALRISK_GEN_ALL_CORE
For information on Fall & Injury Prevention, visit: [email or web address] OR  [email or web address] OR  https://www.cdc.gov/steadi/patient.html For information on Fall & Injury Prevention, visit: [email or web address] OR  [email or web address] OR  https://www.cdc.gov/steadi/patient.html

## 2023-12-04 NOTE — ED ADULT TRIAGE NOTE - CHIEF COMPLAINT QUOTE
BIBEMS from [facility] for rehab for sudden SOB. Per EMS pt. SpO2 on room air was low 90s. Former smoker. Symptoms started ~1 hour prior to arrival. BIBEMS from [facility] for rehab for sudden SOB. Per EMS pt. SpO2 on room air was low 90s. Former smoker. Symptoms started ~1 hour prior to arrival.

## 2023-12-04 NOTE — CONSULT NOTE ADULT - ASSESSMENT
69 y/o female with pmhx of COPD active smoker now with:    1. acute copd    - acute copd compounded by anxiety attack. start steroids, standing duonebs, symbicort  - xanax prn  - satting well on 2 liters NC in no distress. stable to admit to telemetry. please reconsult if patients condition changes  - RVP pending  - recommend empiric abx for now pending procalcitonin. if not elevated consider discontinuation   - smoking cessation. nicotine patch added   67 y/o female with pmhx of COPD active smoker now with:    1. acute copd    - acute copd compounded by anxiety attack. start steroids, standing duonebs, symbicort  - xanax prn  - satting well on 2 liters NC in no distress. stable to admit to telemetry. please reconsult if patients condition changes  - RVP pending  - recommend empiric abx for now pending procalcitonin. if not elevated consider discontinuation   - smoking cessation. nicotine patch added   69 y/o female with pmhx of COPD active smoker now with:    1. acute copd    - acute copd compounded by anxiety attack. start steroids, standing duonebs, symbicort  - xanax prn  - satting well on 2 liters NC in no distress. stable to admit to telemetry. please reconsult if patients condition changes  - RVP pending  - recommend empiric abx for now pending procalcitonin. if not elevated consider discontinuation   - smoking cessation. nicotine patch added  - bipap prn   67 y/o female with pmhx of COPD active smoker now with:    1. acute copd    - acute copd compounded by anxiety attack. start steroids, standing duonebs, symbicort  - xanax prn  - satting well on 2 liters NC in no distress. stable to admit to telemetry. please reconsult if patients condition changes  - RVP pending  - recommend empiric abx for now pending procalcitonin. if not elevated consider discontinuation   - smoking cessation. nicotine patch added  - bipap prn

## 2023-12-04 NOTE — CONSULT NOTE ADULT - SUBJECTIVE AND OBJECTIVE BOX
PULMONARY CONSULT  Location of Patient : [facility] E348 W1 ([facility])      Initial HPI on admission:  HPI:  68 year old Female with PMH of COPD on 2L home oxygen, former smoker (recently stopped), CAD, Remote history of DVT (on Xarelto), Anemia, Breast CA s/p left mastectomy who presents to the ER from [facility] for shortness of breath and cough since last night.  + anxiety    Patient states she has been having cough and difficulty breahting since last night which has been worsening. Cough is productive of white sputum. Denies fever, chills, sick contacts. Reports medication compliance,   Denies chest pain, nausea, vomiting, diarrhea, constipation.     States she feels better now after steroids and nebulizers.  currently back to n/c and feeling better with current rx       PAST MEDICAL & SURGICAL HISTORY:  COPD, severe on home o2  CAD (coronary artery disease)  Breast cancer  No significant past surgical history        Allergies  penicillin (Hives)    Intolerances      FAMILY HISTORY: Denies    Social History:     Smoking: recently stopped active hiker    Review of Systems: as stated above          Medications:  MEDICATIONS  (STANDING):  albuterol/ipratropium for Nebulization 3 milliLiter(s) Nebulizer every 6 hours  aspirin enteric coated 81 milliGRAM(s) Oral daily  atorvastatin 80 milliGRAM(s) Oral at bedtime  budesonide 160 MICROgram(s)/formoterol 4.5 MICROgram(s) Inhaler 2 Puff(s) Inhalation two times a day  buPROPion XL (24-Hour) . 300 milliGRAM(s) Oral daily  dextrose 5%. 1000 milliLiter(s) (100 mL/Hr) IV Continuous <Continuous>  dextrose 5%. 1000 milliLiter(s) (50 mL/Hr) IV Continuous <Continuous>  dextrose 50% Injectable 25 Gram(s) IV Push once  dextrose 50% Injectable 12.5 Gram(s) IV Push once  dextrose 50% Injectable 25 Gram(s) IV Push once  doxazosin 2 milliGRAM(s) Oral at bedtime  ferrous    sulfate 325 milliGRAM(s) Oral daily  glucagon  Injectable 1 milliGRAM(s) IntraMuscular once  insulin lispro (ADMELOG) corrective regimen sliding scale   SubCutaneous at bedtime  insulin lispro (ADMELOG) corrective regimen sliding scale   SubCutaneous three times a day before meals  letrozole 2.5 milliGRAM(s) Oral daily  methylPREDNISolone sodium succinate Injectable 40 milliGRAM(s) IV Push every 8 hours  nicotine -  14 mG/24Hr(s) Patch 1 Patch Transdermal daily  pantoprazole   Suspension 40 milliGRAM(s) Oral daily  polyethylene glycol 3350 17 Gram(s) Oral daily  rivaroxaban 10 milliGRAM(s) Oral with dinner  senna Syrup 10 milliLiter(s) Oral at bedtime    MEDICATIONS  (PRN):  acetaminophen     Tablet .. 650 milliGRAM(s) Oral every 6 hours PRN Temp greater or equal to 38C (100.4F), Mild Pain (1 - 3)  ALPRAZolam 0.5 milliGRAM(s) Oral every 8 hours PRN anxiety  aluminum hydroxide/magnesium hydroxide/simethicone Suspension 30 milliLiter(s) Oral every 4 hours PRN Dyspepsia  bisacodyl Suppository 10 milliGRAM(s) Rectal daily PRN Constipation  dextrose Oral Gel 15 Gram(s) Oral once PRN Blood Glucose LESS THAN 70 milliGRAM(s)/deciliter  melatonin 3 milliGRAM(s) Oral at bedtime PRN Insomnia  ondansetron Injectable 4 milliGRAM(s) IV Push every 8 hours PRN Nausea and/or Vomiting      Antibiotics History  levoFLOXacin IVPB 750 milliGRAM(s) IV Intermittent once, 12-04-23 @ 04:20, Stop order after: 1 Doses      Heme Medications   aspirin enteric coated 81 milliGRAM(s) Oral daily, 12-04-23 @ 08:02  rivaroxaban 10 milliGRAM(s) Oral with dinner, 12-04-23 @ 08:03      GI Medications  aluminum hydroxide/magnesium hydroxide/simethicone Suspension 30 milliLiter(s) Oral every 4 hours, 12-04-23 @ 08:00, Routine PRN  bisacodyl Suppository 10 milliGRAM(s) Rectal daily, 12-04-23 @ 08:04, Routine PRN  pantoprazole   Suspension 40 milliGRAM(s) Oral daily, 12-04-23 @ 08:04, Routine  polyethylene glycol 3350 17 Gram(s) Oral daily, 12-04-23 @ 08:04, Routine  senna Syrup 10 milliLiter(s) Oral at bedtime, 12-04-23 @ 08:04, Routine        Home Medications:  Last Order Reconciliation Date: 12-04-23 @ 08:05 (Admission Reconciliation)  ALPRAZolam 0.5 mg oral tablet: 1 tab(s) orally every 8 hours As needed anxiety (11-16-23 @ 12:20)  Aspir 81 oral delayed release tablet: 1 tab(s) orally once a day (11-16-23 @ 12:35)  atorvastatin 80 mg oral tablet: 1 tab(s) orally once a day (10-30-23 @ 14:25)  bisacodyl 10 mg rectal suppository: 1 suppository(ies) rectal once a day As needed Constipation (11-16-23 @ 12:20)  buPROPion 150 mg/24 hours (XL) oral tablet, extended release: 2 tab(s) orally once a day (10-30-23 @ 14:21)  doxazosin 2 mg oral tablet: 1 tab(s) orally once a day (at bedtime) (11-16-23 @ 12:20)  ferrous sulfate 325 mg (65 mg elemental iron) oral tablet: 1 tab(s) orally once a day (10-30-23 @ 14:27)  letrozole 2.5 mg oral tablet: 1 tab(s) orally once a day (10-30-23 @ 14:24)  levalbuterol 0.63 mg/3 mL inhalation solution: 3 milliliter(s) inhaled every 6 hours as needed for  shortness of breath and/or wheezing (11-16-23 @ 12:20)  metFORMIN 500 mg oral tablet: 1 tab(s) orally 2 times a day (11-16-23 @ 12:20)  MiraLax oral powder for reconstitution: 17 gram(s) orally once a day (11-16-23 @ 12:20)  nicotine 7 mg/24 hr transdermal film, extended release: 1 film(s) transdermal once a day (11-16-23 @ 12:35)  predniSONE: 20 milligram(s) once a day through 11/18. Then taper to 10 mg daily for 3 more days, then stop (11-16-23 @ 12:20)  Protonix 40 mg oral granule, delayed release: 1 tab(s) orally once a day (11-16-23 @ 12:20)  rivaroxaban 10 mg oral tablet: 1 tab(s) orally once a day (before a meal) (11-16-23 @ 12:20)  roflumilast 500 mcg oral tablet: 1 tab(s) orally once a day (11-16-23 @ 12:35)  senna (sennosides) 8.8 mg/5 mL oral syrup: 10 milliliter(s) orally once a day (at bedtime) (11-16-23 @ 12:20)  Spiriva Respimat 10 ACT 2.5 mcg/inh inhalation aerosol: 2 puff(s) inhaled once a day (10-30-23 @ 14:24)  Symbicort 160 mcg-4.5 mcg/inh inhalation aerosol: 2 puff(s) inhaled 2 times a day (10-27-23 @ 14:56)  Tylenol 325 mg oral tablet: 2 tab(s) orally every 6 hours as needed for  mild pain (11-16-23 @ 12:20)      LABS:                        9.3    14.79 )-----------( 467      ( 04 Dec 2023 04:00 )             29.4     12-04    132<L>  |  95<L>  |  9   ----------------------------<  129<H>  4.3   |  30  |  0.26<L>    Ca    8.6      04 Dec 2023 04:00    TPro  6.8  /  Alb  2.7<L>  /  TBili  0.5  /  DBili  x   /  AST  78<H>  /  ALT  39  /  AlkPhos  91  12-04            PT/INR - ( 04 Dec 2023 04:00 )   PT: 21.8 sec;   INR: 1.95 ratio         PTT - ( 04 Dec 2023 04:00 )  PTT:30.5 sec  Urinalysis Basic - ( 04 Dec 2023 04:00 )       Rapid RVP Result: NotDetec (12-04-23 @ 09:46)  Rapid RVP Result: NotDetec (12-04-23 @ 06:30)    ABG Trend  12-04-23 @ 03:20   - 7.44/43<H>/349<H>/99.4<H>  11-05-23 @ 13:27   - 7.49<H>/44<H>/110<H>/97.2  11-05-23 @ 09:40   - 7.46<H>/47<H>/95/97.3  11-04-23 @ 05:00   - 7.51<H>/48<H>/107/96.7  11-03-23 @ 17:53   - 7.50<H>/47<H>/148<H>/98.4<H>  11-03-23 @ 15:11   - 7.31<L>/75<HH>/115<H>/98.1<H>  11-03-23 @ 04:00   - 7.41/61<H>/216<H>/98.0  11-02-23 @ 12:18   - 7.44/51<H>/85/97.2  11-02-23 @ 10:09   - 7.46<H>/50<H>/102/98.0  11-02-23 @ 04:00   - 7.46<H>/47<H>/103/97.2  10-31-23 @ 11:40   - 7.43/39<H>/163<H>/98.2<H>  10-31-23 @ 08:10   - 7.35/50<H>/111<H>/97.1        RADIOLOGY  CXR:  Hyperinflated lungs   no gorss infiltrate           VITALS:  T(C): 37.2 (12-04-23 @ 08:39), Max: 37.4 (12-04-23 @ 03:13)  T(F): 98.9 (12-04-23 @ 08:39), Max: 99.4 (12-04-23 @ 03:13)  HR: 102 (12-04-23 @ 09:45) (102 - 123)  BP: 136/62 (12-04-23 @ 08:39) (133/74 - 169/63)  BP(mean): 94 (12-04-23 @ 07:30) (94 - 94)  ABP: --  ABP(mean): --  RR: 20 (12-04-23 @ 08:39) (20 - 32)  SpO2: 100% (12-04-23 @ 09:45) (100% - 100%)  CVP(mm Hg): --  CVP(cm H2O): --    Ins and Outs       Height (cm): 149.9 (12-04-23 @ 08:39)  Weight (kg): 40 (12-04-23 @ 08:39)  BMI (kg/m2): 17.8 (12-04-23 @ 08:39)        I&O's Detail      Physical Examination:  GENERAL:               Alert, Oriented, No acute distress.    HEENT:                    No JVD, Moist MM  PULM:                     Bilateral air entry, Diminished to auscultation bilaterally, no significant sputum production, No Rales, No Rhonchi, No Wheezing  CVS:                         S1, S2,  + Murmur  ABD:                        Soft, nondistended, nontender, normoactive bowel sounds,   EXT:                         No edema, nontender, No Cyanosis or Clubbing   NEURO:                  Alert, oriented, interactive, nonfocal, follows commands  PSYC:                      Calm, + Insight.     PULMONARY CONSULT  Location of Patient : [facility] E348 W1 ([facility])      Initial HPI on admission:  HPI:  68 year old Female with PMH of COPD on 2L home oxygen, former smoker (recently stopped), CAD, Remote history of DVT (on Xarelto), Anemia, Breast CA s/p left mastectomy who presents to the ER from [facility] for shortness of breath and cough since last night.  + anxiety    Patient states she has been having cough and difficulty breahting since last night which has been worsening. Cough is productive of white sputum. Denies fever, chills, sick contacts. Reports medication compliance,   Denies chest pain, nausea, vomiting, diarrhea, constipation.     States she feels better now after steroids and nebulizers.  currently back to n/c and feeling better with current rx       PAST MEDICAL & SURGICAL HISTORY:  COPD, severe on home o2  CAD (coronary artery disease)  Breast cancer  No significant past surgical history        Allergies  penicillin (Hives)    Intolerances      FAMILY HISTORY: Denies    Social History:     Smoking: recently stopped active hiker    Review of Systems: as stated above          Medications:  MEDICATIONS  (STANDING):  albuterol/ipratropium for Nebulization 3 milliLiter(s) Nebulizer every 6 hours  aspirin enteric coated 81 milliGRAM(s) Oral daily  atorvastatin 80 milliGRAM(s) Oral at bedtime  budesonide 160 MICROgram(s)/formoterol 4.5 MICROgram(s) Inhaler 2 Puff(s) Inhalation two times a day  buPROPion XL (24-Hour) . 300 milliGRAM(s) Oral daily  dextrose 5%. 1000 milliLiter(s) (100 mL/Hr) IV Continuous <Continuous>  dextrose 5%. 1000 milliLiter(s) (50 mL/Hr) IV Continuous <Continuous>  dextrose 50% Injectable 25 Gram(s) IV Push once  dextrose 50% Injectable 12.5 Gram(s) IV Push once  dextrose 50% Injectable 25 Gram(s) IV Push once  doxazosin 2 milliGRAM(s) Oral at bedtime  ferrous    sulfate 325 milliGRAM(s) Oral daily  glucagon  Injectable 1 milliGRAM(s) IntraMuscular once  insulin lispro (ADMELOG) corrective regimen sliding scale   SubCutaneous at bedtime  insulin lispro (ADMELOG) corrective regimen sliding scale   SubCutaneous three times a day before meals  letrozole 2.5 milliGRAM(s) Oral daily  methylPREDNISolone sodium succinate Injectable 40 milliGRAM(s) IV Push every 8 hours  nicotine -  14 mG/24Hr(s) Patch 1 Patch Transdermal daily  pantoprazole   Suspension 40 milliGRAM(s) Oral daily  polyethylene glycol 3350 17 Gram(s) Oral daily  rivaroxaban 10 milliGRAM(s) Oral with dinner  senna Syrup 10 milliLiter(s) Oral at bedtime    MEDICATIONS  (PRN):  acetaminophen     Tablet .. 650 milliGRAM(s) Oral every 6 hours PRN Temp greater or equal to 38C (100.4F), Mild Pain (1 - 3)  ALPRAZolam 0.5 milliGRAM(s) Oral every 8 hours PRN anxiety  aluminum hydroxide/magnesium hydroxide/simethicone Suspension 30 milliLiter(s) Oral every 4 hours PRN Dyspepsia  bisacodyl Suppository 10 milliGRAM(s) Rectal daily PRN Constipation  dextrose Oral Gel 15 Gram(s) Oral once PRN Blood Glucose LESS THAN 70 milliGRAM(s)/deciliter  melatonin 3 milliGRAM(s) Oral at bedtime PRN Insomnia  ondansetron Injectable 4 milliGRAM(s) IV Push every 8 hours PRN Nausea and/or Vomiting      Antibiotics History  levoFLOXacin IVPB 750 milliGRAM(s) IV Intermittent once, 12-04-23 @ 04:20, Stop order after: 1 Doses      Heme Medications   aspirin enteric coated 81 milliGRAM(s) Oral daily, 12-04-23 @ 08:02  rivaroxaban 10 milliGRAM(s) Oral with dinner, 12-04-23 @ 08:03      GI Medications  aluminum hydroxide/magnesium hydroxide/simethicone Suspension 30 milliLiter(s) Oral every 4 hours, 12-04-23 @ 08:00, Routine PRN  bisacodyl Suppository 10 milliGRAM(s) Rectal daily, 12-04-23 @ 08:04, Routine PRN  pantoprazole   Suspension 40 milliGRAM(s) Oral daily, 12-04-23 @ 08:04, Routine  polyethylene glycol 3350 17 Gram(s) Oral daily, 12-04-23 @ 08:04, Routine  senna Syrup 10 milliLiter(s) Oral at bedtime, 12-04-23 @ 08:04, Routine        Home Medications:  Last Order Reconciliation Date: 12-04-23 @ 08:05 (Admission Reconciliation)  ALPRAZolam 0.5 mg oral tablet: 1 tab(s) orally every 8 hours As needed anxiety (11-16-23 @ 12:20)  Aspir 81 oral delayed release tablet: 1 tab(s) orally once a day (11-16-23 @ 12:35)  atorvastatin 80 mg oral tablet: 1 tab(s) orally once a day (10-30-23 @ 14:25)  bisacodyl 10 mg rectal suppository: 1 suppository(ies) rectal once a day As needed Constipation (11-16-23 @ 12:20)  buPROPion 150 mg/24 hours (XL) oral tablet, extended release: 2 tab(s) orally once a day (10-30-23 @ 14:21)  doxazosin 2 mg oral tablet: 1 tab(s) orally once a day (at bedtime) (11-16-23 @ 12:20)  ferrous sulfate 325 mg (65 mg elemental iron) oral tablet: 1 tab(s) orally once a day (10-30-23 @ 14:27)  letrozole 2.5 mg oral tablet: 1 tab(s) orally once a day (10-30-23 @ 14:24)  levalbuterol 0.63 mg/3 mL inhalation solution: 3 milliliter(s) inhaled every 6 hours as needed for  shortness of breath and/or wheezing (11-16-23 @ 12:20)  metFORMIN 500 mg oral tablet: 1 tab(s) orally 2 times a day (11-16-23 @ 12:20)  MiraLax oral powder for reconstitution: 17 gram(s) orally once a day (11-16-23 @ 12:20)  nicotine 7 mg/24 hr transdermal film, extended release: 1 film(s) transdermal once a day (11-16-23 @ 12:35)  predniSONE: 20 milligram(s) once a day through 11/18. Then taper to 10 mg daily for 3 more days, then stop (11-16-23 @ 12:20)  Protonix 40 mg oral granule, delayed release: 1 tab(s) orally once a day (11-16-23 @ 12:20)  rivaroxaban 10 mg oral tablet: 1 tab(s) orally once a day (before a meal) (11-16-23 @ 12:20)  roflumilast 500 mcg oral tablet: 1 tab(s) orally once a day (11-16-23 @ 12:35)  senna (sennosides) 8.8 mg/5 mL oral syrup: 10 milliliter(s) orally once a day (at bedtime) (11-16-23 @ 12:20)  Spiriva Respimat 10 ACT 2.5 mcg/inh inhalation aerosol: 2 puff(s) inhaled once a day (10-30-23 @ 14:24)  Symbicort 160 mcg-4.5 mcg/inh inhalation aerosol: 2 puff(s) inhaled 2 times a day (10-27-23 @ 14:56)  Tylenol 325 mg oral tablet: 2 tab(s) orally every 6 hours as needed for  mild pain (11-16-23 @ 12:20)      LABS:                        9.3    14.79 )-----------( 467      ( 04 Dec 2023 04:00 )             29.4     12-04    132<L>  |  95<L>  |  9   ----------------------------<  129<H>  4.3   |  30  |  0.26<L>    Ca    8.6      04 Dec 2023 04:00    TPro  6.8  /  Alb  2.7<L>  /  TBili  0.5  /  DBili  x   /  AST  78<H>  /  ALT  39  /  AlkPhos  91  12-04            PT/INR - ( 04 Dec 2023 04:00 )   PT: 21.8 sec;   INR: 1.95 ratio         PTT - ( 04 Dec 2023 04:00 )  PTT:30.5 sec  Urinalysis Basic - ( 04 Dec 2023 04:00 )       Rapid RVP Result: NotDetec (12-04-23 @ 09:46)  Rapid RVP Result: NotDetec (12-04-23 @ 06:30)    ABG Trend  12-04-23 @ 03:20   - 7.44/43<H>/349<H>/99.4<H>  11-05-23 @ 13:27   - 7.49<H>/44<H>/110<H>/97.2  11-05-23 @ 09:40   - 7.46<H>/47<H>/95/97.3  11-04-23 @ 05:00   - 7.51<H>/48<H>/107/96.7  11-03-23 @ 17:53   - 7.50<H>/47<H>/148<H>/98.4<H>  11-03-23 @ 15:11   - 7.31<L>/75<HH>/115<H>/98.1<H>  11-03-23 @ 04:00   - 7.41/61<H>/216<H>/98.0  11-02-23 @ 12:18   - 7.44/51<H>/85/97.2  11-02-23 @ 10:09   - 7.46<H>/50<H>/102/98.0  11-02-23 @ 04:00   - 7.46<H>/47<H>/103/97.2  10-31-23 @ 11:40   - 7.43/39<H>/163<H>/98.2<H>  10-31-23 @ 08:10   - 7.35/50<H>/111<H>/97.1        RADIOLOGY  CXR:  Hyperinflated lungs   no gorss infiltrate           VITALS:  T(C): 37.2 (12-04-23 @ 08:39), Max: 37.4 (12-04-23 @ 03:13)  T(F): 98.9 (12-04-23 @ 08:39), Max: 99.4 (12-04-23 @ 03:13)  HR: 102 (12-04-23 @ 09:45) (102 - 123)  BP: 136/62 (12-04-23 @ 08:39) (133/74 - 169/63)  BP(mean): 94 (12-04-23 @ 07:30) (94 - 94)  ABP: --  ABP(mean): --  RR: 20 (12-04-23 @ 08:39) (20 - 32)  SpO2: 100% (12-04-23 @ 09:45) (100% - 100%)  CVP(mm Hg): --  CVP(cm H2O): --    Ins and Outs       Height (cm): 149.9 (12-04-23 @ 08:39)  Weight (kg): 40 (12-04-23 @ 08:39)  BMI (kg/m2): 17.8 (12-04-23 @ 08:39)        I&O's Detail      Physical Examination:  GENERAL:               Alert, Oriented, No acute distress.    HEENT:                    No JVD, Moist MM  PULM:                     Bilateral air entry, Diminished to auscultation bilaterally, no significant sputum production, No Rales, No Rhonchi, No Wheezing  CVS:                         S1, S2,  + Murmur  ABD:                        Soft, nondistended, nontender, normoactive bowel sounds,   EXT:                         No edema, nontender, No Cyanosis or Clubbing   NEURO:                  Alert, oriented, interactive, nonfocal, follows commands  PSYC:                      Calm, + Insight.

## 2023-12-04 NOTE — H&P ADULT - HISTORY OF PRESENT ILLNESS
REHAN IGLESIAS is a 68y year old Female with PMH of COPD on 2L home oxygen, former smoker, CAD, DVT (on Xarelto), Anemia, Breast CA s/p left mastectomy who presents to the ER from [facility] for shortness of breath and cough since last night.     Patient states she has been having cough and difficulty breahting since last night which has been worsening. Cough is productive of white sputum. Denies fever, chills, sick contacts. Reports medication compliance, has been on 2L oxygen since hospital discharge 11/16.   Denies chest pain, nausea, vomiting, diarrhea, constipation.     States she feels better now after steroids and nebulizers.     On ER arrival: /72; ; T 99.4; sat 100% on 6L nrb   Given Levaquin 750 mg; solumedrol 125mg; duoneb x3; 1.05L NS  Evaluate by ICU for need for NIPPV, patient weaned off BPAP in ER and deemed stable for medical floors  REHAN IGLESIAS is a 68y year old Female with PMH of COPD on 2L home oxygen, former smoker, CAD, DVT (on Xarelto), Anemia, Breast CA s/p left mastectomy who presents to the ER from [facility] for shortness of breath and cough since last night.     Patient states she has been having cough and difficulty breahting since last night which has been worsening. Cough is productive of white sputum. Denies fever, chills, sick contacts. Reports medication compliance, has been on 2L oxygen since hospital discharge 11/16.   Denies chest pain, nausea, vomiting, diarrhea, constipation.     States she feels better now after steroids and nebulizers.     On ER arrival: /72; ; T 99.4; sat 100% on 6L nrb   Given Levaquin 750 mg; solumedrol 125mg; duoneb x3; 1.05L NS  Evaluate by ICU for need for NIPPV, patient weaned off BPAP in ER and deemed stable for medical floors

## 2023-12-04 NOTE — ED PROVIDER NOTE - PHYSICAL EXAMINATION
exam:   General: moderately labored breathing. alert, coherent  HEENT: eyes perrl, nose normal,   cor: RRR, s1s2, 2+rad pulses.   lungs: moderate labored. decreased breath sounds diffusely. mild expir wheeze diffusely  abd: soft, ntnd.   neuro: a&ox3, cn2-12 intact, ESPARZA, 5/5 strength c nl sensation all extremities, nl coordination.   MSK: no extremity swelling.  Skin: normal, no rash

## 2023-12-04 NOTE — H&P ADULT - NSHPPHYSICALEXAM_GEN_ALL_CORE
Wt(kg): --Vital Signs Last 24 Hrs  T(C): 37.2 (04 Dec 2023 08:39), Max: 37.4 (04 Dec 2023 03:13)  T(F): 98.9 (04 Dec 2023 08:39), Max: 99.4 (04 Dec 2023 03:13)  HR: 104 (04 Dec 2023 08:39) (103 - 123)  BP: 136/62 (04 Dec 2023 08:39) (133/74 - 169/63)  BP(mean): 94 (04 Dec 2023 07:30) (94 - 94)  RR: 20 (04 Dec 2023 08:39) (20 - 32)  SpO2: 100% (04 Dec 2023 08:39) (100% - 100%)    Parameters below as of 04 Dec 2023 08:39  Patient On (Oxygen Delivery Method): nasal cannula  O2 Flow (L/min): 3    PHYSICAL EXAM:  GENERAL: NAD, thin F, appears older than states age  NERVOUS SYSTEM:  Alert & Oriented X3, Good concentration; no focal deficits   HEAD:  Atraumatic, Normocephalic  EYES: EOMI, PERRLA, conjunctiva and sclera clear  ENMT: No tonsillar erythema, exudates, or enlargement; Moist mucous membranes, Good dentition, No lesions  NECK: Supple, No JVD, Normal thyroid  CHEST/LUNG: fair air entry bilaterally. faint wheezing   HEART: Regular rate and rhythm; No murmurs, rubs, or gallops  ABDOMEN: Soft, Nontender, Nondistended; Bowel sounds present  EXTREMITIES:  2+ Peripheral Pulses, No clubbing, No LE edema. R hand edema   LYMPH: No lymphadenopathy noted  SKIN: No rashes or lesions

## 2023-12-04 NOTE — PATIENT PROFILE ADULT - FALL HARM RISK - HARM RISK INTERVENTIONS
Assistance with ambulation/Assistance OOB with selected safe patient handling equipment/Communicate Risk of Fall with Harm to all staff/Discuss with provider need for PT consult/Monitor gait and stability/Provide patient with walking aids - walker, cane, crutches/Reinforce activity limits and safety measures with patient and family/Tailored Fall Risk Interventions/Visual Cue: Yellow wristband and red socks/Bed in lowest position, wheels locked, appropriate side rails in place/Call bell, personal items and telephone in reach/Instruct patient to call for assistance before getting out of bed or chair/Non-slip footwear when patient is out of bed/[place] to call system/Physically safe environment - no spills, clutter or unnecessary equipment/Purposeful Proactive Rounding/Room/bathroom lighting operational, light cord in reach Assistance with ambulation/Assistance OOB with selected safe patient handling equipment/Communicate Risk of Fall with Harm to all staff/Discuss with provider need for PT consult/Monitor gait and stability/Provide patient with walking aids - walker, cane, crutches/Reinforce activity limits and safety measures with patient and family/Tailored Fall Risk Interventions/Visual Cue: Yellow wristband and red socks/Bed in lowest position, wheels locked, appropriate side rails in place/Call bell, personal items and telephone in reach/Instruct patient to call for assistance before getting out of bed or chair/Non-slip footwear when patient is out of bed/[place] to call system/Physically safe environment - no spills, clutter or unnecessary equipment/Purposeful Proactive Rounding/Room/bathroom lighting operational, light cord in reach

## 2023-12-04 NOTE — CHART NOTE - NSCHARTNOTEFT_GEN_A_CORE
consulted for acute COPD. stated she was breathing at 40-45 bpm. advised to place on bipap. significantly improved after nebs and bipap. now breathing at about 18 bpm with no use of accessory respiratory muscles taking TV of 600 on 12/6 and satting 100% on 30% fio2. abg done prior to bipap initiation stable. will keep on bipap for a little longer then trial off. if stable can go to telemetry if becomes tachypneic again will admit to ICU

## 2023-12-04 NOTE — ED ADULT TRIAGE NOTE - NURSING HOMES
[facility]

## 2023-12-05 NOTE — PROGRESS NOTE ADULT - SUBJECTIVE AND OBJECTIVE BOX
Follow-up Pulmonary Progress Note  Chief Complaint : Chronic obstructive pulmonary disease with acute exacerbation    patient seen and examined  states feels anxious, states prior to arrival patient did not get xanax   at this time feels comfortable          Allergies :penicillin (Hives)      PAST MEDICAL & SURGICAL HISTORY:  COPD, severe    CAD (coronary artery disease)    Breast cancer    No significant past surgical history        Medications:  MEDICATIONS  (STANDING):  albuterol    0.083% 2.5 milliGRAM(s) Nebulizer every 6 hours  aspirin enteric coated 81 milliGRAM(s) Oral daily  atorvastatin 80 milliGRAM(s) Oral at bedtime  budesonide 160 MICROgram(s)/formoterol 4.5 MICROgram(s) Inhaler 2 Puff(s) Inhalation two times a day  buPROPion XL (24-Hour) . 300 milliGRAM(s) Oral daily  dextrose 5%. 1000 milliLiter(s) (100 mL/Hr) IV Continuous <Continuous>  dextrose 5%. 1000 milliLiter(s) (50 mL/Hr) IV Continuous <Continuous>  dextrose 50% Injectable 25 Gram(s) IV Push once  dextrose 50% Injectable 12.5 Gram(s) IV Push once  dextrose 50% Injectable 25 Gram(s) IV Push once  diltiazem    milliGRAM(s) Oral daily  doxazosin 2 milliGRAM(s) Oral at bedtime  ferrous    sulfate 325 milliGRAM(s) Oral daily  glucagon  Injectable 1 milliGRAM(s) IntraMuscular once  insulin lispro (ADMELOG) corrective regimen sliding scale   SubCutaneous at bedtime  insulin lispro (ADMELOG) corrective regimen sliding scale   SubCutaneous three times a day before meals  letrozole 2.5 milliGRAM(s) Oral daily  methylPREDNISolone sodium succinate Injectable 40 milliGRAM(s) IV Push every 8 hours  nicotine -  14 mG/24Hr(s) Patch 1 Patch Transdermal daily  pantoprazole   Suspension 40 milliGRAM(s) Oral daily  polyethylene glycol 3350 17 Gram(s) Oral daily  potassium chloride    Tablet ER 40 milliEquivalent(s) Oral every 4 hours  rivaroxaban 10 milliGRAM(s) Oral with dinner  roflumilast 500 MICROGram(s) Oral daily  senna Syrup 10 milliLiter(s) Oral at bedtime  tiotropium 2.5 MICROgram(s) Inhaler 2 Puff(s) Inhalation daily    MEDICATIONS  (PRN):  acetaminophen     Tablet .. 650 milliGRAM(s) Oral every 6 hours PRN Temp greater or equal to 38C (100.4F), Mild Pain (1 - 3)  aluminum hydroxide/magnesium hydroxide/simethicone Suspension 30 milliLiter(s) Oral every 4 hours PRN Dyspepsia  bisacodyl Suppository 10 milliGRAM(s) Rectal daily PRN Constipation  dextrose Oral Gel 15 Gram(s) Oral once PRN Blood Glucose LESS THAN 70 milliGRAM(s)/deciliter  melatonin 3 milliGRAM(s) Oral at bedtime PRN Insomnia  ondansetron    Tablet 4 milliGRAM(s) Oral four times a day PRN for nausea      Antibiotics History  levoFLOXacin IVPB 750 milliGRAM(s) IV Intermittent once, 12-04-23 @ 04:20, Stop order after: 1 Doses      Heme Medications   aspirin enteric coated 81 milliGRAM(s) Oral daily, 12-04-23 @ 08:02  rivaroxaban 10 milliGRAM(s) Oral with dinner, 12-04-23 @ 08:03      GI Medications  aluminum hydroxide/magnesium hydroxide/simethicone Suspension 30 milliLiter(s) Oral every 4 hours, 12-04-23 @ 08:00, Routine PRN  bisacodyl Suppository 10 milliGRAM(s) Rectal daily, 12-04-23 @ 08:04, Routine PRN  pantoprazole   Suspension 40 milliGRAM(s) Oral daily, 12-04-23 @ 08:04, Routine  polyethylene glycol 3350 17 Gram(s) Oral daily, 12-04-23 @ 08:04, Routine  senna Syrup 10 milliLiter(s) Oral at bedtime, 12-04-23 @ 08:04, Routine        LABS:                        8.3    8.54  )-----------( 485      ( 05 Dec 2023 12:37 )             26.3     12-05    139  |  102  |  3<L>  ----------------------------<  113<H>  3.0<L>   |  31  |  0.22<L>    Ca    8.1<L>      05 Dec 2023 05:20  Mg     1.4     12-05    TPro  5.5<L>  /  Alb  2.2<L>  /  TBili  0.3  /  DBili  x   /  AST  24  /  ALT  32  /  AlkPhos  70  12-05         PT/INR - ( 04 Dec 2023 04:00 )   PT: 21.8 sec;   INR: 1.95 ratio         PTT - ( 04 Dec 2023 04:00 )  PTT:30.5 sec  Urinalysis Basic - ( 05 Dec 2023 05:20 )           CULTURES: (if applicable)    Culture - Blood (collected 12-04-23 @ 04:25)  Source: .Blood Blood-Peripheral  Preliminary Report (12-05-23 @ 11:02):    No growth at 24 hours    Culture - Blood (collected 12-04-23 @ 04:00)  Source: .Blood Blood-Peripheral  Preliminary Report (12-05-23 @ 11:02):    No growth at 24 hours      Rapid RVP Result: NotDetec (12-04-23 @ 09:46)  Rapid RVP Result: NotDetec (12-04-23 @ 06:30)      ABG - ( 04 Dec 2023 03:20 )  pH, Arterial: 7.44  pH, Blood: x     /  pCO2: 43    /  pO2: 349   / HCO3: 29    / Base Excess: 5.0   /  SaO2: 99.4             VITALS:  T(C): 37.1 (12-05-23 @ 12:53), Max: 37.1 (12-05-23 @ 12:53)  T(F): 98.8 (12-05-23 @ 12:53), Max: 98.8 (12-05-23 @ 12:53)  HR: 123 (12-05-23 @ 12:53) (92 - 123)  BP: 137/71 (12-05-23 @ 12:53) (126/66 - 138/81)  BP(mean): 86 (12-05-23 @ 05:00) (86 - 86)  ABP: --  ABP(mean): --  RR: 22 (12-05-23 @ 12:53) (20 - 22)  SpO2: 99% (12-05-23 @ 12:53) (95% - 100%)  CVP(mm Hg): --  CVP(cm H2O): --    Ins and Outs     12-04-23 @ 07:01  -  12-05-23 @ 07:00  --------------------------------------------------------  IN: 0 mL / OUT: 600 mL / NET: -600 mL        Height (cm): 149.9 (12-04-23 @ 08:39)  Weight (kg): 40 (12-04-23 @ 08:39)  BMI (kg/m2): 17.8 (12-04-23 @ 08:39)        I&O's Detail    04 Dec 2023 07:01  -  05 Dec 2023 07:00  --------------------------------------------------------  IN:  Total IN: 0 mL    OUT:    Voided (mL): 600 mL  Total OUT: 600 mL    Total NET: -600 mL

## 2023-12-05 NOTE — DIETITIAN INITIAL EVALUATION ADULT - NUTRITIONGOAL OUTCOME1
PHYSICIAN NEXT STEPS:  Review Only    CHIEF COMPLAINT:  Chief Complaint/Protocol Used: Headache  Onset: today      ASSESSMENT:  ? Onset: today  ? Normal True  ? Location: frontal to temporal  ? Onset: today  ? Pattern: intermittent  ? Severity: moderate  ? Recurrent Symptom: yes 1 week, taking ibuprofen  ? Other Symptoms: left and right behind eyes/ neck to shoulder pain/ mild sob  -------------------------------------------------------    DISPOSITION:  Disposition Recommendation: See PCP When Office is Open (within 3 days)  Questions that led to disposition:  ? [1] MILD-MODERATE headache AND [2] present > 72 hours  Patient Directed To: Unspecified  Patient Intended Action: Take care of myself at home      CALL NOTES:  03/11/2021 at 4:43 PM by Stephenie Shaw  ? agreed with 72 hour disposition and care advice. Will go to [facility]. Advised ED for worsening.     DISPOSITION OVERRIDE/PROVIDER CONSULT:  Disposition Override: N/A  Override Source: Unspecified  Consulted with PCP: No  Consulted with On-Call Physician: No    CALLER CONTACT INFO:  Name: Rosamaria Pena (Self)  Phone 1: [phone] (Home Phone)  Phone 2: [phone] (Work Phone)  Phone 3: [phone] (Mobile)      ENCOUNTER STARTED:  03/11/21 04:29:47 PM  ENCOUNTER ASSIGNED TO/CLOSED BY:  Stephenie Shaw @ 03/11/21 04:44:33 PM      -------------------------------------------------------    CARE ADVICE given per Headache guideline.  SEE PCP WITHIN 3 DAYS:  * You need to be seen within 2 or 3 days. Call your doctor during regular office hours and make an appointment. An urgent care center is often the best source of care if your doctor's office is closed or you can't get an appointment. NOTE: If office will   be open tomorrow, tell caller to call then, not in 3 days.   * IF PATIENT HAS NO PCP: An urgent care center is often the best source of care if you do not have a regular doctor you can see in the next couple days. NOTE: Try to help caller find a doctor. Is  there a physician referral line or other resource? Having   a PCP or 'medical home' means better long-term care.?; PAIN MEDICINES:  * For pain relief, take acetaminophen, ibuprofen, or naproxen.  * Use the lowest amount that makes your pain feel better.  ACETAMINOPHEN (E.G., TYLENOL):   * Take 650 mg (two 325 mg pills) by mouth every 4-6 hours as needed. Each Regular Strength Tylenol pill has 325 mg of acetaminophen. The most you should take each day is 3,250 mg (10 Regular Strength pills a day).  * Another choice is to take 1,000 mg (two 500 mg pills) every 8 hours as needed. Each Extra Strength Tylenol pill has 500 mg of acetaminophen. The most you should take each day is 3,000 mg (6 Extra Strength pills a day).  IBUPROFEN (E.G., MOTRIN, ADVIL):  * Take 400 mg (two 200 mg pills) by mouth every 6 hours as needed.  * Another choice is to take 600 mg (three 200 mg pills) by mouth every 8 hours as needed.  * The most you should take each day is 1,200 mg (six 200 mg pills a day), unless your doctor has told you to take more.  NAPROXEN (E.G., ALEVE):  * Take 220 mg (one 220 mg pill) by mouth every 8 hours as needed. You may take 440 mg (two 220 mg pills) for your first dose.  * The most you should take each day is 660 mg (three 220 mg pills a day), unless your doctor has told you to take more.  EXTRA NOTES:  * Acetaminophen is thought to be safer than ibuprofen or naproxen for people over 65 years old. Acetaminophen is in many OTC and prescription medicines. It might be in more than one medicine that you are taking. You need to be careful and not take an   overdose. An acetaminophen overdose can hurt the liver.  * [employer], the company that makes Tylenol, has different dosage instructions for Tylenol in [place] and the [place]. In [place], the maximum recommended dose per day is 4,000 mg or twelve (12) Regular-Strength (325 mg) pills. In the [place],   [employer] recommends a maximum dose of ten (10)  Regular-Strength (325 mg) pills.  * Before taking any medicine, read all the instructions on the package.; OTHER OTC PAIN MEDS:   * OTC headache relievers are generally safe and effective.   * If you have other health problems or are taking other medications, consult with your doctor or pharmacist.   * Be certain to read the label carefully.; REST: Lie down in a dark quiet place and relax until feeling better.; LOCAL COLD: Apply a cold wet washcloth or cold pack to the forehead for 20 minutes; STRETCHING: Stretch and massage any tight neck muscles.;   CALL BACK IF:    * Severe headache persists over 2 hours after pain medicine    * Stiff neck occurs (i.e., can't touch chin to chest)    * You become worse.      UNDERSTANDS CARE ADVICE: Yes    AGREES WITH CARE ADVICE: Yes    WILL FOLLOW CARE ADVICE: Yes    -------------------------------------------------------   Patient to meet 75% of assessed needs during hospitalization via diet & po nutrition supplements

## 2023-12-05 NOTE — PROGRESS NOTE ADULT - ASSESSMENT
Physical Examination:  GENERAL:               Alert, Oriented, No acute distress.    HEENT:                    No JVD, Moist MM  PULM:                     Bilateral air entry, Diminished to auscultation bilaterally, no significant sputum production, No Rales, No Rhonchi, trace Wheezing  CVS:                         S1, S2,  + Murmur  ABD:                        Soft, nondistended, nontender, normoactive bowel sounds,   EXT:                         No edema, nontender, No Cyanosis or Clubbing   NEURO:                  Alert, oriented, interactive, nonfocal, follows commands  PSYC:                      Calm, + Insight.        Assessment  1. Chronic COPD, with acute dyspnea, now back to baseline with steroids, possible COPD exacerbation vs Anxiety attack, RVP negative   2. Recent stopped smoking, - 1pk/day  3. Chronic Hypoxia on home o2  4. h/o DVT on xaralto  5. Underlying CAD, HTN, DM2    Plan  N/C o2 to maintain sat  Steroids if stable consider to taper in am  Continue Symbicort, Spiriva, Albuterol, Daliresp     continue nicotine patch  OOB, PT  monitor sat   d/w Brother bedside     he states believes was anxiety attack

## 2023-12-05 NOTE — DIETITIAN INITIAL EVALUATION ADULT - PERTINENT MEDS FT
MEDICATIONS  (STANDING):  albuterol    0.083% 2.5 milliGRAM(s) Nebulizer every 6 hours  aspirin enteric coated 81 milliGRAM(s) Oral daily  atorvastatin 80 milliGRAM(s) Oral at bedtime  budesonide 160 MICROgram(s)/formoterol 4.5 MICROgram(s) Inhaler 2 Puff(s) Inhalation two times a day  buPROPion XL (24-Hour) . 300 milliGRAM(s) Oral daily  dextrose 5%. 1000 milliLiter(s) (100 mL/Hr) IV Continuous <Continuous>  dextrose 5%. 1000 milliLiter(s) (50 mL/Hr) IV Continuous <Continuous>  dextrose 50% Injectable 25 Gram(s) IV Push once  dextrose 50% Injectable 12.5 Gram(s) IV Push once  dextrose 50% Injectable 25 Gram(s) IV Push once  diltiazem    milliGRAM(s) Oral daily  doxazosin 2 milliGRAM(s) Oral at bedtime  ferrous    sulfate 325 milliGRAM(s) Oral daily  glucagon  Injectable 1 milliGRAM(s) IntraMuscular once  insulin lispro (ADMELOG) corrective regimen sliding scale   SubCutaneous three times a day before meals  insulin lispro (ADMELOG) corrective regimen sliding scale   SubCutaneous at bedtime  letrozole 2.5 milliGRAM(s) Oral daily  methylPREDNISolone sodium succinate Injectable 40 milliGRAM(s) IV Push every 8 hours  nicotine -  14 mG/24Hr(s) Patch 1 Patch Transdermal daily  pantoprazole   Suspension 40 milliGRAM(s) Oral daily  polyethylene glycol 3350 17 Gram(s) Oral daily  potassium chloride    Tablet ER 40 milliEquivalent(s) Oral every 4 hours  rivaroxaban 10 milliGRAM(s) Oral with dinner  roflumilast 500 MICROGram(s) Oral daily  senna Syrup 10 milliLiter(s) Oral at bedtime  tiotropium 2.5 MICROgram(s) Inhaler 2 Puff(s) Inhalation daily    MEDICATIONS  (PRN):  acetaminophen     Tablet .. 650 milliGRAM(s) Oral every 6 hours PRN Temp greater or equal to 38C (100.4F), Mild Pain (1 - 3)  aluminum hydroxide/magnesium hydroxide/simethicone Suspension 30 milliLiter(s) Oral every 4 hours PRN Dyspepsia  bisacodyl Suppository 10 milliGRAM(s) Rectal daily PRN Constipation  dextrose Oral Gel 15 Gram(s) Oral once PRN Blood Glucose LESS THAN 70 milliGRAM(s)/deciliter  melatonin 3 milliGRAM(s) Oral at bedtime PRN Insomnia  ondansetron    Tablet 4 milliGRAM(s) Oral four times a day PRN for nausea

## 2023-12-05 NOTE — PROGRESS NOTE ADULT - ASSESSMENT
69 y/o Female with PMH of COPD on 2L home oxygen, former smoker, DVT (on Xarelto), Anemia, CAD, Breast CA s/p left mastectomy who presents to the ER from [facility] for shortness of breath and cough admitted for acute on chronic COPD exacerbation.    Tachycardia  -Cardizem 30mg PO x1, follow EKG    Hypokalemia  -K 3.0, replete and monitor    Acute on chronic hypoxic respiratory failure   Acute COPD exacerbation   Former smoker  -Pulm appreciated - continue steroids, symbicort, spiriva, albuterol. Resume home dalisresp - will f/u with pharmacy   -Continue solumedrol 40mg q8h  -Stable on home O2 2L NC - continue   -s/p Levaquin in ER, unclear role for continued abx - monitor of abx - follow blood cx  -Continue nicotine patch     Anemia  -s/p EGD on last admission  -Hb 9.3 > 7.6, no obvious s/s bleeding - follow cbc 1200   -Continue PPI  -Follow occult stool    CAD  -Continue ASA, statin    Breast CA  -Continue femara     Hx of DVT (2018)  -Continue Xarelto   -Outpatient oncologist is Dr. Larios [phone]    Anxiety   -Continue xanax prn    DVT ppx - xarelto  Pt eval - MARK    Unsuccessful attempt to reach patient's brother Maximiliano ([phone])     69 y/o Female with PMH of COPD on 2L home oxygen, former smoker, DVT (on Xarelto), Anemia, CAD, Breast CA s/p left mastectomy who presents to the ER from [facility] for shortness of breath and cough admitted for acute on chronic COPD exacerbation.    Tachycardia  -Cardizem 30mg PO x1, follow EKG    Hypokalemia  -K 3.0, replete and monitor    Acute on chronic hypoxic respiratory failure   Acute COPD exacerbation   Former smoker  -Pulm appreciated - continue steroids, symbicort, spiriva, albuterol. Resume home dalisresp - will f/u with pharmacy   -Continue solumedrol 40mg q8h  -Stable on home O2 2L NC - continue   -s/p Levaquin in ER, unclear role for continued abx - monitor of abx - follow blood cx  -Continue nicotine patch     Anemia  -s/p EGD on last admission  -Hb 9.3 > 7.6, no obvious s/s bleeding - follow cbc 1200   -Continue PPI  -Follow occult stool    CAD  -Continue ASA, statin    Breast CA  -Continue femara     Hx of DVT (2018)  -Continue Xarelto   -Outpatient oncologist is Dr. Larios [phone]    Anxiety   -Continue xanax prn    DVT ppx - xarelto  Pt eval - MARK    Unsuccessful attempt to reach patient's brother Maximiliano ([phone])     67 y/o Female with PMH of COPD on 2L home oxygen, former smoker, DVT (on Xarelto), Anemia, CAD, Breast CA s/p left mastectomy who presents to the ER from [facility] for shortness of breath and cough admitted for acute on chronic COPD exacerbation.    Tachycardia  -Cardizem 30mg PO x1, follow EKG    Hypokalemia  -K 3.0, replete and monitor    Acute on chronic hypoxic respiratory failure   Acute COPD exacerbation   Former smoker  -Pulm appreciated - continue steroids, symbicort, spiriva, albuterol. Resume home dalisresp - will f/u with pharmacy   -Continue solumedrol 40mg q8h  -Stable on home O2 2L NC - continue   -s/p Levaquin in ER, unclear role for continued abx - monitor of abx - follow blood cx  -Continue nicotine patch     Anemia  -s/p EGD on last admission  -Hb 9.3 > 7.6, no obvious s/s bleeding - follow cbc 1200   -Continue PPI  -Follow occult stool    CAD  -Continue ASA, statin    Breast CA  -Continue femara     Hx of DVT (2018)  -Continue Xarelto   -Outpatient oncologist is Dr. Larios [phone]    Anxiety   -Continue xanax prn    DVT ppx - xarelto  Pt eval - eventual return to [facility]    Unsuccessful attempt to reach patient's brother Maximiliano ([phone])     67 y/o Female with PMH of COPD on 2L home oxygen, former smoker, DVT (on Xarelto), Anemia, CAD, Breast CA s/p left mastectomy who presents to the ER from [facility] for shortness of breath and cough admitted for acute on chronic COPD exacerbation.    Tachycardia  -Cardizem 30mg PO x1, follow EKG    Hypokalemia  -K 3.0, replete and monitor    Acute on chronic hypoxic respiratory failure   Acute COPD exacerbation   Former smoker  -Pulm appreciated - continue steroids, symbicort, spiriva, albuterol. Resume home dalisresp - will f/u with pharmacy   -Continue solumedrol 40mg q8h  -Stable on home O2 2L NC - continue   -s/p Levaquin in ER, unclear role for continued abx - monitor of abx - follow blood cx  -Continue nicotine patch     Anemia  -s/p EGD on last admission  -Hb 9.3 > 7.6, no obvious s/s bleeding - follow cbc 1200   -Continue PPI  -Follow occult stool    CAD  -Continue ASA, statin    Breast CA  -Continue femara     Hx of DVT (2018)  -Continue Xarelto   -Outpatient oncologist is Dr. Larios [phone]    Anxiety   -Continue xanax prn    DVT ppx - xarelto  Pt eval - eventual return to [facility]    Unsuccessful attempt to reach patient's brother Maximiliano ([phone])

## 2023-12-05 NOTE — PHYSICAL THERAPY INITIAL EVALUATION ADULT - PHYSICAL ASSIST/NONPHYSICAL ASSIST: SUPINE/SIT, REHAB EVAL
Instructions: This plan will send the code FBSE to the PM system.  DO NOT or CHANGE the price. Price (Do Not Change): 0.00 Detail Level: Simple 1 person + 1 person to manage equipment

## 2023-12-05 NOTE — PHYSICAL THERAPY INITIAL EVALUATION ADULT - DISCHARGE PLANNER MADE AWARE
The company who will be taking care of your CPAP supplies is:     <<< APPOINTMENT FOR CPAP SETUP IS 6/1/22 @ 11:00 AM >>>    Address: [address] [place] [address] [address], [address]  Phone: [phone] Option #1  Fax: [phone] yes

## 2023-12-05 NOTE — DIETITIAN INITIAL EVALUATION ADULT - OTHER INFO
68y year old Female with PMH of COPD on 2L home oxygen, former smoker, CAD, DVT (on Xarelto), Anemia, Breast CA s/p left mastectomy who presents to the ER from [facility] for shortness of breath , admitted acute COPD exacerbation , Patient reports tolerating diet consuming between 25-50% of meals , NFPE conducted patient with evidence of severe protein calorie (+) muscle wasting /loss of body fat observed , (+) BM (12/4) Stage II sacrum noted , (+2) (R) arm/hand edema present . Food intolerances obtained . Labs reviewed , POCT/ Insulin coverage provided due to (+) steroids , suggest addition of Glucerna shake BID due to varied po & dx of sever malnutrition      68y year old Female with PMH of COPD on 2L home oxygen, former smoker, CAD, DVT (on Xarelto), Anemia, Breast CA s/p left mastectomy who presents to the ER from [facility] for shortness of breath , admitted acute COPD exacerbation , Patient reports tolerating diet consuming between 25-50% of meals , NFPE conducted patient with evidence of severe protein calorie (+) muscle wasting /loss of body fat observed , (+) BM (12/4) Stage II sacrum noted , (+2) (R) arm/hand edema present . Food intolerances obtained . Labs reviewed , POCT/ Insulin coverage provided due to (+) steroids , suggest addition of Glucerna shake BID due to varied po & dx of sever malnutrition

## 2023-12-05 NOTE — PHYSICAL THERAPY INITIAL EVALUATION ADULT - IMPAIRMENTS CONTRIBUTING TO GAIT DEVIATIONS, PT EVAL
Met with pt and reviewed FET calendar in detail, including appts needed, SHG instructions, medications needed and all dates.  Will order specialty meds now.  Pt verbalizes understanding of it all.   impaired balance/decreased strength

## 2023-12-05 NOTE — DIETITIAN INITIAL EVALUATION ADULT - PERTINENT LABORATORY DATA
12-05    139  |  102  |  3<L>  ----------------------------<  113<H>  3.0<L>   |  31  |  0.22<L>    Ca    8.1<L>      05 Dec 2023 05:20    TPro  5.5<L>  /  Alb  2.2<L>  /  TBili  0.3  /  DBili  x   /  AST  24  /  ALT  32  /  AlkPhos  70  12-05  POCT Blood Glucose.: 197 mg/dL (12-05-23 @ 11:36)  A1C with Estimated Average Glucose Result: 5.3 % (10-28-23 @ 05:00)

## 2023-12-05 NOTE — PHYSICAL THERAPY INITIAL EVALUATION ADULT - ADDITIONAL COMMENTS
pt from [facility] for approx 5 wks, non ambulatory, requires assist for transfers and ADL's pt from [facility] for approx 5 wks, non ambulatory, requires assist for transfers and ADL's

## 2023-12-05 NOTE — DIETITIAN NUTRITION RISK NOTIFICATION - TREATMENT: THE FOLLOWING DIET HAS BEEN RECOMMENDED
Diet, Soft and Bite Sized:   Supplement Feeding Modality:  Oral  Glucerna Shake Cans or Servings Per Day:  1       Frequency:  Two Times a day (12-05-23 @ 13:01) [Pending Verification By Attending]  Diet, Regular:   Soft and Bite Sized ([id number]) (12-04-23 @ 08:01) [Active]

## 2023-12-05 NOTE — PHYSICAL THERAPY INITIAL EVALUATION ADULT - PERTINENT HX OF CURRENT PROBLEM, REHAB EVAL
REHAN IGLESIAS is a 68y year old Female with PMH of COPD on 2L home oxygen, former smoker, CAD, DVT (on Xarelto), Anemia, Breast CA s/p left mastectomy who presents to the ER from [facility] for shortness of breath and cough since last night.     Patient states she has been having cough and difficulty breahting since last night which has been worsening. Cough is productive of white sputum. Denies fever, chills, sick contacts. Reports medication compliance, has been on 2L oxygen since hospital discharge 11/16.   Denies chest pain, nausea, vomiting, diarrhea, constipation. REHAN IGLESIAS is a 68y year old Female with PMH of COPD on 2L home oxygen, former smoker, CAD, DVT (on Xarelto), Anemia, Breast CA s/p left mastectomy who presents to the ER from [facility] for shortness of breath and cough since last night.     Patient states she has been having cough and difficulty breahting since last night which has been worsening. Cough is productive of white sputum. Denies fever, chills, sick contacts. Reports medication compliance, has been on 2L oxygen since hospital discharge 11/16.   Denies chest pain, nausea, vomiting, diarrhea, constipation.

## 2023-12-05 NOTE — PROGRESS NOTE ADULT - SUBJECTIVE AND OBJECTIVE BOX
Patient is a 68y old  Female who presents with a chief complaint of shortness of breath (04 Dec 2023 14:43)      Patient seen and examined at bedside. comfortable on 2L NC. noted tachycardia HR 110s-120s. denies headache, fever, chills, cp, sob, n/v, abd pain.      ALLERGIES:  penicillin (Hives)    MEDICATIONS  (STANDING):  albuterol    0.083% 2.5 milliGRAM(s) Nebulizer every 6 hours  aspirin enteric coated 81 milliGRAM(s) Oral daily  atorvastatin 80 milliGRAM(s) Oral at bedtime  budesonide 160 MICROgram(s)/formoterol 4.5 MICROgram(s) Inhaler 2 Puff(s) Inhalation two times a day  buPROPion XL (24-Hour) . 300 milliGRAM(s) Oral daily  dextrose 5%. 1000 milliLiter(s) (100 mL/Hr) IV Continuous <Continuous>  dextrose 5%. 1000 milliLiter(s) (50 mL/Hr) IV Continuous <Continuous>  dextrose 50% Injectable 25 Gram(s) IV Push once  dextrose 50% Injectable 12.5 Gram(s) IV Push once  dextrose 50% Injectable 25 Gram(s) IV Push once  diltiazem    Tablet 30 milliGRAM(s) Oral once  doxazosin 2 milliGRAM(s) Oral at bedtime  ferrous    sulfate 325 milliGRAM(s) Oral daily  glucagon  Injectable 1 milliGRAM(s) IntraMuscular once  insulin lispro (ADMELOG) corrective regimen sliding scale   SubCutaneous three times a day before meals  insulin lispro (ADMELOG) corrective regimen sliding scale   SubCutaneous at bedtime  letrozole 2.5 milliGRAM(s) Oral daily  methylPREDNISolone sodium succinate Injectable 40 milliGRAM(s) IV Push every 8 hours  nicotine -  14 mG/24Hr(s) Patch 1 Patch Transdermal daily  pantoprazole   Suspension 40 milliGRAM(s) Oral daily  polyethylene glycol 3350 17 Gram(s) Oral daily  potassium chloride    Tablet ER 40 milliEquivalent(s) Oral every 4 hours  rivaroxaban 10 milliGRAM(s) Oral with dinner  senna Syrup 10 milliLiter(s) Oral at bedtime  tiotropium 2.5 MICROgram(s) Inhaler 2 Puff(s) Inhalation daily    MEDICATIONS  (PRN):  acetaminophen     Tablet .. 650 milliGRAM(s) Oral every 6 hours PRN Temp greater or equal to 38C (100.4F), Mild Pain (1 - 3)  ALPRAZolam 0.5 milliGRAM(s) Oral every 8 hours PRN anxiety  aluminum hydroxide/magnesium hydroxide/simethicone Suspension 30 milliLiter(s) Oral every 4 hours PRN Dyspepsia  bisacodyl Suppository 10 milliGRAM(s) Rectal daily PRN Constipation  dextrose Oral Gel 15 Gram(s) Oral once PRN Blood Glucose LESS THAN 70 milliGRAM(s)/deciliter  melatonin 3 milliGRAM(s) Oral at bedtime PRN Insomnia  ondansetron Injectable 4 milliGRAM(s) IV Push every 8 hours PRN Nausea and/or Vomiting    Vital Signs Last 24 Hrs  T(F): 98.4 (05 Dec 2023 05:00), Max: 98.4 (05 Dec 2023 05:00)  HR: 113 (05 Dec 2023 08:27) (92 - 119)  BP: 126/66 (05 Dec 2023 05:00) (125/63 - 132/65)  RR: 20 (05 Dec 2023 05:00) (20 - 20)  SpO2: 95% (05 Dec 2023 08:27) (95% - 100%)  I&O's Summary    04 Dec 2023 07:01  -  05 Dec 2023 07:00  --------------------------------------------------------  IN: 0 mL / OUT: 600 mL / NET: -600 mL      BMI (kg/m2): 17.8 (12-04-23 @ 08:39)  PHYSICAL EXAM:  General: NAD, A/O x 2, frail, older than stated age  ENT: MMM, no scleral icterus  Neck: Supple, No JVD  Lungs: Clear to auscultation bilaterally, diminished at bases b/l  Cardio: tachycardia, S1S2 +murmur  Abdomen: Soft, Nontender, Nondistended; Bowel sounds present  Extremities: No calf tenderness, No pitting edema    LABS:                        7.6    6.99  )-----------( 408      ( 05 Dec 2023 05:20 )             24.6       12-05    139  |  102  |  3   ----------------------------<  113  3.0   |  31  |  0.22    Ca    8.1      05 Dec 2023 05:20    TPro  5.5  /  Alb  2.2  /  TBili  0.3  /  DBili  x   /  AST  24  /  ALT  32  /  AlkPhos  70  12-05       PT/INR - ( 04 Dec 2023 04:00 )   PT: 21.8 sec;   INR: 1.95 ratio         PTT - ( 04 Dec 2023 04:00 )  PTT:30.5 sec   Lactate, Blood: 1.0 mmol/L (12-04 @ 04:00)    CARDIAC MARKERS ( 04 Dec 2023 04:00 )  x     / 11.4 ng/L / x     / x     / x                ABG - ( 04 Dec 2023 03:20 )  pH, Arterial: 7.44  pH, Blood: x     /  pCO2: 43    /  pO2: 349   / HCO3: 29    / Base Excess: 5.0   /  SaO2: 99.4                    POCT Blood Glucose.: 134 mg/dL (05 Dec 2023 08:38)  POCT Blood Glucose.: 164 mg/dL (04 Dec 2023 22:15)  POCT Blood Glucose.: 148 mg/dL (04 Dec 2023 17:29)  POCT Blood Glucose.: 146 mg/dL (04 Dec 2023 11:51)      Urinalysis Basic - ( 05 Dec 2023 05:20 )    Color: x / Appearance: x / SG: x / pH: x  Gluc: 113 mg/dL / Ketone: x  / Bili: x / Urobili: x   Blood: x / Protein: x / Nitrite: x   Leuk Esterase: x / RBC: x / WBC x   Sq Epi: x / Non Sq Epi: x / Bacteria: x            RADIOLOGY & ADDITIONAL TESTS:    Care Discussed with Consultants/Other Providers: pulm

## 2023-12-05 NOTE — DIETITIAN INITIAL EVALUATION ADULT - ORAL INTAKE PTA/DIET HISTORY
Patient reports a fair appetite , tolerating diet , reports was consuming po nutrition supplement PTA at nursing facility

## 2023-12-06 NOTE — PROGRESS NOTE ADULT - SUBJECTIVE AND OBJECTIVE BOX
Follow-up Pulmonary Progress Note  Chief Complaint : Chronic obstructive pulmonary disease with acute exacerbation    Comfortable sitting in a chair    Allergies :penicillin (Hives)      PAST MEDICAL & SURGICAL HISTORY:  COPD, severe    CAD (coronary artery disease)    Breast cancer    No significant past surgical history        Medications:  MEDICATIONS  (STANDING):  albuterol    0.083% 2.5 milliGRAM(s) Nebulizer every 6 hours  aspirin enteric coated 81 milliGRAM(s) Oral daily  atorvastatin 80 milliGRAM(s) Oral at bedtime  budesonide 160 MICROgram(s)/formoterol 4.5 MICROgram(s) Inhaler 2 Puff(s) Inhalation two times a day  buPROPion XL (24-Hour) . 300 milliGRAM(s) Oral daily  dextrose 5%. 1000 milliLiter(s) (100 mL/Hr) IV Continuous <Continuous>  dextrose 5%. 1000 milliLiter(s) (50 mL/Hr) IV Continuous <Continuous>  dextrose 50% Injectable 25 Gram(s) IV Push once  dextrose 50% Injectable 12.5 Gram(s) IV Push once  dextrose 50% Injectable 25 Gram(s) IV Push once  diltiazem    milliGRAM(s) Oral daily  doxazosin 2 milliGRAM(s) Oral at bedtime  ferrous    sulfate 325 milliGRAM(s) Oral daily  glucagon  Injectable 1 milliGRAM(s) IntraMuscular once  insulin lispro (ADMELOG) corrective regimen sliding scale   SubCutaneous at bedtime  insulin lispro (ADMELOG) corrective regimen sliding scale   SubCutaneous three times a day before meals  letrozole 2.5 milliGRAM(s) Oral daily  magnesium oxide 400 milliGRAM(s) Oral two times a day with meals  methylPREDNISolone sodium succinate Injectable 40 milliGRAM(s) IV Push every 8 hours  nicotine -  14 mG/24Hr(s) Patch 1 Patch Transdermal daily  pantoprazole   Suspension 40 milliGRAM(s) Oral daily  polyethylene glycol 3350 17 Gram(s) Oral daily  rivaroxaban 10 milliGRAM(s) Oral with dinner  roflumilast 500 MICROGram(s) Oral daily  senna Syrup 10 milliLiter(s) Oral at bedtime  tiotropium 2.5 MICROgram(s) Inhaler 2 Puff(s) Inhalation daily    MEDICATIONS  (PRN):  acetaminophen     Tablet .. 650 milliGRAM(s) Oral every 6 hours PRN Temp greater or equal to 38C (100.4F), Mild Pain (1 - 3)  ALPRAZolam 0.25 milliGRAM(s) Oral every 8 hours PRN anxiety  aluminum hydroxide/magnesium hydroxide/simethicone Suspension 30 milliLiter(s) Oral every 4 hours PRN Dyspepsia  bisacodyl Suppository 10 milliGRAM(s) Rectal daily PRN Constipation  dextrose Oral Gel 15 Gram(s) Oral once PRN Blood Glucose LESS THAN 70 milliGRAM(s)/deciliter  melatonin 3 milliGRAM(s) Oral at bedtime PRN Insomnia  ondansetron    Tablet 4 milliGRAM(s) Oral four times a day PRN for nausea      Antibiotics History  levoFLOXacin IVPB 750 milliGRAM(s) IV Intermittent once, 12-04-23 @ 04:20, Stop order after: 1 Doses      Heme Medications   aspirin enteric coated 81 milliGRAM(s) Oral daily, 12-04-23 @ 08:02  rivaroxaban 10 milliGRAM(s) Oral with dinner, 12-04-23 @ 08:03      GI Medications  aluminum hydroxide/magnesium hydroxide/simethicone Suspension 30 milliLiter(s) Oral every 4 hours, 12-04-23 @ 08:00, Routine PRN  bisacodyl Suppository 10 milliGRAM(s) Rectal daily, 12-04-23 @ 08:04, Routine PRN  pantoprazole   Suspension 40 milliGRAM(s) Oral daily, 12-04-23 @ 08:04, Routine  polyethylene glycol 3350 17 Gram(s) Oral daily, 12-04-23 @ 08:04, Routine  senna Syrup 10 milliLiter(s) Oral at bedtime, 12-04-23 @ 08:04, Routine        LABS:                        8.5    5.99  )-----------( 434      ( 06 Dec 2023 06:00 )             27.1     12-06    138  |  104  |  9   ----------------------------<  135<H>  4.4   |  27  |  <0.20<L>    Ca    8.1<L>      06 Dec 2023 06:00  Mg     1.4     12-05    TPro  5.8<L>  /  Alb  2.4<L>  /  TBili  0.3  /  DBili  x   /  AST  25  /  ALT  28  /  AlkPhos  76  12-06              Urinalysis Basic - ( 06 Dec 2023 06:00 )    Color: x / Appearance: x / SG: x / pH: x  Gluc: 135 mg/dL / Ketone: x  / Bili: x / Urobili: x   Blood: x / Protein: x / Nitrite: x   Leuk Esterase: x / RBC: x / WBC x   Sq Epi: x / Non Sq Epi: x / Bacteria: x      Procalcitonin, Serum: 0.32 ng/mL (12-05-23 @ 12:37)          CULTURES: (if applicable)    Culture - Blood (collected 12-04-23 @ 04:25)  Source: .Blood Blood-Peripheral  Preliminary Report (12-06-23 @ 11:02):    No growth at 48 Hours    Culture - Blood (collected 12-04-23 @ 04:00)  Source: .Blood Blood-Peripheral  Preliminary Report (12-06-23 @ 11:02):    No growth at 48 Hours      Rapid RVP Result: NotDetec (12-04-23 @ 09:46)  Rapid RVP Result: NotDetec (12-04-23 @ 06:30)        CAPILLARY BLOOD GLUCOSE      POCT Blood Glucose.: 321 mg/dL (06 Dec 2023 11:59)      RADIOLOGY  CXR:      CT:    ECHO:      VITALS:  T(C): 36.6 (12-06-23 @ 12:07), Max: 36.6 (12-05-23 @ 21:52)  T(F): 97.9 (12-06-23 @ 12:07), Max: 97.9 (12-05-23 @ 21:52)  HR: 117 (12-06-23 @ 12:07) (94 - 117)  BP: 118/65 (12-06-23 @ 12:07) (118/65 - 146/83)  BP(mean): --  ABP: --  ABP(mean): --  RR: 19 (12-06-23 @ 12:07) (18 - 20)  SpO2: 100% (12-06-23 @ 12:07) (97% - 100%)  CVP(mm Hg): --  CVP(cm H2O): --    Ins and Outs     12-05-23 @ 07:01  -  12-06-23 @ 07:00  --------------------------------------------------------  IN: 0 mL / OUT: 300 mL / NET: -300 mL        Height (cm): 149.9 (12-04-23 @ 08:39)  Weight (kg): 40 (12-04-23 @ 08:39)  BMI (kg/m2): 17.8 (12-04-23 @ 08:39)        I&O's Detail    05 Dec 2023 07:01  -  06 Dec 2023 07:00  --------------------------------------------------------  IN:  Total IN: 0 mL    OUT:    Voided (mL): 300 mL  Total OUT: 300 mL    Total NET: -300 mL

## 2023-12-06 NOTE — PROGRESS NOTE ADULT - ASSESSMENT
Physical Examination:  GENERAL:               Alert, Oriented, No acute distress.    HEENT:                    No JVD, Moist MM  PULM:                     Bilateral air entry, No Rales, No Rhonchi, trace Wheezing  CVS:                         S1, S2,  + Murmur  ABD:                        Soft, nondistended, nontender, normoactive bowel sounds,   EXT:                         No edema, nontender, No Cyanosis or Clubbing   NEURO:                  Alert, oriented, interactive, nonfocal, follows commands  PSYC:                      Calm, + Insight.        Assessment  1. Chronic COPD, with acute dyspnea, now back to baseline with steroids, possible COPD exacerbation vs Anxiety attack, RVP negative   2. Recent stopped smoking, - 1pk/day  3. Chronic Hypoxia on home o2  4. h/o DVT on xaralto  5. Underlying CAD, HTN, DM2    Plan  N/C o2 to maintain sat  Consider tapering steroids to Methylprednisolone 40 mg BID   Continue Symbicort, Spiriva, Albuterol, Daliresp     continue nicotine patch  OOB, PT  monitor sat

## 2023-12-06 NOTE — PROGRESS NOTE ADULT - SUBJECTIVE AND OBJECTIVE BOX
Patient is a 68y old  Female who presents with a chief complaint of shortness of breath (05 Dec 2023 13:35)      Patient seen and examined at bedside.    ALLERGIES:  penicillin (Hives)    MEDICATIONS  (STANDING):  albuterol    0.083% 2.5 milliGRAM(s) Nebulizer every 6 hours  aspirin enteric coated 81 milliGRAM(s) Oral daily  atorvastatin 80 milliGRAM(s) Oral at bedtime  budesonide 160 MICROgram(s)/formoterol 4.5 MICROgram(s) Inhaler 2 Puff(s) Inhalation two times a day  buPROPion XL (24-Hour) . 300 milliGRAM(s) Oral daily  dextrose 5%. 1000 milliLiter(s) (100 mL/Hr) IV Continuous <Continuous>  dextrose 5%. 1000 milliLiter(s) (50 mL/Hr) IV Continuous <Continuous>  dextrose 50% Injectable 25 Gram(s) IV Push once  dextrose 50% Injectable 12.5 Gram(s) IV Push once  dextrose 50% Injectable 25 Gram(s) IV Push once  diltiazem    milliGRAM(s) Oral daily  doxazosin 2 milliGRAM(s) Oral at bedtime  ferrous    sulfate 325 milliGRAM(s) Oral daily  glucagon  Injectable 1 milliGRAM(s) IntraMuscular once  insulin lispro (ADMELOG) corrective regimen sliding scale   SubCutaneous three times a day before meals  insulin lispro (ADMELOG) corrective regimen sliding scale   SubCutaneous at bedtime  letrozole 2.5 milliGRAM(s) Oral daily  magnesium oxide 400 milliGRAM(s) Oral two times a day with meals  methylPREDNISolone sodium succinate Injectable 40 milliGRAM(s) IV Push every 8 hours  nicotine -  14 mG/24Hr(s) Patch 1 Patch Transdermal daily  pantoprazole   Suspension 40 milliGRAM(s) Oral daily  polyethylene glycol 3350 17 Gram(s) Oral daily  rivaroxaban 10 milliGRAM(s) Oral with dinner  roflumilast 500 MICROGram(s) Oral daily  senna Syrup 10 milliLiter(s) Oral at bedtime  tiotropium 2.5 MICROgram(s) Inhaler 2 Puff(s) Inhalation daily    MEDICATIONS  (PRN):  acetaminophen     Tablet .. 650 milliGRAM(s) Oral every 6 hours PRN Temp greater or equal to 38C (100.4F), Mild Pain (1 - 3)  aluminum hydroxide/magnesium hydroxide/simethicone Suspension 30 milliLiter(s) Oral every 4 hours PRN Dyspepsia  bisacodyl Suppository 10 milliGRAM(s) Rectal daily PRN Constipation  dextrose Oral Gel 15 Gram(s) Oral once PRN Blood Glucose LESS THAN 70 milliGRAM(s)/deciliter  melatonin 3 milliGRAM(s) Oral at bedtime PRN Insomnia  ondansetron    Tablet 4 milliGRAM(s) Oral four times a day PRN for nausea    Vital Signs Last 24 Hrs  T(F): 97.8 (06 Dec 2023 06:21), Max: 98.8 (05 Dec 2023 12:53)  HR: 94 (06 Dec 2023 08:25) (94 - 123)  BP: 146/83 (06 Dec 2023 06:21) (129/77 - 146/83)  RR: 20 (06 Dec 2023 06:21) (18 - 22)  SpO2: 100% (06 Dec 2023 08:25) (97% - 100%)  I&O's Summary    05 Dec 2023 07:01  -  06 Dec 2023 07:00  --------------------------------------------------------  IN: 0 mL / OUT: 300 mL / NET: -300 mL      BMI (kg/m2): 17.8 (12-04-23 @ 08:39)  PHYSICAL EXAM:  General: NAD, A/O x 2, frail, older than stated age  ENT: MMM, no scleral icterus  Neck: Supple, No JVD  Lungs: Clear to auscultation bilaterally, diminished at bases b/l  Cardio: tachycardia, S1S2 +murmur  Abdomen: Soft, Nontender, Nondistended; Bowel sounds present  Extremities: No calf tenderness, No pitting edema    LABS:                        8.5    5.99  )-----------( 434      ( 06 Dec 2023 06:00 )             27.1       12-06    138  |  104  |  9   ----------------------------<  135  4.4   |  27  |  <0.20    Ca    8.1      06 Dec 2023 06:00  Mg     1.4     12-05    TPro  5.8  /  Alb  2.4  /  TBili  0.3  /  DBili  x   /  AST  25  /  ALT  28  /  AlkPhos  76  12-06       PT/INR - ( 04 Dec 2023 04:00 )   PT: 21.8 sec;   INR: 1.95 ratio         PTT - ( 04 Dec 2023 04:00 )  PTT:30.5 sec   Lactate, Blood: 1.0 mmol/L (12-04 @ 04:00)    CARDIAC MARKERS ( 05 Dec 2023 12:37 )  x     / 11.4 ng/L / x     / x     / x      CARDIAC MARKERS ( 04 Dec 2023 04:00 )  x     / 11.4 ng/L / x     / x     / x                ABG - ( 04 Dec 2023 03:20 )  pH, Arterial: 7.44  pH, Blood: x     /  pCO2: 43    /  pO2: 349   / HCO3: 29    / Base Excess: 5.0   /  SaO2: 99.4                    POCT Blood Glucose.: 121 mg/dL (06 Dec 2023 08:33)  POCT Blood Glucose.: 178 mg/dL (05 Dec 2023 21:37)  POCT Blood Glucose.: 157 mg/dL (05 Dec 2023 17:18)  POCT Blood Glucose.: 197 mg/dL (05 Dec 2023 11:36)      Urinalysis Basic - ( 06 Dec 2023 06:00 )    Color: x / Appearance: x / SG: x / pH: x  Gluc: 135 mg/dL / Ketone: x  / Bili: x / Urobili: x   Blood: x / Protein: x / Nitrite: x   Leuk Esterase: x / RBC: x / WBC x   Sq Epi: x / Non Sq Epi: x / Bacteria: x        Culture - Blood (collected 04 Dec 2023 04:25)  Source: .Blood Blood-Peripheral  Preliminary Report (05 Dec 2023 11:02):    No growth at 24 hours    Culture - Blood (collected 04 Dec 2023 04:00)  Source: .Blood Blood-Peripheral  Preliminary Report (05 Dec 2023 11:02):    No growth at 24 hours          RADIOLOGY & ADDITIONAL TESTS:    Care Discussed with Consultants/Other Providers:    Patient is a 68y old  Female who presents with a chief complaint of shortness of breath (05 Dec 2023 13:35)      Patient seen and examined at bedside with brother, mildly anxious though comfortable on NC. denies acute medical complaints.     ALLERGIES:  penicillin (Hives)    MEDICATIONS  (STANDING):  albuterol    0.083% 2.5 milliGRAM(s) Nebulizer every 6 hours  aspirin enteric coated 81 milliGRAM(s) Oral daily  atorvastatin 80 milliGRAM(s) Oral at bedtime  budesonide 160 MICROgram(s)/formoterol 4.5 MICROgram(s) Inhaler 2 Puff(s) Inhalation two times a day  buPROPion XL (24-Hour) . 300 milliGRAM(s) Oral daily  dextrose 5%. 1000 milliLiter(s) (100 mL/Hr) IV Continuous <Continuous>  dextrose 5%. 1000 milliLiter(s) (50 mL/Hr) IV Continuous <Continuous>  dextrose 50% Injectable 25 Gram(s) IV Push once  dextrose 50% Injectable 12.5 Gram(s) IV Push once  dextrose 50% Injectable 25 Gram(s) IV Push once  diltiazem    milliGRAM(s) Oral daily  doxazosin 2 milliGRAM(s) Oral at bedtime  ferrous    sulfate 325 milliGRAM(s) Oral daily  glucagon  Injectable 1 milliGRAM(s) IntraMuscular once  insulin lispro (ADMELOG) corrective regimen sliding scale   SubCutaneous three times a day before meals  insulin lispro (ADMELOG) corrective regimen sliding scale   SubCutaneous at bedtime  letrozole 2.5 milliGRAM(s) Oral daily  magnesium oxide 400 milliGRAM(s) Oral two times a day with meals  methylPREDNISolone sodium succinate Injectable 40 milliGRAM(s) IV Push every 8 hours  nicotine -  14 mG/24Hr(s) Patch 1 Patch Transdermal daily  pantoprazole   Suspension 40 milliGRAM(s) Oral daily  polyethylene glycol 3350 17 Gram(s) Oral daily  rivaroxaban 10 milliGRAM(s) Oral with dinner  roflumilast 500 MICROGram(s) Oral daily  senna Syrup 10 milliLiter(s) Oral at bedtime  tiotropium 2.5 MICROgram(s) Inhaler 2 Puff(s) Inhalation daily    MEDICATIONS  (PRN):  acetaminophen     Tablet .. 650 milliGRAM(s) Oral every 6 hours PRN Temp greater or equal to 38C (100.4F), Mild Pain (1 - 3)  aluminum hydroxide/magnesium hydroxide/simethicone Suspension 30 milliLiter(s) Oral every 4 hours PRN Dyspepsia  bisacodyl Suppository 10 milliGRAM(s) Rectal daily PRN Constipation  dextrose Oral Gel 15 Gram(s) Oral once PRN Blood Glucose LESS THAN 70 milliGRAM(s)/deciliter  melatonin 3 milliGRAM(s) Oral at bedtime PRN Insomnia  ondansetron    Tablet 4 milliGRAM(s) Oral four times a day PRN for nausea    Vital Signs Last 24 Hrs  T(F): 97.8 (06 Dec 2023 06:21), Max: 98.8 (05 Dec 2023 12:53)  HR: 94 (06 Dec 2023 08:25) (94 - 123)  BP: 146/83 (06 Dec 2023 06:21) (129/77 - 146/83)  RR: 20 (06 Dec 2023 06:21) (18 - 22)  SpO2: 100% (06 Dec 2023 08:25) (97% - 100%)  I&O's Summary    05 Dec 2023 07:01  -  06 Dec 2023 07:00  --------------------------------------------------------  IN: 0 mL / OUT: 300 mL / NET: -300 mL      BMI (kg/m2): 17.8 (12-04-23 @ 08:39)  PHYSICAL EXAM:  General: NAD, A/O x 2, frail, older than stated age  ENT: MMM, no scleral icterus  Neck: Supple, No JVD  Lungs: Clear to auscultation bilaterally, diminished at bases b/l  Cardio: tachycardia, S1S2 +murmur  Abdomen: Soft, Nontender, Nondistended; Bowel sounds present  Extremities: No calf tenderness, No pitting edema    LABS:                        8.5    5.99  )-----------( 434      ( 06 Dec 2023 06:00 )             27.1       12-06    138  |  104  |  9   ----------------------------<  135  4.4   |  27  |  <0.20    Ca    8.1      06 Dec 2023 06:00  Mg     1.4     12-05    TPro  5.8  /  Alb  2.4  /  TBili  0.3  /  DBili  x   /  AST  25  /  ALT  28  /  AlkPhos  76  12-06       PT/INR - ( 04 Dec 2023 04:00 )   PT: 21.8 sec;   INR: 1.95 ratio         PTT - ( 04 Dec 2023 04:00 )  PTT:30.5 sec   Lactate, Blood: 1.0 mmol/L (12-04 @ 04:00)    CARDIAC MARKERS ( 05 Dec 2023 12:37 )  x     / 11.4 ng/L / x     / x     / x      CARDIAC MARKERS ( 04 Dec 2023 04:00 )  x     / 11.4 ng/L / x     / x     / x                ABG - ( 04 Dec 2023 03:20 )  pH, Arterial: 7.44  pH, Blood: x     /  pCO2: 43    /  pO2: 349   / HCO3: 29    / Base Excess: 5.0   /  SaO2: 99.4                    POCT Blood Glucose.: 121 mg/dL (06 Dec 2023 08:33)  POCT Blood Glucose.: 178 mg/dL (05 Dec 2023 21:37)  POCT Blood Glucose.: 157 mg/dL (05 Dec 2023 17:18)  POCT Blood Glucose.: 197 mg/dL (05 Dec 2023 11:36)      Urinalysis Basic - ( 06 Dec 2023 06:00 )    Color: x / Appearance: x / SG: x / pH: x  Gluc: 135 mg/dL / Ketone: x  / Bili: x / Urobili: x   Blood: x / Protein: x / Nitrite: x   Leuk Esterase: x / RBC: x / WBC x   Sq Epi: x / Non Sq Epi: x / Bacteria: x        Culture - Blood (collected 04 Dec 2023 04:25)  Source: .Blood Blood-Peripheral  Preliminary Report (05 Dec 2023 11:02):    No growth at 24 hours    Culture - Blood (collected 04 Dec 2023 04:00)  Source: .Blood Blood-Peripheral  Preliminary Report (05 Dec 2023 11:02):    No growth at 24 hours          RADIOLOGY & ADDITIONAL TESTS:    Care Discussed with Consultants/Other Providers:

## 2023-12-06 NOTE — PROGRESS NOTE ADULT - ASSESSMENT
67 y/o Female with PMH of COPD on 2L home oxygen, former smoker, DVT (on Xarelto), Anemia, CAD, Breast CA s/p left mastectomy who presents to the ER from [facility] for shortness of breath and cough admitted for acute on chronic COPD exacerbation.    Tachycardia - improved ***  -Cardizem 30mg PO x1, follow EKG    Hypokalemia  -K 3.0, replete and monitor    Acute on chronic hypoxic respiratory failure   Acute COPD exacerbation   Former smoker  -Pulm appreciated - continue steroids, symbicort, spiriva, albuterol. Resume home dalisresp - will f/u with pharmacy   -Continue solumedrol 40mg q8h  -Stable on home O2 2L NC - continue   -s/p Levaquin in ER, unclear role for continued abx - monitor of abx - follow blood cx  -Continue nicotine patch     Anemia  -s/p EGD on last admission  -Hb 9.3 > 7.6, no obvious s/s bleeding - follow cbc 1200   -Continue PPI  -Follow occult stool    CAD  -Continue ASA, statin    Breast CA  -Continue femara     Hx of DVT (2018)  -Continue Xarelto   -Outpatient oncologist is Dr. Larios [phone]    Anxiety   -Continue xanax prn    DVT ppx - xarelto  Pt eval - eventual return to [facility]    Unsuccessful attempt to reach patient's brother Maximiliano ([phone])     69 y/o Female with PMH of COPD on 2L home oxygen, former smoker, DVT (on Xarelto), Anemia, CAD, Breast CA s/p left mastectomy who presents to the ER from [facility] for shortness of breath and cough admitted for acute on chronic COPD exacerbation.    Tachycardia - improved ***  -Cardizem 30mg PO x1, follow EKG    Hypokalemia  -K 3.0, replete and monitor    Acute on chronic hypoxic respiratory failure   Acute COPD exacerbation   Former smoker  -Pulm appreciated - continue steroids, symbicort, spiriva, albuterol. Resume home dalisresp - will f/u with pharmacy   -Continue solumedrol 40mg q8h  -Stable on home O2 2L NC - continue   -s/p Levaquin in ER, unclear role for continued abx - monitor of abx - follow blood cx  -Continue nicotine patch     Anemia  -s/p EGD on last admission  -Hb 9.3 > 7.6, no obvious s/s bleeding - follow cbc 1200   -Continue PPI  -Follow occult stool    CAD  -Continue ASA, statin    Breast CA  -Continue femara     Hx of DVT (2018)  -Continue Xarelto   -Outpatient oncologist is Dr. Larios [phone]    Anxiety   -Continue xanax prn    DVT ppx - xarelto  Pt eval - eventual return to [facility]    Unsuccessful attempt to reach patient's brother Maximiliano ([phone])     67 y/o Female with PMH of COPD on 2L home oxygen, former smoker, DVT (on Xarelto), Anemia, CAD, Breast CA s/p left mastectomy who presents to the ER from [facility] for shortness of breath and cough admitted for acute on chronic COPD exacerbation.    Tachycardia - persists  -Trial xanax prn  -Continue cardizem CD 180mg qd    Acute on chronic hypoxic respiratory failure   Acute COPD exacerbation   Former smoker  -Pulm appreciated - continue steroids, symbicort, spiriva, albuterol, dalisresp  -Continue solumedrol 40mg q8h - switch to PO  -Stable on home O2 2L NC - continue   -s/p Levaquin in ER, unclear role for continued abx - monitor of abx - 12/4 blood cx NGTD  -Continue nicotine patch     Anemia  -s/p EGD on last admission  -H/H Stable, monitor   -Continue PPI  -Occult stool positive, though no shahida bleeding    CAD  -Continue ASA, statin    Breast CA  -Continue femara     Hx of DVT (2018)  -Continue Xarelto   -Outpatient oncologist is Dr. Larios [phone]    Anxiety   -Continue xanax prn    DVT ppx - xarelto  Pt eval - eventual return to [facility]    12/6 Brother Sonia  updated at bedside 67 y/o Female with PMH of COPD on 2L home oxygen, former smoker, DVT (on Xarelto), Anemia, CAD, Breast CA s/p left mastectomy who presents to the ER from [facility] for shortness of breath and cough admitted for acute on chronic COPD exacerbation.    Tachycardia - persists  -Trial xanax prn  -Continue cardizem CD 180mg qd    Acute on chronic hypoxic respiratory failure   Acute COPD exacerbation   Former smoker  -Pulm appreciated - continue steroids, symbicort, spiriva, albuterol, dalisresp  -Continue solumedrol 40mg q8h - switch to PO  -Stable on home O2 2L NC - continue   -s/p Levaquin in ER, unclear role for continued abx - monitor of abx - 12/4 blood cx NGTD  -Continue nicotine patch     Anemia  -s/p EGD on last admission  -H/H Stable, monitor   -Continue PPI  -Occult stool positive, though no shahida bleeding    CAD  -Continue ASA, statin    Breast CA  -Continue femara     Hx of DVT (2018)  -Continue Xarelto   -Outpatient oncologist is Dr. Larios [phone]    Anxiety   -Continue xanax prn    DVT ppx - xarelto  Pt eval - eventual return to [facility]    12/6 Brother Sonia  updated at bedside

## 2023-12-07 NOTE — PROGRESS NOTE ADULT - CONVERSATION DETAILS
Discussed code status with pt at bedside. We discussed that the palliative care team saw her at her last hospitalization here at [facility] and [facility] discussions were had. We discussed resuscitation and pt stated she would want chest compressions in the event of cardiac arrest. When asked about intubation she deferred this conversation as she would like to discuss further with her brother. Supportive care given. We discussed that the pt is currently full code and will get chest compressions/intubated if needed. All questions answered. FULL CODE    I called pt's brother Mr. Gonzales and discussed advanced directives with him at the pt's request. We discussed that the pt was intubated in the past and would like to be intubated if needed again. The pt however would not want a trach according to Mr. Gonzales. Pt is FULL CODE. Discussed code status with pt at bedside. We discussed that the palliative care team saw her at her last hospitalization here at [facility] and [facility] discussions were had. We discussed resuscitation and pt stated she would want chest compressions in the event of cardiac arrest. When asked about intubation she deferred this conversation as she would like to discuss further with her brother. Supportive care given. We discussed that the pt is currently full code and will get chest compressions/intubated if needed. All questions answered. FULL CODE    I called pt's brother Mr. Gonzales and discussed advanced directives with him at the pt's request. We discussed that the pt was intubated in the past and would like to be intubated if needed again. The pt however would not want a trach according to Mr. Gonzales. Pt is FULL CODE.

## 2023-12-07 NOTE — PROGRESS NOTE ADULT - ASSESSMENT
67 y/o Female with PMH of COPD on 2L home oxygen, former smoker, DVT (on Xarelto), Anemia, CAD, Breast CA s/p left mastectomy who presents to the ER from [facility] for shortness of breath and cough admitted for acute on chronic COPD exacerbation.    Tachycardia - persists  -Trial xanax prn  -Continue cardizem CD 180mg qd    Acute on chronic hypoxic respiratory failure   Acute COPD exacerbation   Former smoker  -Pulm appreciated - continue steroids, symbicort, spiriva, albuterol, dalisresp  -Decrease solumedrol 40mg q8h to q12h today IV  -Stable on home O2 2L NC - continue   -s/p Levaquin in ER, unclear role for continued abx - monitor of abx - 12/4 blood cx NGTD  -Continue nicotine patch     Hyperglycemia  - possibly exacerbated 2/2 steroids  - ISS ACHS    Anemia  -s/p EGD on last admission  -H/H Stable on CBC today, monitor CBC tomorrow  -Continue PPI  -Occult stool positive, though no shahida bleeding    CAD  -Continue ASA, statin    Breast CA  -Continue femara     Hx of DVT (2018)  -Continue Xarelto   -Outpatient oncologist is Dr. Larios [phone]    Anxiety   -Continue xanax prn    Advanced care planning  - see GOC note above  - Pt is FULL CODE    DVT ppx - xarelto  Pt eval - eventual return to [facility]    12/7 Brother Maximiliano called and updated 69 y/o Female with PMH of COPD on 2L home oxygen, former smoker, DVT (on Xarelto), Anemia, CAD, Breast CA s/p left mastectomy who presents to the ER from [facility] for shortness of breath and cough admitted for acute on chronic COPD exacerbation.    Tachycardia - persists  -Trial xanax prn  -Continue cardizem CD 180mg qd    Acute on chronic hypoxic respiratory failure   Acute COPD exacerbation   Former smoker  -Pulm appreciated - continue steroids, symbicort, spiriva, albuterol, dalisresp  -Decrease solumedrol 40mg q8h to q12h today IV  -Stable on home O2 2L NC - continue   -s/p Levaquin in ER, unclear role for continued abx - monitor of abx - 12/4 blood cx NGTD  -Continue nicotine patch     Hyperglycemia  - possibly exacerbated 2/2 steroids  - ISS ACHS    Anemia  -s/p EGD on last admission  -H/H Stable on CBC today, monitor CBC tomorrow  -Continue PPI  -Occult stool positive, though no shahida bleeding    CAD  -Continue ASA, statin    Breast CA  -Continue femara     Hx of DVT (2018)  -Continue Xarelto   -Outpatient oncologist is Dr. Larios [phone]    Anxiety   -Continue xanax prn    Advanced care planning  - see GOC note above  - Pt is FULL CODE    DVT ppx - xarelto  Pt eval - eventual return to [facility]    12/7 Brother Maximiliano called and updated

## 2023-12-07 NOTE — PROGRESS NOTE ADULT - SUBJECTIVE AND OBJECTIVE BOX
Follow-up Pulmonary Progress Note  Chief Complaint : Chronic obstructive pulmonary disease with acute exacerbation    Comfortable, not in distress. States breathing is unchanged.     Allergies :penicillin (Hives)      PAST MEDICAL & SURGICAL HISTORY:  COPD, severe    CAD (coronary artery disease)    Breast cancer    No significant past surgical history        Medications:  MEDICATIONS  (STANDING):  albuterol    0.083% 2.5 milliGRAM(s) Nebulizer every 6 hours  aspirin enteric coated 81 milliGRAM(s) Oral daily  atorvastatin 80 milliGRAM(s) Oral at bedtime  budesonide 160 MICROgram(s)/formoterol 4.5 MICROgram(s) Inhaler 2 Puff(s) Inhalation two times a day  buPROPion XL (24-Hour) . 300 milliGRAM(s) Oral daily  dextrose 5%. 1000 milliLiter(s) (100 mL/Hr) IV Continuous <Continuous>  dextrose 5%. 1000 milliLiter(s) (50 mL/Hr) IV Continuous <Continuous>  dextrose 50% Injectable 25 Gram(s) IV Push once  dextrose 50% Injectable 12.5 Gram(s) IV Push once  dextrose 50% Injectable 25 Gram(s) IV Push once  diltiazem    milliGRAM(s) Oral daily  doxazosin 2 milliGRAM(s) Oral at bedtime  ferrous    sulfate 325 milliGRAM(s) Oral daily  glucagon  Injectable 1 milliGRAM(s) IntraMuscular once  insulin lispro (ADMELOG) corrective regimen sliding scale   SubCutaneous at bedtime  insulin lispro (ADMELOG) corrective regimen sliding scale   SubCutaneous three times a day before meals  letrozole 2.5 milliGRAM(s) Oral daily  methylPREDNISolone sodium succinate Injectable 40 milliGRAM(s) IV Push every 12 hours  nicotine -  14 mG/24Hr(s) Patch 1 Patch Transdermal daily  pantoprazole   Suspension 40 milliGRAM(s) Oral daily  polyethylene glycol 3350 17 Gram(s) Oral daily  rivaroxaban 10 milliGRAM(s) Oral with dinner  roflumilast 500 MICROGram(s) Oral daily  senna Syrup 10 milliLiter(s) Oral at bedtime  tiotropium 2.5 MICROgram(s) Inhaler 2 Puff(s) Inhalation daily    MEDICATIONS  (PRN):  acetaminophen     Tablet .. 650 milliGRAM(s) Oral every 6 hours PRN Temp greater or equal to 38C (100.4F), Mild Pain (1 - 3)  ALPRAZolam 0.25 milliGRAM(s) Oral every 8 hours PRN anxiety  aluminum hydroxide/magnesium hydroxide/simethicone Suspension 30 milliLiter(s) Oral every 4 hours PRN Dyspepsia  bisacodyl Suppository 10 milliGRAM(s) Rectal daily PRN Constipation  dextrose Oral Gel 15 Gram(s) Oral once PRN Blood Glucose LESS THAN 70 milliGRAM(s)/deciliter  melatonin 3 milliGRAM(s) Oral at bedtime PRN Insomnia  ondansetron    Tablet 4 milliGRAM(s) Oral four times a day PRN for nausea      Antibiotics History  levoFLOXacin IVPB 750 milliGRAM(s) IV Intermittent once, 12-04-23 @ 04:20, Stop order after: 1 Doses      Heme Medications   aspirin enteric coated 81 milliGRAM(s) Oral daily, 12-04-23 @ 08:02  rivaroxaban 10 milliGRAM(s) Oral with dinner, 12-04-23 @ 08:03      GI Medications  aluminum hydroxide/magnesium hydroxide/simethicone Suspension 30 milliLiter(s) Oral every 4 hours, 12-04-23 @ 08:00, Routine PRN  bisacodyl Suppository 10 milliGRAM(s) Rectal daily, 12-04-23 @ 08:04, Routine PRN  pantoprazole   Suspension 40 milliGRAM(s) Oral daily, 12-04-23 @ 08:04, Routine  polyethylene glycol 3350 17 Gram(s) Oral daily, 12-04-23 @ 08:04, Routine  senna Syrup 10 milliLiter(s) Oral at bedtime, 12-04-23 @ 08:04, Routine        LABS:                        8.1    8.51  )-----------( 435      ( 07 Dec 2023 06:27 )             26.8     12-07    142  |  105  |  6<L>  ----------------------------<  139<H>  4.0   |  29  |  0.24<L>    Ca    8.7      07 Dec 2023 06:27    TPro  5.8<L>  /  Alb  2.5<L>  /  TBili  0.3  /  DBili  x   /  AST  27  /  ALT  35  /  AlkPhos  79  12-07              Urinalysis Basic - ( 07 Dec 2023 06:27 )    Color: x / Appearance: x / SG: x / pH: x  Gluc: 139 mg/dL / Ketone: x  / Bili: x / Urobili: x   Blood: x / Protein: x / Nitrite: x   Leuk Esterase: x / RBC: x / WBC x   Sq Epi: x / Non Sq Epi: x / Bacteria: x      Procalcitonin, Serum: 0.32 ng/mL (12-05-23 @ 12:37)          CULTURES: (if applicable)    Culture - Blood (collected 12-04-23 @ 04:25)  Source: .Blood Blood-Peripheral  Preliminary Report (12-07-23 @ 11:01):    No growth at 72 Hours    Culture - Blood (collected 12-04-23 @ 04:00)  Source: .Blood Blood-Peripheral  Preliminary Report (12-07-23 @ 11:01):    No growth at 72 Hours      Rapid RVP Result: NotDetec (12-04-23 @ 09:46)  Rapid RVP Result: NotDetec (12-04-23 @ 06:30)        CAPILLARY BLOOD GLUCOSE      POCT Blood Glucose.: 321 mg/dL (07 Dec 2023 12:24)      RADIOLOGY  CXR:      CT:    ECHO:      VITALS:  T(C): 36.8 (12-07-23 @ 12:09), Max: 36.8 (12-07-23 @ 12:09)  T(F): 98.2 (12-07-23 @ 12:09), Max: 98.2 (12-07-23 @ 12:09)  HR: 106 (12-07-23 @ 12:09) (87 - 118)  BP: 109/61 (12-07-23 @ 12:09) (109/61 - 118/58)  BP(mean): --  ABP: --  ABP(mean): --  RR: 17 (12-07-23 @ 12:09) (17 - 20)  SpO2: 99% (12-07-23 @ 12:09) (95% - 100%)  CVP(mm Hg): --  CVP(cm H2O): --    Ins and Outs     12-06-23 @ 07:01  -  12-07-23 @ 07:00  --------------------------------------------------------  IN: 0 mL / OUT: 200 mL / NET: -200 mL                I&O's Detail    06 Dec 2023 07:01  -  07 Dec 2023 07:00  --------------------------------------------------------  IN:  Total IN: 0 mL    OUT:    Voided (mL): 200 mL  Total OUT: 200 mL    Total NET: -200 mL

## 2023-12-07 NOTE — PROGRESS NOTE ADULT - ASSESSMENT
Physical Examination:  GENERAL:               Alert, Oriented, No acute distress.    HEENT:                    No JVD, Moist MM  PULM:                     Bilateral air entry, No Rales, No Rhonchi, No Wheezing  CVS:                         S1, S2,  + Murmur  ABD:                        Soft, nondistended, nontender, normoactive bowel sounds,   EXT:                         No edema, nontender, No Cyanosis or Clubbing   NEURO:                  Alert, oriented, interactive, nonfocal, follows commands  PSYC:                      Calm, + Insight.        Assessment  1. Chronic COPD, with acute dyspnea, now back to baseline with steroids, possible COPD exacerbation vs Anxiety attack, RVP negative   2. Recent stopped smoking, - 1pk/day  3. Chronic Hypoxia on home o2  4. h/o DVT on xaralto  5. Underlying CAD, HTN, DM2    Plan  N/C o2 to maintain sat  Cont. Methylprednisolone 40 mg BID   Continue Symbicort, Spiriva, Albuterol, Daliresp   Nocturnal NIV support as needed  continue nicotine patch  OOB, PT  monitor sat

## 2023-12-07 NOTE — PROGRESS NOTE ADULT - SUBJECTIVE AND OBJECTIVE BOX
CC: Patient is a 68y old  Female who presents with a chief complaint of shortness of breath (06 Dec 2023 13:15)      Interval History: Patient seen and examined at bedside. No acute overnight events. Pt reports feeling better and states her breathing is okay. Denies CP, abd pain, nausea/vomiting, or other complaints at this time.    ALLERGIES:  penicillin (Hives)    MEDICATIONS  (STANDING):  albuterol    0.083% 2.5 milliGRAM(s) Nebulizer every 6 hours  aspirin enteric coated 81 milliGRAM(s) Oral daily  atorvastatin 80 milliGRAM(s) Oral at bedtime  budesonide 160 MICROgram(s)/formoterol 4.5 MICROgram(s) Inhaler 2 Puff(s) Inhalation two times a day  buPROPion XL (24-Hour) . 300 milliGRAM(s) Oral daily  dextrose 5%. 1000 milliLiter(s) (100 mL/Hr) IV Continuous <Continuous>  dextrose 5%. 1000 milliLiter(s) (50 mL/Hr) IV Continuous <Continuous>  dextrose 50% Injectable 25 Gram(s) IV Push once  dextrose 50% Injectable 12.5 Gram(s) IV Push once  dextrose 50% Injectable 25 Gram(s) IV Push once  diltiazem    milliGRAM(s) Oral daily  doxazosin 2 milliGRAM(s) Oral at bedtime  ferrous    sulfate 325 milliGRAM(s) Oral daily  glucagon  Injectable 1 milliGRAM(s) IntraMuscular once  insulin lispro (ADMELOG) corrective regimen sliding scale   SubCutaneous at bedtime  insulin lispro (ADMELOG) corrective regimen sliding scale   SubCutaneous three times a day before meals  letrozole 2.5 milliGRAM(s) Oral daily  methylPREDNISolone sodium succinate Injectable 40 milliGRAM(s) IV Push every 12 hours  nicotine -  14 mG/24Hr(s) Patch 1 Patch Transdermal daily  pantoprazole   Suspension 40 milliGRAM(s) Oral daily  polyethylene glycol 3350 17 Gram(s) Oral daily  rivaroxaban 10 milliGRAM(s) Oral with dinner  roflumilast 500 MICROGram(s) Oral daily  senna Syrup 10 milliLiter(s) Oral at bedtime  tiotropium 2.5 MICROgram(s) Inhaler 2 Puff(s) Inhalation daily    MEDICATIONS  (PRN):  acetaminophen     Tablet .. 650 milliGRAM(s) Oral every 6 hours PRN Temp greater or equal to 38C (100.4F), Mild Pain (1 - 3)  ALPRAZolam 0.25 milliGRAM(s) Oral every 8 hours PRN anxiety  aluminum hydroxide/magnesium hydroxide/simethicone Suspension 30 milliLiter(s) Oral every 4 hours PRN Dyspepsia  bisacodyl Suppository 10 milliGRAM(s) Rectal daily PRN Constipation  dextrose Oral Gel 15 Gram(s) Oral once PRN Blood Glucose LESS THAN 70 milliGRAM(s)/deciliter  melatonin 3 milliGRAM(s) Oral at bedtime PRN Insomnia  ondansetron    Tablet 4 milliGRAM(s) Oral four times a day PRN for nausea    Vital Signs Last 24 Hrs  T(F): 97.7 (07 Dec 2023 05:35), Max: 97.7 (07 Dec 2023 05:35)  HR: 110 (07 Dec 2023 09:31) (87 - 118)  BP: 118/58 (07 Dec 2023 05:35) (114/64 - 118/58)  RR: 20 (07 Dec 2023 05:35) (18 - 20)  SpO2: 96% (07 Dec 2023 09:31) (95% - 100%)  I&O's Summary    06 Dec 2023 07:01  -  07 Dec 2023 07:00  --------------------------------------------------------  IN: 0 mL / OUT: 200 mL / NET: -200 mL      BMI (kg/m2): 17.8 (12-04-23 @ 08:39)    PHYSICAL EXAM:  GENERAL: pt laying in bed in NAD, frail elderly female  CHEST/LUNG: diminished breath sounds throughout, No rales, rhonchi, wheezing, or rubs; mildly tachypneic  HEART: Regular rate and rhythm; No murmurs noted  ABDOMEN: Soft, Nontender, Nondistended; Bowel sounds present   MUSCULOSKELETAL/EXTREMITIES:  2+ Peripheral Pulses, FROM of extremities no edema noted to BLE  NERVOUS SYSTEM:  CN II - XII grossly intact; Sensation intact; follows commands  PSYCH: Appropriate affect, Alert & Oriented x 3     LABS:                        8.1    8.51  )-----------( 435      ( 07 Dec 2023 06:27 )             26.8       12-07    142  |  105  |  6   ----------------------------<  139  4.0   |  29  |  0.24    Ca    8.7      07 Dec 2023 06:27  Mg     1.4     12-05    TPro  5.8  /  Alb  2.5  /  TBili  0.3  /  DBili  x   /  AST  27  /  ALT  35  /  AlkPhos  79  12-07            CARDIAC MARKERS ( 05 Dec 2023 12:37 )  x     / 11.4 ng/L / x     / x     / x                        POCT Blood Glucose.: 149 mg/dL (07 Dec 2023 08:19)  POCT Blood Glucose.: 261 mg/dL (06 Dec 2023 21:44)  POCT Blood Glucose.: 172 mg/dL (06 Dec 2023 16:50)      Urinalysis Basic - ( 07 Dec 2023 06:27 )    Color: x / Appearance: x / SG: x / pH: x  Gluc: 139 mg/dL / Ketone: x  / Bili: x / Urobili: x   Blood: x / Protein: x / Nitrite: x   Leuk Esterase: x / RBC: x / WBC x   Sq Epi: x / Non Sq Epi: x / Bacteria: x        Culture - Blood (collected 04 Dec 2023 04:25)  Source: .Blood Blood-Peripheral  Preliminary Report (07 Dec 2023 11:01):    No growth at 72 Hours    Culture - Blood (collected 04 Dec 2023 04:00)  Source: .Blood Blood-Peripheral  Preliminary Report (07 Dec 2023 11:01):    No growth at 72 Hours          Care Discussed with Consultants/Other Providers: Yes - with pulm

## 2023-12-08 NOTE — PROGRESS NOTE ADULT - SUBJECTIVE AND OBJECTIVE BOX
CC: Patient is a 68y old  Female who presents with a chief complaint of shortness of breath (07 Dec 2023 14:25)      Interval History: Patient seen and examined at bedside. No acute overnight events. No complaints this morning. Reports feeling better.    ALLERGIES:  penicillin (Hives)    MEDICATIONS  (STANDING):  albuterol    0.083% 2.5 milliGRAM(s) Nebulizer every 6 hours  aspirin enteric coated 81 milliGRAM(s) Oral daily  atorvastatin 80 milliGRAM(s) Oral at bedtime  budesonide 160 MICROgram(s)/formoterol 4.5 MICROgram(s) Inhaler 2 Puff(s) Inhalation two times a day  buPROPion XL (24-Hour) . 300 milliGRAM(s) Oral daily  dextrose 5%. 1000 milliLiter(s) (100 mL/Hr) IV Continuous <Continuous>  dextrose 5%. 1000 milliLiter(s) (50 mL/Hr) IV Continuous <Continuous>  dextrose 50% Injectable 25 Gram(s) IV Push once  dextrose 50% Injectable 12.5 Gram(s) IV Push once  dextrose 50% Injectable 25 Gram(s) IV Push once  diltiazem    milliGRAM(s) Oral daily  doxazosin 2 milliGRAM(s) Oral at bedtime  ferrous    sulfate 325 milliGRAM(s) Oral daily  glucagon  Injectable 1 milliGRAM(s) IntraMuscular once  insulin lispro (ADMELOG) corrective regimen sliding scale   SubCutaneous three times a day before meals  insulin lispro (ADMELOG) corrective regimen sliding scale   SubCutaneous at bedtime  letrozole 2.5 milliGRAM(s) Oral daily  methylPREDNISolone sodium succinate Injectable 40 milliGRAM(s) IV Push every 12 hours  nicotine -  14 mG/24Hr(s) Patch 1 Patch Transdermal daily  pantoprazole   Suspension 40 milliGRAM(s) Oral daily  polyethylene glycol 3350 17 Gram(s) Oral daily  rivaroxaban 10 milliGRAM(s) Oral with dinner  roflumilast 500 MICROGram(s) Oral daily  senna Syrup 10 milliLiter(s) Oral at bedtime  tiotropium 2.5 MICROgram(s) Inhaler 2 Puff(s) Inhalation daily    MEDICATIONS  (PRN):  acetaminophen     Tablet .. 650 milliGRAM(s) Oral every 6 hours PRN Temp greater or equal to 38C (100.4F), Mild Pain (1 - 3)  ALPRAZolam 0.25 milliGRAM(s) Oral every 8 hours PRN anxiety  aluminum hydroxide/magnesium hydroxide/simethicone Suspension 30 milliLiter(s) Oral every 4 hours PRN Dyspepsia  bisacodyl Suppository 10 milliGRAM(s) Rectal daily PRN Constipation  dextrose Oral Gel 15 Gram(s) Oral once PRN Blood Glucose LESS THAN 70 milliGRAM(s)/deciliter  melatonin 3 milliGRAM(s) Oral at bedtime PRN Insomnia  ondansetron    Tablet 4 milliGRAM(s) Oral four times a day PRN for nausea    Vital Signs Last 24 Hrs  T(F): 98.5 (08 Dec 2023 12:49), Max: 98.5 (08 Dec 2023 12:49)  HR: 95 (08 Dec 2023 12:49) (82 - 101)  BP: 107/56 (08 Dec 2023 12:49) (107/56 - 130/67)  RR: 17 (08 Dec 2023 12:49) (17 - 18)  SpO2: 100% (08 Dec 2023 12:49) (96% - 100%)  I&O's Summary    07 Dec 2023 07:01  -  08 Dec 2023 07:00  --------------------------------------------------------  IN: 0 mL / OUT: 400 mL / NET: -400 mL      BMI (kg/m2): 17.8 (12-04-23 @ 08:39)    PHYSICAL EXAM:  GENERAL: pt laying in bed in NAD, frail elderly female  CHEST/LUNG: diminished breath sounds throughout, No rales, rhonchi, wheezing, or rubs; mildly tachypneic  HEART: Regular rate and rhythm; No murmurs noted  ABDOMEN: Soft, Nontender, Nondistended; Bowel sounds present   MUSCULOSKELETAL/EXTREMITIES:  2+ Peripheral Pulses, FROM of extremities no edema noted to BLE  NERVOUS SYSTEM:  CN II - XII grossly intact; Sensation intact; follows commands  PSYCH: Appropriate affect, Alert & Oriented x 3     LABS:                        8.3    9.86  )-----------( 437      ( 08 Dec 2023 05:28 )             27.3       12-08    141  |  105  |  8   ----------------------------<  140  4.0   |  33  |  0.33    Ca    8.5      08 Dec 2023 05:28    TPro  5.8  /  Alb  2.5  /  TBili  0.3  /  DBili  x   /  AST  27  /  ALT  35  /  AlkPhos  79  12-07                              POCT Blood Glucose.: 129 mg/dL (08 Dec 2023 08:23)  POCT Blood Glucose.: 192 mg/dL (07 Dec 2023 21:28)  POCT Blood Glucose.: 174 mg/dL (07 Dec 2023 17:12)      Urinalysis Basic - ( 08 Dec 2023 05:28 )    Color: x / Appearance: x / SG: x / pH: x  Gluc: 140 mg/dL / Ketone: x  / Bili: x / Urobili: x   Blood: x / Protein: x / Nitrite: x   Leuk Esterase: x / RBC: x / WBC x   Sq Epi: x / Non Sq Epi: x / Bacteria: x        Culture - Urine (collected 05 Dec 2023 20:55)  Source: Clean Catch Clean Catch (Midstream)  Preliminary Report (08 Dec 2023 09:43):    10,000 - 49,000 CFU/mL Enterococcus faecalis    Culture - Blood (collected 04 Dec 2023 04:25)  Source: .Blood Blood-Peripheral  Preliminary Report (08 Dec 2023 11:01):    No growth at 4 days    Culture - Blood (collected 04 Dec 2023 04:00)  Source: .Blood Blood-Peripheral  Preliminary Report (08 Dec 2023 11:01):    No growth at 4 days

## 2023-12-08 NOTE — PROGRESS NOTE ADULT - SUBJECTIVE AND OBJECTIVE BOX
Follow-up Pulmonary Progress Note  Chief Complaint : Chronic obstructive pulmonary disease with acute exacerbation    States breathing is stable, Has intermittent coughing.     Allergies :penicillin (Hives)      PAST MEDICAL & SURGICAL HISTORY:  COPD, severe    CAD (coronary artery disease)    Breast cancer    No significant past surgical history        Medications:  MEDICATIONS  (STANDING):  albuterol    0.083% 2.5 milliGRAM(s) Nebulizer every 6 hours  aspirin enteric coated 81 milliGRAM(s) Oral daily  atorvastatin 80 milliGRAM(s) Oral at bedtime  budesonide 160 MICROgram(s)/formoterol 4.5 MICROgram(s) Inhaler 2 Puff(s) Inhalation two times a day  buPROPion XL (24-Hour) . 300 milliGRAM(s) Oral daily  dextrose 5%. 1000 milliLiter(s) (100 mL/Hr) IV Continuous <Continuous>  dextrose 5%. 1000 milliLiter(s) (50 mL/Hr) IV Continuous <Continuous>  dextrose 50% Injectable 25 Gram(s) IV Push once  dextrose 50% Injectable 12.5 Gram(s) IV Push once  dextrose 50% Injectable 25 Gram(s) IV Push once  diltiazem    milliGRAM(s) Oral daily  doxazosin 2 milliGRAM(s) Oral at bedtime  ferrous    sulfate 325 milliGRAM(s) Oral daily  glucagon  Injectable 1 milliGRAM(s) IntraMuscular once  insulin lispro (ADMELOG) corrective regimen sliding scale   SubCutaneous at bedtime  insulin lispro (ADMELOG) corrective regimen sliding scale   SubCutaneous three times a day before meals  letrozole 2.5 milliGRAM(s) Oral daily  methylPREDNISolone sodium succinate Injectable 40 milliGRAM(s) IV Push every 12 hours  nicotine -  14 mG/24Hr(s) Patch 1 Patch Transdermal daily  pantoprazole   Suspension 40 milliGRAM(s) Oral daily  polyethylene glycol 3350 17 Gram(s) Oral daily  rivaroxaban 10 milliGRAM(s) Oral with dinner  roflumilast 500 MICROGram(s) Oral daily  senna Syrup 10 milliLiter(s) Oral at bedtime  tiotropium 2.5 MICROgram(s) Inhaler 2 Puff(s) Inhalation daily    MEDICATIONS  (PRN):  acetaminophen     Tablet .. 650 milliGRAM(s) Oral every 6 hours PRN Temp greater or equal to 38C (100.4F), Mild Pain (1 - 3)  ALPRAZolam 0.25 milliGRAM(s) Oral every 8 hours PRN anxiety  aluminum hydroxide/magnesium hydroxide/simethicone Suspension 30 milliLiter(s) Oral every 4 hours PRN Dyspepsia  bisacodyl Suppository 10 milliGRAM(s) Rectal daily PRN Constipation  dextrose Oral Gel 15 Gram(s) Oral once PRN Blood Glucose LESS THAN 70 milliGRAM(s)/deciliter  melatonin 3 milliGRAM(s) Oral at bedtime PRN Insomnia  ondansetron    Tablet 4 milliGRAM(s) Oral four times a day PRN for nausea      Antibiotics History  levoFLOXacin IVPB 750 milliGRAM(s) IV Intermittent once, 12-04-23 @ 04:20, Stop order after: 1 Doses      Heme Medications   aspirin enteric coated 81 milliGRAM(s) Oral daily, 12-04-23 @ 08:02  rivaroxaban 10 milliGRAM(s) Oral with dinner, 12-04-23 @ 08:03      GI Medications  aluminum hydroxide/magnesium hydroxide/simethicone Suspension 30 milliLiter(s) Oral every 4 hours, 12-04-23 @ 08:00, Routine PRN  bisacodyl Suppository 10 milliGRAM(s) Rectal daily, 12-04-23 @ 08:04, Routine PRN  pantoprazole   Suspension 40 milliGRAM(s) Oral daily, 12-04-23 @ 08:04, Routine  polyethylene glycol 3350 17 Gram(s) Oral daily, 12-04-23 @ 08:04, Routine  senna Syrup 10 milliLiter(s) Oral at bedtime, 12-04-23 @ 08:04, Routine        LABS:                        8.3    9.86  )-----------( 437      ( 08 Dec 2023 05:28 )             27.3     12-08    141  |  105  |  8   ----------------------------<  140<H>  4.0   |  33<H>  |  0.33<L>    Ca    8.5      08 Dec 2023 05:28    TPro  5.8<L>  /  Alb  2.5<L>  /  TBili  0.3  /  DBili  x   /  AST  27  /  ALT  35  /  AlkPhos  79  12-07              Urinalysis Basic - ( 08 Dec 2023 05:28 )    Color: x / Appearance: x / SG: x / pH: x  Gluc: 140 mg/dL / Ketone: x  / Bili: x / Urobili: x   Blood: x / Protein: x / Nitrite: x   Leuk Esterase: x / RBC: x / WBC x   Sq Epi: x / Non Sq Epi: x / Bacteria: x              CULTURES: (if applicable)    Culture - Urine (collected 12-05-23 @ 20:55)  Source: Clean Catch Clean Catch (Midstream)  Preliminary Report (12-08-23 @ 09:43):    10,000 - 49,000 CFU/mL Enterococcus faecalis    Culture - Blood (collected 12-04-23 @ 04:25)  Source: .Blood Blood-Peripheral  Preliminary Report (12-08-23 @ 11:01):    No growth at 4 days    Culture - Blood (collected 12-04-23 @ 04:00)  Source: .Blood Blood-Peripheral  Preliminary Report (12-08-23 @ 11:01):    No growth at 4 days      Rapid RVP Result: NotDetec (12-04-23 @ 09:46)  Rapid RVP Result: NotDetec (12-04-23 @ 06:30)        CAPILLARY BLOOD GLUCOSE      POCT Blood Glucose.: 240 mg/dL (08 Dec 2023 13:02)      RADIOLOGY  CXR:      CT:    ECHO:      VITALS:  T(C): 36.9 (12-08-23 @ 12:49), Max: 36.9 (12-08-23 @ 12:49)  T(F): 98.5 (12-08-23 @ 12:49), Max: 98.5 (12-08-23 @ 12:49)  HR: 95 (12-08-23 @ 12:49) (82 - 101)  BP: 107/56 (12-08-23 @ 12:49) (107/56 - 130/67)  BP(mean): --  ABP: --  ABP(mean): --  RR: 17 (12-08-23 @ 12:49) (17 - 18)  SpO2: 100% (12-08-23 @ 12:49) (96% - 100%)  CVP(mm Hg): --  CVP(cm H2O): --    Ins and Outs     12-07-23 @ 07:01  -  12-08-23 @ 07:00  --------------------------------------------------------  IN: 0 mL / OUT: 400 mL / NET: -400 mL                I&O's Detail    07 Dec 2023 07:01  -  08 Dec 2023 07:00  --------------------------------------------------------  IN:  Total IN: 0 mL    OUT:    Voided (mL): 400 mL  Total OUT: 400 mL    Total NET: -400 mL

## 2023-12-08 NOTE — PROGRESS NOTE ADULT - ASSESSMENT
Physical Examination:  GENERAL:               Alert, Oriented, No acute distress.    HEENT:                    No JVD, Moist MM  PULM:                     Bilateral air entry, No Rales, No Rhonchi, No Wheezing  CVS:                         S1, S2,  + Murmur  ABD:                        Soft, nondistended, nontender, normoactive bowel sounds,   EXT:                         No edema, nontender, No Cyanosis or Clubbing   NEURO:                  Alert, oriented, interactive, nonfocal, follows commands  PSYC:                      Calm, + Insight.        Assessment  1. Chronic COPD, with acute dyspnea, now back to baseline with steroids, possible COPD exacerbation vs Anxiety attack, RVP negative   2. Recent stopped smoking, - 1pk/day  3. Chronic Hypoxia on home o2  4. h/o DVT on xaralto  5. Underlying CAD, HTN, DM2    Plan  N/C o2 to maintain sat  Cont. Methylprednisolone 40 mg BID - if stable change to daily dose in AM  Continue Symbicort, Spiriva, Albuterol, Daliresp   Nocturnal NIV support as needed  continue nicotine patch  OOB, PT  monitor sat

## 2023-12-08 NOTE — PROGRESS NOTE ADULT - ASSESSMENT
69 y/o Female with PMH of COPD on 2L home oxygen, former smoker, DVT (on Xarelto), Anemia, CAD, Breast CA s/p left mastectomy who presents to the ER from [facility] for shortness of breath and cough admitted for acute on chronic COPD exacerbation.    Tachycardia, improved  -Trial xanax prn  -Continue cardizem CD 180mg qd    Acute on chronic hypoxic respiratory failure   Acute COPD exacerbation   Former smoker  -Pulm appreciated - continue steroids, symbicort, spiriva, albuterol, dalisresp  - C/w solumedrol 40mg q12h IV, continue to taper  -Stable on home O2 2L NC - continue   -s/p Levaquin in ER, unclear role for continued abx - monitor of abx - 12/4 blood cx NGTD  -Continue nicotine patch     Hyperglycemia;   - possibly exacerbated 2/2 steroids  - ISS ACHS    Anemia  -s/p EGD on last admission  -H/H Stable on CBC today, monitor CBC tomorrow  -Continue PPI  -Occult stool positive, though no shahida bleeding    CAD  -Continue ASA, statin    Breast CA  -Continue femara     Hx of DVT (2018)  -Continue Xarelto   -Outpatient oncologist is Dr. Larios [phone]    Anxiety   -Continue xanax prn    Advanced care planning  - Pt is FULL CODE    DVT ppx - xarelto  Pt eval - eventual return to [facility]     Discussed treatment plan with pt at bedside and pt was in agreement with the plan. All questions answered.  69 y/o Female with PMH of COPD on 2L home oxygen, former smoker, DVT (on Xarelto), Anemia, CAD, Breast CA s/p left mastectomy who presents to the ER from [facility] for shortness of breath and cough admitted for acute on chronic COPD exacerbation.    Tachycardia, improved  -Trial xanax prn  -Continue cardizem CD 180mg qd    Acute on chronic hypoxic respiratory failure   Acute COPD exacerbation   Former smoker  -Pulm appreciated - continue steroids, symbicort, spiriva, albuterol, dalisresp  - C/w solumedrol 40mg q12h IV, continue to taper  -Stable on home O2 2L NC - continue   -s/p Levaquin in ER, unclear role for continued abx - monitor of abx - 12/4 blood cx NGTD  -Continue nicotine patch     Hyperglycemia;   - possibly exacerbated 2/2 steroids  - ISS ACHS    Anemia  -s/p EGD on last admission  -H/H Stable on CBC today, monitor CBC tomorrow  -Continue PPI  -Occult stool positive, though no shahida bleeding    CAD  -Continue ASA, statin    Breast CA  -Continue femara     Hx of DVT (2018)  -Continue Xarelto   -Outpatient oncologist is Dr. Larios [phone]    Anxiety   -Continue xanax prn    Advanced care planning  - Pt is FULL CODE    DVT ppx - xarelto  Pt eval - eventual return to [facility]     Discussed treatment plan with pt at bedside and pt was in agreement with the plan. All questions answered.

## 2023-12-09 NOTE — PROGRESS NOTE ADULT - ASSESSMENT
Physical Examination:  GENERAL:               Alert, Oriented, No acute distress.    HEENT:                    No JVD, Moist MM  PULM:                     Bilateral air entry, No Rales, No Rhonchi, No Wheezing  CVS:                         S1, S2,  + Murmur  ABD:                        Soft, nondistended, nontender, normoactive bowel sounds,   EXT:                         No edema, nontender, No Cyanosis or Clubbing   NEURO:                  Alert, oriented, interactive, nonfocal, follows commands  PSYC:                      Calm, + Insight.        Assessment  1. Chronic COPD, with acute dyspnea, now back to baseline with steroids, possible COPD exacerbation vs Anxiety attack, RVP negative   2. Recent stopped smoking, - 1pk/day  3. Chronic Hypoxia on home o2  4. h/o DVT on xaralto  5. Underlying CAD, HTN, DM2    Plan  N/C o2 to maintain sat  Currently off Steroids  Continue Symbicort, Spiriva, Albuterol, Daliresp   Nocturnal NIV support as needed will not qualify for home NIV on discharge as pco2 < 55  continue nicotine patch  OOB, PT  monitor sat   consider dispo planning

## 2023-12-09 NOTE — PROGRESS NOTE ADULT - ASSESSMENT
67 y/o Female with PMH of COPD on 2L home oxygen, former smoker, DVT (on Xarelto), Anemia, CAD, Breast CA s/p left mastectomy who presents to the ER from [facility] for shortness of breath and cough admitted for acute on chronic COPD exacerbation.    Tachycardia, improved   -Trial xanax prn - tachy possibly 2/2 Anxiety  -Continue cardizem CD 180mg qd    Acute on chronic hypoxic respiratory failure   Acute COPD exacerbation   Former smoker  -Pulm appreciated - continue steroids, symbicort, spiriva, albuterol, dalisresp  - Decrease solumedrol 40mg q12h IV to qD, continue to taper  -Stable on home O2 2L NC - continue   -s/p Levaquin in ER, unclear role for continued abx - monitor of abx - 12/4 blood cx NGTD  -Continue nicotine patch     Hyperglycemia;   - possibly exacerbated 2/2 steroids  - ISS ACHS    Anemia  -s/p EGD on last admission  -H/H Stable on CBC today, monitor CBC tomorrow  -Continue PPI  -Occult stool positive, though no shahida bleeding    CAD  -Continue ASA, statin    Breast CA  -Continue femara     Hx of DVT (2018)  -Continue Xarelto   -Outpatient oncologist is Dr. Larios [phone]    Anxiety   -Continue xanax prn    Advanced care planning  - Pt is FULL CODE    DVT ppx - xarelto  Pt eval - eventual return to [facility]     Discussed treatment plan with pt at bedside and pt was in agreement with the plan. All questions answered. Updated pt's brother Mr. Gonzales on the phone. 67 y/o Female with PMH of COPD on 2L home oxygen, former smoker, DVT (on Xarelto), Anemia, CAD, Breast CA s/p left mastectomy who presents to the ER from [facility] for shortness of breath and cough admitted for acute on chronic COPD exacerbation.    Tachycardia, improved   -Trial xanax prn - tachy possibly 2/2 Anxiety  -Continue cardizem CD 180mg qd    Acute on chronic hypoxic respiratory failure   Acute COPD exacerbation   Former smoker  -Pulm appreciated - continue steroids, symbicort, spiriva, albuterol, dalisresp  - Decrease solumedrol 40mg q12h IV to qD, continue to taper  -Stable on home O2 2L NC - continue   -s/p Levaquin in ER, unclear role for continued abx - monitor of abx - 12/4 blood cx NGTD  -Continue nicotine patch     Hyperglycemia;   - possibly exacerbated 2/2 steroids  - ISS ACHS    Anemia  -s/p EGD on last admission  -H/H Stable on CBC today, monitor CBC tomorrow  -Continue PPI  -Occult stool positive, though no shahida bleeding    CAD  -Continue ASA, statin    Breast CA  -Continue femara     Hx of DVT (2018)  -Continue Xarelto   -Outpatient oncologist is Dr. Larios [phone]    Anxiety   -Continue xanax prn    Advanced care planning  - Pt is FULL CODE    DVT ppx - xarelto  Pt eval - eventual return to [facility]     Discussed treatment plan with pt at bedside and pt was in agreement with the plan. All questions answered. Updated pt's brother Mr. Gonzales on the phone. 69 y/o Female with PMH of COPD on 2L home oxygen, former smoker, DVT (on Xarelto), Anemia, CAD, Breast CA s/p left mastectomy who presents to the ER from [facility] for shortness of breath and cough admitted for acute on chronic COPD exacerbation.    Tachycardia, improved   -Trial xanax prn - tachy possibly 2/2 Anxiety  -Continue cardizem CD 180mg qd    Acute on chronic hypoxic respiratory failure   Acute COPD exacerbation   Former smoker  -Pulm appreciated - continue steroids, symbicort, spiriva, albuterol, dalisresp  - Decrease solumedrol 40mg q12h IV to qD, continue to taper  -Stable on home O2 2L NC - continue   -s/p Levaquin in ER, unclear role for continued abx - monitor of abx - 12/4 blood cx NGTD  -Continue nicotine patch   -Slight leukocytosis noted on AM CBC, f/u repeat for monitoring in AM    Hyperglycemia;   - possibly exacerbated 2/2 steroids  - ISS ACHS    Anemia  -s/p EGD on last admission  -H/H Stable on CBC today, monitor CBC tomorrow  -Continue PPI  -Occult stool positive, though no shahida bleeding  -Hgb 7.9 this AM, f/u AM CBC for monitoring    CAD  -Continue ASA, statin    Breast CA  -Continue femara     Hx of DVT (2018)  -Continue Xarelto   -Outpatient oncologist is Dr. Larios [phone]    Anxiety   -Continue xanax prn    Advanced care planning  - Pt is FULL CODE    DVT ppx - xarelto  Pt eval - eventual return to [facility]     Discussed treatment plan with pt at bedside and pt was in agreement with the plan. All questions answered. Updated pt's brother Mr. Gonzales on the phone. 69 y/o Female with PMH of COPD on 2L home oxygen, former smoker, DVT (on Xarelto), Anemia, CAD, Breast CA s/p left mastectomy who presents to the ER from [facility] for shortness of breath and cough admitted for acute on chronic COPD exacerbation.    Tachycardia, improved   -Trial xanax prn - tachy possibly 2/2 Anxiety  -Continue cardizem CD 180mg qd    Acute on chronic hypoxic respiratory failure   Acute COPD exacerbation   Former smoker  -Pulm appreciated - continue steroids, symbicort, spiriva, albuterol, dalisresp  - Decrease solumedrol 40mg q12h IV to qD, continue to taper  -Stable on home O2 2L NC - continue   -s/p Levaquin in ER, unclear role for continued abx - monitor of abx - 12/4 blood cx NGTD  -Continue nicotine patch   -Slight leukocytosis noted on AM CBC, f/u repeat for monitoring in AM    Hyperglycemia;   - possibly exacerbated 2/2 steroids  - ISS ACHS    Anemia  -s/p EGD on last admission  -H/H Stable on CBC today, monitor CBC tomorrow  -Continue PPI  -Occult stool positive, though no shahida bleeding  -Hgb 7.9 this AM, f/u AM CBC for monitoring    CAD  -Continue ASA, statin    Breast CA  -Continue femara     Hx of DVT (2018)  -Continue Xarelto   -Outpatient oncologist is Dr. Larios [phone]    Anxiety   -Continue xanax prn    Advanced care planning  - Pt is FULL CODE    DVT ppx - xarelto  Pt eval - eventual return to [facility]     Discussed treatment plan with pt at bedside and pt was in agreement with the plan. All questions answered. Updated pt's brother Mr. Gonzales on the phone. 69 y/o Female with PMH of COPD on 2L home oxygen, former smoker, DVT (on Xarelto), Anemia, CAD, Breast CA s/p left mastectomy who presents to the ER from [facility] for shortness of breath and cough admitted for acute on chronic COPD exacerbation.    Tachycardia, improved   -Trial xanax prn - tachy possibly 2/2 Anxiety  -Continue cardizem CD 180mg qd    Debility; Poor PO intake  - PT rec MARK, brother requesting Emer[facility]  - Starting Marinol qD to help stimulate appetite     Acute on chronic hypoxic respiratory failure   Acute COPD exacerbation   Former smoker  -Pulm appreciated - continue steroids, symbicort, spiriva, albuterol, dalisresp  - Decrease solumedrol 40mg q12h IV to qD, continue to taper  -Stable on home O2 2L NC - continue   -s/p Levaquin in ER, unclear role for continued abx - monitor of abx - 12/4 blood cx NGTD  -Continue nicotine patch   -Slight leukocytosis noted on AM CBC, f/u repeat for monitoring in AM    Hyperglycemia;   - possibly exacerbated 2/2 steroids  - ISS ACHS    Anemia  -s/p EGD on last admission  -H/H Stable on CBC today, monitor CBC tomorrow  -Continue PPI  -Occult stool positive, though no shahida bleeding  -Hgb 7.9 this AM, f/u AM CBC for monitoring    CAD  -Continue ASA, statin    Breast CA  -Continue femara     Hx of DVT (2018)  -Continue Xarelto   -Outpatient oncologist is Dr. Larios [phone]    Anxiety   -Continue xanax prn    Advanced care planning  - Pt is FULL CODE    DVT ppx - xarelto  Pt eval - eventual return to [facility]     Discussed treatment plan with pt at bedside and pt was in agreement with the plan. All questions answered. Updated pt's brother Mr. Gonzales on the phone. 67 y/o Female with PMH of COPD on 2L home oxygen, former smoker, DVT (on Xarelto), Anemia, CAD, Breast CA s/p left mastectomy who presents to the ER from [facility] for shortness of breath and cough admitted for acute on chronic COPD exacerbation.    Tachycardia, improved   -Trial xanax prn - tachy possibly 2/2 Anxiety  -Continue cardizem CD 180mg qd    Debility; Poor PO intake  - PT rec MARK, brother requesting Emer[facility]  - Starting Marinol qD to help stimulate appetite     Acute on chronic hypoxic respiratory failure   Acute COPD exacerbation   Former smoker  -Pulm appreciated - continue steroids, symbicort, spiriva, albuterol, dalisresp  - Decrease solumedrol 40mg q12h IV to qD, continue to taper  -Stable on home O2 2L NC - continue   -s/p Levaquin in ER, unclear role for continued abx - monitor of abx - 12/4 blood cx NGTD  -Continue nicotine patch   -Slight leukocytosis noted on AM CBC, f/u repeat for monitoring in AM    Hyperglycemia;   - possibly exacerbated 2/2 steroids  - ISS ACHS    Anemia  -s/p EGD on last admission  -H/H Stable on CBC today, monitor CBC tomorrow  -Continue PPI  -Occult stool positive, though no shahida bleeding  -Hgb 7.9 this AM, f/u AM CBC for monitoring    CAD  -Continue ASA, statin    Breast CA  -Continue femara     Hx of DVT (2018)  -Continue Xarelto   -Outpatient oncologist is Dr. Larios [phone]    Anxiety   -Continue xanax prn    Advanced care planning  - Pt is FULL CODE    DVT ppx - xarelto  Pt eval - eventual return to [facility]     Discussed treatment plan with pt at bedside and pt was in agreement with the plan. All questions answered. Updated pt's brother Mr. Gonzales on the phone.

## 2023-12-09 NOTE — PROGRESS NOTE ADULT - SUBJECTIVE AND OBJECTIVE BOX
Follow-up Pulmonary Progress Note  Chief Complaint : Chronic obstructive pulmonary disease with acute exacerbation      patient seen and examined  comfortable  no cp, sob, palp, n/v  cough, secretions  feeling better        Allergies :penicillin (Hives)      PAST MEDICAL & SURGICAL HISTORY:  COPD, severe    CAD (coronary artery disease)    Breast cancer    No significant past surgical history        Medications:  MEDICATIONS  (STANDING):  albuterol    0.083% 2.5 milliGRAM(s) Nebulizer every 6 hours  aspirin enteric coated 81 milliGRAM(s) Oral daily  atorvastatin 80 milliGRAM(s) Oral at bedtime  budesonide 160 MICROgram(s)/formoterol 4.5 MICROgram(s) Inhaler 2 Puff(s) Inhalation two times a day  buPROPion XL (24-Hour) . 300 milliGRAM(s) Oral daily  dextrose 5%. 1000 milliLiter(s) (100 mL/Hr) IV Continuous <Continuous>  dextrose 5%. 1000 milliLiter(s) (50 mL/Hr) IV Continuous <Continuous>  dextrose 50% Injectable 25 Gram(s) IV Push once  dextrose 50% Injectable 12.5 Gram(s) IV Push once  dextrose 50% Injectable 25 Gram(s) IV Push once  diltiazem    milliGRAM(s) Oral daily  doxazosin 2 milliGRAM(s) Oral at bedtime  dronabinol 5 milliGRAM(s) Oral daily  ferrous    sulfate 325 milliGRAM(s) Oral daily  glucagon  Injectable 1 milliGRAM(s) IntraMuscular once  insulin lispro (ADMELOG) corrective regimen sliding scale   SubCutaneous three times a day before meals  insulin lispro (ADMELOG) corrective regimen sliding scale   SubCutaneous at bedtime  letrozole 2.5 milliGRAM(s) Oral daily  nicotine -  14 mG/24Hr(s) Patch 1 Patch Transdermal daily  pantoprazole   Suspension 40 milliGRAM(s) Oral daily  polyethylene glycol 3350 17 Gram(s) Oral daily  rivaroxaban 10 milliGRAM(s) Oral with dinner  roflumilast 500 MICROGram(s) Oral daily  senna Syrup 10 milliLiter(s) Oral at bedtime  tiotropium 2.5 MICROgram(s) Inhaler 2 Puff(s) Inhalation daily    MEDICATIONS  (PRN):  acetaminophen     Tablet .. 650 milliGRAM(s) Oral every 6 hours PRN Temp greater or equal to 38C (100.4F), Mild Pain (1 - 3)  ALPRAZolam 0.25 milliGRAM(s) Oral every 8 hours PRN anxiety  aluminum hydroxide/magnesium hydroxide/simethicone Suspension 30 milliLiter(s) Oral every 4 hours PRN Dyspepsia  bisacodyl Suppository 10 milliGRAM(s) Rectal daily PRN Constipation  dextrose Oral Gel 15 Gram(s) Oral once PRN Blood Glucose LESS THAN 70 milliGRAM(s)/deciliter  melatonin 3 milliGRAM(s) Oral at bedtime PRN Insomnia  ondansetron    Tablet 4 milliGRAM(s) Oral four times a day PRN for nausea      Antibiotics History  levoFLOXacin IVPB 750 milliGRAM(s) IV Intermittent once, 12-04-23 @ 04:20, Stop order after: 1 Doses      Heme Medications   aspirin enteric coated 81 milliGRAM(s) Oral daily, 12-04-23 @ 08:02  rivaroxaban 10 milliGRAM(s) Oral with dinner, 12-04-23 @ 08:03      GI Medications  aluminum hydroxide/magnesium hydroxide/simethicone Suspension 30 milliLiter(s) Oral every 4 hours, 12-04-23 @ 08:00, Routine PRN  bisacodyl Suppository 10 milliGRAM(s) Rectal daily, 12-04-23 @ 08:04, Routine PRN  pantoprazole   Suspension 40 milliGRAM(s) Oral daily, 12-04-23 @ 08:04, Routine  polyethylene glycol 3350 17 Gram(s) Oral daily, 12-04-23 @ 08:04, Routine  senna Syrup 10 milliLiter(s) Oral at bedtime, 12-04-23 @ 08:04, Routine        LABS:                        7.9    11.40 )-----------( 461      ( 09 Dec 2023 05:15 )             25.5     12-09    140  |  104  |  9   ----------------------------<  133<H>  3.5   |  31  |  0.22<L>    Ca    8.4      09 Dec 2023 05:15       CULTURES: (if applicable)    Culture - Urine (collected 12-05-23 @ 20:55)  Source: Clean Catch Clean Catch (Midstream)  Final Report (12-09-23 @ 09:48):    10,000 - 49,000 CFU/mL Enterococcus faecalis  Organism: Enterococcus faecalis (12-09-23 @ 09:48)  Organism: Enterococcus faecalis (12-09-23 @ 09:48)      Method Type: WINNIE      -  Ampicillin: S <=2 Predicts results to ampicillin/sulbactam, amoxacillin-clavulanate and  piperacillin-tazobactam.      -  Ciprofloxacin: R >2      -  Levofloxacin: R >4      -  Nitrofurantoin: S <=32 Should not be used to treat pyelonephritis.      -  Tetracycline: R >8      -  Vancomycin: S 2    Culture - Blood (collected 12-04-23 @ 04:25)  Source: .Blood Blood-Peripheral  Final Report (12-09-23 @ 11:00):    No growth at 5 days    Culture - Blood (collected 12-04-23 @ 04:00)  Source: .Blood Blood-Peripheral  Final Report (12-09-23 @ 11:00):    No growth at 5 days      Rapid RVP Result: NotDetec (12-04-23 @ 09:46)  Rapid RVP Result: NotDetec (12-04-23 @ 06:30)    ABG Trend  12-04-23 @ 03:20   - 7.44/43<H>/349<H>/99.4<H>  11-05-23 @ 13:27   - 7.49<H>/44<H>/110<H>/97.2  11-05-23 @ 09:40   - 7.46<H>/47<H>/95/97.3  11-04-23 @ 05:00   - 7.51<H>/48<H>/107/96.7  11-03-23 @ 17:53   - 7.50<H>/47<H>/148<H>/98.4<H>  11-03-23 @ 15:11   - 7.31<L>/75<HH>/115<H>/98.1<H>  11-03-23 @ 04:00   - 7.41/61<H>/216<H>/98.0  11-02-23 @ 12:18   - 7.44/51<H>/85/97.2  11-02-23 @ 10:09   - 7.46<H>/50<H>/102/98.0  11-02-23 @ 04:00   - 7.46<H>/47<H>/103/97.2  10-31-23 @ 11:40   - 7.43/39<H>/163<H>/98.2<H>  10-31-23 @ 08:10   - 7.35/50<H>/111<H>/97.1         VITALS:  T(C): 36.7 (12-09-23 @ 11:59), Max: 36.7 (12-09-23 @ 11:59)  T(F): 98 (12-09-23 @ 11:59), Max: 98 (12-09-23 @ 11:59)  HR: 102 (12-09-23 @ 14:55) (80 - 118)  BP: 119/74 (12-09-23 @ 11:59) (111/71 - 129/76)  BP(mean): --  ABP: --  ABP(mean): --  RR: 18 (12-09-23 @ 11:59) (17 - 18)  SpO2: 95% (12-09-23 @ 14:55) (95% - 100%)  CVP(mm Hg): --  CVP(cm H2O): --    Ins and Outs     12-08-23 @ 07:01  -  12-09-23 @ 07:00  --------------------------------------------------------  IN: 0 mL / OUT: 1200 mL / NET: -1200 mL                I&O's Detail    08 Dec 2023 07:01  -  09 Dec 2023 07:00  --------------------------------------------------------  IN:  Total IN: 0 mL    OUT:    Voided (mL): 1200 mL  Total OUT: 1200 mL    Total NET: -1200 mL

## 2023-12-09 NOTE — PROGRESS NOTE ADULT - SUBJECTIVE AND OBJECTIVE BOX
CC: Patient is a 68y old  Female who presents with a chief complaint of shortness of breath (08 Dec 2023 13:24)      Interval History: Patient seen and examined at bedside. No acute overnight events. Pt reports feeling a bit anxious this AM, knows she can ask for Xanax PRN. Denies chest pain or N/V.    ALLERGIES:  penicillin (Hives)    MEDICATIONS  (STANDING):  albuterol    0.083% 2.5 milliGRAM(s) Nebulizer every 6 hours  aspirin enteric coated 81 milliGRAM(s) Oral daily  atorvastatin 80 milliGRAM(s) Oral at bedtime  budesonide 160 MICROgram(s)/formoterol 4.5 MICROgram(s) Inhaler 2 Puff(s) Inhalation two times a day  buPROPion XL (24-Hour) . 300 milliGRAM(s) Oral daily  dextrose 5%. 1000 milliLiter(s) (100 mL/Hr) IV Continuous <Continuous>  dextrose 5%. 1000 milliLiter(s) (50 mL/Hr) IV Continuous <Continuous>  dextrose 50% Injectable 25 Gram(s) IV Push once  dextrose 50% Injectable 12.5 Gram(s) IV Push once  dextrose 50% Injectable 25 Gram(s) IV Push once  diltiazem    milliGRAM(s) Oral daily  doxazosin 2 milliGRAM(s) Oral at bedtime  ferrous    sulfate 325 milliGRAM(s) Oral daily  glucagon  Injectable 1 milliGRAM(s) IntraMuscular once  insulin lispro (ADMELOG) corrective regimen sliding scale   SubCutaneous three times a day before meals  insulin lispro (ADMELOG) corrective regimen sliding scale   SubCutaneous at bedtime  letrozole 2.5 milliGRAM(s) Oral daily  methylPREDNISolone sodium succinate Injectable 40 milliGRAM(s) IV Push every 12 hours  nicotine -  14 mG/24Hr(s) Patch 1 Patch Transdermal daily  pantoprazole   Suspension 40 milliGRAM(s) Oral daily  polyethylene glycol 3350 17 Gram(s) Oral daily  rivaroxaban 10 milliGRAM(s) Oral with dinner  roflumilast 500 MICROGram(s) Oral daily  senna Syrup 10 milliLiter(s) Oral at bedtime  tiotropium 2.5 MICROgram(s) Inhaler 2 Puff(s) Inhalation daily    MEDICATIONS  (PRN):  acetaminophen     Tablet .. 650 milliGRAM(s) Oral every 6 hours PRN Temp greater or equal to 38C (100.4F), Mild Pain (1 - 3)  ALPRAZolam 0.25 milliGRAM(s) Oral every 8 hours PRN anxiety  aluminum hydroxide/magnesium hydroxide/simethicone Suspension 30 milliLiter(s) Oral every 4 hours PRN Dyspepsia  bisacodyl Suppository 10 milliGRAM(s) Rectal daily PRN Constipation  dextrose Oral Gel 15 Gram(s) Oral once PRN Blood Glucose LESS THAN 70 milliGRAM(s)/deciliter  melatonin 3 milliGRAM(s) Oral at bedtime PRN Insomnia  ondansetron    Tablet 4 milliGRAM(s) Oral four times a day PRN for nausea    Vital Signs Last 24 Hrs  T(F): 97.5 (09 Dec 2023 04:48), Max: 98.5 (08 Dec 2023 12:49)  HR: 115 (09 Dec 2023 09:02) (80 - 118)  BP: 129/76 (09 Dec 2023 04:48) (107/56 - 129/76)  RR: 17 (08 Dec 2023 21:20) (17 - 17)  SpO2: 98% (09 Dec 2023 09:02) (96% - 100%)  I&O's Summary    08 Dec 2023 07:01  -  09 Dec 2023 07:00  --------------------------------------------------------  IN: 0 mL / OUT: 1200 mL / NET: -1200 mL          PHYSICAL EXAM:  GENERAL: pt sitting in chair in NAD, frail elderly female  CHEST/LUNG: diminished breath sounds throughout, No rales, rhonchi, wheezing, or rubs; mildly tachypneic  HEART: Regular rate and rhythm; No murmurs noted  ABDOMEN: Soft, Nontender, Nondistended; Bowel sounds present   MUSCULOSKELETAL/EXTREMITIES:  2+ Peripheral Pulses, FROM of extremities no edema noted to BLE  NERVOUS SYSTEM:  CN II - XII grossly intact; Sensation intact; follows commands  PSYCH: mildly anxious, Alert & Oriented x 3     LABS:                        7.9    11.40 )-----------( 461      ( 09 Dec 2023 05:15 )             25.5       12-09    140  |  104  |  9   ----------------------------<  133  3.5   |  31  |  0.22    Ca    8.4      09 Dec 2023 05:15    TPro  5.8  /  Alb  2.5  /  TBili  0.3  /  DBili  x   /  AST  27  /  ALT  35  /  AlkPhos  79  12-07                              POCT Blood Glucose.: 127 mg/dL (09 Dec 2023 08:54)  POCT Blood Glucose.: 214 mg/dL (08 Dec 2023 22:08)  POCT Blood Glucose.: 117 mg/dL (08 Dec 2023 17:12)  POCT Blood Glucose.: 240 mg/dL (08 Dec 2023 13:02)      Urinalysis Basic - ( 09 Dec 2023 05:15 )    Color: x / Appearance: x / SG: x / pH: x  Gluc: 133 mg/dL / Ketone: x  / Bili: x / Urobili: x   Blood: x / Protein: x / Nitrite: x   Leuk Esterase: x / RBC: x / WBC x   Sq Epi: x / Non Sq Epi: x / Bacteria: x        Culture - Urine (collected 05 Dec 2023 20:55)  Source: Clean Catch Clean Catch (Midstream)  Final Report (09 Dec 2023 09:48):    10,000 - 49,000 CFU/mL Enterococcus faecalis  Organism: Enterococcus faecalis (09 Dec 2023 09:48)  Organism: Enterococcus faecalis (09 Dec 2023 09:48)      Method Type: WINNIE      -  Ampicillin: S <=2 Predicts results to ampicillin/sulbactam, amoxacillin-clavulanate and  piperacillin-tazobactam.      -  Ciprofloxacin: R >2      -  Levofloxacin: R >4      -  Nitrofurantoin: S <=32 Should not be used to treat pyelonephritis.      -  Tetracycline: R >8      -  Vancomycin: S 2    Culture - Blood (collected 04 Dec 2023 04:25)  Source: .Blood Blood-Peripheral  Preliminary Report (08 Dec 2023 11:01):    No growth at 4 days    Culture - Blood (collected 04 Dec 2023 04:00)  Source: .Blood Blood-Peripheral  Preliminary Report (08 Dec 2023 11:01):    No growth at 4 days          Care Discussed with Consultants/Other Providers: Yes with pulmonology

## 2023-12-10 NOTE — PROGRESS NOTE ADULT - SUBJECTIVE AND OBJECTIVE BOX
CC: Patient is a 68y old  Female who presents with a chief complaint of shortness of breath (09 Dec 2023 18:23)      Interval History: Patient seen and examined at bedside. No acute overnight events. Pt reports feeling better today and her anxiety is improved as well. Denies any pain.    ALLERGIES:  penicillin (Hives)    MEDICATIONS  (STANDING):  albuterol    0.083% 2.5 milliGRAM(s) Nebulizer every 6 hours  aspirin enteric coated 81 milliGRAM(s) Oral daily  atorvastatin 80 milliGRAM(s) Oral at bedtime  budesonide 160 MICROgram(s)/formoterol 4.5 MICROgram(s) Inhaler 2 Puff(s) Inhalation two times a day  buPROPion XL (24-Hour) . 300 milliGRAM(s) Oral daily  dextrose 5%. 1000 milliLiter(s) (100 mL/Hr) IV Continuous <Continuous>  dextrose 5%. 1000 milliLiter(s) (50 mL/Hr) IV Continuous <Continuous>  dextrose 50% Injectable 25 Gram(s) IV Push once  dextrose 50% Injectable 12.5 Gram(s) IV Push once  dextrose 50% Injectable 25 Gram(s) IV Push once  diltiazem    milliGRAM(s) Oral daily  doxazosin 2 milliGRAM(s) Oral at bedtime  dronabinol 5 milliGRAM(s) Oral daily  ferrous    sulfate 325 milliGRAM(s) Oral daily  glucagon  Injectable 1 milliGRAM(s) IntraMuscular once  insulin lispro (ADMELOG) corrective regimen sliding scale   SubCutaneous three times a day before meals  insulin lispro (ADMELOG) corrective regimen sliding scale   SubCutaneous at bedtime  letrozole 2.5 milliGRAM(s) Oral daily  nicotine -  14 mG/24Hr(s) Patch 1 Patch Transdermal daily  pantoprazole   Suspension 40 milliGRAM(s) Oral daily  polyethylene glycol 3350 17 Gram(s) Oral daily  rivaroxaban 10 milliGRAM(s) Oral with dinner  roflumilast 500 MICROGram(s) Oral daily  senna Syrup 10 milliLiter(s) Oral at bedtime  tiotropium 2.5 MICROgram(s) Inhaler 2 Puff(s) Inhalation daily    MEDICATIONS  (PRN):  acetaminophen     Tablet .. 650 milliGRAM(s) Oral every 6 hours PRN Temp greater or equal to 38C (100.4F), Mild Pain (1 - 3)  ALPRAZolam 0.25 milliGRAM(s) Oral every 8 hours PRN anxiety  aluminum hydroxide/magnesium hydroxide/simethicone Suspension 30 milliLiter(s) Oral every 4 hours PRN Dyspepsia  bisacodyl Suppository 10 milliGRAM(s) Rectal daily PRN Constipation  dextrose Oral Gel 15 Gram(s) Oral once PRN Blood Glucose LESS THAN 70 milliGRAM(s)/deciliter  melatonin 3 milliGRAM(s) Oral at bedtime PRN Insomnia  ondansetron    Tablet 4 milliGRAM(s) Oral four times a day PRN for nausea    Vital Signs Last 24 Hrs  T(F): 97.5 (10 Dec 2023 05:00), Max: 98 (09 Dec 2023 11:59)  HR: 92 (10 Dec 2023 08:45) (82 - 103)  BP: 127/67 (10 Dec 2023 05:00) (119/74 - 149/70)  RR: 18 (10 Dec 2023 05:00) (18 - 19)  SpO2: 96% (10 Dec 2023 08:45) (95% - 100%)  I&O's Summary    09 Dec 2023 07:01  -  10 Dec 2023 07:00  --------------------------------------------------------  IN: 0 mL / OUT: 500 mL / NET: -500 mL        PHYSICAL EXAM:  GENERAL: pt sitting in chair in NAD, frail elderly female  CHEST/LUNG: diminished breath sounds throughout, No rales, rhonchi, wheezing, or rubs; mildly tachypneic  HEART: Regular rate and rhythm; No murmurs noted  ABDOMEN: Soft, Nontender, Nondistended; Bowel sounds present   MUSCULOSKELETAL/EXTREMITIES:  2+ Peripheral Pulses, FROM of extremities no edema noted to BLE  NERVOUS SYSTEM:  CN II - XII grossly intact; Sensation intact; follows commands  PSYCH: appropriate affect, Alert & Oriented x 3     LABS:                        8.3    16.07 )-----------( 488      ( 10 Dec 2023 06:42 )             26.6       12-10    142  |  105  |  8   ----------------------------<  87  3.6   |  30  |  0.27    Ca    8.2      10 Dec 2023 06:42                                POCT Blood Glucose.: 117 mg/dL (10 Dec 2023 08:36)  POCT Blood Glucose.: 173 mg/dL (09 Dec 2023 21:29)  POCT Blood Glucose.: 125 mg/dL (09 Dec 2023 17:28)  POCT Blood Glucose.: 313 mg/dL (09 Dec 2023 12:46)      Urinalysis Basic - ( 10 Dec 2023 06:42 )    Color: x / Appearance: x / SG: x / pH: x  Gluc: 87 mg/dL / Ketone: x  / Bili: x / Urobili: x   Blood: x / Protein: x / Nitrite: x   Leuk Esterase: x / RBC: x / WBC x   Sq Epi: x / Non Sq Epi: x / Bacteria: x        Culture - Urine (collected 05 Dec 2023 20:55)  Source: Clean Catch Clean Catch (Midstream)  Final Report (09 Dec 2023 09:48):    10,000 - 49,000 CFU/mL Enterococcus faecalis  Organism: Enterococcus faecalis (09 Dec 2023 09:48)  Organism: Enterococcus faecalis (09 Dec 2023 09:48)      Method Type: WINNIE      -  Ampicillin: S <=2 Predicts results to ampicillin/sulbactam, amoxacillin-clavulanate and  piperacillin-tazobactam.      -  Ciprofloxacin: R >2      -  Levofloxacin: R >4      -  Nitrofurantoin: S <=32 Should not be used to treat pyelonephritis.      -  Tetracycline: R >8      -  Vancomycin: S 2    Culture - Blood (collected 04 Dec 2023 04:25)  Source: .Blood Blood-Peripheral  Final Report (09 Dec 2023 11:00):    No growth at 5 days    Culture - Blood (collected 04 Dec 2023 04:00)  Source: .Blood Blood-Peripheral  Final Report (09 Dec 2023 11:00):    No growth at 5 days          Care Discussed with Consultants/Other Providers: Yes with pulligia Barragan

## 2023-12-10 NOTE — PROGRESS NOTE ADULT - SUBJECTIVE AND OBJECTIVE BOX
Follow-up Pulmonary Progress Note  Chief Complaint : Chronic obstructive pulmonary disease with acute exacerbation      patient seen and examined  comfortable  no cp, sob, palp, n/v  resp status stable         Allergies :penicillin (Hives)      PAST MEDICAL & SURGICAL HISTORY:  COPD, severe    CAD (coronary artery disease)    Breast cancer    No significant past surgical history        Medications:  MEDICATIONS  (STANDING):  albuterol    0.083% 2.5 milliGRAM(s) Nebulizer every 6 hours  aspirin enteric coated 81 milliGRAM(s) Oral daily  atorvastatin 80 milliGRAM(s) Oral at bedtime  budesonide 160 MICROgram(s)/formoterol 4.5 MICROgram(s) Inhaler 2 Puff(s) Inhalation two times a day  buPROPion XL (24-Hour) . 300 milliGRAM(s) Oral daily  dextrose 5%. 1000 milliLiter(s) (100 mL/Hr) IV Continuous <Continuous>  dextrose 5%. 1000 milliLiter(s) (50 mL/Hr) IV Continuous <Continuous>  dextrose 50% Injectable 25 Gram(s) IV Push once  dextrose 50% Injectable 12.5 Gram(s) IV Push once  dextrose 50% Injectable 25 Gram(s) IV Push once  diltiazem    milliGRAM(s) Oral daily  doxazosin 2 milliGRAM(s) Oral at bedtime  dronabinol 5 milliGRAM(s) Oral daily  ferrous    sulfate 325 milliGRAM(s) Oral daily  glucagon  Injectable 1 milliGRAM(s) IntraMuscular once  insulin lispro (ADMELOG) corrective regimen sliding scale   SubCutaneous three times a day before meals  insulin lispro (ADMELOG) corrective regimen sliding scale   SubCutaneous at bedtime  letrozole 2.5 milliGRAM(s) Oral daily  nicotine -  14 mG/24Hr(s) Patch 1 Patch Transdermal daily  pantoprazole   Suspension 40 milliGRAM(s) Oral daily  polyethylene glycol 3350 17 Gram(s) Oral daily  rivaroxaban 10 milliGRAM(s) Oral with dinner  roflumilast 500 MICROGram(s) Oral daily  senna Syrup 10 milliLiter(s) Oral at bedtime  tiotropium 2.5 MICROgram(s) Inhaler 2 Puff(s) Inhalation daily    MEDICATIONS  (PRN):  acetaminophen     Tablet .. 650 milliGRAM(s) Oral every 6 hours PRN Temp greater or equal to 38C (100.4F), Mild Pain (1 - 3)  ALPRAZolam 0.25 milliGRAM(s) Oral every 8 hours PRN anxiety  aluminum hydroxide/magnesium hydroxide/simethicone Suspension 30 milliLiter(s) Oral every 4 hours PRN Dyspepsia  bisacodyl Suppository 10 milliGRAM(s) Rectal daily PRN Constipation  dextrose Oral Gel 15 Gram(s) Oral once PRN Blood Glucose LESS THAN 70 milliGRAM(s)/deciliter  melatonin 3 milliGRAM(s) Oral at bedtime PRN Insomnia  ondansetron    Tablet 4 milliGRAM(s) Oral four times a day PRN for nausea      Antibiotics History  levoFLOXacin IVPB 750 milliGRAM(s) IV Intermittent once, 12-04-23 @ 04:20, Stop order after: 1 Doses      Heme Medications   aspirin enteric coated 81 milliGRAM(s) Oral daily, 12-04-23 @ 08:02  rivaroxaban 10 milliGRAM(s) Oral with dinner, 12-04-23 @ 08:03      GI Medications  aluminum hydroxide/magnesium hydroxide/simethicone Suspension 30 milliLiter(s) Oral every 4 hours, 12-04-23 @ 08:00, Routine PRN  bisacodyl Suppository 10 milliGRAM(s) Rectal daily, 12-04-23 @ 08:04, Routine PRN  pantoprazole   Suspension 40 milliGRAM(s) Oral daily, 12-04-23 @ 08:04, Routine  polyethylene glycol 3350 17 Gram(s) Oral daily, 12-04-23 @ 08:04, Routine  senna Syrup 10 milliLiter(s) Oral at bedtime, 12-04-23 @ 08:04, Routine        LABS:                        8.3    16.07 )-----------( 488      ( 10 Dec 2023 06:42 )             26.6     12-10    142  |  105  |  8   ----------------------------<  87  3.6   |  30  |  0.27<L>    Ca    8.2<L>      10 Dec 2023 06:42         CULTURES: (if applicable)    Culture - Urine (collected 12-05-23 @ 20:55)  Source: Clean Catch Clean Catch (Midstream)  Final Report (12-09-23 @ 09:48):    10,000 - 49,000 CFU/mL Enterococcus faecalis  Organism: Enterococcus faecalis (12-09-23 @ 09:48)  Organism: Enterococcus faecalis (12-09-23 @ 09:48)      Method Type: WINNIE      -  Ampicillin: S <=2 Predicts results to ampicillin/sulbactam, amoxacillin-clavulanate and  piperacillin-tazobactam.      -  Ciprofloxacin: R >2      -  Levofloxacin: R >4      -  Nitrofurantoin: S <=32 Should not be used to treat pyelonephritis.      -  Tetracycline: R >8      -  Vancomycin: S 2    Culture - Blood (collected 12-04-23 @ 04:25)  Source: .Blood Blood-Peripheral  Final Report (12-09-23 @ 11:00):    No growth at 5 days    Culture - Blood (collected 12-04-23 @ 04:00)  Source: .Blood Blood-Peripheral  Final Report (12-09-23 @ 11:00):    No growth at 5 days      Rapid RVP Result: NotDetec (12-04-23 @ 09:46)  Rapid RVP Result: NotDetec (12-04-23 @ 06:30)        VITALS:  T(C): 36.7 (12-10-23 @ 12:34), Max: 36.7 (12-10-23 @ 12:34)  T(F): 98 (12-10-23 @ 12:34), Max: 98 (12-10-23 @ 12:34)  HR: 109 (12-10-23 @ 12:34) (84 - 109)  BP: 116/63 (12-10-23 @ 12:34) (116/63 - 149/70)  BP(mean): 87 (12-10-23 @ 05:00) (87 - 87)  ABP: --  ABP(mean): --  RR: 18 (12-10-23 @ 12:34) (18 - 19)  SpO2: 99% (12-10-23 @ 12:34) (95% - 100%)  CVP(mm Hg): --  CVP(cm H2O): --    Ins and Outs     12-09-23 @ 07:01  -  12-10-23 @ 07:00  --------------------------------------------------------  IN: 0 mL / OUT: 500 mL / NET: -500 mL         I&O's Detail    09 Dec 2023 07:01  -  10 Dec 2023 07:00  --------------------------------------------------------  IN:  Total IN: 0 mL    OUT:    Voided (mL): 500 mL  Total OUT: 500 mL    Total NET: -500 mL

## 2023-12-10 NOTE — PROGRESS NOTE ADULT - PROVIDER SPECIALTY LIST ADULT
Hospitalist
Hospitalist
Pulmonology
Hospitalist
Pulmonology
Hospitalist
Hospitalist
Pulmonology
Hospitalist

## 2023-12-10 NOTE — PROGRESS NOTE ADULT - NUTRITIONAL ASSESSMENT
This patient has been assessed with a concern for Malnutrition and has been determined to have a diagnosis/diagnoses of Severe protein-calorie malnutrition and Underweight (BMI < 19).    This patient is being managed with:   Diet Soft and Bite Sized-  Consistent Carbohydrate {No Snacks}  Supplement Feeding Modality:  Oral  Glucerna Shake Cans or Servings Per Day:  1       Frequency:  Two Times a day  Entered: Dec  7 2023  1:00PM  
This patient has been assessed with a concern for Malnutrition and has been determined to have a diagnosis/diagnoses of Severe protein-calorie malnutrition and Underweight (BMI < 19).    This patient is being managed with:   Diet Soft and Bite Sized-  Supplement Feeding Modality:  Oral  Glucerna Shake Cans or Servings Per Day:  1       Frequency:  Two Times a day  Entered: Dec  5 2023  1:01PM  
This patient has been assessed with a concern for Malnutrition and has been determined to have a diagnosis/diagnoses of Severe protein-calorie malnutrition and Underweight (BMI < 19).    This patient is being managed with:   Diet Soft and Bite Sized-  Supplement Feeding Modality:  Oral  Glucerna Shake Cans or Servings Per Day:  1       Frequency:  Two Times a day  Entered: Dec  5 2023  1:01PM

## 2023-12-10 NOTE — PROGRESS NOTE ADULT - ASSESSMENT
Physical Examination:  GENERAL:               Alert, Oriented, No acute distress.    HEENT:                    No JVD, Moist MM  PULM:                     Bilateral air entry, No Rales, No Rhonchi, No Wheezing  CVS:                         S1, S2,  + Murmur  ABD:                        Soft, nondistended, nontender, normoactive bowel sounds,   EXT:                         No edema, nontender, No Cyanosis or Clubbing   NEURO:                  Alert, oriented, interactive, nonfocal, follows commands  PSYC:                      Calm, + Insight.        Assessment  1. Chronic COPD, with acute dyspnea, now back to baseline with steroids, possible COPD exacerbation vs Anxiety attack, RVP negative   2. Recent stopped smoking, - 1pk/day  3. Chronic Hypoxia on home o2  4. h/o DVT on xaralto  5. Underlying CAD, HTN, DM2    Plan  N/C o2 to maintain sat  Currently on oral  Steroid taper, plan to taper 10mg every 3 days to off  Continue Symbicort, Spiriva, Albuterol, Daliresp   Nocturnal NIV support as needed will not qualify for home NIV on discharge as pco2 < 55  continue nicotine patch  OOB, PT  monitor sat   consider dispo planning  d/w Kane Mckinney

## 2023-12-10 NOTE — PROGRESS NOTE ADULT - ASSESSMENT
69 y/o Female with PMH of COPD on 2L home oxygen, former smoker, DVT (on Xarelto), Anemia, CAD, Breast CA s/p left mastectomy who presents to the ER from [facility] for shortness of breath and cough admitted for acute on chronic COPD exacerbation.    Tachycardia, improved   -Trial xanax prn - tachy possibly 2/2 Anxiety  -Continue cardizem CD 180mg qd    Debility; Poor PO intake  - PT rec MARK, brother requesting Emerge  - C/w Marinol qD to help stimulate appetite     Acute on chronic hypoxic respiratory failure   Acute COPD exacerbation   Former smoker  -Pulm appreciated - continue steroids, symbicort, spiriva, albuterol, dalisresp  - Stop Solumedrol IV qD, start Prednisone taper. Spoke with pulm regarding plan for dispo  -Stable on home O2 2L NC - continue   -s/p Levaquin in ER, unclear role for continued abx - monitor of abx - 12/4 blood cx NGTD  -Continue nicotine patch   -leukocytosis increased on AM CBC, continue to monitor; pt on steroids, afebrile    Hyperglycemia;   - possibly exacerbated 2/2 steroids  - ISS ACHS    Anemia  -s/p EGD on last admission  -H/H Stable on CBC today, monitor CBC tomorrow  -Continue PPI  -Occult stool positive, though no shahida bleeding  -Hgb stable this AM, f/u AM CBC for monitoring    CAD  -Continue ASA, statin    Breast CA  -Continue femara     Hx of DVT (2018)  -Continue Xarelto   -Outpatient oncologist is Dr. Larios [phone]    Anxiety   -Continue xanax prn    Advanced care planning  - Pt is FULL CODE    DVT ppx - xarelto  Pt eval - eventual return to [facility]     Discussed treatment plan with pt at bedside and pt was in agreement with the plan. All questions answered. Updated pt's brother Mr. Gonzales   67 y/o Female with PMH of COPD on 2L home oxygen, former smoker, DVT (on Xarelto), Anemia, CAD, Breast CA s/p left mastectomy who presents to the ER from [facility] for shortness of breath and cough admitted for acute on chronic COPD exacerbation.    Tachycardia, improved   -Trial xanax prn - tachy possibly 2/2 Anxiety  -Continue cardizem CD 180mg qd    Debility; Poor PO intake  - PT rec MARK, brother requesting Emerge  - C/w Marinol qD to help stimulate appetite     Acute on chronic hypoxic respiratory failure   Acute COPD exacerbation   Former smoker  -Pulm appreciated - continue steroids, symbicort, spiriva, albuterol, dalisresp  - Stop Solumedrol IV qD, start Prednisone taper. Spoke with pulm regarding plan for dispo  -Stable on home O2 2L NC - continue   -s/p Levaquin in ER, unclear role for continued abx - monitor of abx - 12/4 blood cx NGTD  -Continue nicotine patch   -leukocytosis increased on AM CBC, continue to monitor; pt on steroids, afebrile    Hyperglycemia;   - possibly exacerbated 2/2 steroids  - ISS ACHS    Anemia  -s/p EGD on last admission  -H/H Stable on CBC today, monitor CBC tomorrow  -Continue PPI  -Occult stool positive, though no shahida bleeding  -Hgb stable this AM, f/u AM CBC for monitoring    CAD  -Continue ASA, statin    Breast CA  -Continue femara     Hx of DVT (2018)  -Continue Xarelto   -Outpatient oncologist is Dr. Larios [phone]    Anxiety   -Continue xanax prn    Advanced care planning  - Pt is FULL CODE    DVT ppx - xarelto  Pt eval - eventual return to [facility]     Discussed treatment plan with pt at bedside and pt was in agreement with the plan. All questions answered. Updated pt's brother Mr. Gonzales

## 2023-12-10 NOTE — CHART NOTE - NSCHARTNOTEFT_GEN_A_CORE
NUTRITION FOLLOW UP    SOURCE: Patient [X)  Medical Record (X)    DIET: Soft/Bite Sized, Consistent Carbohydrate (No Snacks); Glucerna BID     PATIENT REPORT  [x] other: no GI distress  Patient reports fair appetite, consumed 25% breakfast, 50% dinner last night. Patient adherent to Oral Nutrition Supplement BID. Noted patient on soft/bite sized diet, patient states she does not have trouble chewing or swallowing food, prefers whole eggs vs chopped eggs. Dietary preferences noted and communicated with diet office. Recommend SLP evaluation for possible diet upgrade. Noted patient's brother brings food in from outside hospital to optimize patients po intake.     PO INTAKE:  [x]   25-75%               CURRENT WEIGHT: Weight (kg):   38.4 (12-10)   40 (12-04)     PERTINENT MEDS:   Pertinent Medications: MEDICATIONS  (STANDING):  albuterol    0.083% 2.5 milliGRAM(s) Nebulizer every 6 hours  aspirin enteric coated 81 milliGRAM(s) Oral daily  atorvastatin 80 milliGRAM(s) Oral at bedtime  budesonide 160 MICROgram(s)/formoterol 4.5 MICROgram(s) Inhaler 2 Puff(s) Inhalation two times a day  buPROPion XL (24-Hour) . 300 milliGRAM(s) Oral daily  dextrose 5%. 1000 milliLiter(s) (100 mL/Hr) IV Continuous <Continuous>  dextrose 5%. 1000 milliLiter(s) (50 mL/Hr) IV Continuous <Continuous>  dextrose 50% Injectable 25 Gram(s) IV Push once  dextrose 50% Injectable 12.5 Gram(s) IV Push once  dextrose 50% Injectable 25 Gram(s) IV Push once  diltiazem    milliGRAM(s) Oral daily  doxazosin 2 milliGRAM(s) Oral at bedtime  dronabinol 5 milliGRAM(s) Oral daily  ferrous    sulfate 325 milliGRAM(s) Oral daily  glucagon  Injectable 1 milliGRAM(s) IntraMuscular once  insulin lispro (ADMELOG) corrective regimen sliding scale   SubCutaneous three times a day before meals  insulin lispro (ADMELOG) corrective regimen sliding scale   SubCutaneous at bedtime  letrozole 2.5 milliGRAM(s) Oral daily  nicotine -  14 mG/24Hr(s) Patch 1 Patch Transdermal daily  pantoprazole   Suspension 40 milliGRAM(s) Oral daily  polyethylene glycol 3350 17 Gram(s) Oral daily  rivaroxaban 10 milliGRAM(s) Oral with dinner  roflumilast 500 MICROGram(s) Oral daily  senna Syrup 10 milliLiter(s) Oral at bedtime  tiotropium 2.5 MICROgram(s) Inhaler 2 Puff(s) Inhalation daily    MEDICATIONS  (PRN):  acetaminophen     Tablet .. 650 milliGRAM(s) Oral every 6 hours PRN Temp greater or equal to 38C (100.4F), Mild Pain (1 - 3)  ALPRAZolam 0.25 milliGRAM(s) Oral every 8 hours PRN anxiety  aluminum hydroxide/magnesium hydroxide/simethicone Suspension 30 milliLiter(s) Oral every 4 hours PRN Dyspepsia  bisacodyl Suppository 10 milliGRAM(s) Rectal daily PRN Constipation  dextrose Oral Gel 15 Gram(s) Oral once PRN Blood Glucose LESS THAN 70 milliGRAM(s)/deciliter  melatonin 3 milliGRAM(s) Oral at bedtime PRN Insomnia  ondansetron    Tablet 4 milliGRAM(s) Oral four times a day PRN for nausea      PERTINENT LABS:  12-10 Na142 mmol/L Glu 87 mg/dL K+ 3.6 mmol/L Cr  0.27 mg/dL<L> BUN 8 mg/dL 12-07 Alb 2.5 g/dL<L>    SKIN: Stage 2 sacrum    EDEMA: 2+ left/right foot   LAST BM: 12/9    ESTIMATED NEEDS:   [X] no change since previous assessment    PREVIOUS NUTRITION DIAGNOSIS:  Malnutrition     NUTRITION DIAGNOSIS is :  ( x )  Ongoing - Addressed with oral nutrition supplement BID and appetite stimulant     NEW NUTRITION DIAGNOSIS: No new nutrition dx at this time     NUTRITION RECOMMENDATIONS:   1. Continue Consistent Carbohydrate (No Snacks) diet  2. Continue Glucerna BID   3. Recommend SLP evaluation for possible diet upgrade     MONITORING AND EVALUATION:   1. Tolerance to diet prescription   2. PO intake  3. Weights  4. Labs  5. Follow Up per protocol     RD to remain available  Chacha Mead MS,RD,CDN

## 2023-12-11 NOTE — DISCHARGE NOTE PROVIDER - NSDCMRMEDTOKEN_GEN_ALL_CORE_FT
albuterol 2.5 mg/3 mL (0.083%) inhalation solution: inhaled every 6 hours likely d/c when taper finishing  ALPRAZolam 0.25 mg oral tablet: 1 tab(s) orally every 8 hours As needed anxiety  Aspir 81 oral delayed release tablet: 1 tab(s) orally once a day  atorvastatin 80 mg oral tablet: 1 tab(s) orally once a day  bisacodyl 10 mg rectal suppository: 1 suppository(ies) rectal once a day As needed Constipation  budesonide-formoterol 160 mcg-4.5 mcg/inh inhalation aerosol: inhaled 2 times a day  buPROPion 150 mg/24 hours (XL) oral tablet, extended release: 2 tab(s) orally once a day  Cardizem  mg/24 hours oral capsule, extended release: 1 cap(s) orally once a day  doxazosin 2 mg oral tablet: 1 tab(s) orally once a day (at bedtime)  droNABinol 5 mg oral capsule: 1 cap(s) orally once a day  ferrous sulfate 325 mg (65 mg elemental iron) oral tablet: 1 tab(s) orally once a day  letrozole 2.5 mg oral tablet: 1 tab(s) orally once a day  melatonin 3 mg oral tablet: 1 tab(s) orally once a day (at bedtime) As needed Insomnia  metFORMIN 500 mg oral tablet: 1 tab(s) orally 2 times a day  MiraLax oral powder for reconstitution: 17 gram(s) orally once a day  nicotine 7 mg/24 hr transdermal film, extended release: 1 film(s) transdermal once a day  omeprazole 40 mg oral delayed release capsule: 1 cap(s) orally once a day  predniSONE 20 mg oral tablet: 2 tab(s) orally once a day Last dose 12/12. Then steroid taper Prednisone by 10mg every 3 days.  rivaroxaban 10 mg oral tablet: 1 tab(s) orally once a day (before a meal)  roflumilast 500 mcg oral tablet: 1 tab(s) orally once a day  senna (sennosides) 8.6 mg oral tablet: 2 tab(s) orally once a day (at bedtime)  Spiriva 18 mcg inhalation capsule: 1 cap(s) inhaled once a day  Tylenol 325 mg oral tablet: 2 tab(s) orally every 6 hours as needed for  mild pain  Zofran 4 mg oral tablet: 1 tab(s) orally 4 times a day as needed for  nausea

## 2023-12-11 NOTE — DISCHARGE NOTE PROVIDER - PROVIDER TOKENS
FREE:[LAST:[[facility]],PHONE:[phone]:[(   )    -],ADDRESS:[[address] [place] [address]]] FREE:[LAST:[[facility]],PHONE:[phone]:[(   )    -],ADDRESS:[[address] [place] [address]]]

## 2023-12-11 NOTE — DISCHARGE NOTE PROVIDER - ATTENDING ATTESTATION STATEMENT
I have personally seen and examined the patient. I have collaborated with and supervised the unable to perform

## 2023-12-11 NOTE — DISCHARGE NOTE PROVIDER - NSDCCPCAREPLAN_GEN_ALL_CORE_FT
PRINCIPAL DISCHARGE DIAGNOSIS  Diagnosis: COPD exacerbation  Assessment and Plan of Treatment: You were treated with IV steroids and are now on oral steroids. You are to continue taking them on a taper and continue with inhaler treatments.

## 2023-12-11 NOTE — DISCHARGE NOTE NURSING/CASE MANAGEMENT/SOCIAL WORK - NSDCPEFALRISK_GEN_ALL_CORE
For information on Fall & Injury Prevention, visit: [email or web address]-[email or web address] OR  [email or web address]-[email or web address]-injury OR  https://www.cdc.gov/steadi/patient.html For information on Fall & Injury Prevention, visit: [email or web address] OR  [email or web address] OR  https://www.cdc.gov/steadi/patient.html

## 2023-12-11 NOTE — DISCHARGE NOTE NURSING/CASE MANAGEMENT/SOCIAL WORK - NSTRANSFERBELONGINGSDISPO_GEN_A_NUR
Chief Complaint   Patient presents with    Well Child     Pt here for a 8 yr old well child visit     Subjective: Informant: Mother, Silvino Mello, [address] [place] [address] Sharath Carpenter is a 8 y.o. female here for this well-child visit. She will be in 3rd grade this fall. Mother is concerned of off/on pain on her back and legs. No swelling redness noted. No h/o trauma. She was in gymnastics but recently stopped. Patient was evaluated by rheumatologist at [facility] due to joint pains, patient has a sibling who has Abe Moser. Was referred to physical therapy for mild pronation of bilateral feet but no concern of JA.     Birth History    Gestation Age: 44 wks     Breech presentation       Immunization History   Administered Date(s) Administered    COVID-19, PFIZER ORANGE top, DILUTE for use, (age 5y-11y), IM, 10mcg/0.2 mL 12/09/2021, 01/03/2022, 06/15/2022    DTaP, INFANRIX, (age 6w-6y), IM, 0.5mL 08/03/2016    DTaP-IPV, Sherley Simmering, (age 2y-11y), IM, 0.5mL 03/12/2019    DTaP-IPV/Hib, PENTACEL, (age 6w-4y), IM, 0.5mL 2015, 2015, 2015    Hep B, ENGERIX-B, RECOMBIVAX-HB, (age Birth - 22y), IM, 0.5mL 2015, 2015, 2015    Hepatitis A Ped/Adol (Vaqta) 03/17/2016, 02/15/2017    Hib PRP-T, ACTHIB (age 2m-5y, Adlt Risk), HIBERIX (age 6w-4y, Adlt Risk), IM, 0.5mL 08/03/2016    Influenza Virus Vaccine 2015, 2015, 11/12/2016, 11/03/2017    Influenza, FLUARIX, FLULAVAL, FLUZONE (age 10 mo+) AND AFLURIA, (age 1 y+), PF, 0.5mL 03/12/2019, 10/31/2019    MMR, Feliciano Saver, M-M-R II, (age 12m+), SC, 0.5mL 03/17/2016, 07/12/2022    MMR-Varicella, PROQUAD, (age 14m -12y), SC, 0.5mL 03/12/2019    Pneumococcal, PCV-13, PREVNAR 15, (age 6w+), IM, 0.5mL 2015, 2015, 2015, 08/03/2016    Poliovirus, IPOL, (age 6w+), SC/IM, 0.5mL 07/12/2022    Rotavirus, ROTARIX, (age 6w-24w), Oral, 1mL 2015, 05/11/2016    TDaP, ADACEL (age 6y-58y), BOOSTRIX (age 10y+), IM, 0.5mL 07/12/2022
shirt/with patient
not applicable

## 2023-12-11 NOTE — DISCHARGE NOTE PROVIDER - NSDCPNSUBOBJ_GEN_ALL_CORE
Tachycardia, improved   -Trial xanax prn - tachy possibly 2/2 Anxiety  -Continue cardizem CD 180mg qd    Debility; Poor PO intake  - PT rec [facility], brother requesting [facility]  - C/w Marinol qD to help stimulate appetite     Acute on chronic hypoxic respiratory failure   Acute COPD exacerbation   Former smoker  -Pulm appreciated - continue steroids, symbicort, spiriva, albuterol, dalisresp  - Stop Solumedrol IV qD, start Prednisone taper. Spoke with pulm regarding plan for dispo  -Stable on home O2 2L NC - continue   -s/p Levaquin in ER, unclear role for continued abx - monitor of abx - 12/4 blood cx NGTD  -Continue nicotine patch   -leukocytosis downtrending on AM CBC, continue to monitor; pt on steroids, afebrile    Hyperglycemia;   - possibly exacerbated 2/2 steroids  - ISS ACHS    Anemia  -s/p EGD on last admission  -H/H Stable on CBC today, monitor CBC tomorrow  -Continue PPI  -Occult stool positive, though no shahida bleeding  -Hgb stable this AM, f/u AM CBC for monitoring    CAD  -Continue ASA, statin    Breast CA  -Continue femara     Hx of DVT (2018)  -Continue Xarelto   -Outpatient oncologist is Dr. Lraios [phone]    Anxiety   -Continue xanax prn    Advanced care planning  - Pt is FULL CODE    DVT ppx - xarelto  Pt eval - eventual return to [facility]  Pt stable for d/c to [facility] - family want [facility].      Tachycardia, improved   -Trial xanax prn - tachy possibly 2/2 Anxiety  -Continue cardizem CD 180mg qd    Debility; Poor PO intake  - PT rec [facility], brother requesting [facility]  - C/w Marinol qD to help stimulate appetite     Acute on chronic hypoxic respiratory failure   Acute COPD exacerbation   Former smoker  -Pulm appreciated - continue steroids, symbicort, spiriva, albuterol, dalisresp  - Stop Solumedrol IV qD, start Prednisone taper. Spoke with pulm regarding plan for dispo  -Stable on home O2 2L NC - continue   -s/p Levaquin in ER, unclear role for continued abx - monitor of abx - 12/4 blood cx NGTD  -Continue nicotine patch   -leukocytosis downtrending on AM CBC, continue to monitor; pt on steroids, afebrile    Hyperglycemia;   - possibly exacerbated 2/2 steroids  - ISS ACHS    Anemia  -s/p EGD on last admission  -H/H Stable on CBC today, monitor CBC tomorrow  -Continue PPI  -Occult stool positive, though no shahida bleeding  -Hgb stable this AM, f/u AM CBC for monitoring    CAD  -Continue ASA, statin    Breast CA  -Continue femara     Hx of DVT (2018)  -Continue Xarelto   -Outpatient oncologist is Dr. Larios [phone]    Anxiety   -Continue xanax prn    Advanced care planning  - Pt is FULL CODE    DVT ppx - xarelto  Pt eval - eventual return to [facility]  Pt stable for d/c to [facility] - family want [facility].

## 2023-12-11 NOTE — DISCHARGE NOTE PROVIDER - HOSPITAL COURSE
Pt was admitted for acute on chronic COPD exacerbation. Pulm was consulted and she was started on IV steroids. Antibiotics were d/c as pt BCx were negative and labs/CXR were neg for infectious source. Pt had tachycardia, attributed to anxiety; symptoms improved with Xanax PRN. Trops neg. Steroids were tapered down and pt symptoms improved. Pt kateal recommended [facility]. She was started on Marinol to stimulate appetite in addition. Pt is to continue steroid taper down 10mg every 3 days until off and is stable for d/c to [facility].

## 2023-12-11 NOTE — DISCHARGE NOTE PROVIDER - CARE PROVIDER_API CALL
[facility],   [address] [place] [address]  Phone: [phone]  Fax: (   )    -  Follow Up Time:    [facility],   [address] [place] [id number]  Phone: [phone]  Fax: (   )    -  Follow Up Time:

## 2023-12-11 NOTE — DISCHARGE NOTE PROVIDER - ATTENDING DISCHARGE PHYSICAL EXAMINATION:
PHYSICAL EXAM:  GENERAL: pt sitting in chair in NAD, frail elderly female  CHEST/LUNG: diminished breath sounds throughout, No rales, rhonchi, wheezing, or rubs; mildly tachypneic  HEART: Regular rate and rhythm; No murmurs noted  ABDOMEN: Soft, Nontender, Nondistended; Bowel sounds present   MUSCULOSKELETAL/EXTREMITIES:  2+ Peripheral Pulses, FROM of extremities no edema noted to BLE  NERVOUS SYSTEM:  CN II - XII grossly intact; Sensation intact; follows commands  PSYCH: appropriate affect, Alert & Oriented x 3

## 2023-12-11 NOTE — DISCHARGE NOTE NURSING/CASE MANAGEMENT/SOCIAL WORK - PATIENT PORTAL LINK FT
You can access the FollowMyHealth Patient Portal offered by [facility] by registering at the following website: [email or web address]. By joining [employer]’s FollowMyHealth portal, you will also be able to view your health information using other applications (apps) compatible with our system. You can access the FollowMyHealth Patient Portal offered by [facility] by registering at the following website: [email or web address]. By joining [employer]’s FollowMyHealth portal, you will also be able to view your health information using other applications (apps) compatible with our system.

## 2023-12-13 PROBLEM — F41.1 GENERALIZED ANXIETY DISORDER: Status: ACTIVE | Noted: 2023-01-01

## 2023-12-13 PROBLEM — F93.8 ANXIETY DISORDER OF ADOLESCENCE: Status: ACTIVE | Noted: 2023-01-01

## 2023-12-27 NOTE — H&P ADULT - ASSESSMENT
#Acute on chronic hypoxic respiratory failure   #Acute COPD exacerbation in the setting of Covid -19 Infection   -Admit to telemetry   -Former smoker  -Continue solumedrol 40mg q8h; nebulizers   - Stable at 2L nc currently   - s/p Levaquin in ER, unclear role for continued abx     #Chronic normocytic Anemia   - monitor H/H  - continue PPI    Leukocytosis   - monitor     CAD  - continue ASA, statin    Breast CA  - Not receiving  chemotherapy since 3 months     Hx of DVT (2018)  - Xarelto   - outpatient oncologist is Dr. Larios [phone]    Anxiety       DVT Prophylaxis:  - Xalelto     #Acute on chronic hypoxic respiratory failure   #Acute COPD exacerbation in the setting of Covid -19 Infection   -Admit to telemetry   -Former smoker  -Continue solumedrol 40mg q8h; nebulizers   - Stable at 2L nc currently   - s/p Levaquin in ER, unclear role for continued abx     #Chronic normocytic Anemia   - monitor H/H  - continue PPI    Leukocytosis   - monitor     CAD  - continue ASA, statin    Breast CA  - Not receiving  chemotherapy since 3 months     Hx of DVT (2018)  - Xarelto   - outpatient oncologist is Dr. Larios [phone]    Anxiety       DVT Prophylaxis:  - Xalelto     A 68-year-old female with a past medical history of muscle wasting and atrophy, chronic obstructive pulmonary disease on home o2 2 lit,, thromboembolism, deep vein thrombosis of the lower extremity, type 2 diabetes mellitus, left mastectomy, anxiety disorder, iron deficiency anemia, malignant neoplasm of the breast in remission, stage 3 pressure ulcer of the sacral region,  hyperlipidemia,anxiety disorder, and essential hypertension presented today to the emergency department due to a one-day history of shortness of breath, lethargy, and fever.      #Acute on chronic hypoxic respiratory failure   #Acute COPD exacerbation in the setting of Covid -19 Infection   #Severe Sepsis   -Admit to telemetry   -Pt met sepsis criteria on admission   -CXR - reviewed   -Lactate- 3.3   -Start Ramdisvir   -Start Dexamethasone 6mg iv daily   -C/W Azithromycin and   -c/w- Albuterol, Symbicort and Spiriva   - Stable at 2L nc currently   - F/U ID and Pulmonology consult       #Chronic normocytic Anemia   - monitor H/H  - continue PPI    Leukocytosis   - monitor     CAD  - continue ASA, statin    Breast CA  - In remession   - Not receiving  chemotherapy since 3 months   -C/W Litrazole     Hx of DVT (2018)  - Xarelto   - outpatient oncologist is Dr. Larios [phone]    Anxiety       DVT Prophylaxis:  - Xalelto     A 68-year-old female with a past medical history of muscle wasting and atrophy, chronic obstructive pulmonary disease on home o2 2 lit,, thromboembolism, deep vein thrombosis of the lower extremity, type 2 diabetes mellitus, left mastectomy, anxiety disorder, iron deficiency anemia, malignant neoplasm of the breast in remission, stage 3 pressure ulcer of the sacral region,  hyperlipidemia,anxiety disorder, and essential hypertension presented today to the emergency department due to a one-day history of shortness of breath, lethargy, and fever.      #Acute on chronic hypoxic respiratory failure   #Acute COPD exacerbation in the setting of Covid -19 Infection   #Severe Sepsis   -Admit to telemetry   -Pt met sepsis criteria on admission   -CXR - reviewed   -Lactate- 3.3   -Start Ramdisvir   -Start Dexamethasone 6mg iv daily   -C/W Azithromycin and   -c/w- Albuterol, Symbicort and Spiriva   - Stable at 2L nc currently   - F/U ID and Pulmonology consult       #Chronic normocytic Anemia   - monitor H/H  - continue PPI    Leukocytosis   - monitor     CAD  - continue ASA, statin    Breast CA  - In remession   - Not receiving  chemotherapy since 3 months   -C/W Litrazole     Hx of DVT (2018)  - Xarelto   - outpatient oncologist is Dr. Larios [phone]    Anxiety       DVT Prophylaxis:  - Xalelto     A 68-year-old female with a past medical history of muscle wasting and atrophy, chronic obstructive pulmonary disease on home o2 2 lit,, thromboembolism, deep vein thrombosis of the lower extremity, type 2 diabetes mellitus, left mastectomy, anxiety disorder, iron deficiency anemia, malignant neoplasm of the breast in remission, stage 3 pressure ulcer of the sacral region,  hyperlipidemia, anxiety disorder, and essential hypertension presented today to the emergency department due to a one-day history of shortness of breath, lethargy, and fever.      #Acute on chronic hypoxic respiratory failure   #Acute COPD exacerbation in the setting of Covid -19 Infection   #Severe Sepsis   -Admit to telemetry   -Pt met sepsis criteria on admission   -BP wnl   -CXR - reviewed   -EKG- LVH and no signs of acute ischemia   -Lactate- 3.3   -Start Ramdisvir   -Start Dexamethasone 6mg iv daily   -C/W Azithromycin and  Cefepime   -C/w- Albuterol, Symbicort and Spiriva   -Stable at 2L nc currently   -F/U ID and Pulmonology consult   -F/U am labs     #Hypokalemia   -K-2.9   -Repletion  -F/U repeat  serum potassium     #Constipation   - C/W Miralax, and senna     #Chronic normocytic Anemia   - monitor H/H  - continue PPI    #Suppressed TSH   -F/U Serum T4 and T3     #Malnutrition   -Nutrition consult    #Leukocytosis   - monitor     #CAD  - continue ASA, statin    #Breast CA  - In remission   - Not receiving  chemotherapy since 3 months   -C/W Liarozole       #Hx of DVT (2018)  - Xarelto   - outpatient oncologist is Dr. Larios [phone]    # Stage 2-Sacral Decubitus ulcer   - C/W Dressing     #DVT Prophylaxis  - Xarelto    #Code status   -Full code    d/w      A 68-year-old female with a past medical history of muscle wasting and atrophy, chronic obstructive pulmonary disease on home o2 2 lit,, thromboembolism, deep vein thrombosis of the lower extremity, type 2 diabetes mellitus, left mastectomy, anxiety disorder, iron deficiency anemia, malignant neoplasm of the breast in remission, stage 3 pressure ulcer of the sacral region,  hyperlipidemia, anxiety disorder, and essential hypertension presented today to the emergency department due to a one-day history of shortness of breath, lethargy, and fever.      #Acute on chronic hypoxic respiratory failure   #Acute COPD exacerbation in the setting of Covid -19 Infection   #Severe Sepsis   -Admit to telemetry   -Pt met sepsis criteria on admission   -BP wnl   -CXR - reviewed   -EKG- LVH and no signs of acute ischemia   -Lactate- 3.3   -Start Ramdisvir   -Start Dexamethasone 6mg iv daily   -C/W Azithromycin and  Cefepime   -C/w- Albuterol, Symbicort and Spiriva   -Stable at 2L nc currently   -F/U ID and Pulmonology consult   -F/U am labs     #Hypokalemia   -K-2.9   -Repletion  -F/U repeat  serum potassium     #Constipation   - C/W Miralax, and senna     #Chronic normocytic Anemia   - monitor H/H  - continue PPI    #Suppressed TSH   -F/U Serum T4 and T3     #Malnutrition   -Nutrition consult    #Leukocytosis   - monitor     #CAD  - continue ASA, statin    #Breast CA  - In remission   - Not receiving  chemotherapy since 3 months   -C/W Liarozole       #Hx of DVT (2018)  - Xarelto   - outpatient oncologist is Dr. Lairos [phone]    # Stage 2-Sacral Decubitus ulcer   - C/W Dressing     #DVT Prophylaxis  - Xarelto    #Code status   -Full code    d/w      A 68-year-old female with a past medical history of muscle wasting and atrophy, chronic obstructive pulmonary disease on home o2 2 lit,, thromboembolism, deep vein thrombosis of the lower extremity, type 2 diabetes mellitus, left mastectomy, anxiety disorder, iron deficiency anemia, malignant neoplasm of the breast in remission, stage 3 pressure ulcer of the sacral region,  hyperlipidemia, anxiety disorder, and essential hypertension presented today to the emergency department due to a one-day history of shortness of breath, lethargy, and fever.      #Acute on chronic hypoxic respiratory failure   #Acute COPD exacerbation in the setting of Covid -19 Infection   #Severe Sepsis   -Admit to telemetry   -Pt met sepsis criteria on admission   -BP wnl   -CXR - reviewed   -EKG- LVH and no signs of acute ischemia   -Lactate- 3.3   -Start Ramdisvir   -Start Dexamethasone 6mg iv daily   -C/W Azithromycin and  Cefepime   -C/w- Albuterol, Symbicort and Spiriva   -Stable at 2L nc currently   -F/U ID and Pulmonology consult   -F/U am labs     #Hypokalemia   -K-2.9   -Repletion  -F/U repeat  serum potassium     #Constipation   - C/W Miralax, and senna     #Chronic normocytic Anemia   - monitor H/H  - continue PPI    #Suppressed TSH   -F/U Serum T4 and T3     #Malnutrition   -Nutrition consult    #Leukocytosis   - monitor     #CAD  - continue ASA, statin    #DM-2   -Hold metformin  -Sliding scale   -F/U a1c      #Breast CA  - In remission   - Not receiving  chemotherapy since 3 months   -C/W Liarozole       #Hx of DVT (2018)  - Xarelto   - outpatient oncologist is Dr. Larios [phone]    # Stage 2-Sacral Decubitus ulcer   - C/W Dressing     #DVT Prophylaxis  - Xarelto    #Code status   -Full code    d/w      A 68-year-old female with a past medical history of muscle wasting and atrophy, chronic obstructive pulmonary disease on home o2 2 lit,, thromboembolism, deep vein thrombosis of the lower extremity, type 2 diabetes mellitus, left mastectomy, anxiety disorder, iron deficiency anemia, malignant neoplasm of the breast in remission, stage 3 pressure ulcer of the sacral region,  hyperlipidemia, anxiety disorder, and essential hypertension presented today to the emergency department due to a one-day history of shortness of breath, lethargy, and fever.      #Acute on chronic hypoxic respiratory failure   #Acute COPD exacerbation in the setting of Covid -19 Infection   #Severe Sepsis   -Admit to telemetry   -Pt met sepsis criteria on admission   -BP wnl   -CXR - reviewed   -EKG- LVH and no signs of acute ischemia   -Lactate- 3.3   -Start Ramdisvir   -Start Dexamethasone 6mg iv daily   -C/W Azithromycin and  Cefepime   -C/w- Albuterol, Symbicort and Spiriva   -Stable at 2L nc currently   -F/U ID and Pulmonology consult   -F/U am labs     #Hypokalemia   -K-2.9   -Repletion  -F/U repeat  serum potassium     #Constipation   - C/W Miralax, and senna     #Chronic normocytic Anemia   - monitor H/H  - continue PPI    #Suppressed TSH   -F/U Serum T4 and T3     #Malnutrition   -Nutrition consult    #Leukocytosis   - monitor     #CAD  - continue ASA, statin    #DM-2   -Hold metformin  -Sliding scale   -F/U a1c      #Breast CA  - In remission   - Not receiving  chemotherapy since 3 months   -C/W Liarozole       #Hx of DVT (2018)  - Xarelto   - outpatient oncologist is Dr. Larios [phone]    # Stage 2-Sacral Decubitus ulcer   - C/W Dressing     #DVT Prophylaxis  - Xarelto    #Code status   -Full code    d/w      A 68-year-old female with a past medical history of muscle wasting and atrophy, chronic obstructive pulmonary disease on home o2 2 lit,, thromboembolism, deep vein thrombosis of the lower extremity, type 2 diabetes mellitus, left mastectomy, anxiety disorder, iron deficiency anemia, malignant neoplasm of the breast in remission, stage 3 pressure ulcer of the sacral region,  hyperlipidemia, anxiety disorder, and essential hypertension presented today to the emergency department due to a one-day history of shortness of breath, lethargy, and fever.      #Acute on chronic hypoxic respiratory failure   #Acute COPD exacerbation in the setting of Covid -19 Infection   #Severe Sepsis   #Multifocal pneumonia   -Admit to telemetry   -Pt met sepsis criteria on admission   -BP wnl   -CXR - reviewed   -Ct - Multifocal pneumonia   -EKG- LVH and no signs of acute ischemia   -Lactate- 3.3   -Start Ramdisvir   -Start Dexamethasone 6mg iv daily   -C/W Azithromycin and  Cefepime   -C/w- Albuterol, Symbicort and Spiriva   -Stable at 2L nc currently   -F/U ID and Pulmonology consult   -F/U am labs     #Hypokalemia   -K-2.9   -Repletion  -F/U repeat  serum potassium     #Cholelithiasis   - Asymptomatic  -F/U out patient    #Constipation   - C/W Miralax, and senna     #Chronic normocytic Anemia   - monitor H/H  - continue PPI    #Suppressed TSH   -F/U Serum T4 and T3     #Malnutrition   -Nutrition consult    #Leukocytosis   - monitor     #CAD  - continue ASA, statin    #DM-2   -Hold metformin  -Sliding scale   -F/U a1c    #Breast CA  - In remission   - Not receiving  chemotherapy since 3 months   -C/W Liarozole     #Hx of DVT (2018)  - Xarelto   - outpatient oncologist is Dr. Larios [phone]    # Stage 2-Sacral Decubitus ulcer   - C/W Dressing     #DVT Prophylaxis  - Xarelto    #Code status   -Full code    d/w      A 68-year-old female with a past medical history of muscle wasting and atrophy, chronic obstructive pulmonary disease on home o2 2 lit,, thromboembolism, deep vein thrombosis of the lower extremity, type 2 diabetes mellitus, left mastectomy, anxiety disorder, iron deficiency anemia, malignant neoplasm of the breast in remission, stage 3 pressure ulcer of the sacral region,  hyperlipidemia, anxiety disorder, and essential hypertension presented today to the emergency department due to a one-day history of shortness of breath, lethargy, and fever.      #Acute on chronic hypoxic respiratory failure   #Acute COPD exacerbation in the setting of Covid -19 Infection   #Severe Sepsis   #Multifocal pneumonia   -Admit to telemetry   -Pt met sepsis criteria on admission   -BP wnl   -CXR - reviewed   -Ct - Multifocal pneumonia   -EKG- LVH and no signs of acute ischemia   -Lactate- 3.3   -Start Ramdisvir   -Start Dexamethasone 6mg iv daily   -C/W Azithromycin and  Cefepime   -C/w- Albuterol, Symbicort and Spiriva   -Stable at 2L nc currently   -F/U ID and Pulmonology consult   -F/U am labs     #Hypokalemia   -K-2.9   -Repletion  -F/U repeat  serum potassium     #Cholelithiasis   - Asymptomatic  -F/U out patient    #Constipation   - C/W Miralax, and senna     #Chronic normocytic Anemia   - monitor H/H  - continue PPI    #Suppressed TSH   -F/U Serum T4 and T3     #Malnutrition   -Nutrition consult    #Leukocytosis   - monitor     #CAD  - continue ASA, statin    #DM-2   -Hold metformin  -Sliding scale   -F/U a1c    #Breast CA  - In remission   - Not receiving  chemotherapy since 3 months   -C/W Liarozole     #Hx of DVT (2018)  - Xarelto   - outpatient oncologist is Dr. Larios [phone]    # Stage 2-Sacral Decubitus ulcer   - C/W Dressing     #DVT Prophylaxis  - Xarelto    #Code status   -Full code    d/w

## 2023-12-27 NOTE — ED PROVIDER NOTE - CLINICAL SUMMARY MEDICAL DECISION MAKING FREE TEXT BOX
Given history and physical clinical concern for probability of COPD exacerbation.  Will assess with x-ray likely to be admitted given patient is lethargic appearing 68-year-old female history of breast cancer chief complaint of shortness of breath.  Patient unable to provide history ; history gathered from cousin.  Patient has been having difficulty ambulating and is not currently in rehab.  Patient was having fever with difficulty breathing at the outside rehab center.   Given history and physical clinical concern for probability of COPD exacerbation.  Will assess with x-ray likely to be admitted given patient is lethargic appearing. ABG sent.   Patient signed out to incoming physician Dr Oconnell.  All decisions regarding the progression of care will be made at their discretion. Consider admission. Cultures sent and abx initiated.

## 2023-12-27 NOTE — ED PROVIDER NOTE - OBJECTIVE STATEMENT
68-year-old female history of breast cancer chief complaint of of shortness of breath.  Patient unable to write history family history gathered from cousin.  Patient has been having difficulty ambulating and is not currently in rehab.  Patient was having fever with difficulty breathing at the outside rehab center 68-year-old female history of breast cancer chief complaint of shortness of breath.  Patient unable to provide history ; history gathered from cousin.  Patient has been having difficulty ambulating and is not currently in rehab.  Patient was having fever with difficulty breathing at the outside rehab center

## 2023-12-27 NOTE — ED ADULT NURSE NOTE - NSFALLHARMRISKINTERV_ED_ALL_ED
Assistance OOB with selected safe patient handling equipment if applicable/Assistance with ambulation/Communicate risk of Fall with Harm to all staff, patient, and family/Monitor gait and stability/Monitor for mental status changes and reorient to person, place, and time, as needed/Provide visual cue: red socks, yellow wristband, yellow gown, etc/Reinforce activity limits and safety measures with patient and family/Toileting schedule using arm’s reach rule for commode and bathroom/Use of alarms - bed, stretcher, chair and/or video monitoring/Bed in lowest position, wheels locked, appropriate side rails in place/Call bell, personal items and telephone in reach/Instruct patient to call for assistance before getting out of bed/chair/stretcher/Non-slip footwear applied when patient is off stretcher/[place] to call system/Physically safe environment - no spills, clutter or unnecessary equipment/Purposeful Proactive Rounding/Room/bathroom lighting operational, light cord in reach Assistance OOB with selected safe patient handling equipment if applicable/Assistance with ambulation/Communicate risk of Fall with Harm to all staff, patient, and family/Monitor gait and stability/Monitor for mental status changes and reorient to person, place, and time, as needed/Provide visual cue: red socks, yellow wristband, yellow gown, etc/Reinforce activity limits and safety measures with patient and family/Toileting schedule using arm’s reach rule for commode and bathroom/Use of alarms - bed, stretcher, chair and/or video monitoring/Bed in lowest position, wheels locked, appropriate side rails in place/Call bell, personal items and telephone in reach/Instruct patient to call for assistance before getting out of bed/chair/stretcher/Non-slip footwear applied when patient is off stretcher/[place] to call system/Physically safe environment - no spills, clutter or unnecessary equipment/Purposeful Proactive Rounding/Room/bathroom lighting operational, light cord in reach

## 2023-12-27 NOTE — ED PROVIDER NOTE - ATTENDING CONTRIBUTION TO CARE
Kunal with Resident Dr. Quispe. 68-year-old female history of breast cancer chief complaint of shortness of breath.  Patient unable to provide history ; history gathered from cousin.  Patient has been having difficulty ambulating and is not currently in rehab.  Patient was having fever with difficulty breathing at the outside rehab center.   Given history and physical clinical concern for probability of COPD exacerbation.  Will assess with x-ray likely to be admitted given patient is lethargic appearing. ABG sent.   Patient signed out to incoming physician Dr Oconnell.  All decisions regarding the progression of care will be made at their discretion. Consider admission. Cultures sent and abx initiated.    I have discussed the patient's plan of care with PA/NP/Resident. I agree with note as stated above.  This includes History of Present Illness, HIV, Past Medical/Surgical/Family/Social History, Allergies and Home Medications, Review of Systems, Physical Exam, and any Progress Notes during the time I functioned as the attending physician for this patient.

## 2023-12-27 NOTE — ED PROVIDER NOTE - PHYSICAL EXAMINATION
GENERAL: Awake, alert, NAD  LUNGS: Mild tachypnea no wheezes or crackles   CARDIAC: RRR, no m/r/g  ABDOMEN: Soft, , non tender, non distended, no rebound, no guarding  BACK: No midline spinal tenderness, no CVA tenderness  EXT: No edema, no calf tenderness, 2+ DP pulses bilaterally, no deformities.  SKIN: Warm and dry. No rash. GENERAL: Arousable but lethargic.  LUNGS: Mild tachypnea no wheezes or crackles   CARDIAC: RRR, no m/r/g  ABDOMEN: Soft, non tender, non distended, no rebound, no guarding  BACK: No midline spinal tenderness, no CVA tenderness  EXT: No edema, no calf tenderness, 2+ DP pulses bilaterally, no deformities.  SKIN: Warm and dry. No rash.

## 2023-12-27 NOTE — H&P ADULT - ATTENDING COMMENTS
69 y/o female  with multiple comorbid conditions significant for COPD on 2 L NC, breast CA, DVT presented to ED to be evaluated for hypoxia  pt was suppose to get her CPAP delivered last night at rehab but for some reason ot was not delivered.   hypoxic respiratory failure:  covid pneumonia  -treat with dexamethasone/remdesivir  given increase  in 02 requirement  -duoneb PRN  -GI ppx  -if o2 requirement going up to >6 L from baseline in next 24 hour or pt needed to be placed on NRBM then consider adding baricitinib   -ID/Pulm consult  -CXR: hyperinflated lung     sepsis:  -C/w CAP coverage      lactic acidosis:  -? increase work of breathing, metformin at home, sepsis  -gentle IV hydration    hypokalemia:  -replenished  -F/u r/p       DVT:  -on DOAC    DM:  -C/w Insulin SS 67 y/o female  with multiple comorbid conditions significant for COPD on 2 L NC, breast CA, DVT presented to ED to be evaluated for hypoxia  pt was suppose to get her CPAP delivered last night at rehab but for some reason ot was not delivered.   hypoxic respiratory failure:  covid pneumonia  -treat with dexamethasone/remdesivir  given increase  in 02 requirement  -duoneb PRN  -GI ppx  -if o2 requirement going up to >6 L from baseline in next 24 hour or pt needed to be placed on NRBM then consider adding baricitinib   -ID/Pulm consult  -CXR: hyperinflated lung     sepsis:  -C/w CAP coverage      lactic acidosis:  -? increase work of breathing, metformin at home, sepsis  -gentle IV hydration    hypokalemia:  -replenished  -F/u r/p       DVT:  -on DOAC    DM:  -C/w Insulin SS

## 2023-12-27 NOTE — H&P ADULT - NSHPPHYSICALEXAM_GEN_ALL_CORE
Vital Signs Last 24 Hrs  T(C): 37.2 (27 Dec 2023 19:24), Max: 37.2 (27 Dec 2023 16:37)  T(F): 99 (27 Dec 2023 19:24), Max: 99 (27 Dec 2023 16:37)  HR: 94 (27 Dec 2023 19:24) (85 - 94)  BP: 124/54 (27 Dec 2023 19:24) (109/64 - 129/53)  BP(mean): 75 (27 Dec 2023 19:03) (75 - 75)  RR: 19 (27 Dec 2023 19:24) (19 - 26)  SpO2: 98% (27 Dec 2023 19:24) (96% - 98%)    Parameters below as of 27 Dec 2023 19:24  Patient On (Oxygen Delivery Method): nasal cannula  O2 Flow (L/min): 2    Exam     GENERAL: NAD, lying in bed comfortably  HEAD:  Atraumatic, Normocephalic  EYES: EOMI, PERRLA, conjunctiva and sclera clear  ENT: Moist mucous membranes  NECK: Supple, No JVD  CHEST/LUNG: Clear to auscultation bilaterally; No rales, rhonchi, wheezing, or rubs. Unlabored respirations  HEART: Regular rate and rhythm; No murmurs, rubs, or gallops  ABDOMEN: Bowel sounds present; Soft, Nontender, Nondistended. No hepatomegally  EXTREMITIES:  2+ Peripheral Pulses, brisk capillary refill. No clubbing, cyanosis, or edema  NERVOUS SYSTEM:  Alert & Oriented X3, speech clear. No deficits   MSK: FROM all 4 extremities, full and equal strength  SKIN: No rashes or lesions Vital Signs Last 24 Hrs  T(C): 37.2 (27 Dec 2023 19:24), Max: 37.2 (27 Dec 2023 16:37)  T(F): 99 (27 Dec 2023 19:24), Max: 99 (27 Dec 2023 16:37)  HR: 94 (27 Dec 2023 19:24) (85 - 94)  BP: 124/54 (27 Dec 2023 19:24) (109/64 - 129/53)  BP(mean): 75 (27 Dec 2023 19:03) (75 - 75)  RR: 19 (27 Dec 2023 19:24) (19 - 26)  SpO2: 98% (27 Dec 2023 19:24) (96% - 98%)    Parameters below as of 27 Dec 2023 19:24  Patient On (Oxygen Delivery Method): nasal cannula  O2 Flow (L/min): 2      GENERAL: Lethargic   HEAD:  Atraumatic, Normocephalic  EYES: EOMI, PERRLA, conjunctiva and sclera clear  ENT: Moist mucous membranes  Breast- Left mastectomy   NECK: Supple, No JVD  CHEST/LUNG: Unlabored respirations, Bilateral expiratory wheezing noted   HEART: Regular rate and rhythm; No murmurs, rubs, or gallops  ABDOMEN: Mild tenderness noted on palpation   EXTREMITIES:  2+ Peripheral Pulses, brisk capillary refill. No clubbing, cyanosis, or edema  NERVOUS SYSTEM:  Alert & Oriented X3, speech clear. No deficits   Skin- Stage 2 sacral decubitus ulcer noted . Chemo port noted in the right chest  MSK: FROM all 4 extremities, full and equal strength

## 2023-12-27 NOTE — H&P ADULT - HISTORY OF PRESENT ILLNESS
A 68-year-old female with a past medical history of muscle wasting and atrophy, chronic obstructive pulmonary disease on home o2 2 lit,, thromboembolism, deep vein thrombosis of the lower extremity, type 2 diabetes mellitus, left mastectomy, anxiety disorder, iron deficiency anemia, malignant neoplasm of the breast in remission, stage 3 pressure ulcer of the sacral region,  hyperlipidemia,anxiety disorder, and essential hypertension presented today to the emergency department due to a one-day history of shortness of breath, lethargy, and fever. The patient was very lethargic and unable to provide any information. Her brother was contacted; according to him, the patient has been staying in a rehab center where she has felt extremely weak for a few days. Today, she started experiencing fever and shortness of breath. Due to low oxygen saturations, she was transferred to the emergency department for further care. Her brother reported that she has been losing excessive weight over the past year and has a low oral intake. She was treated for breast cancer and was scheduled to receive chemotherapy via a port every three weeks. However, she has not received chemotherapy for the past three months. Pts oncologist said that its ok to with out chematherapy.  A 68-year-old female with a past medical history of muscle wasting and atrophy, chronic obstructive pulmonary disease on home o2 2 lit,, thromboembolism, deep vein thrombosis of the lower extremity, type 2 diabetes mellitus, left mastectomy, anxiety disorder, iron deficiency anemia, malignant neoplasm of the breast in remission, stage 3 pressure ulcer of the sacral region,  hyperlipidemia,anxiety disorder, and essential hypertension presented today to the emergency department due to a one-day history of shortness of breath, lethargy, and fever. The patient was very lethargic and unable to provide any information. Her brother was contacted; according to him, the patient has been staying in a rehab center where she has felt extremely weak for a few days. Today, she started experiencing fever and shortness of breath. Due to low oxygen saturations, she was transferred to the emergency department for further care. Her brother reported that she has been losing excessive weight over the past year and has a low oral intake. She was treated for breast cancer and was scheduled to receive chemotherapy via a port every three weeks. However, she has not received chemotherapy for the past three months.

## 2023-12-28 NOTE — PROGRESS NOTE ADULT - PROBLEM SELECTOR PLAN 2
SIRS 2/4 on admission: WBC >14, RR 26  Likely 2/2 COVID, r/o superimposed PNA  -WBC on admission 14.53, now down trending   -Afebrile in hospital  -Lactate 3.3>3.8  -c/w IVF  -c/w cefepime, azithromycin   -f/u ID recs  -f/u Ucx, Bc

## 2023-12-28 NOTE — DIETITIAN INITIAL EVALUATION ADULT - ORAL INTAKE PTA/DIET HISTORY
Pt asleep at time of visit, known from previous admissions to [facility]. Fair appetite on regular diet. History on oral nutrition supplements. NKFA.  Pt asleep at time of visit, known from previous admissions to [facility]. Fair appetite on regular diet. History on oral nutrition supplements. NKFA.

## 2023-12-28 NOTE — DIETITIAN INITIAL EVALUATION ADULT - OTHER INFO
A 68-year-old female with a past medical history of muscle wasting and atrophy, chronic obstructive pulmonary disease on home o2 2 lit,, thromboembolism, deep vein thrombosis of the lower extremity, type 2 diabetes mellitus, left mastectomy, anxiety disorder, iron deficiency anemia, malignant neoplasm of the breast in remission, stage 3 pressure ulcer of the sacral region,  hyperlipidemia, anxiety disorder, and essential hypertension presented today to the emergency department due to a one-day history of shortness of breath, lethargy, and fever.  Pt sleeping at time of visit. Currently on consistent carbohydrate diet. Weight hx: (12/5) 88lbs, (11/10) 94.4/45.1kg, (11/9) 101.1/45.9kg, (11/8) 102/46.3kg. NFPE with evidence of severe malnutrition. Will add glucerna TID given pt with good reception previously. Stage III pressure ulcer sacrum. Recommend MVI, Vitamin C, Zinc Sulfate to meet increased needs. A1c 5.1%, hx steroid induced hyperglycemia, +decadron. Hx anemia, +ferrous sulfate.

## 2023-12-28 NOTE — PROGRESS NOTE ADULT - ASSESSMENT
A 68-year-old female with a past medical history of muscle wasting and atrophy, chronic obstructive pulmonary disease on home o2 2 lit,, thromboembolism, deep vein thrombosis of the lower extremity, type 2 diabetes mellitus, left mastectomy, anxiety disorder, iron deficiency anemia, malignant neoplasm of the breast in remission, stage 3 pressure ulcer of the sacral region,  hyperlipidemia, anxiety disorder, and essential hypertension presented today to the emergency department due to a one-day history of shortness of breath, lethargy, and fever, admitted for acute hypoxic, hypercapnic respiratory failure i/s/o MICHAEL      d/w Dr. Harper     A 68-year-old female with a past medical history of muscle wasting and atrophy, chronic obstructive pulmonary disease on home o2 2 lit,, thromboembolism, deep vein thrombosis of the lower extremity, type 2 diabetes mellitus, left mastectomy, anxiety disorder, iron deficiency anemia, malignant neoplasm of the breast in remission, stage 3 pressure ulcer of the sacral region,  hyperlipidemia, anxiety disorder, and essential hypertension presented today to the emergency department due to a one-day history of shortness of breath, lethargy, and fever, admitted for acute hypoxic, hypercapnic respiratory failure i/s/o COVID as well as severe protein calorie malnutrition with complex care coordination      d/w Dr. Harper

## 2023-12-28 NOTE — PROGRESS NOTE ADULT - PROBLEM SELECTOR PLAN 1
#Mild hypercapnia, chronic due to COPD  #Acute COPD exacerbation in the setting of Covid -19 Infection   #Multifocal pneumonia likely viral 2/2 COVID, possible superimposed bacterial PNA  -CXR - personally reviewed, scattered infiltrate, no focality   -CT - Bilateral lower lobe pneumonia, greater on the right.  -EKG- LVH and no signs of acute ischemia  -Stable at 2L nc currently (home O2 baseline)  -DDimer WNL  -ABG with chronic respiratory alkalosis with metabolic acidosis  -c/w Remdisvir, Dexamethasone 6mg iv daily   -c/w Azithromycin and  Cefepime   -c/w- Albuterol, Symbicort and Spiriva   -F/U ID and Pulmonology consult

## 2023-12-28 NOTE — PROGRESS NOTE ADULT - PROBLEM SELECTOR PLAN 5
- Hold home meds  - insulin corrective scale, meals and bedtime  - Hypoglycemia protocol, fingerstick glucose QAC&HS  - Consistent Carbohydrate diet  - F/u AM HbA1c

## 2023-12-28 NOTE — PHYSICAL THERAPY INITIAL EVALUATION ADULT - PERTINENT HX OF CURRENT PROBLEM, REHAB EVAL
A 68-year-old female with a past medical history of muscle wasting and atrophy, chronic obstructive pulmonary disease on home o2 2 lit,, thromboembolism, deep vein thrombosis of the lower extremity, type 2 diabetes mellitus, left mastectomy, anxiety disorder, iron deficiency anemia, malignant neoplasm of the breast in remission, stage 3 pressure ulcer of the sacral region,  hyperlipidemia,anxiety disorder, and essential hypertension presented today to the emergency department due to a one-day history of shortness of breath, lethargy, and fever. The patient was very lethargic and unable to provide any information. Her brother was contacted; according to him, the patient has been staying in a rehab center where she has felt extremely weak for a few days. Today, she started experiencing fever and shortness of breath. Due to low oxygen saturations, she was transferred to the emergency department for further care. Her brother reported that she has been losing excessive weight over the past year and has a low oral intake. She was treated for breast cancer and was scheduled to receive chemotherapy via a port every three weeks. However, she has not received chemotherapy for the past three months.

## 2023-12-28 NOTE — CONSULT NOTE ADULT - SUBJECTIVE AND OBJECTIVE BOX
HPI:   Patient is a 68y female with    REVIEW OF SYSTEMS:  All other review of systems negative (Comprehensive ROS)    PAST MEDICAL & SURGICAL HISTORY:  COPD, severe      CAD (coronary artery disease)      Breast cancer      H/O left mastectomy          Allergies    penicillin (Hives)    Intolerances        Antimicrobials Day #    azithromycin  IVPB 500 milliGRAM(s) IV Intermittent every 24 hours  cefepime   IVPB 1000 milliGRAM(s) IV Intermittent every 12 hours  remdesivir  IVPB 100 milliGRAM(s) IV Intermittent every 24 hours  remdesivir  IVPB   IV Intermittent     Other Medications:  acetaminophen     Tablet .. 650 milliGRAM(s) Oral every 6 hours PRN  albuterol    90 MICROgram(s) HFA Inhaler 2 Puff(s) Inhalation every 6 hours PRN  aluminum hydroxide/magnesium hydroxide/simethicone Suspension 30 milliLiter(s) Oral every 4 hours PRN  bisacodyl Suppository 10 milliGRAM(s) Rectal daily PRN  budesonide 160 MICROgram(s)/formoterol 4.5 MICROgram(s) Inhaler 2 Puff(s) Inhalation two times a day  buPROPion XL (24-Hour) 150 milliGRAM(s) Oral daily  dexAMETHasone  Injectable 6 milliGRAM(s) IV Push daily  dextrose 5%. 1000 milliLiter(s) IV Continuous <Continuous>  dextrose 5%. 1000 milliLiter(s) IV Continuous <Continuous>  dextrose 50% Injectable 25 Gram(s) IV Push once  dextrose 50% Injectable 12.5 Gram(s) IV Push once  dextrose 50% Injectable 25 Gram(s) IV Push once  dextrose Oral Gel 15 Gram(s) Oral once PRN  diltiazem    milliGRAM(s) Oral daily  doxazosin 2 milliGRAM(s) Oral at bedtime  ferrous    sulfate 325 milliGRAM(s) Oral daily  glucagon  Injectable 1 milliGRAM(s) IntraMuscular once  insulin lispro (ADMELOG) corrective regimen sliding scale   SubCutaneous three times a day before meals  insulin lispro (ADMELOG) corrective regimen sliding scale   SubCutaneous at bedtime  letrozole 2.5 milliGRAM(s) Oral daily  melatonin 3 milliGRAM(s) Oral at bedtime PRN  nicotine -   7 mG/24Hr(s) Patch 1 Patch Transdermal daily  ondansetron    Tablet 4 milliGRAM(s) Oral four times a day PRN  ondansetron Injectable 4 milliGRAM(s) IV Push every 8 hours PRN  pantoprazole    Tablet 40 milliGRAM(s) Oral before breakfast  polyethylene glycol 3350 17 Gram(s) Oral daily  rivaroxaban 10 milliGRAM(s) Oral with dinner  senna 1 Tablet(s) Oral at bedtime  sodium chloride 0.9% with potassium chloride 20 mEq/L 500 milliLiter(s) IV Continuous <Continuous>  sodium chloride 0.9%. 1000 milliLiter(s) IV Continuous <Continuous>  tiotropium 2.5 MICROgram(s) Inhaler 2 Puff(s) Inhalation daily      FAMILY HISTORY:      SOCIAL HISTORY:  Smoking:     ETOH:     Drug Use:     Single     T(F): 99 (12-27-23 @ 19:24), Max: 99 (12-27-23 @ 16:37)  HR: 88 (12-28-23 @ 08:26)  BP: 126/67 (12-28-23 @ 08:26)  RR: 20 (12-28-23 @ 08:26)  SpO2: 100% (12-28-23 @ 08:26)  Wt(kg): --    PHYSICAL EXAM:  General: alert, no acute distress  Eyes:  anicteric, no conjunctival injection, no discharge  Oropharynx: no lesions or injection 	  Neck: supple, without adenopathy  Lungs: clear to auscultation  Heart: regular rate and rhythm; no murmur, rubs or gallops  Abdomen: soft, nondistended, nontender, without mass or organomegaly  Skin: no lesions  Extremities: no clubbing, cyanosis, or edema  Neurologic: alert, oriented, moves all extremities    LAB RESULTS:                        7.8    12.14 )-----------( 314      ( 28 Dec 2023 08:14 )             25.2     12-28    140  |  104  |  10  ----------------------------<  101<H>  3.5   |  29  |  <0.20<L>    Ca    8.2<L>      28 Dec 2023 08:14  Mg     1.7     12-27    TPro  6.0  /  Alb  1.8<L>  /  TBili  0.4  /  DBili  x   /  AST  52<H>  /  ALT  32  /  AlkPhos  57  12-28    LIVER FUNCTIONS - ( 28 Dec 2023 08:14 )  Alb: 1.8 g/dL / Pro: 6.0 g/dL / ALK PHOS: 57 U/L / ALT: 32 U/L / AST: 52 U/L / GGT: x           Urinalysis Basic - ( 28 Dec 2023 08:14 )    Color: x / Appearance: x / SG: x / pH: x  Gluc: 101 mg/dL / Ketone: x  / Bili: x / Urobili: x   Blood: x / Protein: x / Nitrite: x   Leuk Esterase: x / RBC: x / WBC x   Sq Epi: x / Non Sq Epi: x / Bacteria: x        MICROBIOLOGY REVIEWED:    RADIOLOGY REVIEWED:  < from: CT Abdomen and Pelvis No Cont (12.27.23 @ 21:18) >    IMPRESSION:    Limited as above.    Bilateral lower lobe pneumonia, greater on the right.    Distended bladder.    < end of copied text >   HPI:   Patient is a 68y female with hx COPD on home O2, CAD, breast CA, L mastectomy, hx DVT, DM. She presented to ER with SOB and cough with fever and lethargy. She states she has been feeling sick for weeks, but worsened symptoms over last 3 days, unsure of sick contact, no travel. She tested + COVID, also CT chest showed b/l infiltrates,R>L, hence on empiric Cefepime and Zithromax    REVIEW OF SYSTEMS:  All other review of systems negative (Comprehensive ROS)    PAST MEDICAL & SURGICAL HISTORY:  COPD, severe      CAD (coronary artery disease)      Breast cancer      H/O left mastectomy          Allergies    penicillin (Hives)    Intolerances        Antimicrobials Day #    azithromycin  IVPB 500 milliGRAM(s) IV Intermittent every 24 hours  cefepime   IVPB 1000 milliGRAM(s) IV Intermittent every 12 hours  remdesivir  IVPB 100 milliGRAM(s) IV Intermittent every 24 hours  remdesivir  IVPB   IV Intermittent     Other Medications:  acetaminophen     Tablet .. 650 milliGRAM(s) Oral every 6 hours PRN  albuterol    90 MICROgram(s) HFA Inhaler 2 Puff(s) Inhalation every 6 hours PRN  aluminum hydroxide/magnesium hydroxide/simethicone Suspension 30 milliLiter(s) Oral every 4 hours PRN  bisacodyl Suppository 10 milliGRAM(s) Rectal daily PRN  budesonide 160 MICROgram(s)/formoterol 4.5 MICROgram(s) Inhaler 2 Puff(s) Inhalation two times a day  buPROPion XL (24-Hour) 150 milliGRAM(s) Oral daily  dexAMETHasone  Injectable 6 milliGRAM(s) IV Push daily  dextrose 5%. 1000 milliLiter(s) IV Continuous <Continuous>  dextrose 5%. 1000 milliLiter(s) IV Continuous <Continuous>  dextrose 50% Injectable 25 Gram(s) IV Push once  dextrose 50% Injectable 12.5 Gram(s) IV Push once  dextrose 50% Injectable 25 Gram(s) IV Push once  dextrose Oral Gel 15 Gram(s) Oral once PRN  diltiazem    milliGRAM(s) Oral daily  doxazosin 2 milliGRAM(s) Oral at bedtime  ferrous    sulfate 325 milliGRAM(s) Oral daily  glucagon  Injectable 1 milliGRAM(s) IntraMuscular once  insulin lispro (ADMELOG) corrective regimen sliding scale   SubCutaneous three times a day before meals  insulin lispro (ADMELOG) corrective regimen sliding scale   SubCutaneous at bedtime  letrozole 2.5 milliGRAM(s) Oral daily  melatonin 3 milliGRAM(s) Oral at bedtime PRN  nicotine -   7 mG/24Hr(s) Patch 1 Patch Transdermal daily  ondansetron    Tablet 4 milliGRAM(s) Oral four times a day PRN  ondansetron Injectable 4 milliGRAM(s) IV Push every 8 hours PRN  pantoprazole    Tablet 40 milliGRAM(s) Oral before breakfast  polyethylene glycol 3350 17 Gram(s) Oral daily  rivaroxaban 10 milliGRAM(s) Oral with dinner  senna 1 Tablet(s) Oral at bedtime  sodium chloride 0.9% with potassium chloride 20 mEq/L 500 milliLiter(s) IV Continuous <Continuous>  sodium chloride 0.9%. 1000 milliLiter(s) IV Continuous <Continuous>  tiotropium 2.5 MICROgram(s) Inhaler 2 Puff(s) Inhalation daily      FAMILY HISTORY:      SOCIAL HISTORY:  Smoking:     ETOH:     Drug Use:     Single     T(F): 99 (12-27-23 @ 19:24), Max: 99 (12-27-23 @ 16:37)  HR: 88 (12-28-23 @ 08:26)  BP: 126/67 (12-28-23 @ 08:26)  RR: 20 (12-28-23 @ 08:26)  SpO2: 100% (12-28-23 @ 08:26)  Wt(kg): --    PHYSICAL EXAM:  General: frail and cachectic  Eyes:  anicteric, no conjunctival injection, no discharge  Oropharynx: no lesions or injection 	  Neck: supple, without adenopathy  Lungs: poor insp effort, R chest port  Heart: regular rate and rhythm; no murmur, rubs or gallops  Abdomen: soft, nondistended, nontender, without mass or organomegaly  Skin: no lesions  Extremities: no clubbing, cyanosis, or edema  Neurologic: alert, oriented, moves all extremities    LAB RESULTS:                        7.8    12.14 )-----------( 314      ( 28 Dec 2023 08:14 )             25.2     12-28    140  |  104  |  10  ----------------------------<  101<H>  3.5   |  29  |  <0.20<L>    Ca    8.2<L>      28 Dec 2023 08:14  Mg     1.7     12-27    TPro  6.0  /  Alb  1.8<L>  /  TBili  0.4  /  DBili  x   /  AST  52<H>  /  ALT  32  /  AlkPhos  57  12-28    LIVER FUNCTIONS - ( 28 Dec 2023 08:14 )  Alb: 1.8 g/dL / Pro: 6.0 g/dL / ALK PHOS: 57 U/L / ALT: 32 U/L / AST: 52 U/L / GGT: x           Urinalysis Basic - ( 28 Dec 2023 08:14 )    Color: x / Appearance: x / SG: x / pH: x  Gluc: 101 mg/dL / Ketone: x  / Bili: x / Urobili: x   Blood: x / Protein: x / Nitrite: x   Leuk Esterase: x / RBC: x / WBC x   Sq Epi: x / Non Sq Epi: x / Bacteria: x        MICROBIOLOGY REVIEWED:    RADIOLOGY REVIEWED:  < from: CT Abdomen and Pelvis No Cont (12.27.23 @ 21:18) >    IMPRESSION:    Limited as above.    Bilateral lower lobe pneumonia, greater on the right.    Distended bladder.    < end of copied text >

## 2023-12-28 NOTE — CONSULT NOTE ADULT - ASSESSMENT
Physical Examination:  GENERAL:               Alert, Oriented, mild-mod acute distress.    HEENT:                    No JVD, Moist MM  PULM:                     Bilateral air entry, + Rales, No Rhonchi, No Wheezing  CVS:                         S1, S2,  + Murmur  ABD:                        Soft, nondistended, nontender, normoactive bowel sounds,   EXT:                         No edema, nontender, No Cyanosis or Clubbing   NEURO:                  Alert, oriented, interactive, nonfocal, follows commands  PSYC:                      Calm, + Insight.        Assessment  1. COVID +   2. Chronic COPD, with acute dyspnea and chronic hypoxia and hypercarbia on NIV QHSand on home o2  3. Recent stopped smoking, - 1pk/day  4. h/o DVT on xaralto  5. Underlying CAD, HTN, DM2    Plan  Remdesivir, decadron  n/c o2  antibiotics    Continue Symbicort, Spiriva, Albuterol, Daliresp   Nocturnal NIV support as needed will not qualify for home NIV on discharge as pco2 < 55    continue nicotine patch  OOB, PT  monitor sat   consider dispo planning Physical Examination:  GENERAL:               Alert, Oriented, mild-mod acute distress.    HEENT:                    No JVD, Moist MM  PULM:                     Bilateral air entry, + Rales, No Rhonchi, No Wheezing  CVS:                         S1, S2,  + Murmur  ABD:                        Soft, nondistended, nontender, normoactive bowel sounds,   EXT:                         No edema, nontender, No Cyanosis or Clubbing   NEURO:                  Alert, oriented, interactive, nonfocal, follows commands  PSYC:                      Calm, + Insight.        Assessment  1. COVID +   2. Chronic COPD, with acute dyspnea and chronic hypoxia and hypercarbia on NIV QHSand on home o2  3. Recent stopped smoking, - 1pk/day  4. h/o DVT on xaralto  5. Underlying CAD, HTN, DM2    Plan  Remdesivir, decadron  n/c o2  antibiotics    Continue Symbicort, Spiriva, Albuterol, Daliresp   Nocturnal NIV support as needed will not qualify for home NIV on discharge as pco2 < 55    continue nicotine patch  OOB, PT  monitor sat   Physical Examination:  GENERAL:               Alert, Oriented, mild-mod acute distress.    HEENT:                    No JVD, Moist MM  PULM:                     Bilateral air entry, + Rales, No Rhonchi, No Wheezing  CVS:                         S1, S2,  + Murmur  ABD:                        Soft, nondistended, nontender, normoactive bowel sounds,   EXT:                         No edema, nontender, No Cyanosis or Clubbing   NEURO:                  Alert, oriented, interactive, nonfocal, follows commands  PSYC:                      Calm, + Insight.        Assessment  1. COVID +   2. Chronic COPD, with acute dyspnea and chronic hypoxia and hypercarbia on NIV QHS and on home o2  3. Recent stopped smoking, - 1pk/day  4. h/o DVT on xaralto  5. Underlying CAD, HTN, DM2    Plan  Remdesivir, decadron  n/c o2  antibiotics    Continue Symbicort, Spiriva, Albuterol,   Start Daliresp     Nocturnal NIV support as needed will not qualify for home NIV on discharge as pco2 < 55 in past   - Patient will need home NIV/AVAPS-AE for home , As patient has elevated pco2 levels on ABG  and with compensated pH patient has chronic hypercarbic respiratory failure, the use of NIV/AVAPS-AE  will allow better ventilation that a normal (conventional) NIV/Bipap will not be able to manage. Furthermore, Use on NIV/AVAPS-AE can minimize hospitalizations and improve quality of life.    OOB, PT  monitor sat

## 2023-12-28 NOTE — DIETITIAN INITIAL EVALUATION ADULT - PERTINENT MEDS FT
MEDICATIONS  (STANDING):  aspirin  chewable 81 milliGRAM(s) Oral daily  azithromycin  IVPB 500 milliGRAM(s) IV Intermittent every 24 hours  budesonide 160 MICROgram(s)/formoterol 4.5 MICROgram(s) Inhaler 2 Puff(s) Inhalation two times a day  buPROPion XL (24-Hour) 150 milliGRAM(s) Oral daily  cefepime   IVPB 1000 milliGRAM(s) IV Intermittent every 12 hours  dexAMETHasone  Injectable 6 milliGRAM(s) IV Push daily  dextrose 5%. 1000 milliLiter(s) (50 mL/Hr) IV Continuous <Continuous>  dextrose 5%. 1000 milliLiter(s) (100 mL/Hr) IV Continuous <Continuous>  dextrose 50% Injectable 12.5 Gram(s) IV Push once  dextrose 50% Injectable 25 Gram(s) IV Push once  dextrose 50% Injectable 25 Gram(s) IV Push once  diltiazem    milliGRAM(s) Oral daily  doxazosin 2 milliGRAM(s) Oral at bedtime  ferrous    sulfate 325 milliGRAM(s) Oral daily  glucagon  Injectable 1 milliGRAM(s) IntraMuscular once  insulin lispro (ADMELOG) corrective regimen sliding scale   SubCutaneous at bedtime  insulin lispro (ADMELOG) corrective regimen sliding scale   SubCutaneous three times a day before meals  letrozole 2.5 milliGRAM(s) Oral daily  nicotine -   7 mG/24Hr(s) Patch 1 Patch Transdermal daily  pantoprazole    Tablet 40 milliGRAM(s) Oral before breakfast  polyethylene glycol 3350 17 Gram(s) Oral daily  remdesivir  IVPB 100 milliGRAM(s) IV Intermittent every 24 hours  remdesivir  IVPB   IV Intermittent   rivaroxaban 10 milliGRAM(s) Oral with dinner  senna 1 Tablet(s) Oral at bedtime  sodium chloride 0.9% with potassium chloride 20 mEq/L 500 milliLiter(s) (70 mL/Hr) IV Continuous <Continuous>  sodium chloride 0.9%. 1000 milliLiter(s) (70 mL/Hr) IV Continuous <Continuous>  tiotropium 2.5 MICROgram(s) Inhaler 2 Puff(s) Inhalation daily    MEDICATIONS  (PRN):  acetaminophen     Tablet .. 650 milliGRAM(s) Oral every 6 hours PRN Temp greater or equal to 38C (100.4F), Mild Pain (1 - 3)  albuterol    90 MICROgram(s) HFA Inhaler 2 Puff(s) Inhalation every 6 hours PRN Bronchospasm  aluminum hydroxide/magnesium hydroxide/simethicone Suspension 30 milliLiter(s) Oral every 4 hours PRN Dyspepsia  bisacodyl Suppository 10 milliGRAM(s) Rectal daily PRN Constipation  dextrose Oral Gel 15 Gram(s) Oral once PRN Blood Glucose LESS THAN 70 milliGRAM(s)/deciliter  melatonin 3 milliGRAM(s) Oral at bedtime PRN Insomnia  ondansetron    Tablet 4 milliGRAM(s) Oral four times a day PRN for nausea  ondansetron Injectable 4 milliGRAM(s) IV Push every 8 hours PRN Nausea and/or Vomiting

## 2023-12-28 NOTE — DIETITIAN INITIAL EVALUATION ADULT - REASON INDICATOR FOR ASSESSMENT
Left message letting patient know [place] was sent in by Dr. Jacki Biggs as Dr. Nia Hammer was out of office. I called to verify RX was received at [facility]. They did receive this RX and they have it ready for him.   I spoke to Boston Chandler at [facility].
Nutrition Assessment

## 2023-12-28 NOTE — PROGRESS NOTE ADULT - PROBLEM SELECTOR PLAN 12
DVT PPx: On Xarelto (hx of DVT 2018)    Full Code    From [facility], uses walker  PT consult  Fall, aspiration precautions

## 2023-12-28 NOTE — DIETITIAN NUTRITION RISK NOTIFICATION - TREATMENT: THE FOLLOWING DIET HAS BEEN RECOMMENDED
Diet, Consistent Carbohydrate w/Evening Snack:   Supplement Feeding Modality:  Oral  Glucerna Shake Cans or Servings Per Day:  1       Frequency:  Three Times a day (12-28-23 @ 15:35) [Pending Verification By Attending]  Diet, Consistent Carbohydrate w/Evening Snack (12-28-23 @ 09:41) [Active]

## 2023-12-28 NOTE — PROGRESS NOTE ADULT - PROBLEM SELECTOR PLAN 10
In remission   - Not receiving  chemotherapy since 3 months   -C/W [facility]    - outpatient oncologist is Dr. Larios [phone] In remission   - Not receiving  chemotherapy since 3 months   -C/W [facility]    - outpatient oncologist is Dr. Larios [phone]

## 2023-12-28 NOTE — DIETITIAN INITIAL EVALUATION ADULT - PERTINENT LABORATORY DATA
12-28    140  |  104  |  10  ----------------------------<  101<H>  3.5   |  29  |  <0.20<L>    Ca    8.2<L>      28 Dec 2023 08:14  Mg     1.7     12-27    TPro  6.0  /  Alb  1.8<L>  /  TBili  0.4  /  DBili  x   /  AST  52<H>  /  ALT  32  /  AlkPhos  57  12-28  POCT Blood Glucose.: 129 mg/dL (12-28-23 @ 13:15)  A1C with Estimated Average Glucose Result: 5.1 % (12-28-23 @ 08:14)  A1C with Estimated Average Glucose Result: 5.3 % (10-28-23 @ 05:00)

## 2023-12-28 NOTE — CONSULT NOTE ADULT - SUBJECTIVE AND OBJECTIVE BOX
PULMONARY CONSULT  Location of Patient : [facility] 09 ([facility])  Attending requesting Consult:Deshawn Harper  Chief Complaint :  SOB  Reason For consult : SOB      Initial HPI on admission:  HPI:  68-year-old female with a past medical history of muscle wasting and atrophy, chronic obstructive pulmonary disease on home o2 2 lit,, thromboembolism, deep vein thrombosis of the lower extremity, type 2 diabetes mellitus, left mastectomy, anxiety disorder, iron deficiency anemia, malignant neoplasm of the breast in remission, stage 3 pressure ulcer of the sacral region,  hyperlipidemia,anxiety disorder, and essential hypertension presented today to the emergency department due to a one-day history of shortness of breath, lethargy, and fever. The patient was very lethargic and unable to provide any information. Her brother was contacted; according to him, the patient has been staying in a rehab center where she has felt extremely weak for a few days. Today, she started experiencing fever and shortness of breath. Due to low oxygen saturations, she was transferred to the emergency department for further care. Her brother reported that she has been losing excessive weight over the past year and has a low oral intake. She was treated for breast cancer and was scheduled to receive chemotherapy via a port every three weeks. However, she has not received chemotherapy for the past three months.  (27 Dec 2023 21:14)      BRIEF HOSPITAL COURSE:   patient found to have covid  complain of cough, sob, and feeling weak with flu like symptoms.      PAST MEDICAL & SURGICAL HISTORY:  COPD, severe  CAD (coronary artery disease)  Breast cancer  H/O left mastectomy    Allergies    penicillin (Hives)    Intolerances      FAMILY HISTORY:    Social history:   From [facility] (27 Dec 2023 21:14)     Smoking: Ex smoker, last smoke few months ago prior to hospialization     Review of Systems: limited as sob     CONSTITUTIONAL: +fever, +chills, + fatigue   RESPIRATORY: + Cough, +SOB, No Secretions  CARDIOVASCULAR: No chest pain, No palpitations, No dizziness, or +leg swelling       Medications:  MEDICATIONS  (STANDING):  ascorbic acid 500 milliGRAM(s) Oral daily  aspirin  chewable 81 milliGRAM(s) Oral daily  azithromycin  IVPB 500 milliGRAM(s) IV Intermittent every 24 hours  budesonide 160 MICROgram(s)/formoterol 4.5 MICROgram(s) Inhaler 2 Puff(s) Inhalation two times a day  buPROPion XL (24-Hour) 150 milliGRAM(s) Oral daily  cefepime   IVPB 1000 milliGRAM(s) IV Intermittent every 12 hours  dexAMETHasone  Injectable 6 milliGRAM(s) IV Push daily  dextrose 5%. 1000 milliLiter(s) (50 mL/Hr) IV Continuous <Continuous>  dextrose 5%. 1000 milliLiter(s) (100 mL/Hr) IV Continuous <Continuous>  dextrose 50% Injectable 25 Gram(s) IV Push once  dextrose 50% Injectable 12.5 Gram(s) IV Push once  dextrose 50% Injectable 25 Gram(s) IV Push once  diltiazem    milliGRAM(s) Oral daily  doxazosin 2 milliGRAM(s) Oral at bedtime  ferrous    sulfate 325 milliGRAM(s) Oral daily  glucagon  Injectable 1 milliGRAM(s) IntraMuscular once  insulin lispro (ADMELOG) corrective regimen sliding scale   SubCutaneous at bedtime  insulin lispro (ADMELOG) corrective regimen sliding scale   SubCutaneous three times a day before meals  letrozole 2.5 milliGRAM(s) Oral daily  multivitamin 1 Tablet(s) Oral daily  nicotine -   7 mG/24Hr(s) Patch 1 Patch Transdermal daily  pantoprazole    Tablet 40 milliGRAM(s) Oral before breakfast  polyethylene glycol 3350 17 Gram(s) Oral daily  remdesivir  IVPB 100 milliGRAM(s) IV Intermittent every 24 hours  remdesivir  IVPB   IV Intermittent   rivaroxaban 10 milliGRAM(s) Oral with dinner  senna 1 Tablet(s) Oral at bedtime  sodium chloride 0.9% with potassium chloride 20 mEq/L 500 milliLiter(s) (70 mL/Hr) IV Continuous <Continuous>  sodium chloride 0.9%. 1000 milliLiter(s) (70 mL/Hr) IV Continuous <Continuous>  tiotropium 2.5 MICROgram(s) Inhaler 2 Puff(s) Inhalation daily  zinc sulfate 220 milliGRAM(s) Oral daily    MEDICATIONS  (PRN):  acetaminophen     Tablet .. 650 milliGRAM(s) Oral every 6 hours PRN Temp greater or equal to 38C (100.4F), Mild Pain (1 - 3)  albuterol    90 MICROgram(s) HFA Inhaler 2 Puff(s) Inhalation every 6 hours PRN Bronchospasm  aluminum hydroxide/magnesium hydroxide/simethicone Suspension 30 milliLiter(s) Oral every 4 hours PRN Dyspepsia  bisacodyl Suppository 10 milliGRAM(s) Rectal daily PRN Constipation  dextrose Oral Gel 15 Gram(s) Oral once PRN Blood Glucose LESS THAN 70 milliGRAM(s)/deciliter  melatonin 3 milliGRAM(s) Oral at bedtime PRN Insomnia  ondansetron    Tablet 4 milliGRAM(s) Oral four times a day PRN for nausea  ondansetron Injectable 4 milliGRAM(s) IV Push every 8 hours PRN Nausea and/or Vomiting      Antibiotics History  azithromycin  IVPB 500 milliGRAM(s) IV Intermittent once, 12-27-23 @ 18:14, Stop order after: 1 Doses  azithromycin  IVPB 500 milliGRAM(s) IV Intermittent every 24 hours, 12-27-23 @ 18:15, Stop order after: 3 Doses  cefepime   IVPB 1000 milliGRAM(s) IV Intermittent once, 12-27-23 @ 18:14, Stop order after: 1 Doses  cefepime   IVPB 1000 milliGRAM(s) IV Intermittent every 12 hours, 12-27-23 @ 22:41  remdesivir  IVPB 200 milliGRAM(s) IV Intermittent every 24 hours, 12-27-23 @ 22:17, Stop order after: 1 Doses  remdesivir  IVPB 100 milliGRAM(s) IV Intermittent every 24 hours, 12-28-23 @ 22:17, Stop order after: 4 Doses  remdesivir  IVPB   IV Intermittent , 12-27-23 @ 22:17      Heme Medications   aspirin  chewable 81 milliGRAM(s) Oral daily, 12-28-23 @ 14:05  rivaroxaban 10 milliGRAM(s) Oral with dinner, 12-27-23 @ 21:57      GI Medications  aluminum hydroxide/magnesium hydroxide/simethicone Suspension 30 milliLiter(s) Oral every 4 hours, 12-27-23 @ 22:17, Routine PRN  bisacodyl Suppository 10 milliGRAM(s) Rectal daily, 12-27-23 @ 21:53, Routine PRN  pantoprazole    Tablet 40 milliGRAM(s) Oral before breakfast, 12-27-23 @ 21:52, Routine  polyethylene glycol 3350 17 Gram(s) Oral daily, 12-27-23 @ 21:53, Routine  senna 1 Tablet(s) Oral at bedtime, 12-27-23 @ 21:53, Routine        Home Medications:  Last Order Reconciliation Date: 12-27-23 @ 21:58 (Admission Reconciliation)  albuterol 2.5 mg/3 mL (0.083%) inhalation solution: inhaled every 6 hours likely d/c when taper finishing (12-11-23 @ 10:49)  ALPRAZolam 0.25 mg oral tablet: 1 tab(s) orally every 8 hours As needed anxiety (12-11-23 @ 10:49)  Aspir 81 oral delayed release tablet: 1 tab(s) orally once a day (12-05-23 @ 11:06)  atorvastatin 80 mg oral tablet: 1 tab(s) orally once a day (12-05-23 @ 11:06)  bisacodyl 10 mg rectal suppository: 1 suppository(ies) rectal once a day As needed Constipation (12-05-23 @ 11:06)  budesonide-formoterol 160 mcg-4.5 mcg/inh inhalation aerosol: inhaled 2 times a day (12-11-23 @ 10:49)  buPROPion 150 mg/24 hours (XL) oral tablet, extended release: 2 tab(s) orally once a day (12-05-23 @ 11:06)  Cardizem  mg/24 hours oral capsule, extended release: 1 cap(s) orally once a day (12-05-23 @ 11:04)  doxazosin 2 mg oral tablet: 1 tab(s) orally once a day (at bedtime) (12-05-23 @ 11:06)  droNABinol 5 mg oral capsule: 1 cap(s) orally once a day (12-11-23 @ 10:49)  ferrous sulfate 325 mg (65 mg elemental iron) oral tablet: 1 tab(s) orally once a day (12-05-23 @ 11:06)  letrozole 2.5 mg oral tablet: 1 tab(s) orally once a day (12-05-23 @ 11:06)  melatonin 3 mg oral tablet: 1 tab(s) orally once a day (at bedtime) As needed Insomnia (12-11-23 @ 10:49)  metFORMIN 500 mg oral tablet: 1 tab(s) orally 2 times a day (12-05-23 @ 11:06)  MiraLax oral powder for reconstitution: 17 gram(s) orally once a day (12-05-23 @ 11:06)  nicotine 7 mg/24 hr transdermal film, extended release: 1 film(s) transdermal once a day (12-05-23 @ 11:06)  omeprazole 40 mg oral delayed release capsule: 1 cap(s) orally once a day (12-05-23 @ 10:59)  predniSONE 20 mg oral tablet: 2 tab(s) orally once a day Last dose 12/12. Then steroid taper Prednisone by 10mg every 3 days. (12-11-23 @ 10:49)  rivaroxaban 10 mg oral tablet: 1 tab(s) orally once a day (before a meal) (12-05-23 @ 11:06)  roflumilast 500 mcg oral tablet: 1 tab(s) orally once a day (12-05-23 @ 11:06)  senna (sennosides) 8.6 mg oral tablet: 2 tab(s) orally once a day (at bedtime) (12-05-23 @ 11:00)  Spiriva 18 mcg inhalation capsule: 1 cap(s) inhaled once a day (12-05-23 @ 11:02)  Tylenol 325 mg oral tablet: 2 tab(s) orally every 6 hours as needed for  mild pain (12-05-23 @ 11:06)  Zofran 4 mg oral tablet: 1 tab(s) orally 4 times a day as needed for  nausea (12-05-23 @ 11:05)      LABS:                        7.8    12.14 )-----------( 314      ( 28 Dec 2023 08:14 )             25.2     12-28    140  |  104  |  10  ----------------------------<  101<H>  3.5   |  29  |  <0.20<L>    Ca    8.2<L>      28 Dec 2023 08:14  Mg     1.7     12-27    TPro  6.0  /  Alb  1.8<L>  /  TBili  0.4  /  DBili  x   /  AST  52<H>  /  ALT  32  /  AlkPhos  57  12-28    HIT ab -- 12-27 @ 20:15  HIT ab EIA --  D Dimer -<150          PT/INR - ( 28 Dec 2023 08:14 )   PT: 11.8 sec;   INR: 1.04 ratio         PTT - ( 28 Dec 2023 08:14 )  PTT:37.1 sec  Urinalysis Basic - ( 28 Dec 2023 08:14 )    Color: x / Appearance: x / SG: x / pH: x  Gluc: 101 mg/dL / Ketone: x  / Bili: x / Urobili: x   Blood: x / Protein: x / Nitrite: x   Leuk Esterase: x / RBC: x / WBC x   Sq Epi: x / Non Sq Epi: x / Bacteria: x         CULTURES: (if applicable)    Rapid RVP Result: Detected (12-27-23 @ 17:14)    SARS-CoV-2: Detected: This Respiratory Panel uses polymerase chain reaction (PCR) to detect for    RADIOLOGY  CXR:      CT:  < from: CT Abdomen and Pelvis No Cont (12.27.23 @ 21:18) >    ACC: [id number] EXAM:  CT ABDOMEN AND PELVIS   ORDERED BY: SAEID KWON     PROCEDURE DATE:  12/27/2023          INTERPRETATION:  CLINICAL INFORMATION: 68 years  Female with Abdominal   pain. Breast cancer and COPD.    COMPARISON: G chest 10/27/2023    CONTRAST/COMPLICATIONS:  IV Contrast: NONE  Oral Contrast: NONE  Complications: None reported at time of study completion    PROCEDURE:  CT of the Abdomen and Pelvis was performed.  Sagittal and coronal reformats were performed.    LIMITATIONS: Evaluation of the solid organs, vascular structures and GI   tract is limited without oral and IV contrast. Motion artifact. Streak   artifact from patient's arms.    FINDINGS:  LOWER CHEST: Cardiac pacemaker leads. Patchy bilateral lower lobe   airspace infiltrates and right lower lobe consolidation consistent with   pneumonia. Trace bilateral pleural effusions.    LIVER: Within normal limits.  BILE DUCTS: Normal caliber.  GALLBLADDER: Cholecystectomy.  SPLEEN: Within normal limits.  PANCREAS: Within normal limits.  ADRENALS: Within normal limits.  KIDNEYS/URETERS: Within normal limits.    BLADDER: Distended measuring 12.6 cm.  REPRODUCTIVE ORGANS: Unremarkable uterus.    BOWEL: No bowel obstruction. Appendix is not visualized. No evidence of   inflammation in the pericecal region.. Moderate colonic stool burden.   Mild rectal prolapse.  PERITONEUM: No ascites.  VESSELS: Advanced atherosclerosis with marked stenosis of the bilateral   common iliac arteries.  RETROPERITONEUM/LYMPH NODES: No lymphadenopathy.  ABDOMINAL WALL: Within normal limits.  BONES: Mild degenerative changes.    IMPRESSION:    Limited as above.    Bilateral lower lobe pneumonia, greater on the right.    Distended bladder.    Cholelithiasis.    Moderate constipation with mild rectal prolapse.    Advanced atherosclerosis. Sternotomy bilateral common iliac artery.        A preliminary report was provided by Dr. Nasreen Salgado at 11:46 PM on   12/27/2023    --- End of Report ---      < end of copied text >    ECHO:      VITALS:  T(C): 37.2 (12-27-23 @ 19:24), Max: 37.2 (12-27-23 @ 19:24)  T(F): 99 (12-27-23 @ 19:24), Max: 99 (12-27-23 @ 19:24)  HR: 88 (12-28-23 @ 08:26) (88 - 94)  BP: 126/67 (12-28-23 @ 08:26) (124/54 - 129/53)  BP(mean): 75 (12-27-23 @ 19:03) (75 - 75)  ABP: --  ABP(mean): --  RR: 20 (12-28-23 @ 08:26) (19 - 20)  SpO2: 100% (12-28-23 @ 08:26) (97% - 100%)  CVP(mm Hg): --  CVP(cm H2O): --    Ins and Outs       Height (cm): 149.9 (12-28-23 @ 09:36)  Weight (kg): 38.6 (12-28-23 @ 09:36)  BMI (kg/m2): 17.2 (12-28-23 @ 09:36)        I&O's Detail      Physical Examination:  GENERAL:               Alert, Oriented, No acute distress.    HEENT:                    Pupils equal, reactive to light.  Symmetric. No JVD, Moist MM  PULM:                     Bilateral air entry, Clear to auscultation bilaterally, no significant sputum production, No Rales, No Rhonchi, No Wheezing  CVS:                         S1, S2,  No Murmur  ABD:                        Soft, nondistended, nontender, normoactive bowel sounds,   EXT:                         No edema, nontender, No Cyanosis or Clubbing   Vascular:                Warm Extremities, Normal Capillary refill, Normal Distal Pulses  SKIN:                       Warm and well perfused, no rashes noted.   NEURO:                  Alert, oriented, interactive, nonfocal, follows commands  PSYC:                      Calm, + Insight.     PULMONARY CONSULT  [facility] of Patient : [facility] ([facility]  Attending requesting Consult:Deshawn Harper  Chief Complaint :  SOB  Reason For consult : SOB      Initial HPI on admission:  HPI:  68-year-old female with a past medical history of muscle wasting and atrophy, chronic obstructive pulmonary disease on home o2 2 lit,, thromboembolism, deep vein thrombosis of the lower extremity, type 2 diabetes mellitus, left mastectomy, anxiety disorder, iron deficiency anemia, malignant neoplasm of the breast in remission, stage 3 pressure ulcer of the sacral region,  hyperlipidemia,anxiety disorder, and essential hypertension presented today to the emergency department due to a one-day history of shortness of breath, lethargy, and fever. The patient was very lethargic and unable to provide any information. Her brother was contacted; according to him, the patient has been staying in a rehab center where she has felt extremely weak for a few days. Today, she started experiencing fever and shortness of breath. Due to low oxygen saturations, she was transferred to the emergency department for further care. Her brother reported that she has been losing excessive weight over the past year and has a low oral intake. She was treated for breast cancer and was scheduled to receive chemotherapy via a port every three weeks. However, she has not received chemotherapy for the past three months.  (27 Dec 2023 21:14)      BRIEF HOSPITAL COURSE:   patient found to have covid  complain of cough, sob, and feeling weak with flu like symptoms.      PAST MEDICAL & SURGICAL HISTORY:  COPD, severe  CAD (coronary artery disease)  Breast cancer  H/O left mastectomy    Allergies    penicillin (Hives)    Intolerances      FAMILY HISTORY:    Social history:   From [facility] (27 Dec 2023 21:14)     Smoking: Ex smoker, last smoke few months ago prior to hospialization     Review of Systems: limited as sob     CONSTITUTIONAL: +fever, +chills, + fatigue   RESPIRATORY: + Cough, +SOB, No Secretions  CARDIOVASCULAR: No chest pain, No palpitations, No dizziness, or +leg swelling       Medications:  MEDICATIONS  (STANDING):  ascorbic acid 500 milliGRAM(s) Oral daily  aspirin  chewable 81 milliGRAM(s) Oral daily  azithromycin  IVPB 500 milliGRAM(s) IV Intermittent every 24 hours  budesonide 160 MICROgram(s)/formoterol 4.5 MICROgram(s) Inhaler 2 Puff(s) Inhalation two times a day  buPROPion XL (24-Hour) 150 milliGRAM(s) Oral daily  cefepime   IVPB 1000 milliGRAM(s) IV Intermittent every 12 hours  dexAMETHasone  Injectable 6 milliGRAM(s) IV Push daily  dextrose 5%. 1000 milliLiter(s) (50 mL/Hr) IV Continuous <Continuous>  dextrose 5%. 1000 milliLiter(s) (100 mL/Hr) IV Continuous <Continuous>  dextrose 50% Injectable 25 Gram(s) IV Push once  dextrose 50% Injectable 12.5 Gram(s) IV Push once  dextrose 50% Injectable 25 Gram(s) IV Push once  diltiazem    milliGRAM(s) Oral daily  doxazosin 2 milliGRAM(s) Oral at bedtime  ferrous    sulfate 325 milliGRAM(s) Oral daily  glucagon  Injectable 1 milliGRAM(s) IntraMuscular once  insulin lispro (ADMELOG) corrective regimen sliding scale   SubCutaneous at bedtime  insulin lispro (ADMELOG) corrective regimen sliding scale   SubCutaneous three times a day before meals  letrozole 2.5 milliGRAM(s) Oral daily  multivitamin 1 Tablet(s) Oral daily  nicotine -   7 mG/24Hr(s) Patch 1 Patch Transdermal daily  pantoprazole    Tablet 40 milliGRAM(s) Oral before breakfast  polyethylene glycol 3350 17 Gram(s) Oral daily  remdesivir  IVPB 100 milliGRAM(s) IV Intermittent every 24 hours  remdesivir  IVPB   IV Intermittent   rivaroxaban 10 milliGRAM(s) Oral with dinner  senna 1 Tablet(s) Oral at bedtime  sodium chloride 0.9% with potassium chloride 20 mEq/L 500 milliLiter(s) (70 mL/Hr) IV Continuous <Continuous>  sodium chloride 0.9%. 1000 milliLiter(s) (70 mL/Hr) IV Continuous <Continuous>  tiotropium 2.5 MICROgram(s) Inhaler 2 Puff(s) Inhalation daily  zinc sulfate 220 milliGRAM(s) Oral daily    MEDICATIONS  (PRN):  acetaminophen     Tablet .. 650 milliGRAM(s) Oral every 6 hours PRN Temp greater or equal to 38C (100.4F), Mild Pain (1 - 3)  albuterol    90 MICROgram(s) HFA Inhaler 2 Puff(s) Inhalation every 6 hours PRN Bronchospasm  aluminum hydroxide/magnesium hydroxide/simethicone Suspension 30 milliLiter(s) Oral every 4 hours PRN Dyspepsia  bisacodyl Suppository 10 milliGRAM(s) Rectal daily PRN Constipation  dextrose Oral Gel 15 Gram(s) Oral once PRN Blood Glucose LESS THAN 70 milliGRAM(s)/deciliter  melatonin 3 milliGRAM(s) Oral at bedtime PRN Insomnia  ondansetron    Tablet 4 milliGRAM(s) Oral four times a day PRN for nausea  ondansetron Injectable 4 milliGRAM(s) IV Push every 8 hours PRN Nausea and/or Vomiting      Antibiotics History  azithromycin  IVPB 500 milliGRAM(s) IV Intermittent once, 12-27-23 @ 18:14, Stop order after: 1 Doses  azithromycin  IVPB 500 milliGRAM(s) IV Intermittent every 24 hours, 12-27-23 @ 18:15, Stop order after: 3 Doses  cefepime   IVPB 1000 milliGRAM(s) IV Intermittent once, 12-27-23 @ 18:14, Stop order after: 1 Doses  cefepime   IVPB 1000 milliGRAM(s) IV Intermittent every 12 hours, 12-27-23 @ 22:41  remdesivir  IVPB 200 milliGRAM(s) IV Intermittent every 24 hours, 12-27-23 @ 22:17, Stop order after: 1 Doses  remdesivir  IVPB 100 milliGRAM(s) IV Intermittent every 24 hours, 12-28-23 @ 22:17, Stop order after: 4 Doses  remdesivir  IVPB   IV Intermittent , 12-27-23 @ 22:17      Heme Medications   aspirin  chewable 81 milliGRAM(s) Oral daily, 12-28-23 @ 14:05  rivaroxaban 10 milliGRAM(s) Oral with dinner, 12-27-23 @ 21:57      GI Medications  aluminum hydroxide/magnesium hydroxide/simethicone Suspension 30 milliLiter(s) Oral every 4 hours, 12-27-23 @ 22:17, Routine PRN  bisacodyl Suppository 10 milliGRAM(s) Rectal daily, 12-27-23 @ 21:53, Routine PRN  pantoprazole    Tablet 40 milliGRAM(s) Oral before breakfast, 12-27-23 @ 21:52, Routine  polyethylene glycol 3350 17 Gram(s) Oral daily, 12-27-23 @ 21:53, Routine  senna 1 Tablet(s) Oral at bedtime, 12-27-23 @ 21:53, Routine        Home Medications:  Last Order Reconciliation Date: 12-27-23 @ 21:58 (Admission Reconciliation)  albuterol 2.5 mg/3 mL (0.083%) inhalation solution: inhaled every 6 hours likely d/c when taper finishing (12-11-23 @ 10:49)  ALPRAZolam 0.25 mg oral tablet: 1 tab(s) orally every 8 hours As needed anxiety (12-11-23 @ 10:49)  Aspir 81 oral delayed release tablet: 1 tab(s) orally once a day (12-05-23 @ 11:06)  atorvastatin 80 mg oral tablet: 1 tab(s) orally once a day (12-05-23 @ 11:06)  bisacodyl 10 mg rectal suppository: 1 suppository(ies) rectal once a day As needed Constipation (12-05-23 @ 11:06)  budesonide-formoterol 160 mcg-4.5 mcg/inh inhalation aerosol: inhaled 2 times a day (12-11-23 @ 10:49)  buPROPion 150 mg/24 hours (XL) oral tablet, extended release: 2 tab(s) orally once a day (12-05-23 @ 11:06)  Cardizem  mg/24 hours oral capsule, extended release: 1 cap(s) orally once a day (12-05-23 @ 11:04)  doxazosin 2 mg oral tablet: 1 tab(s) orally once a day (at bedtime) (12-05-23 @ 11:06)  droNABinol 5 mg oral capsule: 1 cap(s) orally once a day (12-11-23 @ 10:49)  ferrous sulfate 325 mg (65 mg elemental iron) oral tablet: 1 tab(s) orally once a day (12-05-23 @ 11:06)  letrozole 2.5 mg oral tablet: 1 tab(s) orally once a day (12-05-23 @ 11:06)  melatonin 3 mg oral tablet: 1 tab(s) orally once a day (at bedtime) As needed Insomnia (12-11-23 @ 10:49)  metFORMIN 500 mg oral tablet: 1 tab(s) orally 2 times a day (12-05-23 @ 11:06)  MiraLax oral powder for reconstitution: 17 gram(s) orally once a day (12-05-23 @ 11:06)  nicotine 7 mg/24 hr transdermal film, extended release: 1 film(s) transdermal once a day (12-05-23 @ 11:06)  omeprazole 40 mg oral delayed release capsule: 1 cap(s) orally once a day (12-05-23 @ 10:59)  predniSONE 20 mg oral tablet: 2 tab(s) orally once a day Last dose 12/12. Then steroid taper Prednisone by 10mg every 3 days. (12-11-23 @ 10:49)  rivaroxaban 10 mg oral tablet: 1 tab(s) orally once a day (before a meal) (12-05-23 @ 11:06)  roflumilast 500 mcg oral tablet: 1 tab(s) orally once a day (12-05-23 @ 11:06)  senna (sennosides) 8.6 mg oral tablet: 2 tab(s) orally once a day (at bedtime) (12-05-23 @ 11:00)  Spiriva 18 mcg inhalation capsule: 1 cap(s) inhaled once a day (12-05-23 @ 11:02)  Tylenol 325 mg oral tablet: 2 tab(s) orally every 6 hours as needed for  mild pain (12-05-23 @ 11:06)  Zofran 4 mg oral tablet: 1 tab(s) orally 4 times a day as needed for  nausea (12-05-23 @ 11:05)      LABS:                        7.8    12.14 )-----------( 314      ( 28 Dec 2023 08:14 )             25.2     12-28    140  |  104  |  10  ----------------------------<  101<H>  3.5   |  29  |  <0.20<L>    Ca    8.2<L>      28 Dec 2023 08:14  Mg     1.7     12-27    TPro  6.0  /  Alb  1.8<L>  /  TBili  0.4  /  DBili  x   /  AST  52<H>  /  ALT  32  /  AlkPhos  57  12-28    HIT ab -- 12-27 @ 20:15  HIT ab EIA --  D Dimer -<150          PT/INR - ( 28 Dec 2023 08:14 )   PT: 11.8 sec;   INR: 1.04 ratio         PTT - ( 28 Dec 2023 08:14 )  PTT:37.1 sec  Urinalysis Basic - ( 28 Dec 2023 08:14 )    Color: x / Appearance: x / SG: x / pH: x  Gluc: 101 mg/dL / Ketone: x  / Bili: x / Urobili: x   Blood: x / Protein: x / Nitrite: x   Leuk Esterase: x / RBC: x / WBC x   Sq Epi: x / Non Sq Epi: x / Bacteria: x         CULTURES: (if applicable)    Rapid RVP Result: Detected (12-27-23 @ 17:14)    SARS-CoV-2: Detected: This Respiratory Panel uses polymerase chain reaction (PCR) to detect for    RADIOLOGY  CXR:      CT:  < from: CT Abdomen and Pelvis No Cont (12.27.23 @ 21:18) >    ACC: [id number] EXAM:  CT ABDOMEN AND PELVIS   ORDERED BY: SAEID KWON     PROCEDURE DATE:  12/27/2023          INTERPRETATION:  CLINICAL INFORMATION: 68 years  Female with Abdominal   pain. Breast cancer and COPD.    COMPARISON: G chest 10/27/2023    CONTRAST/COMPLICATIONS:  IV Contrast: NONE  Oral Contrast: NONE  Complications: None reported at time of study completion    PROCEDURE:  CT of the Abdomen and Pelvis was performed.  Sagittal and coronal reformats were performed.    LIMITATIONS: Evaluation of the solid organs, vascular structures and GI   tract is limited without oral and IV contrast. Motion artifact. Streak   artifact from patient's arms.    FINDINGS:  LOWER CHEST: Cardiac pacemaker leads. Patchy bilateral lower lobe   airspace infiltrates and right lower lobe consolidation consistent with   pneumonia. Trace bilateral pleural effusions.    LIVER: Within normal limits.  BILE DUCTS: Normal caliber.  GALLBLADDER: Cholecystectomy.  SPLEEN: Within normal limits.  PANCREAS: Within normal limits.  ADRENALS: Within normal limits.  KIDNEYS/URETERS: Within normal limits.    BLADDER: Distended measuring 12.6 cm.  REPRODUCTIVE ORGANS: Unremarkable uterus.    BOWEL: No bowel obstruction. Appendix is not visualized. No evidence of   inflammation in the pericecal region.. Moderate colonic stool burden.   Mild rectal prolapse.  PERITONEUM: No ascites.  VESSELS: Advanced atherosclerosis with marked stenosis of the bilateral   common iliac arteries.  RETROPERITONEUM/LYMPH NODES: No lymphadenopathy.  ABDOMINAL WALL: Within normal limits.  BONES: Mild degenerative changes.    IMPRESSION:    Limited as above.    Bilateral lower lobe pneumonia, greater on the right.    Distended bladder.    Cholelithiasis.    Moderate constipation with mild rectal prolapse.    Advanced atherosclerosis. Sternotomy bilateral common iliac artery.        A preliminary report was provided by Dr. Nasreen Salgado at 11:46 PM on   12/27/2023    --- End of Report ---      < end of copied text >    ECHO:      VITALS:  T(C): 37.2 (12-27-23 @ 19:24), Max: 37.2 (12-27-23 @ 19:24)  T(F): 99 (12-27-23 @ 19:24), Max: 99 (12-27-23 @ 19:24)  HR: 88 (12-28-23 @ 08:26) (88 - 94)  BP: 126/67 (12-28-23 @ 08:26) (124/54 - 129/53)  BP(mean): 75 (12-27-23 @ 19:03) (75 - 75)  ABP: --  ABP(mean): --  RR: 20 (12-28-23 @ 08:26) (19 - 20)  SpO2: 100% (12-28-23 @ 08:26) (97% - 100%)  CVP(mm Hg): --  CVP(cm H2O): --    Ins and Outs       Height (cm): 149.9 (12-28-23 @ 09:36)  Weight (kg): 38.6 (12-28-23 @ 09:36)  BMI (kg/m2): 17.2 (12-28-23 @ 09:36)        I&O's Detail      Physical Examination:  GENERAL:               Alert, Oriented, No acute distress.    HEENT:                    Pupils equal, reactive to light.  Symmetric. No JVD, Moist MM  PULM:                     Bilateral air entry, Clear to auscultation bilaterally, no significant sputum production, No Rales, No Rhonchi, No Wheezing  CVS:                         S1, S2,  No Murmur  ABD:                        Soft, nondistended, nontender, normoactive bowel sounds,   EXT:                         No edema, nontender, No Cyanosis or Clubbing   Vascular:                Warm Extremities, Normal Capillary refill, Normal Distal Pulses  SKIN:                       Warm and well perfused, no rashes noted.   NEURO:                  Alert, oriented, interactive, nonfocal, follows commands  PSYC:                      Calm, + Insight.     PULMONARY CONSULT  Location of Patient : [facility] ([facility]  Attending requesting Consult:Deshawn Harper  Chief Complaint :  SOB  Reason For consult : SOB      Initial HPI on admission:  HPI:  68-year-old female with a past medical history of muscle wasting and atrophy, chronic obstructive pulmonary disease on home o2 2 lit,, thromboembolism, deep vein thrombosis of the lower extremity, type 2 diabetes mellitus, left mastectomy, anxiety disorder, iron deficiency anemia, malignant neoplasm of the breast in remission, stage 3 pressure ulcer of the sacral region,  hyperlipidemia,anxiety disorder, and essential hypertension presented today to the emergency department due to a one-day history of shortness of breath, lethargy, and fever. The patient was very lethargic and unable to provide any information. Her brother was contacted; according to him, the patient has been staying in a rehab center where she has felt extremely weak for a few days. Today, she started experiencing fever and shortness of breath. Due to low oxygen saturations, she was transferred to the emergency department for further care. Her brother reported that she has been losing excessive weight over the past year and has a low oral intake. She was treated for breast cancer and was scheduled to receive chemotherapy via a port every three weeks. However, she has not received chemotherapy for the past three months.  (27 Dec 2023 21:14)      BRIEF HOSPITAL COURSE:   patient found to have covid  complain of cough, sob, and feeling weak with flu like symptoms.      PAST MEDICAL & SURGICAL HISTORY:  COPD, severe  CAD (coronary artery disease)  Breast cancer  H/O left mastectomy    Allergies    penicillin (Hives)    Intolerances      FAMILY HISTORY:    Social history:   From [facility] (27 Dec 2023 21:14)     Smoking: Ex smoker, last smoke few months ago prior to hospialization     Review of Systems: limited as sob     CONSTITUTIONAL: +fever, +chills, + fatigue   RESPIRATORY: + Cough, +SOB, No Secretions  CARDIOVASCULAR: No chest pain, No palpitations, No dizziness, or +leg swelling       Medications:  MEDICATIONS  (STANDING):  ascorbic acid 500 milliGRAM(s) Oral daily  aspirin  chewable 81 milliGRAM(s) Oral daily  azithromycin  IVPB 500 milliGRAM(s) IV Intermittent every 24 hours  budesonide 160 MICROgram(s)/formoterol 4.5 MICROgram(s) Inhaler 2 Puff(s) Inhalation two times a day  buPROPion XL (24-Hour) 150 milliGRAM(s) Oral daily  cefepime   IVPB 1000 milliGRAM(s) IV Intermittent every 12 hours  dexAMETHasone  Injectable 6 milliGRAM(s) IV Push daily  dextrose 5%. 1000 milliLiter(s) (50 mL/Hr) IV Continuous <Continuous>  dextrose 5%. 1000 milliLiter(s) (100 mL/Hr) IV Continuous <Continuous>  dextrose 50% Injectable 25 Gram(s) IV Push once  dextrose 50% Injectable 12.5 Gram(s) IV Push once  dextrose 50% Injectable 25 Gram(s) IV Push once  diltiazem    milliGRAM(s) Oral daily  doxazosin 2 milliGRAM(s) Oral at bedtime  ferrous    sulfate 325 milliGRAM(s) Oral daily  glucagon  Injectable 1 milliGRAM(s) IntraMuscular once  insulin lispro (ADMELOG) corrective regimen sliding scale   SubCutaneous at bedtime  insulin lispro (ADMELOG) corrective regimen sliding scale   SubCutaneous three times a day before meals  letrozole 2.5 milliGRAM(s) Oral daily  multivitamin 1 Tablet(s) Oral daily  nicotine -   7 mG/24Hr(s) Patch 1 Patch Transdermal daily  pantoprazole    Tablet 40 milliGRAM(s) Oral before breakfast  polyethylene glycol 3350 17 Gram(s) Oral daily  remdesivir  IVPB 100 milliGRAM(s) IV Intermittent every 24 hours  remdesivir  IVPB   IV Intermittent   rivaroxaban 10 milliGRAM(s) Oral with dinner  senna 1 Tablet(s) Oral at bedtime  sodium chloride 0.9% with potassium chloride 20 mEq/L 500 milliLiter(s) (70 mL/Hr) IV Continuous <Continuous>  sodium chloride 0.9%. 1000 milliLiter(s) (70 mL/Hr) IV Continuous <Continuous>  tiotropium 2.5 MICROgram(s) Inhaler 2 Puff(s) Inhalation daily  zinc sulfate 220 milliGRAM(s) Oral daily    MEDICATIONS  (PRN):  acetaminophen     Tablet .. 650 milliGRAM(s) Oral every 6 hours PRN Temp greater or equal to 38C (100.4F), Mild Pain (1 - 3)  albuterol    90 MICROgram(s) HFA Inhaler 2 Puff(s) Inhalation every 6 hours PRN Bronchospasm  aluminum hydroxide/magnesium hydroxide/simethicone Suspension 30 milliLiter(s) Oral every 4 hours PRN Dyspepsia  bisacodyl Suppository 10 milliGRAM(s) Rectal daily PRN Constipation  dextrose Oral Gel 15 Gram(s) Oral once PRN Blood Glucose LESS THAN 70 milliGRAM(s)/deciliter  melatonin 3 milliGRAM(s) Oral at bedtime PRN Insomnia  ondansetron    Tablet 4 milliGRAM(s) Oral four times a day PRN for nausea  ondansetron Injectable 4 milliGRAM(s) IV Push every 8 hours PRN Nausea and/or Vomiting      Antibiotics History  azithromycin  IVPB 500 milliGRAM(s) IV Intermittent once, 12-27-23 @ 18:14, Stop order after: 1 Doses  azithromycin  IVPB 500 milliGRAM(s) IV Intermittent every 24 hours, 12-27-23 @ 18:15, Stop order after: 3 Doses  cefepime   IVPB 1000 milliGRAM(s) IV Intermittent once, 12-27-23 @ 18:14, Stop order after: 1 Doses  cefepime   IVPB 1000 milliGRAM(s) IV Intermittent every 12 hours, 12-27-23 @ 22:41  remdesivir  IVPB 200 milliGRAM(s) IV Intermittent every 24 hours, 12-27-23 @ 22:17, Stop order after: 1 Doses  remdesivir  IVPB 100 milliGRAM(s) IV Intermittent every 24 hours, 12-28-23 @ 22:17, Stop order after: 4 Doses  remdesivir  IVPB   IV Intermittent , 12-27-23 @ 22:17      Heme Medications   aspirin  chewable 81 milliGRAM(s) Oral daily, 12-28-23 @ 14:05  rivaroxaban 10 milliGRAM(s) Oral with dinner, 12-27-23 @ 21:57      GI Medications  aluminum hydroxide/magnesium hydroxide/simethicone Suspension 30 milliLiter(s) Oral every 4 hours, 12-27-23 @ 22:17, Routine PRN  bisacodyl Suppository 10 milliGRAM(s) Rectal daily, 12-27-23 @ 21:53, Routine PRN  pantoprazole    Tablet 40 milliGRAM(s) Oral before breakfast, 12-27-23 @ 21:52, Routine  polyethylene glycol 3350 17 Gram(s) Oral daily, 12-27-23 @ 21:53, Routine  senna 1 Tablet(s) Oral at bedtime, 12-27-23 @ 21:53, Routine        Home Medications:  Last Order Reconciliation Date: 12-27-23 @ 21:58 (Admission Reconciliation)  albuterol 2.5 mg/3 mL (0.083%) inhalation solution: inhaled every 6 hours likely d/c when taper finishing (12-11-23 @ 10:49)  ALPRAZolam 0.25 mg oral tablet: 1 tab(s) orally every 8 hours As needed anxiety (12-11-23 @ 10:49)  Aspir 81 oral delayed release tablet: 1 tab(s) orally once a day (12-05-23 @ 11:06)  atorvastatin 80 mg oral tablet: 1 tab(s) orally once a day (12-05-23 @ 11:06)  bisacodyl 10 mg rectal suppository: 1 suppository(ies) rectal once a day As needed Constipation (12-05-23 @ 11:06)  budesonide-formoterol 160 mcg-4.5 mcg/inh inhalation aerosol: inhaled 2 times a day (12-11-23 @ 10:49)  buPROPion 150 mg/24 hours (XL) oral tablet, extended release: 2 tab(s) orally once a day (12-05-23 @ 11:06)  Cardizem  mg/24 hours oral capsule, extended release: 1 cap(s) orally once a day (12-05-23 @ 11:04)  doxazosin 2 mg oral tablet: 1 tab(s) orally once a day (at bedtime) (12-05-23 @ 11:06)  droNABinol 5 mg oral capsule: 1 cap(s) orally once a day (12-11-23 @ 10:49)  ferrous sulfate 325 mg (65 mg elemental iron) oral tablet: 1 tab(s) orally once a day (12-05-23 @ 11:06)  letrozole 2.5 mg oral tablet: 1 tab(s) orally once a day (12-05-23 @ 11:06)  melatonin 3 mg oral tablet: 1 tab(s) orally once a day (at bedtime) As needed Insomnia (12-11-23 @ 10:49)  metFORMIN 500 mg oral tablet: 1 tab(s) orally 2 times a day (12-05-23 @ 11:06)  MiraLax oral powder for reconstitution: 17 gram(s) orally once a day (12-05-23 @ 11:06)  nicotine 7 mg/24 hr transdermal film, extended release: 1 film(s) transdermal once a day (12-05-23 @ 11:06)  omeprazole 40 mg oral delayed release capsule: 1 cap(s) orally once a day (12-05-23 @ 10:59)  predniSONE 20 mg oral tablet: 2 tab(s) orally once a day Last dose 12/12. Then steroid taper Prednisone by 10mg every 3 days. (12-11-23 @ 10:49)  rivaroxaban 10 mg oral tablet: 1 tab(s) orally once a day (before a meal) (12-05-23 @ 11:06)  roflumilast 500 mcg oral tablet: 1 tab(s) orally once a day (12-05-23 @ 11:06)  senna (sennosides) 8.6 mg oral tablet: 2 tab(s) orally once a day (at bedtime) (12-05-23 @ 11:00)  Spiriva 18 mcg inhalation capsule: 1 cap(s) inhaled once a day (12-05-23 @ 11:02)  Tylenol 325 mg oral tablet: 2 tab(s) orally every 6 hours as needed for  mild pain (12-05-23 @ 11:06)  Zofran 4 mg oral tablet: 1 tab(s) orally 4 times a day as needed for  nausea (12-05-23 @ 11:05)      LABS:                        7.8    12.14 )-----------( 314      ( 28 Dec 2023 08:14 )             25.2     12-28    140  |  104  |  10  ----------------------------<  101<H>  3.5   |  29  |  <0.20<L>    Ca    8.2<L>      28 Dec 2023 08:14  Mg     1.7     12-27    TPro  6.0  /  Alb  1.8<L>  /  TBili  0.4  /  DBili  x   /  AST  52<H>  /  ALT  32  /  AlkPhos  57  12-28    HIT ab -- 12-27 @ 20:15  HIT ab EIA --  D Dimer -<150    ABG Trend  12-27-23 @ 18:51   - 7.46<H>/36<H>/90/98.7<H>  12-04-23 @ 03:20   - 7.44/43<H>/349<H>/99.4<H>  11-05-23 @ 13:27   - 7.49<H>/44<H>/110<H>/97.2  11-05-23 @ 09:40   - 7.46<H>/47<H>/95/97.3  11-04-23 @ 05:00   - 7.51<H>/48<H>/107/96.7  11-03-23 @ 17:53   - 7.50<H>/47<H>/148<H>/98.4<H>  11-03-23 @ 15:11   - 7.31<L>/75<HH>/115<H>/98.1<H>  11-03-23 @ 04:00   - 7.41/61<H>/216<H>/98.0  11-02-23 @ 12:18   - 7.44/51<H>/85/97.2  11-02-23 @ 10:09   - 7.46<H>/50<H>/102/98.0  11-02-23 @ 04:00   - 7.46<H>/47<H>/103/97.2  10-31-23 @ 11:40   - 7.43/39<H>/163<H>/98.2<H>        PT/INR - ( 28 Dec 2023 08:14 )   PT: 11.8 sec;   INR: 1.04 ratio         PTT - ( 28 Dec 2023 08:14 )  PTT:37.1 sec  Urinalysis Basic - ( 28 Dec 2023 08:14 )    Color: x / Appearance: x / SG: x / pH: x  Gluc: 101 mg/dL / Ketone: x  / Bili: x / Urobili: x   Blood: x / Protein: x / Nitrite: x   Leuk Esterase: x / RBC: x / WBC x   Sq Epi: x / Non Sq Epi: x / Bacteria: x         CULTURES: (if applicable)    Rapid RVP Result: Detected (12-27-23 @ 17:14)    SARS-CoV-2: Detected: This Respiratory Panel uses polymerase chain reaction (PCR) to detect for    RADIOLOGY  CXR:      CT:  < from: CT Abdomen and Pelvis No Cont (12.27.23 @ 21:18) >    ACC: [id number] EXAM:  CT ABDOMEN AND PELVIS   ORDERED BY: SAEID   TANYATARA     PROCEDURE DATE:  12/27/2023          INTERPRETATION:  CLINICAL INFORMATION: 68 years  Female with Abdominal   pain. Breast cancer and COPD.    COMPARISON: G chest 10/27/2023    CONTRAST/COMPLICATIONS:  IV Contrast: NONE  Oral Contrast: NONE  Complications: None reported at time of study completion    PROCEDURE:  CT of the Abdomen and Pelvis was performed.  Sagittal and coronal reformats were performed.    LIMITATIONS: Evaluation of the solid organs, vascular structures and GI   tract is limited without oral and IV contrast. Motion artifact. Streak   artifact from patient's arms.    FINDINGS:  LOWER CHEST: Cardiac pacemaker leads. Patchy bilateral lower lobe   airspace infiltrates and right lower lobe consolidation consistent with   pneumonia. Trace bilateral pleural effusions.    LIVER: Within normal limits.  BILE DUCTS: Normal caliber.  GALLBLADDER: Cholecystectomy.  SPLEEN: Within normal limits.  PANCREAS: Within normal limits.  ADRENALS: Within normal limits.  KIDNEYS/URETERS: Within normal limits.    BLADDER: Distended measuring 12.6 cm.  REPRODUCTIVE ORGANS: Unremarkable uterus.    BOWEL: No bowel obstruction. Appendix is not visualized. No evidence of   inflammation in the pericecal region.. Moderate colonic stool burden.   Mild rectal prolapse.  PERITONEUM: No ascites.  VESSELS: Advanced atherosclerosis with marked stenosis of the bilateral   common iliac arteries.  RETROPERITONEUM/LYMPH NODES: No lymphadenopathy.  ABDOMINAL WALL: Within normal limits.  BONES: Mild degenerative changes.    IMPRESSION:    Limited as above.    Bilateral lower lobe pneumonia, greater on the right.    Distended bladder.    Cholelithiasis.    Moderate constipation with mild rectal prolapse.    Advanced atherosclerosis. Sternotomy bilateral common iliac artery.        A preliminary report was provided by Dr. Nasreen Salgado at 11:46 PM on   12/27/2023    --- End of Report ---      < end of copied text >    ECHO:      VITALS:  T(C): 37.2 (12-27-23 @ 19:24), Max: 37.2 (12-27-23 @ 19:24)  T(F): 99 (12-27-23 @ 19:24), Max: 99 (12-27-23 @ 19:24)  HR: 88 (12-28-23 @ 08:26) (88 - 94)  BP: 126/67 (12-28-23 @ 08:26) (124/54 - 129/53)  BP(mean): 75 (12-27-23 @ 19:03) (75 - 75)  ABP: --  ABP(mean): --  RR: 20 (12-28-23 @ 08:26) (19 - 20)  SpO2: 100% (12-28-23 @ 08:26) (97% - 100%)  CVP(mm Hg): --  CVP(cm H2O): --    Ins and Outs       Height (cm): 149.9 (12-28-23 @ 09:36)  Weight (kg): 38.6 (12-28-23 @ 09:36)  BMI (kg/m2): 17.2 (12-28-23 @ 09:36)        I&O's Detail    PULMONARY CONSULT  Location of Patient : [facility] ([facility]  Attending requesting Consult:Deshawn Harper  Chief Complaint :  SOB  Reason For consult : SOB      Initial HPI on admission:  HPI:  68-year-old female with a past medical history of muscle wasting and atrophy, chronic obstructive pulmonary disease on home o2 2 lit,, thromboembolism, deep vein thrombosis of the lower extremity, type 2 diabetes mellitus, left mastectomy, anxiety disorder, iron deficiency anemia, malignant neoplasm of the breast in remission, stage 3 pressure ulcer of the sacral region,  hyperlipidemia,anxiety disorder, and essential hypertension presented today to the emergency department due to a one-day history of shortness of breath, lethargy, and fever. The patient was very lethargic and unable to provide any information. Her brother was contacted; according to him, the patient has been staying in a rehab center where she has felt extremely weak for a few days. Today, she started experiencing fever and shortness of breath. Due to low oxygen saturations, she was transferred to the emergency department for further care. Her brother reported that she has been losing excessive weight over the past year and has a low oral intake. She was treated for breast cancer and was scheduled to receive chemotherapy via a port every three weeks. However, she has not received chemotherapy for the past three months.  (27 Dec 2023 21:14)      BRIEF HOSPITAL COURSE:   patient found to have covid  complain of cough, sob, and feeling weak with flu like symptoms.      PAST MEDICAL & SURGICAL HISTORY:  COPD, severe  CAD (coronary artery disease)  Breast cancer  H/O left mastectomy    Allergies    penicillin (Hives)    Intolerances      FAMILY HISTORY:    Social history:   From [facility] (27 Dec 2023 21:14)     Smoking: Ex smoker, last smoke few months ago prior to hospialization     Review of Systems: limited as sob     CONSTITUTIONAL: +fever, +chills, + fatigue   RESPIRATORY: + Cough, +SOB, No Secretions  CARDIOVASCULAR: No chest pain, No palpitations, No dizziness, or +leg swelling       Medications:  MEDICATIONS  (STANDING):  ascorbic acid 500 milliGRAM(s) Oral daily  aspirin  chewable 81 milliGRAM(s) Oral daily  azithromycin  IVPB 500 milliGRAM(s) IV Intermittent every 24 hours  budesonide 160 MICROgram(s)/formoterol 4.5 MICROgram(s) Inhaler 2 Puff(s) Inhalation two times a day  buPROPion XL (24-Hour) 150 milliGRAM(s) Oral daily  cefepime   IVPB 1000 milliGRAM(s) IV Intermittent every 12 hours  dexAMETHasone  Injectable 6 milliGRAM(s) IV Push daily  dextrose 5%. 1000 milliLiter(s) (50 mL/Hr) IV Continuous <Continuous>  dextrose 5%. 1000 milliLiter(s) (100 mL/Hr) IV Continuous <Continuous>  dextrose 50% Injectable 25 Gram(s) IV Push once  dextrose 50% Injectable 12.5 Gram(s) IV Push once  dextrose 50% Injectable 25 Gram(s) IV Push once  diltiazem    milliGRAM(s) Oral daily  doxazosin 2 milliGRAM(s) Oral at bedtime  ferrous    sulfate 325 milliGRAM(s) Oral daily  glucagon  Injectable 1 milliGRAM(s) IntraMuscular once  insulin lispro (ADMELOG) corrective regimen sliding scale   SubCutaneous at bedtime  insulin lispro (ADMELOG) corrective regimen sliding scale   SubCutaneous three times a day before meals  letrozole 2.5 milliGRAM(s) Oral daily  multivitamin 1 Tablet(s) Oral daily  nicotine -   7 mG/24Hr(s) Patch 1 Patch Transdermal daily  pantoprazole    Tablet 40 milliGRAM(s) Oral before breakfast  polyethylene glycol 3350 17 Gram(s) Oral daily  remdesivir  IVPB 100 milliGRAM(s) IV Intermittent every 24 hours  remdesivir  IVPB   IV Intermittent   rivaroxaban 10 milliGRAM(s) Oral with dinner  senna 1 Tablet(s) Oral at bedtime  sodium chloride 0.9% with potassium chloride 20 mEq/L 500 milliLiter(s) (70 mL/Hr) IV Continuous <Continuous>  sodium chloride 0.9%. 1000 milliLiter(s) (70 mL/Hr) IV Continuous <Continuous>  tiotropium 2.5 MICROgram(s) Inhaler 2 Puff(s) Inhalation daily  zinc sulfate 220 milliGRAM(s) Oral daily    MEDICATIONS  (PRN):  acetaminophen     Tablet .. 650 milliGRAM(s) Oral every 6 hours PRN Temp greater or equal to 38C (100.4F), Mild Pain (1 - 3)  albuterol    90 MICROgram(s) HFA Inhaler 2 Puff(s) Inhalation every 6 hours PRN Bronchospasm  aluminum hydroxide/magnesium hydroxide/simethicone Suspension 30 milliLiter(s) Oral every 4 hours PRN Dyspepsia  bisacodyl Suppository 10 milliGRAM(s) Rectal daily PRN Constipation  dextrose Oral Gel 15 Gram(s) Oral once PRN Blood Glucose LESS THAN 70 milliGRAM(s)/deciliter  melatonin 3 milliGRAM(s) Oral at bedtime PRN Insomnia  ondansetron    Tablet 4 milliGRAM(s) Oral four times a day PRN for nausea  ondansetron Injectable 4 milliGRAM(s) IV Push every 8 hours PRN Nausea and/or Vomiting      Antibiotics History  azithromycin  IVPB 500 milliGRAM(s) IV Intermittent once, 12-27-23 @ 18:14, Stop order after: 1 Doses  azithromycin  IVPB 500 milliGRAM(s) IV Intermittent every 24 hours, 12-27-23 @ 18:15, Stop order after: 3 Doses  cefepime   IVPB 1000 milliGRAM(s) IV Intermittent once, 12-27-23 @ 18:14, Stop order after: 1 Doses  cefepime   IVPB 1000 milliGRAM(s) IV Intermittent every 12 hours, 12-27-23 @ 22:41  remdesivir  IVPB 200 milliGRAM(s) IV Intermittent every 24 hours, 12-27-23 @ 22:17, Stop order after: 1 Doses  remdesivir  IVPB 100 milliGRAM(s) IV Intermittent every 24 hours, 12-28-23 @ 22:17, Stop order after: 4 Doses  remdesivir  IVPB   IV Intermittent , 12-27-23 @ 22:17      Heme Medications   aspirin  chewable 81 milliGRAM(s) Oral daily, 12-28-23 @ 14:05  rivaroxaban 10 milliGRAM(s) Oral with dinner, 12-27-23 @ 21:57      GI Medications  aluminum hydroxide/magnesium hydroxide/simethicone Suspension 30 milliLiter(s) Oral every 4 hours, 12-27-23 @ 22:17, Routine PRN  bisacodyl Suppository 10 milliGRAM(s) Rectal daily, 12-27-23 @ 21:53, Routine PRN  pantoprazole    Tablet 40 milliGRAM(s) Oral before breakfast, 12-27-23 @ 21:52, Routine  polyethylene glycol 3350 17 Gram(s) Oral daily, 12-27-23 @ 21:53, Routine  senna 1 Tablet(s) Oral at bedtime, 12-27-23 @ 21:53, Routine        Home Medications:  Last Order Reconciliation Date: 12-27-23 @ 21:58 (Admission Reconciliation)  albuterol 2.5 mg/3 mL (0.083%) inhalation solution: inhaled every 6 hours likely d/c when taper finishing (12-11-23 @ 10:49)  ALPRAZolam 0.25 mg oral tablet: 1 tab(s) orally every 8 hours As needed anxiety (12-11-23 @ 10:49)  Aspir 81 oral delayed release tablet: 1 tab(s) orally once a day (12-05-23 @ 11:06)  atorvastatin 80 mg oral tablet: 1 tab(s) orally once a day (12-05-23 @ 11:06)  bisacodyl 10 mg rectal suppository: 1 suppository(ies) rectal once a day As needed Constipation (12-05-23 @ 11:06)  budesonide-formoterol 160 mcg-4.5 mcg/inh inhalation aerosol: inhaled 2 times a day (12-11-23 @ 10:49)  buPROPion 150 mg/24 hours (XL) oral tablet, extended release: 2 tab(s) orally once a day (12-05-23 @ 11:06)  Cardizem  mg/24 hours oral capsule, extended release: 1 cap(s) orally once a day (12-05-23 @ 11:04)  doxazosin 2 mg oral tablet: 1 tab(s) orally once a day (at bedtime) (12-05-23 @ 11:06)  droNABinol 5 mg oral capsule: 1 cap(s) orally once a day (12-11-23 @ 10:49)  ferrous sulfate 325 mg (65 mg elemental iron) oral tablet: 1 tab(s) orally once a day (12-05-23 @ 11:06)  letrozole 2.5 mg oral tablet: 1 tab(s) orally once a day (12-05-23 @ 11:06)  melatonin 3 mg oral tablet: 1 tab(s) orally once a day (at bedtime) As needed Insomnia (12-11-23 @ 10:49)  metFORMIN 500 mg oral tablet: 1 tab(s) orally 2 times a day (12-05-23 @ 11:06)  MiraLax oral powder for reconstitution: 17 gram(s) orally once a day (12-05-23 @ 11:06)  nicotine 7 mg/24 hr transdermal film, extended release: 1 film(s) transdermal once a day (12-05-23 @ 11:06)  omeprazole 40 mg oral delayed release capsule: 1 cap(s) orally once a day (12-05-23 @ 10:59)  predniSONE 20 mg oral tablet: 2 tab(s) orally once a day Last dose 12/12. Then steroid taper Prednisone by 10mg every 3 days. (12-11-23 @ 10:49)  rivaroxaban 10 mg oral tablet: 1 tab(s) orally once a day (before a meal) (12-05-23 @ 11:06)  roflumilast 500 mcg oral tablet: 1 tab(s) orally once a day (12-05-23 @ 11:06)  senna (sennosides) 8.6 mg oral tablet: 2 tab(s) orally once a day (at bedtime) (12-05-23 @ 11:00)  Spiriva 18 mcg inhalation capsule: 1 cap(s) inhaled once a day (12-05-23 @ 11:02)  Tylenol 325 mg oral tablet: 2 tab(s) orally every 6 hours as needed for  mild pain (12-05-23 @ 11:06)  Zofran 4 mg oral tablet: 1 tab(s) orally 4 times a day as needed for  nausea (12-05-23 @ 11:05)      LABS:                        7.8    12.14 )-----------( 314      ( 28 Dec 2023 08:14 )             25.2     12-28    140  |  104  |  10  ----------------------------<  101<H>  3.5   |  29  |  <0.20<L>    Ca    8.2<L>      28 Dec 2023 08:14  Mg     1.7     12-27    TPro  6.0  /  Alb  1.8<L>  /  TBili  0.4  /  DBili  x   /  AST  52<H>  /  ALT  32  /  AlkPhos  57  12-28    HIT ab -- 12-27 @ 20:15  HIT ab EIA --  D Dimer -<150    ABG Trend  12-27-23 @ 18:51   - 7.46<H>/36<H>/90/98.7<H>  12-04-23 @ 03:20   - 7.44/43<H>/349<H>/99.4<H>  11-05-23 @ 13:27   - 7.49<H>/44<H>/110<H>/97.2  11-05-23 @ 09:40   - 7.46<H>/47<H>/95/97.3  11-04-23 @ 05:00   - 7.51<H>/48<H>/107/96.7  11-03-23 @ 17:53   - 7.50<H>/47<H>/148<H>/98.4<H>  11-03-23 @ 15:11   - 7.31<L>/75<HH>/115<H>/98.1<H>  11-03-23 @ 04:00   - 7.41/61<H>/216<H>/98.0  11-02-23 @ 12:18   - 7.44/51<H>/85/97.2  11-02-23 @ 10:09   - 7.46<H>/50<H>/102/98.0  11-02-23 @ 04:00   - 7.46<H>/47<H>/103/97.2  10-31-23 @ 11:40   - 7.43/39<H>/163<H>/98.2<H>        PT/INR - ( 28 Dec 2023 08:14 )   PT: 11.8 sec;   INR: 1.04 ratio         PTT - ( 28 Dec 2023 08:14 )  PTT:37.1 sec  Urinalysis Basic - ( 28 Dec 2023 08:14 )    Color: x / Appearance: x / SG: x / pH: x  Gluc: 101 mg/dL / Ketone: x  / Bili: x / Urobili: x   Blood: x / Protein: x / Nitrite: x   Leuk Esterase: x / RBC: x / WBC x   Sq Epi: x / Non Sq Epi: x / Bacteria: x         CULTURES: (if applicable)    Rapid RVP Result: Detected (12-27-23 @ 17:14)    SARS-CoV-2: Detected: This Respiratory Panel uses polymerase chain reaction (PCR) to detect for    RADIOLOGY  CXR:      CT:  < from: CT Abdomen and Pelvis No Cont (12.27.23 @ 21:18) >    ACC: [id number] EXAM:  CT ABDOMEN AND PELVIS   ORDERED BY: SAEID   TANYATARA     PROCEDURE DATE:  12/27/2023          INTERPRETATION:  CLINICAL INFORMATION: 68 years  Female with Abdominal   pain. Breast cancer and COPD.    COMPARISON: G chest 10/27/2023    CONTRAST/COMPLICATIONS:  IV Contrast: NONE  Oral Contrast: NONE  Complications: None reported at time of study completion    PROCEDURE:  CT of the Abdomen and Pelvis was performed.  Sagittal and coronal reformats were performed.    LIMITATIONS: Evaluation of the solid organs, vascular structures and GI   tract is limited without oral and IV contrast. Motion artifact. Streak   artifact from patient's arms.    FINDINGS:  LOWER CHEST: Cardiac pacemaker leads. Patchy bilateral lower lobe   airspace infiltrates and right lower lobe consolidation consistent with   pneumonia. Trace bilateral pleural effusions.    LIVER: Within normal limits.  BILE DUCTS: Normal caliber.  GALLBLADDER: Cholecystectomy.  SPLEEN: Within normal limits.  PANCREAS: Within normal limits.  ADRENALS: Within normal limits.  KIDNEYS/URETERS: Within normal limits.    BLADDER: Distended measuring 12.6 cm.  REPRODUCTIVE ORGANS: Unremarkable uterus.    BOWEL: No bowel obstruction. Appendix is not visualized. No evidence of   inflammation in the pericecal region.. Moderate colonic stool burden.   Mild rectal prolapse.  PERITONEUM: No ascites.  VESSELS: Advanced atherosclerosis with marked stenosis of the bilateral   common iliac arteries.  RETROPERITONEUM/LYMPH NODES: No lymphadenopathy.  ABDOMINAL WALL: Within normal limits.  BONES: Mild degenerative changes.    IMPRESSION:    Limited as above.    Bilateral lower lobe pneumonia, greater on the right.    Distended bladder.    Cholelithiasis.    Moderate constipation with mild rectal prolapse.    Advanced atherosclerosis. Sternotomy bilateral common iliac artery.        A preliminary report was provided by Dr. Nasreen Salgado at 11:46 PM on   12/27/2023    --- End of Report ---      < end of copied text >    ECHO:      VITALS:  T(C): 37.2 (12-27-23 @ 19:24), Max: 37.2 (12-27-23 @ 19:24)  T(F): 99 (12-27-23 @ 19:24), Max: 99 (12-27-23 @ 19:24)  HR: 88 (12-28-23 @ 08:26) (88 - 94)  BP: 126/67 (12-28-23 @ 08:26) (124/54 - 129/53)  BP(mean): 75 (12-27-23 @ 19:03) (75 - 75)  ABP: --  ABP(mean): --  RR: 20 (12-28-23 @ 08:26) (19 - 20)  SpO2: 100% (12-28-23 @ 08:26) (97% - 100%)  CVP(mm Hg): --  CVP(cm H2O): --    Ins and Outs       Height (cm): 149.9 (12-28-23 @ 09:36)  Weight (kg): 38.6 (12-28-23 @ 09:36)  BMI (kg/m2): 17.2 (12-28-23 @ 09:36)        I&O's Detail

## 2023-12-28 NOTE — CONSULT NOTE ADULT - ASSESSMENT
68y female with hx COPD on home O2, CAD, breast CA, L mastectomy, hx DVT, DM. She presented to ER with SOB and cough with fever and lethargy. She states she has been feeling sick for weeks, but worsened symptoms over last 3 days, unsure of sick contact, no travel. She tested + COVID, also CT chest showed b/l infiltrates,R>L, hence on empiric Cefepime and Zithromax    Suggest:  Agree with RDV for 3-5 days  monitor LFTs, O2 sats  Cefepime and Zithromax, given CT findings, ? post-viral PNA, ? aspiration risk  check MRSA and legionella screen

## 2023-12-29 NOTE — PATIENT PROFILE ADULT - ARRIVAL FROM
Nursing home [facility] [place] [place]

## 2023-12-29 NOTE — PROGRESS NOTE ADULT - ASSESSMENT
Mailing   Jaclyn Carvalho, [place] A 68-year-old female with a past medical history of muscle wasting and atrophy, chronic obstructive pulmonary disease on home o2 2 lit,, thromboembolism, deep vein thrombosis of the lower extremity, type 2 diabetes mellitus, left mastectomy, anxiety disorder, iron deficiency anemia, malignant neoplasm of the breast in remission, stage 3 pressure ulcer of the sacral region,  hyperlipidemia, anxiety disorder, and essential hypertension presented today to the emergency department due to a one-day history of shortness of breath, lethargy, and fever, admitted for acute hypoxic, hypercapnic respiratory failure i/s/o COVID as well as severe protein calorie malnutrition with complex care coordination      d/w Dr. Harper

## 2023-12-29 NOTE — PROGRESS NOTE ADULT - PROBLEM SELECTOR PLAN 10
In remission   - Not receiving  chemotherapy since 3 months   -C/W Liarozole    - outpatient oncologist is Dr. Larios [phone] In remission   - Not receiving  chemotherapy since 3 months   -C/W Liarozole    - outpatient oncologist is Dr. Larios [phone]

## 2023-12-29 NOTE — PROGRESS NOTE ADULT - PROBLEM SELECTOR PLAN 12
DVT PPx: On Xarelto (hx of DVT 2018)    Full Code    From [facility], uses walker  PT consult  Fall, aspiration precautions    Nicotine patch

## 2023-12-29 NOTE — PATIENT PROFILE ADULT - FALL HARM RISK - HARM RISK INTERVENTIONS
Assistance with ambulation/Assistance OOB with selected safe patient handling equipment/Communicate Risk of Fall with Harm to all staff/Discuss with provider need for PT consult/Monitor gait and stability/Provide patient with walking aids - walker, cane, crutches/Reinforce activity limits and safety measures with patient and family/Tailored Fall Risk Interventions/Visual Cue: Yellow wristband and red socks/Bed in lowest position, wheels locked, appropriate side rails in place/Call bell, personal items and telephone in reach/Instruct patient to call for assistance before getting out of bed or chair/Non-slip footwear when patient is out of bed/[place] to call system/Physically safe environment - no spills, clutter or unnecessary equipment/Purposeful Proactive Rounding/Room/bathroom lighting operational, light cord in reach Assistance with ambulation/Assistance OOB with selected safe patient handling equipment/Communicate Risk of Fall with Harm to all staff/Discuss with provider need for PT consult/Monitor gait and stability/Provide patient with walking aids - walker, cane, crutches/Reinforce activity limits and safety measures with patient and family/Tailored Fall Risk Interventions/Visual Cue: Yellow wristband and red socks/Bed in lowest position, wheels locked, appropriate side rails in place/Call bell, personal items and telephone in reach/Instruct patient to call for assistance before getting out of bed or chair/Non-slip footwear when patient is out of bed/[place] to call system/Physically safe environment - no spills, clutter or unnecessary equipment/Purposeful Proactive Rounding/Room/bathroom lighting operational, light cord in reach

## 2023-12-29 NOTE — PROGRESS NOTE ADULT - PROBLEM SELECTOR PLAN 5
- Hold home meds  - insulin corrective scale, meals and bedtime  - Hypoglycemia protocol, fingerstick glucose QAC&HS  - Consistent Carbohydrate diet  - F/u AM HbA1c - Hold home meds  - insulin corrective scale, meals and bedtime  - Hypoglycemia protocol, fingerstick glucose QAC&HS  - Consistent Carbohydrate diet  - A1c: 5.1

## 2023-12-29 NOTE — PROGRESS NOTE ADULT - PROBLEM SELECTOR PLAN 2
SIRS 2/4 on admission: WBC >14, RR 26  Likely 2/2 COVID, r/o superimposed PNA  -WBC on admission 14.53, now down trending   -Afebrile in hospital  -Lactate 3.3>3.8  -BCx: NGTD  -ID consults apprec.  -c/w IVF  -c/w cefepime, azithromycin   -f/u Ucx

## 2023-12-29 NOTE — PROGRESS NOTE ADULT - ASSESSMENT
68y female with hx COPD on home O2, CAD, breast CA, L mastectomy, hx DVT, DM. She presented to ER with SOB and cough with fever and lethargy. She states she has been feeling sick for weeks, but worsened symptoms over last 3 days, unsure of sick contact, no travel. She tested + COVID, also CT chest showed b/l infiltrates,R>L, hence on empiric Cefepime and Zithromax  She has been losing wt. She reportedly needs more chemo for her breast cancer.She looks both acute and chronically ill.  Suggest:  Agree with RDV for 3-5 days- day 2  monitor LFTs, O2 sats  Cefepime and Zithromax, given CT findings, ? post-viral PNA, ? aspiration risk- day 2 of 5-7  days  check MRSA and legionella screen  [place] guarded 68y female with hx COPD on home O2, CAD, breast CA, L mastectomy, hx DVT, DM. She presented to ER with SOB and cough with fever and lethargy. She states she has been feeling sick for weeks, but worsened symptoms over last 3 days, unsure of sick contact, no travel. She tested + COVID, also CT chest showed b/l infiltrates,R>L, hence on empiric Cefepime and Zithromax  She has been losing wt. She reportedly needs more chemo for her breast cancer.She looks both acute and chronically ill.  Suggest:  Agree with RDV for 3-5 days- day 2  monitor LFTs, O2 sats  Cefepime and Zithromax, given CT findings, ? post-viral PNA, ? aspiration risk- day 2 of 5-7  days  check MRSA and legionella screen  [place] guarded

## 2023-12-29 NOTE — PROGRESS NOTE ADULT - ASSESSMENT
Physical Examination:  GENERAL:               Alert, Oriented, mild-mod acute distress.    HEENT:                    No JVD, Moist MM  PULM:                     Bilateral air entry, + Rales, No Rhonchi, No Wheezing  CVS:                         S1, S2,  + Murmur  ABD:                        Soft, nondistended, nontender, normoactive bowel sounds,   EXT:                         No edema, nontender, No Cyanosis or Clubbing   NEURO:                  Alert, oriented, interactive, nonfocal, follows commands  PSYC:                      Calm, + Insight.        Assessment  1. COVID +   2. ChronAic COPD, with acute dyspnea and chronic hypoxia and hypercarbia on NIV QHS and on home o2  3. Recent stopped smoking, - 1pk/day  4. h/o DVT on xaralto  5. Underlying CAD, HTN, DM2    Plan  Remdesivir x 5 days  decadron 6 mg x 10 days  n/c o2   antibiotics    Continue Symbicort, Spiriva, Albuterol,   Start Daliresp - called pharmacy to start    Nocturnal NIV support as needed will not qualify for home NIV on discharge as pco2 < 55 in past   - Patient will need home NIV/AVAPS-AE for home , As patient has elevated pco2 levels on ABG  and with compensated pH patient has chronic hypercarbic respiratory failure, the use of NIV/AVAPS-AE  will allow better ventilation that a normal (conventional) NIV/Bipap will not be able to manage. Furthermore, Use on NIV/AVAPS-AE can minimize hospitalizations and improve quality of life.    OOB, PT  monitor sat

## 2023-12-29 NOTE — CHART NOTE - NSCHARTNOTEFT_GEN_A_CORE
Brief Note; RD consulted for Pressure Injury.     Visited with patient and brother. Patient requires 1:1 feeding assistance, with PCA at breakfast meal. Noted with Severe protein-calorie malnutrition, previously receiving Glucerna 8oz PO TID (Provides 660kcal-30grams of Protein). Patient with Stage 3 Pressure injury; Sacrum. Recommend Mk BID, +MVI,  Vit C (500 mg) & Zinc (220mg x 10 days) supplementation to aid in wound healing, when medically feasible. Reviewed preferences and menu alternatives; likes strawberry yogurt and prunes TID Hard boiled eggs at breakfast, dislikes avocado, eggs, cheese, noted in Kardex. RD to remain available/follow up per protocol.   Toña Lopez RDN also available on TEAMS

## 2023-12-29 NOTE — PROGRESS NOTE ADULT - PROBLEM SELECTOR PLAN 1
#Mild hypercapnia, chronic due to COPD  #Acute COPD exacerbation in the setting of Covid -19 Infection   #Multifocal pneumonia likely viral 2/2 COVID, possible superimposed bacterial PNA  -CXR - personally reviewed, scattered infiltrate, no focality   -CT - Bilateral lower lobe pneumonia, greater on the right.  -EKG- LVH and no signs of acute ischemia  -Stable at 2L nc currently (home O2 baseline)  -DDimer WNL  -ABG with chronic respiratory alkalosis with metabolic acidosis  -ID and pulm consults apprec.  -c/w Remdisvir, Dexamethasone 6mg iv daily   -c/w Azithromycin and  Cefepime   -c/w Albuterol, Symbicort and Spiriva   -c/w AVAPS, will need home AVAPS-AE

## 2023-12-29 NOTE — PATIENT PROFILE ADULT - NURSING HOMES
[facility]

## 2023-12-30 NOTE — PROGRESS NOTE ADULT - TIME BILLING
care coordination, patient face to face, 2S IDR team
care coordination, plan of care discussed with patient face to face, ER HOLD IDR team
care coordination, patient face to face, 2S IDR team

## 2023-12-30 NOTE — PROGRESS NOTE ADULT - ASSESSMENT
A 68-year-old female with a past medical history of muscle wasting and atrophy, chronic obstructive pulmonary disease on home o2 2 lit,, thromboembolism, deep vein thrombosis of the lower extremity, type 2 diabetes mellitus, left mastectomy, anxiety disorder, iron deficiency anemia, malignant neoplasm of the breast in remission, stage 3 pressure ulcer of the sacral region,  hyperlipidemia, anxiety disorder, and essential hypertension presented today to the emergency department due to a one-day history of shortness of breath, lethargy, and fever, admitted for acute hypoxic, hypercapnic respiratory failure i/s/o COVID as well as severe protein calorie malnutrition with complex care coordination      d/w Dr. Harper

## 2023-12-30 NOTE — PROGRESS NOTE ADULT - NUTRITIONAL ASSESSMENT
This patient has been assessed with a concern for Malnutrition and has been determined to have a diagnosis/diagnoses of Severe protein-calorie malnutrition and Underweight (BMI < 19).    This patient is being managed with:   Diet Consistent Carbohydrate w/Evening Snack-  Mk(7 Gm Arginine/7 Gm Glut/1.2 Gm HMB     Qty per Day:  BID  Supplement Feeding Modality:  Oral  Glucerna Shake Cans or Servings Per Day:  1       Frequency:  Three Times a day  Entered: Dec 29 2023 11:26AM  
This patient has been assessed with a concern for Malnutrition and has been determined to have a diagnosis/diagnoses of Severe protein-calorie malnutrition and Underweight (BMI < 19).    This patient is being managed with:   Diet Consistent Carbohydrate w/Evening Snack-  Mk(7 Gm Arginine/7 Gm Glut/1.2 Gm HMB     Qty per Day:  BID  Supplement Feeding Modality:  Oral  Glucerna Shake Cans or Servings Per Day:  1       Frequency:  Three Times a day  Entered: Dec 29 2023 11:26AM

## 2023-12-30 NOTE — PROGRESS NOTE ADULT - PROBLEM SELECTOR PLAN 7
Likely 2/2 chronic disease  -Hb at baseline (base ~8)  -continue to monitor

## 2023-12-30 NOTE — PROGRESS NOTE ADULT - PROBLEM SELECTOR PLAN 1
#Mild hypercapnia, chronic due to COPD  #Acute COPD exacerbation in the setting of Covid -19 Infection   #Multifocal pneumonia likely viral 2/2 COVID, possible superimposed bacterial PNA  -CXR - personally reviewed, scattered infiltrate, no focality   -CT - Bilateral lower lobe pneumonia, greater on the right.  -EKG- LVH and no signs of acute ischemia  -Stable at 2L nc currently (home O2 baseline)  -DDimer WNL  -ABG with chronic respiratory alkalosis with metabolic acidosis  -ID and pulm consults apprec.  -c/w Remdesevir, Dexamethasone 6mg iv daily   -c/w Azithromycin and  Cefepime   -c/w Albuterol, Symbicort and Spiriva   -c/w AVAPS, will need home AVAPS-AE #Mild hypercapnia, chronic due to COPD  #Acute COPD exacerbation in the setting of Covid -19 Infection   #Multifocal pneumonia likely viral 2/2 COVID, possible superimposed bacterial PNA  -CXR - personally reviewed, scattered infiltrate, no focality   -CT - Bilateral lower lobe pneumonia, greater on the right.  -EKG- LVH and no signs of acute ischemia  -Stable at 2L nc currently (home O2 baseline)  -DDimer WNL  -ABG with chronic respiratory alkalosis with metabolic acidosis  -ID and pulm recommendations appreciated  -on Remdesevir, Dexamethasone 6mg iv daily   -on Azithromycin and Cefepime   -c/w Albuterol, Symbicort and Spiriva   -d/c planning for tomorrow   -will need home AVAPS-AE

## 2023-12-30 NOTE — PROGRESS NOTE ADULT - PROBLEM SELECTOR PROBLEM 8
Stage 2 skin ulcer of sacral region

## 2023-12-30 NOTE — PROGRESS NOTE ADULT - ATTENDING COMMENTS
68-year-old female with a past medical history of muscle wasting and atrophy, chronic obstructive pulmonary disease on home o2 2 lit,, thromboembolism, deep vein thrombosis of the lower extremity, type 2 diabetes mellitus, left mastectomy, anxiety disorder, iron deficiency anemia, malignant neoplasm of the breast in remission, stage 3 pressure ulcer of the sacral region,  hyperlipidemia, anxiety disorder, and essential hypertension presented today to the emergency department due to a one-day history of shortness of breath, lethargy, and fever, admitted for acute hypoxic, hypercapnic respiratory failure i/s/o COVID as well as severe protein calorie malnutrition with complex care coordination Plan: cont to monitor clinical course, apprec pulm recs, poss dc over weekend if continues stable, apprec PT eval: [facility], medically stable for dc to [facility]
68-year-old female with a past medical history of muscle wasting and atrophy, chronic obstructive pulmonary disease on home o2 2 lit,, thromboembolism, deep vein thrombosis of the lower extremity, type 2 diabetes mellitus, left mastectomy, anxiety disorder, iron deficiency anemia, malignant neoplasm of the breast in remission, stage 3 pressure ulcer of the sacral region,  hyperlipidemia, anxiety disorder, and essential hypertension presented today to the emergency department due to a one-day history of shortness of breath, lethargy, and fever, admitted for acute hypoxic, hypercapnic respiratory failure i/s/o COVID as well as severe protein calorie malnutrition with complex care coordination Plan: cont iv remdesmivir, monitor clincial course, apprec ID and pulm collaboration in care optimization, guarded to poor prognosis, moonitor clinical course for improvement, apprec dietician recs
68-year-old female with a past medical history of muscle wasting and atrophy, chronic obstructive pulmonary disease on home o2 2 lit,, thromboembolism, deep vein thrombosis of the lower extremity, type 2 diabetes mellitus, left mastectomy, anxiety disorder, iron deficiency anemia, malignant neoplasm of the breast in remission, stage 3 pressure ulcer of the sacral region,  hyperlipidemia, anxiety disorder, and essential hypertension presented today to the emergency department due to a one-day history of shortness of breath, lethargy, and fever, admitted for acute hypoxic, hypercapnic respiratory failure i/s/o COVID as well as severe protein calorie malnutrition with complex care coordination Plan: cont to monitor clinical course, apprec pulm recs, poss dc over weekend if continues stable, apprec PT eval: MARK

## 2023-12-30 NOTE — PROGRESS NOTE ADULT - PROBLEM SELECTOR PLAN 5
- Hold home meds  - insulin corrective scale, meals and bedtime  - Hypoglycemia protocol, fingerstick glucose QAC&HS  - Consistent Carbohydrate diet  - A1c: 5.1

## 2023-12-30 NOTE — PROGRESS NOTE ADULT - PROBLEM SELECTOR PLAN 10
In remission   - Not receiving  chemotherapy since 3 months   -C/W [facility]ar[facility]    - outpatient oncologist is Dr. Larios [phone] In remission   - Not receiving  chemotherapy since 3 months   -C/W Liarozole    - outpatient oncologist is Dr. Larios [phone]

## 2023-12-30 NOTE — PROGRESS NOTE ADULT - PROBLEM SELECTOR PROBLEM 1
Acute on chronic hypoxic respiratory failure

## 2023-12-30 NOTE — PROGRESS NOTE ADULT - PROBLEM SELECTOR PLAN 12
DVT PPx: On Xarelto (hx of DVT 2018)    Full Code    From [facility], uses walker  PT consult  Fall, aspiration precautions    Nicotine patch    Dispo Planning: d/c likely tomorrow DVT PPx: On Xarelto (hx of DVT 2018)    Full Code    From [facility], uses walker  PT consult  Fall, aspiration precautions    Nicotine patch    Dispo Planning: d/c likely tomorrow to [facility]

## 2023-12-30 NOTE — PROGRESS NOTE ADULT - PROBLEM SELECTOR PLAN 2
SIRS 2/4 on admission: WBC >14, RR 26  Likely 2/2 COVID, r/o superimposed PNA  -WBC on admission 14.53, now down trending   -Afebrile in hospital  -Lactate 3.3>3.8  -BCx: NGTD  -ID consults apprec.  -c/w IVF  -c/w cefepime, azithromycin   -f/u Ucx SIRS 2/4 on admission: WBC >14, RR 26  Likely 2/2 COVID, r/o superimposed PNA  -WBC on admission 14.53, now down trending   -Afebrile in hospital  -Lactate 3.3>3.8  -BCx: NGTD  -ID consults apprec.  -c/w IVF  -c/w cefepime, azithromycin

## 2023-12-30 NOTE — PROGRESS NOTE ADULT - PROBLEM SELECTOR PLAN 3
-dietician consult placed
-dietician consult apprec  -supplements ordered
-dietician consult apprec  -supplements ordered

## 2023-12-30 NOTE — PROGRESS NOTE ADULT - PROBLEM SELECTOR PLAN 4
ACUTE  -K-2.9 on admission, Repleted  -Follow BMPs, replete PRN

## 2023-12-30 NOTE — PROGRESS NOTE ADULT - PROBLEM SELECTOR PLAN 8
-clean appearing  -unlikely source of infection  -continue with dressing changes

## 2023-12-31 NOTE — DISCHARGE NOTE PROVIDER - PROVIDER TOKENS
PROVIDER:[[id number]],ESTABLISHEDPATIENT:[T]],PROVIDER:[[id number]],FOLLOWUP:[2 weeks],ESTABLISHEDPATIENT:[T]] PROVIDER:[[id number]],ESTABLISHEDPATIENT:[T]],PROVIDER:[TOKEN:[[id number]],FOLLOWUP:[2 weeks],ESTABLISHEDPATIENT:[T]] PROVIDER:[TOKEN:[[id number]],FOLLOWUP:[1-3 days],ESTABLISHEDPATIENT:[T]],PROVIDER:[TOKEN:[[id number]],FOLLOWUP:[2 weeks],ESTABLISHEDPATIENT:[T]] PROVIDER:[TOKEN:[[id number]],FOLLOWUP:[1-3 days],ESTABLISHEDPATIENT:[T]],PROVIDER:[TOKEN:[[id number]],FOLLOWUP:[2 weeks],ESTABLISHEDPATIENT:[T]]

## 2023-12-31 NOTE — PROGRESS NOTE ADULT - ASSESSMENT
Physical Examination:  GENERAL:               Alert, Oriented, mild-mod acute distress.    HEENT:                    No JVD, Moist MM  PULM:                     Bilateral air entry, + Rales, No Rhonchi, No Wheezing  CVS:                         S1, S2,  + Murmur  ABD:                        Soft, nondistended, nontender, normoactive bowel sounds,   EXT:                         No edema, nontender, No Cyanosis or Clubbing   NEURO:                  Alert, oriented, interactive, nonfocal, follows commands  PSYC:                      Calm, + Insight.        Assessment  1. COVID +   2. ChronAic COPD, with acute dyspnea and chronic hypoxia and hypercarbia on NIV QHS and on home o2  3. Recent stopped smoking, - 1pk/day  4. h/o DVT on xaralto  5. Underlying CAD, HTN, DM2    Plan  Remdesivir x 5 days  decadron 6 mg x 10 days  n/c o2   can finish course of abx    Continue Symbicort, Spiriva, Albuterol, Daliresp     Nocturnal NIV support as needed will not qualify for home NIV on discharge as pco2 < 55 in past   - Patient will need home NIV/AVAPS-AE for home , As patient has elevated pco2 levels on ABG  and with compensated pH patient has chronic hypercarbic respiratory failure, the use of NIV/AVAPS-AE  will allow better ventilation that a normal (conventional) NIV/Bipap will not be able to manage. Furthermore, Use on NIV/AVAPS-AE can minimize hospitalizations and improve quality of life.      OOB, PT  monitor sat

## 2023-12-31 NOTE — DISCHARGE NOTE PROVIDER - HOSPITAL COURSE
68-year-old female with a past medical history of muscle wasting and atrophy, chronic obstructive pulmonary disease on home o2 2 lit, thromboembolism, deep vein thrombosis of the lower extremity, type 2 diabetes mellitus, left mastectomy, anxiety disorder, iron deficiency anemia, malignant neoplasm of the breast in remission, stage 3 pressure ulcer of the sacral region,  hyperlipidemia, anxiety disorder, and essential hypertension presented to  ED 12/27/23 due to a one-day history of shortness of breath, lethargy, and fever. COVID+, ABG with compensated hypercapnea, WBC 14, RR26. CXR with a reveloping retrocardiac infiltrate. Admitted for sepsis and acute hypoxic, hypercapnic respiratory failure i/s/o COVID. Started on remdesivir (4 days completed), decadron (4 days/10), Cefepime (5 days completed), Azithromycin (3 days completed) to cover concomitant bacterial PNA. Patient was also c/o abd pain at the time, CT Abd/pelvis with bilateral lower lobe pneumonia R>L, cholelithiasis, moderate constipation with mild rectal prolapse. Blood culture x2 negative at 72 hours. Evaluated by pulmonology who recommended NIV/AVAPS-AE at home for chronic hypercarbic respiratory failure, which will be managed by Dr. Salina Mullen as per Dr. Edgar. Evaluated by ID for co-management. Patient continued to clinically improve and is stable for DC back to [facility]. MRSA screen was negative, but Legionella still pending should be followed up outpatient. Will be DC'd with prednisone 40 for 6 days with short taper  to complete 10 days of steroid treatment for COVID PNA.     PHYSICAL EXAM:  Vital Signs Last 24 Hrs  T(C): 36.6 (30 Dec 2023 22:02), Max: 36.6 (30 Dec 2023 16:08)  T(F): 97.8 (30 Dec 2023 22:02), Max: 97.9 (30 Dec 2023 16:08)  HR: 95 (31 Dec 2023 04:12) (91 - 99)  BP: 126/80 (30 Dec 2023 22:02) (122/81 - 126/80)  RR: 18 (30 Dec 2023 22:02) (18 - 19)  SpO2: 97% (31 Dec 2023 04:12) (97% - 100%)    Parameters below as of 30 Dec 2023 22:02  Patient On (Oxygen Delivery Method): room air      Constitutional: Pt lying in bed, awake and alert, NAD. Overall very emaciated  HEENT: EOMI, normocephalic, dry mucous membranes  Neck: Soft and supple  Respiratory: Clear breath sounds bilaterally, No wheezing, rales or rhonchi  Cardiovascular: S1S2+, RRR, no M/G/R  Gastrointestinal: BS+, soft, NT/ND, no guarding, no rebound  Extremities: No calf pain  Vascular: Peripheral pulses present  Neurological: AAOx3, no focal deficits  Musculoskeletal: b/l UE/LE weakness symmetrically and muscular atrophy   Skin: Sacral ulcer with clean base on right buttock

## 2023-12-31 NOTE — DISCHARGE NOTE PROVIDER - NSDCMRMEDTOKEN_GEN_ALL_CORE_FT
albuterol 2.5 mg/3 mL (0.083%) inhalation solution: inhaled every 6 hours likely d/c when taper finishing  ALPRAZolam 0.25 mg oral tablet: 1 tab(s) orally every 8 hours As needed anxiety  Aspir 81 oral delayed release tablet: 1 tab(s) orally once a day  atorvastatin 80 mg oral tablet: 1 tab(s) orally once a day  bisacodyl 10 mg rectal suppository: 1 suppository(ies) rectal once a day As needed Constipation  budesonide-formoterol 160 mcg-4.5 mcg/inh inhalation aerosol: inhaled 2 times a day  buPROPion 150 mg/24 hours (XL) oral tablet, extended release: 2 tab(s) orally once a day  Cardizem  mg/24 hours oral capsule, extended release: 1 cap(s) orally once a day  doxazosin 2 mg oral tablet: 1 tab(s) orally once a day (at bedtime)  droNABinol 5 mg oral capsule: 1 cap(s) orally once a day  ferrous sulfate 325 mg (65 mg elemental iron) oral tablet: 1 tab(s) orally once a day  letrozole 2.5 mg oral tablet: 1 tab(s) orally once a day  melatonin 3 mg oral tablet: 1 tab(s) orally once a day (at bedtime) As needed Insomnia  metFORMIN 500 mg oral tablet: 1 tab(s) orally 2 times a day  MiraLax oral powder for reconstitution: 17 gram(s) orally once a day  nicotine 7 mg/24 hr transdermal film, extended release: 1 film(s) transdermal once a day  omeprazole 40 mg oral delayed release capsule: 1 cap(s) orally once a day  predniSONE 20 mg oral tablet: 2 tab(s) orally once a day 2 tablets until 1/6. Then 1 tab until finished  rivaroxaban 10 mg oral tablet: 1 tab(s) orally once a day (before a meal)  roflumilast 500 mcg oral tablet: 1 tab(s) orally once a day  senna (sennosides) 8.6 mg oral tablet: 2 tab(s) orally once a day (at bedtime)  Spiriva 18 mcg inhalation capsule: 1 cap(s) inhaled once a day  Tylenol 325 mg oral tablet: 2 tab(s) orally every 6 hours as needed for  mild pain  Zofran 4 mg oral tablet: 1 tab(s) orally 4 times a day as needed for  nausea

## 2023-12-31 NOTE — DISCHARGE NOTE NURSING/CASE MANAGEMENT/SOCIAL WORK - PATIENT PORTAL LINK FT
You can access the FollowMyHealth Patient Portal offered by [facility] by registering at the following website: [email or web address]. By joining [employer]’s FollowMyHealth portal, you will also be able to view your health information using other applications (apps) compatible with our system. You can access the FollowMyHealth Patient Portal offered by [facility] by registering at the following website: [email or web address]. By joining [employer]’s FollowMyHealth portal, you will also be able to view your health information using other applications (apps) compatible with our system.

## 2023-12-31 NOTE — DISCHARGE NOTE PROVIDER - CARE PROVIDERS DIRECT ADDRESSES
,[email or web address],DirectAddress_Unknown [email or web address],DirectAddress_Unknown

## 2023-12-31 NOTE — DISCHARGE NOTE PROVIDER - CARE PROVIDER_API CALL
Reyes, John Anthony  Internal Medicine  [address] [place] [address], [address]  [place] [address]  Phone: [phone]  Fax: [phone]  Established Patient  Follow Up Time:     Salina Mullen  Internal Medicine  [address] [facility], [address]  [place] [address]  Phone: [phone]  Fax: [phone]  Established Patient  Follow Up Time: 2 weeks   Reyes, John Anthony  Internal Medicine  [address] [place] [address], [address]  [place] [address]  Phone: [phone]  Fax: [phone]  Established Patient  Follow Up Time:     Salina Mullen  Internal Medicine  [address] [facility], [address]  [place] [address]  Phone: [phone]  Fax: [phone]  Established Patient  Follow Up Time: 2 weeks   Reyes, John Anthony  Internal Medicine  [address] [place] [address], [address]  [place] [address]  Phone: [phone]  Fax: [phone]  Established Patient  Follow Up Time: 1-3 days    Salina Mullen  Internal Medicine  [address] [facility], [address]  [place] [address]  Phone: [phone]  Fax: [phone]  Established Patient  Follow Up Time: 2 weeks   Reyes, John Anthony  Internal Medicine  [address] [place] [address], [address]  [place] [address]  Phone: [phone]  Fax: [phone]  Established Patient  Follow Up Time: 1-3 days    Salina Mullen  Internal Medicine  [address] [facility], [address]  [place] [address]  Phone: [phone]  Fax: [phone]  Established Patient  Follow Up Time: 2 weeks

## 2023-12-31 NOTE — DISCHARGE NOTE PROVIDER - ATTENDING DISCHARGE PHYSICAL EXAMINATION:
Constitutional: Pt lying in bed, awake and alert, NAD. Overall very emaciated, elder  HEENT: EOMI, normocephalic, dry mucous membranes  Neck: Soft and supple  Respiratory: Clear breath sounds bilaterally, No wheezing, rales or rhonchi  Cardiovascular: S1S2+, RRR, no M/G/R  Gastrointestinal: BS+, soft, NT/ND, no guarding, no rebound  Extremities: No calf pain  Vascular: Peripheral pulses present  Neurological: AAOx3, no focal deficits  Musculoskeletal: b/l UE/LE weakness symmetrically and muscular atrophy   Skin: Sacral ulcer with clean base on right buttock

## 2023-12-31 NOTE — DISCHARGE NOTE NURSING/CASE MANAGEMENT/SOCIAL WORK - NSDCPEFALRISK_GEN_ALL_CORE
For information on Fall & Injury Prevention, visit: [email or web address] OR  [email or web address] OR  https://www.cdc.gov/steadi/patient.html For information on Fall & Injury Prevention, visit: [email or web address] OR  [email or web address] OR  https://www.cdc.gov/steadi/patient.html

## 2023-12-31 NOTE — DISCHARGE NOTE PROVIDER - NSDCCPCAREPLAN_GEN_ALL_CORE_FT
PRINCIPAL DISCHARGE DIAGNOSIS  Diagnosis: Acute on chronic hypoxic respiratory failure  Assessment and Plan of Treatment: You completed treatment for COVID19 and antibiotics for Pneumonia. Prednisone (steroids) were sent to the pharmacy for a few extra days. Please  this medication and take as prescribed and follow up with your doctors.      SECONDARY DISCHARGE DIAGNOSES  Diagnosis: Hypokalemia  Assessment and Plan of Treatment: Low potassium which was treated in the hospital    Diagnosis: Acute sepsis  Assessment and Plan of Treatment:

## 2023-12-31 NOTE — PROGRESS NOTE ADULT - SUBJECTIVE AND OBJECTIVE BOX
Follow-up Pulmonary Progress Note  Chief Complaint : Chronic obstructive pulmonary disease with acute exacerbation      patient seen and examined  comfortable  no cp, sob, palp, n/v  feelin better  less sob.       Allergies :penicillin (Hives)      PAST MEDICAL & SURGICAL HISTORY:  COPD, severe    CAD (coronary artery disease)    Breast cancer    H/O left mastectomy        Medications:  MEDICATIONS  (STANDING):  ascorbic acid 500 milliGRAM(s) Oral daily  aspirin  chewable 81 milliGRAM(s) Oral daily  azithromycin  IVPB 500 milliGRAM(s) IV Intermittent every 24 hours  budesonide 160 MICROgram(s)/formoterol 4.5 MICROgram(s) Inhaler 2 Puff(s) Inhalation two times a day  buPROPion XL (24-Hour) 150 milliGRAM(s) Oral daily  cefepime   IVPB 1000 milliGRAM(s) IV Intermittent every 12 hours  dexAMETHasone  Injectable 6 milliGRAM(s) IV Push daily  dextrose 5%. 1000 milliLiter(s) (50 mL/Hr) IV Continuous <Continuous>  dextrose 5%. 1000 milliLiter(s) (100 mL/Hr) IV Continuous <Continuous>  dextrose 50% Injectable 25 Gram(s) IV Push once  dextrose 50% Injectable 25 Gram(s) IV Push once  dextrose 50% Injectable 12.5 Gram(s) IV Push once  diltiazem    milliGRAM(s) Oral daily  doxazosin 2 milliGRAM(s) Oral at bedtime  ferrous    sulfate 325 milliGRAM(s) Oral daily  glucagon  Injectable 1 milliGRAM(s) IntraMuscular once  insulin lispro (ADMELOG) corrective regimen sliding scale   SubCutaneous at bedtime  insulin lispro (ADMELOG) corrective regimen sliding scale   SubCutaneous three times a day before meals  letrozole 2.5 milliGRAM(s) Oral daily  multivitamin 1 Tablet(s) Oral daily  nicotine -   7 mG/24Hr(s) Patch 1 Patch Transdermal daily  pantoprazole    Tablet 40 milliGRAM(s) Oral before breakfast  polyethylene glycol 3350 17 Gram(s) Oral daily  remdesivir  IVPB 100 milliGRAM(s) IV Intermittent every 24 hours  remdesivir  IVPB   IV Intermittent   rivaroxaban 10 milliGRAM(s) Oral with dinner  senna 1 Tablet(s) Oral at bedtime  sodium chloride 0.9% with potassium chloride 20 mEq/L 500 milliLiter(s) (70 mL/Hr) IV Continuous <Continuous>  tiotropium 2.5 MICROgram(s) Inhaler 2 Puff(s) Inhalation daily  zinc sulfate 220 milliGRAM(s) Oral daily    MEDICATIONS  (PRN):  acetaminophen     Tablet .. 650 milliGRAM(s) Oral every 6 hours PRN Temp greater or equal to 38C (100.4F), Mild Pain (1 - 3)  albuterol    90 MICROgram(s) HFA Inhaler 2 Puff(s) Inhalation every 6 hours PRN Bronchospasm  aluminum hydroxide/magnesium hydroxide/simethicone Suspension 30 milliLiter(s) Oral every 4 hours PRN Dyspepsia  bisacodyl Suppository 10 milliGRAM(s) Rectal daily PRN Constipation  dextrose Oral Gel 15 Gram(s) Oral once PRN Blood Glucose LESS THAN 70 milliGRAM(s)/deciliter  melatonin 3 milliGRAM(s) Oral at bedtime PRN Insomnia  ondansetron    Tablet 4 milliGRAM(s) Oral four times a day PRN for nausea  ondansetron Injectable 4 milliGRAM(s) IV Push every 8 hours PRN Nausea and/or Vomiting      Antibiotics History  azithromycin  IVPB 500 milliGRAM(s) IV Intermittent every 24 hours, 12-27-23 @ 18:15, Stop order after: 3 Doses  azithromycin  IVPB 500 milliGRAM(s) IV Intermittent once, 12-27-23 @ 18:14, Stop order after: 1 Doses  cefepime   IVPB 1000 milliGRAM(s) IV Intermittent every 12 hours, 12-27-23 @ 22:41  cefepime   IVPB 1000 milliGRAM(s) IV Intermittent once, 12-27-23 @ 18:14, Stop order after: 1 Doses  remdesivir  IVPB 100 milliGRAM(s) IV Intermittent every 24 hours, 12-28-23 @ 22:17, Stop order after: 4 Doses  remdesivir  IVPB 200 milliGRAM(s) IV Intermittent every 24 hours, 12-27-23 @ 22:17, Stop order after: 1 Doses  remdesivir  IVPB   IV Intermittent , 12-27-23 @ 22:17      Heme Medications   aspirin  chewable 81 milliGRAM(s) Oral daily, 12-28-23 @ 14:05  rivaroxaban 10 milliGRAM(s) Oral with dinner, 12-27-23 @ 21:57      GI Medications  aluminum hydroxide/magnesium hydroxide/simethicone Suspension 30 milliLiter(s) Oral every 4 hours, 12-27-23 @ 22:17, Routine PRN  bisacodyl Suppository 10 milliGRAM(s) Rectal daily, 12-27-23 @ 21:53, Routine PRN  pantoprazole    Tablet 40 milliGRAM(s) Oral before breakfast, 12-27-23 @ 21:52, Routine  polyethylene glycol 3350 17 Gram(s) Oral daily, 12-27-23 @ 21:53, Routine  senna 1 Tablet(s) Oral at bedtime, 12-27-23 @ 21:53, Routine        LABS:                        7.4    9.30  )-----------( 345      ( 29 Dec 2023 06:41 )             24.8     12-29    141  |  106  |  11  ----------------------------<  74  2.7<LL>   |  28  |  <0.20<L>    Ca    7.6<L>      29 Dec 2023 06:41  Mg     1.7     12-29    TPro  5.3<L>  /  Alb  1.7<L>  /  TBili  0.2  /  DBili  x   /  AST  24  /  ALT  28  /  AlkPhos  59  12-29         CULTURES: (if applicable)    Culture - Blood (collected 12-27-23 @ 18:55)  Source: .Blood Blood-Peripheral  Preliminary Report (12-29-23 @ 02:03):    No growth at 24 hours    Culture - Blood (collected 12-27-23 @ 18:55)  Source: .Blood Blood-Peripheral  Preliminary Report (12-29-23 @ 02:03):    No growth at 24 hours      Rapid RVP Result: Detected (12-27-23 @ 17:14)      ABG - ( 27 Dec 2023 18:51 )  pH, Arterial: 7.46  pH, Blood: x     /  pCO2: 36    /  pO2: 90    / HCO3: 26    / Base Excess: 1.8   /  SaO2: 98.7             VITALS:  T(C): 36.6 (12-29-23 @ 15:51), Max: 36.6 (12-29-23 @ 13:17)  T(F): 97.9 (12-29-23 @ 15:51), Max: 97.9 (12-29-23 @ 15:51)  HR: 91 (12-29-23 @ 15:51) (90 - 100)  BP: 107/69 (12-29-23 @ 15:51) (107/69 - 128/71)  BP(mean): --  ABP: --  ABP(mean): --  RR: 19 (12-29-23 @ 15:51) (16 - 19)  SpO2: 98% (12-29-23 @ 15:51) (98% - 100%)  CVP(mm Hg): --  CVP(cm H2O): --    Ins and Outs     12-28-23 @ 07:01  -  12-29-23 @ 07:00  --------------------------------------------------------  IN: 400 mL / OUT: 0 mL / NET: 400 mL    12-29-23 @ 07:01  -  12-29-23 @ 17:57  --------------------------------------------------------  IN: 350 mL / OUT: 400 mL / NET: -50 mL        Height (cm): 149.9 (12-29-23 @ 12:20)  Weight (kg): 38.6 (12-29-23 @ 12:20)  BMI (kg/m2): 17.2 (12-29-23 @ 12:20)        I&O's Detail    28 Dec 2023 07:01  -  29 Dec 2023 07:00  --------------------------------------------------------  IN:    IV PiggyBack: 100 mL    IV PiggyBack: 250 mL    IV PiggyBack: 50 mL  Total IN: 400 mL    OUT:  Total OUT: 0 mL    Total NET: 400 mL      29 Dec 2023 07:01  -  29 Dec 2023 17:57  --------------------------------------------------------  IN:    IV PiggyBack: 50 mL    Oral Fluid: 300 mL  Total IN: 350 mL    OUT:    Voided (mL): 400 mL  Total OUT: 400 mL    Total NET: -50 mL       
CC: f/u for Covid and pneumonia    Patient reports: she remains weak and frail, is on nasal oxygen, appears mildly short of breath.    REVIEW OF SYSTEMS:  All other review of systems negative (Comprehensive ROS): very weak, debilitated    Antimicrobials Day #  :day 2  azithromycin  IVPB 500 milliGRAM(s) IV Intermittent every 24 hours  cefepime   IVPB 1000 milliGRAM(s) IV Intermittent every 12 hours  remdesivir  IVPB 100 milliGRAM(s) IV Intermittent every 24 hours  remdesivir  IVPB   IV Intermittent     Other Medications Reviewed  MEDICATIONS  (STANDING):  ascorbic acid 500 milliGRAM(s) Oral daily  aspirin  chewable 81 milliGRAM(s) Oral daily  azithromycin  IVPB 500 milliGRAM(s) IV Intermittent every 24 hours  budesonide 160 MICROgram(s)/formoterol 4.5 MICROgram(s) Inhaler 2 Puff(s) Inhalation two times a day  buPROPion XL (24-Hour) 150 milliGRAM(s) Oral daily  cefepime   IVPB 1000 milliGRAM(s) IV Intermittent every 12 hours  dexAMETHasone  Injectable 6 milliGRAM(s) IV Push daily  dextrose 5%. 1000 milliLiter(s) (50 mL/Hr) IV Continuous <Continuous>  dextrose 5%. 1000 milliLiter(s) (100 mL/Hr) IV Continuous <Continuous>  dextrose 50% Injectable 25 Gram(s) IV Push once  dextrose 50% Injectable 12.5 Gram(s) IV Push once  dextrose 50% Injectable 25 Gram(s) IV Push once  diltiazem    milliGRAM(s) Oral daily  doxazosin 2 milliGRAM(s) Oral at bedtime  ferrous    sulfate 325 milliGRAM(s) Oral daily  glucagon  Injectable 1 milliGRAM(s) IntraMuscular once  insulin lispro (ADMELOG) corrective regimen sliding scale   SubCutaneous three times a day before meals  insulin lispro (ADMELOG) corrective regimen sliding scale   SubCutaneous at bedtime  letrozole 2.5 milliGRAM(s) Oral daily  multivitamin 1 Tablet(s) Oral daily  nicotine -   7 mG/24Hr(s) Patch 1 Patch Transdermal daily  pantoprazole    Tablet 40 milliGRAM(s) Oral before breakfast  polyethylene glycol 3350 17 Gram(s) Oral daily  potassium chloride    Tablet ER 40 milliEquivalent(s) Oral every 4 hours  remdesivir  IVPB 100 milliGRAM(s) IV Intermittent every 24 hours  remdesivir  IVPB   IV Intermittent   rivaroxaban 10 milliGRAM(s) Oral with dinner  senna 1 Tablet(s) Oral at bedtime  sodium chloride 0.9% with potassium chloride 20 mEq/L 500 milliLiter(s) (70 mL/Hr) IV Continuous <Continuous>  tiotropium 2.5 MICROgram(s) Inhaler 2 Puff(s) Inhalation daily  zinc sulfate 220 milliGRAM(s) Oral daily    T(F): 97.8 (12-29-23 @ 13:17), Max: 97.8 (12-29-23 @ 13:17)  HR: 98 (12-29-23 @ 13:17)  BP: 118/68 (12-29-23 @ 13:17)  RR: 16 (12-29-23 @ 13:17)  SpO2: 98% (12-29-23 @ 13:17)  Wt(kg): --    PHYSICAL EXAM:  General: awake, mild respiratory distress, appears chronic and acutely ill. Wasted appearance  Eyes:  anicteric, no conjunctival injection, no discharge  Oropharynx: no lesions or injection 	  Neck: supple, without adenopathy  Lungs: junky BS  Heart: regular rate and rhythm; no murmur, rubs or gallops  Abdomen: soft, nondistended, nontender, without mass or organomegaly  Skin: no lesions  Extremities: no clubbing, cyanosis, or edema  Neurologic: alert, oriented, moves all extremities    LAB RESULTS:                        7.4    9.30  )-----------( 345      ( 29 Dec 2023 06:41 )             24.8     12-29    141  |  106  |  11  ----------------------------<  74  2.7<LL>   |  28  |  <0.20<L>    Ca    7.6<L>      29 Dec 2023 06:41  Mg     1.7     12-29    TPro  5.3<L>  /  Alb  1.7<L>  /  TBili  0.2  /  DBili  x   /  AST  24  /  ALT  28  /  AlkPhos  59  12-29    LIVER FUNCTIONS - ( 29 Dec 2023 06:41 )  Alb: 1.7 g/dL / Pro: 5.3 g/dL / ALK PHOS: 59 U/L / ALT: 28 U/L / AST: 24 U/L / GGT: x             MICROBIOLOGY:  RECENT CULTURES:  12-27 @ 18:55 .Blood Blood-Peripheral     No growth at 24 hours          RADIOLOGY REVIEWED:    < from: CT Abdomen and Pelvis No Cont (12.27.23 @ 21:18) >  IMPRESSION:    Limited as above.    Bilateral lower lobe pneumonia, greater on the right.    Distended bladder.    Cholelithiasis.    Moderate constipation with mild rectal prolapse.    Advanced atherosclerosis. Sternotomy bilateral common iliac artery.        A preliminary report was provided by Dr. Nasreen Salgado at 11:46 PM on   12/27/2023    --- End of Report ---    < end of copied text >  
Follow-up Pulmonary Progress Note  Chief Complaint : Chronic obstructive pulmonary disease with acute exacerbation    patient seen and examined  states breathing at baseline        Allergies :penicillin (Hives)      PAST MEDICAL & SURGICAL HISTORY:  COPD, severe    CAD (coronary artery disease)    Breast cancer    H/O left mastectomy        Medications:  MEDICATIONS  (STANDING):  ascorbic acid 500 milliGRAM(s) Oral daily  aspirin  chewable 81 milliGRAM(s) Oral daily  budesonide 160 MICROgram(s)/formoterol 4.5 MICROgram(s) Inhaler 2 Puff(s) Inhalation two times a day  buPROPion XL (24-Hour) 150 milliGRAM(s) Oral daily  cefepime   IVPB 1000 milliGRAM(s) IV Intermittent every 12 hours  dexAMETHasone  Injectable 6 milliGRAM(s) IV Push daily  dextrose 5%. 1000 milliLiter(s) (50 mL/Hr) IV Continuous <Continuous>  dextrose 5%. 1000 milliLiter(s) (100 mL/Hr) IV Continuous <Continuous>  dextrose 50% Injectable 25 Gram(s) IV Push once  dextrose 50% Injectable 25 Gram(s) IV Push once  dextrose 50% Injectable 12.5 Gram(s) IV Push once  diltiazem    milliGRAM(s) Oral daily  doxazosin 2 milliGRAM(s) Oral at bedtime  ferrous    sulfate 325 milliGRAM(s) Oral daily  glucagon  Injectable 1 milliGRAM(s) IntraMuscular once  insulin lispro (ADMELOG) corrective regimen sliding scale   SubCutaneous three times a day before meals  insulin lispro (ADMELOG) corrective regimen sliding scale   SubCutaneous at bedtime  letrozole 2.5 milliGRAM(s) Oral daily  multivitamin 1 Tablet(s) Oral daily  nicotine -   7 mG/24Hr(s) Patch 1 Patch Transdermal daily  pantoprazole    Tablet 40 milliGRAM(s) Oral before breakfast  polyethylene glycol 3350 17 Gram(s) Oral daily  remdesivir  IVPB 100 milliGRAM(s) IV Intermittent every 24 hours  remdesivir  IVPB   IV Intermittent   rivaroxaban 10 milliGRAM(s) Oral with dinner  roflumilast 500 MICROGram(s) Oral daily  senna 1 Tablet(s) Oral at bedtime  tiotropium 2.5 MICROgram(s) Inhaler 2 Puff(s) Inhalation daily  zinc sulfate 220 milliGRAM(s) Oral daily    MEDICATIONS  (PRN):  acetaminophen     Tablet .. 650 milliGRAM(s) Oral every 6 hours PRN Temp greater or equal to 38C (100.4F), Mild Pain (1 - 3)  albuterol    90 MICROgram(s) HFA Inhaler 2 Puff(s) Inhalation every 6 hours PRN Bronchospasm  ALPRAZolam 0.25 milliGRAM(s) Oral every 8 hours PRN anxiety  aluminum hydroxide/magnesium hydroxide/simethicone Suspension 30 milliLiter(s) Oral every 4 hours PRN Dyspepsia  bisacodyl Suppository 10 milliGRAM(s) Rectal daily PRN Constipation  dextrose Oral Gel 15 Gram(s) Oral once PRN Blood Glucose LESS THAN 70 milliGRAM(s)/deciliter  melatonin 3 milliGRAM(s) Oral at bedtime PRN Insomnia  ondansetron    Tablet 4 milliGRAM(s) Oral four times a day PRN for nausea  ondansetron Injectable 4 milliGRAM(s) IV Push every 8 hours PRN Nausea and/or Vomiting      Antibiotics History  azithromycin  IVPB 500 milliGRAM(s) IV Intermittent once, 12-27-23 @ 18:14, Stop order after: 1 Doses  azithromycin  IVPB 500 milliGRAM(s) IV Intermittent every 24 hours, 12-27-23 @ 18:15, Stop order after: 3 Doses  cefepime   IVPB 1000 milliGRAM(s) IV Intermittent every 12 hours, 12-27-23 @ 22:41  cefepime   IVPB 1000 milliGRAM(s) IV Intermittent once, 12-27-23 @ 18:14, Stop order after: 1 Doses  remdesivir  IVPB 200 milliGRAM(s) IV Intermittent every 24 hours, 12-27-23 @ 22:17, Stop order after: 1 Doses  remdesivir  IVPB 100 milliGRAM(s) IV Intermittent every 24 hours, 12-28-23 @ 22:17, Stop order after: 4 Doses  remdesivir  IVPB   IV Intermittent , 12-27-23 @ 22:17      Heme Medications   aspirin  chewable 81 milliGRAM(s) Oral daily, 12-28-23 @ 14:05  rivaroxaban 10 milliGRAM(s) Oral with dinner, 12-27-23 @ 21:57      GI Medications  aluminum hydroxide/magnesium hydroxide/simethicone Suspension 30 milliLiter(s) Oral every 4 hours, 12-27-23 @ 22:17, Routine PRN  bisacodyl Suppository 10 milliGRAM(s) Rectal daily, 12-27-23 @ 21:53, Routine PRN  pantoprazole    Tablet 40 milliGRAM(s) Oral before breakfast, 12-27-23 @ 21:52, Routine  polyethylene glycol 3350 17 Gram(s) Oral daily, 12-27-23 @ 21:53, Routine  senna 1 Tablet(s) Oral at bedtime, 12-27-23 @ 21:53, Routine        LABS:                        8.6    9.38  )-----------( 438      ( 31 Dec 2023 06:50 )             27.5     12-30    136  |  105  |  12  ----------------------------<  166<H>  5.2   |  23  |  <0.20<L>    Ca    8.1<L>      30 Dec 2023 08:45    TPro  5.6<L>  /  Alb  1.6<L>  /  TBili  0.4  /  DBili  x   /  AST  36  /  ALT  30  /  AlkPhos  63  12-30    HIT ab -- 12-27 @ 20:15  HIT ab EIA --  D Dimer -<150       CULTURES: (if applicable)    Culture - Blood (collected 12-27-23 @ 18:55)  Source: .Blood Blood-Peripheral  Preliminary Report (12-31-23 @ 02:02):    No growth at 72 Hours    Culture - Blood (collected 12-27-23 @ 18:55)  Source: .Blood Blood-Peripheral  Preliminary Report (12-31-23 @ 02:02):    No growth at 72 Hours      Rapid RVP Result: Detected (12-27-23 @ 17:14)     VITALS:  T(C): 36.6 (12-30-23 @ 22:02), Max: 36.6 (12-30-23 @ 16:08)  T(F): 97.8 (12-30-23 @ 22:02), Max: 97.9 (12-30-23 @ 16:08)  HR: 95 (12-31-23 @ 04:12) (91 - 99)  BP: 126/80 (12-30-23 @ 22:02) (122/81 - 126/80)  BP(mean): --  ABP: --  ABP(mean): --  RR: 18 (12-30-23 @ 22:02) (18 - 19)  SpO2: 97% (12-31-23 @ 04:12) (97% - 100%)  CVP(mm Hg): --  CVP(cm H2O): --    Ins and Outs       Height (cm): 149.9 (12-29-23 @ 12:20)  Weight (kg): 38.6 (12-29-23 @ 12:20)  BMI (kg/m2): 17.2 (12-29-23 @ 12:20)        I&O's Detail             
Subjective and Objective:   A 68-year-old female with a past medical history of muscle wasting and atrophy, chronic obstructive pulmonary disease on home o2 2 lit,, thromboembolism, deep vein thrombosis of the lower extremity, type 2 diabetes mellitus, left mastectomy, anxiety disorder, iron deficiency anemia, malignant neoplasm of the breast in remission, stage 3 pressure ulcer of the sacral region,  hyperlipidemia, anxiety disorder, and essential hypertension presented today to the emergency department due to a one-day history of shortness of breath, lethargy, and fever, admitted for acute hypoxic, hypercapnic respiratory failure i/s/o COVID    Overnight Events: On Bipap overnight  Interval History: Patient was seen and examined by me this morning at bedside. Patient reports feeling a little better. Mild continued weakness.     REVIEW OF SYSTEMS:   CONSTITUTIONAL: +weakness, chills  EYES/ENT: No visual changes;  No vertigo or throat pain   NECK: No pain or stiffness  RESPIRATORY: + cough, No wheezing, hemoptysis;  +shortness of breath  CARDIOVASCULAR: No chest pain or palpitations  GASTROINTESTINAL: No abdominal or epigastric pain. No nausea, vomiting; No diarrhea or constipation.   GENITOURINARY: No dysuria, frequency or hematuria  NEUROLOGICAL: No numbness   SKIN: No itching, burning, rashes, or lesions       Vital Signs Last 24 Hrs  T(C): 36.4 (30 Dec 2023 05:45), Max: 36.6 (29 Dec 2023 15:51)  T(F): 97.5 (30 Dec 2023 05:45), Max: 97.9 (29 Dec 2023 15:51)  HR: 98 (30 Dec 2023 05:45) (91 - 100)  BP: 119/76 (30 Dec 2023 05:45) (107/69 - 119/76)  BP(mean): --  RR: 19 (30 Dec 2023 05:45) (18 - 19)  SpO2: 100% (30 Dec 2023 05:45) (98% - 100%)    Parameters below as of 30 Dec 2023 05:45  Patient On (Oxygen Delivery Method): BiPAP/CPAP          PHYSICAL EXAM  Constitutional: Pt lying in bed, awake and alert, NAD. Overall very emaciated  HEENT: EOMI, normocephalic, dry mucous membranes  Neck: Soft and supple  Respiratory: Course breath sounds diffusely.  No wheezing, rales or rhonchi  Cardiovascular: S1S2+, RRR, no M/G/R  Gastrointestinal: BS+, soft, NT/ND, no guarding, no rebound  Extremities: No calf pain  Vascular: Peripheral pulses present  Neurological: AAOx3, no focal deficits  Musculoskeletal: b/l UE/LE weakness symmetrically and muscular atrophy   Skin: Sacral ulcer with clean base on right buttock    MEDICATIONS:  MEDICATIONS  (STANDING):  ascorbic acid 500 milliGRAM(s) Oral daily  aspirin  chewable 81 milliGRAM(s) Oral daily  azithromycin  IVPB 500 milliGRAM(s) IV Intermittent every 24 hours  budesonide 160 MICROgram(s)/formoterol 4.5 MICROgram(s) Inhaler 2 Puff(s) Inhalation two times a day  buPROPion XL (24-Hour) 150 milliGRAM(s) Oral daily  cefepime   IVPB 1000 milliGRAM(s) IV Intermittent every 12 hours  dexAMETHasone  Injectable 6 milliGRAM(s) IV Push daily  dextrose 5%. 1000 milliLiter(s) (50 mL/Hr) IV Continuous <Continuous>  dextrose 5%. 1000 milliLiter(s) (100 mL/Hr) IV Continuous <Continuous>  dextrose 50% Injectable 12.5 Gram(s) IV Push once  dextrose 50% Injectable 25 Gram(s) IV Push once  dextrose 50% Injectable 25 Gram(s) IV Push once  diltiazem    milliGRAM(s) Oral daily  doxazosin 2 milliGRAM(s) Oral at bedtime  ferrous    sulfate 325 milliGRAM(s) Oral daily  glucagon  Injectable 1 milliGRAM(s) IntraMuscular once  insulin lispro (ADMELOG) corrective regimen sliding scale   SubCutaneous three times a day before meals  insulin lispro (ADMELOG) corrective regimen sliding scale   SubCutaneous at bedtime  letrozole 2.5 milliGRAM(s) Oral daily  multivitamin 1 Tablet(s) Oral daily  nicotine -   7 mG/24Hr(s) Patch 1 Patch Transdermal daily  pantoprazole    Tablet 40 milliGRAM(s) Oral before breakfast  polyethylene glycol 3350 17 Gram(s) Oral daily  remdesivir  IVPB 100 milliGRAM(s) IV Intermittent every 24 hours  remdesivir  IVPB   IV Intermittent   rivaroxaban 10 milliGRAM(s) Oral with dinner  roflumilast 500 MICROGram(s) Oral daily  senna 1 Tablet(s) Oral at bedtime  sodium chloride 0.9% with potassium chloride 20 mEq/L 500 milliLiter(s) (70 mL/Hr) IV Continuous <Continuous>  tiotropium 2.5 MICROgram(s) Inhaler 2 Puff(s) Inhalation daily  zinc sulfate 220 milliGRAM(s) Oral daily    MEDICATIONS  (PRN):  acetaminophen     Tablet .. 650 milliGRAM(s) Oral every 6 hours PRN Temp greater or equal to 38C (100.4F), Mild Pain (1 - 3)  albuterol    90 MICROgram(s) HFA Inhaler 2 Puff(s) Inhalation every 6 hours PRN Bronchospasm  aluminum hydroxide/magnesium hydroxide/simethicone Suspension 30 milliLiter(s) Oral every 4 hours PRN Dyspepsia  bisacodyl Suppository 10 milliGRAM(s) Rectal daily PRN Constipation  dextrose Oral Gel 15 Gram(s) Oral once PRN Blood Glucose LESS THAN 70 milliGRAM(s)/deciliter  melatonin 3 milliGRAM(s) Oral at bedtime PRN Insomnia  ondansetron    Tablet 4 milliGRAM(s) Oral four times a day PRN for nausea  ondansetron Injectable 4 milliGRAM(s) IV Push every 8 hours PRN Nausea and/or Vomiting        LABS: All Labs Reviewed:                          7.5    7.71  )-----------( 346      ( 30 Dec 2023 08:45 )             25.1   12-30    136  |  105  |  12  ----------------------------<  166<H>  5.2   |  23  |  <0.20<L>    Ca    8.1<L>      30 Dec 2023 08:45  Mg     1.7     12-29    TPro  5.6<L>  /  Alb  1.6<L>  /  TBili  0.4  /  DBili  x   /  AST  36  /  ALT  30  /  AlkPhos  63  12-30                     CAPILLARY BLOOD GLUCOSE    POCT Blood Glucose.: 197 mg/dL (30 Dec 2023 12:51)  POCT Blood Glucose.: 131 mg/dL (29 Dec 2023 22:20)  POCT Blood Glucose.: 138 mg/dL (29 Dec 2023 17:01)      RADIOLOGY/EKG (personally reviewed):  CTAP  IMPRESSION:    Limited as above.    Bilateral lower lobe pneumonia, greater on the right.    Distended bladder.    Cholelithiasis.    Moderate constipation with mild rectal prolapse.    Advanced atherosclerosis. Sternotomy bilateral common iliac artery.  
Subjective and Objective:   A 68-year-old female with a past medical history of muscle wasting and atrophy, chronic obstructive pulmonary disease on home o2 2 lit,, thromboembolism, deep vein thrombosis of the lower extremity, type 2 diabetes mellitus, left mastectomy, anxiety disorder, iron deficiency anemia, malignant neoplasm of the breast in remission, stage 3 pressure ulcer of the sacral region,  hyperlipidemia, anxiety disorder, and essential hypertension presented today to the emergency department due to a one-day history of shortness of breath, lethargy, and fever, admitted for acute hypoxic, hypercapnic respiratory failure i/s/o COVID    Overnight Events: None  Interval History: Patient was seen and examined by me this morning at bedside. Pateint feels a little better this morning but very weak.     REVIEW OF SYSTEMS:   CONSTITUTIONAL: +weakness, chills  EYES/ENT: No visual changes;  No vertigo or throat pain   NECK: No pain or stiffness  RESPIRATORY: + cough, No wheezing, hemoptysis;  +shortness of breath  CARDIOVASCULAR: No chest pain or palpitations  GASTROINTESTINAL: No abdominal or epigastric pain. No nausea, vomiting; No diarrhea or constipation.   GENITOURINARY: No dysuria, frequency or hematuria  NEUROLOGICAL: No numbness   SKIN: No itching, burning, rashes, or lesions       Vital Signs Last 24 Hrs      PHYSICAL EXAM  Constitutional: Pt lying in bed, awake and alert, NAD. Overall very emaciated  HEENT: EOMI, normocephalic, dry mucous membranes  Neck: Soft and supple  Respiratory: Course breath sounds diffusely.  No wheezing, rales or rhonchi  Cardiovascular: S1S2+, RRR, no M/G/R  Gastrointestinal: BS+, soft, NT/ND, no guarding, no rebound  Extremities: No calf pain  Vascular: Peripheral pulses present  Neurological: AAOx3, no focal deficits  Musculoskeletal: b/l UE/LE weakness symmetrically and muscular atrophy   Skin: Sacral ulcer with clean base on right buttock    MEDICATIONS:  MEDICATIONS  (STANDING):  azithromycin  IVPB 500 milliGRAM(s) IV Intermittent every 24 hours  budesonide 160 MICROgram(s)/formoterol 4.5 MICROgram(s) Inhaler 2 Puff(s) Inhalation two times a day  buPROPion XL (24-Hour) 150 milliGRAM(s) Oral daily  cefepime   IVPB 1000 milliGRAM(s) IV Intermittent every 12 hours  dexAMETHasone  Injectable 6 milliGRAM(s) IV Push daily  dextrose 5%. 1000 milliLiter(s) (50 mL/Hr) IV Continuous <Continuous>  dextrose 5%. 1000 milliLiter(s) (100 mL/Hr) IV Continuous <Continuous>  dextrose 50% Injectable 25 Gram(s) IV Push once  dextrose 50% Injectable 12.5 Gram(s) IV Push once  dextrose 50% Injectable 25 Gram(s) IV Push once  diltiazem    milliGRAM(s) Oral daily  doxazosin 2 milliGRAM(s) Oral at bedtime  ferrous    sulfate 325 milliGRAM(s) Oral daily  glucagon  Injectable 1 milliGRAM(s) IntraMuscular once  insulin lispro (ADMELOG) corrective regimen sliding scale   SubCutaneous at bedtime  insulin lispro (ADMELOG) corrective regimen sliding scale   SubCutaneous three times a day before meals  letrozole 2.5 milliGRAM(s) Oral daily  nicotine -   7 mG/24Hr(s) Patch 1 Patch Transdermal daily  pantoprazole    Tablet 40 milliGRAM(s) Oral before breakfast  polyethylene glycol 3350 17 Gram(s) Oral daily  remdesivir  IVPB 100 milliGRAM(s) IV Intermittent every 24 hours  remdesivir  IVPB   IV Intermittent   rivaroxaban 10 milliGRAM(s) Oral with dinner  senna 1 Tablet(s) Oral at bedtime  sodium chloride 0.9% with potassium chloride 20 mEq/L 500 milliLiter(s) (70 mL/Hr) IV Continuous <Continuous>  sodium chloride 0.9%. 1000 milliLiter(s) (70 mL/Hr) IV Continuous <Continuous>  tiotropium 2.5 MICROgram(s) Inhaler 2 Puff(s) Inhalation daily    MEDICATIONS  (PRN):  acetaminophen     Tablet .. 650 milliGRAM(s) Oral every 6 hours PRN Temp greater or equal to 38C (100.4F), Mild Pain (1 - 3)  albuterol    90 MICROgram(s) HFA Inhaler 2 Puff(s) Inhalation every 6 hours PRN Bronchospasm  aluminum hydroxide/magnesium hydroxide/simethicone Suspension 30 milliLiter(s) Oral every 4 hours PRN Dyspepsia  bisacodyl Suppository 10 milliGRAM(s) Rectal daily PRN Constipation  dextrose Oral Gel 15 Gram(s) Oral once PRN Blood Glucose LESS THAN 70 milliGRAM(s)/deciliter  melatonin 3 milliGRAM(s) Oral at bedtime PRN Insomnia  ondansetron    Tablet 4 milliGRAM(s) Oral four times a day PRN for nausea  ondansetron Injectable 4 milliGRAM(s) IV Push every 8 hours PRN Nausea and/or Vomiting      LABS: All Labs Reviewed:                        7.4    9.30  )-----------( 345      ( 29 Dec 2023 06:41 )             24.8     12-29    141  |  106  |  11  ----------------------------<  74  2.7<LL>   |  28  |  <0.20<L>    Ca    7.6<L>      29 Dec 2023 06:41  Mg     1.7     12-29    TPro  5.3<L>  /  Alb  1.7<L>  /  TBili  0.2  /  DBili  x   /  AST  24  /  ALT  28  /  AlkPhos  59  12-29    PT/INR - ( 28 Dec 2023 08:14 )   PT: 11.8 sec;   INR: 1.04 ratio         PTT - ( 28 Dec 2023 08:14 )  PTT:37.1 sec      Blood Culture: 12-27 @ 18:55  Organism --  Gram Stain Blood -- Gram Stain --  Specimen Source .Blood Blood-Peripheral  Culture-Blood --      CAPILLARY BLOOD GLUCOSE      POCT Blood Glucose.: 133 mg/dL (29 Dec 2023 11:17)  POCT Blood Glucose.: 118 mg/dL (29 Dec 2023 08:26)  POCT Blood Glucose.: 113 mg/dL (28 Dec 2023 21:36)  POCT Blood Glucose.: 97 mg/dL (28 Dec 2023 17:51)  POCT Blood Glucose.: 129 mg/dL (28 Dec 2023 13:15)    Blood Culture:   CAPILLARY BLOOD GLUCOSE  POCT Blood Glucose.: 117 mg/dL (28 Dec 2023 09:08)    RADIOLOGY/EKG (personally reviewed):  CTAP  IMPRESSION:    Limited as above.    Bilateral lower lobe pneumonia, greater on the right.    Distended bladder.    Cholelithiasis.    Moderate constipation with mild rectal prolapse.    Advanced atherosclerosis. Sternotomy bilateral common iliac artery.  
Subjective and Objective:   A 68-year-old female with a past medical history of muscle wasting and atrophy, chronic obstructive pulmonary disease on home o2 2 lit,, thromboembolism, deep vein thrombosis of the lower extremity, type 2 diabetes mellitus, left mastectomy, anxiety disorder, iron deficiency anemia, malignant neoplasm of the breast in remission, stage 3 pressure ulcer of the sacral region,  hyperlipidemia, anxiety disorder, and essential hypertension presented today to the emergency department due to a one-day history of shortness of breath, lethargy, and fever, admitted for acute hypoxic, hypercapnic respiratory failure i/s/o COVID    Overnight Events: None  Interval History: Patient was seen and examined by me this morning at bedside. Pateint feels a little better this morning but very weak.     REVIEW OF SYSTEMS:   CONSTITUTIONAL: +weakness, chills  EYES/ENT: No visual changes;  No vertigo or throat pain   NECK: No pain or stiffness  RESPIRATORY: + cough, No wheezing, hemoptysis;  +shortness of breath  CARDIOVASCULAR: No chest pain or palpitations  GASTROINTESTINAL: No abdominal or epigastric pain. No nausea, vomiting; No diarrhea or constipation.   GENITOURINARY: No dysuria, frequency or hematuria  NEUROLOGICAL: No numbness   SKIN: No itching, burning, rashes, or lesions       Vital Signs Last 24 Hrs  T(C): 37.2 (27 Dec 2023 19:24), Max: 37.2 (27 Dec 2023 16:37)  T(F): 99 (27 Dec 2023 19:24), Max: 99 (27 Dec 2023 16:37)  HR: 88 (28 Dec 2023 08:26) (85 - 94)  BP: 126/67 (28 Dec 2023 08:26) (109/64 - 129/53)  BP(mean): 75 (27 Dec 2023 19:03) (75 - 75)  RR: 20 (28 Dec 2023 08:26) (19 - 26)  SpO2: 100% (28 Dec 2023 08:26) (96% - 100%)    Parameters below as of 28 Dec 2023 08:26  Patient On (Oxygen Delivery Method): nasal cannula  O2 Flow (L/min): 2      I&O's Summary      PHYSICAL EXAM  Constitutional: Pt lying in bed, awake and alert, NAD. Overall very emaciated  HEENT: EOMI, normocephalic, dry mucous membranes  Neck: Soft and supple  Respiratory: Course breath sounds diffusely.  No wheezing, rales or rhonchi  Cardiovascular: S1S2+, RRR, no M/G/R  Gastrointestinal: BS+, soft, NT/ND, no guarding, no rebound  Extremities: No calf pain  Vascular: Peripheral pulses present  Neurological: AAOx3, no focal deficits  Musculoskeletal: b/l UE/LE weakness symmetrically and muscular atrophy   Skin: Sacral ulcer with clean base on right buttock    MEDICATIONS:  MEDICATIONS  (STANDING):  azithromycin  IVPB 500 milliGRAM(s) IV Intermittent every 24 hours  budesonide 160 MICROgram(s)/formoterol 4.5 MICROgram(s) Inhaler 2 Puff(s) Inhalation two times a day  buPROPion XL (24-Hour) 150 milliGRAM(s) Oral daily  cefepime   IVPB 1000 milliGRAM(s) IV Intermittent every 12 hours  dexAMETHasone  Injectable 6 milliGRAM(s) IV Push daily  dextrose 5%. 1000 milliLiter(s) (50 mL/Hr) IV Continuous <Continuous>  dextrose 5%. 1000 milliLiter(s) (100 mL/Hr) IV Continuous <Continuous>  dextrose 50% Injectable 25 Gram(s) IV Push once  dextrose 50% Injectable 12.5 Gram(s) IV Push once  dextrose 50% Injectable 25 Gram(s) IV Push once  diltiazem    milliGRAM(s) Oral daily  doxazosin 2 milliGRAM(s) Oral at bedtime  ferrous    sulfate 325 milliGRAM(s) Oral daily  glucagon  Injectable 1 milliGRAM(s) IntraMuscular once  insulin lispro (ADMELOG) corrective regimen sliding scale   SubCutaneous at bedtime  insulin lispro (ADMELOG) corrective regimen sliding scale   SubCutaneous three times a day before meals  letrozole 2.5 milliGRAM(s) Oral daily  nicotine -   7 mG/24Hr(s) Patch 1 Patch Transdermal daily  pantoprazole    Tablet 40 milliGRAM(s) Oral before breakfast  polyethylene glycol 3350 17 Gram(s) Oral daily  remdesivir  IVPB 100 milliGRAM(s) IV Intermittent every 24 hours  remdesivir  IVPB   IV Intermittent   rivaroxaban 10 milliGRAM(s) Oral with dinner  senna 1 Tablet(s) Oral at bedtime  sodium chloride 0.9% with potassium chloride 20 mEq/L 500 milliLiter(s) (70 mL/Hr) IV Continuous <Continuous>  sodium chloride 0.9%. 1000 milliLiter(s) (70 mL/Hr) IV Continuous <Continuous>  tiotropium 2.5 MICROgram(s) Inhaler 2 Puff(s) Inhalation daily    MEDICATIONS  (PRN):  acetaminophen     Tablet .. 650 milliGRAM(s) Oral every 6 hours PRN Temp greater or equal to 38C (100.4F), Mild Pain (1 - 3)  albuterol    90 MICROgram(s) HFA Inhaler 2 Puff(s) Inhalation every 6 hours PRN Bronchospasm  aluminum hydroxide/magnesium hydroxide/simethicone Suspension 30 milliLiter(s) Oral every 4 hours PRN Dyspepsia  bisacodyl Suppository 10 milliGRAM(s) Rectal daily PRN Constipation  dextrose Oral Gel 15 Gram(s) Oral once PRN Blood Glucose LESS THAN 70 milliGRAM(s)/deciliter  melatonin 3 milliGRAM(s) Oral at bedtime PRN Insomnia  ondansetron    Tablet 4 milliGRAM(s) Oral four times a day PRN for nausea  ondansetron Injectable 4 milliGRAM(s) IV Push every 8 hours PRN Nausea and/or Vomiting      LABS: All Labs Reviewed:                        7.8    12.14 )-----------( 314      ( 28 Dec 2023 08:14 )             25.2     12-28    140  |  104  |  10  ----------------------------<  101<H>  3.5   |  29  |  x     Ca    8.2<L>      28 Dec 2023 08:14  Mg     1.7     12-27    TPro  6.0  /  Alb  1.8<L>  /  TBili  0.4  /  DBili  x   /  AST  52<H>  /  ALT  32  /  AlkPhos  57  12-28    PT/INR - ( 28 Dec 2023 08:14 )   PT: 11.8 sec;   INR: 1.04 ratio         PTT - ( 28 Dec 2023 08:14 )  PTT:37.1 sec      Blood Culture:   CAPILLARY BLOOD GLUCOSE  POCT Blood Glucose.: 117 mg/dL (28 Dec 2023 09:08)    RADIOLOGY/EKG (personally reviewed):  CTAP  IMPRESSION:    Limited as above.    Bilateral lower lobe pneumonia, greater on the right.    Distended bladder.    Cholelithiasis.    Moderate constipation with mild rectal prolapse.    Advanced atherosclerosis. Sternotomy bilateral common iliac artery.

## 2023-12-31 NOTE — DISCHARGE NOTE PROVIDER - NSDCCAREPROVSEEN_GEN_ALL_CORE_FT
Agapito, Yudy Mckinney, Jigar Simon, Konrad Johnson, Jon Harper, Deshawn Curran, Oliver Edgar, Edward Pena, Ernesto Avery, [place]

## 2024-01-01 ENCOUNTER — NON-APPOINTMENT (OUTPATIENT)
Age: 69
End: 2024-01-01

## 2024-01-01 ENCOUNTER — INPATIENT (INPATIENT)
Facility: HOSPITAL | Age: 69
LOS: 14 days | DRG: 870 | End: 2024-02-09
Attending: INTERNAL MEDICINE | Admitting: INTERNAL MEDICINE
Payer: MEDICARE

## 2024-01-01 ENCOUNTER — TRANSCRIPTION ENCOUNTER (OUTPATIENT)
Age: 69
End: 2024-01-01

## 2024-01-01 VITALS
OXYGEN SATURATION: 100 % | RESPIRATION RATE: 22 BRPM | HEART RATE: 134 BPM | SYSTOLIC BLOOD PRESSURE: 121 MMHG | HEIGHT: 59 IN | TEMPERATURE: 98 F | DIASTOLIC BLOOD PRESSURE: 74 MMHG | WEIGHT: 80.03 LBS

## 2024-01-01 VITALS — HEART RATE: 109 BPM | TEMPERATURE: 97 F

## 2024-01-01 DIAGNOSIS — Z98.890 OTHER SPECIFIED POSTPROCEDURAL STATES: Chronic | ICD-10-CM

## 2024-01-01 DIAGNOSIS — Z90.12 ACQUIRED ABSENCE OF LEFT BREAST AND NIPPLE: Chronic | ICD-10-CM

## 2024-01-01 DIAGNOSIS — J44.1 CHRONIC OBSTRUCTIVE PULMONARY DISEASE WITH (ACUTE) EXACERBATION: ICD-10-CM

## 2024-01-01 DIAGNOSIS — Z29.9 ENCOUNTER FOR PROPHYLACTIC MEASURES, UNSPECIFIED: ICD-10-CM

## 2024-01-01 DIAGNOSIS — J96.01 ACUTE RESPIRATORY FAILURE WITH HYPOXIA: ICD-10-CM

## 2024-01-01 DIAGNOSIS — E11.9 TYPE 2 DIABETES MELLITUS WITHOUT COMPLICATIONS: ICD-10-CM

## 2024-01-01 DIAGNOSIS — I48.0 PAROXYSMAL ATRIAL FIBRILLATION: ICD-10-CM

## 2024-01-01 DIAGNOSIS — I10 ESSENTIAL (PRIMARY) HYPERTENSION: ICD-10-CM

## 2024-01-01 DIAGNOSIS — I82.409 ACUTE EMBOLISM AND THROMBOSIS OF UNSPECIFIED DEEP VEINS OF UNSPECIFIED LOWER EXTREMITY: ICD-10-CM

## 2024-01-01 DIAGNOSIS — I25.10 ATHEROSCLEROTIC HEART DISEASE OF NATIVE CORONARY ARTERY WITHOUT ANGINA PECTORIS: ICD-10-CM

## 2024-01-01 LAB
ALBUMIN SERPL ELPH-MCNC: 1.6 G/DL — LOW (ref 3.3–5)
ALBUMIN SERPL ELPH-MCNC: 1.7 G/DL — LOW (ref 3.3–5)
ALBUMIN SERPL ELPH-MCNC: 1.8 G/DL — LOW (ref 3.3–5)
ALBUMIN SERPL ELPH-MCNC: 1.9 G/DL — LOW (ref 3.3–5)
ALBUMIN SERPL ELPH-MCNC: 2.1 G/DL — LOW (ref 3.3–5)
ALBUMIN SERPL ELPH-MCNC: 2.2 G/DL — LOW (ref 3.3–5)
ALBUMIN SERPL ELPH-MCNC: 2.4 G/DL — LOW (ref 3.3–5)
ALBUMIN SERPL ELPH-MCNC: 2.5 G/DL — LOW (ref 3.3–5)
ALBUMIN SERPL ELPH-MCNC: 2.6 G/DL — LOW (ref 3.3–5)
ALP SERPL-CCNC: 104 U/L — SIGNIFICANT CHANGE UP (ref 40–120)
ALP SERPL-CCNC: 130 U/L — HIGH (ref 40–120)
ALP SERPL-CCNC: 77 U/L — SIGNIFICANT CHANGE UP (ref 40–120)
ALP SERPL-CCNC: 78 U/L — SIGNIFICANT CHANGE UP (ref 40–120)
ALP SERPL-CCNC: 82 U/L — SIGNIFICANT CHANGE UP (ref 40–120)
ALP SERPL-CCNC: 82 U/L — SIGNIFICANT CHANGE UP (ref 40–120)
ALP SERPL-CCNC: 85 U/L — SIGNIFICANT CHANGE UP (ref 40–120)
ALP SERPL-CCNC: 87 U/L — SIGNIFICANT CHANGE UP (ref 40–120)
ALP SERPL-CCNC: 87 U/L — SIGNIFICANT CHANGE UP (ref 40–120)
ALP SERPL-CCNC: 88 U/L — SIGNIFICANT CHANGE UP (ref 40–120)
ALP SERPL-CCNC: 95 U/L — SIGNIFICANT CHANGE UP (ref 40–120)
ALT FLD-CCNC: 114 U/L — HIGH (ref 10–45)
ALT FLD-CCNC: 14 U/L — SIGNIFICANT CHANGE UP (ref 10–45)
ALT FLD-CCNC: 15 U/L — SIGNIFICANT CHANGE UP (ref 10–45)
ALT FLD-CCNC: 16 U/L — SIGNIFICANT CHANGE UP (ref 10–45)
ALT FLD-CCNC: 17 U/L — SIGNIFICANT CHANGE UP (ref 10–45)
ALT FLD-CCNC: 20 U/L — SIGNIFICANT CHANGE UP (ref 10–45)
ALT FLD-CCNC: 21 U/L — SIGNIFICANT CHANGE UP (ref 10–45)
ALT FLD-CCNC: 30 U/L — SIGNIFICANT CHANGE UP (ref 10–45)
ANION GAP SERPL CALC-SCNC: 1 MMOL/L — LOW (ref 5–17)
ANION GAP SERPL CALC-SCNC: 10 MMOL/L — SIGNIFICANT CHANGE UP (ref 5–17)
ANION GAP SERPL CALC-SCNC: 11 MMOL/L — SIGNIFICANT CHANGE UP (ref 5–17)
ANION GAP SERPL CALC-SCNC: 2 MMOL/L — LOW (ref 5–17)
ANION GAP SERPL CALC-SCNC: 4 MMOL/L — LOW (ref 5–17)
ANION GAP SERPL CALC-SCNC: 5 MMOL/L — SIGNIFICANT CHANGE UP (ref 5–17)
ANION GAP SERPL CALC-SCNC: 7 MMOL/L — SIGNIFICANT CHANGE UP (ref 5–17)
ANION GAP SERPL CALC-SCNC: 9 MMOL/L — SIGNIFICANT CHANGE UP (ref 5–17)
ANISOCYTOSIS BLD QL: SLIGHT — SIGNIFICANT CHANGE UP
APTT BLD: 41.1 SEC — HIGH (ref 24.5–35.6)
AST SERPL-CCNC: 12 U/L — SIGNIFICANT CHANGE UP (ref 10–40)
AST SERPL-CCNC: 14 U/L — SIGNIFICANT CHANGE UP (ref 10–40)
AST SERPL-CCNC: 15 U/L — SIGNIFICANT CHANGE UP (ref 10–40)
AST SERPL-CCNC: 15 U/L — SIGNIFICANT CHANGE UP (ref 10–40)
AST SERPL-CCNC: 16 U/L — SIGNIFICANT CHANGE UP (ref 10–40)
AST SERPL-CCNC: 21 U/L — SIGNIFICANT CHANGE UP (ref 10–40)
AST SERPL-CCNC: 24 U/L — SIGNIFICANT CHANGE UP (ref 10–40)
AST SERPL-CCNC: 29 U/L — SIGNIFICANT CHANGE UP (ref 10–40)
AST SERPL-CCNC: 34 U/L — SIGNIFICANT CHANGE UP (ref 10–40)
AST SERPL-CCNC: 34 U/L — SIGNIFICANT CHANGE UP (ref 10–40)
AST SERPL-CCNC: 98 U/L — HIGH (ref 10–40)
BASE EXCESS BLDA CALC-SCNC: 1.6 MMOL/L — SIGNIFICANT CHANGE UP (ref -2–3)
BASE EXCESS BLDA CALC-SCNC: 4.7 MMOL/L — HIGH (ref -2–3)
BASE EXCESS BLDA CALC-SCNC: 6.4 MMOL/L — HIGH (ref -2–3)
BASE EXCESS BLDA CALC-SCNC: 8.2 MMOL/L — HIGH (ref -2–3)
BASE EXCESS BLDA CALC-SCNC: 8.5 MMOL/L — HIGH (ref -2–3)
BASE EXCESS BLDA CALC-SCNC: 8.9 MMOL/L — HIGH (ref -2–3)
BASOPHILS # BLD AUTO: 0 K/UL — SIGNIFICANT CHANGE UP (ref 0–0.2)
BASOPHILS # BLD AUTO: 0.02 K/UL — SIGNIFICANT CHANGE UP (ref 0–0.2)
BASOPHILS # BLD AUTO: 0.04 K/UL — SIGNIFICANT CHANGE UP (ref 0–0.2)
BASOPHILS # BLD AUTO: 0.05 K/UL — SIGNIFICANT CHANGE UP (ref 0–0.2)
BASOPHILS NFR BLD AUTO: 0 % — SIGNIFICANT CHANGE UP (ref 0–2)
BASOPHILS NFR BLD AUTO: 0.2 % — SIGNIFICANT CHANGE UP (ref 0–2)
BASOPHILS NFR BLD AUTO: 0.3 % — SIGNIFICANT CHANGE UP (ref 0–2)
BASOPHILS NFR BLD AUTO: 0.5 % — SIGNIFICANT CHANGE UP (ref 0–2)
BILIRUB SERPL-MCNC: 0.3 MG/DL — SIGNIFICANT CHANGE UP (ref 0.2–1.2)
BILIRUB SERPL-MCNC: 0.4 MG/DL — SIGNIFICANT CHANGE UP (ref 0.2–1.2)
BILIRUB SERPL-MCNC: 0.5 MG/DL — SIGNIFICANT CHANGE UP (ref 0.2–1.2)
BILIRUB SERPL-MCNC: 0.5 MG/DL — SIGNIFICANT CHANGE UP (ref 0.2–1.2)
BLD GP AB SCN SERPL QL: SIGNIFICANT CHANGE UP
BLD GP AB SCN SERPL QL: SIGNIFICANT CHANGE UP
BLOOD GAS COMMENTS ARTERIAL: SIGNIFICANT CHANGE UP
BUN SERPL-MCNC: 10 MG/DL — SIGNIFICANT CHANGE UP (ref 7–23)
BUN SERPL-MCNC: 11 MG/DL — SIGNIFICANT CHANGE UP (ref 7–23)
BUN SERPL-MCNC: 12 MG/DL — SIGNIFICANT CHANGE UP (ref 7–23)
BUN SERPL-MCNC: 12 MG/DL — SIGNIFICANT CHANGE UP (ref 7–23)
BUN SERPL-MCNC: 13 MG/DL — SIGNIFICANT CHANGE UP (ref 7–23)
BUN SERPL-MCNC: 14 MG/DL — SIGNIFICANT CHANGE UP (ref 7–23)
BUN SERPL-MCNC: 15 MG/DL — SIGNIFICANT CHANGE UP (ref 7–23)
BUN SERPL-MCNC: 16 MG/DL — SIGNIFICANT CHANGE UP (ref 7–23)
BUN SERPL-MCNC: 19 MG/DL — SIGNIFICANT CHANGE UP (ref 7–23)
BUN SERPL-MCNC: 20 MG/DL — SIGNIFICANT CHANGE UP (ref 7–23)
BUN SERPL-MCNC: 20 MG/DL — SIGNIFICANT CHANGE UP (ref 7–23)
BUN SERPL-MCNC: 21 MG/DL — SIGNIFICANT CHANGE UP (ref 7–23)
BUN SERPL-MCNC: 22 MG/DL — SIGNIFICANT CHANGE UP (ref 7–23)
BUN SERPL-MCNC: 32 MG/DL — HIGH (ref 7–23)
BURR CELLS BLD QL SMEAR: PRESENT — SIGNIFICANT CHANGE UP
CALCIUM SERPL-MCNC: 8 MG/DL — LOW (ref 8.4–10.5)
CALCIUM SERPL-MCNC: 8.2 MG/DL — LOW (ref 8.4–10.5)
CALCIUM SERPL-MCNC: 8.4 MG/DL — SIGNIFICANT CHANGE UP (ref 8.4–10.5)
CALCIUM SERPL-MCNC: 8.5 MG/DL — SIGNIFICANT CHANGE UP (ref 8.4–10.5)
CALCIUM SERPL-MCNC: 8.5 MG/DL — SIGNIFICANT CHANGE UP (ref 8.4–10.5)
CALCIUM SERPL-MCNC: 8.7 MG/DL — SIGNIFICANT CHANGE UP (ref 8.4–10.5)
CALCIUM SERPL-MCNC: 8.8 MG/DL — SIGNIFICANT CHANGE UP (ref 8.4–10.5)
CALCIUM SERPL-MCNC: 9 MG/DL — SIGNIFICANT CHANGE UP (ref 8.4–10.5)
CALCIUM SERPL-MCNC: 9 MG/DL — SIGNIFICANT CHANGE UP (ref 8.4–10.5)
CALCIUM SERPL-MCNC: 9.1 MG/DL — SIGNIFICANT CHANGE UP (ref 8.4–10.5)
CALCIUM SERPL-MCNC: 9.1 MG/DL — SIGNIFICANT CHANGE UP (ref 8.4–10.5)
CALCIUM SERPL-MCNC: 9.3 MG/DL — SIGNIFICANT CHANGE UP (ref 8.4–10.5)
CALCIUM SERPL-MCNC: 9.3 MG/DL — SIGNIFICANT CHANGE UP (ref 8.4–10.5)
CHLORIDE SERPL-SCNC: 103 MMOL/L — SIGNIFICANT CHANGE UP (ref 96–108)
CHLORIDE SERPL-SCNC: 105 MMOL/L — SIGNIFICANT CHANGE UP (ref 96–108)
CHLORIDE SERPL-SCNC: 106 MMOL/L — SIGNIFICANT CHANGE UP (ref 96–108)
CHLORIDE SERPL-SCNC: 106 MMOL/L — SIGNIFICANT CHANGE UP (ref 96–108)
CHLORIDE SERPL-SCNC: 107 MMOL/L — SIGNIFICANT CHANGE UP (ref 96–108)
CHLORIDE SERPL-SCNC: 108 MMOL/L — SIGNIFICANT CHANGE UP (ref 96–108)
CHLORIDE SERPL-SCNC: 109 MMOL/L — HIGH (ref 96–108)
CHLORIDE SERPL-SCNC: 110 MMOL/L — HIGH (ref 96–108)
CO2 BLDA-SCNC: 28 MMOL/L — HIGH (ref 19–24)
CO2 BLDA-SCNC: 30 MMOL/L — HIGH (ref 19–24)
CO2 BLDA-SCNC: 31 MMOL/L — HIGH (ref 19–24)
CO2 BLDA-SCNC: 32 MMOL/L — HIGH (ref 19–24)
CO2 BLDA-SCNC: 33 MMOL/L — HIGH (ref 19–24)
CO2 BLDA-SCNC: 34 MMOL/L — HIGH (ref 19–24)
CO2 SERPL-SCNC: 29 MMOL/L — SIGNIFICANT CHANGE UP (ref 22–31)
CO2 SERPL-SCNC: 29 MMOL/L — SIGNIFICANT CHANGE UP (ref 22–31)
CO2 SERPL-SCNC: 30 MMOL/L — SIGNIFICANT CHANGE UP (ref 22–31)
CO2 SERPL-SCNC: 30 MMOL/L — SIGNIFICANT CHANGE UP (ref 22–31)
CO2 SERPL-SCNC: 31 MMOL/L — SIGNIFICANT CHANGE UP (ref 22–31)
CO2 SERPL-SCNC: 33 MMOL/L — HIGH (ref 22–31)
CO2 SERPL-SCNC: 33 MMOL/L — HIGH (ref 22–31)
CO2 SERPL-SCNC: 34 MMOL/L — HIGH (ref 22–31)
CO2 SERPL-SCNC: 34 MMOL/L — HIGH (ref 22–31)
CO2 SERPL-SCNC: 35 MMOL/L — HIGH (ref 22–31)
CO2 SERPL-SCNC: 37 MMOL/L — HIGH (ref 22–31)
CO2 SERPL-SCNC: 38 MMOL/L — HIGH (ref 22–31)
CREAT SERPL-MCNC: 0.2 MG/DL — LOW (ref 0.5–1.3)
CREAT SERPL-MCNC: 0.21 MG/DL — LOW (ref 0.5–1.3)
CREAT SERPL-MCNC: 0.22 MG/DL — LOW (ref 0.5–1.3)
CREAT SERPL-MCNC: 0.24 MG/DL — LOW (ref 0.5–1.3)
CREAT SERPL-MCNC: 0.25 MG/DL — LOW (ref 0.5–1.3)
CREAT SERPL-MCNC: 0.27 MG/DL — LOW (ref 0.5–1.3)
CREAT SERPL-MCNC: 0.31 MG/DL — LOW (ref 0.5–1.3)
CREAT SERPL-MCNC: 0.36 MG/DL — LOW (ref 0.5–1.3)
CREAT SERPL-MCNC: 0.37 MG/DL — LOW (ref 0.5–1.3)
CREAT SERPL-MCNC: <0.2 MG/DL — LOW (ref 0.5–1.3)
CRP SERPL-MCNC: 77 MG/L — HIGH
CULTURE RESULTS: ABNORMAL
CULTURE RESULTS: SIGNIFICANT CHANGE UP
DACRYOCYTES BLD QL SMEAR: SLIGHT — SIGNIFICANT CHANGE UP
EGFR: 111 ML/MIN/1.73M2 — SIGNIFICANT CHANGE UP
EGFR: 112 ML/MIN/1.73M2 — SIGNIFICANT CHANGE UP
EGFR: 116 ML/MIN/1.73M2 — SIGNIFICANT CHANGE UP
EGFR: 120 ML/MIN/1.73M2 — SIGNIFICANT CHANGE UP
EGFR: 123 ML/MIN/1.73M2 — SIGNIFICANT CHANGE UP
EGFR: 123 ML/MIN/1.73M2 — SIGNIFICANT CHANGE UP
EGFR: 125 ML/MIN/1.73M2 — SIGNIFICANT CHANGE UP
EGFR: 125 ML/MIN/1.73M2 — SIGNIFICANT CHANGE UP
EGFR: 126 ML/MIN/1.73M2 — SIGNIFICANT CHANGE UP
EGFR: 126 ML/MIN/1.73M2 — SIGNIFICANT CHANGE UP
EGFR: 127 ML/MIN/1.73M2 — SIGNIFICANT CHANGE UP
EGFR: 129 ML/MIN/1.73M2 — SIGNIFICANT CHANGE UP
EGFR: 131 ML/MIN/1.73M2 — SIGNIFICANT CHANGE UP
EGFR: 134 ML/MIN/1.73M2 — SIGNIFICANT CHANGE UP
EGFR: 143 ML/MIN/1.73M2 — SIGNIFICANT CHANGE UP
EGFR: SIGNIFICANT CHANGE UP ML/MIN/1.73M2
ELLIPTOCYTES BLD QL SMEAR: SLIGHT — SIGNIFICANT CHANGE UP
EOSINOPHIL # BLD AUTO: 0 K/UL — SIGNIFICANT CHANGE UP (ref 0–0.5)
EOSINOPHIL # BLD AUTO: 0.01 K/UL — SIGNIFICANT CHANGE UP (ref 0–0.5)
EOSINOPHIL NFR BLD AUTO: 0 % — SIGNIFICANT CHANGE UP (ref 0–6)
EOSINOPHIL NFR BLD AUTO: 0.1 % — SIGNIFICANT CHANGE UP (ref 0–6)
GAS PNL BLDA: SIGNIFICANT CHANGE UP
GI PCR PANEL: SIGNIFICANT CHANGE UP
GLUCOSE BLDC GLUCOMTR-MCNC: 102 MG/DL — HIGH (ref 70–99)
GLUCOSE BLDC GLUCOMTR-MCNC: 105 MG/DL — HIGH (ref 70–99)
GLUCOSE BLDC GLUCOMTR-MCNC: 109 MG/DL — HIGH (ref 70–99)
GLUCOSE BLDC GLUCOMTR-MCNC: 110 MG/DL — HIGH (ref 70–99)
GLUCOSE BLDC GLUCOMTR-MCNC: 114 MG/DL — HIGH (ref 70–99)
GLUCOSE BLDC GLUCOMTR-MCNC: 117 MG/DL — HIGH (ref 70–99)
GLUCOSE BLDC GLUCOMTR-MCNC: 118 MG/DL — HIGH (ref 70–99)
GLUCOSE BLDC GLUCOMTR-MCNC: 120 MG/DL — HIGH (ref 70–99)
GLUCOSE BLDC GLUCOMTR-MCNC: 121 MG/DL — HIGH (ref 70–99)
GLUCOSE BLDC GLUCOMTR-MCNC: 124 MG/DL — HIGH (ref 70–99)
GLUCOSE BLDC GLUCOMTR-MCNC: 127 MG/DL — HIGH (ref 70–99)
GLUCOSE BLDC GLUCOMTR-MCNC: 127 MG/DL — HIGH (ref 70–99)
GLUCOSE BLDC GLUCOMTR-MCNC: 131 MG/DL — HIGH (ref 70–99)
GLUCOSE BLDC GLUCOMTR-MCNC: 132 MG/DL — HIGH (ref 70–99)
GLUCOSE BLDC GLUCOMTR-MCNC: 132 MG/DL — HIGH (ref 70–99)
GLUCOSE BLDC GLUCOMTR-MCNC: 136 MG/DL — HIGH (ref 70–99)
GLUCOSE BLDC GLUCOMTR-MCNC: 138 MG/DL — HIGH (ref 70–99)
GLUCOSE BLDC GLUCOMTR-MCNC: 139 MG/DL — HIGH (ref 70–99)
GLUCOSE BLDC GLUCOMTR-MCNC: 142 MG/DL — HIGH (ref 70–99)
GLUCOSE BLDC GLUCOMTR-MCNC: 142 MG/DL — HIGH (ref 70–99)
GLUCOSE BLDC GLUCOMTR-MCNC: 143 MG/DL — HIGH (ref 70–99)
GLUCOSE BLDC GLUCOMTR-MCNC: 145 MG/DL — HIGH (ref 70–99)
GLUCOSE BLDC GLUCOMTR-MCNC: 147 MG/DL — HIGH (ref 70–99)
GLUCOSE BLDC GLUCOMTR-MCNC: 148 MG/DL — HIGH (ref 70–99)
GLUCOSE BLDC GLUCOMTR-MCNC: 148 MG/DL — HIGH (ref 70–99)
GLUCOSE BLDC GLUCOMTR-MCNC: 149 MG/DL — HIGH (ref 70–99)
GLUCOSE BLDC GLUCOMTR-MCNC: 151 MG/DL — HIGH (ref 70–99)
GLUCOSE BLDC GLUCOMTR-MCNC: 153 MG/DL — HIGH (ref 70–99)
GLUCOSE BLDC GLUCOMTR-MCNC: 155 MG/DL — HIGH (ref 70–99)
GLUCOSE BLDC GLUCOMTR-MCNC: 158 MG/DL — HIGH (ref 70–99)
GLUCOSE BLDC GLUCOMTR-MCNC: 161 MG/DL — HIGH (ref 70–99)
GLUCOSE BLDC GLUCOMTR-MCNC: 162 MG/DL — HIGH (ref 70–99)
GLUCOSE BLDC GLUCOMTR-MCNC: 162 MG/DL — HIGH (ref 70–99)
GLUCOSE BLDC GLUCOMTR-MCNC: 166 MG/DL — HIGH (ref 70–99)
GLUCOSE BLDC GLUCOMTR-MCNC: 167 MG/DL — HIGH (ref 70–99)
GLUCOSE BLDC GLUCOMTR-MCNC: 168 MG/DL — HIGH (ref 70–99)
GLUCOSE BLDC GLUCOMTR-MCNC: 172 MG/DL — HIGH (ref 70–99)
GLUCOSE BLDC GLUCOMTR-MCNC: 173 MG/DL — HIGH (ref 70–99)
GLUCOSE BLDC GLUCOMTR-MCNC: 175 MG/DL — HIGH (ref 70–99)
GLUCOSE BLDC GLUCOMTR-MCNC: 179 MG/DL — HIGH (ref 70–99)
GLUCOSE BLDC GLUCOMTR-MCNC: 183 MG/DL — HIGH (ref 70–99)
GLUCOSE BLDC GLUCOMTR-MCNC: 184 MG/DL — HIGH (ref 70–99)
GLUCOSE BLDC GLUCOMTR-MCNC: 186 MG/DL — HIGH (ref 70–99)
GLUCOSE BLDC GLUCOMTR-MCNC: 187 MG/DL — HIGH (ref 70–99)
GLUCOSE BLDC GLUCOMTR-MCNC: 189 MG/DL — HIGH (ref 70–99)
GLUCOSE BLDC GLUCOMTR-MCNC: 208 MG/DL — HIGH (ref 70–99)
GLUCOSE BLDC GLUCOMTR-MCNC: 209 MG/DL — HIGH (ref 70–99)
GLUCOSE BLDC GLUCOMTR-MCNC: 211 MG/DL — HIGH (ref 70–99)
GLUCOSE BLDC GLUCOMTR-MCNC: 214 MG/DL — HIGH (ref 70–99)
GLUCOSE BLDC GLUCOMTR-MCNC: 215 MG/DL — HIGH (ref 70–99)
GLUCOSE BLDC GLUCOMTR-MCNC: 218 MG/DL — HIGH (ref 70–99)
GLUCOSE BLDC GLUCOMTR-MCNC: 230 MG/DL — HIGH (ref 70–99)
GLUCOSE BLDC GLUCOMTR-MCNC: 231 MG/DL — HIGH (ref 70–99)
GLUCOSE BLDC GLUCOMTR-MCNC: 233 MG/DL — HIGH (ref 70–99)
GLUCOSE BLDC GLUCOMTR-MCNC: 249 MG/DL — HIGH (ref 70–99)
GLUCOSE BLDC GLUCOMTR-MCNC: 266 MG/DL — HIGH (ref 70–99)
GLUCOSE BLDC GLUCOMTR-MCNC: 289 MG/DL — HIGH (ref 70–99)
GLUCOSE BLDC GLUCOMTR-MCNC: 303 MG/DL — HIGH (ref 70–99)
GLUCOSE BLDC GLUCOMTR-MCNC: 319 MG/DL — HIGH (ref 70–99)
GLUCOSE BLDC GLUCOMTR-MCNC: 321 MG/DL — HIGH (ref 70–99)
GLUCOSE BLDC GLUCOMTR-MCNC: 322 MG/DL — HIGH (ref 70–99)
GLUCOSE BLDC GLUCOMTR-MCNC: 327 MG/DL — HIGH (ref 70–99)
GLUCOSE BLDC GLUCOMTR-MCNC: 338 MG/DL — HIGH (ref 70–99)
GLUCOSE BLDC GLUCOMTR-MCNC: 358 MG/DL — HIGH (ref 70–99)
GLUCOSE BLDC GLUCOMTR-MCNC: 364 MG/DL — HIGH (ref 70–99)
GLUCOSE BLDC GLUCOMTR-MCNC: 382 MG/DL — HIGH (ref 70–99)
GLUCOSE BLDC GLUCOMTR-MCNC: 407 MG/DL — HIGH (ref 70–99)
GLUCOSE BLDC GLUCOMTR-MCNC: 409 MG/DL — HIGH (ref 70–99)
GLUCOSE BLDC GLUCOMTR-MCNC: 422 MG/DL — HIGH (ref 70–99)
GLUCOSE BLDC GLUCOMTR-MCNC: 431 MG/DL — HIGH (ref 70–99)
GLUCOSE BLDC GLUCOMTR-MCNC: 447 MG/DL — HIGH (ref 70–99)
GLUCOSE BLDC GLUCOMTR-MCNC: 458 MG/DL — CRITICAL HIGH (ref 70–99)
GLUCOSE BLDC GLUCOMTR-MCNC: 73 MG/DL — SIGNIFICANT CHANGE UP (ref 70–99)
GLUCOSE BLDC GLUCOMTR-MCNC: 83 MG/DL — SIGNIFICANT CHANGE UP (ref 70–99)
GLUCOSE BLDC GLUCOMTR-MCNC: 84 MG/DL — SIGNIFICANT CHANGE UP (ref 70–99)
GLUCOSE BLDC GLUCOMTR-MCNC: 84 MG/DL — SIGNIFICANT CHANGE UP (ref 70–99)
GLUCOSE BLDC GLUCOMTR-MCNC: 86 MG/DL — SIGNIFICANT CHANGE UP (ref 70–99)
GLUCOSE BLDC GLUCOMTR-MCNC: 87 MG/DL — SIGNIFICANT CHANGE UP (ref 70–99)
GLUCOSE BLDC GLUCOMTR-MCNC: 94 MG/DL — SIGNIFICANT CHANGE UP (ref 70–99)
GLUCOSE SERPL-MCNC: 102 MG/DL — HIGH (ref 70–99)
GLUCOSE SERPL-MCNC: 108 MG/DL — HIGH (ref 70–99)
GLUCOSE SERPL-MCNC: 110 MG/DL — HIGH (ref 70–99)
GLUCOSE SERPL-MCNC: 116 MG/DL — HIGH (ref 70–99)
GLUCOSE SERPL-MCNC: 126 MG/DL — HIGH (ref 70–99)
GLUCOSE SERPL-MCNC: 139 MG/DL — HIGH (ref 70–99)
GLUCOSE SERPL-MCNC: 141 MG/DL — HIGH (ref 70–99)
GLUCOSE SERPL-MCNC: 153 MG/DL — HIGH (ref 70–99)
GLUCOSE SERPL-MCNC: 159 MG/DL — HIGH (ref 70–99)
GLUCOSE SERPL-MCNC: 162 MG/DL — HIGH (ref 70–99)
GLUCOSE SERPL-MCNC: 165 MG/DL — HIGH (ref 70–99)
GLUCOSE SERPL-MCNC: 175 MG/DL — HIGH (ref 70–99)
GLUCOSE SERPL-MCNC: 203 MG/DL — HIGH (ref 70–99)
GLUCOSE SERPL-MCNC: 69 MG/DL — LOW (ref 70–99)
GLUCOSE SERPL-MCNC: 88 MG/DL — SIGNIFICANT CHANGE UP (ref 70–99)
GLUCOSE SERPL-MCNC: 94 MG/DL — SIGNIFICANT CHANGE UP (ref 70–99)
GRAM STN FLD: ABNORMAL
HCO3 BLDA-SCNC: 27 MMOL/L — SIGNIFICANT CHANGE UP (ref 21–28)
HCO3 BLDA-SCNC: 28 MMOL/L — SIGNIFICANT CHANGE UP (ref 21–28)
HCO3 BLDA-SCNC: 30 MMOL/L — HIGH (ref 21–28)
HCO3 BLDA-SCNC: 31 MMOL/L — HIGH (ref 21–28)
HCO3 BLDA-SCNC: 32 MMOL/L — HIGH (ref 21–28)
HCO3 BLDA-SCNC: 33 MMOL/L — HIGH (ref 21–28)
HCT VFR BLD CALC: 19.8 % — CRITICAL LOW (ref 34.5–45)
HCT VFR BLD CALC: 20.3 % — CRITICAL LOW (ref 34.5–45)
HCT VFR BLD CALC: 21.9 % — LOW (ref 34.5–45)
HCT VFR BLD CALC: 22.4 % — LOW (ref 34.5–45)
HCT VFR BLD CALC: 22.5 % — LOW (ref 34.5–45)
HCT VFR BLD CALC: 22.6 % — LOW (ref 34.5–45)
HCT VFR BLD CALC: 23.4 % — LOW (ref 34.5–45)
HCT VFR BLD CALC: 24.2 % — LOW (ref 34.5–45)
HCT VFR BLD CALC: 24.5 % — LOW (ref 34.5–45)
HCT VFR BLD CALC: 25.5 % — LOW (ref 34.5–45)
HCT VFR BLD CALC: 26 % — LOW (ref 34.5–45)
HCT VFR BLD CALC: 26.2 % — LOW (ref 34.5–45)
HCT VFR BLD CALC: 26.7 % — LOW (ref 34.5–45)
HCT VFR BLD CALC: 28.4 % — LOW (ref 34.5–45)
HCT VFR BLD CALC: 28.4 % — LOW (ref 34.5–45)
HCT VFR BLD CALC: 29.3 % — LOW (ref 34.5–45)
HCT VFR BLD CALC: 30.4 % — LOW (ref 34.5–45)
HCT VFR BLD CALC: 30.5 % — LOW (ref 34.5–45)
HCT VFR BLD CALC: 31.7 % — LOW (ref 34.5–45)
HGB BLD-MCNC: 6.2 G/DL — CRITICAL LOW (ref 11.5–15.5)
HGB BLD-MCNC: 6.2 G/DL — CRITICAL LOW (ref 11.5–15.5)
HGB BLD-MCNC: 6.6 G/DL — CRITICAL LOW (ref 11.5–15.5)
HGB BLD-MCNC: 7 G/DL — CRITICAL LOW (ref 11.5–15.5)
HGB BLD-MCNC: 7 G/DL — CRITICAL LOW (ref 11.5–15.5)
HGB BLD-MCNC: 7.1 G/DL — LOW (ref 11.5–15.5)
HGB BLD-MCNC: 7.2 G/DL — LOW (ref 11.5–15.5)
HGB BLD-MCNC: 7.2 G/DL — LOW (ref 11.5–15.5)
HGB BLD-MCNC: 7.4 G/DL — LOW (ref 11.5–15.5)
HGB BLD-MCNC: 7.7 G/DL — LOW (ref 11.5–15.5)
HGB BLD-MCNC: 7.7 G/DL — LOW (ref 11.5–15.5)
HGB BLD-MCNC: 8 G/DL — LOW (ref 11.5–15.5)
HGB BLD-MCNC: 8.3 G/DL — LOW (ref 11.5–15.5)
HGB BLD-MCNC: 9.1 G/DL — LOW (ref 11.5–15.5)
HGB BLD-MCNC: 9.1 G/DL — LOW (ref 11.5–15.5)
HGB BLD-MCNC: 9.3 G/DL — LOW (ref 11.5–15.5)
HGB BLD-MCNC: 9.3 G/DL — LOW (ref 11.5–15.5)
HGB BLD-MCNC: 9.5 G/DL — LOW (ref 11.5–15.5)
HGB BLD-MCNC: 9.9 G/DL — LOW (ref 11.5–15.5)
HOROWITZ INDEX BLDA+IHG-RTO: 30 — SIGNIFICANT CHANGE UP
HOROWITZ INDEX BLDA+IHG-RTO: 40 — SIGNIFICANT CHANGE UP
HOROWITZ INDEX BLDA+IHG-RTO: 50 — SIGNIFICANT CHANGE UP
HYPOCHROMIA BLD QL: SIGNIFICANT CHANGE UP
IMM GRANULOCYTES NFR BLD AUTO: 0.7 % — SIGNIFICANT CHANGE UP (ref 0–0.9)
IMM GRANULOCYTES NFR BLD AUTO: 1.3 % — HIGH (ref 0–0.9)
IMM GRANULOCYTES NFR BLD AUTO: 2.1 % — HIGH (ref 0–0.9)
INR BLD: 1.08 RATIO — SIGNIFICANT CHANGE UP (ref 0.85–1.18)
LACTATE SERPL-SCNC: 2.2 MMOL/L — HIGH (ref 0.7–2)
LACTATE SERPL-SCNC: 3 MMOL/L — HIGH (ref 0.7–2)
LEGIONELLA AG UR QL: NEGATIVE — SIGNIFICANT CHANGE UP
LYMPHOCYTES # BLD AUTO: 0.28 K/UL — LOW (ref 1–3.3)
LYMPHOCYTES # BLD AUTO: 0.44 K/UL — LOW (ref 1–3.3)
LYMPHOCYTES # BLD AUTO: 0.51 K/UL — LOW (ref 1–3.3)
LYMPHOCYTES # BLD AUTO: 1.31 K/UL — SIGNIFICANT CHANGE UP (ref 1–3.3)
LYMPHOCYTES # BLD AUTO: 12 % — LOW (ref 13–44)
LYMPHOCYTES # BLD AUTO: 2 % — LOW (ref 13–44)
LYMPHOCYTES # BLD AUTO: 2.1 % — LOW (ref 13–44)
LYMPHOCYTES # BLD AUTO: 4.1 % — LOW (ref 13–44)
MAGNESIUM SERPL-MCNC: 0.9 MG/DL — CRITICAL LOW (ref 1.6–2.6)
MAGNESIUM SERPL-MCNC: 1.4 MG/DL — LOW (ref 1.6–2.6)
MAGNESIUM SERPL-MCNC: 1.5 MG/DL — LOW (ref 1.6–2.6)
MAGNESIUM SERPL-MCNC: 1.6 MG/DL — SIGNIFICANT CHANGE UP (ref 1.6–2.6)
MAGNESIUM SERPL-MCNC: 1.7 MG/DL — SIGNIFICANT CHANGE UP (ref 1.6–2.6)
MAGNESIUM SERPL-MCNC: 1.8 MG/DL — SIGNIFICANT CHANGE UP (ref 1.6–2.6)
MAGNESIUM SERPL-MCNC: 1.8 MG/DL — SIGNIFICANT CHANGE UP (ref 1.6–2.6)
MAGNESIUM SERPL-MCNC: 1.9 MG/DL — SIGNIFICANT CHANGE UP (ref 1.6–2.6)
MAGNESIUM SERPL-MCNC: 2 MG/DL — SIGNIFICANT CHANGE UP (ref 1.6–2.6)
MAGNESIUM SERPL-MCNC: 2.1 MG/DL — SIGNIFICANT CHANGE UP (ref 1.6–2.6)
MAGNESIUM SERPL-MCNC: 2.1 MG/DL — SIGNIFICANT CHANGE UP (ref 1.6–2.6)
MAGNESIUM SERPL-MCNC: 2.3 MG/DL — SIGNIFICANT CHANGE UP (ref 1.6–2.6)
MAGNESIUM SERPL-MCNC: 2.5 MG/DL — SIGNIFICANT CHANGE UP (ref 1.6–2.6)
MANUAL SMEAR VERIFICATION: SIGNIFICANT CHANGE UP
MCHC RBC-ENTMCNC: 25.8 PG — LOW (ref 27–34)
MCHC RBC-ENTMCNC: 26.1 PG — LOW (ref 27–34)
MCHC RBC-ENTMCNC: 26.2 PG — LOW (ref 27–34)
MCHC RBC-ENTMCNC: 26.3 PG — LOW (ref 27–34)
MCHC RBC-ENTMCNC: 26.4 PG — LOW (ref 27–34)
MCHC RBC-ENTMCNC: 27.2 PG — SIGNIFICANT CHANGE UP (ref 27–34)
MCHC RBC-ENTMCNC: 27.4 PG — SIGNIFICANT CHANGE UP (ref 27–34)
MCHC RBC-ENTMCNC: 27.4 PG — SIGNIFICANT CHANGE UP (ref 27–34)
MCHC RBC-ENTMCNC: 27.5 PG — SIGNIFICANT CHANGE UP (ref 27–34)
MCHC RBC-ENTMCNC: 27.5 PG — SIGNIFICANT CHANGE UP (ref 27–34)
MCHC RBC-ENTMCNC: 27.7 PG — SIGNIFICANT CHANGE UP (ref 27–34)
MCHC RBC-ENTMCNC: 27.7 PG — SIGNIFICANT CHANGE UP (ref 27–34)
MCHC RBC-ENTMCNC: 27.8 PG — SIGNIFICANT CHANGE UP (ref 27–34)
MCHC RBC-ENTMCNC: 27.9 PG — SIGNIFICANT CHANGE UP (ref 27–34)
MCHC RBC-ENTMCNC: 28.1 PG — SIGNIFICANT CHANGE UP (ref 27–34)
MCHC RBC-ENTMCNC: 28.6 PG — SIGNIFICANT CHANGE UP (ref 27–34)
MCHC RBC-ENTMCNC: 29.3 PG — SIGNIFICANT CHANGE UP (ref 27–34)
MCHC RBC-ENTMCNC: 29.6 GM/DL — LOW (ref 32–36)
MCHC RBC-ENTMCNC: 29.8 GM/DL — LOW (ref 32–36)
MCHC RBC-ENTMCNC: 30.1 GM/DL — LOW (ref 32–36)
MCHC RBC-ENTMCNC: 30.2 GM/DL — LOW (ref 32–36)
MCHC RBC-ENTMCNC: 30.2 GM/DL — LOW (ref 32–36)
MCHC RBC-ENTMCNC: 30.5 GM/DL — LOW (ref 32–36)
MCHC RBC-ENTMCNC: 30.5 GM/DL — LOW (ref 32–36)
MCHC RBC-ENTMCNC: 30.6 GM/DL — LOW (ref 32–36)
MCHC RBC-ENTMCNC: 30.8 GM/DL — LOW (ref 32–36)
MCHC RBC-ENTMCNC: 31.1 GM/DL — LOW (ref 32–36)
MCHC RBC-ENTMCNC: 31.2 GM/DL — LOW (ref 32–36)
MCHC RBC-ENTMCNC: 31.3 GM/DL — LOW (ref 32–36)
MCHC RBC-ENTMCNC: 31.3 GM/DL — LOW (ref 32–36)
MCHC RBC-ENTMCNC: 31.4 GM/DL — LOW (ref 32–36)
MCHC RBC-ENTMCNC: 31.7 GM/DL — LOW (ref 32–36)
MCHC RBC-ENTMCNC: 32 GM/DL — SIGNIFICANT CHANGE UP (ref 32–36)
MCHC RBC-ENTMCNC: 32 GM/DL — SIGNIFICANT CHANGE UP (ref 32–36)
MCV RBC AUTO: 83.6 FL — SIGNIFICANT CHANGE UP (ref 80–100)
MCV RBC AUTO: 83.9 FL — SIGNIFICANT CHANGE UP (ref 80–100)
MCV RBC AUTO: 85 FL — SIGNIFICANT CHANGE UP (ref 80–100)
MCV RBC AUTO: 85.7 FL — SIGNIFICANT CHANGE UP (ref 80–100)
MCV RBC AUTO: 86.9 FL — SIGNIFICANT CHANGE UP (ref 80–100)
MCV RBC AUTO: 87.2 FL — SIGNIFICANT CHANGE UP (ref 80–100)
MCV RBC AUTO: 87.5 FL — SIGNIFICANT CHANGE UP (ref 80–100)
MCV RBC AUTO: 87.5 FL — SIGNIFICANT CHANGE UP (ref 80–100)
MCV RBC AUTO: 88.3 FL — SIGNIFICANT CHANGE UP (ref 80–100)
MCV RBC AUTO: 88.4 FL — SIGNIFICANT CHANGE UP (ref 80–100)
MCV RBC AUTO: 89 FL — SIGNIFICANT CHANGE UP (ref 80–100)
MCV RBC AUTO: 89.7 FL — SIGNIFICANT CHANGE UP (ref 80–100)
MCV RBC AUTO: 89.9 FL — SIGNIFICANT CHANGE UP (ref 80–100)
MCV RBC AUTO: 90 FL — SIGNIFICANT CHANGE UP (ref 80–100)
MCV RBC AUTO: 91.1 FL — SIGNIFICANT CHANGE UP (ref 80–100)
MCV RBC AUTO: 92 FL — SIGNIFICANT CHANGE UP (ref 80–100)
MCV RBC AUTO: 93.2 FL — SIGNIFICANT CHANGE UP (ref 80–100)
MCV RBC AUTO: 93.6 FL — SIGNIFICANT CHANGE UP (ref 80–100)
MCV RBC AUTO: 94.1 FL — SIGNIFICANT CHANGE UP (ref 80–100)
MONOCYTES # BLD AUTO: 0.18 K/UL — SIGNIFICANT CHANGE UP (ref 0–0.9)
MONOCYTES # BLD AUTO: 0.23 K/UL — SIGNIFICANT CHANGE UP (ref 0–0.9)
MONOCYTES # BLD AUTO: 0.65 K/UL — SIGNIFICANT CHANGE UP (ref 0–0.9)
MONOCYTES # BLD AUTO: 1.02 K/UL — HIGH (ref 0–0.9)
MONOCYTES NFR BLD AUTO: 1.7 % — LOW (ref 2–14)
MONOCYTES NFR BLD AUTO: 1.7 % — LOW (ref 2–14)
MONOCYTES NFR BLD AUTO: 4 % — SIGNIFICANT CHANGE UP (ref 2–14)
MONOCYTES NFR BLD AUTO: 6 % — SIGNIFICANT CHANGE UP (ref 2–14)
MRSA PCR RESULT.: SIGNIFICANT CHANGE UP
MYELOCYTES NFR BLD: 1 % — HIGH (ref 0–0)
NEUTROPHILS # BLD AUTO: 12.39 K/UL — HIGH (ref 1.8–7.4)
NEUTROPHILS # BLD AUTO: 23.8 K/UL — HIGH (ref 1.8–7.4)
NEUTROPHILS # BLD AUTO: 8.86 K/UL — HIGH (ref 1.8–7.4)
NEUTROPHILS # BLD AUTO: 9.91 K/UL — HIGH (ref 1.8–7.4)
NEUTROPHILS NFR BLD AUTO: 74 % — SIGNIFICANT CHANGE UP (ref 43–77)
NEUTROPHILS NFR BLD AUTO: 81.1 % — HIGH (ref 43–77)
NEUTROPHILS NFR BLD AUTO: 92.3 % — HIGH (ref 43–77)
NEUTROPHILS NFR BLD AUTO: 93.8 % — HIGH (ref 43–77)
NEUTS BAND # BLD: 19 % — HIGH (ref 0–8)
NRBC # BLD: 0 /100 WBCS — SIGNIFICANT CHANGE UP (ref 0–0)
NT-PROBNP SERPL-SCNC: 1950 PG/ML — HIGH (ref 0–300)
NT-PROBNP SERPL-SCNC: 855 PG/ML — HIGH (ref 0–300)
OB PNL STL: NEGATIVE — SIGNIFICANT CHANGE UP
PCO2 BLDA: 37 MMHG — HIGH (ref 32–35)
PCO2 BLDA: 39 MMHG — HIGH (ref 32–35)
PCO2 BLDA: 39 MMHG — HIGH (ref 32–35)
PCO2 BLDA: 43 MMHG — HIGH (ref 32–35)
PCO2 BLDA: 44 MMHG — HIGH (ref 32–35)
PCO2 BLDA: 46 MMHG — HIGH (ref 32–35)
PH BLDA: 7.39 — SIGNIFICANT CHANGE UP (ref 7.35–7.45)
PH BLDA: 7.46 — HIGH (ref 7.35–7.45)
PH BLDA: 7.47 — HIGH (ref 7.35–7.45)
PH BLDA: 7.48 — HIGH (ref 7.35–7.45)
PH BLDA: 7.49 — HIGH (ref 7.35–7.45)
PH BLDA: 7.53 — HIGH (ref 7.35–7.45)
PHOSPHATE SERPL-MCNC: 1.5 MG/DL — LOW (ref 2.5–4.5)
PHOSPHATE SERPL-MCNC: 1.8 MG/DL — LOW (ref 2.5–4.5)
PHOSPHATE SERPL-MCNC: 1.8 MG/DL — LOW (ref 2.5–4.5)
PHOSPHATE SERPL-MCNC: 1.9 MG/DL — LOW (ref 2.5–4.5)
PHOSPHATE SERPL-MCNC: 1.9 MG/DL — LOW (ref 2.5–4.5)
PHOSPHATE SERPL-MCNC: 2 MG/DL — LOW (ref 2.5–4.5)
PHOSPHATE SERPL-MCNC: 2.1 MG/DL — LOW (ref 2.5–4.5)
PHOSPHATE SERPL-MCNC: 2.2 MG/DL — LOW (ref 2.5–4.5)
PHOSPHATE SERPL-MCNC: 2.6 MG/DL — SIGNIFICANT CHANGE UP (ref 2.5–4.5)
PHOSPHATE SERPL-MCNC: 2.8 MG/DL — SIGNIFICANT CHANGE UP (ref 2.5–4.5)
PHOSPHATE SERPL-MCNC: 3.1 MG/DL — SIGNIFICANT CHANGE UP (ref 2.5–4.5)
PHOSPHATE SERPL-MCNC: 3.5 MG/DL — SIGNIFICANT CHANGE UP (ref 2.5–4.5)
PLAT MORPH BLD: NORMAL — SIGNIFICANT CHANGE UP
PLATELET # BLD AUTO: 206 K/UL — SIGNIFICANT CHANGE UP (ref 150–400)
PLATELET # BLD AUTO: 215 K/UL — SIGNIFICANT CHANGE UP (ref 150–400)
PLATELET # BLD AUTO: 217 K/UL — SIGNIFICANT CHANGE UP (ref 150–400)
PLATELET # BLD AUTO: 218 K/UL — SIGNIFICANT CHANGE UP (ref 150–400)
PLATELET # BLD AUTO: 228 K/UL — SIGNIFICANT CHANGE UP (ref 150–400)
PLATELET # BLD AUTO: 239 K/UL — SIGNIFICANT CHANGE UP (ref 150–400)
PLATELET # BLD AUTO: 245 K/UL — SIGNIFICANT CHANGE UP (ref 150–400)
PLATELET # BLD AUTO: 246 K/UL — SIGNIFICANT CHANGE UP (ref 150–400)
PLATELET # BLD AUTO: 261 K/UL — SIGNIFICANT CHANGE UP (ref 150–400)
PLATELET # BLD AUTO: 270 K/UL — SIGNIFICANT CHANGE UP (ref 150–400)
PLATELET # BLD AUTO: 273 K/UL — SIGNIFICANT CHANGE UP (ref 150–400)
PLATELET # BLD AUTO: 276 K/UL — SIGNIFICANT CHANGE UP (ref 150–400)
PLATELET # BLD AUTO: 281 K/UL — SIGNIFICANT CHANGE UP (ref 150–400)
PLATELET # BLD AUTO: 291 K/UL — SIGNIFICANT CHANGE UP (ref 150–400)
PLATELET # BLD AUTO: 296 K/UL — SIGNIFICANT CHANGE UP (ref 150–400)
PLATELET # BLD AUTO: 298 K/UL — SIGNIFICANT CHANGE UP (ref 150–400)
PLATELET # BLD AUTO: 308 K/UL — SIGNIFICANT CHANGE UP (ref 150–400)
PLATELET # BLD AUTO: 337 K/UL — SIGNIFICANT CHANGE UP (ref 150–400)
PLATELET # BLD AUTO: 343 K/UL — SIGNIFICANT CHANGE UP (ref 150–400)
PO2 BLDA: 105 MMHG — SIGNIFICANT CHANGE UP (ref 83–108)
PO2 BLDA: 130 MMHG — HIGH (ref 83–108)
PO2 BLDA: 140 MMHG — HIGH (ref 83–108)
PO2 BLDA: 183 MMHG — HIGH (ref 83–108)
PO2 BLDA: 78 MMHG — LOW (ref 83–108)
PO2 BLDA: 80 MMHG — LOW (ref 83–108)
POIKILOCYTOSIS BLD QL AUTO: SLIGHT — SIGNIFICANT CHANGE UP
POLYCHROMASIA BLD QL SMEAR: SLIGHT — SIGNIFICANT CHANGE UP
POTASSIUM SERPL-MCNC: 2.7 MMOL/L — CRITICAL LOW (ref 3.5–5.3)
POTASSIUM SERPL-MCNC: 3.4 MMOL/L — LOW (ref 3.5–5.3)
POTASSIUM SERPL-MCNC: 3.4 MMOL/L — LOW (ref 3.5–5.3)
POTASSIUM SERPL-MCNC: 3.6 MMOL/L — SIGNIFICANT CHANGE UP (ref 3.5–5.3)
POTASSIUM SERPL-MCNC: 3.7 MMOL/L — SIGNIFICANT CHANGE UP (ref 3.5–5.3)
POTASSIUM SERPL-MCNC: 3.8 MMOL/L — SIGNIFICANT CHANGE UP (ref 3.5–5.3)
POTASSIUM SERPL-MCNC: 3.9 MMOL/L — SIGNIFICANT CHANGE UP (ref 3.5–5.3)
POTASSIUM SERPL-MCNC: 3.9 MMOL/L — SIGNIFICANT CHANGE UP (ref 3.5–5.3)
POTASSIUM SERPL-MCNC: 4 MMOL/L — SIGNIFICANT CHANGE UP (ref 3.5–5.3)
POTASSIUM SERPL-MCNC: 4.1 MMOL/L — SIGNIFICANT CHANGE UP (ref 3.5–5.3)
POTASSIUM SERPL-MCNC: 4.3 MMOL/L — SIGNIFICANT CHANGE UP (ref 3.5–5.3)
POTASSIUM SERPL-MCNC: 4.4 MMOL/L — SIGNIFICANT CHANGE UP (ref 3.5–5.3)
POTASSIUM SERPL-MCNC: 4.5 MMOL/L — SIGNIFICANT CHANGE UP (ref 3.5–5.3)
POTASSIUM SERPL-MCNC: 4.6 MMOL/L — SIGNIFICANT CHANGE UP (ref 3.5–5.3)
POTASSIUM SERPL-MCNC: 4.8 MMOL/L — SIGNIFICANT CHANGE UP (ref 3.5–5.3)
POTASSIUM SERPL-MCNC: 5.5 MMOL/L — HIGH (ref 3.5–5.3)
POTASSIUM SERPL-SCNC: 2.7 MMOL/L — CRITICAL LOW (ref 3.5–5.3)
POTASSIUM SERPL-SCNC: 3.4 MMOL/L — LOW (ref 3.5–5.3)
POTASSIUM SERPL-SCNC: 3.4 MMOL/L — LOW (ref 3.5–5.3)
POTASSIUM SERPL-SCNC: 3.6 MMOL/L — SIGNIFICANT CHANGE UP (ref 3.5–5.3)
POTASSIUM SERPL-SCNC: 3.7 MMOL/L — SIGNIFICANT CHANGE UP (ref 3.5–5.3)
POTASSIUM SERPL-SCNC: 3.8 MMOL/L — SIGNIFICANT CHANGE UP (ref 3.5–5.3)
POTASSIUM SERPL-SCNC: 3.9 MMOL/L — SIGNIFICANT CHANGE UP (ref 3.5–5.3)
POTASSIUM SERPL-SCNC: 3.9 MMOL/L — SIGNIFICANT CHANGE UP (ref 3.5–5.3)
POTASSIUM SERPL-SCNC: 4 MMOL/L — SIGNIFICANT CHANGE UP (ref 3.5–5.3)
POTASSIUM SERPL-SCNC: 4.1 MMOL/L — SIGNIFICANT CHANGE UP (ref 3.5–5.3)
POTASSIUM SERPL-SCNC: 4.3 MMOL/L — SIGNIFICANT CHANGE UP (ref 3.5–5.3)
POTASSIUM SERPL-SCNC: 4.4 MMOL/L — SIGNIFICANT CHANGE UP (ref 3.5–5.3)
POTASSIUM SERPL-SCNC: 4.5 MMOL/L — SIGNIFICANT CHANGE UP (ref 3.5–5.3)
POTASSIUM SERPL-SCNC: 4.6 MMOL/L — SIGNIFICANT CHANGE UP (ref 3.5–5.3)
POTASSIUM SERPL-SCNC: 4.8 MMOL/L — SIGNIFICANT CHANGE UP (ref 3.5–5.3)
POTASSIUM SERPL-SCNC: 5.5 MMOL/L — HIGH (ref 3.5–5.3)
PROCALCITONIN SERPL-MCNC: 7.48 NG/ML — HIGH
PROT SERPL-MCNC: 5.1 G/DL — LOW (ref 6–8.3)
PROT SERPL-MCNC: 5.3 G/DL — LOW (ref 6–8.3)
PROT SERPL-MCNC: 5.4 G/DL — LOW (ref 6–8.3)
PROT SERPL-MCNC: 5.5 G/DL — LOW (ref 6–8.3)
PROT SERPL-MCNC: 5.7 G/DL — LOW (ref 6–8.3)
PROT SERPL-MCNC: 5.8 G/DL — LOW (ref 6–8.3)
PROT SERPL-MCNC: 5.8 G/DL — LOW (ref 6–8.3)
PROT SERPL-MCNC: 6.3 G/DL — SIGNIFICANT CHANGE UP (ref 6–8.3)
PROT SERPL-MCNC: 6.4 G/DL — SIGNIFICANT CHANGE UP (ref 6–8.3)
PROTHROM AB SERPL-ACNC: 12.6 SEC — SIGNIFICANT CHANGE UP (ref 9.5–13)
RAPID RVP RESULT: DETECTED
RBC # BLD: 2.35 M/UL — LOW (ref 3.8–5.2)
RBC # BLD: 2.36 M/UL — LOW (ref 3.8–5.2)
RBC # BLD: 2.37 M/UL — LOW (ref 3.8–5.2)
RBC # BLD: 2.39 M/UL — LOW (ref 3.8–5.2)
RBC # BLD: 2.56 M/UL — LOW (ref 3.8–5.2)
RBC # BLD: 2.6 M/UL — LOW (ref 3.8–5.2)
RBC # BLD: 2.63 M/UL — LOW (ref 3.8–5.2)
RBC # BLD: 2.68 M/UL — LOW (ref 3.8–5.2)
RBC # BLD: 2.8 M/UL — LOW (ref 3.8–5.2)
RBC # BLD: 2.97 M/UL — LOW (ref 3.8–5.2)
RBC # BLD: 2.98 M/UL — LOW (ref 3.8–5.2)
RBC # BLD: 3.27 M/UL — LOW (ref 3.8–5.2)
RBC # BLD: 3.34 M/UL — LOW (ref 3.8–5.2)
RBC # BLD: 3.35 M/UL — LOW (ref 3.8–5.2)
RBC # BLD: 3.39 M/UL — LOW (ref 3.8–5.2)
RBC # BLD: 3.45 M/UL — LOW (ref 3.8–5.2)
RBC # BLD: 3.56 M/UL — LOW (ref 3.8–5.2)
RBC # FLD: 15.7 % — HIGH (ref 10.3–14.5)
RBC # FLD: 15.9 % — HIGH (ref 10.3–14.5)
RBC # FLD: 16.3 % — HIGH (ref 10.3–14.5)
RBC # FLD: 16.4 % — HIGH (ref 10.3–14.5)
RBC # FLD: 16.7 % — HIGH (ref 10.3–14.5)
RBC # FLD: 17.2 % — HIGH (ref 10.3–14.5)
RBC # FLD: 17.2 % — HIGH (ref 10.3–14.5)
RBC # FLD: 17.3 % — HIGH (ref 10.3–14.5)
RBC # FLD: 17.4 % — HIGH (ref 10.3–14.5)
RBC # FLD: 17.6 % — HIGH (ref 10.3–14.5)
RBC # FLD: 17.7 % — HIGH (ref 10.3–14.5)
RBC # FLD: 18.3 % — HIGH (ref 10.3–14.5)
RBC # FLD: 18.6 % — HIGH (ref 10.3–14.5)
RBC # FLD: 18.6 % — HIGH (ref 10.3–14.5)
RBC # FLD: 18.7 % — HIGH (ref 10.3–14.5)
RBC BLD AUTO: ABNORMAL
ROULEAUX BLD QL SMEAR: PRESENT
S AUREUS DNA NOSE QL NAA+PROBE: DETECTED
S PNEUM AG UR QL: NEGATIVE — SIGNIFICANT CHANGE UP
SAO2 % BLDA: 97 % — SIGNIFICANT CHANGE UP (ref 94–98)
SAO2 % BLDA: 97.4 % — SIGNIFICANT CHANGE UP (ref 94–98)
SAO2 % BLDA: 98.7 % — HIGH (ref 94–98)
SAO2 % BLDA: 99.1 % — HIGH (ref 94–98)
SAO2 % BLDA: 99.1 % — HIGH (ref 94–98)
SAO2 % BLDA: 99.2 % — HIGH (ref 94–98)
SARS-COV-2 RNA SPEC QL NAA+PROBE: DETECTED
SCHISTOCYTES BLD QL AUTO: SLIGHT — SIGNIFICANT CHANGE UP
SODIUM SERPL-SCNC: 141 MMOL/L — SIGNIFICANT CHANGE UP (ref 135–145)
SODIUM SERPL-SCNC: 142 MMOL/L — SIGNIFICANT CHANGE UP (ref 135–145)
SODIUM SERPL-SCNC: 143 MMOL/L — SIGNIFICANT CHANGE UP (ref 135–145)
SODIUM SERPL-SCNC: 143 MMOL/L — SIGNIFICANT CHANGE UP (ref 135–145)
SODIUM SERPL-SCNC: 144 MMOL/L — SIGNIFICANT CHANGE UP (ref 135–145)
SODIUM SERPL-SCNC: 145 MMOL/L — SIGNIFICANT CHANGE UP (ref 135–145)
SODIUM SERPL-SCNC: 145 MMOL/L — SIGNIFICANT CHANGE UP (ref 135–145)
SODIUM SERPL-SCNC: 146 MMOL/L — HIGH (ref 135–145)
SODIUM SERPL-SCNC: 147 MMOL/L — HIGH (ref 135–145)
SODIUM SERPL-SCNC: 148 MMOL/L — HIGH (ref 135–145)
SPECIMEN SOURCE: SIGNIFICANT CHANGE UP
VANCOMYCIN TROUGH SERPL-MCNC: 5.2 UG/ML — LOW (ref 10–20)
WBC # BLD: 10.01 K/UL — SIGNIFICANT CHANGE UP (ref 3.8–10.5)
WBC # BLD: 10.04 K/UL — SIGNIFICANT CHANGE UP (ref 3.8–10.5)
WBC # BLD: 10.56 K/UL — HIGH (ref 3.8–10.5)
WBC # BLD: 10.73 K/UL — HIGH (ref 3.8–10.5)
WBC # BLD: 10.92 K/UL — HIGH (ref 3.8–10.5)
WBC # BLD: 11.74 K/UL — HIGH (ref 3.8–10.5)
WBC # BLD: 12.27 K/UL — HIGH (ref 3.8–10.5)
WBC # BLD: 12.82 K/UL — HIGH (ref 3.8–10.5)
WBC # BLD: 13.22 K/UL — HIGH (ref 3.8–10.5)
WBC # BLD: 13.46 K/UL — HIGH (ref 3.8–10.5)
WBC # BLD: 14.84 K/UL — HIGH (ref 3.8–10.5)
WBC # BLD: 20.54 K/UL — HIGH (ref 3.8–10.5)
WBC # BLD: 21.79 K/UL — HIGH (ref 3.8–10.5)
WBC # BLD: 25.59 K/UL — HIGH (ref 3.8–10.5)
WBC # BLD: 8.58 K/UL — SIGNIFICANT CHANGE UP (ref 3.8–10.5)
WBC # BLD: 8.59 K/UL — SIGNIFICANT CHANGE UP (ref 3.8–10.5)
WBC # BLD: 8.74 K/UL — SIGNIFICANT CHANGE UP (ref 3.8–10.5)
WBC # BLD: 8.83 K/UL — SIGNIFICANT CHANGE UP (ref 3.8–10.5)
WBC # BLD: 9.17 K/UL — SIGNIFICANT CHANGE UP (ref 3.8–10.5)
WBC # FLD AUTO: 10.01 K/UL — SIGNIFICANT CHANGE UP (ref 3.8–10.5)
WBC # FLD AUTO: 10.04 K/UL — SIGNIFICANT CHANGE UP (ref 3.8–10.5)
WBC # FLD AUTO: 10.56 K/UL — HIGH (ref 3.8–10.5)
WBC # FLD AUTO: 10.73 K/UL — HIGH (ref 3.8–10.5)
WBC # FLD AUTO: 10.92 K/UL — HIGH (ref 3.8–10.5)
WBC # FLD AUTO: 11.74 K/UL — HIGH (ref 3.8–10.5)
WBC # FLD AUTO: 12.27 K/UL — HIGH (ref 3.8–10.5)
WBC # FLD AUTO: 12.82 K/UL — HIGH (ref 3.8–10.5)
WBC # FLD AUTO: 13.22 K/UL — HIGH (ref 3.8–10.5)
WBC # FLD AUTO: 13.46 K/UL — HIGH (ref 3.8–10.5)
WBC # FLD AUTO: 14.84 K/UL — HIGH (ref 3.8–10.5)
WBC # FLD AUTO: 20.54 K/UL — HIGH (ref 3.8–10.5)
WBC # FLD AUTO: 21.79 K/UL — HIGH (ref 3.8–10.5)
WBC # FLD AUTO: 25.59 K/UL — HIGH (ref 3.8–10.5)
WBC # FLD AUTO: 8.58 K/UL — SIGNIFICANT CHANGE UP (ref 3.8–10.5)
WBC # FLD AUTO: 8.59 K/UL — SIGNIFICANT CHANGE UP (ref 3.8–10.5)
WBC # FLD AUTO: 8.74 K/UL — SIGNIFICANT CHANGE UP (ref 3.8–10.5)
WBC # FLD AUTO: 8.83 K/UL — SIGNIFICANT CHANGE UP (ref 3.8–10.5)
WBC # FLD AUTO: 9.17 K/UL — SIGNIFICANT CHANGE UP (ref 3.8–10.5)

## 2024-01-01 PROCEDURE — 82272 OCCULT BLD FECES 1-3 TESTS: CPT

## 2024-01-01 PROCEDURE — 71045 X-RAY EXAM CHEST 1 VIEW: CPT | Mod: 26

## 2024-01-01 PROCEDURE — 94640 AIRWAY INHALATION TREATMENT: CPT

## 2024-01-01 PROCEDURE — 86900 BLOOD TYPING SEROLOGIC ABO: CPT

## 2024-01-01 PROCEDURE — 85025 COMPLETE CBC W/AUTO DIFF WBC: CPT

## 2024-01-01 PROCEDURE — 87040 BLOOD CULTURE FOR BACTERIA: CPT

## 2024-01-01 PROCEDURE — 87640 STAPH A DNA AMP PROBE: CPT

## 2024-01-01 PROCEDURE — 87899 AGENT NOS ASSAY W/OPTIC: CPT

## 2024-01-01 PROCEDURE — 99291 CRITICAL CARE FIRST HOUR: CPT

## 2024-01-01 PROCEDURE — 86850 RBC ANTIBODY SCREEN: CPT

## 2024-01-01 PROCEDURE — 83880 ASSAY OF NATRIURETIC PEPTIDE: CPT

## 2024-01-01 PROCEDURE — 87641 MR-STAPH DNA AMP PROBE: CPT

## 2024-01-01 PROCEDURE — 87070 CULTURE OTHR SPECIMN AEROBIC: CPT

## 2024-01-01 PROCEDURE — 85610 PROTHROMBIN TIME: CPT

## 2024-01-01 PROCEDURE — 80048 BASIC METABOLIC PNL TOTAL CA: CPT

## 2024-01-01 PROCEDURE — 99232 SBSQ HOSP IP/OBS MODERATE 35: CPT

## 2024-01-01 PROCEDURE — 92610 EVALUATE SWALLOWING FUNCTION: CPT

## 2024-01-01 PROCEDURE — 83735 ASSAY OF MAGNESIUM: CPT

## 2024-01-01 PROCEDURE — 87449 NOS EACH ORGANISM AG IA: CPT

## 2024-01-01 PROCEDURE — 97162 PT EVAL MOD COMPLEX 30 MIN: CPT

## 2024-01-01 PROCEDURE — 99497 ADVNCD CARE PLAN 30 MIN: CPT

## 2024-01-01 PROCEDURE — 93010 ELECTROCARDIOGRAM REPORT: CPT

## 2024-01-01 PROCEDURE — 36415 COLL VENOUS BLD VENIPUNCTURE: CPT

## 2024-01-01 PROCEDURE — 83605 ASSAY OF LACTIC ACID: CPT

## 2024-01-01 PROCEDURE — 86923 COMPATIBILITY TEST ELECTRIC: CPT

## 2024-01-01 PROCEDURE — 86140 C-REACTIVE PROTEIN: CPT

## 2024-01-01 PROCEDURE — P9016: CPT

## 2024-01-01 PROCEDURE — 99223 1ST HOSP IP/OBS HIGH 75: CPT

## 2024-01-01 PROCEDURE — 85027 COMPLETE CBC AUTOMATED: CPT

## 2024-01-01 PROCEDURE — 94003 VENT MGMT INPAT SUBQ DAY: CPT

## 2024-01-01 PROCEDURE — 86901 BLOOD TYPING SEROLOGIC RH(D): CPT

## 2024-01-01 PROCEDURE — 82803 BLOOD GASES ANY COMBINATION: CPT

## 2024-01-01 PROCEDURE — 93005 ELECTROCARDIOGRAM TRACING: CPT

## 2024-01-01 PROCEDURE — 80202 ASSAY OF VANCOMYCIN: CPT

## 2024-01-01 PROCEDURE — 87507 IADNA-DNA/RNA PROBE TQ 12-25: CPT

## 2024-01-01 PROCEDURE — 99497 ADVNCD CARE PLAN 30 MIN: CPT | Mod: 25

## 2024-01-01 PROCEDURE — 94660 CPAP INITIATION&MGMT: CPT

## 2024-01-01 PROCEDURE — P9047: CPT

## 2024-01-01 PROCEDURE — 0225U NFCT DS DNA&RNA 21 SARSCOV2: CPT

## 2024-01-01 PROCEDURE — 84145 PROCALCITONIN (PCT): CPT

## 2024-01-01 PROCEDURE — 80053 COMPREHEN METABOLIC PANEL: CPT

## 2024-01-01 PROCEDURE — 94667 MNPJ CHEST WALL 1ST: CPT

## 2024-01-01 PROCEDURE — 36600 WITHDRAWAL OF ARTERIAL BLOOD: CPT

## 2024-01-01 PROCEDURE — 96374 THER/PROPH/DIAG INJ IV PUSH: CPT

## 2024-01-01 PROCEDURE — 82962 GLUCOSE BLOOD TEST: CPT

## 2024-01-01 PROCEDURE — 96375 TX/PRO/DX INJ NEW DRUG ADDON: CPT

## 2024-01-01 PROCEDURE — 85730 THROMBOPLASTIN TIME PARTIAL: CPT

## 2024-01-01 PROCEDURE — 84100 ASSAY OF PHOSPHORUS: CPT

## 2024-01-01 PROCEDURE — 92526 ORAL FUNCTION THERAPY: CPT

## 2024-01-01 PROCEDURE — 71045 X-RAY EXAM CHEST 1 VIEW: CPT

## 2024-01-01 PROCEDURE — 36430 TRANSFUSION BLD/BLD COMPNT: CPT

## 2024-01-01 RX ORDER — ACETAMINOPHEN 500 MG
550 TABLET ORAL ONCE
Refills: 0 | Status: COMPLETED | OUTPATIENT
Start: 2024-01-01 | End: 2024-01-01

## 2024-01-01 RX ORDER — MAGNESIUM SULFATE 500 MG/ML
2 VIAL (ML) INJECTION ONCE
Refills: 0 | Status: COMPLETED | OUTPATIENT
Start: 2024-01-01 | End: 2024-01-01

## 2024-01-01 RX ORDER — IRON SUCROSE 20 MG/ML
200 INJECTION, SOLUTION INTRAVENOUS
Refills: 0 | DISCHARGE

## 2024-01-01 RX ORDER — MAGNESIUM HYDROXIDE 400 MG/1
30 TABLET, CHEWABLE ORAL
Refills: 0 | DISCHARGE

## 2024-01-01 RX ORDER — FENTANYL CITRATE 50 UG/ML
75 INJECTION INTRAVENOUS EVERY 4 HOURS
Refills: 0 | Status: DISCONTINUED | OUTPATIENT
Start: 2024-01-01 | End: 2024-01-01

## 2024-01-01 RX ORDER — DILTIAZEM HCL 120 MG
30 CAPSULE, EXT RELEASE 24 HR ORAL ONCE
Refills: 0 | Status: COMPLETED | OUTPATIENT
Start: 2024-01-01 | End: 2024-01-01

## 2024-01-01 RX ORDER — DEXTROSE 50 % IN WATER 50 %
15 SYRINGE (ML) INTRAVENOUS ONCE
Refills: 0 | Status: DISCONTINUED | OUTPATIENT
Start: 2024-01-01 | End: 2024-01-01

## 2024-01-01 RX ORDER — ASCORBIC ACID 60 MG
500 TABLET,CHEWABLE ORAL DAILY
Refills: 0 | Status: DISCONTINUED | OUTPATIENT
Start: 2024-01-01 | End: 2024-01-01

## 2024-01-01 RX ORDER — PREDNISOLONE 5 MG
30 TABLET ORAL DAILY
Refills: 0 | Status: DISCONTINUED | OUTPATIENT
Start: 2024-01-01 | End: 2024-01-01

## 2024-01-01 RX ORDER — BUDESONIDE AND FORMOTEROL FUMARATE DIHYDRATE 160; 4.5 UG/1; UG/1
2 AEROSOL RESPIRATORY (INHALATION)
Refills: 0 | DISCHARGE

## 2024-01-01 RX ORDER — FERROUS SULFATE 325(65) MG
300 TABLET ORAL DAILY
Refills: 0 | Status: DISCONTINUED | OUTPATIENT
Start: 2024-01-01 | End: 2024-01-01

## 2024-01-01 RX ORDER — RIVAROXABAN 15 MG-20MG
1 KIT ORAL
Qty: 0 | Refills: 0 | DISCHARGE

## 2024-01-01 RX ORDER — ATORVASTATIN CALCIUM 80 MG/1
1 TABLET, FILM COATED ORAL
Qty: 0 | Refills: 0 | DISCHARGE

## 2024-01-01 RX ORDER — POLYETHYLENE GLYCOL 3350 17 G/17G
17 POWDER, FOR SOLUTION ORAL DAILY
Refills: 0 | Status: DISCONTINUED | OUTPATIENT
Start: 2024-01-01 | End: 2024-01-01

## 2024-01-01 RX ORDER — SENNA PLUS 8.6 MG/1
2 TABLET ORAL
Refills: 0 | DISCHARGE

## 2024-01-01 RX ORDER — INSULIN LISPRO 100/ML
VIAL (ML) SUBCUTANEOUS EVERY 6 HOURS
Refills: 0 | Status: DISCONTINUED | OUTPATIENT
Start: 2024-01-01 | End: 2024-01-01

## 2024-01-01 RX ORDER — MUPIROCIN 20 MG/G
1 OINTMENT TOPICAL
Refills: 0 | Status: COMPLETED | OUTPATIENT
Start: 2024-01-01 | End: 2024-01-01

## 2024-01-01 RX ORDER — HUMAN INSULIN 100 [IU]/ML
14 INJECTION, SUSPENSION SUBCUTANEOUS ONCE
Refills: 0 | Status: COMPLETED | OUTPATIENT
Start: 2024-01-01 | End: 2024-01-01

## 2024-01-01 RX ORDER — SODIUM,POTASSIUM PHOSPHATES 278-250MG
1 POWDER IN PACKET (EA) ORAL ONCE
Refills: 0 | Status: COMPLETED | OUTPATIENT
Start: 2024-01-01 | End: 2024-01-01

## 2024-01-01 RX ORDER — ALPRAZOLAM 0.25 MG/1
0.25 TABLET ORAL EVERY 8 HOURS
Qty: 42 | Refills: 0 | Status: ACTIVE | COMMUNITY
Start: 2023-01-01 | End: 1900-01-01

## 2024-01-01 RX ORDER — ZINC SULFATE TAB 220 MG (50 MG ZINC EQUIVALENT) 220 (50 ZN) MG
220 TAB ORAL DAILY
Refills: 0 | Status: DISCONTINUED | OUTPATIENT
Start: 2024-01-01 | End: 2024-01-01

## 2024-01-01 RX ORDER — CHLORHEXIDINE GLUCONATE 213 G/1000ML
15 SOLUTION TOPICAL EVERY 12 HOURS
Refills: 0 | Status: DISCONTINUED | OUTPATIENT
Start: 2024-01-01 | End: 2024-01-01

## 2024-01-01 RX ORDER — VANCOMYCIN HCL 1 G
500 VIAL (EA) INTRAVENOUS EVERY 12 HOURS
Refills: 0 | Status: DISCONTINUED | OUTPATIENT
Start: 2024-01-01 | End: 2024-01-01

## 2024-01-01 RX ORDER — BUMETANIDE 0.25 MG/ML
1 INJECTION INTRAMUSCULAR; INTRAVENOUS ONCE
Refills: 0 | Status: COMPLETED | OUTPATIENT
Start: 2024-01-01 | End: 2024-01-01

## 2024-01-01 RX ORDER — SODIUM CHLORIDE 9 MG/ML
1000 INJECTION, SOLUTION INTRAVENOUS
Refills: 0 | Status: DISCONTINUED | OUTPATIENT
Start: 2024-01-01 | End: 2024-01-01

## 2024-01-01 RX ORDER — VANCOMYCIN HCL 1 G
500 VIAL (EA) INTRAVENOUS ONCE
Refills: 0 | Status: COMPLETED | OUTPATIENT
Start: 2024-01-01 | End: 2024-01-01

## 2024-01-01 RX ORDER — MORPHINE SULFATE 50 MG/1
2 CAPSULE, EXTENDED RELEASE ORAL
Qty: 100 | Refills: 0 | Status: DISCONTINUED | OUTPATIENT
Start: 2024-01-01 | End: 2024-01-01

## 2024-01-01 RX ORDER — DOXAZOSIN MESYLATE 4 MG
2 TABLET ORAL AT BEDTIME
Refills: 0 | Status: DISCONTINUED | OUTPATIENT
Start: 2024-01-01 | End: 2024-01-01

## 2024-01-01 RX ORDER — LISINOPRIL 2.5 MG/1
5 TABLET ORAL DAILY
Refills: 0 | Status: DISCONTINUED | OUTPATIENT
Start: 2024-01-01 | End: 2024-01-01

## 2024-01-01 RX ORDER — SODIUM,POTASSIUM PHOSPHATES 278-250MG
2 POWDER IN PACKET (EA) ORAL ONCE
Refills: 0 | Status: COMPLETED | OUTPATIENT
Start: 2024-01-01 | End: 2024-01-01

## 2024-01-01 RX ORDER — PANTOPRAZOLE SODIUM 20 MG/1
40 TABLET, DELAYED RELEASE ORAL
Refills: 0 | Status: DISCONTINUED | OUTPATIENT
Start: 2024-01-01 | End: 2024-01-01

## 2024-01-01 RX ORDER — LETROZOLE 2.5 MG/1
1 TABLET, FILM COATED ORAL
Qty: 0 | Refills: 0 | DISCHARGE

## 2024-01-01 RX ORDER — ENOXAPARIN SODIUM 100 MG/ML
30 INJECTION SUBCUTANEOUS EVERY 24 HOURS
Refills: 0 | Status: DISCONTINUED | OUTPATIENT
Start: 2024-01-01 | End: 2024-01-01

## 2024-01-01 RX ORDER — MORPHINE SULFATE 50 MG/1
2 CAPSULE, EXTENDED RELEASE ORAL
Refills: 0 | Status: DISCONTINUED | OUTPATIENT
Start: 2024-01-01 | End: 2024-01-01

## 2024-01-01 RX ORDER — ALPRAZOLAM 0.25 MG
0.25 TABLET ORAL EVERY 12 HOURS
Refills: 0 | Status: DISCONTINUED | OUTPATIENT
Start: 2024-01-01 | End: 2024-01-01

## 2024-01-01 RX ORDER — BUPROPION HYDROCHLORIDE 150 MG/1
2 TABLET, EXTENDED RELEASE ORAL
Refills: 0 | DISCHARGE

## 2024-01-01 RX ORDER — ALBUTEROL 90 UG/1
2.5 AEROSOL, METERED ORAL ONCE
Refills: 0 | Status: COMPLETED | OUTPATIENT
Start: 2024-01-01 | End: 2024-01-01

## 2024-01-01 RX ORDER — TIOTROPIUM BROMIDE 18 UG/1
2 CAPSULE ORAL; RESPIRATORY (INHALATION)
Qty: 0 | Refills: 0 | DISCHARGE

## 2024-01-01 RX ORDER — ACETYLCYSTEINE 200 MG/ML
2 VIAL (ML) MISCELLANEOUS EVERY 6 HOURS
Refills: 0 | Status: DISCONTINUED | OUTPATIENT
Start: 2024-01-01 | End: 2024-01-01

## 2024-01-01 RX ORDER — ACETAMINOPHEN 500 MG
650 TABLET ORAL EVERY 6 HOURS
Refills: 0 | Status: DISCONTINUED | OUTPATIENT
Start: 2024-01-01 | End: 2024-01-01

## 2024-01-01 RX ORDER — ATORVASTATIN CALCIUM 80 MG/1
80 TABLET, FILM COATED ORAL AT BEDTIME
Refills: 0 | Status: DISCONTINUED | OUTPATIENT
Start: 2024-01-01 | End: 2024-01-01

## 2024-01-01 RX ORDER — FERROUS SULFATE 325(65) MG
325 TABLET ORAL DAILY
Refills: 0 | Status: DISCONTINUED | OUTPATIENT
Start: 2024-01-01 | End: 2024-01-01

## 2024-01-01 RX ORDER — DILTIAZEM HCL 120 MG
1 CAPSULE, EXT RELEASE 24 HR ORAL
Refills: 0 | DISCHARGE

## 2024-01-01 RX ORDER — BUPROPION HYDROCHLORIDE 150 MG/1
150 TABLET, EXTENDED RELEASE ORAL DAILY
Refills: 0 | Status: DISCONTINUED | OUTPATIENT
Start: 2024-01-01 | End: 2024-01-01

## 2024-01-01 RX ORDER — DILTIAZEM HCL 120 MG
30 CAPSULE, EXT RELEASE 24 HR ORAL EVERY 8 HOURS
Refills: 0 | Status: DISCONTINUED | OUTPATIENT
Start: 2024-01-01 | End: 2024-01-01

## 2024-01-01 RX ORDER — COLLAGENASE CLOSTRIDIUM HIST. 250 UNIT/G
1 OINTMENT (GRAM) TOPICAL DAILY
Refills: 0 | Status: DISCONTINUED | OUTPATIENT
Start: 2024-01-01 | End: 2024-01-01

## 2024-01-01 RX ORDER — BUPROPION HYDROCHLORIDE 150 MG/1
75 TABLET, EXTENDED RELEASE ORAL EVERY 12 HOURS
Refills: 0 | Status: DISCONTINUED | OUTPATIENT
Start: 2024-01-01 | End: 2024-01-01

## 2024-01-01 RX ORDER — ALPRAZOLAM 0.25 MG
1 TABLET ORAL
Refills: 0 | DISCHARGE

## 2024-01-01 RX ORDER — ROFLUMILAST 500 UG/1
500 TABLET ORAL DAILY
Refills: 0 | Status: DISCONTINUED | OUTPATIENT
Start: 2024-01-01 | End: 2024-01-01

## 2024-01-01 RX ORDER — ONDANSETRON 8 MG/1
1 TABLET, FILM COATED ORAL
Refills: 0 | DISCHARGE

## 2024-01-01 RX ORDER — RIVAROXABAN 15 MG-20MG
10 KIT ORAL
Refills: 0 | Status: DISCONTINUED | OUTPATIENT
Start: 2024-01-01 | End: 2024-01-01

## 2024-01-01 RX ORDER — ALBUTEROL 90 UG/1
2.5 AEROSOL, METERED ORAL EVERY 6 HOURS
Refills: 0 | Status: DISCONTINUED | OUTPATIENT
Start: 2024-01-01 | End: 2024-01-01

## 2024-01-01 RX ORDER — MORPHINE SULFATE 50 MG/1
6 CAPSULE, EXTENDED RELEASE ORAL
Qty: 100 | Refills: 0 | Status: DISCONTINUED | OUTPATIENT
Start: 2024-01-01 | End: 2024-01-01

## 2024-01-01 RX ORDER — DRONABINOL 2.5 MG
5 CAPSULE ORAL DAILY
Refills: 0 | Status: DISCONTINUED | OUTPATIENT
Start: 2024-01-01 | End: 2024-01-01

## 2024-01-01 RX ORDER — VANCOMYCIN HCL 1 G
VIAL (EA) INTRAVENOUS
Refills: 0 | Status: DISCONTINUED | OUTPATIENT
Start: 2024-01-01 | End: 2024-01-01

## 2024-01-01 RX ORDER — LETROZOLE 2.5 MG/1
1 TABLET, FILM COATED ORAL
Refills: 0 | DISCHARGE

## 2024-01-01 RX ORDER — BUDESONIDE AND FORMOTEROL FUMARATE DIHYDRATE 160; 4.5 UG/1; UG/1
2 AEROSOL RESPIRATORY (INHALATION)
Refills: 0 | Status: DISCONTINUED | OUTPATIENT
Start: 2024-01-01 | End: 2024-01-01

## 2024-01-01 RX ORDER — SENNA PLUS 8.6 MG/1
1 TABLET ORAL
Refills: 0 | DISCHARGE

## 2024-01-01 RX ORDER — POTASSIUM PHOSPHATE, MONOBASIC POTASSIUM PHOSPHATE, DIBASIC 236; 224 MG/ML; MG/ML
30 INJECTION, SOLUTION INTRAVENOUS ONCE
Refills: 0 | Status: COMPLETED | OUTPATIENT
Start: 2024-01-01 | End: 2024-01-01

## 2024-01-01 RX ORDER — MIRTAZAPINE 45 MG/1
7.5 TABLET, ORALLY DISINTEGRATING ORAL AT BEDTIME
Refills: 0 | Status: DISCONTINUED | OUTPATIENT
Start: 2024-01-01 | End: 2024-01-01

## 2024-01-01 RX ORDER — VANCOMYCIN HCL 1 G
500 VIAL (EA) INTRAVENOUS ONCE
Refills: 0 | Status: DISCONTINUED | OUTPATIENT
Start: 2024-01-01 | End: 2024-01-01

## 2024-01-01 RX ORDER — RIVAROXABAN 15 MG-20MG
10 KIT ORAL DAILY
Refills: 0 | Status: DISCONTINUED | OUTPATIENT
Start: 2024-01-01 | End: 2024-01-01

## 2024-01-01 RX ORDER — FENTANYL CITRATE 50 UG/ML
25 INJECTION INTRAVENOUS ONCE
Refills: 0 | Status: DISCONTINUED | OUTPATIENT
Start: 2024-01-01 | End: 2024-01-01

## 2024-01-01 RX ORDER — ACETAMINOPHEN 500 MG
2 TABLET ORAL
Refills: 0 | DISCHARGE

## 2024-01-01 RX ORDER — GLUCAGON INJECTION, SOLUTION 0.5 MG/.1ML
1 INJECTION, SOLUTION SUBCUTANEOUS ONCE
Refills: 0 | Status: DISCONTINUED | OUTPATIENT
Start: 2024-01-01 | End: 2024-01-01

## 2024-01-01 RX ORDER — POTASSIUM CHLORIDE 20 MEQ
1 PACKET (EA) ORAL
Refills: 0 | DISCHARGE

## 2024-01-01 RX ORDER — FENTANYL CITRATE 50 UG/ML
25 INJECTION INTRAVENOUS
Refills: 0 | Status: DISCONTINUED | OUTPATIENT
Start: 2024-01-01 | End: 2024-01-01

## 2024-01-01 RX ORDER — DEXTROSE 50 % IN WATER 50 %
50 SYRINGE (ML) INTRAVENOUS
Refills: 0 | Status: DISCONTINUED | OUTPATIENT
Start: 2024-01-01 | End: 2024-01-01

## 2024-01-01 RX ORDER — DOCUSATE SODIUM 100 MG
2 CAPSULE ORAL
Refills: 0 | DISCHARGE

## 2024-01-01 RX ORDER — COLLAGENASE CLOSTRIDIUM HIST. 250 UNIT/G
1 OINTMENT (GRAM) TOPICAL
Refills: 0 | DISCHARGE

## 2024-01-01 RX ORDER — FERROUS FUMARATE 350(115)MG
1 TABLET ORAL
Refills: 0 | DISCHARGE

## 2024-01-01 RX ORDER — POTASSIUM CHLORIDE 20 MEQ
40 PACKET (EA) ORAL ONCE
Refills: 0 | Status: COMPLETED | OUTPATIENT
Start: 2024-01-01 | End: 2024-01-01

## 2024-01-01 RX ORDER — NICOTINE POLACRILEX 2 MG
1 GUM BUCCAL DAILY
Refills: 0 | Status: DISCONTINUED | OUTPATIENT
Start: 2024-01-01 | End: 2024-01-01

## 2024-01-01 RX ORDER — NOREPINEPHRINE BITARTRATE/D5W 8 MG/250ML
0.05 PLASTIC BAG, INJECTION (ML) INTRAVENOUS
Qty: 8 | Refills: 0 | Status: DISCONTINUED | OUTPATIENT
Start: 2024-01-01 | End: 2024-01-01

## 2024-01-01 RX ORDER — ASPIRIN/CALCIUM CARB/MAGNESIUM 324 MG
1 TABLET ORAL
Qty: 0 | Refills: 0 | DISCHARGE

## 2024-01-01 RX ORDER — METFORMIN HYDROCHLORIDE 850 MG/1
1 TABLET ORAL
Qty: 0 | Refills: 0 | DISCHARGE

## 2024-01-01 RX ORDER — INSULIN LISPRO 100/ML
VIAL (ML) SUBCUTANEOUS
Refills: 0 | Status: DISCONTINUED | OUTPATIENT
Start: 2024-01-01 | End: 2024-01-01

## 2024-01-01 RX ORDER — ATORVASTATIN CALCIUM 80 MG/1
1 TABLET, FILM COATED ORAL
Refills: 0 | DISCHARGE

## 2024-01-01 RX ORDER — TIOTROPIUM BROMIDE 18 UG/1
1 CAPSULE ORAL; RESPIRATORY (INHALATION)
Refills: 0 | DISCHARGE

## 2024-01-01 RX ORDER — DILTIAZEM HCL 120 MG
30 CAPSULE, EXT RELEASE 24 HR ORAL EVERY 6 HOURS
Refills: 0 | Status: DISCONTINUED | OUTPATIENT
Start: 2024-01-01 | End: 2024-01-01

## 2024-01-01 RX ORDER — HUMAN INSULIN 100 [IU]/ML
14 INJECTION, SUSPENSION SUBCUTANEOUS
Refills: 0 | Status: DISCONTINUED | OUTPATIENT
Start: 2024-01-01 | End: 2024-01-01

## 2024-01-01 RX ORDER — ALBUMIN HUMAN 25 %
100 VIAL (ML) INTRAVENOUS EVERY 4 HOURS
Refills: 0 | Status: COMPLETED | OUTPATIENT
Start: 2024-01-01 | End: 2024-01-01

## 2024-01-01 RX ORDER — MAGNESIUM SULFATE 500 MG/ML
1 VIAL (ML) INJECTION ONCE
Refills: 0 | Status: COMPLETED | OUTPATIENT
Start: 2024-01-01 | End: 2024-01-01

## 2024-01-01 RX ORDER — POLYETHYLENE GLYCOL 3350 17 G/17G
17 POWDER, FOR SOLUTION ORAL
Refills: 0 | DISCHARGE

## 2024-01-01 RX ORDER — LEVALBUTEROL 1.25 MG/.5ML
0.63 SOLUTION, CONCENTRATE RESPIRATORY (INHALATION) EVERY 6 HOURS
Refills: 0 | Status: DISCONTINUED | OUTPATIENT
Start: 2024-01-01 | End: 2024-01-01

## 2024-01-01 RX ORDER — LANOLIN ALCOHOL/MO/W.PET/CERES
3 CREAM (GRAM) TOPICAL AT BEDTIME
Refills: 0 | Status: DISCONTINUED | OUTPATIENT
Start: 2024-01-01 | End: 2024-01-01

## 2024-01-01 RX ORDER — DEXTROSE 50 % IN WATER 50 %
25 SYRINGE (ML) INTRAVENOUS ONCE
Refills: 0 | Status: DISCONTINUED | OUTPATIENT
Start: 2024-01-01 | End: 2024-01-01

## 2024-01-01 RX ORDER — BUDESONIDE AND FORMOTEROL FUMARATE DIHYDRATE 160; 4.5 UG/1; UG/1
2 AEROSOL RESPIRATORY (INHALATION)
Qty: 0 | Refills: 0 | DISCHARGE

## 2024-01-01 RX ORDER — INSULIN HUMAN 100 [IU]/ML
3 INJECTION, SOLUTION SUBCUTANEOUS
Qty: 100 | Refills: 0 | Status: DISCONTINUED | OUTPATIENT
Start: 2024-01-01 | End: 2024-01-01

## 2024-01-01 RX ORDER — CEFEPIME 1 G/1
2000 INJECTION, POWDER, FOR SOLUTION INTRAMUSCULAR; INTRAVENOUS EVERY 12 HOURS
Refills: 0 | Status: DISCONTINUED | OUTPATIENT
Start: 2024-01-01 | End: 2024-01-01

## 2024-01-01 RX ORDER — SODIUM CHLORIDE 9 MG/ML
10 INJECTION INTRAMUSCULAR; INTRAVENOUS; SUBCUTANEOUS
Refills: 0 | Status: DISCONTINUED | OUTPATIENT
Start: 2024-01-01 | End: 2024-01-01

## 2024-01-01 RX ORDER — ALPRAZOLAM 0.25 MG
0.25 TABLET ORAL EVERY 8 HOURS
Refills: 0 | Status: DISCONTINUED | OUTPATIENT
Start: 2024-01-01 | End: 2024-01-01

## 2024-01-01 RX ORDER — MULTIVIT-MIN/FERROUS GLUCONATE 9 MG/15 ML
15 LIQUID (ML) ORAL DAILY
Refills: 0 | Status: DISCONTINUED | OUTPATIENT
Start: 2024-01-01 | End: 2024-01-01

## 2024-01-01 RX ORDER — METFORMIN HYDROCHLORIDE 850 MG/1
1 TABLET ORAL
Refills: 0 | DISCHARGE

## 2024-01-01 RX ORDER — BUPROPION HYDROCHLORIDE 150 MG/1
300 TABLET, EXTENDED RELEASE ORAL DAILY
Refills: 0 | Status: DISCONTINUED | OUTPATIENT
Start: 2024-01-01 | End: 2024-01-01

## 2024-01-01 RX ORDER — FLUTICASONE PROPIONATE AND SALMETEROL 50; 250 UG/1; UG/1
1 POWDER ORAL; RESPIRATORY (INHALATION)
Refills: 0 | DISCHARGE

## 2024-01-01 RX ORDER — AZITHROMYCIN 500 MG/1
500 TABLET, FILM COATED ORAL DAILY
Refills: 0 | Status: COMPLETED | OUTPATIENT
Start: 2024-01-01 | End: 2024-01-01

## 2024-01-01 RX ORDER — LOSARTAN POTASSIUM 100 MG/1
1 TABLET, FILM COATED ORAL
Refills: 0 | DISCHARGE

## 2024-01-01 RX ORDER — OMEPRAZOLE 10 MG/1
1 CAPSULE, DELAYED RELEASE ORAL
Refills: 0 | DISCHARGE

## 2024-01-01 RX ORDER — FENTANYL CITRATE 50 UG/ML
100 INJECTION INTRAVENOUS ONCE
Refills: 0 | Status: DISCONTINUED | OUTPATIENT
Start: 2024-01-01 | End: 2024-01-01

## 2024-01-01 RX ORDER — LANOLIN ALCOHOL/MO/W.PET/CERES
1 CREAM (GRAM) TOPICAL
Refills: 0 | DISCHARGE

## 2024-01-01 RX ORDER — MIRTAZAPINE 45 MG/1
0.5 TABLET, ORALLY DISINTEGRATING ORAL
Refills: 0 | DISCHARGE

## 2024-01-01 RX ORDER — POTASSIUM CHLORIDE 20 MEQ
40 PACKET (EA) ORAL EVERY 4 HOURS
Refills: 0 | Status: COMPLETED | OUTPATIENT
Start: 2024-01-01 | End: 2024-01-01

## 2024-01-01 RX ORDER — NICOTINE POLACRILEX 2 MG
1 GUM BUCCAL
Refills: 0 | DISCHARGE

## 2024-01-01 RX ORDER — MAGNESIUM SULFATE 500 MG/ML
2 VIAL (ML) INJECTION
Refills: 0 | Status: COMPLETED | OUTPATIENT
Start: 2024-01-01 | End: 2024-01-01

## 2024-01-01 RX ORDER — IPRATROPIUM/ALBUTEROL SULFATE 18-103MCG
3 AEROSOL WITH ADAPTER (GRAM) INHALATION EVERY 6 HOURS
Refills: 0 | Status: DISCONTINUED | OUTPATIENT
Start: 2024-01-01 | End: 2024-01-01

## 2024-01-01 RX ORDER — DILTIAZEM HCL 120 MG
180 CAPSULE, EXT RELEASE 24 HR ORAL DAILY
Refills: 0 | Status: DISCONTINUED | OUTPATIENT
Start: 2024-01-01 | End: 2024-01-01

## 2024-01-01 RX ORDER — LEVALBUTEROL 1.25 MG/.5ML
2 SOLUTION, CONCENTRATE RESPIRATORY (INHALATION)
Refills: 0 | DISCHARGE

## 2024-01-01 RX ORDER — SODIUM CHLORIDE 9 MG/ML
1000 INJECTION INTRAMUSCULAR; INTRAVENOUS; SUBCUTANEOUS ONCE
Refills: 0 | Status: COMPLETED | OUTPATIENT
Start: 2024-01-01 | End: 2024-01-01

## 2024-01-01 RX ORDER — FERROUS SULFATE 325(65) MG
1 TABLET ORAL
Refills: 0 | DISCHARGE

## 2024-01-01 RX ORDER — POTASSIUM CHLORIDE 20 MEQ
20 PACKET (EA) ORAL
Refills: 0 | Status: COMPLETED | OUTPATIENT
Start: 2024-01-01 | End: 2024-01-01

## 2024-01-01 RX ORDER — LETROZOLE 2.5 MG/1
2.5 TABLET, FILM COATED ORAL DAILY
Refills: 0 | Status: DISCONTINUED | OUTPATIENT
Start: 2024-01-01 | End: 2024-01-01

## 2024-01-01 RX ORDER — CHLORHEXIDINE GLUCONATE 213 G/1000ML
1 SOLUTION TOPICAL
Refills: 0 | Status: DISCONTINUED | OUTPATIENT
Start: 2024-01-01 | End: 2024-01-01

## 2024-01-01 RX ORDER — MEROPENEM 1 G/30ML
1000 INJECTION INTRAVENOUS EVERY 8 HOURS
Refills: 0 | Status: COMPLETED | OUTPATIENT
Start: 2024-01-01 | End: 2024-01-01

## 2024-01-01 RX ORDER — PREDNISOLONE 5 MG
40 TABLET ORAL DAILY
Refills: 0 | Status: DISCONTINUED | OUTPATIENT
Start: 2024-01-01 | End: 2024-01-01

## 2024-01-01 RX ORDER — ONDANSETRON 8 MG/1
4 TABLET, FILM COATED ORAL
Refills: 0 | Status: DISCONTINUED | OUTPATIENT
Start: 2024-01-01 | End: 2024-01-01

## 2024-01-01 RX ORDER — RIVAROXABAN 15 MG-20MG
1 KIT ORAL
Refills: 0 | DISCHARGE

## 2024-01-01 RX ORDER — PROPOFOL 10 MG/ML
20 INJECTION, EMULSION INTRAVENOUS ONCE
Refills: 0 | Status: COMPLETED | OUTPATIENT
Start: 2024-01-01 | End: 2024-01-01

## 2024-01-01 RX ORDER — IPRATROPIUM/ALBUTEROL SULFATE 18-103MCG
3 AEROSOL WITH ADAPTER (GRAM) INHALATION ONCE
Refills: 0 | Status: COMPLETED | OUTPATIENT
Start: 2024-01-01 | End: 2024-01-01

## 2024-01-01 RX ORDER — DEXMEDETOMIDINE HYDROCHLORIDE IN 0.9% SODIUM CHLORIDE 4 UG/ML
0.5 INJECTION INTRAVENOUS
Qty: 400 | Refills: 0 | Status: DISCONTINUED | OUTPATIENT
Start: 2024-01-01 | End: 2024-01-01

## 2024-01-01 RX ORDER — ETOMIDATE 2 MG/ML
20 INJECTION INTRAVENOUS ONCE
Refills: 0 | Status: COMPLETED | OUTPATIENT
Start: 2024-01-01 | End: 2024-01-01

## 2024-01-01 RX ORDER — ALBUTEROL 90 UG/1
3 AEROSOL, METERED ORAL
Refills: 0 | DISCHARGE

## 2024-01-01 RX ORDER — LOSARTAN POTASSIUM 100 MG/1
1 TABLET, FILM COATED ORAL
Qty: 0 | Refills: 0 | DISCHARGE

## 2024-01-01 RX ORDER — BUMETANIDE 0.25 MG/ML
1 INJECTION INTRAMUSCULAR; INTRAVENOUS ONCE
Refills: 0 | Status: DISCONTINUED | OUTPATIENT
Start: 2024-01-01 | End: 2024-01-01

## 2024-01-01 RX ORDER — TIOTROPIUM BROMIDE 18 UG/1
2 CAPSULE ORAL; RESPIRATORY (INHALATION)
Refills: 0 | DISCHARGE

## 2024-01-01 RX ORDER — TIOTROPIUM BROMIDE 18 UG/1
2 CAPSULE ORAL; RESPIRATORY (INHALATION) DAILY
Refills: 0 | Status: DISCONTINUED | OUTPATIENT
Start: 2024-01-01 | End: 2024-01-01

## 2024-01-01 RX ORDER — PROPOFOL 10 MG/ML
5 INJECTION, EMULSION INTRAVENOUS
Qty: 1000 | Refills: 0 | Status: DISCONTINUED | OUTPATIENT
Start: 2024-01-01 | End: 2024-01-01

## 2024-01-01 RX ADMIN — CHLORHEXIDINE GLUCONATE 15 MILLILITER(S): 213 SOLUTION TOPICAL at 05:46

## 2024-01-01 RX ADMIN — Medication 100 MILLILITER(S): at 06:09

## 2024-01-01 RX ADMIN — Medication 15 MILLILITER(S): at 12:07

## 2024-01-01 RX ADMIN — FENTANYL CITRATE 25 MICROGRAM(S): 50 INJECTION INTRAVENOUS at 11:36

## 2024-01-01 RX ADMIN — Medication 300 MILLIGRAM(S): at 11:05

## 2024-01-01 RX ADMIN — ATORVASTATIN CALCIUM 80 MILLIGRAM(S): 80 TABLET, FILM COATED ORAL at 22:10

## 2024-01-01 RX ADMIN — Medication 1: at 17:10

## 2024-01-01 RX ADMIN — Medication 4: at 09:08

## 2024-01-01 RX ADMIN — HUMAN INSULIN 14 UNIT(S): 100 INJECTION, SUSPENSION SUBCUTANEOUS at 05:42

## 2024-01-01 RX ADMIN — RIVAROXABAN 10 MILLIGRAM(S): KIT at 16:56

## 2024-01-01 RX ADMIN — HUMAN INSULIN 14 UNIT(S): 100 INJECTION, SUSPENSION SUBCUTANEOUS at 05:46

## 2024-01-01 RX ADMIN — BUDESONIDE AND FORMOTEROL FUMARATE DIHYDRATE 2 PUFF(S): 160; 4.5 AEROSOL RESPIRATORY (INHALATION) at 21:21

## 2024-01-01 RX ADMIN — Medication 2 PACKET(S): at 11:10

## 2024-01-01 RX ADMIN — MIRTAZAPINE 7.5 MILLIGRAM(S): 45 TABLET, ORALLY DISINTEGRATING ORAL at 22:01

## 2024-01-01 RX ADMIN — Medication 2: at 11:35

## 2024-01-01 RX ADMIN — MUPIROCIN 1 APPLICATION(S): 20 OINTMENT TOPICAL at 17:08

## 2024-01-01 RX ADMIN — LETROZOLE 2.5 MILLIGRAM(S): 2.5 TABLET, FILM COATED ORAL at 12:41

## 2024-01-01 RX ADMIN — MIRTAZAPINE 7.5 MILLIGRAM(S): 45 TABLET, ORALLY DISINTEGRATING ORAL at 21:52

## 2024-01-01 RX ADMIN — LEVALBUTEROL 0.63 MILLIGRAM(S): 1.25 SOLUTION, CONCENTRATE RESPIRATORY (INHALATION) at 20:49

## 2024-01-01 RX ADMIN — Medication 2: at 17:17

## 2024-01-01 RX ADMIN — Medication 40 MILLIGRAM(S): at 05:24

## 2024-01-01 RX ADMIN — Medication 2 MILLIGRAM(S): at 21:02

## 2024-01-01 RX ADMIN — MUPIROCIN 1 APPLICATION(S): 20 OINTMENT TOPICAL at 17:21

## 2024-01-01 RX ADMIN — ZINC SULFATE TAB 220 MG (50 MG ZINC EQUIVALENT) 220 MILLIGRAM(S): 220 (50 ZN) TAB at 11:28

## 2024-01-01 RX ADMIN — BUDESONIDE AND FORMOTEROL FUMARATE DIHYDRATE 2 PUFF(S): 160; 4.5 AEROSOL RESPIRATORY (INHALATION) at 08:19

## 2024-01-01 RX ADMIN — Medication 2 MILLIGRAM(S): at 21:10

## 2024-01-01 RX ADMIN — Medication 1 MILLIGRAM(S): at 13:39

## 2024-01-01 RX ADMIN — Medication 40 MILLIEQUIVALENT(S): at 11:20

## 2024-01-01 RX ADMIN — FENTANYL CITRATE 25 MICROGRAM(S): 50 INJECTION INTRAVENOUS at 09:50

## 2024-01-01 RX ADMIN — MEROPENEM 100 MILLIGRAM(S): 1 INJECTION INTRAVENOUS at 14:00

## 2024-01-01 RX ADMIN — LETROZOLE 2.5 MILLIGRAM(S): 2.5 TABLET, FILM COATED ORAL at 17:21

## 2024-01-01 RX ADMIN — Medication 1 APPLICATION(S): at 12:21

## 2024-01-01 RX ADMIN — Medication 1 PATCH: at 11:44

## 2024-01-01 RX ADMIN — BUDESONIDE AND FORMOTEROL FUMARATE DIHYDRATE 2 PUFF(S): 160; 4.5 AEROSOL RESPIRATORY (INHALATION) at 09:09

## 2024-01-01 RX ADMIN — ALBUTEROL 2.5 MILLIGRAM(S): 90 AEROSOL, METERED ORAL at 09:14

## 2024-01-01 RX ADMIN — Medication 1 APPLICATION(S): at 11:18

## 2024-01-01 RX ADMIN — Medication 100 MILLILITER(S): at 21:42

## 2024-01-01 RX ADMIN — Medication 25 GRAM(S): at 09:41

## 2024-01-01 RX ADMIN — Medication 1 PATCH: at 19:40

## 2024-01-01 RX ADMIN — Medication 40 MILLIGRAM(S): at 21:47

## 2024-01-01 RX ADMIN — Medication 40 MILLIGRAM(S): at 17:04

## 2024-01-01 RX ADMIN — ROFLUMILAST 500 MICROGRAM(S): 500 TABLET ORAL at 11:28

## 2024-01-01 RX ADMIN — FENTANYL CITRATE 75 MICROGRAM(S): 50 INJECTION INTRAVENOUS at 17:59

## 2024-01-01 RX ADMIN — PANTOPRAZOLE SODIUM 40 MILLIGRAM(S): 20 TABLET, DELAYED RELEASE ORAL at 17:04

## 2024-01-01 RX ADMIN — MEROPENEM 100 MILLIGRAM(S): 1 INJECTION INTRAVENOUS at 22:01

## 2024-01-01 RX ADMIN — Medication 325 MILLIGRAM(S): at 11:10

## 2024-01-01 RX ADMIN — RIVAROXABAN 10 MILLIGRAM(S): KIT at 12:08

## 2024-01-01 RX ADMIN — Medication 1 PATCH: at 11:23

## 2024-01-01 RX ADMIN — LEVALBUTEROL 0.63 MILLIGRAM(S): 1.25 SOLUTION, CONCENTRATE RESPIRATORY (INHALATION) at 04:50

## 2024-01-01 RX ADMIN — PANTOPRAZOLE SODIUM 40 MILLIGRAM(S): 20 TABLET, DELAYED RELEASE ORAL at 05:10

## 2024-01-01 RX ADMIN — LEVALBUTEROL 0.63 MILLIGRAM(S): 1.25 SOLUTION, CONCENTRATE RESPIRATORY (INHALATION) at 05:36

## 2024-01-01 RX ADMIN — BUDESONIDE AND FORMOTEROL FUMARATE DIHYDRATE 2 PUFF(S): 160; 4.5 AEROSOL RESPIRATORY (INHALATION) at 23:07

## 2024-01-01 RX ADMIN — Medication 2: at 05:53

## 2024-01-01 RX ADMIN — LEVALBUTEROL 0.63 MILLIGRAM(S): 1.25 SOLUTION, CONCENTRATE RESPIRATORY (INHALATION) at 08:18

## 2024-01-01 RX ADMIN — Medication 1 PATCH: at 11:04

## 2024-01-01 RX ADMIN — AZITHROMYCIN 500 MILLIGRAM(S): 500 TABLET, FILM COATED ORAL at 11:49

## 2024-01-01 RX ADMIN — Medication 1 APPLICATION(S): at 11:04

## 2024-01-01 RX ADMIN — BUDESONIDE AND FORMOTEROL FUMARATE DIHYDRATE 2 PUFF(S): 160; 4.5 AEROSOL RESPIRATORY (INHALATION) at 09:11

## 2024-01-01 RX ADMIN — AZITHROMYCIN 500 MILLIGRAM(S): 500 TABLET, FILM COATED ORAL at 11:04

## 2024-01-01 RX ADMIN — ZINC SULFATE TAB 220 MG (50 MG ZINC EQUIVALENT) 220 MILLIGRAM(S): 220 (50 ZN) TAB at 11:03

## 2024-01-01 RX ADMIN — MIRTAZAPINE 7.5 MILLIGRAM(S): 45 TABLET, ORALLY DISINTEGRATING ORAL at 22:58

## 2024-01-01 RX ADMIN — CHLORHEXIDINE GLUCONATE 15 MILLILITER(S): 213 SOLUTION TOPICAL at 05:42

## 2024-01-01 RX ADMIN — FENTANYL CITRATE 25 MICROGRAM(S): 50 INJECTION INTRAVENOUS at 08:21

## 2024-01-01 RX ADMIN — MUPIROCIN 1 APPLICATION(S): 20 OINTMENT TOPICAL at 05:59

## 2024-01-01 RX ADMIN — MEROPENEM 100 MILLIGRAM(S): 1 INJECTION INTRAVENOUS at 21:43

## 2024-01-01 RX ADMIN — Medication 2 MILLILITER(S): at 21:43

## 2024-01-01 RX ADMIN — Medication 2 MILLILITER(S): at 09:37

## 2024-01-01 RX ADMIN — MORPHINE SULFATE 2 MG/HR: 50 CAPSULE, EXTENDED RELEASE ORAL at 06:53

## 2024-01-01 RX ADMIN — LEVALBUTEROL 0.63 MILLIGRAM(S): 1.25 SOLUTION, CONCENTRATE RESPIRATORY (INHALATION) at 05:58

## 2024-01-01 RX ADMIN — DEXMEDETOMIDINE HYDROCHLORIDE IN 0.9% SODIUM CHLORIDE 4.54 MICROGRAM(S)/KG/HR: 4 INJECTION INTRAVENOUS at 00:30

## 2024-01-01 RX ADMIN — Medication 3.4 MICROGRAM(S)/KG/MIN: at 06:29

## 2024-01-01 RX ADMIN — LETROZOLE 2.5 MILLIGRAM(S): 2.5 TABLET, FILM COATED ORAL at 13:59

## 2024-01-01 RX ADMIN — Medication 500 MILLIGRAM(S): at 11:07

## 2024-01-01 RX ADMIN — CHLORHEXIDINE GLUCONATE 15 MILLILITER(S): 213 SOLUTION TOPICAL at 06:10

## 2024-01-01 RX ADMIN — LETROZOLE 2.5 MILLIGRAM(S): 2.5 TABLET, FILM COATED ORAL at 17:03

## 2024-01-01 RX ADMIN — Medication 1 PATCH: at 11:43

## 2024-01-01 RX ADMIN — Medication 180 MILLIGRAM(S): at 21:52

## 2024-01-01 RX ADMIN — LEVALBUTEROL 0.63 MILLIGRAM(S): 1.25 SOLUTION, CONCENTRATE RESPIRATORY (INHALATION) at 08:57

## 2024-01-01 RX ADMIN — CEFEPIME 100 MILLIGRAM(S): 1 INJECTION, POWDER, FOR SOLUTION INTRAMUSCULAR; INTRAVENOUS at 05:24

## 2024-01-01 RX ADMIN — PANTOPRAZOLE SODIUM 40 MILLIGRAM(S): 20 TABLET, DELAYED RELEASE ORAL at 05:46

## 2024-01-01 RX ADMIN — DEXMEDETOMIDINE HYDROCHLORIDE IN 0.9% SODIUM CHLORIDE 4.54 MICROGRAM(S)/KG/HR: 4 INJECTION INTRAVENOUS at 13:07

## 2024-01-01 RX ADMIN — Medication 0.25 MILLIGRAM(S): at 18:39

## 2024-01-01 RX ADMIN — BUDESONIDE AND FORMOTEROL FUMARATE DIHYDRATE 2 PUFF(S): 160; 4.5 AEROSOL RESPIRATORY (INHALATION) at 09:10

## 2024-01-01 RX ADMIN — FENTANYL CITRATE 25 MICROGRAM(S): 50 INJECTION INTRAVENOUS at 08:36

## 2024-01-01 RX ADMIN — LETROZOLE 2.5 MILLIGRAM(S): 2.5 TABLET, FILM COATED ORAL at 17:23

## 2024-01-01 RX ADMIN — PANTOPRAZOLE SODIUM 40 MILLIGRAM(S): 20 TABLET, DELAYED RELEASE ORAL at 20:37

## 2024-01-01 RX ADMIN — MORPHINE SULFATE 2 MG/HR: 50 CAPSULE, EXTENDED RELEASE ORAL at 14:37

## 2024-01-01 RX ADMIN — PROPOFOL 1.09 MICROGRAM(S)/KG/MIN: 10 INJECTION, EMULSION INTRAVENOUS at 04:42

## 2024-01-01 RX ADMIN — Medication 1 APPLICATION(S): at 11:53

## 2024-01-01 RX ADMIN — MIRTAZAPINE 7.5 MILLIGRAM(S): 45 TABLET, ORALLY DISINTEGRATING ORAL at 21:17

## 2024-01-01 RX ADMIN — Medication 325 MILLIGRAM(S): at 11:27

## 2024-01-01 RX ADMIN — ALBUTEROL 2.5 MILLIGRAM(S): 90 AEROSOL, METERED ORAL at 14:52

## 2024-01-01 RX ADMIN — Medication 1 PATCH: at 11:10

## 2024-01-01 RX ADMIN — MIRTAZAPINE 7.5 MILLIGRAM(S): 45 TABLET, ORALLY DISINTEGRATING ORAL at 21:45

## 2024-01-01 RX ADMIN — PROPOFOL 1.09 MICROGRAM(S)/KG/MIN: 10 INJECTION, EMULSION INTRAVENOUS at 00:07

## 2024-01-01 RX ADMIN — ALBUTEROL 2.5 MILLIGRAM(S): 90 AEROSOL, METERED ORAL at 15:41

## 2024-01-01 RX ADMIN — Medication 4: at 21:54

## 2024-01-01 RX ADMIN — Medication 325 MILLIGRAM(S): at 11:17

## 2024-01-01 RX ADMIN — Medication 4: at 21:47

## 2024-01-01 RX ADMIN — Medication 0.25 MILLIGRAM(S): at 17:21

## 2024-01-01 RX ADMIN — Medication 2 MILLILITER(S): at 05:40

## 2024-01-01 RX ADMIN — Medication 1 PATCH: at 07:00

## 2024-01-01 RX ADMIN — DEXMEDETOMIDINE HYDROCHLORIDE IN 0.9% SODIUM CHLORIDE 4.54 MICROGRAM(S)/KG/HR: 4 INJECTION INTRAVENOUS at 07:28

## 2024-01-01 RX ADMIN — Medication 40 MILLIGRAM(S): at 05:55

## 2024-01-01 RX ADMIN — Medication 5 MILLIGRAM(S): at 17:22

## 2024-01-01 RX ADMIN — DEXMEDETOMIDINE HYDROCHLORIDE IN 0.9% SODIUM CHLORIDE 4.54 MICROGRAM(S)/KG/HR: 4 INJECTION INTRAVENOUS at 20:48

## 2024-01-01 RX ADMIN — PROPOFOL 1.09 MICROGRAM(S)/KG/MIN: 10 INJECTION, EMULSION INTRAVENOUS at 10:54

## 2024-01-01 RX ADMIN — Medication 650 MILLIGRAM(S): at 17:02

## 2024-01-01 RX ADMIN — Medication 0.25 MILLIGRAM(S): at 22:03

## 2024-01-01 RX ADMIN — Medication 220 MILLIGRAM(S): at 10:07

## 2024-01-01 RX ADMIN — CHLORHEXIDINE GLUCONATE 15 MILLILITER(S): 213 SOLUTION TOPICAL at 17:15

## 2024-01-01 RX ADMIN — CHLORHEXIDINE GLUCONATE 15 MILLILITER(S): 213 SOLUTION TOPICAL at 05:57

## 2024-01-01 RX ADMIN — PROPOFOL 1.09 MICROGRAM(S)/KG/MIN: 10 INJECTION, EMULSION INTRAVENOUS at 03:04

## 2024-01-01 RX ADMIN — BUDESONIDE AND FORMOTEROL FUMARATE DIHYDRATE 2 PUFF(S): 160; 4.5 AEROSOL RESPIRATORY (INHALATION) at 20:24

## 2024-01-01 RX ADMIN — Medication 100 MILLIGRAM(S): at 16:49

## 2024-01-01 RX ADMIN — Medication 300 MILLIGRAM(S): at 11:04

## 2024-01-01 RX ADMIN — Medication 100 GRAM(S): at 16:14

## 2024-01-01 RX ADMIN — CHLORHEXIDINE GLUCONATE 15 MILLILITER(S): 213 SOLUTION TOPICAL at 05:55

## 2024-01-01 RX ADMIN — ALBUTEROL 2.5 MILLIGRAM(S): 90 AEROSOL, METERED ORAL at 20:06

## 2024-01-01 RX ADMIN — Medication 1 PATCH: at 11:47

## 2024-01-01 RX ADMIN — HUMAN INSULIN 14 UNIT(S): 100 INJECTION, SUSPENSION SUBCUTANEOUS at 06:29

## 2024-01-01 RX ADMIN — Medication 3 MILLILITER(S): at 09:00

## 2024-01-01 RX ADMIN — Medication 40 MILLIEQUIVALENT(S): at 13:57

## 2024-01-01 RX ADMIN — Medication 1 PACKET(S): at 11:43

## 2024-01-01 RX ADMIN — Medication 0.25 MILLIGRAM(S): at 14:03

## 2024-01-01 RX ADMIN — ROFLUMILAST 500 MICROGRAM(S): 500 TABLET ORAL at 11:02

## 2024-01-01 RX ADMIN — Medication 15 MILLILITER(S): at 11:06

## 2024-01-01 RX ADMIN — CHLORHEXIDINE GLUCONATE 1 APPLICATION(S): 213 SOLUTION TOPICAL at 05:21

## 2024-01-01 RX ADMIN — SODIUM CHLORIDE 50 MILLILITER(S): 9 INJECTION, SOLUTION INTRAVENOUS at 09:53

## 2024-01-01 RX ADMIN — Medication 100 MILLILITER(S): at 01:14

## 2024-01-01 RX ADMIN — ALBUTEROL 2.5 MILLIGRAM(S): 90 AEROSOL, METERED ORAL at 20:56

## 2024-01-01 RX ADMIN — Medication 3 MILLILITER(S): at 12:38

## 2024-01-01 RX ADMIN — RIVAROXABAN 10 MILLIGRAM(S): KIT at 11:04

## 2024-01-01 RX ADMIN — Medication 1 PATCH: at 11:20

## 2024-01-01 RX ADMIN — LETROZOLE 2.5 MILLIGRAM(S): 2.5 TABLET, FILM COATED ORAL at 11:04

## 2024-01-01 RX ADMIN — ALBUTEROL 2.5 MILLIGRAM(S): 90 AEROSOL, METERED ORAL at 04:32

## 2024-01-01 RX ADMIN — ATORVASTATIN CALCIUM 80 MILLIGRAM(S): 80 TABLET, FILM COATED ORAL at 21:44

## 2024-01-01 RX ADMIN — CHLORHEXIDINE GLUCONATE 1 APPLICATION(S): 213 SOLUTION TOPICAL at 05:54

## 2024-01-01 RX ADMIN — Medication 30 MILLIGRAM(S): at 05:45

## 2024-01-01 RX ADMIN — PROPOFOL 1.09 MICROGRAM(S)/KG/MIN: 10 INJECTION, EMULSION INTRAVENOUS at 17:44

## 2024-01-01 RX ADMIN — Medication 2 MILLIGRAM(S): at 22:16

## 2024-01-01 RX ADMIN — MORPHINE SULFATE 2 MG/HR: 50 CAPSULE, EXTENDED RELEASE ORAL at 15:07

## 2024-01-01 RX ADMIN — ENOXAPARIN SODIUM 30 MILLIGRAM(S): 100 INJECTION SUBCUTANEOUS at 16:37

## 2024-01-01 RX ADMIN — Medication 1 PATCH: at 11:05

## 2024-01-01 RX ADMIN — LEVALBUTEROL 0.63 MILLIGRAM(S): 1.25 SOLUTION, CONCENTRATE RESPIRATORY (INHALATION) at 08:32

## 2024-01-01 RX ADMIN — Medication 1 PATCH: at 09:52

## 2024-01-01 RX ADMIN — Medication 1 PATCH: at 07:42

## 2024-01-01 RX ADMIN — MEROPENEM 100 MILLIGRAM(S): 1 INJECTION INTRAVENOUS at 13:11

## 2024-01-01 RX ADMIN — PROPOFOL 20 MILLIGRAM(S): 10 INJECTION, EMULSION INTRAVENOUS at 16:53

## 2024-01-01 RX ADMIN — Medication 30 MILLIGRAM(S): at 06:01

## 2024-01-01 RX ADMIN — MORPHINE SULFATE 6 MG/HR: 50 CAPSULE, EXTENDED RELEASE ORAL at 23:10

## 2024-01-01 RX ADMIN — Medication 1 PATCH: at 20:18

## 2024-01-01 RX ADMIN — BUPROPION HYDROCHLORIDE 150 MILLIGRAM(S): 150 TABLET, EXTENDED RELEASE ORAL at 11:53

## 2024-01-01 RX ADMIN — Medication 2: at 21:20

## 2024-01-01 RX ADMIN — ALBUTEROL 2.5 MILLIGRAM(S): 90 AEROSOL, METERED ORAL at 04:59

## 2024-01-01 RX ADMIN — Medication 2 MILLIGRAM(S): at 21:20

## 2024-01-01 RX ADMIN — FENTANYL CITRATE 100 MICROGRAM(S): 50 INJECTION INTRAVENOUS at 21:39

## 2024-01-01 RX ADMIN — Medication 1 MILLIGRAM(S): at 21:04

## 2024-01-01 RX ADMIN — Medication 5 MILLIGRAM(S): at 11:18

## 2024-01-01 RX ADMIN — ATORVASTATIN CALCIUM 80 MILLIGRAM(S): 80 TABLET, FILM COATED ORAL at 22:51

## 2024-01-01 RX ADMIN — Medication 1 APPLICATION(S): at 11:08

## 2024-01-01 RX ADMIN — LEVALBUTEROL 0.63 MILLIGRAM(S): 1.25 SOLUTION, CONCENTRATE RESPIRATORY (INHALATION) at 21:16

## 2024-01-01 RX ADMIN — Medication 220 MILLIGRAM(S): at 17:36

## 2024-01-01 RX ADMIN — Medication 1 PATCH: at 11:25

## 2024-01-01 RX ADMIN — CEFEPIME 100 MILLIGRAM(S): 1 INJECTION, POWDER, FOR SOLUTION INTRAMUSCULAR; INTRAVENOUS at 05:49

## 2024-01-01 RX ADMIN — CHLORHEXIDINE GLUCONATE 1 APPLICATION(S): 213 SOLUTION TOPICAL at 06:11

## 2024-01-01 RX ADMIN — CEFEPIME 100 MILLIGRAM(S): 1 INJECTION, POWDER, FOR SOLUTION INTRAMUSCULAR; INTRAVENOUS at 05:07

## 2024-01-01 RX ADMIN — HUMAN INSULIN 14 UNIT(S): 100 INJECTION, SUSPENSION SUBCUTANEOUS at 15:06

## 2024-01-01 RX ADMIN — MEROPENEM 100 MILLIGRAM(S): 1 INJECTION INTRAVENOUS at 21:02

## 2024-01-01 RX ADMIN — Medication 500 MILLIGRAM(S): at 11:02

## 2024-01-01 RX ADMIN — MUPIROCIN 1 APPLICATION(S): 20 OINTMENT TOPICAL at 17:11

## 2024-01-01 RX ADMIN — ATORVASTATIN CALCIUM 80 MILLIGRAM(S): 80 TABLET, FILM COATED ORAL at 21:02

## 2024-01-01 RX ADMIN — BUDESONIDE AND FORMOTEROL FUMARATE DIHYDRATE 2 PUFF(S): 160; 4.5 AEROSOL RESPIRATORY (INHALATION) at 09:00

## 2024-01-01 RX ADMIN — Medication 180 MILLIGRAM(S): at 20:36

## 2024-01-01 RX ADMIN — Medication 2: at 23:34

## 2024-01-01 RX ADMIN — PROPOFOL 1.09 MICROGRAM(S)/KG/MIN: 10 INJECTION, EMULSION INTRAVENOUS at 07:21

## 2024-01-01 RX ADMIN — BUDESONIDE AND FORMOTEROL FUMARATE DIHYDRATE 2 PUFF(S): 160; 4.5 AEROSOL RESPIRATORY (INHALATION) at 08:52

## 2024-01-01 RX ADMIN — PANTOPRAZOLE SODIUM 40 MILLIGRAM(S): 20 TABLET, DELAYED RELEASE ORAL at 05:55

## 2024-01-01 RX ADMIN — CEFEPIME 100 MILLIGRAM(S): 1 INJECTION, POWDER, FOR SOLUTION INTRAMUSCULAR; INTRAVENOUS at 05:11

## 2024-01-01 RX ADMIN — HUMAN INSULIN 14 UNIT(S): 100 INJECTION, SUSPENSION SUBCUTANEOUS at 06:08

## 2024-01-01 RX ADMIN — CEFEPIME 100 MILLIGRAM(S): 1 INJECTION, POWDER, FOR SOLUTION INTRAMUSCULAR; INTRAVENOUS at 17:36

## 2024-01-01 RX ADMIN — ALBUTEROL 2.5 MILLIGRAM(S): 90 AEROSOL, METERED ORAL at 21:12

## 2024-01-01 RX ADMIN — Medication 500 MILLIGRAM(S): at 11:09

## 2024-01-01 RX ADMIN — LEVALBUTEROL 0.63 MILLIGRAM(S): 1.25 SOLUTION, CONCENTRATE RESPIRATORY (INHALATION) at 21:06

## 2024-01-01 RX ADMIN — Medication 2 MILLILITER(S): at 15:20

## 2024-01-01 RX ADMIN — ROFLUMILAST 500 MICROGRAM(S): 500 TABLET ORAL at 17:07

## 2024-01-01 RX ADMIN — FENTANYL CITRATE 25 MICROGRAM(S): 50 INJECTION INTRAVENOUS at 12:06

## 2024-01-01 RX ADMIN — Medication 3 MILLILITER(S): at 09:08

## 2024-01-01 RX ADMIN — ALBUTEROL 2.5 MILLIGRAM(S): 90 AEROSOL, METERED ORAL at 21:21

## 2024-01-01 RX ADMIN — DEXMEDETOMIDINE HYDROCHLORIDE IN 0.9% SODIUM CHLORIDE 4.54 MICROGRAM(S)/KG/HR: 4 INJECTION INTRAVENOUS at 20:43

## 2024-01-01 RX ADMIN — FENTANYL CITRATE 25 MICROGRAM(S): 50 INJECTION INTRAVENOUS at 10:20

## 2024-01-01 RX ADMIN — Medication 2 MILLILITER(S): at 09:12

## 2024-01-01 RX ADMIN — Medication 2: at 13:06

## 2024-01-01 RX ADMIN — Medication 40 MILLIEQUIVALENT(S): at 20:48

## 2024-01-01 RX ADMIN — Medication 1: at 17:30

## 2024-01-01 RX ADMIN — LEVALBUTEROL 0.63 MILLIGRAM(S): 1.25 SOLUTION, CONCENTRATE RESPIRATORY (INHALATION) at 09:37

## 2024-01-01 RX ADMIN — PANTOPRAZOLE SODIUM 40 MILLIGRAM(S): 20 TABLET, DELAYED RELEASE ORAL at 06:09

## 2024-01-01 RX ADMIN — Medication 40 MILLIGRAM(S): at 13:57

## 2024-01-01 RX ADMIN — Medication 100 MILLIGRAM(S): at 06:30

## 2024-01-01 RX ADMIN — ALBUTEROL 2.5 MILLIGRAM(S): 90 AEROSOL, METERED ORAL at 15:18

## 2024-01-01 RX ADMIN — Medication 1 APPLICATION(S): at 11:41

## 2024-01-01 RX ADMIN — CHLORHEXIDINE GLUCONATE 1 APPLICATION(S): 213 SOLUTION TOPICAL at 05:46

## 2024-01-01 RX ADMIN — Medication 1 PATCH: at 07:56

## 2024-01-01 RX ADMIN — Medication 100 MILLIGRAM(S): at 16:54

## 2024-01-01 RX ADMIN — BUDESONIDE AND FORMOTEROL FUMARATE DIHYDRATE 2 PUFF(S): 160; 4.5 AEROSOL RESPIRATORY (INHALATION) at 09:01

## 2024-01-01 RX ADMIN — LEVALBUTEROL 0.63 MILLIGRAM(S): 1.25 SOLUTION, CONCENTRATE RESPIRATORY (INHALATION) at 08:50

## 2024-01-01 RX ADMIN — DEXMEDETOMIDINE HYDROCHLORIDE IN 0.9% SODIUM CHLORIDE 4.54 MICROGRAM(S)/KG/HR: 4 INJECTION INTRAVENOUS at 03:04

## 2024-01-01 RX ADMIN — Medication 1: at 23:40

## 2024-01-01 RX ADMIN — ATORVASTATIN CALCIUM 80 MILLIGRAM(S): 80 TABLET, FILM COATED ORAL at 22:58

## 2024-01-01 RX ADMIN — Medication 40 MILLIGRAM(S): at 17:05

## 2024-01-01 RX ADMIN — Medication 2 MILLIGRAM(S): at 21:36

## 2024-01-01 RX ADMIN — LEVALBUTEROL 0.63 MILLIGRAM(S): 1.25 SOLUTION, CONCENTRATE RESPIRATORY (INHALATION) at 21:35

## 2024-01-01 RX ADMIN — PANTOPRAZOLE SODIUM 40 MILLIGRAM(S): 20 TABLET, DELAYED RELEASE ORAL at 05:57

## 2024-01-01 RX ADMIN — Medication 3 MILLILITER(S): at 15:02

## 2024-01-01 RX ADMIN — PROPOFOL 1.09 MICROGRAM(S)/KG/MIN: 10 INJECTION, EMULSION INTRAVENOUS at 00:14

## 2024-01-01 RX ADMIN — LEVALBUTEROL 0.63 MILLIGRAM(S): 1.25 SOLUTION, CONCENTRATE RESPIRATORY (INHALATION) at 15:10

## 2024-01-01 RX ADMIN — ATORVASTATIN CALCIUM 80 MILLIGRAM(S): 80 TABLET, FILM COATED ORAL at 21:10

## 2024-01-01 RX ADMIN — ATORVASTATIN CALCIUM 80 MILLIGRAM(S): 80 TABLET, FILM COATED ORAL at 20:36

## 2024-01-01 RX ADMIN — DEXMEDETOMIDINE HYDROCHLORIDE IN 0.9% SODIUM CHLORIDE 4.54 MICROGRAM(S)/KG/HR: 4 INJECTION INTRAVENOUS at 00:22

## 2024-01-01 RX ADMIN — CEFEPIME 100 MILLIGRAM(S): 1 INJECTION, POWDER, FOR SOLUTION INTRAMUSCULAR; INTRAVENOUS at 17:03

## 2024-01-01 RX ADMIN — HUMAN INSULIN 14 UNIT(S): 100 INJECTION, SUSPENSION SUBCUTANEOUS at 05:59

## 2024-01-01 RX ADMIN — Medication 1 MILLIGRAM(S): at 16:12

## 2024-01-01 RX ADMIN — ALBUTEROL 2.5 MILLIGRAM(S): 90 AEROSOL, METERED ORAL at 15:22

## 2024-01-01 RX ADMIN — CHLORHEXIDINE GLUCONATE 15 MILLILITER(S): 213 SOLUTION TOPICAL at 17:08

## 2024-01-01 RX ADMIN — Medication 500 MILLIGRAM(S): at 12:07

## 2024-01-01 RX ADMIN — CHLORHEXIDINE GLUCONATE 1 APPLICATION(S): 213 SOLUTION TOPICAL at 05:56

## 2024-01-01 RX ADMIN — Medication 1 APPLICATION(S): at 11:17

## 2024-01-01 RX ADMIN — LEVALBUTEROL 0.63 MILLIGRAM(S): 1.25 SOLUTION, CONCENTRATE RESPIRATORY (INHALATION) at 15:41

## 2024-01-01 RX ADMIN — Medication 1 PATCH: at 07:15

## 2024-01-01 RX ADMIN — LEVALBUTEROL 0.63 MILLIGRAM(S): 1.25 SOLUTION, CONCENTRATE RESPIRATORY (INHALATION) at 03:12

## 2024-01-01 RX ADMIN — Medication 650 MILLIGRAM(S): at 12:23

## 2024-01-01 RX ADMIN — DEXMEDETOMIDINE HYDROCHLORIDE IN 0.9% SODIUM CHLORIDE 4.54 MICROGRAM(S)/KG/HR: 4 INJECTION INTRAVENOUS at 11:02

## 2024-01-01 RX ADMIN — Medication 2 MILLILITER(S): at 21:36

## 2024-01-01 RX ADMIN — MEROPENEM 100 MILLIGRAM(S): 1 INJECTION INTRAVENOUS at 22:58

## 2024-01-01 RX ADMIN — POTASSIUM PHOSPHATE, MONOBASIC POTASSIUM PHOSPHATE, DIBASIC 83.33 MILLIMOLE(S): 236; 224 INJECTION, SOLUTION INTRAVENOUS at 10:12

## 2024-01-01 RX ADMIN — ATORVASTATIN CALCIUM 80 MILLIGRAM(S): 80 TABLET, FILM COATED ORAL at 22:01

## 2024-01-01 RX ADMIN — CHLORHEXIDINE GLUCONATE 1 APPLICATION(S): 213 SOLUTION TOPICAL at 05:11

## 2024-01-01 RX ADMIN — RIVAROXABAN 10 MILLIGRAM(S): KIT at 11:43

## 2024-01-01 RX ADMIN — MIRTAZAPINE 7.5 MILLIGRAM(S): 45 TABLET, ORALLY DISINTEGRATING ORAL at 21:20

## 2024-01-01 RX ADMIN — Medication 2 MILLILITER(S): at 05:59

## 2024-01-01 RX ADMIN — FENTANYL CITRATE 75 MICROGRAM(S): 50 INJECTION INTRAVENOUS at 18:19

## 2024-01-01 RX ADMIN — Medication 125 MILLIGRAM(S): at 12:38

## 2024-01-01 RX ADMIN — ROFLUMILAST 500 MICROGRAM(S): 500 TABLET ORAL at 11:10

## 2024-01-01 RX ADMIN — Medication 1 PATCH: at 13:29

## 2024-01-01 RX ADMIN — FENTANYL CITRATE 25 MICROGRAM(S): 50 INJECTION INTRAVENOUS at 00:07

## 2024-01-01 RX ADMIN — LEVALBUTEROL 0.63 MILLIGRAM(S): 1.25 SOLUTION, CONCENTRATE RESPIRATORY (INHALATION) at 05:00

## 2024-01-01 RX ADMIN — PROPOFOL 1.09 MICROGRAM(S)/KG/MIN: 10 INJECTION, EMULSION INTRAVENOUS at 17:21

## 2024-01-01 RX ADMIN — TIOTROPIUM BROMIDE 2 PUFF(S): 18 CAPSULE ORAL; RESPIRATORY (INHALATION) at 09:00

## 2024-01-01 RX ADMIN — Medication 5 MILLIGRAM(S): at 17:28

## 2024-01-01 RX ADMIN — Medication 2 MILLILITER(S): at 09:31

## 2024-01-01 RX ADMIN — MEROPENEM 100 MILLIGRAM(S): 1 INJECTION INTRAVENOUS at 21:35

## 2024-01-01 RX ADMIN — PROPOFOL 1.09 MICROGRAM(S)/KG/MIN: 10 INJECTION, EMULSION INTRAVENOUS at 16:54

## 2024-01-01 RX ADMIN — Medication 2 MILLILITER(S): at 15:12

## 2024-01-01 RX ADMIN — Medication 5 MILLIGRAM(S): at 11:53

## 2024-01-01 RX ADMIN — FENTANYL CITRATE 100 MICROGRAM(S): 50 INJECTION INTRAVENOUS at 21:24

## 2024-01-01 RX ADMIN — ROFLUMILAST 500 MICROGRAM(S): 500 TABLET ORAL at 11:42

## 2024-01-01 RX ADMIN — Medication 0.25 MILLIGRAM(S): at 05:15

## 2024-01-01 RX ADMIN — Medication 100 MILLIGRAM(S): at 07:59

## 2024-01-01 RX ADMIN — Medication 1 PATCH: at 22:08

## 2024-01-01 RX ADMIN — RIVAROXABAN 10 MILLIGRAM(S): KIT at 11:02

## 2024-01-01 RX ADMIN — CEFEPIME 100 MILLIGRAM(S): 1 INJECTION, POWDER, FOR SOLUTION INTRAMUSCULAR; INTRAVENOUS at 17:21

## 2024-01-01 RX ADMIN — DEXMEDETOMIDINE HYDROCHLORIDE IN 0.9% SODIUM CHLORIDE 4.54 MICROGRAM(S)/KG/HR: 4 INJECTION INTRAVENOUS at 06:15

## 2024-01-01 RX ADMIN — ZINC SULFATE TAB 220 MG (50 MG ZINC EQUIVALENT) 220 MILLIGRAM(S): 220 (50 ZN) TAB at 11:07

## 2024-01-01 RX ADMIN — Medication 500 MILLIGRAM(S): at 11:26

## 2024-01-01 RX ADMIN — Medication 300 MILLIGRAM(S): at 12:07

## 2024-01-01 RX ADMIN — Medication 100 MILLIGRAM(S): at 17:03

## 2024-01-01 RX ADMIN — CEFEPIME 100 MILLIGRAM(S): 1 INJECTION, POWDER, FOR SOLUTION INTRAMUSCULAR; INTRAVENOUS at 05:26

## 2024-01-01 RX ADMIN — Medication 500 MILLIGRAM(S): at 11:03

## 2024-01-01 RX ADMIN — LETROZOLE 2.5 MILLIGRAM(S): 2.5 TABLET, FILM COATED ORAL at 11:49

## 2024-01-01 RX ADMIN — Medication 2 MILLILITER(S): at 15:42

## 2024-01-01 RX ADMIN — MORPHINE SULFATE 2 MG/HR: 50 CAPSULE, EXTENDED RELEASE ORAL at 06:46

## 2024-01-01 RX ADMIN — CHLORHEXIDINE GLUCONATE 15 MILLILITER(S): 213 SOLUTION TOPICAL at 17:09

## 2024-01-01 RX ADMIN — FENTANYL CITRATE 25 MICROGRAM(S): 50 INJECTION INTRAVENOUS at 09:02

## 2024-01-01 RX ADMIN — Medication 2 MILLIGRAM(S): at 22:11

## 2024-01-01 RX ADMIN — Medication 1 PATCH: at 11:07

## 2024-01-01 RX ADMIN — CHLORHEXIDINE GLUCONATE 15 MILLILITER(S): 213 SOLUTION TOPICAL at 17:02

## 2024-01-01 RX ADMIN — Medication 1 PATCH: at 11:00

## 2024-01-01 RX ADMIN — MIRTAZAPINE 7.5 MILLIGRAM(S): 45 TABLET, ORALLY DISINTEGRATING ORAL at 21:36

## 2024-01-01 RX ADMIN — RIVAROXABAN 10 MILLIGRAM(S): KIT at 11:06

## 2024-01-01 RX ADMIN — PROPOFOL 1.09 MICROGRAM(S)/KG/MIN: 10 INJECTION, EMULSION INTRAVENOUS at 01:12

## 2024-01-01 RX ADMIN — ATORVASTATIN CALCIUM 80 MILLIGRAM(S): 80 TABLET, FILM COATED ORAL at 21:20

## 2024-01-01 RX ADMIN — FENTANYL CITRATE 75 MICROGRAM(S): 50 INJECTION INTRAVENOUS at 09:37

## 2024-01-01 RX ADMIN — BUDESONIDE AND FORMOTEROL FUMARATE DIHYDRATE 2 PUFF(S): 160; 4.5 AEROSOL RESPIRATORY (INHALATION) at 21:04

## 2024-01-01 RX ADMIN — MIRTAZAPINE 7.5 MILLIGRAM(S): 45 TABLET, ORALLY DISINTEGRATING ORAL at 22:11

## 2024-01-01 RX ADMIN — DEXMEDETOMIDINE HYDROCHLORIDE IN 0.9% SODIUM CHLORIDE 4.54 MICROGRAM(S)/KG/HR: 4 INJECTION INTRAVENOUS at 11:48

## 2024-01-01 RX ADMIN — Medication 25 GRAM(S): at 14:05

## 2024-01-01 RX ADMIN — ATORVASTATIN CALCIUM 80 MILLIGRAM(S): 80 TABLET, FILM COATED ORAL at 21:52

## 2024-01-01 RX ADMIN — POLYETHYLENE GLYCOL 3350 17 GRAM(S): 17 POWDER, FOR SOLUTION ORAL at 11:55

## 2024-01-01 RX ADMIN — Medication 1 PATCH: at 09:12

## 2024-01-01 RX ADMIN — FENTANYL CITRATE 25 MICROGRAM(S): 50 INJECTION INTRAVENOUS at 08:32

## 2024-01-01 RX ADMIN — Medication 1 APPLICATION(S): at 11:29

## 2024-01-01 RX ADMIN — Medication 1 PATCH: at 19:00

## 2024-01-01 RX ADMIN — Medication 1 PATCH: at 12:40

## 2024-01-01 RX ADMIN — LEVALBUTEROL 0.63 MILLIGRAM(S): 1.25 SOLUTION, CONCENTRATE RESPIRATORY (INHALATION) at 05:06

## 2024-01-01 RX ADMIN — Medication 0.25 MILLIGRAM(S): at 05:57

## 2024-01-01 RX ADMIN — Medication 0.25 MILLIGRAM(S): at 09:00

## 2024-01-01 RX ADMIN — FENTANYL CITRATE 75 MICROGRAM(S): 50 INJECTION INTRAVENOUS at 09:45

## 2024-01-01 RX ADMIN — Medication 2 MILLILITER(S): at 05:06

## 2024-01-01 RX ADMIN — BUDESONIDE AND FORMOTEROL FUMARATE DIHYDRATE 2 PUFF(S): 160; 4.5 AEROSOL RESPIRATORY (INHALATION) at 09:32

## 2024-01-01 RX ADMIN — ALBUTEROL 2.5 MILLIGRAM(S): 90 AEROSOL, METERED ORAL at 05:02

## 2024-01-01 RX ADMIN — Medication 1: at 11:17

## 2024-01-01 RX ADMIN — Medication 325 MILLIGRAM(S): at 12:41

## 2024-01-01 RX ADMIN — BUMETANIDE 1 MILLIGRAM(S): 0.25 INJECTION INTRAMUSCULAR; INTRAVENOUS at 16:37

## 2024-01-01 RX ADMIN — DEXMEDETOMIDINE HYDROCHLORIDE IN 0.9% SODIUM CHLORIDE 4.54 MICROGRAM(S)/KG/HR: 4 INJECTION INTRAVENOUS at 04:29

## 2024-01-01 RX ADMIN — Medication 1 PATCH: at 07:43

## 2024-01-01 RX ADMIN — ATORVASTATIN CALCIUM 80 MILLIGRAM(S): 80 TABLET, FILM COATED ORAL at 22:16

## 2024-01-01 RX ADMIN — MEROPENEM 100 MILLIGRAM(S): 1 INJECTION INTRAVENOUS at 05:55

## 2024-01-01 RX ADMIN — CHLORHEXIDINE GLUCONATE 1 APPLICATION(S): 213 SOLUTION TOPICAL at 05:26

## 2024-01-01 RX ADMIN — Medication 15 MILLILITER(S): at 11:45

## 2024-01-01 RX ADMIN — Medication 300 MILLIGRAM(S): at 11:02

## 2024-01-01 RX ADMIN — LEVALBUTEROL 0.63 MILLIGRAM(S): 1.25 SOLUTION, CONCENTRATE RESPIRATORY (INHALATION) at 09:31

## 2024-01-01 RX ADMIN — MIRTAZAPINE 7.5 MILLIGRAM(S): 45 TABLET, ORALLY DISINTEGRATING ORAL at 22:51

## 2024-01-01 RX ADMIN — CHLORHEXIDINE GLUCONATE 1 APPLICATION(S): 213 SOLUTION TOPICAL at 05:33

## 2024-01-01 RX ADMIN — PANTOPRAZOLE SODIUM 40 MILLIGRAM(S): 20 TABLET, DELAYED RELEASE ORAL at 05:26

## 2024-01-01 RX ADMIN — Medication 2 MILLILITER(S): at 20:57

## 2024-01-01 RX ADMIN — MEROPENEM 100 MILLIGRAM(S): 1 INJECTION INTRAVENOUS at 13:52

## 2024-01-01 RX ADMIN — PROPOFOL 1.09 MICROGRAM(S)/KG/MIN: 10 INJECTION, EMULSION INTRAVENOUS at 13:07

## 2024-01-01 RX ADMIN — MUPIROCIN 1 APPLICATION(S): 20 OINTMENT TOPICAL at 17:23

## 2024-01-01 RX ADMIN — Medication 1 PATCH: at 18:59

## 2024-01-01 RX ADMIN — SODIUM CHLORIDE 75 MILLILITER(S): 9 INJECTION, SOLUTION INTRAVENOUS at 09:33

## 2024-01-01 RX ADMIN — Medication 1: at 17:09

## 2024-01-01 RX ADMIN — MIRTAZAPINE 7.5 MILLIGRAM(S): 45 TABLET, ORALLY DISINTEGRATING ORAL at 22:16

## 2024-01-01 RX ADMIN — ALBUTEROL 2.5 MILLIGRAM(S): 90 AEROSOL, METERED ORAL at 15:55

## 2024-01-01 RX ADMIN — CHLORHEXIDINE GLUCONATE 1 APPLICATION(S): 213 SOLUTION TOPICAL at 05:43

## 2024-01-01 RX ADMIN — Medication 15 MILLILITER(S): at 11:05

## 2024-01-01 RX ADMIN — Medication 6: at 11:32

## 2024-01-01 RX ADMIN — Medication 325 MILLIGRAM(S): at 11:54

## 2024-01-01 RX ADMIN — DEXMEDETOMIDINE HYDROCHLORIDE IN 0.9% SODIUM CHLORIDE 4.54 MICROGRAM(S)/KG/HR: 4 INJECTION INTRAVENOUS at 17:42

## 2024-01-01 RX ADMIN — ALBUTEROL 2.5 MILLIGRAM(S): 90 AEROSOL, METERED ORAL at 09:00

## 2024-01-01 RX ADMIN — Medication 1 APPLICATION(S): at 11:59

## 2024-01-01 RX ADMIN — ZINC SULFATE TAB 220 MG (50 MG ZINC EQUIVALENT) 220 MILLIGRAM(S): 220 (50 ZN) TAB at 11:09

## 2024-01-01 RX ADMIN — Medication 1 PATCH: at 20:00

## 2024-01-01 RX ADMIN — LEVALBUTEROL 0.63 MILLIGRAM(S): 1.25 SOLUTION, CONCENTRATE RESPIRATORY (INHALATION) at 21:13

## 2024-01-01 RX ADMIN — ZINC SULFATE TAB 220 MG (50 MG ZINC EQUIVALENT) 220 MILLIGRAM(S): 220 (50 ZN) TAB at 12:07

## 2024-01-01 RX ADMIN — RIVAROXABAN 10 MILLIGRAM(S): KIT at 20:36

## 2024-01-01 RX ADMIN — Medication 2 MILLIGRAM(S): at 21:44

## 2024-01-01 RX ADMIN — Medication 2 PACKET(S): at 08:22

## 2024-01-01 RX ADMIN — ROFLUMILAST 500 MICROGRAM(S): 500 TABLET ORAL at 11:05

## 2024-01-01 RX ADMIN — PANTOPRAZOLE SODIUM 40 MILLIGRAM(S): 20 TABLET, DELAYED RELEASE ORAL at 05:52

## 2024-01-01 RX ADMIN — PANTOPRAZOLE SODIUM 40 MILLIGRAM(S): 20 TABLET, DELAYED RELEASE ORAL at 17:36

## 2024-01-01 RX ADMIN — PROPOFOL 1.09 MICROGRAM(S)/KG/MIN: 10 INJECTION, EMULSION INTRAVENOUS at 13:12

## 2024-01-01 RX ADMIN — Medication 2 MILLILITER(S): at 08:32

## 2024-01-01 RX ADMIN — LEVALBUTEROL 0.63 MILLIGRAM(S): 1.25 SOLUTION, CONCENTRATE RESPIRATORY (INHALATION) at 21:36

## 2024-01-01 RX ADMIN — Medication 500 MILLIGRAM(S): at 11:43

## 2024-01-01 RX ADMIN — Medication 2: at 09:33

## 2024-01-01 RX ADMIN — LEVALBUTEROL 0.63 MILLIGRAM(S): 1.25 SOLUTION, CONCENTRATE RESPIRATORY (INHALATION) at 16:27

## 2024-01-01 RX ADMIN — Medication 650 MILLIGRAM(S): at 17:32

## 2024-01-01 RX ADMIN — Medication 1 PATCH: at 19:49

## 2024-01-01 RX ADMIN — CHLORHEXIDINE GLUCONATE 15 MILLILITER(S): 213 SOLUTION TOPICAL at 18:00

## 2024-01-01 RX ADMIN — POLYETHYLENE GLYCOL 3350 17 GRAM(S): 17 POWDER, FOR SOLUTION ORAL at 11:10

## 2024-01-01 RX ADMIN — Medication 180 MILLIGRAM(S): at 21:10

## 2024-01-01 RX ADMIN — Medication 50 MILLIEQUIVALENT(S): at 15:21

## 2024-01-01 RX ADMIN — Medication 30 MILLIGRAM(S): at 08:57

## 2024-01-01 RX ADMIN — Medication 40 MILLIGRAM(S): at 05:11

## 2024-01-01 RX ADMIN — BUPROPION HYDROCHLORIDE 150 MILLIGRAM(S): 150 TABLET, EXTENDED RELEASE ORAL at 11:09

## 2024-01-01 RX ADMIN — MEROPENEM 100 MILLIGRAM(S): 1 INJECTION INTRAVENOUS at 13:51

## 2024-01-01 RX ADMIN — MORPHINE SULFATE 6 MG/HR: 50 CAPSULE, EXTENDED RELEASE ORAL at 22:40

## 2024-01-01 RX ADMIN — Medication 2 MILLIGRAM(S): at 21:52

## 2024-01-01 RX ADMIN — Medication 0.25 MILLIGRAM(S): at 18:20

## 2024-01-01 RX ADMIN — Medication 1 PATCH: at 07:21

## 2024-01-01 RX ADMIN — Medication 3 MILLILITER(S): at 16:33

## 2024-01-01 RX ADMIN — CHLORHEXIDINE GLUCONATE 15 MILLILITER(S): 213 SOLUTION TOPICAL at 05:56

## 2024-01-01 RX ADMIN — Medication 15 MILLILITER(S): at 11:02

## 2024-01-01 RX ADMIN — Medication 1 MILLIGRAM(S): at 23:09

## 2024-01-01 RX ADMIN — Medication 40 MILLIGRAM(S): at 05:54

## 2024-01-01 RX ADMIN — BUDESONIDE AND FORMOTEROL FUMARATE DIHYDRATE 2 PUFF(S): 160; 4.5 AEROSOL RESPIRATORY (INHALATION) at 21:12

## 2024-01-01 RX ADMIN — Medication 1 PATCH: at 11:06

## 2024-01-01 RX ADMIN — Medication 1 PATCH: at 07:07

## 2024-01-01 RX ADMIN — Medication 300 MILLIGRAM(S): at 11:06

## 2024-01-01 RX ADMIN — Medication 0.25 MILLIGRAM(S): at 05:12

## 2024-01-01 RX ADMIN — ROFLUMILAST 500 MICROGRAM(S): 500 TABLET ORAL at 11:06

## 2024-01-01 RX ADMIN — Medication 1 PATCH: at 08:03

## 2024-01-01 RX ADMIN — Medication 40 MILLIGRAM(S): at 05:53

## 2024-01-01 RX ADMIN — SODIUM CHLORIDE 40 MILLILITER(S): 9 INJECTION, SOLUTION INTRAVENOUS at 11:48

## 2024-01-01 RX ADMIN — Medication 30 MILLIGRAM(S): at 05:57

## 2024-01-01 RX ADMIN — CHLORHEXIDINE GLUCONATE 15 MILLILITER(S): 213 SOLUTION TOPICAL at 17:11

## 2024-01-01 RX ADMIN — MUPIROCIN 1 APPLICATION(S): 20 OINTMENT TOPICAL at 06:09

## 2024-01-01 RX ADMIN — Medication 1 APPLICATION(S): at 11:45

## 2024-01-01 RX ADMIN — Medication 3 MILLIGRAM(S): at 21:19

## 2024-01-01 RX ADMIN — ROFLUMILAST 500 MICROGRAM(S): 500 TABLET ORAL at 12:08

## 2024-01-01 RX ADMIN — INSULIN HUMAN 3 UNIT(S)/HR: 100 INJECTION, SOLUTION SUBCUTANEOUS at 13:10

## 2024-01-01 RX ADMIN — Medication 180 MILLIGRAM(S): at 21:19

## 2024-01-01 RX ADMIN — Medication 25 GRAM(S): at 11:18

## 2024-01-01 RX ADMIN — Medication 1 APPLICATION(S): at 11:06

## 2024-01-01 RX ADMIN — Medication 1 PATCH: at 11:27

## 2024-01-01 RX ADMIN — BUDESONIDE AND FORMOTEROL FUMARATE DIHYDRATE 2 PUFF(S): 160; 4.5 AEROSOL RESPIRATORY (INHALATION) at 21:43

## 2024-01-01 RX ADMIN — MEROPENEM 100 MILLIGRAM(S): 1 INJECTION INTRAVENOUS at 06:09

## 2024-01-01 RX ADMIN — BUDESONIDE AND FORMOTEROL FUMARATE DIHYDRATE 2 PUFF(S): 160; 4.5 AEROSOL RESPIRATORY (INHALATION) at 21:17

## 2024-01-01 RX ADMIN — SODIUM CHLORIDE 40 MILLILITER(S): 9 INJECTION, SOLUTION INTRAVENOUS at 01:07

## 2024-01-01 RX ADMIN — Medication 300 MILLIGRAM(S): at 11:42

## 2024-01-01 RX ADMIN — Medication 25 GRAM(S): at 09:47

## 2024-01-01 RX ADMIN — Medication 180 MILLIGRAM(S): at 22:12

## 2024-01-01 RX ADMIN — Medication 1 PATCH: at 11:55

## 2024-01-01 RX ADMIN — LEVALBUTEROL 0.63 MILLIGRAM(S): 1.25 SOLUTION, CONCENTRATE RESPIRATORY (INHALATION) at 03:24

## 2024-01-01 RX ADMIN — LEVALBUTEROL 0.63 MILLIGRAM(S): 1.25 SOLUTION, CONCENTRATE RESPIRATORY (INHALATION) at 09:12

## 2024-01-01 RX ADMIN — CHLORHEXIDINE GLUCONATE 15 MILLILITER(S): 213 SOLUTION TOPICAL at 05:25

## 2024-01-01 RX ADMIN — Medication 1: at 05:59

## 2024-01-01 RX ADMIN — LEVALBUTEROL 0.63 MILLIGRAM(S): 1.25 SOLUTION, CONCENTRATE RESPIRATORY (INHALATION) at 09:00

## 2024-01-01 RX ADMIN — TIOTROPIUM BROMIDE 2 PUFF(S): 18 CAPSULE ORAL; RESPIRATORY (INHALATION) at 09:09

## 2024-01-01 RX ADMIN — MIRTAZAPINE 7.5 MILLIGRAM(S): 45 TABLET, ORALLY DISINTEGRATING ORAL at 21:10

## 2024-01-01 RX ADMIN — LEVALBUTEROL 0.63 MILLIGRAM(S): 1.25 SOLUTION, CONCENTRATE RESPIRATORY (INHALATION) at 03:06

## 2024-01-01 RX ADMIN — Medication 650 MILLIGRAM(S): at 11:53

## 2024-01-01 RX ADMIN — PANTOPRAZOLE SODIUM 40 MILLIGRAM(S): 20 TABLET, DELAYED RELEASE ORAL at 05:21

## 2024-01-01 RX ADMIN — FENTANYL CITRATE 25 MICROGRAM(S): 50 INJECTION INTRAVENOUS at 12:55

## 2024-01-01 RX ADMIN — Medication 100 MILLIGRAM(S): at 05:26

## 2024-01-01 RX ADMIN — LEVALBUTEROL 0.63 MILLIGRAM(S): 1.25 SOLUTION, CONCENTRATE RESPIRATORY (INHALATION) at 15:02

## 2024-01-01 RX ADMIN — CEFEPIME 100 MILLIGRAM(S): 1 INJECTION, POWDER, FOR SOLUTION INTRAMUSCULAR; INTRAVENOUS at 17:20

## 2024-01-01 RX ADMIN — MUPIROCIN 1 APPLICATION(S): 20 OINTMENT TOPICAL at 17:16

## 2024-01-01 RX ADMIN — DEXMEDETOMIDINE HYDROCHLORIDE IN 0.9% SODIUM CHLORIDE 4.54 MICROGRAM(S)/KG/HR: 4 INJECTION INTRAVENOUS at 23:47

## 2024-01-01 RX ADMIN — Medication 40 MILLIGRAM(S): at 06:09

## 2024-01-01 RX ADMIN — ALBUTEROL 2.5 MILLIGRAM(S): 90 AEROSOL, METERED ORAL at 09:11

## 2024-01-01 RX ADMIN — Medication 1 APPLICATION(S): at 12:39

## 2024-01-01 RX ADMIN — Medication 1 PATCH: at 19:18

## 2024-01-01 RX ADMIN — LETROZOLE 2.5 MILLIGRAM(S): 2.5 TABLET, FILM COATED ORAL at 13:51

## 2024-01-01 RX ADMIN — FENTANYL CITRATE 25 MICROGRAM(S): 50 INJECTION INTRAVENOUS at 12:25

## 2024-01-01 RX ADMIN — Medication 50 MILLIEQUIVALENT(S): at 21:09

## 2024-01-01 RX ADMIN — Medication 2 MILLILITER(S): at 21:34

## 2024-01-01 RX ADMIN — MEROPENEM 100 MILLIGRAM(S): 1 INJECTION INTRAVENOUS at 05:53

## 2024-01-01 RX ADMIN — MUPIROCIN 1 APPLICATION(S): 20 OINTMENT TOPICAL at 05:26

## 2024-01-01 RX ADMIN — DEXMEDETOMIDINE HYDROCHLORIDE IN 0.9% SODIUM CHLORIDE 4.54 MICROGRAM(S)/KG/HR: 4 INJECTION INTRAVENOUS at 16:55

## 2024-01-01 RX ADMIN — POLYETHYLENE GLYCOL 3350 17 GRAM(S): 17 POWDER, FOR SOLUTION ORAL at 11:02

## 2024-01-01 RX ADMIN — POLYETHYLENE GLYCOL 3350 17 GRAM(S): 17 POWDER, FOR SOLUTION ORAL at 11:16

## 2024-01-01 RX ADMIN — RIVAROXABAN 10 MILLIGRAM(S): KIT at 17:24

## 2024-01-01 RX ADMIN — BUDESONIDE AND FORMOTEROL FUMARATE DIHYDRATE 2 PUFF(S): 160; 4.5 AEROSOL RESPIRATORY (INHALATION) at 09:39

## 2024-01-01 RX ADMIN — CHLORHEXIDINE GLUCONATE 1 APPLICATION(S): 213 SOLUTION TOPICAL at 05:00

## 2024-01-01 RX ADMIN — Medication 2 MILLIGRAM(S): at 22:58

## 2024-01-01 RX ADMIN — Medication 15 MILLILITER(S): at 11:09

## 2024-01-01 RX ADMIN — CHLORHEXIDINE GLUCONATE 15 MILLILITER(S): 213 SOLUTION TOPICAL at 17:30

## 2024-01-01 RX ADMIN — Medication 2 MILLIGRAM(S): at 22:01

## 2024-01-01 RX ADMIN — Medication 1 PATCH: at 12:08

## 2024-01-01 RX ADMIN — Medication 2 MILLIGRAM(S): at 20:36

## 2024-01-01 RX ADMIN — Medication 40 MILLIGRAM(S): at 06:02

## 2024-01-01 RX ADMIN — Medication 3 MILLILITER(S): at 04:27

## 2024-01-01 RX ADMIN — LEVALBUTEROL 0.63 MILLIGRAM(S): 1.25 SOLUTION, CONCENTRATE RESPIRATORY (INHALATION) at 08:55

## 2024-01-01 RX ADMIN — Medication 2 MILLILITER(S): at 21:15

## 2024-01-01 RX ADMIN — BUDESONIDE AND FORMOTEROL FUMARATE DIHYDRATE 2 PUFF(S): 160; 4.5 AEROSOL RESPIRATORY (INHALATION) at 09:14

## 2024-01-01 RX ADMIN — BUDESONIDE AND FORMOTEROL FUMARATE DIHYDRATE 2 PUFF(S): 160; 4.5 AEROSOL RESPIRATORY (INHALATION) at 20:50

## 2024-01-01 RX ADMIN — PANTOPRAZOLE SODIUM 40 MILLIGRAM(S): 20 TABLET, DELAYED RELEASE ORAL at 06:31

## 2024-01-01 RX ADMIN — LEVALBUTEROL 0.63 MILLIGRAM(S): 1.25 SOLUTION, CONCENTRATE RESPIRATORY (INHALATION) at 21:43

## 2024-01-01 RX ADMIN — BUPROPION HYDROCHLORIDE 150 MILLIGRAM(S): 150 TABLET, EXTENDED RELEASE ORAL at 11:18

## 2024-01-01 RX ADMIN — Medication 1 APPLICATION(S): at 14:28

## 2024-01-01 RX ADMIN — Medication 3 MILLILITER(S): at 20:23

## 2024-01-01 RX ADMIN — CEFEPIME 100 MILLIGRAM(S): 1 INJECTION, POWDER, FOR SOLUTION INTRAMUSCULAR; INTRAVENOUS at 17:05

## 2024-01-01 RX ADMIN — LEVALBUTEROL 0.63 MILLIGRAM(S): 1.25 SOLUTION, CONCENTRATE RESPIRATORY (INHALATION) at 08:05

## 2024-01-01 RX ADMIN — Medication 1 PATCH: at 19:23

## 2024-01-01 RX ADMIN — Medication 1 APPLICATION(S): at 11:13

## 2024-01-01 RX ADMIN — CEFEPIME 100 MILLIGRAM(S): 1 INJECTION, POWDER, FOR SOLUTION INTRAMUSCULAR; INTRAVENOUS at 17:14

## 2024-01-01 RX ADMIN — LEVALBUTEROL 0.63 MILLIGRAM(S): 1.25 SOLUTION, CONCENTRATE RESPIRATORY (INHALATION) at 15:13

## 2024-01-01 RX ADMIN — Medication 2 MILLILITER(S): at 15:03

## 2024-01-01 RX ADMIN — Medication 40 MILLIGRAM(S): at 05:26

## 2024-01-01 RX ADMIN — DEXMEDETOMIDINE HYDROCHLORIDE IN 0.9% SODIUM CHLORIDE 4.54 MICROGRAM(S)/KG/HR: 4 INJECTION INTRAVENOUS at 13:11

## 2024-01-01 RX ADMIN — ATORVASTATIN CALCIUM 80 MILLIGRAM(S): 80 TABLET, FILM COATED ORAL at 21:36

## 2024-01-01 RX ADMIN — BUDESONIDE AND FORMOTEROL FUMARATE DIHYDRATE 2 PUFF(S): 160; 4.5 AEROSOL RESPIRATORY (INHALATION) at 21:14

## 2024-01-01 RX ADMIN — Medication 2 MILLILITER(S): at 16:27

## 2024-01-01 RX ADMIN — BUDESONIDE AND FORMOTEROL FUMARATE DIHYDRATE 2 PUFF(S): 160; 4.5 AEROSOL RESPIRATORY (INHALATION) at 21:34

## 2024-01-01 RX ADMIN — MEROPENEM 100 MILLIGRAM(S): 1 INJECTION INTRAVENOUS at 05:24

## 2024-01-01 RX ADMIN — Medication 550 MILLIGRAM(S): at 18:06

## 2024-01-01 RX ADMIN — CHLORHEXIDINE GLUCONATE 1 APPLICATION(S): 213 SOLUTION TOPICAL at 05:25

## 2024-01-01 RX ADMIN — Medication 0.25 MILLIGRAM(S): at 08:48

## 2024-01-01 RX ADMIN — Medication 1 PATCH: at 19:02

## 2024-01-01 RX ADMIN — Medication 550 MILLIGRAM(S): at 10:20

## 2024-01-01 RX ADMIN — Medication 0.25 MILLIGRAM(S): at 20:36

## 2024-01-01 RX ADMIN — Medication 2 MILLILITER(S): at 03:06

## 2024-01-01 RX ADMIN — BUPROPION HYDROCHLORIDE 300 MILLIGRAM(S): 150 TABLET, EXTENDED RELEASE ORAL at 12:42

## 2024-01-01 RX ADMIN — BUPROPION HYDROCHLORIDE 150 MILLIGRAM(S): 150 TABLET, EXTENDED RELEASE ORAL at 11:26

## 2024-01-01 RX ADMIN — ROFLUMILAST 500 MICROGRAM(S): 500 TABLET ORAL at 11:04

## 2024-01-01 RX ADMIN — ROFLUMILAST 500 MICROGRAM(S): 500 TABLET ORAL at 11:09

## 2024-01-01 RX ADMIN — Medication 40 MILLIGRAM(S): at 05:42

## 2024-01-01 RX ADMIN — Medication 50 MILLIEQUIVALENT(S): at 17:22

## 2024-01-01 RX ADMIN — LETROZOLE 2.5 MILLIGRAM(S): 2.5 TABLET, FILM COATED ORAL at 17:07

## 2024-01-01 RX ADMIN — MIRTAZAPINE 7.5 MILLIGRAM(S): 45 TABLET, ORALLY DISINTEGRATING ORAL at 20:37

## 2024-01-01 RX ADMIN — BUDESONIDE AND FORMOTEROL FUMARATE DIHYDRATE 2 PUFF(S): 160; 4.5 AEROSOL RESPIRATORY (INHALATION) at 20:07

## 2024-01-01 RX ADMIN — Medication 1 PATCH: at 06:48

## 2024-01-01 RX ADMIN — MUPIROCIN 1 APPLICATION(S): 20 OINTMENT TOPICAL at 05:54

## 2024-01-01 RX ADMIN — BUDESONIDE AND FORMOTEROL FUMARATE DIHYDRATE 2 PUFF(S): 160; 4.5 AEROSOL RESPIRATORY (INHALATION) at 08:57

## 2024-01-01 RX ADMIN — Medication 25 GRAM(S): at 08:22

## 2024-01-01 RX ADMIN — ZINC SULFATE TAB 220 MG (50 MG ZINC EQUIVALENT) 220 MILLIGRAM(S): 220 (50 ZN) TAB at 11:44

## 2024-01-01 RX ADMIN — ROFLUMILAST 500 MICROGRAM(S): 500 TABLET ORAL at 11:55

## 2024-01-01 RX ADMIN — Medication 4: at 12:46

## 2024-01-01 RX ADMIN — Medication 300 MILLIGRAM(S): at 11:09

## 2024-01-01 RX ADMIN — Medication 1 PATCH: at 12:41

## 2024-01-01 RX ADMIN — CHLORHEXIDINE GLUCONATE 1 APPLICATION(S): 213 SOLUTION TOPICAL at 05:49

## 2024-01-01 RX ADMIN — CHLORHEXIDINE GLUCONATE 1 APPLICATION(S): 213 SOLUTION TOPICAL at 05:53

## 2024-01-01 RX ADMIN — ZINC SULFATE TAB 220 MG (50 MG ZINC EQUIVALENT) 220 MILLIGRAM(S): 220 (50 ZN) TAB at 11:04

## 2024-01-01 RX ADMIN — Medication 40 MILLIGRAM(S): at 05:15

## 2024-01-01 RX ADMIN — SODIUM CHLORIDE 50 MILLILITER(S): 9 INJECTION, SOLUTION INTRAVENOUS at 04:28

## 2024-01-01 RX ADMIN — LEVALBUTEROL 0.63 MILLIGRAM(S): 1.25 SOLUTION, CONCENTRATE RESPIRATORY (INHALATION) at 16:10

## 2024-01-01 RX ADMIN — FENTANYL CITRATE 25 MICROGRAM(S): 50 INJECTION INTRAVENOUS at 00:23

## 2024-01-01 RX ADMIN — Medication 3.4 MICROGRAM(S)/KG/MIN: at 16:55

## 2024-01-01 RX ADMIN — BUDESONIDE AND FORMOTEROL FUMARATE DIHYDRATE 2 PUFF(S): 160; 4.5 AEROSOL RESPIRATORY (INHALATION) at 08:33

## 2024-01-01 RX ADMIN — AZITHROMYCIN 500 MILLIGRAM(S): 500 TABLET, FILM COATED ORAL at 12:07

## 2024-01-01 RX ADMIN — Medication 3 MILLILITER(S): at 05:06

## 2024-01-01 RX ADMIN — PROPOFOL 1.09 MICROGRAM(S)/KG/MIN: 10 INJECTION, EMULSION INTRAVENOUS at 07:27

## 2024-01-01 RX ADMIN — RIVAROXABAN 10 MILLIGRAM(S): KIT at 11:41

## 2024-01-01 RX ADMIN — DEXMEDETOMIDINE HYDROCHLORIDE IN 0.9% SODIUM CHLORIDE 4.54 MICROGRAM(S)/KG/HR: 4 INJECTION INTRAVENOUS at 10:40

## 2024-01-01 RX ADMIN — Medication 40 MILLIGRAM(S): at 17:29

## 2024-01-01 RX ADMIN — LEVALBUTEROL 0.63 MILLIGRAM(S): 1.25 SOLUTION, CONCENTRATE RESPIRATORY (INHALATION) at 15:20

## 2024-01-01 RX ADMIN — ROFLUMILAST 500 MICROGRAM(S): 500 TABLET ORAL at 11:17

## 2024-01-01 RX ADMIN — Medication 1 TABLET(S): at 17:23

## 2024-01-01 RX ADMIN — Medication 3 MILLIGRAM(S): at 21:52

## 2024-01-01 RX ADMIN — DEXMEDETOMIDINE HYDROCHLORIDE IN 0.9% SODIUM CHLORIDE 4.54 MICROGRAM(S)/KG/HR: 4 INJECTION INTRAVENOUS at 13:17

## 2024-01-01 RX ADMIN — ETOMIDATE 20 MILLIGRAM(S): 2 INJECTION INTRAVENOUS at 16:53

## 2024-01-01 RX ADMIN — Medication 1 PATCH: at 20:27

## 2024-01-01 RX ADMIN — Medication 2 MILLIGRAM(S): at 22:50

## 2024-01-01 RX ADMIN — Medication 1 PATCH: at 11:54

## 2024-01-01 RX ADMIN — Medication 12: at 17:09

## 2024-01-01 RX ADMIN — SODIUM CHLORIDE 1000 MILLILITER(S): 9 INJECTION INTRAMUSCULAR; INTRAVENOUS; SUBCUTANEOUS at 16:54

## 2024-01-01 RX ADMIN — Medication 2 MILLILITER(S): at 03:25

## 2024-01-01 RX ADMIN — MUPIROCIN 1 APPLICATION(S): 20 OINTMENT TOPICAL at 05:15

## 2024-01-01 RX ADMIN — Medication 2 MILLILITER(S): at 08:50

## 2024-01-01 RX ADMIN — POLYETHYLENE GLYCOL 3350 17 GRAM(S): 17 POWDER, FOR SOLUTION ORAL at 11:28

## 2024-01-01 RX ADMIN — DEXMEDETOMIDINE HYDROCHLORIDE IN 0.9% SODIUM CHLORIDE 4.54 MICROGRAM(S)/KG/HR: 4 INJECTION INTRAVENOUS at 19:31

## 2024-01-01 RX ADMIN — PANTOPRAZOLE SODIUM 40 MILLIGRAM(S): 20 TABLET, DELAYED RELEASE ORAL at 06:01

## 2024-01-01 RX ADMIN — Medication 100 MILLIGRAM(S): at 17:13

## 2024-01-01 RX ADMIN — CEFEPIME 100 MILLIGRAM(S): 1 INJECTION, POWDER, FOR SOLUTION INTRAMUSCULAR; INTRAVENOUS at 05:52

## 2024-01-01 RX ADMIN — Medication 25 GRAM(S): at 11:19

## 2024-01-01 RX ADMIN — Medication 40 MILLIGRAM(S): at 21:14

## 2024-01-01 RX ADMIN — Medication 1: at 23:12

## 2024-01-01 RX ADMIN — LETROZOLE 2.5 MILLIGRAM(S): 2.5 TABLET, FILM COATED ORAL at 13:14

## 2024-01-01 RX ADMIN — PANTOPRAZOLE SODIUM 40 MILLIGRAM(S): 20 TABLET, DELAYED RELEASE ORAL at 05:43

## 2024-01-21 NOTE — PHYSICAL THERAPY INITIAL EVALUATION ADULT - ADDITIONAL COMMENTS
Port accessed   pt lives w/siblings in private house, 6 corazon w/rail, none inside. pt independent w/ambulation and all ADL's

## 2024-01-25 NOTE — H&P ADULT - ASSESSMENT
67 y/o F with PMH  muscle wasting and atrophy, COPD on home o2 2L NC (prior intubation/ICU), thromboembolism, DVTs of the lower extremity, type 2 DM, left mastectomy, anxiety disorder, iron deficiency anemia, malignant neoplasm of the breast in remission, HLD, HTN  recent admission to this hospital 12/27-12/31 for COVID now admitted to ICU for further management of :       Assessment:  Acute COPD exacerbation  Hypercarbic respiratory failure   Hypomagnesemia  Leukocytosis  Lactic acidosis  sepsis? possible PNA    Plan:  neuro  -maintain normal sleep/ wake cycle     resp  - Maintain oxygen saturation > 90% with supplemental oxygen/ BIPAP  - consider intubation if resp status not improving  - Continue bronchodilators and nebs PRN  - Continue solumedrol     ID   Leukocytosis with WBC 25, pt is afebrile and pt on home steroids. WBC 9 at baseline, will give cefepime and vanco empirically for ? PNA especially given pt reported increased sputum production  - trend fever curve and WBC count  - COVID + however was admitted In December and was COVID + at that time treated with RDV and steroids doubt re-infection, likely just lingering / prolonged due to patients poor immune status   - lactic acid 3.0 > trend    Cardio  - On home Eliquis will give lovenox while NPO    GI/   - Maintain NPO while on NIV  - GI ppx  - bladder scan Q 6 hour, monitor I & O  - hypomag> trend and replace electrotypes as needed to maintain K > 4, mag > 2 - lactic acid 3.0 > trend      - continue nicotine patch  - continue letrozol when taking PO  - wound care  - turn and position Q 2 hours   - skin care  - palliative consulted   - continue home meds  FULL CODE  Admit to ICU    Patient seen and evaluated with Dr Stark who agrees with above stated plan of care   Plan of care discussed with daughter who agrees with above stated plan of care  69 y/o F with PMH  muscle wasting and atrophy, COPD on home o2 2L NC (prior intubation/ICU), thromboembolism, DVTs of the lower extremity, type 2 DM, left mastectomy, anxiety disorder, iron deficiency anemia, malignant neoplasm of the breast in remission, HLD, HTN  recent admission to this hospital 12/27-12/31 for COVID now admitted to ICU for further management of :       Assessment:  Acute COPD exacerbation  Hypercarbic respiratory failure   Hypomagnesemia  Leukocytosis  Lactic acidosis  sepsis- possible PNA?    Plan:  neuro  -maintain normal sleep/ wake cycle     resp  - Maintain oxygen saturation > 90% with supplemental oxygen/ BIPAP  - consider intubation if resp status not improving  - Continue bronchodilators and nebs PRN  - Continue solumedrol   - chest PT    ID   Leukocytosis with WBC 25 & low grade fever     pt on home steroids. WBC 9 at baseline, will give cefepime and vanco empirically for ? PNA especially given pt reported increased sputum production and fever   - trend fever curve and WBC count  - COVID + however was admitted in December and was COVID + at that time treated with RDV and steroids doubt re-infection, likely just lingering / prolonged due to patients poor immune status   - lactic acid 3.0 > trend    Cardio  - On home Eliquis will give lovenox while NPO    GI/   - Maintain NPO while on NIV  - GI ppx  - bladder scan Q 6 hour, monitor I & O  - hypomag> trend and replace electrotypes as needed to maintain K > 4, mag > 2 - lactic acid 3.0 > trend      - continue nicotine patch  - continue letrozol when taking PO  - wound care  - turn and position Q 2 hours   - skin care  - palliative consulted   - continue home meds  FULL CODE  Admit to ICU    Patient seen and evaluated with Dr Stark who agrees with above stated plan of care   Plan of care discussed with daughter who agrees with above stated plan of care  67 y/o F with PMH  muscle wasting and atrophy, COPD on home o2 2L NC (prior intubation/ICU), thromboembolism, DVTs of the lower extremity, type 2 DM, left mastectomy, anxiety disorder, iron deficiency anemia, malignant neoplasm of the breast in remission, HLD, HTN  recent admission to this hospital 12/27-12/31 for COVID now admitted to ICU for further management of :       Assessment:  Acute COPD exacerbation  Acute on chronic hypoxic and hypercarbic respiratory failure   Hypomagnesemia  Lactic acidosis  Sepsis  Multifocal PNA  DM type 2  VTE   Breast cancer    Plan:  neuro  -maintain normal sleep/ wake cycle     resp  - Maintain oxygen saturation > 90% with supplemental oxygen/ BIPAP  - consider intubation if resp status not improving  - Continue bronchodilators and nebs PRN  - Continue solumedrol   - chest PT    ID   Leukocytosis with WBC 25 & low grade fever     pt on home steroids. WBC 9 at baseline, will give cefepime and vanco empirically for ? PNA especially given pt reported increased sputum production and fever   - trend fever curve and WBC count  - COVID + however was admitted in December and was COVID + at that time treated with RDV and steroids doubt re-infection, likely just lingering / prolonged due to patients poor immune status   - lactic acid 3.0 > trend    Cardio  - On home Eliquis will give lovenox while NPO    GI/   - Maintain NPO while on NIV  - GI ppx  - bladder scan Q 6 hour, monitor I & O  - hypomag> trend and replace electrotypes as needed to maintain K > 4, mag > 2 - lactic acid 3.0 > trend      - continue nicotine patch  - continue letrozol when taking PO  - wound care  - turn and position Q 2 hours   - skin care  - palliative consulted   - continue home meds  FULL CODE  Admit to ICU    Patient seen and evaluated with Dr Stark who agrees with above stated plan of care   Plan of care discussed with daughter who agrees with above stated plan of care

## 2024-01-25 NOTE — ED PROVIDER NOTE - OBJECTIVE STATEMENT
68F PMHX muscle wasting and atrophy, COPD on home o2 2L NC (prior intubation/ICU), thromboembolism, DVTs of the lower extremity, on xarelto; type 2 DM, left mastectomy, anxiety disorder, iron deficiency anemia, malignant neoplasm of the breast in remission, stage 3 pressure ulcer of the sacral region,  HLD, anxiety disorder, HTN presenting [facility] for low Hb 6.5? and SOB today. Patient having mild cough, tachypnea, and shortness of breath, at rest over the past few days. Denies any chest pain, vomiting, abdominal pain, fevers, chills, headaches, neck pain, dysuria.

## 2024-01-25 NOTE — ED PROVIDER NOTE - PHYSICAL EXAMINATION
VITAL SIGNS: I have reviewed nursing notes and confirm.   GEN: Well-developed; well-nourished (+) mod respiratory distress, tachypnea. (+) cachetic.  SKIN: Warm, pink, no rash, no diaphoresis, no cyanosis, well perfused.   HEAD: Normocephalic; atraumatic. No scalp lacerations, no abrasions.  NECK: Supple; non tender.   EYES: Pupils 3mm equal, round, reactive to light and accomodation, conjunctiva and sclera clear.    ENT: No nasal discharge; airway clear. Trachea is midline. Normal dentition.  CV: RRR. S1, S2 normal; no murmurs, gallops, or rubs. Capillary refill < 2 seconds throughout. Distal pulses intact 2+ throughout.  RESP:  (+) tachypnea, mild retractions, (+) diffuse rhonchi, mild expiratory wheezing, No rales.  ABD: Normal bowel sounds, soft, non-distended, non-tender, no rebound, no guarding, no rigidity, no hepatosplenomegaly.  MSK: Normal range of motion and movement of all 4 extremities.    BACK: No thoracolumbar midline or paravertebral tenderness.    NEURO: Alert & oriented x 3, Normal speech and coordination.

## 2024-01-25 NOTE — ED PROVIDER NOTE - CLINICAL SUMMARY MEDICAL DECISION MAKING FREE TEXT BOX
68F PMHX muscle wasting and atrophy, COPD on home o2 2L NC (prior intubation/ICU), thromboembolism, DVTs of the lower extremity, type 2 DM, left mastectomy, anxiety disorder, iron deficiency anemia, malignant neoplasm of the breast in remission, stage 3 pressure ulcer of the sacral region,  HLD, anxiety disorder, HTN presenting from [facility] for low Hb 6.5? and SOB today. Patient having mild cough, tachypnea, and shortness of breath, at rest over the past few days. Denies any chest pain, vomiting, abdominal pain, fevers, chills, headaches, neck pain, dysuria. 68F PMHX muscle wasting and atrophy, COPD on home o2 2L NC (prior intubation/ICU), thromboembolism, DVTs of the lower extremity, type 2 DM, left mastectomy, anxiety disorder, iron deficiency anemia, malignant neoplasm of the breast in remission, stage 3 pressure ulcer of the sacral region,  HLD, anxiety disorder, HTN presenting from [facility] for low Hb 6.5? and SOB today. Patient having mild cough, tachypnea, and shortness of breath, at rest over the past few days. Denies any chest pain, vomiting, abdominal pain, fevers, chills, headaches, neck pain, dysuria.    Pt presents with symptoms most consistent w/ an acute COPD exacerbation. The likely precipitant is acute respiratory infection, weather change, air quality, recent betablocker use, recent opioid use. Low suspicion for alternate etiologies such as pneumothorax, acute pulmonary embolism, ACS/NSTEMI, CHF, or cardiac effusion.  - Pseudomonas risk factors: recent hospitalizations, frequent antibiotic treatment, severe COPD, previously isolated pseudomonas.   - Maintain oxygen saturations 90-94% with supplemental oxygen PRN. BIPAP PRN worsening work of breathing.  - Trial of duonebs x 3 with solumedrol 125mg IVP, Serial re-assessments. Continuous albuterol PRN.  - Antibiotics are indicated given purulent sputum, increased sputum production, trial of BIPAP.   - CXR to evaluate for other acute cardiopulmonary processes, CBC, CMP, EKG  - Re-evaluate and disposition accordingly.     230pm: improved on bipap, CXR with b/l infiltrates, already on xarelto so low suspicious for breakthrough PE. D/w ICU Attending On call Dr. Stark who recommends admission ICU.

## 2024-01-25 NOTE — H&P ADULT - NSHPPHYSICALEXAM_GEN_ALL_CORE
PE:   GEN: lethargic, increased WOB, generalized malaise, in moderate distress, ill appearing, frail   HEAD: Atraumatic  EYES: Sclera white, conjunctiva pink, PERRLA  NOSE: Patent, no epistaxis  MOUTH/THROAT: Patent, uvula midline, no tonsillar edema or erythema, no acute dental findings, no oral lesions or ulcerations, no Hoarse voice  CARDIAC: Reg rate and rhythm, S1,S2, no murmur/rub/gallop. Strong central and peripheral pulses, Brisk cap refill, no evident pedal edema.   RESP: increased WOB, diminished lung sounds on R upper, .   ABD: soft, supple, non-tender, no guarding. BS x 4, normoactive.   NEURO: lethargic but oriented x 3, no focal deficits   MSK: Moving all extremities with no apparent deformities.   SKIN: stage 2 to right buttocks

## 2024-01-25 NOTE — H&P ADULT - NSHPREVIEWOFSYSTEMS_GEN_ALL_CORE
Constitutional: - Fever, - Chills, - Anorexia, - Fatigue  Eyes: - Discharge, - Irritation, - Redness, - Visual changes, - Light sensitivity  NOSE: - Congestion, - Bloody nose  MOUTH/THROAT: - Vocal Changes, -, - Sore throat  NECK: -- Stiffness, - Pain  CV: - Palpitations, - Chest Pain, + Edema   RESP:  + Cough, + Shortness of Breath, - Pain with breathing, -  GI: - Diarrhea, - Constipation, - Bloody stools, - Nausea, - Vomiting, - Abdominal Pain  : - Dysuria, -Frequency, - Hematuria,   MSK: - Myalgias,  - Injuries  SKIN, - Rash, - Swelling, - Bruises, + sacral wound   NEURO: , - Dec. Alertness, - Headache, - Dizziness, - Numbness/Tingling,   - Seizure-like activity

## 2024-01-25 NOTE — H&P ADULT - CONVERSATION DETAILS
Patient has prior MOLST indicating full code. We had discussion with patient and daughter at bedside. Patient made aware of severity of illness and would like to  remain full code. Will allow for intubation if needed and CPR.

## 2024-01-25 NOTE — H&P ADULT - NSICDXPASTMEDICALHX_GEN_ALL_CORE_FT
PAST MEDICAL HISTORY:  Breast cancer     Breast cancer     CAD (coronary artery disease)     COPD (chronic obstructive pulmonary disease)     COPD, moderate     COPD, severe     Diabetes     DM (diabetes mellitus) new diagnosis    HTN (hypertension)     HTN (hypertension)     Smoker

## 2024-01-25 NOTE — ED ADULT NURSE NOTE - NSFALLHARMRISKINTERV_ED_ALL_ED

## 2024-01-25 NOTE — ED ADULT NURSE NOTE - OBJECTIVE STATEMENT
Patient [facility]IB[facility]S from [facility] for crackles and abnormal labs. Patient unable to provide hx due to medical condition.

## 2024-01-25 NOTE — H&P ADULT - NSICDXPASTSURGICALHX_GEN_ALL_CORE_FT
PAST SURGICAL HISTORY:  H/O breast surgery     H/O left mastectomy     H/O left mastectomy     History of cardiac cath

## 2024-01-25 NOTE — PROGRESS NOTE ADULT - SUBJECTIVE AND OBJECTIVE BOX
Initial Eval:      68y Female w/ COPD on home O2, Hx of breast CA, anemia admitted with hypoxia/increased WOB, required BiPAP        Vital Signs Last 24 Hrs  T(C): 38 (25 Jan 2024 16:00), Max: 38 (25 Jan 2024 16:00)  T(F): 100.4 (25 Jan 2024 16:00), Max: 100.4 (25 Jan 2024 16:00)  HR: 119 (25 Jan 2024 18:00) (115 - 135)  BP: 126/68 (25 Jan 2024 18:00) (118/54 - 147/72)  BP(mean): 85 (25 Jan 2024 18:00) (72 - 91)  RR: 22 (25 Jan 2024 18:00) (21 - 26)  SpO2: 100% (25 Jan 2024 18:00) (97% - 100%)    Parameters below as of 25 Jan 2024 18:00  Patient On (Oxygen Delivery Method): BiPAP/CPAP    O2 Concentration (%): 40                            7.4    25.59 )-----------( 343      ( 25 Jan 2024 12:24 )             24.5         01-25    141  |  103  |  20  ----------------------------<  94  4.1   |  29  |  0.31<L>    Ca    9.1      25 Jan 2024 12:24  Mg     1.9     01-25    TPro  6.4  /  Alb  2.1<L>  /  TBili  0.3  /  DBili  x   /  AST  16  /  ALT  17  /  AlkPhos  82  01-25        ROS:  -unable to obtain        NEURO: lethargic on BiPAP  CV: (+) S1/S2, rrr, no mrg  RESP: CTA b/l  GI: soft, non tender

## 2024-01-25 NOTE — H&P ADULT - HISTORY OF PRESENT ILLNESS
69 y/o F with PMH muscle wasting and atrophy, COPD on home o2 2L NC (prior intubation/ICU), thromboembolism, DVTs of the lower extremity, type 2 DM, left mastectomy, anxiety disorder, iron deficiency anemia, malignant neoplasm of the breast in remission,   HLD,  HTN presented [facility] for low Hb 6.5 on outpatient labs and SOB today. Patient having mild cough, tachypnea, and shortness of breath, at rest over the past few days. Denies any chest pain, vomiting, abdominal pain, fevers, chills, headaches, neck pain, dysuria.  While in ED hgb noted to be 7.4 but  saturation 90-94% with supplemental oxygen, placed on BIPAP.  ICU consulted for further management.

## 2024-01-25 NOTE — PROGRESS NOTE ADULT - ASSESSMENT
68y Female w/ COPD on home O2, Hx of breast CA, anemia admitted with hypoxia/increased WOB, required BiPAP        PLAN:  -ABG shows normalized pH, will attempt to take off BiPAP   Patient currently on BiPAP  -will titrate IPAP and EPAP to maintain pH >7.30 and SaO2 >92%  -alarms reviewed  -NPO while on BiPAP   -started on cefepime and vanco, will need complete course  -nebs/steroids  -DVT prophylaxis  -AM labs        TIME SPENT:  55 minutes of  time spent providing medical care for patient's acute illness/conditions that impairs at least one vital organ system and/or poses a high risk of imminent or life threatening deterioration in the patient's condition. It includes time spent evaluating and treating the patient's acute illness as well as time spent reviewing labs, radiology, discussing goals of care with patient and/or patient's family, and discussing the case with a multidisciplinary team, including the eICU, in an effort to prevent further life threatening deterioration or end organ damage. This time is independent of any procedures performed.    DATE OF ENTRY OF THIS NOTE IS EQUAL TO THE DATE OF SERVICES RENDERED

## 2024-01-25 NOTE — ED ADULT TRIAGE NOTE - CHIEF COMPLAINT QUOTE
Patient BIB EMS from [facility] with labored breathing, crackles, and hemoglobin of 6.8 (down from 8.0 yesterday). On 6L NC satting 100%. Patient is tachycardic. .

## 2024-01-25 NOTE — H&P ADULT - CRITICAL CARE ATTENDING COMMENT
Patient being admitted to the ICU for Sepsis secondary to multifocal pneumonia and COPD exacerbation. Seen on bipap support. Hemodynamically stable, Noted with lower extremity edema. Lactic acidosis on labs. Will start broad spectrum antibiotics. Follow up cultures. IV steroids. Nebs RTC. Recent COVID19 infection, continues to test + for COVID. Monitor for hyperglycemic while on steroids. Palliative care consult. Will admit to ICU

## 2024-01-26 NOTE — PROGRESS NOTE ADULT - SUBJECTIVE AND OBJECTIVE BOX
Patient is a 68y old  Female who presents with a chief complaint of Chronic obstructive pulmonary disease with acute exacerbation (26 Jan 2024 12:21)    BRIEF HOSPITAL COURSE:   68F PMHx muscle wasting and atrophy, COPD (on home O2 2L NC, prior intubations in the past), Thromboembolism, DVTs of the lower extremity, T2DM,, anxiety disorder, iron deficiency anemia, malignant neoplasm of the breast s/p L mastectomy (in remission), HLD,  HTN presented [facility] for low Hb 6.5 on outpatient labs and SOB today. Admitted to MICU with Acute on Chronic Hypoxic / Hypercapnic Respiratory Failure secondary to AECOPD, PNA,     Events last 24 hours:   -Placed back on NIPPV with tonight for increased WOB / SOB. Complaining of anxiety, given Xanax 0.25mg POx1.  -FS >400s this evening.     PAST MEDICAL & SURGICAL HISTORY:  COPD (chronic obstructive pulmonary disease)  Breast cancer  Smoker  DM (diabetes mellitus)  new diagnosis  HTN (hypertension)  COPD, moderate  HTN (hypertension)  Diabetes  COPD, severe  CAD (coronary artery disease)  Breast cancer  H/O left mastectomy  H/O breast surgery  History of cardiac cath  H/O left mastectomy    Review of Systems:  CONSTITUTIONAL: No fever, chills, or fatigue  EYES: No eye pain, visual disturbances, or discharge  ENMT:  No difficulty hearing, tinnitus, vertigo; No sinus or throat pain  NECK: No pain or stiffness  RESPIRATORY: No cough, wheezing, chills or hemoptysis; (+) shortness of breath  CARDIOVASCULAR: No chest pain, palpitations, dizziness, or leg swelling  GASTROINTESTINAL: No abdominal or epigastric pain. No nausea, vomiting, or hematemesis; No diarrhea or constipation. No melena or hematochezia.  GENITOURINARY: No dysuria, frequency, hematuria, or incontinence  NEUROLOGICAL: No headaches, memory loss, loss of strength, numbness, or tremors  SKIN: No itching, burning, rashes, or lesions   MUSCULOSKELETAL: No joint pain or swelling; No muscle, back, or extremity pain  PSYCHIATRIC: No depression, (+)anxiety, no mood swings, or difficulty sleeping    Medications:  cefepime   IVPB 2000 milliGRAM(s) IV Intermittent every 12 hours  vancomycin  IVPB      vancomycin  IVPB 500 milliGRAM(s) IV Intermittent every 12 hours  diltiazem    milliGRAM(s) Oral daily  doxazosin 2 milliGRAM(s) Oral at bedtime  albuterol/ipratropium for Nebulization 3 milliLiter(s) Nebulizer every 6 hours  budesonide 160 MICROgram(s)/formoterol 4.5 MICROgram(s) Inhaler 2 Puff(s) Inhalation two times a day  tiotropium 2.5 MICROgram(s) Inhaler 2 Puff(s) Inhalation daily  ALPRAZolam 0.25 milliGRAM(s) Oral every 8 hours PRN  buPROPion XL (24-Hour) . 150 milliGRAM(s) Oral daily  dronabinol 5 milliGRAM(s) Oral daily  melatonin 3 milliGRAM(s) Oral at bedtime PRN  mirtazapine 7.5 milliGRAM(s) Oral at bedtime  ondansetron    Tablet 4 milliGRAM(s) Oral four times a day PRN  letrozole 2.5 milliGRAM(s) Oral daily  rivaroxaban 10 milliGRAM(s) Oral with dinner  bisacodyl Suppository 10 milliGRAM(s) Rectal daily PRN  pantoprazole  Injectable 40 milliGRAM(s) IV Push two times a day  polyethylene glycol 3350 17 Gram(s) Oral daily  atorvastatin 80 milliGRAM(s) Oral at bedtime  dextrose 50% Injectable 25 Gram(s) IV Push once  insulin lispro (ADMELOG) corrective regimen sliding scale   SubCutaneous Before meals and at bedtime  methylPREDNISolone sodium succinate Injectable 40 milliGRAM(s) IV Push every 8 hours  dextrose 5%. 1000 milliLiter(s) IV Continuous <Continuous>  ferrous    sulfate 325 milliGRAM(s) Oral daily  potassium chloride    Tablet ER 40 milliEquivalent(s) Oral every 4 hours  chlorhexidine 2% Cloths 1 Application(s) Topical <User Schedule>  collagenase Ointment 1 Application(s) Topical daily  nicotine -   7 mG/24Hr(s) Patch 1 Patch Transdermal daily    ICU Vital Signs Last 24 Hrs  T(C): 35.7 (26 Jan 2024 16:00), Max: 36.7 (26 Jan 2024 12:00)  T(F): 96.3 (26 Jan 2024 16:00), Max: 98.1 (26 Jan 2024 12:00)  HR: 125 (26 Jan 2024 18:00) (93 - 133)  BP: 136/76 (26 Jan 2024 18:00) (112/52 - 144/73)  BP(mean): 92 (26 Jan 2024 18:00) (69 - 106)  ABP: --  ABP(mean): --  RR: 38 (26 Jan 2024 18:00) (15 - 38)  SpO2: 100% (26 Jan 2024 18:00) (96% - 100%)  O2 Parameters below as of 26 Jan 2024 16:00  Patient On (Oxygen Delivery Method): BiPAP/CPAP  O2 Concentration (%): 30  ABG - ( 26 Jan 2024 04:45 )  pH, Arterial: 7.53  pH, Blood: x     /  pCO2: 37    /  pO2: 140   / HCO3: 31    / Base Excess: 8.2   /  SaO2: 99.1      I&O's Detail  25 Jan 2024 07:01  -  26 Jan 2024 07:00  --------------------------------------------------------  IN:  Total IN: 0 mL  OUT:    Voided (mL): 700 mL  Total OUT: 700 mL  Total NET: -700 mL    26 Jan 2024 07:01  -  26 Jan 2024 19:41  --------------------------------------------------------  IN:    IV PiggyBack: 100 mL    IV PiggyBack: 50 mL    IV PiggyBack: 100 mL  Total IN: 250 mL  OUT:    Intermittent Catheterization - Urethral (mL): 500 mL  Total OUT: 500 mL  Total NET: -250 mL    LABS:                      7.7    20.54 )-----------( 296      ( 26 Jan 2024 09:40 )             26.0     01-26  142  |  103  |  22  ----------------------------<  110<H>  3.4<L>   |  29  |  0.36<L>  Ca    9.3      26 Jan 2024 09:40  Phos  3.1     01-26  Mg     1.8     01-26  TPro  6.3  /  Alb  1.9<L>  /  TBili  0.4  /  DBili  x   /  AST  15  /  ALT  15  /  AlkPhos  87  01-26    CAPILLARY BLOOD GLUCOSE  POCT Blood Glucose.: 407 mg/dL (26 Jan 2024 17:02)    PT/INR - ( 25 Jan 2024 12:24 )   PT: 12.6 sec;   INR: 1.08 ratio    PTT - ( 25 Jan 2024 12:24 )  PTT:41.1 sec    Urinalysis Basic - ( 26 Jan 2024 09:40 )  Color: x / Appearance: x / SG: x / pH: x  Gluc: 110 mg/dL / Ketone: x  / Bili: x / Urobili: x   Blood: x / Protein: x / Nitrite: x   Leuk Esterase: x / RBC: x / WBC x   Sq Epi: x / Non Sq Epi: x / Bacteria: x    CULTURES:  Culture Results:   No growth at 24 hours (01-25-24 @ 13:50)  Culture Results:   No growth at 24 hours (01-25-24 @ 13:50)  Rapid RVP Result: Detected (01-25-24 @ 12:26)    Physical Examination:  General: +NIPPV.   HEENT: PERRL.  NECK: Supple.   PULM: Decreased BS at bases bilaterally.  CVS: s1/s2.  ABD: Soft, nondistended, nontender, normoactive bowel sounds.  EXT: No edema, nontender.  SKIN: Warm.    RADIOLOGY:   < from: Xray Chest 1 View- PORTABLE-Routine (01.26.24 @ 06:22) >  ACC: [id number] EXAM:  XR CHEST PORTABLE ROUTINE 1V   ORDERED BY:  ARAMIS CROWDER   PROCEDURE DATE:  01/26/2024    IMPRESSION:  Basilar clearing.    CRITICAL CARE TIME SPENT: 40 minutes of critical care time spent providing medical care for patient's acute illness/conditions that impairs at least one vital organ system and/or poses a high risk of imminent or life threatening deterioration in the patient's condition. It includes time spent evaluating and treating the patient's acute illness as well as time spent reviewing labs, radiology, discussing goals of care with patient and/or patient's family, and discussing the case with a multidisciplinary team, in an effort to prevent further life threatening deterioration or end organ damage. This time is independent of any procedures performed.    Date of entry of this note is equal to the date of services rendered.

## 2024-01-26 NOTE — PROGRESS NOTE ADULT - ASSESSMENT
67 y/o F with PMH muscle wasting and atrophy, COPD on home o2 2L NC (prior intubation/ICU), thromboembolism, DVTs of the lower extremity, type 2 DM, left mastectomy, anxiety disorder, iron deficiency anemia, malignant neoplasm of the breast in remission, HLD, HTN, now admitted to the ICU for COPD exacerbation and sepsis 2/2 PNA.     COPD exacerbation   Hypercapnic respiratory failure  - 2/2 PNA   - On continuous BiPAP  - Wean as tolerated   - c/w solumedrol Q8  - c/w nebs, symbicort and spiriva 67 y/o F with PMH muscle wasting and atrophy, COPD on home o2 2L NC (prior intubation/ICU), thromboembolism, DVTs of the lower extremity, type 2 DM, left mastectomy, anxiety disorder, iron deficiency anemia, malignant neoplasm of the breast in remission, HLD, HTN, now admitted to the ICU for COPD exacerbation and sepsis 2/2 PNA.     COPD exacerbation   Hypercapnic respiratory failure  - 2/2 PNA   - On continuous BiPAP --> transitioned to NC now  - Wean as tolerated   - c/w solumedrol Q8  - c/w nebs, symbicort, spiriva   - Start roflumilast when feasible  - Pt pt back on home meds on d/c     Sepsis 2/2 PNA   - CXR reviewed  - f/u BCx  - Sputum Cx pending   - c/w Cefepime and Vanco # 2     Hx of DVT (hypercoagulable - likely thought process from malignancy which is currently in remission)  - c/w Xeralto   - Unclear why pt is on such long term xeralto therapy but probably d/t hypercoagulable state from breast Ca    CAD s/p PCI   - c/w ASA   - c/w statin     Breast ca   - c/w letrozole     T2DM   - ISS  - BG qAC/HS    Anxiety   - Xanax PRN     HTN   - Appears to be not on ACE-I or ARB   - Cardizem as per pt's med rec from facility     Constipation   - Bowel regimen     It is unclear why pt is on cardizem since there is no history of Afib or Atach    Xeralto is on board likely d/t to the reasons explained above    Pt is full code - MOLST in chart    Case d/w Dr. Edgar              69 y/o F with PMH muscle wasting and atrophy, COPD on home o2 2L NC (prior intubation/ICU), thromboembolism, DVTs of the lower extremity, type 2 DM, left mastectomy, anxiety disorder, iron deficiency anemia, malignant neoplasm of the breast in remission, HLD, HTN, now admitted to the ICU for COPD exacerbation and sepsis 2/2 PNA.     COPD exacerbation   Hypercapnic respiratory failure  - 2/2 PNA   - On BiPAP --> transitioned to NC now  - Wean as tolerated   - c/w solumedrol Q8  - c/w nebs, symbicort, spiriva   - Start roflumilast when feasible  - Pt pt back on home meds on d/c     Sepsis 2/2 PNA   - CXR reviewed  - f/u BCx  - Sputum Cx pending   - c/w Cefepime and Vanco # 2     Hx of DVT (hypercoagulable - likely thought process from malignancy which is currently in remission)  - c/w Xeralto   - Unclear why pt is on such long term xeralto therapy but probably d/t hypercoagulable state from breast Ca    CAD s/p PCI   - c/w ASA   - c/w statin     Breast ca   - c/w letrozole     T2DM   - ISS  - BG qAC/HS    Anxiety   - Xanax PRN     HTN   - Appears to be not on ACE-I or ARB   - Cardizem as per pt's med rec from facility     Constipation   - Bowel regimen     It is unclear why pt is on cardizem since there is no history of Afib or Atach    Xeralto is on board likely d/t to the reasons explained above    Pt is full code - MOLST in chart    Case d/w Dr. Edgar

## 2024-01-26 NOTE — PROGRESS NOTE ADULT - SUBJECTIVE AND OBJECTIVE BOX
HPI:  69 y/o F with PMH muscle wasting and atrophy, COPD on home o2 2L NC (prior intubation/ICU), thromboembolism, DVTs of the lower extremity, type 2 DM, left mastectomy, anxiety disorder, iron deficiency anemia, malignant neoplasm of the breast in remission,   HLD,  HTN presented [facility] for low Hb 6.5 on outpatient labs and SOB today. Patient having mild cough, tachypnea, and shortness of breath, at rest over the past few days. Denies any chest pain, vomiting, abdominal pain, fevers, chills, headaches, neck pain, dysuria.  While in ED hgb noted to be 7.4 but saturation 90-94% with supplemental oxygen, placed on BIPAP.  ICU consulted for further management.     Subjective: Pt was seen and examined at bedside.    HPI:  67 y/o F with PMH muscle wasting and atrophy, COPD on home o2 2L NC (prior intubation/ICU), thromboembolism, DVTs of the lower extremity, type 2 DM, left mastectomy, anxiety disorder, iron deficiency anemia, malignant neoplasm of the breast in remission, HLD, HTN presented [facility] for low Hb 6.5 on outpatient labs and SOB today. Patient having mild cough, tachypnea, and shortness of breath, at rest over the past few days. Denies any chest pain, vomiting, abdominal pain, fevers, chills, headaches, neck pain, dysuria.  While in ED hgb noted to be 7.4 but saturation 90-94% with supplemental oxygen, placed on BIPAP.  ICU consulted for further management.     Subjective: Pt was seen and examined at bedside. On continuous BiPAP. Minimal wheezing. Bilateral bronchovesicular breath sounds auscultated with central airway crackles. Pt AAOX3 and conversant.     Constitutional: Afebrile  Cardiac: No chest pain  Respiratory: Has SOB and cough  GI: No abdominal pain, no N/V/D  Neuro: No headaches, no neck pain/stiffness, no numbness  All other systems reviewed and are negative unless otherwise stated in the HPI.    ICU Vital Signs Last 24 Hrs  T(C): 36.3 (26 Jan 2024 08:00), Max: 38 (25 Jan 2024 16:00)  T(F): 97.3 (26 Jan 2024 08:00), Max: 100.4 (25 Jan 2024 16:00)  HR: 102 (26 Jan 2024 09:01) (93 - 135)  BP: 118/69 (26 Jan 2024 09:00) (112/52 - 147/72)  BP(mean): 81 (26 Jan 2024 09:00) (69 - 92)  ABP: --  ABP(mean): --  RR: 22 (26 Jan 2024 09:00) (15 - 41)  SpO2: 100% (26 Jan 2024 09:01) (96% - 100%)    O2 Parameters below as of 26 Jan 2024 09:01  Patient On (Oxygen Delivery Method): nasal cannula    Constitutional: Pt lying in bed, awake and alert, on BiPAP  Neck: Soft and supple, no JVD  Respiratory: Bronchovesicular breath sounds with central airway gurgles, minimal wheezing auscultated  Cardiovascular: S1S2+  Gastrointestinal: BS+, soft, NT/ND, no guarding, no rebound  Extremities: No peripheral edema  Neurological: AAOx3, no focal deficits  Skin: No rashes    Labs                    7.7    20.54 )-----------( 296      ( 26 Jan 2024 09:40 )             26.0     01-26    144  |  103  |  21  ----------------------------<  88  2.7<LL>   |  30  |  0.37<L>    Ca    9.3      26 Jan 2024 05:15  Phos  3.5     01-26  Mg     1.9     01-26    TPro  6.4  /  Alb  2.1<L>  /  TBili  0.3  /  DBili  x   /  AST  16  /  ALT  17  /  AlkPhos  82  01-25    PT/INR - ( 25 Jan 2024 12:24 )   PT: 12.6 sec;   INR: 1.08 ratio      PTT - ( 25 Jan 2024 12:24 )  PTT:41.1 sec    COVID  01-25-24 @ 12:26  COVID -   Detected<!>  12-27-23 @ 17:14  COVID -   Detected<!>  12-04-23 @ 09:46  COVID -   NotDetec  12-04-23 @ 06:30  COVID -   NotDetec  10-27-23 @ 10:15  COVID -   NotDetec  11-23-22 @ 21:44  COVID -   NotDetec  09-12-22 @ 10:47  COVID -   NotDetec  05-14-22 @ 21:30  COVID -   NotDete    COVID Biomarkers    12-29-23 @ 06:41 ESR --  ---  CRP --  ---  DDimer  --   ---   <H>   ---   Ferritin --    12-28-23 @ 08:14 ESR --  ---  <H>  ---  DDimer  --   ---   LDH --   ---   Ferritin --    12-27-23 @ 20:15 ESR --  ---  CRP --  ---  DDimer  <150   ---   LDH --   ---   Ferritin --      Trend Cardiac Enzymes    Trend BNP  01-26-24 @ 05:15   -  855<H>  01-25-24 @ 12:24   -  1950<H>  12-04-23 @ 04:00   -  206  09-12-22 @ 10:47   -  29    Procalcitonin Trend  01-25-24 @ 18:00   -   7.48<H>  12-28-23 @ 08:14   -   0.56<H>  12-05-23 @ 12:37   -   0.32<H>    WBC Trend  01-26-24 @ 09:40   -  20.54<H>  01-26-24 @ 05:15   -  21.79<H>  01-25-24 @ 12:24   -  25.59<H>    H/H Trend  01-26-24 @ 09:40   -   7.7<L>/ 26.0<L>  01-26-24 @ 05:15   -   7.0<LL>/ 22.4<L>  01-25-24 @ 12:24   -   7.4<L>/ 24.5<L>  12-31-23 @ 06:50   -   8.6<L>/ 27.5<L>  12-30-23 @ 08:45   -   7.5<L>/ 25.1<L>  12-29-23 @ 06:41   -   7.4<L>/ 24.8<L>    Stool Occult Blood  12-05-23 @ 20:55   -   Positive<!>  11-07-23 @ 18:16   -   Positive<!>  11-05-23 @ 16:51   -   Negative    Platelet Trend  01-26-24 @ 09:40   -  296  01-26-24 @ 05:15   -  337  01-25-24 @ 12:24   -  343    Trend Sodium  01-26-24 @ 05:15   -  144  01-25-24 @ 12:24   -  141    Trend Potassium  01-26-24 @ 05:15   -  2.7<LL>  01-25-24 @ 12:24   -  4.1    Trend Bun/Cr  01-26-24 @ 05:15  BUN/CR -  21 / 0.37<L>  01-25-24 @ 12:24  BUN/CR -  20 / 0.31<L>    Lactic Acid Trend  01-25-24 @ 18:00   -   2.2<H>  01-25-24 @ 13:50   -   3.0<H>    ABG Trend  01-26-24 @ 04:45   - 7.53<H>/37<H>/140<H>/99.1<H>  01-25-24 @ 12:05   - 7.49<H>/39<H>/105/98.7<H>  12-27-23 @ 18:51   - 7.46<H>/36<H>/90/98.7<H>  12-04-23 @ 03:20   - 7.44/43<H>/349<H>/99.4<H>  11-05-23 @ 13:27   - 7.49<H>/44<H>/110<H>/97.2  11-05-23 @ 09:40   - 7.46<H>/47<H>/95/97.3  11-04-23 @ 05:00   - 7.51<H>/48<H>/107/96.7  11-03-23 @ 17:53   - 7.50<H>/47<H>/148<H>/98.4<H>  11-03-23 @ 15:11   - 7.31<L>/75<HH>/115<H>/98.1<H>  11-03-23 @ 04:00   - 7.41/61<H>/216<H>/98.0  11-02-23 @ 12:18   - 7.44/51<H>/85/97.2  11-02-23 @ 10:09   - 7.46<H>/50<H>/102/98.0    Trend AST/ALT/ALK Phos/Bili  01-25-24 @ 12:24   16/17/82/0.3  12-30-23 @ 08:45   36/30/63/0.4  12-29-23 @ 06:41   24/28/59/0.2  12-28-23 @ 08:14   52<H>/32/57/0.4  12-27-23 @ 17:14   42<H>/32/60/0.4  12-07-23 @ 06:27   27/35/79/0.3  12-06-23 @ 06:00   25/28/76/0.3  12-05-23 @ 05:20   24/32/70/0.3  12-04-23 @ 04:00   78<H>/39/91/0.5  11-13-23 @ 12:27   25/51<H>/52/0.5  11-12-23 @ 06:50   22/56<H>/52/0.6  11-11-23 @ 06:00   22/57<H>/49/0.6    Ammonia Trend    Amylase / Lipase Trend    Albumin Trend  01-25-24 @ 12:24   -   2.1<L>  12-30-23 @ 08:45   -   1.6<L>  12-29-23 @ 06:41   -   1.7<L>  12-28-23 @ 08:14   -   1.8<L>  12-27-23 @ 17:14   -   2.1<L>  12-07-23 @ 06:27   -   2.5<L>      PTT - PT - INR Trend  01-25-24 @ 12:24   -   41.1<H> - 12.6 - 1.08  12-28-23 @ 08:14   -   37.1<H> - 11.8 - 1.04  12-04-23 @ 04:00   -   30.5 - 21.8<H> - 1.95<H>  10-27-23 @ 10:15   -   51.6<H> - 13.9<H> - 1.19<H>    Glucose Trend  01-26-24 @ 05:32   -  -- -- 102<H>  01-26-24 @ 05:15   -  88 -- --  01-26-24 @ 00:25   -  -- -- 105<H>  01-25-24 @ 17:42   -  -- -- 127<H>  01-25-24 @ 15:10   -  -- -- 84  01-25-24 @ 12:24   -  94 -- --    A1C with Estimated Average Glucose Result: 5.1 % [4.0 - 5.6] (12-28-23 @ 08:14)  A1C with Estimated Average Glucose Result: 5.3 % [4.0 - 5.6] (10-28-23 @ 05:00)    Radiology     < from: Xray Chest 1 View- PORTABLE-Urgent (Xray Chest 1 View- PORTABLE-Urgent .) (01.25.24 @ 13:10) >  Hyperinflation of the lungs with emphysema.    Bilateral patchy lower lung opacities, more extensive on the left,   concerning for multifocal infection. The right-sided opacities are new   and the right-sided opacities are worse.    < end of copied text >       HPI:  67 y/o F with PMH muscle wasting and atrophy, COPD on home o2 2L NC (prior intubation/ICU), thromboembolism, DVTs of the lower extremity, type 2 DM, left mastectomy, anxiety disorder, iron deficiency anemia, malignant neoplasm of the breast in remission, HLD, HTN presented [facility] for low Hb 6.5 on outpatient labs and SOB today. Patient having mild cough, tachypnea, and shortness of breath, at rest over the past few days. Denies any chest pain, vomiting, abdominal pain, fevers, chills, headaches, neck pain, dysuria.  While in ED hgb noted to be 7.4 but saturation 90-94% with supplemental oxygen, placed on BIPAP.  ICU consulted for further management.     Subjective: Pt was seen and examined at bedside. On continuous BiPAP. Minimal wheezing. Bilateral bronchovesicular breath sounds auscultated with central airway crackles. Pt AAOX3 and conversant.     Constitutional: Afebrile  Cardiac: No chest pain  Respiratory: Has SOB and cough  GI: No abdominal pain, no N/V/D  Neuro: No headaches, no neck pain/stiffness, no numbness  All other systems reviewed and are negative unless otherwise stated in the HPI.    ICU Vital Signs Last 24 Hrs  T(C): 36.7 (26 Jan 2024 12:00), Max: 38 (25 Jan 2024 16:00)  T(F): 98.1 (26 Jan 2024 12:00), Max: 100.4 (25 Jan 2024 16:00)  HR: 101 (26 Jan 2024 12:00) (93 - 124)  BP: 127/63 (26 Jan 2024 12:00) (112/52 - 147/72)  BP(mean): 80 (26 Jan 2024 12:00) (69 - 92)  ABP: --  ABP(mean): --  RR: 18 (26 Jan 2024 12:00) (15 - 41)  SpO2: 100% (26 Jan 2024 12:00) (96% - 100%)    O2 Parameters below as of 26 Jan 2024 09:01  Patient On (Oxygen Delivery Method): nasal cannula    Constitutional: Pt lying in bed, awake and alert, BiPAP transitioned to NC   Neck: Soft and supple, no JVD  Respiratory: Bronchovesicular breath sounds with central airway gurgles, minimal wheezing auscultated  Cardiovascular: S1S2+  Gastrointestinal: BS+, soft, NT/ND, no guarding, no rebound  Extremities: No peripheral edema  Neurological: AAOx3, no focal deficits  Skin: No rashes    Labs                    7.7    20.54 )-----------( 296      ( 26 Jan 2024 09:40 )             26.0     01-26    144  |  103  |  21  ----------------------------<  88  2.7<LL>   |  30  |  0.37<L>    Ca    9.3      26 Jan 2024 05:15  Phos  3.5     01-26  Mg     1.9     01-26    TPro  6.4  /  Alb  2.1<L>  /  TBili  0.3  /  DBili  x   /  AST  16  /  ALT  17  /  AlkPhos  82  01-25    PT/INR - ( 25 Jan 2024 12:24 )   PT: 12.6 sec;   INR: 1.08 ratio      PTT - ( 25 Jan 2024 12:24 )  PTT:41.1 sec    COVID  01-25-24 @ 12:26  COVID -   Detected<!>  12-27-23 @ 17:14  COVID -   Detected<!>  12-04-23 @ 09:46  COVID -   NotDetec  12-04-23 @ 06:30  COVID -   NotDetec  10-27-23 @ 10:15  COVID -   NotDetec  11-23-22 @ 21:44  COVID -   NotDetec  09-12-22 @ 10:47  COVID -   NotDetec  05-14-22 @ 21:30  COVID -   NotDetec    COVID Biomarkers    12-29-23 @ 06:41 ESR --  ---  CRP --  ---  DDimer  --   ---   <H>   ---   Ferritin --    12-28-23 @ 08:14 ESR --  ---  <H>  ---  DDimer  --   ---   LDH --   ---   Ferritin --    12-27-23 @ 20:15 ESR --  ---  CRP --  ---  DDimer  <150   ---   LDH --   ---   Ferritin --      Trend Cardiac Enzymes    Trend BNP  01-26-24 @ 05:15   -  855<H>  01-25-24 @ 12:24   -  1950<H>  12-04-23 @ 04:00   -  206  09-12-22 @ 10:47   -  29    Procalcitonin Trend  01-25-24 @ 18:00   -   7.48<H>  12-28-23 @ 08:14   -   0.56<H>  12-05-23 @ 12:37   -   0.32<H>    WBC Trend  01-26-24 @ 09:40   -  20.54<H>  01-26-24 @ 05:15   -  21.79<H>  01-25-24 @ 12:24   -  25.59<H>    H/H Trend  01-26-24 @ 09:40   -   7.7<L>/ 26.0<L>  01-26-24 @ 05:15   -   7.0<LL>/ 22.4<L>  01-25-24 @ 12:24   -   7.4<L>/ 24.5<L>  12-31-23 @ 06:50   -   8.6<L>/ 27.5<L>  12-30-23 @ 08:45   -   7.5<L>/ 25.1<L>  12-29-23 @ 06:41   -   7.4<L>/ 24.8<L>    Stool Occult Blood  12-05-23 @ 20:55   -   Positive<!>  11-07-23 @ 18:16   -   Positive<!>  11-05-23 @ 16:51   -   Negative    Platelet Trend  01-26-24 @ 09:40   -  296  01-26-24 @ 05:15   -  337  01-25-24 @ 12:24   -  343    Trend Sodium  01-26-24 @ 05:15   -  144  01-25-24 @ 12:24   -  141    Trend Potassium  01-26-24 @ 05:15   -  2.7<LL>  01-25-24 @ 12:24   -  4.1    Trend Bun/Cr  01-26-24 @ 05:15  BUN/CR -  21 / 0.37<L>  01-25-24 @ 12:24  BUN/CR -  20 / 0.31<L>    Lactic Acid Trend  01-25-24 @ 18:00   -   2.2<H>  01-25-24 @ 13:50   -   3.0<H>    ABG Trend  01-26-24 @ 04:45   - 7.53<H>/37<H>/140<H>/99.1<H>  01-25-24 @ 12:05   - 7.49<H>/39<H>/105/98.7<H>  12-27-23 @ 18:51   - 7.46<H>/36<H>/90/98.7<H>  12-04-23 @ 03:20   - 7.44/43<H>/349<H>/99.4<H>  11-05-23 @ 13:27   - 7.49<H>/44<H>/110<H>/97.2  11-05-23 @ 09:40   - 7.46<H>/47<H>/95/97.3  11-04-23 @ 05:00   - 7.51<H>/48<H>/107/96.7  11-03-23 @ 17:53   - 7.50<H>/47<H>/148<H>/98.4<H>  11-03-23 @ 15:11   - 7.31<L>/75<HH>/115<H>/98.1<H>  11-03-23 @ 04:00   - 7.41/61<H>/216<H>/98.0  11-02-23 @ 12:18   - 7.44/51<H>/85/97.2  11-02-23 @ 10:09   - 7.46<H>/50<H>/102/98.0    Trend AST/ALT/ALK Phos/Bili  01-25-24 @ 12:24   16/17/82/0.3  12-30-23 @ 08:45   36/30/63/0.4  12-29-23 @ 06:41   24/28/59/0.2  12-28-23 @ 08:14   52<H>/32/57/0.4  12-27-23 @ 17:14   42<H>/32/60/0.4  12-07-23 @ 06:27   27/35/79/0.3  12-06-23 @ 06:00   25/28/76/0.3  12-05-23 @ 05:20   24/32/70/0.3  12-04-23 @ 04:00   78<H>/39/91/0.5  11-13-23 @ 12:27   25/51<H>/52/0.5  11-12-23 @ 06:50   22/56<H>/52/0.6  11-11-23 @ 06:00   22/57<H>/49/0.6    Ammonia Trend    Amylase / Lipase Trend    Albumin Trend  01-25-24 @ 12:24   -   2.1<L>  12-30-23 @ 08:45   -   1.6<L>  12-29-23 @ 06:41   -   1.7<L>  12-28-23 @ 08:14   -   1.8<L>  12-27-23 @ 17:14   -   2.1<L>  12-07-23 @ 06:27   -   2.5<L>      PTT - PT - INR Trend  01-25-24 @ 12:24   -   41.1<H> - 12.6 - 1.08  12-28-23 @ 08:14   -   37.1<H> - 11.8 - 1.04  12-04-23 @ 04:00   -   30.5 - 21.8<H> - 1.95<H>  10-27-23 @ 10:15   -   51.6<H> - 13.9<H> - 1.19<H>    Glucose Trend  01-26-24 @ 05:32   -  -- -- 102<H>  01-26-24 @ 05:15   -  88 -- --  01-26-24 @ 00:25   -  -- -- 105<H>  01-25-24 @ 17:42   -  -- -- 127<H>  01-25-24 @ 15:10   -  -- -- 84  01-25-24 @ 12:24   -  94 -- --    A1C with Estimated Average Glucose Result: 5.1 % [4.0 - 5.6] (12-28-23 @ 08:14)  A1C with Estimated Average Glucose Result: 5.3 % [4.0 - 5.6] (10-28-23 @ 05:00)    Radiology     < from: Xray Chest 1 View- PORTABLE-Urgent (Xray Chest 1 View- PORTABLE-Urgent .) (01.25.24 @ 13:10) >  Hyperinflation of the lungs with emphysema.    Bilateral patchy lower lung opacities, more extensive on the left,   concerning for multifocal infection. The right-sided opacities are new   and the right-sided opacities are worse.    < end of copied text >

## 2024-01-26 NOTE — PROGRESS NOTE ADULT - ASSESSMENT
68F PMHx muscle wasting and atrophy, COPD (on home O2 2L NC, prior intubations in the past), Thromboembolism, DVTs of the lower extremity, T2DM,, anxiety disorder, iron deficiency anemia, malignant neoplasm of the breast s/p L mastectomy (in remission), HLD,  HTN presented [facility] for low Hb 6.5 on outpatient labs and SOB today. Admitted to MICU with:  Assessment:  1.  Acute on Chronic Hypoxic / Hypercapnic Respiratory Failure   2. AECOPD  3. PNA  4. Hyperglycemia    Plan:  NEURO:   -Xanax 0.25mg POx1 given tonight for anxiety. Xanax PRN. Wellbutrin QD, Remeron QHS.   -Monitor mental status closely, avoid neurosuppresants.   -Serial neurologic assessments.     CV:   -Maintain goal MAP >65.   -Keep K~4 and Mg>2 for optimal arrhythmia suppression.  -Cardizem QD for Afib. Lipitor QD.   -TTE with LVEF 50-55%.     PULM:  -Placed back on NIPPV with tonight for increased WOB / SOB. Actively titrating FiO2 to maintain goal SpO2 >88%. Will attempt to wean off NIPPV tonight as able.   -Incentive spirometry, Chest PT/Pulmonary toilet, HOB >30'. Albuterol q6h, Symbicort QD, Spiriva BID. Steroid course with Solu-drol.  -High risk for further life threatening respiratory deterioration, will proceed with a low threshold for endotracheal intubation.     GI:   -NPO on NIPPV.   -Protonix QD.    RENAL:   -Renal function currently WNL.  -trend lytes/Scr daily with BMP  -I's and O's, goal UOP 0.5 cc/kg/hr  -renal dose meds and avoid nephrotoxins     ENDO:   -Aggressive glycemic control to limit FS glucose to <180mg/dl. FS >400s this evening, covered with ISS. Will check FS later this evening; if remains elevated will give additional Admelog.     ID:   -Cx NGTD thus far. SputumCx pending. [facility] Vancomycin / Cefepime courses for PNA.   -ABX use and/or discontinuation based on discussion with ID in conjunction with clinical features, culture data, and judicious procalcitonin monitoring.    HEME:   -Xarelto QD.     GOC: Full Code.  68F PMHx muscle wasting and atrophy, COPD (on home O2 2L NC, prior intubations in the past), Thromboembolism, DVTs of the lower extremity, T2DM,, anxiety disorder, iron deficiency anemia, malignant neoplasm of the breast s/p L mastectomy (in remission), HLD,  HTN presented [facility] for low Hb 6.5 on outpatient labs and SOB today. Admitted to MICU with:  Assessment:  1. Acute on Chronic Hypoxic / Hypercapnic Respiratory Failure   2. AECOPD  3. PNA  4. Hyperglycemia    Plan:  NEURO:   -Xanax 0.25mg POx1 given tonight for anxiety. Xanax PRN. Wellbutrin QD, Remeron QHS.   -Monitor mental status closely, avoid neurosuppresants.   -Serial neurologic assessments.     CV:   -Maintain goal MAP >65.   -Keep K~4 and Mg>2 for optimal arrhythmia suppression.  -Cardizem QD for Afib. Lipitor QD.   -TTE with LVEF 50-55%.     PULM:  -Placed back on NIPPV with tonight for increased WOB / SOB. Actively titrating FiO2 to maintain goal SpO2 >88%. Will attempt to wean off NIPPV tonight as able.   -Incentive spirometry, Chest PT/Pulmonary toilet, HOB >30'. Albuterol q6h, Symbicort QD, Spiriva BID. Steroid course with Solu-drol.  -High risk for further life threatening respiratory deterioration, will proceed with a low threshold for endotracheal intubation.     GI:   -NPO on NIPPV.   -Protonix QD.    RENAL:   -Renal function currently WNL.  -trend lytes/Scr daily with BMP  -I's and O's, goal UOP 0.5 cc/kg/hr  -renal dose meds and avoid nephrotoxins     ENDO:   -Aggressive glycemic control to limit FS glucose to <180mg/dl. FS >400s this evening (likely secondary to steroid course), covered with ISS. Will check FS later this evening; if remains elevated will give additional Admelog.     ID:   -Cx NGTD thus far. SputumCx pending. [facility] Vancomycin / Cefepime courses for PNA.   -ABX use and/or discontinuation based on discussion with ID in conjunction with clinical features, culture data, and judicious procalcitonin monitoring.    HEME:   -Xarelto QD.     GOC: Full Code.

## 2024-01-26 NOTE — DIETITIAN INITIAL EVALUATION ADULT - ADD RECOMMEND
1. Advance diet to minced & moist with thin liquids (pt missing dentition- no hx dysphagia)- no therapeutic restrictions  2. Add Ensure HP TID, Mk BID  3. Obtain and honor food preferences as able- encourage HBV protein intake

## 2024-01-26 NOTE — DIETITIAN NUTRITION RISK NOTIFICATION - TREATMENT: THE FOLLOWING DIET HAS BEEN RECOMMENDED
Diet, Minced and Moist:   Mk(7 Gm Arginine/7 Gm Glut/1.2 Gm HMB     Qty per Day:  BID  Supplement Feeding Modality:  Oral  Ensure Plus High Protein Cans or Servings Per Day:  1       Frequency:  Three Times a day (01-26-24 @ 10:07) [Active]

## 2024-01-26 NOTE — DIETITIAN INITIAL EVALUATION ADULT - PERTINENT MEDS FT
MEDICATIONS  (STANDING):  albuterol/ipratropium for Nebulization 3 milliLiter(s) Nebulizer every 6 hours  atorvastatin 80 milliGRAM(s) Oral at bedtime  budesonide 160 MICROgram(s)/formoterol 4.5 MICROgram(s) Inhaler 2 Puff(s) Inhalation two times a day  buPROPion XL (24-Hour) 300 milliGRAM(s) Oral daily  cefepime   IVPB 2000 milliGRAM(s) IV Intermittent every 12 hours  chlorhexidine 2% Cloths 1 Application(s) Topical <User Schedule>  dextrose 5%. 1000 milliLiter(s) (50 mL/Hr) IV Continuous <Continuous>  dextrose 50% Injectable 25 Gram(s) IV Push once  doxazosin 2 milliGRAM(s) Oral at bedtime  ferrous    sulfate 325 milliGRAM(s) Oral daily  glucagon  Injectable 1 milliGRAM(s) IntraMuscular once  insulin lispro (ADMELOG) corrective regimen sliding scale   SubCutaneous every 6 hours  letrozole 2.5 milliGRAM(s) Oral daily  lisinopril 5 milliGRAM(s) Oral daily  methylPREDNISolone sodium succinate Injectable 40 milliGRAM(s) IV Push every 8 hours  nicotine - 21 mG/24Hr(s) Patch 1 Patch Transdermal daily  pantoprazole  Injectable 40 milliGRAM(s) IV Push two times a day  tiotropium 2.5 MICROgram(s) Inhaler 2 Puff(s) Inhalation daily  vancomycin  IVPB      vancomycin  IVPB 500 milliGRAM(s) IV Intermittent every 12 hours    MEDICATIONS  (PRN):  ALPRAZolam 0.25 milliGRAM(s) Oral every 8 hours PRN anxiety  bisacodyl Suppository 10 milliGRAM(s) Rectal daily PRN Constipation  dextrose Oral Gel 15 Gram(s) Oral once PRN Blood Glucose LESS THAN 70 milliGRAM(s)/deciliter  ondansetron    Tablet 4 milliGRAM(s) Oral four times a day PRN for nausea

## 2024-01-26 NOTE — DIETITIAN INITIAL EVALUATION ADULT - ORAL INTAKE PTA/DIET HISTORY
Pt admitted from [facility] where she was on a regular diet with thin liquids, MARIO, NCS + Glucerna BID, Mk BID, LPS BID. Typically wear dentures. Endorses poor intake PTA- drinks glucerna once/day.

## 2024-01-26 NOTE — DIETITIAN INITIAL EVALUATION ADULT - % CHANGE
25
I will SWITCH the dose or number of times a day I take the medications listed below when I get home from the hospital:  None

## 2024-01-26 NOTE — DIETITIAN INITIAL EVALUATION ADULT - OTHER INFO
67 y/o F with PMH  muscle wasting and atrophy, COPD on home o2 2L NC (prior intubation/ICU), thromboembolism, DVTs of the lower extremity, type 2 DM, left mastectomy, anxiety disorder, iron deficiency anemia, malignant neoplasm of the breast in remission, HLD, HTN  recent admission to this hospital 12/27-12/31 for COVID now admitted to ICU for further management of COPD exacerbation, Acute on chronic hypoxic and hypercarbic respiratory failure.   Pt NPO this am 2/2 Bipap, now weaned to NC. Weight hx: CBW 65.6lbs, (12/5/23) 88lbs, (11/10/23) 94.4/45.1kg, (11/9/23) 101.1/45.9kg, (11/8/23) 102/46.3kg. NFPE with evidence of severe muscle wasting/fat loss. Stage II pressure ulcer on sacrum. Hx DMII, A1c 5.1%. Pt typically wears dentures, does not have at present. Recommend advance diet to minced & moist (no therapeutic restrictions) + Ensure HP TID (350kcals per serving) to optimize nutritional states. Additional preferences obtained from pt, HBV protein intake encouraged. Denies N/V/D, constipation.

## 2024-01-26 NOTE — DIETITIAN INITIAL EVALUATION ADULT - PERTINENT LABORATORY DATA
01-26    142  |  103  |  22  ----------------------------<  110<H>  3.4<L>   |  29  |  0.36<L>    Ca    9.3      26 Jan 2024 09:40  Phos  3.1     01-26  Mg     1.8     01-26    TPro  6.3  /  Alb  1.9<L>  /  TBili  0.4  /  DBili  x   /  AST  15  /  ALT  15  /  AlkPhos  87  01-26  POCT Blood Glucose.: 102 mg/dL (01-26-24 @ 05:32)  A1C with Estimated Average Glucose Result: 5.1 % (12-28-23 @ 08:14)  A1C with Estimated Average Glucose Result: 5.3 % (10-28-23 @ 05:00)

## 2024-01-26 NOTE — DIETITIAN INITIAL EVALUATION ADULT - NS FNS DIET ORDER
Diet, Minced and Moist:   Mk(7 Gm Arginine/7 Gm Glut/1.2 Gm HMB     Qty per Day:  BID  Supplement Feeding Modality:  Oral  Ensure Plus High Protein Cans or Servings Per Day:  1       Frequency:  Three Times a day (01-26-24 @ 10:07)

## 2024-01-27 NOTE — PROGRESS NOTE ADULT - ASSESSMENT
Assessment    67 y/o F with PMH muscle wasting and atrophy, COPD on home o2 2L NC (prior intubation/ICU), thromboembolism, DVTs of the lower extremity, type 2 DM, left mastectomy, anxiety disorder, iron deficiency anemia, malignant neoplasm of the breast in remission, HLD, HTN, now admitted to the ICU for COPD exacerbation and sepsis 2/2 PNA.       Problem List  Acute on chronic COPD exacerbation in ex smoker and underlying severe copd on home o2  Acute hypercarbic respiratory failure  Pneumonia  Anemia,   H/o DVT on xaralto  h/o CAD s/p PCI on asa  h/o breast cancer    Plan  Taper Steroids to solumedrol q 12  Spiriva, Symbicort, Daliresp  Continue antibiotics    check MRSA swab, d/c Vanco if MRSA negative  Bipap QHS  Transfuse 1 unit PRBC  trend cbc  ASA, xaralto held  check stool occult blood  Continue letrozole   Insulin SSDiet, Minced and Moist:   Mk(7 Gm Arginine/7 Gm Glut/1.2 Gm HMB     Qty per Day:  BID  Supplement Feeding Modality:  Oral  Ensure Plus High Protein Cans or Servings Per Day:  1       Frequency:  Three Times a day (01-26-24 @ 10:07) [Active]        Xanax PRN  for anxiety  palliative care eval as lace > 14    PMD:		J reyes		                   Notified(Date):1/27/24  Family Updated:  Maximiliano [phone]                                 Date:  1/27/24      Sedation & Analgesia:	  Diet/Nutrition:		         GI PPx:			  pantoprazole  Injectable 40 milliGRAM(s) IV Push two times a day  polyethylene glycol 3350 17 Gram(s) Oral daily      DVT Ppx:	venodynes, xaralto on hold    Activity:		  Advance as tolerated  Head of Bed:               35-45 Deg  Glycemic Control:        insulin ss  Lines: PIV  CENTRAL LINE: 	[ ] YES [ ] NO	                    LOCATION:   	                       DATE INSERTED:   	                    REMOVE:  [ ] YES [ ] NO    A-LINE:  	                [ ] YES [ ] NO                      LOCATION:   	                       DATE INSERTED: 		            REMOVE:  [ ] YES [ ] NO    DEVLIN: 		        [ ] YES [ ] NO  		                                       DATE INSERTED:		            REMOVE:  [ ] YES [ ] NO      Restraints were deemed necessary to prevent removal of life-sustaining devices [x  ] YES   [    ]  NO    Disposition: ICU Care  Goals of Care: Full code

## 2024-01-27 NOTE — PROGRESS NOTE ADULT - SUBJECTIVE AND OBJECTIVE BOX
Follow-up Critical Care Progress Note  Chief Complaint : Chronic obstructive pulmonary disease with acute exacerbation          No new events overnight.  Denies SOB/CP.   used niv overnight  less sob    this am noted to have low hg x 2 now      Allergies :penicillin (Hives)  penicillin (Short breath)  penicillin (Other)      PAST MEDICAL & SURGICAL HISTORY:  COPD (chronic obstructive pulmonary disease)    Breast cancer    Smoker    DM (diabetes mellitus)  new diagnosis    HTN (hypertension)    COPD, moderate    HTN (hypertension)    Diabetes    COPD, severe    CAD (coronary artery disease)    Breast cancer    H/O left mastectomy    H/O breast surgery    History of cardiac cath    H/O left mastectomy        Medications:  MEDICATIONS  (STANDING):  albuterol/ipratropium for Nebulization 3 milliLiter(s) Nebulizer every 6 hours  atorvastatin 80 milliGRAM(s) Oral at bedtime  budesonide 160 MICROgram(s)/formoterol 4.5 MICROgram(s) Inhaler 2 Puff(s) Inhalation two times a day  buPROPion XL (24-Hour) . 150 milliGRAM(s) Oral daily  cefepime   IVPB 2000 milliGRAM(s) IV Intermittent every 12 hours  chlorhexidine 2% Cloths 1 Application(s) Topical <User Schedule>  collagenase Ointment 1 Application(s) Topical daily  dextrose 5%. 1000 milliLiter(s) (50 mL/Hr) IV Continuous <Continuous>  dextrose 50% Injectable 25 Gram(s) IV Push once  diltiazem    milliGRAM(s) Oral daily  doxazosin 2 milliGRAM(s) Oral at bedtime  dronabinol 5 milliGRAM(s) Oral daily  ferrous    sulfate 325 milliGRAM(s) Oral daily  insulin lispro (ADMELOG) corrective regimen sliding scale   SubCutaneous Before meals and at bedtime  letrozole 2.5 milliGRAM(s) Oral daily  methylPREDNISolone sodium succinate Injectable 40 milliGRAM(s) IV Push every 8 hours  mirtazapine 7.5 milliGRAM(s) Oral at bedtime  nicotine -   7 mG/24Hr(s) Patch 1 Patch Transdermal daily  pantoprazole  Injectable 40 milliGRAM(s) IV Push two times a day  polyethylene glycol 3350 17 Gram(s) Oral daily  roflumilast 500 MICROGram(s) Oral daily  tiotropium 2.5 MICROgram(s) Inhaler 2 Puff(s) Inhalation daily  vancomycin  IVPB      vancomycin  IVPB 500 milliGRAM(s) IV Intermittent every 12 hours    MEDICATIONS  (PRN):  ALPRAZolam 0.25 milliGRAM(s) Oral every 8 hours PRN anxiety  bisacodyl Suppository 10 milliGRAM(s) Rectal daily PRN Constipation  melatonin 3 milliGRAM(s) Oral at bedtime PRN for insomnia  ondansetron    Tablet 4 milliGRAM(s) Oral four times a day PRN for nausea      Antibiotics History  cefepime   IVPB 2000 milliGRAM(s) IV Intermittent every 12 hours, 01-25-24 @ 15:04  levoFLOXacin IVPB 750 milliGRAM(s) IV Intermittent once, 01-25-24 @ 13:16, Stop order after: 1 Doses  vancomycin  IVPB 500 milliGRAM(s) IV Intermittent once, 01-25-24 @ 16:38, Stop order after: 1 Doses  vancomycin  IVPB 500 milliGRAM(s) IV Intermittent once, 01-25-24 @ 15:41, Stop order after: 1 Doses  vancomycin  IVPB    , 01-25-24 @ 16:50  vancomycin  IVPB 500 milliGRAM(s) IV Intermittent every 12 hours, 01-26-24 @ 06:00, Stop order after: 7 Days      Heme Medications       GI Medications  bisacodyl Suppository 10 milliGRAM(s) Rectal daily, 01-25-24 @ 14:55, Routine PRN  pantoprazole  Injectable 40 milliGRAM(s) IV Push two times a day, 01-25-24 @ 14:45, Routine  polyethylene glycol 3350 17 Gram(s) Oral daily, 01-26-24 @ 13:32, Routine      COVID  01-25-24 @ 12:26  COVID -   Detected<!>  12-27-23 @ 17:14  COVID -   Detected<!>  12-04-23 @ 09:46  COVID -   NotDetec  12-04-23 @ 06:30  COVID -   NotDetec  10-27-23 @ 10:15  COVID -   NotDetec  11-23-22 @ 21:44  COVID -   NotDetec  09-12-22 @ 10:47  COVID -   NotDetec  05-14-22 @ 21:30  COVID -   NotDetec      COVID Biomarkers    12-29-23 @ 06:41 ESR --  ---  CRP --  ---  DDimer  --   ---   <H>   ---   Ferritin --    12-28-23 @ 08:14 ESR --  ---  <H>  ---  DDimer  --   ---   LDH --   ---   Ferritin --    12-27-23 @ 20:15 ESR --  ---  CRP --  ---  DDimer  <150   ---   LDH --   ---   Ferritin --             Trend BNP  01-26-24 @ 05:15   -  855<H>  01-25-24 @ 12:24   -  1950<H>  12-04-23 @ 04:00   -  206  09-12-22 @ 10:47   -  29    Procalcitonin Trend  01-25-24 @ 18:00   -   7.48<H>  12-28-23 @ 08:14   -   0.56<H>  12-05-23 @ 12:37   -   0.32<H>    WBC Trend  01-27-24 @ 08:00   -  13.22<H>  01-27-24 @ 06:00   -  13.46<H>  01-26-24 @ 09:40   -  20.54<H>  01-26-24 @ 05:15   -  21.79<H>  01-25-24 @ 12:24   -  25.59<H>    H/H Trend  01-27-24 @ 06:00   -   6.2<LL>/ 20.3<LL>  01-26-24 @ 09:40   -   7.7<L>/ 26.0<L>  01-26-24 @ 05:15   -   7.0<LL>/ 22.4<L>  01-25-24 @ 12:24   -   7.4<L>/ 24.5<L>  12-31-23 @ 06:50   -   8.6<L>/ 27.5<L>  12-30-23 @ 08:45   -   7.5<L>/ 25.1<L>       Platelet Trend  01-27-24 @ 08:00   -  273  01-27-24 @ 06:00   -  261  01-26-24 @ 09:40   -  296  01-26-24 @ 05:15   -  337  01-25-24 @ 12:24   -  343    Trend Sodium  01-26-24 @ 09:40   -  142  01-26-24 @ 05:15   -  144  01-25-24 @ 12:24   -  141    Trend Potassium  01-26-24 @ 09:40   -  3.4<L>  01-26-24 @ 05:15   -  2.7<LL>  01-25-24 @ 12:24   -  4.1    Trend Bun/Cr  01-26-24 @ 09:40  BUN/CR -  22 / 0.36<L>  01-26-24 @ 05:15  BUN/CR -  21 / 0.37<L>  01-25-24 @ 12:24  BUN/CR -  20 / 0.31<L>    Lactic Acid Trend  01-25-24 @ 18:00   -   2.2<H>  01-25-24 @ 13:50   -   3.0<H>    ABG Trend  01-26-24 @ 04:45   - 7.53<H>/37<H>/140<H>/99.1<H>  01-25-24 @ 12:05   - 7.49<H>/39<H>/105/98.7<H>  12-27-23 @ 18:51   - 7.46<H>/36<H>/90/98.7<H>  12-04-23 @ 03:20   - 7.44/43<H>/349<H>/99.4<H>  11-05-23 @ 13:27   - 7.49<H>/44<H>/110<H>/97.2  11-05-23 @ 09:40   - 7.46<H>/47<H>/95/97.3  11-04-23 @ 05:00   - 7.51<H>/48<H>/107/96.7  11-03-23 @ 17:53   - 7.50<H>/47<H>/148<H>/98.4<H>  11-03-23 @ 15:11   - 7.31<L>/75<HH>/115<H>/98.1<H>  11-03-23 @ 04:00   - 7.41/61<H>/216<H>/98.0  11-02-23 @ 12:18   - 7.44/51<H>/85/97.2  11-02-23 @ 10:09   - 7.46<H>/50<H>/102/98.0    Trend AST/ALT/ALK Phos/Bili  01-26-24 @ 09:40   15/15/87/0.4  01-25-24 @ 12:24   16/17/82/0.3  12-30-23 @ 08:45   36/30/63/0.4  12-29-23 @ 06:41   24/28/59/0.2  12-28-23 @ 08:14   52<H>/32/57/0.4  12-27-23 @ 17:14   42<H>/32/60/0.4  12-07-23 @ 06:27   27/35/79/0.3  12-06-23 @ 06:00   25/28/76/0.3  12-05-23 @ 05:20   24/32/70/0.3  12-04-23 @ 04:00   78<H>/39/91/0.5  11-13-23 @ 12:27   25/51<H>/52/0.5  11-12-23 @ 06:50   22/56<H>/52/0.6       Glucose Trend  01-26-24 @ 21:12   -  -- -- 231<H>  01-26-24 @ 17:02   -  -- -- 407<H>  01-26-24 @ 16:59   -  -- -- 431<H>  01-26-24 @ 12:29   -  -- -- 110<H>  01-26-24 @ 09:40   -  110<H> -- --  01-26-24 @ 05:32   -  -- -- 102<H>  01-26-24 @ 05:15   -  88 -- --  01-26-24 @ 00:25   -  -- -- 105<H>  01-25-24 @ 17:42   -  -- -- 127<H>  01-25-24 @ 15:10   -  -- -- 84    A1C with Estimated Average Glucose Result: 5.1 % [4.0 - 5.6] (12-28-23 @ 08:14)  A1C with Estimated Average Glucose Result: 5.3 % [4.0 - 5.6] (10-28-23 @ 05:00)      LABS:                        x      13.22 )-----------( 273      ( 27 Jan 2024 08:00 )             x        01-26    142  |  103  |  22  ----------------------------<  110<H>  3.4<L>   |  29  |  0.36<L>    Ca    9.3      26 Jan 2024 09:40  Phos  3.1     01-26  Mg     1.7     01-27    TPro  6.3  /  Alb  1.9<L>  /  TBili  0.4  /  DBili  x   /  AST  15  /  ALT  15  /  AlkPhos  87  01-26           CULTURES: (if applicable)    Culture - Blood (collected 01-25-24 @ 13:50)  Source: .Blood Blood-Peripheral  Preliminary Report (01-26-24 @ 19:02):    No growth at 24 hours    Culture - Blood (collected 01-25-24 @ 13:50)  Source: .Blood Blood-Peripheral  Preliminary Report (01-26-24 @ 19:02):    No growth at 24 hours      Rapid RVP Result: Detected (01-25-24 @ 12:26)      ABG - ( 26 Jan 2024 04:45 )  pH, Arterial: 7.53  pH, Blood: x     /  pCO2: 37    /  pO2: 140   / HCO3: 31    / Base Excess: 8.2   /  SaO2: 99.1         RADIOLOGY  CXR:  < from: Xray Chest 1 View- PORTABLE-Routine (01.26.24 @ 06:22) >    IMPRESSION:  Basilar clearing.        Thank you for the courtesy of this referral.    --- End of Report ---    < end of copied text >      CT:    ECHO:    < from: TTE Echo Complete w/o Contrast w/ Doppler (10.29.23 @ 10:26) >      Summary:   1. Left ventricular ejection fraction, by visual estimation, is 50 to 55%.   2. Technically difficult study.   3. Normal global left ventricular systolic function.   4. The left ventricular diastolic function could not be assessed in this study.   5. Not all segments of the left ventricular endocardium are well visualized. The overall LV function, appears to be grossly normal. A discrete regional wall motion abnormality, however, cannot be completely ruled out.   6. Normal left atrial size.   7. Normal right atrial size.   8. Mild mitral valve regurgitation.   9. Mild tricuspid regurgitation.  10. Sclerotic aortic valve with normal opening.  11. Estimated pulmonary artery systolic pressure is 38.6 mmHg assuming a right atrial pressure of 10 mmHg, which is consistent with borderline pulmonary hypertension.      < end of copied text >    VITALS:  T(C): 36.6 (01-26-24 @ 21:29), Max: 36.7 (01-26-24 @ 12:00)  T(F): 97.8 (01-26-24 @ 21:29), Max: 98.1 (01-26-24 @ 12:00)  HR: 101 (01-27-24 @ 08:00) (92 - 133)  BP: 119/50 (01-27-24 @ 08:00) (109/54 - 144/73)  BP(mean): 70 (01-27-24 @ 08:00) (68 - 106)  ABP: --  ABP(mean): --  RR: 23 (01-27-24 @ 08:00) (17 - 38)  SpO2: 100% (01-27-24 @ 08:00) (96% - 100%)  CVP(mm Hg): --  CVP(cm H2O): --    Ins and Outs     01-26-24 @ 07:01  -  01-27-24 @ 07:00  --------------------------------------------------------  IN: 300 mL / OUT: 500 mL / NET: -200 mL        Height (cm): 149.9 (01-25-24 @ 11:52)  Weight (kg): 36.3 (01-25-24 @ 11:52)  BMI (kg/m2): 16.2 (01-25-24 @ 11:52)        I&O's Detail    26 Jan 2024 07:01  -  27 Jan 2024 07:00  --------------------------------------------------------  IN:    IV PiggyBack: 100 mL    IV PiggyBack: 100 mL    IV PiggyBack: 100 mL  Total IN: 300 mL    OUT:    Intermittent Catheterization - Urethral (mL): 500 mL  Total OUT: 500 mL    Total NET: -200 mL          Physical Examination:  GENERAL:               Alert, Oriented, No acute distress.    HEENT:                    No JVD, Moist MM  PULM:                     Bilateral air entry, diminished to auscultation bilaterally, no significant sputum production, No Rales, No Rhonchi, No Wheezing  CVS:                         S1, S2,  + Murmur  ABD:                        Soft, nondistended, nontender, normoactive bowel sounds,   EXT:                         No edema, nontender, No Cyanosis or Clubbing    NEURO:                  Alert, oriented, interactive, nonfocal, follows commands  PSYC:                      Calm, + Insight.

## 2024-01-28 NOTE — PROGRESS NOTE ADULT - ASSESSMENT
Physical Examination:  GENERAL:               Alert, Oriented, Mild acute distress.    HEENT:                    No JVD, Moist MM  PULM:                     Bilateral air entry, diminished to auscultation bilaterally, no significant sputum production, No Rales, No Rhonchi, No Wheezing  CVS:                         S1, S2,  + Murmur  ABD:                        Soft, nondistended, nontender, normoactive bowel sounds,   EXT:                         No edema, nontender, No Cyanosis or Clubbing    NEURO:                  Alert, oriented, interactive, nonfocal, follows commands  PSYC:                      Calm, + Insight.    Assessment    69 y/o F with PMH muscle wasting and atrophy, COPD on home o2 2L NC (prior intubation/ICU), thromboembolism, DVTs of the lower extremity, type 2 DM, left mastectomy, anxiety disorder, iron deficiency anemia, malignant neoplasm of the breast in remission, HLD, HTN, now admitted to the ICU for COPD exacerbation and sepsis 2/2 PNA.       Problem List  Acute on chronic COPD exacerbation in ex smoker and underlying severe copd on home o2  Acute hypercarbic respiratory failure  Pneumonia  Anemia,   H/o DVT on xaralto  h/o CAD s/p PCI on asa  h/o breast cancer    Plan  Continue IV Steroids  anticholinergics held due to urinary retention  Symbicort, daliresp  Continue antibiotics  NIV PRN/didnt use overnight  check MRSA swab, d/c Vanco if MRSA negative  Bipap QHS  trend CBC otilio  ASA, xaralto held  check stool occult blood to restart asa/a/c    Continue letrozole     Insulin SSDiet, Minced and Moist:   Mk(7 Gm Arginine/7 Gm Glut/1.2 Gm HMB     Qty per Day:  BID  Supplement Feeding Modality:  Oral  Ensure Plus High Protein Cans or Servings Per Day:  1       Frequency:  Three Times a day (01-26-24 @ 10:07) [Active]        Xanax PRN  for anxiety  palliative care eval as lace > 14    PMD:		J reyes		                   Notified(Date):1/27/24  Family Updated:  Maximiliano [phone]                                 Date:  1/28/24  Updated on status    GI PPx:			  pantoprazole  Injectable 40 milliGRAM(s) IV Push two times a day  polyethylene glycol 3350 17 Gram(s) Oral daily      DVT Ppx:	venodynes, xaralto on hold    Activity:		  Advance as tolerated  Head of Bed:               35-45 Deg  Glycemic Control:        insulin ss  Lines: PIV  CENTRAL LINE: 	[ ] YES [ ] NO	                    LOCATION:   	                       DATE INSERTED:   	                    REMOVE:  [ ] YES [ ] NO    A-LINE:  	                [ ] YES [ ] NO                      LOCATION:   	                       DATE INSERTED: 		            REMOVE:  [ ] YES [ ] NO    DEVLIN: 		        [ ] YES [ ] NO  		                                       DATE INSERTED:		            REMOVE:  [ ] YES [ ] NO      Restraints were deemed necessary to prevent removal of life-sustaining devices [x  ] YES   [    ]  NO    Disposition: ICU Care  Goals of Care: Full code

## 2024-01-28 NOTE — PHYSICAL THERAPY INITIAL EVALUATION ADULT - PERTINENT HX OF CURRENT PROBLEM, REHAB EVAL
Pt is 67 y/o F with PMH muscle wasting and atrophy, COPD on home o2 2L NC (prior intubation/ICU), thromboembolism, DVTs of the lower extremity, type 2 DM, left mastectomy, anxiety disorder, iron deficiency anemia, malignant neoplasm of the breast in remission, HLD, HTN, now admitted to the ICU for COPD exacerbation and sepsis 2/2 PNA. Referred to PT for functional evaluation.

## 2024-01-28 NOTE — PROGRESS NOTE ADULT - SUBJECTIVE AND OBJECTIVE BOX
Follow-up Critical Care Progress Note  Chief Complaint : Chronic obstructive pulmonary disease with acute exacerbation      patient seen and examined  states feels sob  not wheezing, coughing, no palp, n/v  feels anxious.       Allergies :penicillin (Hives)  penicillin (Short breath)  penicillin (Other)      PAST MEDICAL & SURGICAL HISTORY:  COPD (chronic obstructive pulmonary disease)    Breast cancer    Smoker    DM (diabetes mellitus)  new diagnosis    HTN (hypertension)    COPD, moderate    HTN (hypertension)    Diabetes    COPD, severe    CAD (coronary artery disease)    Breast cancer    H/O left mastectomy    H/O breast surgery    History of cardiac cath    H/O left mastectomy        Medications:  MEDICATIONS  (STANDING):  albuterol    0.083% 2.5 milliGRAM(s) Nebulizer every 6 hours  atorvastatin 80 milliGRAM(s) Oral at bedtime  budesonide 160 MICROgram(s)/formoterol 4.5 MICROgram(s) Inhaler 2 Puff(s) Inhalation two times a day  buPROPion XL (24-Hour) . 150 milliGRAM(s) Oral daily  cefepime   IVPB 2000 milliGRAM(s) IV Intermittent every 12 hours  chlorhexidine 2% Cloths 1 Application(s) Topical <User Schedule>  collagenase Ointment 1 Application(s) Topical daily  dextrose 5%. 1000 milliLiter(s) (50 mL/Hr) IV Continuous <Continuous>  dextrose 50% Injectable 25 Gram(s) IV Push once  diltiazem    milliGRAM(s) Oral daily  doxazosin 2 milliGRAM(s) Oral at bedtime  dronabinol 5 milliGRAM(s) Oral daily  ferrous    sulfate 325 milliGRAM(s) Oral daily  insulin lispro (ADMELOG) corrective regimen sliding scale   SubCutaneous Before meals and at bedtime  letrozole 2.5 milliGRAM(s) Oral daily  methylPREDNISolone sodium succinate Injectable 40 milliGRAM(s) IV Push every 12 hours  mirtazapine 7.5 milliGRAM(s) Oral at bedtime  nicotine -   7 mG/24Hr(s) Patch 1 Patch Transdermal daily  pantoprazole  Injectable 40 milliGRAM(s) IV Push two times a day  polyethylene glycol 3350 17 Gram(s) Oral daily  roflumilast 500 MICROGram(s) Oral daily  vancomycin  IVPB      vancomycin  IVPB 500 milliGRAM(s) IV Intermittent every 12 hours    MEDICATIONS  (PRN):  ALPRAZolam 0.25 milliGRAM(s) Oral every 8 hours PRN anxiety  bisacodyl Suppository 10 milliGRAM(s) Rectal daily PRN Constipation  melatonin 3 milliGRAM(s) Oral at bedtime PRN for insomnia  ondansetron    Tablet 4 milliGRAM(s) Oral four times a day PRN for nausea      Antibiotics History  cefepime   IVPB 2000 milliGRAM(s) IV Intermittent every 12 hours, 01-25-24 @ 15:04  levoFLOXacin IVPB 750 milliGRAM(s) IV Intermittent once, 01-25-24 @ 13:16, Stop order after: 1 Doses  vancomycin  IVPB 500 milliGRAM(s) IV Intermittent once, 01-25-24 @ 15:41, Stop order after: 1 Doses  vancomycin  IVPB 500 milliGRAM(s) IV Intermittent once, 01-25-24 @ 16:38, Stop order after: 1 Doses  vancomycin  IVPB    , 01-25-24 @ 16:50  vancomycin  IVPB 500 milliGRAM(s) IV Intermittent every 12 hours, 01-26-24 @ 06:00, Stop order after: 7 Days      Heme Medications       GI Medications  bisacodyl Suppository 10 milliGRAM(s) Rectal daily, 01-25-24 @ 14:55, Routine PRN  pantoprazole  Injectable 40 milliGRAM(s) IV Push two times a day, 01-25-24 @ 14:45, Routine  polyethylene glycol 3350 17 Gram(s) Oral daily, 01-26-24 @ 13:32, Routine      COVID  01-25-24 @ 12:26  COVID -   Detected<!>  12-27-23 @ 17:14  COVID -   Detected<!>  12-04-23 @ 09:46  COVID -   NotDetec  12-04-23 @ 06:30  COVID -   NotDetec  10-27-23 @ 10:15  COVID -   NotDetec  11-23-22 @ 21:44  COVID -   NotDetec  09-12-22 @ 10:47  COVID -   NotDetec  05-14-22 @ 21:30  COVID -   NotDetec      COVID Biomarkers    12-29-23 @ 06:41 ESR --  ---  CRP --  ---  DDimer  --   ---   <H>   ---   Ferritin --    12-28-23 @ 08:14 ESR --  ---  <H>  ---  DDimer  --   ---   LDH --   ---   Ferritin --    12-27-23 @ 20:15 ESR --  ---  CRP --  ---  DDimer  <150   ---   LDH --   ---   Ferritin --         Trend BNP  01-26-24 @ 05:15   -  855<H>  01-25-24 @ 12:24   -  1950<H>  12-04-23 @ 04:00   -  206  09-12-22 @ 10:47   -  29    Procalcitonin Trend  01-25-24 @ 18:00   -   7.48<H>  12-28-23 @ 08:14   -   0.56<H>  12-05-23 @ 12:37   -   0.32<H>    WBC Trend  01-28-24 @ 06:00   -  12.82<H>  01-27-24 @ 18:30   -  12.27<H>  01-27-24 @ 08:00   -  13.22<H>  01-27-24 @ 06:00   -  13.46<H>  01-26-24 @ 09:40   -  20.54<H>  01-26-24 @ 05:15   -  21.79<H>    H/H Trend  01-28-24 @ 06:00   -   9.1<L>/ 28.4<L>  01-27-24 @ 18:30   -   9.1<L>/ 28.4<L>  01-27-24 @ 08:00   -   6.2<LL>/ 19.8<LL>  01-27-24 @ 06:00   -   6.2<LL>/ 20.3<LL>  01-26-24 @ 09:40   -   7.7<L>/ 26.0<L>  01-26-24 @ 05:15   -   7.0<LL>/ 22.4<L>    Stool Occult Blood  12-05-23 @ 20:55   -   Positive<!>  11-07-23 @ 18:16   -   Positive<!>  11-05-23 @ 16:51   -   Negative    Platelet Trend  01-28-24 @ 06:00   -  239  01-27-24 @ 18:30   -  270  01-27-24 @ 08:00   -  273  01-27-24 @ 06:00   -  261  01-26-24 @ 09:40   -  296  01-26-24 @ 05:15   -  337    Trend Sodium  01-28-24 @ 06:00   -  142  01-27-24 @ 06:00   -  141  01-26-24 @ 09:40   -  142  01-26-24 @ 05:15   -  144  01-25-24 @ 12:24   -  141    Trend Potassium  01-28-24 @ 06:00   -  4.0  01-27-24 @ 06:00   -  4.5  01-26-24 @ 09:40   -  3.4<L>  01-26-24 @ 05:15   -  2.7<LL>  01-25-24 @ 12:24   -  4.1    Trend Bun/Cr  01-28-24 @ 06:00  BUN/CR -  16 / 0.22<L>  01-27-24 @ 06:00  BUN/CR -  32<H> / 0.25<L>  01-26-24 @ 09:40  BUN/CR -  22 / 0.36<L>  01-26-24 @ 05:15  BUN/CR -  21 / 0.37<L>  01-25-24 @ 12:24  BUN/CR -  20 / 0.31<L>    Lactic Acid Trend  01-25-24 @ 18:00   -   2.2<H>  01-25-24 @ 13:50   -   3.0<H>    ABG Trend  01-26-24 @ 04:45   - 7.53<H>/37<H>/140<H>/99.1<H>  01-25-24 @ 12:05   - 7.49<H>/39<H>/105/98.7<H>  12-27-23 @ 18:51   - 7.46<H>/36<H>/90/98.7<H>  12-04-23 @ 03:20   - 7.44/43<H>/349<H>/99.4<H>  11-05-23 @ 13:27   - 7.49<H>/44<H>/110<H>/97.2  11-05-23 @ 09:40   - 7.46<H>/47<H>/95/97.3  11-04-23 @ 05:00   - 7.51<H>/48<H>/107/96.7     Trend AST/ALT/ALK Phos/Bili  01-28-24 @ 06:00   14/16/85/0.5  01-27-24 @ 06:00   12/14/87/0.3  01-26-24 @ 09:40   15/15/87/0.4  01-25-24 @ 12:24   16/17/82/0.3  12-30-23 @ 08:45   36/30/63/0.4  12-29-23 @ 06:41   24/28/59/0.2  12-28-23 @ 08:14   52<H>/32/57/0.4       Albumin Trend  01-28-24 @ 06:00   -   1.8<L>  01-27-24 @ 06:00   -   1.7<L>  01-26-24 @ 09:40   -   1.9<L>  01-25-24 @ 12:24   -   2.1<L>  12-30-23 @ 08:45   -   1.6<L>  12-29-23 @ 06:41   -   1.7<L>      PTT - PT - INR Trend  01-25-24 @ 12:24   -   41.1<H> - 12.6 - 1.08  12-28-23 @ 08:14   -   37.1<H> - 11.8 - 1.04  12-04-23 @ 04:00   -   30.5 - 21.8<H> - 1.95<H>  10-27-23 @ 10:15   -   51.6<H> - 13.9<H> - 1.19<H>    Glucose Trend  01-28-24 @ 08:55   -  -- -- 153<H>  01-28-24 @ 06:00   -  108<H> -- --  01-27-24 @ 21:46   -  -- -- 215<H>  01-27-24 @ 17:15   -  -- -- 167<H>  01-27-24 @ 12:45   -  -- -- 211<H>  01-27-24 @ 09:06   -  -- -- 208<H>  01-27-24 @ 06:00   -  162<H> -- --  01-26-24 @ 21:12   -  -- -- 231<H>  01-26-24 @ 17:02   -  -- -- 407<H>  01-26-24 @ 16:59   -  -- -- 431<H>    A1C with Estimated Average Glucose Result: 5.1 % [4.0 - 5.6] (12-28-23 @ 08:14)  A1C with Estimated Average Glucose Result: 5.3 % [4.0 - 5.6] (10-28-23 @ 05:00)      LABS:                        9.1    12.82 )-----------( 239      ( 28 Jan 2024 06:00 )             28.4     01-28    142  |  108  |  16  ----------------------------<  108<H>  4.0   |  30  |  0.22<L>    Ca    9.0      28 Jan 2024 06:00  Phos  1.9     01-28  Mg     1.6     01-28    TPro  5.7<L>  /  Alb  1.8<L>  /  TBili  0.5  /  DBili  x   /  AST  14  /  ALT  16  /  AlkPhos  85  01-28        CULTURES: (if applicable)    Culture - Blood (collected 01-25-24 @ 13:50)  Source: .Blood Blood-Peripheral  Preliminary Report (01-27-24 @ 19:01):    No growth at 48 Hours    Culture - Blood (collected 01-25-24 @ 13:50)  Source: .Blood Blood-Peripheral  Preliminary Report (01-27-24 @ 19:01):    No growth at 48 Hours      Rapid RVP Result: Detected (01-25-24 @ 12:26)     RADIOLOGY  CXR:    < from: Xray Chest 1 View- PORTABLE-Routine (01.27.24 @ 06:23) >    ACC: [id number] EXAM:  XR CHEST PORTABLE ROUTINE 1V   ORDERED BY: PETER VELOZ     PROCEDURE DATE:  01/27/2024          INTERPRETATION:  HISTORY: Admitting Dxs: J44.1 CHRONIC OBSTRUCTIVE   PULMONARY DISEASE W (ACUTE) EXACERBATION;  Hypoxia;  TECHNIQUE: Portable frontal view of the chest, 1 view.  COMPARISON: 1/26/2024.  FINDINGS/  IMPRESSION:    Right Port-A-Cath access catheter tip is in the cavoatrial junction.  HEART:  Enlarged.  LUNGS: COPD. Left lower lobe infiltrate, similar to prior. No   pneumothorax. No significant effusion.. Nipple shadow at the right base.  BONES: degenerative changes    --- End of Report ---    < end of copied text >    CT:    ECHO:      VITALS:  T(C): 36.7 (01-28-24 @ 05:00), Max: 36.7 (01-27-24 @ 13:00)  T(F): 98 (01-28-24 @ 05:00), Max: 98.1 (01-27-24 @ 13:00)  HR: 107 (01-28-24 @ 09:03) (93 - 118)  BP: 125/57 (01-28-24 @ 09:00) (86/67 - 136/71)  BP(mean): 78 (01-28-24 @ 09:00) (71 - 114)  ABP: --  ABP(mean): --  RR: 18 (01-28-24 @ 09:00) (16 - 36)  SpO2: 99% (01-28-24 @ 09:03) (95% - 100%)  CVP(mm Hg): --  CVP(cm H2O): --    Ins and Outs     01-27-24 @ 07:01  -  01-28-24 @ 07:00  --------------------------------------------------------  IN: 324 mL / OUT: 1200 mL / NET: -876 mL        Height (cm): 149.9 (01-25-24 @ 11:52)  Weight (kg): 36.3 (01-25-24 @ 11:52)  BMI (kg/m2): 16.2 (01-25-24 @ 11:52)        I&O's Detail    27 Jan 2024 07:01  -  28 Jan 2024 07:00  --------------------------------------------------------  IN:    PRBCs (Packed Red Blood Cells): 324 mL  Total IN: 324 mL    OUT:    Intermittent Catheterization - Urethral (mL): 1200 mL  Total OUT: 1200 mL    Total NET: -876 mL

## 2024-01-28 NOTE — PHYSICAL THERAPY INITIAL EVALUATION ADULT - PRECAUTIONS/LIMITATIONS, REHAB EVAL
2L/min via NC/aspiration precautions/cardiac precautions/fall precautions/oxygen therapy device and L/min

## 2024-01-28 NOTE — PHYSICAL THERAPY INITIAL EVALUATION ADULT - BED MOBILITY LIMITATIONS, REHAB EVAL
impaired tolerance for sitting due to HR:125, and dizziness (BP stables o2:98% on 2L)/decreased ability to use arms for pushing/pulling/decreased ability to use legs for bridging/pushing/impaired ability to control trunk for mobility

## 2024-01-28 NOTE — PHYSICAL THERAPY INITIAL EVALUATION ADULT - GENERAL OBSERVATIONS, REHAB EVAL
Orders received, chart reviewed, status discussed with RN. Pt received semi-morejon's in bed, (+)O2 via NC at 2L/min, tele, PIV, primafit, in NAD. Pt performed bed mobility with modA, but limited by tachycardia and shortness of breath at edge of bed. Pt left as received in NAD, CBWR.

## 2024-01-28 NOTE — PHYSICAL THERAPY INITIAL EVALUATION ADULT - ADDITIONAL COMMENTS
Pt lives in private house with her brother and daughter. There are 7 steps to enter and bed/bathroom on first floor. PTA, pt was independent in ambulation with RW. Denies falls in the past year. Owns RW and SAC.    At beginning of session, BP:125/57 HR:110 o2:97% on 2L/min via NC  At edge of bed, HR:126, BP: 135/58 O2:98%

## 2024-01-29 NOTE — SWALLOW BEDSIDE ASSESSMENT ADULT - SLP PERTINENT HISTORY OF CURRENT PROBLEM
69 y/o F with PMH muscle wasting and atrophy, COPD on home o2 2L NC (prior intubation/ICU), thromboembolism, DVTs of the lower extremity, type 2 DM, left mastectomy, anxiety disorder, iron deficiency anemia, malignant neoplasm of the breast in remission,   HLD,  HTN presented [facility] for low Hb 6.5 on outpatient labs and SOB today. Patient having mild cough, tachypnea, and shortness of breath, at rest over the past few days. Denies any chest pain, vomiting, abdominal pain, fevers, chills, headaches, neck pain, dysuria.

## 2024-01-29 NOTE — CONSULT NOTE ADULT - SUBJECTIVE AND OBJECTIVE BOX
HPI:  67 y/o F with PMH muscle wasting and atrophy, COPD on home o2 2L NC (prior intubation/ICU), thromboembolism, DVTs of the lower extremity, type 2 DM, left mastectomy, anxiety disorder, iron deficiency anemia, malignant neoplasm of the breast in remission,   HLD,  HTN presented [facility] for low Hb 6.5 on outpatient labs and SOB today. Patient having mild cough, tachypnea, and shortness of breath, at rest over the past few days. Denies any chest pain, vomiting, abdominal pain, fevers, chills, headaches, neck pain, dysuria.  While in ED hgb noted to be 7.4 but  saturation 90-94% with supplemental oxygen, placed on BIPAP.  ICU consulted for further management.     Palliative care consulted for GOC and pt readmission history. Pt seen and examined at bedside this morning. She was having difficulty talking for an extended period of time. Denied any pain however. States she is not hungry.    PERTINENT PM/SXH:   COPD (chronic obstructive pulmonary disease)    Former smoker    Breast cancer    Smoker    DM (diabetes mellitus)    HTN (hypertension)    COPD exacerbation    COPD, moderate    HTN (hypertension)    Diabetes    COPD, severe    CAD (coronary artery disease)    Breast cancer      H/O left mastectomy    H/O breast surgery    History of cardiac cath    No significant past surgical history    H/O left mastectomy      FAMILY HISTORY:  Family history of heart disease (Sibling)    Family history of gout (Father, Mother)    FH: coronary artery disease (Father)      Family Hx substance abuse [ ]yes [ ]no  ITEMS NOT CHECKED ARE NOT PRESENT    SOCIAL HISTORY:   Significant other/partner[ ]  Children[ X]  Zoroastrianism/Spirituality: n/a  Substance hx:  [ ]   Tobacco hx:  [ X]   Alcohol hx: [ ]   Living Situation: [ ]Home  [ ]Long term care  [ X]Rehab- [facility]    ADVANCE DIRECTIVES:    DNR/MOLST - FULL CODE  DECISION MAKER(s):  [ ] Health Care Proxy(s)  [ X] Surrogate(s) - Maximiilano Gonzales      Name(s): Phone Number(s): [phone]    BASELINE (I)ADL(s) (prior to admission):  [place]:  Dependent    Allergies  penicillin (Hives)  penicillin (Short breath)  penicillin (Other)    Intolerances    MEDICATIONS  (STANDING):  albuterol    0.083% 2.5 milliGRAM(s) Nebulizer every 6 hours  atorvastatin 80 milliGRAM(s) Oral at bedtime  budesonide 160 MICROgram(s)/formoterol 4.5 MICROgram(s) Inhaler 2 Puff(s) Inhalation two times a day  buPROPion XL (24-Hour) . 150 milliGRAM(s) Oral daily  cefepime   IVPB 2000 milliGRAM(s) IV Intermittent every 12 hours  chlorhexidine 2% Cloths 1 Application(s) Topical <User Schedule>  collagenase Ointment 1 Application(s) Topical daily  dextrose 5%. 1000 milliLiter(s) (50 mL/Hr) IV Continuous <Continuous>  dextrose 50% Injectable 25 Gram(s) IV Push once  diltiazem    milliGRAM(s) Oral daily  doxazosin 2 milliGRAM(s) Oral at bedtime  dronabinol 5 milliGRAM(s) Oral daily  ferrous    sulfate 325 milliGRAM(s) Oral daily  insulin lispro (ADMELOG) corrective regimen sliding scale   SubCutaneous Before meals and at bedtime  letrozole 2.5 milliGRAM(s) Oral daily  magnesium sulfate  IVPB 2 Gram(s) IV Intermittent every 2 hours  methylPREDNISolone sodium succinate Injectable 40 milliGRAM(s) IV Push every 12 hours  mirtazapine 7.5 milliGRAM(s) Oral at bedtime  nicotine -   7 mG/24Hr(s) Patch 1 Patch Transdermal daily  pantoprazole   Suspension 40 milliGRAM(s) Oral before breakfast  polyethylene glycol 3350 17 Gram(s) Oral daily  potassium chloride   Solution 40 milliEquivalent(s) Oral once  potassium phosphate / sodium phosphate Powder (PHOS-NaK) 2 Packet(s) Oral once  roflumilast 500 MICROGram(s) Oral daily    MEDICATIONS  (PRN):  ALPRAZolam 0.25 milliGRAM(s) Oral every 8 hours PRN anxiety  bisacodyl Suppository 10 milliGRAM(s) Rectal daily PRN Constipation  melatonin 3 milliGRAM(s) Oral at bedtime PRN for insomnia  ondansetron    Tablet 4 milliGRAM(s) Oral four times a day PRN for nausea    PRESENT SYMPTOMS:      Pain:  no  QOL impact -   Location -                    Aggravating factors -  Quality -  Radiation -  Timing-  Severity (0-10 scale):  Minimal acceptable level (0-10 scale):       Dyspnea:                           Severe  Anxiety:                             Mild    Depressed:                    mild  Fatigue:                             Severe  Nausea:                            none  Loss of appetite:              Moderate   Constipation:                   none      Other Symptoms:  [X ]All other review of systems negative       Chaplaincy Referral:   Deferred   Palliative Performance Status Version 2:      30   %    [email or web address]    PHYSICAL EXAM:  Vital Signs Last 24 Hrs  T(C): 36.6 (29 Jan 2024 07:00), Max: 36.7 (28 Jan 2024 14:00)  T(F): 97.8 (29 Jan 2024 07:00), Max: 98.1 (28 Jan 2024 14:00)  HR: 119 (29 Jan 2024 10:00) (87 - 122)  BP: 128/56 (29 Jan 2024 10:00) (106/82 - 145/79)  BP(mean): 76 (29 Jan 2024 10:00) (74 - 100)  RR: 18 (29 Jan 2024 10:00) (14 - 33)  SpO2: 93% (29 Jan 2024 10:00) (89% - 100%)    Parameters below as of 29 Jan 2024 07:00  Patient On (Oxygen Delivery Method): nasal cannula  O2 Flow (L/min): 2   I&O's Summary    28 Jan 2024 07:01  -  29 Jan 2024 07:00  --------------------------------------------------------  IN: 60 mL / OUT: 0 mL / NET: 60 mL    29 Jan 2024 07:01  -  29 Jan 2024 10:42  --------------------------------------------------------  IN: 150 mL / OUT: 0 mL / NET: 150 mL      GENERAL:  Cachexia, tired and weak appearing  HEENT: NC/AT, dry mucous membranes  PULMONARY: diminished breath sounds throughout  CARDIOVASCULAR:  tachycardic  GASTROINTESTINAL: soft, nontender  Last BM: 1/26  MUSCULOSKELETAL: +weakness, no edema to ble  NEUROLOGIC: +dysphagia, A&Ox3  SKIN: see nursing assessment for details      LABS:                        9.3    10.56 )-----------( 246      ( 29 Jan 2024 05:00 )             29.3   01-29    142  |  106  |  15  ----------------------------<  126<H>  3.4<L>   |  34<H>  |  0.22<L>    Ca    8.7      29 Jan 2024 05:00  Phos  2.1     01-29  Mg     1.4     01-29    TPro  5.8<L>  /  Alb  1.9<L>  /  TBili  0.4  /  DBili  x   /  AST  15  /  ALT  21  /  AlkPhos  82  01-29      Urinalysis Basic - ( 29 Jan 2024 05:00 )    Color: x / Appearance: x / SG: x / pH: x  Gluc: 126 mg/dL / Ketone: x  / Bili: x / Urobili: x   Blood: x / Protein: x / Nitrite: x   Leuk Esterase: x / RBC: x / WBC x   Sq Epi: x / Non Sq Epi: x / Bacteria: x      RADIOLOGY & ADDITIONAL STUDIES:    PROCEDURE DATE:  01/27/2024          INTERPRETATION:  HISTORY: Admitting Dxs: J44.1 CHRONIC OBSTRUCTIVE   PULMONARY DISEASE W (ACUTE) EXACERBATION;  Hypoxia;  TECHNIQUE: Portable frontal view of the chest, 1 view.  COMPARISON: 1/26/2024.  FINDINGS/  IMPRESSION:    Right Port-A-Cath access catheter tip is in the cavoatrial junction.  HEART:  Enlarged.  LUNGS: COPD. Left lower lobe infiltrate, similar to prior. No   pneumothorax. No significant effusion.. Nipple shadow at the right base.  BONES: degenerative changes    --- End of Report ---      PROTEIN CALORIE MALNUTRITION PRESENT: SEVERE  [email or web address]    Height (cm): 149.9 (01-25-24 @ 11:52), 149.9 (12-29-23 @ 12:20), 149.9 (12-04-23 @ 08:39)  Weight (kg): 36.3 (01-25-24 @ 11:52), 38.6 (12-29-23 @ 12:20), 40 (12-04-23 @ 08:39)  BMI (kg/m2): 16.2 (01-25-24 @ 11:52), 17.2 (12-29-23 @ 12:20), 17.8 (12-04-23 @ 08:39)    [ ]PPSV2 < or = to 30% [ ]significant weight loss  [ ]poor nutritional intake  [ ]anasarca[ ]Artificial Nutrition      Other REFERRALS:  [ ]Hospice  [ ]Child Life  [ ]Social Work  [ ]Case management [ ]Holistic Therapy  HPI:  67 y/o F with PMH muscle wasting and atrophy, COPD on home o2 2L NC (prior intubation/ICU), thromboembolism, DVTs of the lower extremity, type 2 DM, left mastectomy, anxiety disorder, iron deficiency anemia, malignant neoplasm of the breast in remission,   HLD,  HTN presented [facility] for low Hb 6.5 on outpatient labs and SOB today. Patient having mild cough, tachypnea, and shortness of breath, at rest over the past few days. Denies any chest pain, vomiting, abdominal pain, fevers, chills, headaches, neck pain, dysuria.  While in ED hgb noted to be 7.4 but  saturation 90-94% with supplemental oxygen, placed on BIPAP.  ICU consulted for further management.     Palliative care consulted for GOC and pt readmission history. Pt seen and examined at bedside this morning. She was having difficulty talking for an extended period of time. Denied any pain however. States she is not hungry.    PERTINENT PM/SXH:   COPD (chronic obstructive pulmonary disease)    Former smoker    Breast cancer    Smoker    DM (diabetes mellitus)    HTN (hypertension)    COPD exacerbation    COPD, moderate    HTN (hypertension)    Diabetes    COPD, severe    CAD (coronary artery disease)    Breast cancer      H/O left mastectomy    H/O breast surgery    History of cardiac cath    No significant past surgical history    H/O left mastectomy      FAMILY HISTORY:  Family history of heart disease (Sibling)    Family history of gout (Father, Mother)    FH: coronary artery disease (Father)      Family Hx substance abuse [ ]yes [ ]no  ITEMS NOT CHECKED ARE NOT PRESENT    SOCIAL HISTORY:   Significant other/partner[ ]  Children[ X]  Lutheran/Spirituality: n/a  Substance hx:  [ ]   Tobacco hx:  [ X]   Alcohol hx: [ ]   Living Situation: [ ]Home  [ ]Long term care  [ X]Rehab- [facility]    ADVANCE DIRECTIVES:    DNR/MOLST - FULL CODE  DECISION MAKER(s):  [ ] Health Care Proxy(s)  [ X] Surrogate(s) - Maximiliano Gonzales      Name(s): Phone Number(s): [phone]    BASELINE (I)ADL(s) (prior to admission):  [place]:  Dependent    Allergies  penicillin (Hives)  penicillin (Short breath)  penicillin (Other)    Intolerances    MEDICATIONS  (STANDING):  albuterol    0.083% 2.5 milliGRAM(s) Nebulizer every 6 hours  atorvastatin 80 milliGRAM(s) Oral at bedtime  budesonide 160 MICROgram(s)/formoterol 4.5 MICROgram(s) Inhaler 2 Puff(s) Inhalation two times a day  buPROPion XL (24-Hour) . 150 milliGRAM(s) Oral daily  cefepime   IVPB 2000 milliGRAM(s) IV Intermittent every 12 hours  chlorhexidine 2% Cloths 1 Application(s) Topical <User Schedule>  collagenase Ointment 1 Application(s) Topical daily  dextrose 5%. 1000 milliLiter(s) (50 mL/Hr) IV Continuous <Continuous>  dextrose 50% Injectable 25 Gram(s) IV Push once  diltiazem    milliGRAM(s) Oral daily  doxazosin 2 milliGRAM(s) Oral at bedtime  dronabinol 5 milliGRAM(s) Oral daily  ferrous    sulfate 325 milliGRAM(s) Oral daily  insulin lispro (ADMELOG) corrective regimen sliding scale   SubCutaneous Before meals and at bedtime  letrozole 2.5 milliGRAM(s) Oral daily  magnesium sulfate  IVPB 2 Gram(s) IV Intermittent every 2 hours  methylPREDNISolone sodium succinate Injectable 40 milliGRAM(s) IV Push every 12 hours  mirtazapine 7.5 milliGRAM(s) Oral at bedtime  nicotine -   7 mG/24Hr(s) Patch 1 Patch Transdermal daily  pantoprazole   Suspension 40 milliGRAM(s) Oral before breakfast  polyethylene glycol 3350 17 Gram(s) Oral daily  potassium chloride   Solution 40 milliEquivalent(s) Oral once  potassium phosphate / sodium phosphate Powder (PHOS-NaK) 2 Packet(s) Oral once  roflumilast 500 MICROGram(s) Oral daily    MEDICATIONS  (PRN):  ALPRAZolam 0.25 milliGRAM(s) Oral every 8 hours PRN anxiety  bisacodyl Suppository 10 milliGRAM(s) Rectal daily PRN Constipation  melatonin 3 milliGRAM(s) Oral at bedtime PRN for insomnia  ondansetron    Tablet 4 milliGRAM(s) Oral four times a day PRN for nausea    PRESENT SYMPTOMS:      Pain:  no  QOL impact -   Location -                    Aggravating factors -  Quality -  Radiation -  Timing-  Severity (0-10 scale):  Minimal acceptable level (0-10 scale):       Dyspnea:                           Severe  Anxiety:                             Mild    Depressed:                    mild  Fatigue:                             Severe  Nausea:                            none  Loss of appetite:              Moderate   Constipation:                   none      Other Symptoms:  [X ]All other review of systems negative       Chaplaincy Referral:   Deferred   Palliative Performance Status Version 2:      30   %    [email or web address]    PHYSICAL EXAM:  Vital Signs Last 24 Hrs  T(C): 36.6 (29 Jan 2024 07:00), Max: 36.7 (28 Jan 2024 14:00)  T(F): 97.8 (29 Jan 2024 07:00), Max: 98.1 (28 Jan 2024 14:00)  HR: 119 (29 Jan 2024 10:00) (87 - 122)  BP: 128/56 (29 Jan 2024 10:00) (106/82 - 145/79)  BP(mean): 76 (29 Jan 2024 10:00) (74 - 100)  RR: 18 (29 Jan 2024 10:00) (14 - 33)  SpO2: 93% (29 Jan 2024 10:00) (89% - 100%)    Parameters below as of 29 Jan 2024 07:00  Patient On (Oxygen Delivery Method): nasal cannula  O2 Flow (L/min): 2   I&O's Summary    28 Jan 2024 07:01  -  29 Jan 2024 07:00  --------------------------------------------------------  IN: 60 mL / OUT: 0 mL / NET: 60 mL    29 Jan 2024 07:01  -  29 Jan 2024 10:42  --------------------------------------------------------  IN: 150 mL / OUT: 0 mL / NET: 150 mL      GENERAL:  Cachexia, tired and weak appearing  HEENT: NC/AT, dry mucous membranes  PULMONARY: diminished breath sounds throughout  CARDIOVASCULAR:  tachycardic  GASTROINTESTINAL: soft, nontender  Last BM: 1/26  MUSCULOSKELETAL: +weakness, no edema to ble  NEUROLOGIC: +dysphagia, A&Ox3  SKIN: see nursing assessment for details      LABS:                        9.3    10.56 )-----------( 246      ( 29 Jan 2024 05:00 )             29.3   01-29    142  |  106  |  15  ----------------------------<  126<H>  3.4<L>   |  34<H>  |  0.22<L>    Ca    8.7      29 Jan 2024 05:00  Phos  2.1     01-29  Mg     1.4     01-29    TPro  5.8<L>  /  Alb  1.9<L>  /  TBili  0.4  /  DBili  x   /  AST  15  /  ALT  21  /  AlkPhos  82  01-29      Urinalysis Basic - ( 29 Jan 2024 05:00 )    Color: x / Appearance: x / SG: x / pH: x  Gluc: 126 mg/dL / Ketone: x  / Bili: x / Urobili: x   Blood: x / Protein: x / Nitrite: x   Leuk Esterase: x / RBC: x / WBC x   Sq Epi: x / Non Sq Epi: x / Bacteria: x      RADIOLOGY & ADDITIONAL STUDIES:    PROCEDURE DATE:  01/27/2024          INTERPRETATION:  HISTORY: Admitting Dxs: J44.1 CHRONIC OBSTRUCTIVE   PULMONARY DISEASE W (ACUTE) EXACERBATION;  Hypoxia;  TECHNIQUE: Portable frontal view of the chest, 1 view.  COMPARISON: 1/26/2024.  FINDINGS/  IMPRESSION:    Right Port-A-Cath access catheter tip is in the cavoatrial junction.  HEART:  Enlarged.  LUNGS: COPD. Left lower lobe infiltrate, similar to prior. No   pneumothorax. No significant effusion.. Nipple shadow at the right base.  BONES: degenerative changes    --- End of Report ---      PROTEIN CALORIE MALNUTRITION PRESENT: SEVERE  [email or web address]    Height (cm): 149.9 (01-25-24 @ 11:52), 149.9 (12-29-23 @ 12:20), 149.9 (12-04-23 @ 08:39)  Weight (kg): 36.3 (01-25-24 @ 11:52), 38.6 (12-29-23 @ 12:20), 40 (12-04-23 @ 08:39)  BMI (kg/m2): 16.2 (01-25-24 @ 11:52), 17.2 (12-29-23 @ 12:20), 17.8 (12-04-23 @ 08:39)    [ ]PPSV2 < or = to 30% [ X]significant weight loss  [X ]poor nutritional intake  [ ]anasarca[ ]Artificial Nutrition      Other REFERRALS:  [ ]Hospice  [ ]Child Life  [ ]Social Work  [ ]Case management [ ]Holistic Therapy  HPI:  69 y/o F with PMH muscle wasting and atrophy, COPD on home o2 2L NC (prior intubation/ICU), thromboembolism, DVTs of the lower extremity, type 2 DM, left mastectomy, anxiety disorder, iron deficiency anemia, malignant neoplasm of the breast in remission,   HLD,  HTN presented [facility] for low Hb 6.5 on outpatient labs and SOB today. Patient having mild cough, tachypnea, and shortness of breath, at rest over the past few days. Denies any chest pain, vomiting, abdominal pain, fevers, chills, headaches, neck pain, dysuria.  While in ED hgb noted to be 7.4 but  saturation 90-94% with supplemental oxygen, placed on BIPAP.  ICU consulted for further management.     Palliative care consulted for GOC and pt readmission history. Pt seen and examined at bedside this morning. She was having difficulty talking for an extended period of time. Denied any pain however. States she is not hungry.    PERTINENT PM/SXH:   COPD (chronic obstructive pulmonary disease)    Former smoker    Breast cancer    Smoker    DM (diabetes mellitus)    HTN (hypertension)    COPD exacerbation    COPD, moderate    HTN (hypertension)    Diabetes    COPD, severe    CAD (coronary artery disease)    Breast cancer      H/O left mastectomy    H/O breast surgery    History of cardiac cath    No significant past surgical history    H/O left mastectomy      FAMILY HISTORY:  Family history of heart disease (Sibling)    Family history of gout (Father, Mother)    FH: coronary artery disease (Father)      Family Hx substance abuse [ ]yes [ ]no  ITEMS NOT CHECKED ARE NOT PRESENT    SOCIAL HISTORY:   Significant other/partner[ ]  Children[ X]  Mosque/Spirituality: n/a  Substance hx:  [ ]   Tobacco hx:  [ X]   Alcohol hx: [ ]   Living Situation:  [ X]Rehab- [facility]    ADVANCE DIRECTIVES:    DNR/MOLST - FULL CODE  DECISION MAKER(s):  [ ] Health Care Proxy(s)  [ X] Surrogate(s) - Maximiliano Gonzales      Name(s): Phone Number(s): [phone]    BASELINE (I)ADL(s) (prior to admission):  [place]:  Dependent    Allergies  penicillin (Hives)  penicillin (Short breath)  penicillin (Other)    Intolerances    MEDICATIONS  (STANDING):  albuterol    0.083% 2.5 milliGRAM(s) Nebulizer every 6 hours  atorvastatin 80 milliGRAM(s) Oral at bedtime  budesonide 160 MICROgram(s)/formoterol 4.5 MICROgram(s) Inhaler 2 Puff(s) Inhalation two times a day  buPROPion XL (24-Hour) . 150 milliGRAM(s) Oral daily  cefepime   IVPB 2000 milliGRAM(s) IV Intermittent every 12 hours  chlorhexidine 2% Cloths 1 Application(s) Topical <User Schedule>  collagenase Ointment 1 Application(s) Topical daily  dextrose 5%. 1000 milliLiter(s) (50 mL/Hr) IV Continuous <Continuous>  dextrose 50% Injectable 25 Gram(s) IV Push once  diltiazem    milliGRAM(s) Oral daily  doxazosin 2 milliGRAM(s) Oral at bedtime  dronabinol 5 milliGRAM(s) Oral daily  ferrous    sulfate 325 milliGRAM(s) Oral daily  insulin lispro (ADMELOG) corrective regimen sliding scale   SubCutaneous Before meals and at bedtime  letrozole 2.5 milliGRAM(s) Oral daily  magnesium sulfate  IVPB 2 Gram(s) IV Intermittent every 2 hours  methylPREDNISolone sodium succinate Injectable 40 milliGRAM(s) IV Push every 12 hours  mirtazapine 7.5 milliGRAM(s) Oral at bedtime  nicotine -   7 mG/24Hr(s) Patch 1 Patch Transdermal daily  pantoprazole   Suspension 40 milliGRAM(s) Oral before breakfast  polyethylene glycol 3350 17 Gram(s) Oral daily  potassium chloride   Solution 40 milliEquivalent(s) Oral once  potassium phosphate / sodium phosphate Powder (PHOS-NaK) 2 Packet(s) Oral once  roflumilast 500 MICROGram(s) Oral daily    MEDICATIONS  (PRN):  ALPRAZolam 0.25 milliGRAM(s) Oral every 8 hours PRN anxiety  bisacodyl Suppository 10 milliGRAM(s) Rectal daily PRN Constipation  melatonin 3 milliGRAM(s) Oral at bedtime PRN for insomnia  ondansetron    Tablet 4 milliGRAM(s) Oral four times a day PRN for nausea    PRESENT SYMPTOMS:      Pain:  no  QOL impact -   Location -                    Aggravating factors -  Quality -  Radiation -  Timing-  Severity (0-10 scale):  Minimal acceptable level (0-10 scale):       Dyspnea:                           Severe  Anxiety:                             Mild    Depressed:                    mild  Fatigue:                             Severe  Nausea:                            none  Loss of appetite:              Moderate   Constipation:                   none      Other Symptoms:  [X ]All other review of systems negative       Chaplaincy Referral:   Deferred   Palliative Performance Status Version 2:      30   %    [email or web address]    PHYSICAL EXAM:  Vital Signs Last 24 Hrs  T(C): 36.6 (29 Jan 2024 07:00), Max: 36.7 (28 Jan 2024 14:00)  T(F): 97.8 (29 Jan 2024 07:00), Max: 98.1 (28 Jan 2024 14:00)  HR: 119 (29 Jan 2024 10:00) (87 - 122)  BP: 128/56 (29 Jan 2024 10:00) (106/82 - 145/79)  BP(mean): 76 (29 Jan 2024 10:00) (74 - 100)  RR: 18 (29 Jan 2024 10:00) (14 - 33)  SpO2: 93% (29 Jan 2024 10:00) (89% - 100%)    Parameters below as of 29 Jan 2024 07:00  Patient On (Oxygen Delivery Method): nasal cannula  O2 Flow (L/min): 2   I&O's Summary    28 Jan 2024 07:01  -  29 Jan 2024 07:00  --------------------------------------------------------  IN: 60 mL / OUT: 0 mL / NET: 60 mL    29 Jan 2024 07:01  -  29 Jan 2024 10:42  --------------------------------------------------------  IN: 150 mL / OUT: 0 mL / NET: 150 mL      GENERAL:  Cachexia, tired and weak appearing  HEENT: NC/AT, dry mucous membranes  PULMONARY: diminished breath sounds throughout, tachypneic   CARDIOVASCULAR:  tachycardic  GASTROINTESTINAL: soft, nontender  Last BM: 1/26  MUSCULOSKELETAL: +weakness, no edema to ble  NEUROLOGIC: +dysphagia, A&Ox3  SKIN: see nursing assessment for details      LABS:                        9.3    10.56 )-----------( 246      ( 29 Jan 2024 05:00 )             29.3   01-29    142  |  106  |  15  ----------------------------<  126<H>  3.4<L>   |  34<H>  |  0.22<L>    Ca    8.7      29 Jan 2024 05:00  Phos  2.1     01-29  Mg     1.4     01-29    TPro  5.8<L>  /  Alb  1.9<L>  /  TBili  0.4  /  DBili  x   /  AST  15  /  ALT  21  /  AlkPhos  82  01-29      Urinalysis Basic - ( 29 Jan 2024 05:00 )    Color: x / Appearance: x / SG: x / pH: x  Gluc: 126 mg/dL / Ketone: x  / Bili: x / Urobili: x   Blood: x / Protein: x / Nitrite: x   Leuk Esterase: x / RBC: x / WBC x   Sq Epi: x / Non Sq Epi: x / Bacteria: x      RADIOLOGY & ADDITIONAL STUDIES:    PROCEDURE DATE:  01/27/2024          INTERPRETATION:  HISTORY: Admitting Dxs: J44.1 CHRONIC OBSTRUCTIVE   PULMONARY DISEASE W (ACUTE) EXACERBATION;  Hypoxia;  TECHNIQUE: Portable frontal view of the chest, 1 view.  COMPARISON: 1/26/2024.  FINDINGS/  IMPRESSION:    Right Port-A-Cath access catheter tip is in the cavoatrial junction.  HEART:  Enlarged.  LUNGS: COPD. Left lower lobe infiltrate, similar to prior. No   pneumothorax. No significant effusion.. Nipple shadow at the right base.  BONES: degenerative changes    --- End of Report ---      PROTEIN CALORIE MALNUTRITION PRESENT: SEVERE  [email or web address]    Height (cm): 149.9 (01-25-24 @ 11:52), 149.9 (12-29-23 @ 12:20), 149.9 (12-04-23 @ 08:39)  Weight (kg): 36.3 (01-25-24 @ 11:52), 38.6 (12-29-23 @ 12:20), 40 (12-04-23 @ 08:39)  BMI (kg/m2): 16.2 (01-25-24 @ 11:52), 17.2 (12-29-23 @ 12:20), 17.8 (12-04-23 @ 08:39)    [ ]PPSV2 < or = to 30% [ X]significant weight loss  [X ]poor nutritional intake  [ ]anasarca[ ]Artificial Nutrition      Other REFERRALS:  [ ]Hospice  [ ]Child Life  [ ]Social Work  [ ]Case management [ ]Holistic Therapy

## 2024-01-29 NOTE — PROGRESS NOTE ADULT - ASSESSMENT
Physical Examination:  GENERAL:               Alert, Oriented, no acute distress.  cachectic   HEENT:                    No JVD, Moist MM, bitemporal waisting   PULM:                     Bilateral air entry, diminished to auscultation bilaterally, no significant sputum production, No Rales, No Rhonchi, No Wheezing  CVS:                         S1, S2,  + Murmur  ABD:                        Soft, nondistended, nontender, normoactive bowel sounds,   EXT:                         No edema, nontender, No Cyanosis or Clubbing    NEURO:                  Alert, oriented, interactive, nonfocal, follows commands  PSYC:                      Calm, + Insight.    Assessment    67 y/o F with PMH muscle wasting and atrophy, COPD on home o2 2L NC (prior intubation/ICU), thromboembolism, DVTs of the lower extremity, type 2 DM, left mastectomy, anxiety disorder, iron deficiency anemia, malignant neoplasm of the breast in remission, HLD, HTN, now admitted to the ICU for COPD exacerbation and sepsis 2/2 PNA.       Problem List  Acute on chronic COPD exacerbation in ex smoker and underlying severe copd on home o2  Acute hypercarbic respiratory failure  Pneumonia  Anemia,   H/o DVT on xaralto  h/o CAD s/p PCI on asa  h/o breast cancer    Plan  Continue IV Steroids, taper to oral in am   anticholinergics held due to urinary retention  Symbicort, daliresp  finish course of antibiotics  NIV PRN/didnt use overnight       check stool occult blood to restart asa/a/c   if h/h stable will start in am    last bm 1/26    Continue letrozole     Insulin SS    pt OOB   Diet, Minced and Moist:   Mk(7 Gm Arginine/7 Gm Glut/1.2 Gm HMB     Qty per Day:  BID  Supplement Feeding Modality:  Oral  Ensure Plus High Protein Cans or Servings Per Day:  1       Frequency:  Three Times a day (01-26-24 @ 10:07) [Active]        Xanax PRN  for anxiety  palliative care eval as lace > 14    PMD:		J reyes		                   Notified(Date):1/27/24  Family Updated:  Maximiliano [phone]                                 Date:  1/29/24  Updated on status  discussed not eating much  discussed over all poor prognosis  discussed pallaitive care, and decide advanced directives   states he is out of town till Thursday, and wants to talk to patient furhter     GI PPx:			  pantoprazole  Injectable 40 milliGRAM(s) IV Push two times a day  polyethylene glycol 3350 17 Gram(s) Oral daily      DVT Ppx:	venodynes  ; xaralto on hold    Activity:		  Advance as tolerated  Head of Bed:               35-45 Deg  Glycemic Control:        insulin ss  Lines: PIV  CENTRAL LINE: 	[ ] YES [ ] NO	  port accessed unable to get PIV                  LOCATION:   	                       DATE INSERTED:   	                    REMOVE:  [ ] YES [ ] NO    A-LINE:  	                [ ] YES [ ] NO                      LOCATION:   	                       DATE INSERTED: 		            REMOVE:  [ ] YES [ ] NO    DEVLIN: 		        [ ] YES [ ] NO  		                                       DATE INSERTED:		            REMOVE:  [ ] YES [ ] NO      Restraints were deemed necessary to prevent removal of life-sustaining devices [x  ] YES   [    ]  NO    Disposition: possible tx to medical floor  Goals of Care: Full code

## 2024-01-29 NOTE — SWALLOW BEDSIDE ASSESSMENT ADULT - SWALLOW EVAL: CURRENT DIET
Neurology Progress Note    Bar Cabrera Patient Status:  Inpatient    1983 MRN [id number]   [facility]NE-A Attending Eloy Olivia MD   Hosp Day # 3 PCP None Pcp     Subjective:  Pt was seen and examined at bedside today./ Transf ganglia which could be due to subacute infarction, with other etiologies not entirely excluded. Continued follow-up is suggested.     MRI/MRA 9/5/19  CONCLUSION:  There is restricted diffusion involving the left basal ganglia including the caudate and the Minced and moist with thin

## 2024-01-29 NOTE — PROGRESS NOTE ADULT - ASSESSMENT
68F PMHx muscle wasting and atrophy, COPD (on home O2 2L NC, prior intubations in the past), Thromboembolism, DVTs of the lower extremity, T2DM,, anxiety disorder, iron deficiency anemia, malignant neoplasm of the breast s/p L mastectomy (in remission), HLD,  HTN presented [facility] for low Hb 6.5 on outpatient labs and SOB today. Admitted to MICU with:  Assessment:  1. Acute on Chronic Hypoxic / Hypercapnic Respiratory Failure   2. AECOPD  3. PNA    Plan:  NEURO:   -Xanax PRN. Wellbutrin QD, Remeron QHS.   -Monitor mental status closely, avoid neurosuppresants.   -Serial neurologic assessments.     CV:   -Maintain goal MAP >65.   -Keep K~4 and Mg>2 for optimal arrhythmia suppression.  -Cardizem QD for Afib. Lipitor QD.   -TTE with LVEF 50-55%.     PULM:  -Placed back on NIPPV tonight for increased WOB / SOB. Actively titrating FiO2 to maintain goal SpO2 >88%.    -Incentive spirometry, Chest PT/Pulmonary toilet, HOB >30'. Albuterol q6h, Symbicort QD, Spiriva BID. Prednisone taper.   -Respiratory status remains tenuous. Will attempt to wean off NIPPV tonight as able.     GI:   -NPO on NIPPV.   -Protonix QD.    RENAL:   -Renal function currently WNL.  -trend lytes/Scr daily with BMP  -I's and O's, goal UOP 0.5 cc/kg/hr  -renal dose meds and avoid nephrotoxins     ENDO:   -Aggressive glycemic control to limit FS glucose to <180mg/dl. ISS.     ID:   -Cx NGTD thus far. SputumCx pending. Empiric Cefepime course for PNA.   -ABX use and/or discontinuation based on discussion with ID in conjunction with clinical features, culture data, and judicious procalcitonin monitoring.    HEME:   -Xarelto QD.     GOC: Full Code.

## 2024-01-29 NOTE — PROGRESS NOTE ADULT - SUBJECTIVE AND OBJECTIVE BOX
Follow-up Critical Care Progress Note  Chief Complaint : Chronic obstructive pulmonary disease with acute exacerbation      pt feels weak  less sob today        Allergies :penicillin (Hives)  penicillin (Short breath)  penicillin (Other)      PAST MEDICAL & SURGICAL HISTORY:  COPD (chronic obstructive pulmonary disease)    Breast cancer    Smoker    DM (diabetes mellitus)  new diagnosis    HTN (hypertension)    COPD, moderate    HTN (hypertension)    Diabetes    COPD, severe    CAD (coronary artery disease)    Breast cancer    H/O left mastectomy    H/O breast surgery    History of cardiac cath    H/O left mastectomy        Medications:  MEDICATIONS  (STANDING):  albuterol    0.083% 2.5 milliGRAM(s) Nebulizer every 6 hours  atorvastatin 80 milliGRAM(s) Oral at bedtime  budesonide 160 MICROgram(s)/formoterol 4.5 MICROgram(s) Inhaler 2 Puff(s) Inhalation two times a day  buPROPion XL (24-Hour) . 150 milliGRAM(s) Oral daily  cefepime   IVPB 2000 milliGRAM(s) IV Intermittent every 12 hours  chlorhexidine 2% Cloths 1 Application(s) Topical <User Schedule>  collagenase Ointment 1 Application(s) Topical daily  dextrose 5%. 1000 milliLiter(s) (50 mL/Hr) IV Continuous <Continuous>  dextrose 50% Injectable 25 Gram(s) IV Push once  diltiazem    milliGRAM(s) Oral daily  doxazosin 2 milliGRAM(s) Oral at bedtime  dronabinol 5 milliGRAM(s) Oral daily  ferrous    sulfate 325 milliGRAM(s) Oral daily  insulin lispro (ADMELOG) corrective regimen sliding scale   SubCutaneous Before meals and at bedtime  letrozole 2.5 milliGRAM(s) Oral daily  magnesium sulfate  IVPB 2 Gram(s) IV Intermittent every 2 hours  methylPREDNISolone sodium succinate Injectable 40 milliGRAM(s) IV Push every 12 hours  mirtazapine 7.5 milliGRAM(s) Oral at bedtime  nicotine -   7 mG/24Hr(s) Patch 1 Patch Transdermal daily  pantoprazole   Suspension 40 milliGRAM(s) Oral before breakfast  polyethylene glycol 3350 17 Gram(s) Oral daily  potassium chloride   Solution 40 milliEquivalent(s) Oral once  potassium phosphate / sodium phosphate Powder (PHOS-NaK) 2 Packet(s) Oral once  roflumilast 500 MICROGram(s) Oral daily    MEDICATIONS  (PRN):  ALPRAZolam 0.25 milliGRAM(s) Oral every 8 hours PRN anxiety  bisacodyl Suppository 10 milliGRAM(s) Rectal daily PRN Constipation  melatonin 3 milliGRAM(s) Oral at bedtime PRN for insomnia  ondansetron    Tablet 4 milliGRAM(s) Oral four times a day PRN for nausea      Antibiotics History  cefepime   IVPB 2000 milliGRAM(s) IV Intermittent every 12 hours, 01-25-24 @ 15:04  levoFLOXacin IVPB 750 milliGRAM(s) IV Intermittent once, 01-25-24 @ 13:16, Stop order after: 1 Doses  vancomycin  IVPB 500 milliGRAM(s) IV Intermittent once, 01-25-24 @ 15:41, Stop order after: 1 Doses  vancomycin  IVPB 500 milliGRAM(s) IV Intermittent once, 01-25-24 @ 16:38, Stop order after: 1 Doses  vancomycin  IVPB    , 01-25-24 @ 16:50  vancomycin  IVPB 500 milliGRAM(s) IV Intermittent every 12 hours, 01-26-24 @ 06:00, Stop order after: 7 Days      Heme Medications       GI Medications  bisacodyl Suppository 10 milliGRAM(s) Rectal daily, 01-25-24 @ 14:55, Routine PRN  pantoprazole   Suspension 40 milliGRAM(s) Oral before breakfast, 01-29-24 @ 08:44, Routine  polyethylene glycol 3350 17 Gram(s) Oral daily, 01-26-24 @ 13:32, Routine      COVID  01-25-24 @ 12:26  COVID -   Detected<!>  12-27-23 @ 17:14  COVID -   Detected<!>  12-04-23 @ 09:46  COVID -   NotDetec  12-04-23 @ 06:30  COVID -   NotDetec  10-27-23 @ 10:15  COVID -   NotDetec  11-23-22 @ 21:44  COVID -   NotDetec  09-12-22 @ 10:47  COVID -   NotDetec  05-14-22 @ 21:30  COVID -   NotDetec      COVID Biomarkers    01-28-24 @ 06:00 ESR --  ---  CRP 77<H>  ---  DDimer  --   ---   LDH --   ---   Ferritin --    12-29-23 @ 06:41 ESR --  ---  CRP --  ---  DDimer  --   ---   <H>   ---   Ferritin --    12-28-23 @ 08:14 ESR --  ---  <H>  ---  DDimer  --   ---   LDH --   ---   Ferritin --    12-27-23 @ 20:15 ESR --  ---  CRP --  ---  DDimer  <150   ---   LDH --   ---   Ferritin --            Trend Cardiac Enzymes    Trend BNP  01-26-24 @ 05:15   -  855<H>  01-25-24 @ 12:24   -  1950<H>  12-04-23 @ 04:00   -  206  09-12-22 @ 10:47   -  29    Procalcitonin Trend  01-25-24 @ 18:00   -   7.48<H>  12-28-23 @ 08:14   -   0.56<H>  12-05-23 @ 12:37   -   0.32<H>    WBC Trend  01-29-24 @ 05:00   -  10.56<H>  01-28-24 @ 06:00   -  12.82<H>  01-27-24 @ 18:30   -  12.27<H>  01-27-24 @ 08:00   -  13.22<H>  01-27-24 @ 06:00   -  13.46<H>    H/H Trend  01-29-24 @ 05:00   -   9.3<L>/ 29.3<L>  01-28-24 @ 06:00   -   9.1<L>/ 28.4<L>  01-27-24 @ 18:30   -   9.1<L>/ 28.4<L>  01-27-24 @ 08:00   -   6.2<LL>/ 19.8<LL>  01-27-24 @ 06:00   -   6.2<LL>/ 20.3<LL>  01-26-24 @ 09:40   -   7.7<L>/ 26.0<L>    Stool Occult Blood  12-05-23 @ 20:55   -   Positive<!>  11-07-23 @ 18:16   -   Positive<!>  11-05-23 @ 16:51   -   Negative    Platelet Trend  01-29-24 @ 05:00   -  246  01-28-24 @ 06:00   -  239  01-27-24 @ 18:30   -  270  01-27-24 @ 08:00   -  273  01-27-24 @ 06:00   -  261    Trend Sodium  01-29-24 @ 05:00   -  142  01-28-24 @ 06:00   -  142  01-27-24 @ 06:00   -  141    Trend Potassium  01-29-24 @ 05:00   -  3.4<L>  01-28-24 @ 06:00   -  4.0  01-27-24 @ 06:00   -  4.5    Trend Bun/Cr  01-29-24 @ 05:00  BUN/CR -  15 / 0.22<L>  01-28-24 @ 06:00  BUN/CR -  16 / 0.22<L>  01-27-24 @ 06:00  BUN/CR -  32<H> / 0.25<L>    Lactic Acid Trend  01-25-24 @ 18:00   -   2.2<H>  01-25-24 @ 13:50   -   3.0<H>    ABG Trend  01-26-24 @ 04:45   - 7.53<H>/37<H>/140<H>/99.1<H>  01-25-24 @ 12:05   - 7.49<H>/39<H>/105/98.7<H>  12-27-23 @ 18:51   - 7.46<H>/36<H>/90/98.7<H>  12-04-23 @ 03:20   - 7.44/43<H>/349<H>/99.4<H>  11-05-23 @ 13:27   - 7.49<H>/44<H>/110<H>/97.2  11-05-23 @ 09:40   - 7.46<H>/47<H>/95/97.3  11-04-23 @ 05:00   - 7.51<H>/48<H>/107/96.7  11-03-23 @ 17:53   - 7.50<H>/47<H>/148<H>/98.4<H>  11-03-23 @ 15:11   - 7.31<L>/75<HH>/115<H>/98.1<H>  11-03-23 @ 04:00   - 7.41/61<H>/216<H>/98.0  11-02-23 @ 12:18   - 7.44/51<H>/85/97.2  11-02-23 @ 10:09   - 7.46<H>/50<H>/102/98.0    Trend AST/ALT/ALK Phos/Bili  01-29-24 @ 05:00   15/21/82/0.4  01-28-24 @ 06:00   14/16/85/0.5  01-27-24 @ 06:00   12/14/87/0.3  01-26-24 @ 09:40   15/15/87/0.4  01-25-24 @ 12:24   16/17/82/0.3  12-30-23 @ 08:45   36/30/63/0.4  12-29-23 @ 06:41   24/28/59/0.2  12-28-23 @ 08:14   52<H>/32/57/0.4  12-27-23 @ 17:14   42<H>/32/60/0.4  12-07-23 @ 06:27   27/35/79/0.3  12-06-23 @ 06:00   25/28/76/0.3  12-05-23 @ 05:20   24/32/70/0.3         Albumin Trend  01-29-24 @ 05:00   -   1.9<L>  01-28-24 @ 06:00   -   1.8<L>  01-27-24 @ 06:00   -   1.7<L>  01-26-24 @ 09:40   -   1.9<L>  01-25-24 @ 12:24   -   2.1<L>  12-30-23 @ 08:45   -   1.6<L>      PTT - PT - INR Trend  01-25-24 @ 12:24   -   41.1<H> - 12.6 - 1.08  12-28-23 @ 08:14   -   37.1<H> - 11.8 - 1.04  12-04-23 @ 04:00   -   30.5 - 21.8<H> - 1.95<H>  10-27-23 @ 10:15   -   51.6<H> - 13.9<H> - 1.19<H>    Glucose Trend  01-29-24 @ 07:52   -  -- -- 145<H>  01-29-24 @ 05:00   -  126<H> -- --  01-28-24 @ 21:18   -  -- -- 179<H>  01-28-24 @ 17:09   -  -- -- 139<H>  01-28-24 @ 13:04   -  -- -- 175<H>  01-28-24 @ 08:55   -  -- -- 153<H>  01-28-24 @ 06:00   -  108<H> -- --  01-27-24 @ 21:46   -  -- -- 215<H>  01-27-24 @ 17:15   -  -- -- 167<H>  01-27-24 @ 12:45   -  -- -- 211<H>    A1C with Estimated Average Glucose Result: 5.1 % [4.0 - 5.6] (12-28-23 @ 08:14)  A1C with Estimated Average Glucose Result: 5.3 % [4.0 - 5.6] (10-28-23 @ 05:00)      LABS:                        9.3    10.56 )-----------( 246      ( 29 Jan 2024 05:00 )             29.3     01-29    142  |  106  |  15  ----------------------------<  126<H>  3.4<L>   |  34<H>  |  0.22<L>    Ca    8.7      29 Jan 2024 05:00  Phos  2.1     01-29  Mg     1.4     01-29    TPro  5.8<L>  /  Alb  1.9<L>  /  TBili  0.4  /  DBili  x   /  AST  15  /  ALT  21  /  AlkPhos  82  01-29     CULTURES: (if applicable)    Culture - Blood (collected 01-25-24 @ 13:50)  Source: .Blood Blood-Peripheral  Preliminary Report (01-28-24 @ 19:01):    No growth at 72 Hours    Culture - Blood (collected 01-25-24 @ 13:50)  Source: .Blood Blood-Peripheral  Preliminary Report (01-28-24 @ 19:01):    No growth at 72 Hours          VITALS:  T(C): 36.6 (01-29-24 @ 07:00), Max: 36.7 (01-28-24 @ 14:00)  T(F): 97.8 (01-29-24 @ 07:00), Max: 98.1 (01-28-24 @ 14:00)  HR: 114 (01-29-24 @ 09:00) (87 - 122)  BP: 129/59 (01-29-24 @ 09:00) (106/82 - 145/79)  BP(mean): 77 (01-29-24 @ 09:00) (74 - 100)  ABP: --  ABP(mean): --  RR: 29 (01-29-24 @ 09:00) (14 - 33)  SpO2: 96% (01-29-24 @ 09:00) (89% - 100%)  CVP(mm Hg): --  CVP(cm H2O): --    Ins and Outs     01-28-24 @ 07:01  -  01-29-24 @ 07:00  --------------------------------------------------------  IN: 60 mL / OUT: 0 mL / NET: 60 mL    01-29-24 @ 07:01  -  01-29-24 @ 09:53  --------------------------------------------------------  IN: 125 mL / OUT: 0 mL / NET: 125 mL                I&O's Detail    28 Jan 2024 07:01  -  29 Jan 2024 07:00  --------------------------------------------------------  IN:    Oral Fluid: 60 mL  Total IN: 60 mL    OUT:  Total OUT: 0 mL    Total NET: 60 mL      29 Jan 2024 07:01  -  29 Jan 2024 09:53  --------------------------------------------------------  IN:    IV PiggyBack: 25 mL    Oral Fluid: 100 mL  Total IN: 125 mL    OUT:  Total OUT: 0 mL    Total NET: 125 mL

## 2024-01-29 NOTE — SWALLOW BEDSIDE ASSESSMENT ADULT - PHARYNGEAL PHASE
Cough post oral intake Cough post oral intake/Multiple swallows Cough post oral intake/Delayed cough post oral intake

## 2024-01-29 NOTE — CONSULT NOTE ADULT - TIME BILLING
Total time spent including the following  [X] Physical chart review and documentation   An extensive review of the physical chart, electronic health record, and documentation was conducted to obtain collateral information including but not limited to:  - Current inpatient records (ED, H&P, primary team, and consultants if applicable)  - Inpatient values/results (biomarkers, immunoassays, imaging, and microbiology results)  - Outpatient records  - Prior inpatient records (if available)  - Social work assessments  - Current or proposed treatment plans  - Pharmacotherapy review (including I-STOP if applicable)  []review of medical literature related to pathogenesis, disease/illness progress, treatment and/or prognosis  []care coordination  [X]discussion with the primary team - spoke with Dr. Edgar  [X]discussion with floor staff - spoke to nurse  []discussion with consultant(s)  [X]discussion with the patient, surrogate decision maker, or family  [X]Physical Exam and/or review of systems   [X]Formulation of assessment and plan   []Evaluating for response to treatment and side effects of opioids or benzodiazepines

## 2024-01-29 NOTE — SWALLOW BEDSIDE ASSESSMENT ADULT - COMMENTS
WBC: 10.56  Chest XR 1/27: LUNGS: COPD. Left lower lobe infiltrate, similar to prior. No   pneumothorax. No significant effusion.. Nipple shadow at the right base.  MBSS 11/16: "Impressions: Patient presents with mild oropharyngeal dysphagia secondary to acute illness. Primary deficits significant for prolonged oral management of advanced consistencies and reduced airway protection. Patient at increased risk for fatigue during meals secondary to debility and the impact of respiratory related comorbidities on respiratory function and fluctuations. · Recommended Consistencies	Recommendations: 1) Easy to chew solids with thin liquids 2) General aspiration precautions 3) Therapeutic meal trials to determine candidacy for diet upgrade with SLP only 4) Pacing during meals to prevent fatigue 5) Assistance with meals 6) This service to f/u to assess tolerance of diet"

## 2024-01-29 NOTE — CONSULT NOTE ADULT - CONVERSATION DETAILS
Introduced palliative care team to the pt at bedside. Pt is familiar to myself at prior readmission. We discussed the pt's goals and she shared she wanted to get back to rehab for therapy but stated she cannot currently due to her weak condition. Supportive care given. We discussed code status and Tracy stated she would want chest compressions performed if she had cardiac arrest. She also stated she would want intubation if needed for respiratory arrest. When discussing this further, she asked for her brother Maximiliano to be contacted. Pt is FULL CODE    Spoke with pt's brother Maximiliano on the phone, whom I have spoken to before at a prior admission for the patient. We discussed the pt's current clinical status and her S&S eval this morning. He shared that he and Tracy had spoke at the rehab last week regarding PEG tube and that Tracy expressed she was open to having it placed for nutritional means. We then discussed her overall clinical status including her weakness, debility, weight loss, multiple readmissions and overall poor prognosis. I expressed to Maximiliano that having a feeding tube placed will not significantly change her overall clinical status and encouraged him to discuss this with his family including Tracy's daughters. Maximiliano stated he was away currently but will try to figure out how to have a conversation with his family together about Tracy. I also shared with Maximiliano my conversation with Tracy regarding code status. He stated he will discuss with Tracy as well. I encouraged him to do so this week if possible as she if frail, currently in the ICU and very weak. Supportive care given.

## 2024-01-29 NOTE — SWALLOW BEDSIDE ASSESSMENT ADULT - SWALLOW EVAL: DIAGNOSIS
Pt presenting with clinical signs of oropharyngeal dysphagia due to deconditioning due to acute illness and compromised respiratory status. Adequate oral grading to spoon, prolonged and inefficient mastication of solids. Coughing prior to initiation of swallow with minced and moist and immediately following swallow suggestive of airway invasion. Delayed and effortful manipulation of puree texture. Adquate oral containment of thin liquids with suspected delayed swallow trigger. Pharyngeal motor response appreciated. Congested cough following trials of thin liquids via cup, straw, mildly thick liquids via teaspoon, puree, minced and moist textures suggestive of airway invasion. Increased RR and work of breathing following all trials. Pt with subjective report of increased difficulty eating. Recommend NPO except meds crushed in puree at this time.

## 2024-01-29 NOTE — CONSULT NOTE ADULT - ASSESSMENT
67 y/o F with PMH muscle wasting and atrophy, COPD on home o2 2L NC (prior intubation/ICU), thromboembolism, DVTs of the lower extremity, type 2 DM, left mastectomy, anxiety disorder, iron deficiency anemia, malignant neoplasm of the breast in remission,  HLD,  HTN presented [facility] for low Hb 6.5 on outpatient labs and SOB. Pt in ICU for Acute hypercarbic respiratory failure, COPD exacerbation and PNA; palliative care c/s for GOC.    Debility  - PPS 30-40%  - Turn and position  - PT rec MARK. Concern pt cannot tolerate    Dysphagia  - previously tolerating diet but fail S&S eval today, rec as NPO today except meds  - Discussions regarding feeding tube had previously, see GOC note above    Failure to thrive; severe calorie malnutrition  - C/w Marinol, remeron   - Discussions regarding feeding tube had previously, see GOC note above    Acute on chronic COPD exacerbation in ex smoker and underlying severe copd on home o2; Acute hypercarbic respiratory failure; Pneumonia  - antibiotics/steroids per primary team    Advanced care planning  - Surrogate: Maximiliano Gonzales [phone]  - FULL CODE    Encounter for palliative care  - Introduced the palliative care team to pt at bedside, see GOC note above.   - Will continue to follow for ongoing GOC conversations and supportive care.

## 2024-01-29 NOTE — PROVIDER CONTACT NOTE (OTHER) - RECOMMENDATIONS
Consistent carb diet, and Hgb A1C, as pt is currently on minced moist, and last Hgb A1C from prior admission

## 2024-01-29 NOTE — SWALLOW BEDSIDE ASSESSMENT ADULT - SLP GENERAL OBSERVATIONS
Received in bed on 2L of O2 via nasal cannula, reporting SOB encouraged breathing strategies/techniques. SPO2 >94% throughout assessment. Subjective increased work of breathing with intake.

## 2024-01-30 NOTE — PROGRESS NOTE ADULT - ASSESSMENT
69 y/o F with PMH muscle wasting and atrophy, COPD on home o2 2L NC (prior intubation/ICU), thromboembolism, DVTs of the lower extremity, type 2 DM, left mastectomy, anxiety disorder, iron deficiency anemia, malignant neoplasm of the breast in remission,  HLD,  HTN presented [facility] for low Hb 6.5 on outpatient labs and SOB. Pt in ICU for Acute hypercarbic respiratory failure, COPD exacerbation and PNA; palliative care c/s for GOC.    Debility  - PPS 30-40%  - Turn and position  - PT rec MARK. Concern pt cannot tolerate    Dysphagia  - previously tolerating diet but fail S&S eval yesterday, rec as NPO except meds. Plan for re eval today  - Discussions regarding feeding tube had previously with brother Maximiliano     Failure to thrive; severe calorie malnutrition  - C/w Marinol remeron   - Discussions regarding feeding tube had previously     Acute on chronic COPD exacerbation in ex smoker and underlying severe copd on home o2; Acute hypercarbic respiratory failure; Pneumonia  - antibiotics/steroids per primary team    Advanced care planning  - Surrogate: Maximiliano Gonzales [phone]  - FULL CODE    Encounter for palliative care  - Spoke with pt at bedside regarding my conversation with Maximiliano yesterday about feeding tubes. I explained to the pt that a NGT is temporary and a permanent feeding tube would be PEG. I asked if this was something she wanted to pursue and she states "I don't know". Pt defers the decisions regarding her medical care to her brother Maximiliano. I expressed my concerns to the pt that I did not think a feeding tube purely for nutritional purposes was going to change her overall clinical status. She appeared emotional but nodded to understanding my concerns. I discussed that we would talk further about this this coming week once Maximiliano is more available.   - Will continue to follow for ongoing GOC conversations and supportive care.

## 2024-01-30 NOTE — PROGRESS NOTE ADULT - SUBJECTIVE AND OBJECTIVE BOX
Significant recent/past 24 hr events:    -intermittent use of Bipap for SOB  - Hg stable, restarting AC     Subjective:   Patient is a 68y old  Female who presents with a chief complaint of COPD exacerbation (29 Jan 2024 20:00)    HPI:   68F PMHx muscle wasting and atrophy, COPD (on home O2 2L NC, prior intubations in the past), Thromboembolism, DVTs of the lower extremity, T2DM,, anxiety disorder, iron deficiency anemia, malignant neoplasm of the breast s/p L mastectomy (in remission), HLD,  HTN presented [facility] for low Hb 6.5 on outpatient labs and SOB today. Admitted to MICU with Acute on Chronic Hypoxic / Hypercapnic Respiratory Failure secondary to acute COPD exacerbation and PNA.     PAST MEDICAL & SURGICAL HISTORY:  COPD (chronic obstructive pulmonary disease)      Breast cancer      Smoker      DM (diabetes mellitus)  new diagnosis      HTN (hypertension)      COPD, moderate      HTN (hypertension)      Diabetes      COPD, severe      CAD (coronary artery disease)      Breast cancer      H/O left mastectomy      H/O breast surgery      History of cardiac cath      H/O left mastectomy        FAMILY HISTORY:  Family history of heart disease (Sibling)    Family history of gout (Father, Mother)    FH: coronary artery disease (Father)        Vitals   ICU Vital Signs Last 24 Hrs  T(C): 36.6 (30 Jan 2024 07:00), Max: 36.7 (29 Jan 2024 20:00)  T(F): 97.9 (30 Jan 2024 07:00), Max: 98 (29 Jan 2024 20:00)  HR: 111 (30 Jan 2024 09:13) (93 - 120)  BP: 132/80 (30 Jan 2024 09:00) (116/55 - 141/79)  BP(mean): 97 (30 Jan 2024 09:00) (71 - 98)  ABP: --  ABP(mean): --  RR: 24 (30 Jan 2024 09:11) (14 - 36)  SpO2: 100% (30 Jan 2024 09:13) (93% - 100%)    O2 Parameters below as of 30 Jan 2024 09:13  Patient On (Oxygen Delivery Method): nasal cannula      PHYSICAL EXAM:   Constitutional: NAD, cachetic, ill appearing, mildly anxious.   HEENT: normocephalic, atraumatic , conjunctiva normal B/L.  PERRLA, EOMI, No JVD,  trachea is midline, oval cavity dry.   Respiratory: +B/L BS, + rhonchi over R base, , no accessory muscle use noted, no wheezes, rales  Cardiovascular: Regular rate, regular rhythm, normal S1, S2; no rubs, clicks, gallops murmurs,  no JVD, no peripheral edema  Abdomen:  Soft, non-tender, non distended, + BS,  no masses felt  Extremities:  no peripheral edema,, 2 + pulses throughout  Neurological: A+O x 3 Cn2-12 grossly intact, no focal deficit  Psychiatric: calm, normal mood, normal affect  Skin: warm, dry, well perfused, no rashes    I&O's Detail    29 Jan 2024 07:01  -  30 Jan 2024 07:00  --------------------------------------------------------  IN:    IV PiggyBack: 100 mL    IV PiggyBack: 200 mL    IV PiggyBack: 100 mL    Oral Fluid: 200 mL  Total IN: 600 mL    OUT:    Voided (mL): 600 mL  Total OUT: 600 mL    Total NET: 0 mL          LABS                        9.9    14.84 )-----------( 276      ( 30 Jan 2024 05:00 )             31.7     01-30    141  |  106  |  13  ----------------------------<  116<H>  5.5<H>   |  31  |  0.27<L>    Ca    8.4      30 Jan 2024 05:00  Phos  1.9     01-30  Mg     2.3     01-30    TPro  5.8<L>  /  Alb  1.9<L>  /  TBili  0.4  /  DBili  x   /  AST  15  /  ALT  21  /  AlkPhos  82  01-29    LIVER FUNCTIONS - ( 29 Jan 2024 05:00 )  Alb: 1.9 g/dL / Pro: 5.8 g/dL / ALK PHOS: 82 U/L / ALT: 21 U/L / AST: 15 U/L / GGT: x           Urinalysis Basic - ( 30 Jan 2024 05:00 )    Color: x / Appearance: x / SG: x / pH: x  Gluc: 116 mg/dL / Ketone: x  / Bili: x / Urobili: x   Blood: x / Protein: x / Nitrite: x   Leuk Esterase: x / RBC: x / WBC x   Sq Epi: x / Non Sq Epi: x / Bacteria: x      POCT Blood Glucose.: 131 mg/dL *H* (01-30-24 @ 05:01)  POCT Blood Glucose.: 162 mg/dL *H* (01-29-24 @ 23:30)  POCT Blood Glucose.: 86 mg/dL (01-29-24 @ 17:35)  POCT Blood Glucose.: 266 mg/dL *H* (01-29-24 @ 11:28)        MEDICATIONS  (STANDING):  albuterol    0.083% 2.5 milliGRAM(s) Nebulizer every 6 hours  ALPRAZolam 0.25 milliGRAM(s) Oral every 12 hours  ascorbic acid 500 milliGRAM(s) Oral daily  atorvastatin 80 milliGRAM(s) Oral at bedtime  budesonide 160 MICROgram(s)/formoterol 4.5 MICROgram(s) Inhaler 2 Puff(s) Inhalation two times a day  buPROPion XL (24-Hour) . 150 milliGRAM(s) Oral daily  cefepime   IVPB 2000 milliGRAM(s) IV Intermittent every 12 hours  chlorhexidine 2% Cloths 1 Application(s) Topical <User Schedule>  collagenase Ointment 1 Application(s) Topical daily  dextrose 5%. 1000 milliLiter(s) (50 mL/Hr) IV Continuous <Continuous>  dextrose 50% Injectable 25 Gram(s) IV Push once  diltiazem    milliGRAM(s) Oral daily  doxazosin 2 milliGRAM(s) Oral at bedtime  dronabinol 5 milliGRAM(s) Oral daily  ferrous    sulfate 325 milliGRAM(s) Oral daily  insulin lispro (ADMELOG) corrective regimen sliding scale   SubCutaneous every 6 hours  letrozole 2.5 milliGRAM(s) Oral daily  mirtazapine 7.5 milliGRAM(s) Oral at bedtime  multivitamin 1 Tablet(s) Oral daily  mupirocin 2% Nasal 1 Application(s) Both Nostrils two times a day  nicotine -   7 mG/24Hr(s) Patch 1 Patch Transdermal daily  pantoprazole   Suspension 40 milliGRAM(s) Oral before breakfast  polyethylene glycol 3350 17 Gram(s) Oral daily  predniSONE   Tablet 40 milliGRAM(s) Oral daily  rivaroxaban 10 milliGRAM(s) Oral with dinner  roflumilast 500 MICROGram(s) Oral daily  zinc sulfate 220 milliGRAM(s) Oral daily    MEDICATIONS  (PRN):  ALPRAZolam 0.25 milliGRAM(s) Oral every 8 hours PRN anxiety  bisacodyl Suppository 10 milliGRAM(s) Rectal daily PRN Constipation  melatonin 3 milliGRAM(s) Oral at bedtime PRN for insomnia  ondansetron    Tablet 4 milliGRAM(s) Oral four times a day PRN for nausea      Allergies:  penicillin (Hives)  penicillin (Short breath)  penicillin (Other)    A/P:    68F PMHx muscle wasting and atrophy, COPD (on home O2 2L NC, prior intubations in the past), Thromboembolism, DVTs of the lower extremity, T2DM,, anxiety disorder, iron deficiency anemia, malignant neoplasm of the breast s/p L mastectomy (in remission), HLD,  HTN presented [facility] for low Hb 6.5 on outpatient labs and SOB today. Admitted to MICU with acute on chronic hypoxic/hypercapenic respiratory failure, acute COPD exacerbation, and PNA.     # PULM:   -pt is severe end stage obstructive lung disease with multiple admissions in recent past.   -Dependent  on NIPPV intermittently throughout the day ofr increased WOB.  Actively attempting to wean to high flow or NC during waking hours with goal Sp02 >88%  -Empiric treatment of PNA with Cefepime 2g Q 12 hrs with improvement noted in respiratory status.   -Incentive spirometry, Chest PT/Pulmonary toilet, HOB >30'. Albuterol q6h, Daliresp, Symbicort QD, Spiriva BID. Prednisone taper.      # CV:   - h/o of Chronic Afib rate on Cardizem, currently NSR, restarting Xarelto today    - c/ w Lipitor,  Cardura   - Maintain goal MAP >65.   -Keep K~4 and Mg>2 for optimal arrhythmia suppression.  -TTE from10/23 with LVEF 50-55%.     # ID:   - BCx NGTD    - Empiric Cefepime course for PNA.   - RVP remains + Covid from past admission on 12/2023. Afebrile resp status remains tenuous but stable,     # NEURO:   - known h/o anxiety tim in setting of SOB. Xanax PRN. Wellbutrin QD, Remeron QHS.   -Monitor mental status closely. Serial neurologic assessments.     # GI:   -given debility and frequent PNAs, must r/o aspiration. Speech and swallowing pending pt's ability to tolerate being off NIPPV or high flow supplemental for duration  of evaluation. S/S ordered and will attempt when patient is able.    -Protonix QD.  -No active sign of GI bleed, FOB pending     # HEME:   - Hg remains stable- 9.9/31 s/p 1 prbc transfused on admission on  1/25 for anemia.  -given h/o of PE/DVTs and AFib with  restart Xarelto QD.     # RENAL:   -Renal function currently WNL.  -trend lytes/Scr daily with BMP  -I's and O's, goal UOP 0.5 cc/kg/hr    # ENDO:   -monitor FS Q 6 hrs in setting of steroid use,  -TFTs normal     # GOC: Full Code- Dr. Hawk spoke with family and update on status.                  Significant recent/past 24 hr events:    -intermittent use of Bipap for SOB  - Hg stable, restarting AC     Subjective:   Patient is a 68y old  Female who presents with a chief complaint of COPD exacerbation (29 Jan 2024 20:00)    HPI:   68F PMHx muscle wasting and atrophy, COPD (on home O2 2L NC, prior intubations in the past), Thromboembolism, DVTs of the lower extremity, T2DM,, anxiety disorder, iron deficiency anemia, malignant neoplasm of the breast s/p L mastectomy (in remission), HLD,  HTN presented [facility] for low Hb 6.5 on outpatient labs and SOB today. Admitted to MICU with Acute on Chronic Hypoxic / Hypercapnic Respiratory Failure secondary to acute COPD exacerbation and PNA.     PAST MEDICAL & SURGICAL HISTORY:  COPD (chronic obstructive pulmonary disease)      Breast cancer      Smoker      DM (diabetes mellitus)  new diagnosis      HTN (hypertension)      COPD, moderate      HTN (hypertension)      Diabetes      COPD, severe      CAD (coronary artery disease)      Breast cancer      H/O left mastectomy      H/O breast surgery      History of cardiac cath      H/O left mastectomy        FAMILY HISTORY:  Family history of heart disease (Sibling)    Family history of gout (Father, Mother)    FH: coronary artery disease (Father)        Vitals   ICU Vital Signs Last 24 Hrs  T(C): 36.6 (30 Jan 2024 07:00), Max: 36.7 (29 Jan 2024 20:00)  T(F): 97.9 (30 Jan 2024 07:00), Max: 98 (29 Jan 2024 20:00)  HR: 111 (30 Jan 2024 09:13) (93 - 120)  BP: 132/80 (30 Jan 2024 09:00) (116/55 - 141/79)  BP(mean): 97 (30 Jan 2024 09:00) (71 - 98)  ABP: --  ABP(mean): --  RR: 24 (30 Jan 2024 09:11) (14 - 36)  SpO2: 100% (30 Jan 2024 09:13) (93% - 100%)    O2 Parameters below as of 30 Jan 2024 09:13  Patient On (Oxygen Delivery Method): nasal cannula      PHYSICAL EXAM:   Constitutional: NAD, cachetic, ill appearing, mildly anxious.   HEENT: normocephalic, atraumatic , conjunctiva normal B/L.  PERRLA, EOMI, No JVD,  trachea is midline, oval cavity dry.   Respiratory: +B/L BS, + rhonchi over R base, , no accessory muscle use noted, no wheezes, rales  Cardiovascular: Regular rate, regular rhythm, normal S1, S2; no rubs, clicks, gallops murmurs,  no JVD, no peripheral edema  Abdomen:  Soft, non-tender, non distended, + BS,  no masses felt  Extremities:  no peripheral edema,, 2 + pulses throughout  Neurological: A+O x 3 Cn2-12 grossly intact, no focal deficit  Psychiatric: calm, normal mood, normal affect  Skin: warm, dry, well perfused, no rashes    I&O's Detail    29 Jan 2024 07:01  -  30 Jan 2024 07:00  --------------------------------------------------------  IN:    IV PiggyBack: 100 mL    IV PiggyBack: 200 mL    IV PiggyBack: 100 mL    Oral Fluid: 200 mL  Total IN: 600 mL    OUT:    Voided (mL): 600 mL  Total OUT: 600 mL    Total NET: 0 mL          LABS                        9.9    14.84 )-----------( 276      ( 30 Jan 2024 05:00 )             31.7     01-30    141  |  106  |  13  ----------------------------<  116<H>  5.5<H>   |  31  |  0.27<L>    Ca    8.4      30 Jan 2024 05:00  Phos  1.9     01-30  Mg     2.3     01-30    TPro  5.8<L>  /  Alb  1.9<L>  /  TBili  0.4  /  DBili  x   /  AST  15  /  ALT  21  /  AlkPhos  82  01-29    LIVER FUNCTIONS - ( 29 Jan 2024 05:00 )  Alb: 1.9 g/dL / Pro: 5.8 g/dL / ALK PHOS: 82 U/L / ALT: 21 U/L / AST: 15 U/L / GGT: x           Urinalysis Basic - ( 30 Jan 2024 05:00 )    Color: x / Appearance: x / SG: x / pH: x  Gluc: 116 mg/dL / Ketone: x  / Bili: x / Urobili: x   Blood: x / Protein: x / Nitrite: x   Leuk Esterase: x / RBC: x / WBC x   Sq Epi: x / Non Sq Epi: x / Bacteria: x      POCT Blood Glucose.: 131 mg/dL *H* (01-30-24 @ 05:01)  POCT Blood Glucose.: 162 mg/dL *H* (01-29-24 @ 23:30)  POCT Blood Glucose.: 86 mg/dL (01-29-24 @ 17:35)  POCT Blood Glucose.: 266 mg/dL *H* (01-29-24 @ 11:28)        MEDICATIONS  (STANDING):  albuterol    0.083% 2.5 milliGRAM(s) Nebulizer every 6 hours  ALPRAZolam 0.25 milliGRAM(s) Oral every 12 hours  ascorbic acid 500 milliGRAM(s) Oral daily  atorvastatin 80 milliGRAM(s) Oral at bedtime  budesonide 160 MICROgram(s)/formoterol 4.5 MICROgram(s) Inhaler 2 Puff(s) Inhalation two times a day  buPROPion XL (24-Hour) . 150 milliGRAM(s) Oral daily  cefepime   IVPB 2000 milliGRAM(s) IV Intermittent every 12 hours  chlorhexidine 2% Cloths 1 Application(s) Topical <User Schedule>  collagenase Ointment 1 Application(s) Topical daily  dextrose 5%. 1000 milliLiter(s) (50 mL/Hr) IV Continuous <Continuous>  dextrose 50% Injectable 25 Gram(s) IV Push once  diltiazem    milliGRAM(s) Oral daily  doxazosin 2 milliGRAM(s) Oral at bedtime  dronabinol 5 milliGRAM(s) Oral daily  ferrous    sulfate 325 milliGRAM(s) Oral daily  insulin lispro (ADMELOG) corrective regimen sliding scale   SubCutaneous every 6 hours  letrozole 2.5 milliGRAM(s) Oral daily  mirtazapine 7.5 milliGRAM(s) Oral at bedtime  multivitamin 1 Tablet(s) Oral daily  mupirocin 2% Nasal 1 Application(s) Both Nostrils two times a day  nicotine -   7 mG/24Hr(s) Patch 1 Patch Transdermal daily  pantoprazole   Suspension 40 milliGRAM(s) Oral before breakfast  polyethylene glycol 3350 17 Gram(s) Oral daily  predniSONE   Tablet 40 milliGRAM(s) Oral daily  rivaroxaban 10 milliGRAM(s) Oral with dinner  roflumilast 500 MICROGram(s) Oral daily  zinc sulfate 220 milliGRAM(s) Oral daily    MEDICATIONS  (PRN):  ALPRAZolam 0.25 milliGRAM(s) Oral every 8 hours PRN anxiety  bisacodyl Suppository 10 milliGRAM(s) Rectal daily PRN Constipation  melatonin 3 milliGRAM(s) Oral at bedtime PRN for insomnia  ondansetron    Tablet 4 milliGRAM(s) Oral four times a day PRN for nausea      Allergies:  penicillin (Hives)  penicillin (Short breath)  penicillin (Other)    A/P:    68F PMHx muscle wasting and atrophy, COPD (on home O2 2L NC, prior intubations in the past), Thromboembolism, DVTs of the lower extremity, T2DM,, anxiety disorder, iron deficiency anemia, malignant neoplasm of the breast s/p L mastectomy (in remission), HLD,  HTN presented [facility] for low Hb 6.5 on outpatient labs and SOB today. Admitted to MICU with acute on chronic hypoxic/hypercapenic respiratory failure, acute COPD exacerbation, and PNA.     # PULM:   -pt is severe end stage obstructive lung disease with multiple admissions in recent past. and with clinical decline  -Dependent  on NIPPV intermittently throughout the day for increased WOB.  Actively attempting to wean to high flow or NC during waking hours with goal Sp02 >88%  -Empiric treatment of PNA with Cefepime 2g Q 12 hrs with improvement noted in respiratory status.   -Incentive spirometry, Chest PT/Pulmonary toilet, HOB >30'. Albuterol q6h, Daliresp, Symbicort QD, Spiriva BID. Prednisone taper.      # CV:   - h/o of Chronic Afib rate on Cardizem, currently NSR, restarting Xarelto today    - c/ w Lipitor,  Cardura   - Maintain goal MAP >65.   -Keep K~4 and Mg>2 for optimal arrhythmia suppression.  -TTE from10/23 with LVEF 50-55%.     # ID:   - BCx NGTD    - Empiric Cefepime course for PNA.   - RVP remains + Covid from past admission on 12/2023. Afebrile resp status remains tenuous but stable,     # NEURO:   - known h/o anxiety tim in setting of SOB. Xanax PRN. Wellbutrin QD, Remeron QHS.   -Monitor mental status closely. Serial neurologic assessments.     # GI:   -given debility and frequent PNAs, must r/o aspiration. Speech and swallowing pending pt's ability to tolerate being off NIPPV or high flow supplemental for duration  of evaluation. S/S ordered and will attempt when patient is able.    -Protonix QD.  -No active sign of GI bleed, FOB pending     # HEME:   - Hg remains stable- 9.9/31 s/p 1 prbc transfused on admission on  1/25 for anemia.  -given h/o of PE/DVTs and AFib with  restart Xarelto QD.     # RENAL:   -Renal function currently WNL.  -trend lytes/Scr daily with BMP  -I's and O's, goal UOP 0.5 cc/kg/hr    # ENDO:   -monitor FS Q 6 hrs in setting of steroid use,  -TFTs normal     # GOC: Full Code- Dr. Hawk spoke with family and update on status.                  Significant recent/past 24 hr events:    -intermittent use of Bipap for SOB  - Hg stable, restarting AC     Subjective:   Patient is a 68y old  Female who presents with a chief complaint of COPD exacerbation (29 Jan 2024 20:00)    HPI:   68F PMHx muscle wasting and atrophy, COPD (on home O2 2L NC, prior intubations in the past), Thromboembolism, DVTs of the lower extremity, T2DM,, anxiety disorder, iron deficiency anemia, malignant neoplasm of the breast s/p L mastectomy (in remission), HLD,  HTN presented [facility] for low Hb 6.5 on outpatient labs and SOB today. Admitted to MICU with Acute on Chronic Hypoxic / Hypercapnic Respiratory Failure secondary to acute COPD exacerbation and PNA.     PAST MEDICAL & SURGICAL HISTORY:  COPD (chronic obstructive pulmonary disease)      Breast cancer      Smoker      DM (diabetes mellitus)  new diagnosis      HTN (hypertension)      COPD, moderate      HTN (hypertension)      Diabetes      COPD, severe      CAD (coronary artery disease)      Breast cancer      H/O left mastectomy      H/O breast surgery      History of cardiac cath      H/O left mastectomy        FAMILY HISTORY:  Family history of heart disease (Sibling)    Family history of gout (Father, Mother)    FH: coronary artery disease (Father)        Vitals   ICU Vital Signs Last 24 Hrs  T(C): 36.6 (30 Jan 2024 07:00), Max: 36.7 (29 Jan 2024 20:00)  T(F): 97.9 (30 Jan 2024 07:00), Max: 98 (29 Jan 2024 20:00)  HR: 111 (30 Jan 2024 09:13) (93 - 120)  BP: 132/80 (30 Jan 2024 09:00) (116/55 - 141/79)  BP(mean): 97 (30 Jan 2024 09:00) (71 - 98)  ABP: --  ABP(mean): --  RR: 24 (30 Jan 2024 09:11) (14 - 36)  SpO2: 100% (30 Jan 2024 09:13) (93% - 100%)    O2 Parameters below as of 30 Jan 2024 09:13  Patient On (Oxygen Delivery Method): nasal cannula      PHYSICAL EXAM:   Constitutional: NAD, cachetic, ill appearing, mildly anxious.   HEENT: normocephalic, atraumatic , conjunctiva normal B/L.  PERRLA, EOMI, No JVD,  trachea is midline, oval cavity dry.   Respiratory: +B/L BS, + rhonchi over R base, , no accessory muscle use noted, no wheezes, rales  Cardiovascular: Regular rate, regular rhythm, normal S1, S2; no rubs, clicks, gallops murmurs,  no JVD, no peripheral edema  Abdomen:  Soft, non-tender, non distended, + BS,  no masses felt  Extremities:  no peripheral edema,, 2 + pulses throughout  Neurological: A+O x 3 Cn2-12 grossly intact, no focal deficit  Psychiatric: calm, normal mood, normal affect  Skin: warm, dry, well perfused, no rashes    I&O's Detail    29 Jan 2024 07:01  -  30 Jan 2024 07:00  --------------------------------------------------------  IN:    IV PiggyBack: 100 mL    IV PiggyBack: 200 mL    IV PiggyBack: 100 mL    Oral Fluid: 200 mL  Total IN: 600 mL    OUT:    Voided (mL): 600 mL  Total OUT: 600 mL    Total NET: 0 mL          LABS                        9.9    14.84 )-----------( 276      ( 30 Jan 2024 05:00 )             31.7     01-30    141  |  106  |  13  ----------------------------<  116<H>  5.5<H>   |  31  |  0.27<L>    Ca    8.4      30 Jan 2024 05:00  Phos  1.9     01-30  Mg     2.3     01-30    TPro  5.8<L>  /  Alb  1.9<L>  /  TBili  0.4  /  DBili  x   /  AST  15  /  ALT  21  /  AlkPhos  82  01-29    LIVER FUNCTIONS - ( 29 Jan 2024 05:00 )  Alb: 1.9 g/dL / Pro: 5.8 g/dL / ALK PHOS: 82 U/L / ALT: 21 U/L / AST: 15 U/L / GGT: x           Urinalysis Basic - ( 30 Jan 2024 05:00 )    Color: x / Appearance: x / SG: x / pH: x  Gluc: 116 mg/dL / Ketone: x  / Bili: x / Urobili: x   Blood: x / Protein: x / Nitrite: x   Leuk Esterase: x / RBC: x / WBC x   Sq Epi: x / Non Sq Epi: x / Bacteria: x      POCT Blood Glucose.: 131 mg/dL *H* (01-30-24 @ 05:01)  POCT Blood Glucose.: 162 mg/dL *H* (01-29-24 @ 23:30)  POCT Blood Glucose.: 86 mg/dL (01-29-24 @ 17:35)  POCT Blood Glucose.: 266 mg/dL *H* (01-29-24 @ 11:28)        MEDICATIONS  (STANDING):  albuterol    0.083% 2.5 milliGRAM(s) Nebulizer every 6 hours  ALPRAZolam 0.25 milliGRAM(s) Oral every 12 hours  ascorbic acid 500 milliGRAM(s) Oral daily  atorvastatin 80 milliGRAM(s) Oral at bedtime  budesonide 160 MICROgram(s)/formoterol 4.5 MICROgram(s) Inhaler 2 Puff(s) Inhalation two times a day  buPROPion XL (24-Hour) . 150 milliGRAM(s) Oral daily  cefepime   IVPB 2000 milliGRAM(s) IV Intermittent every 12 hours  chlorhexidine 2% Cloths 1 Application(s) Topical <User Schedule>  collagenase Ointment 1 Application(s) Topical daily  dextrose 5%. 1000 milliLiter(s) (50 mL/Hr) IV Continuous <Continuous>  dextrose 50% Injectable 25 Gram(s) IV Push once  diltiazem    milliGRAM(s) Oral daily  doxazosin 2 milliGRAM(s) Oral at bedtime  dronabinol 5 milliGRAM(s) Oral daily  ferrous    sulfate 325 milliGRAM(s) Oral daily  insulin lispro (ADMELOG) corrective regimen sliding scale   SubCutaneous every 6 hours  letrozole 2.5 milliGRAM(s) Oral daily  mirtazapine 7.5 milliGRAM(s) Oral at bedtime  multivitamin 1 Tablet(s) Oral daily  mupirocin 2% Nasal 1 Application(s) Both Nostrils two times a day  nicotine -   7 mG/24Hr(s) Patch 1 Patch Transdermal daily  pantoprazole   Suspension 40 milliGRAM(s) Oral before breakfast  polyethylene glycol 3350 17 Gram(s) Oral daily  predniSONE   Tablet 40 milliGRAM(s) Oral daily  rivaroxaban 10 milliGRAM(s) Oral with dinner  roflumilast 500 MICROGram(s) Oral daily  zinc sulfate 220 milliGRAM(s) Oral daily    MEDICATIONS  (PRN):  ALPRAZolam 0.25 milliGRAM(s) Oral every 8 hours PRN anxiety  bisacodyl Suppository 10 milliGRAM(s) Rectal daily PRN Constipation  melatonin 3 milliGRAM(s) Oral at bedtime PRN for insomnia  ondansetron    Tablet 4 milliGRAM(s) Oral four times a day PRN for nausea      Allergies:  penicillin (Hives)  penicillin (Short breath)  penicillin (Other)    A/P:    68F PMHx muscle wasting and atrophy, COPD (on home O2 2L NC, prior intubations in the past), Thromboembolism, DVTs of the lower extremity, T2DM,, anxiety disorder, iron deficiency anemia, malignant neoplasm of the breast s/p L mastectomy (in remission), HLD,  HTN presented [facility] for low Hb 6.5 on outpatient labs and SOB today. Admitted to MICU with acute on chronic hypoxic/hypercapenic respiratory failure, acute COPD exacerbation, and PNA.     # PULM:   -pt is severe end stage obstructive lung disease with multiple admissions in recent past. and with clinical decline  -Dependent  on NIPPV intermittently throughout the day for increased WOB.  Actively attempting to wean to high flow or NC during waking hours with goal Sp02 >88%  -Empiric treatment of PNA with Cefepime 2g Q 12 hrs with improvement noted in respiratory status.   -Incentive spirometry, Chest PT/Pulmonary toilet, HOB >30'. Albuterol q6h, Daliresp, Symbicort QD, Spiriva BID. Prednisone taper.      # CV:   - h/o of Chronic Afib rate on Cardizem, currently NSR, restarting Xarelto today    - c/ w Lipitor,  Cardura   - Maintain goal MAP >65.   -Keep K~4 and Mg>2 for optimal arrhythmia suppression.  -TTE from10/23 with LVEF 50-55%.     # ID:   - BCx NGTD    - Empiric Cefepime course for PNA.   - RVP remains + Covid from past admission on 12/2023. Afebrile resp status remains tenuous but stable,     # NEURO:   - known h/o anxiety tim in setting of SOB. Xanax PRN. Wellbutrin QD, Remeron QHS.   -Monitor mental status closely. Serial neurologic assessments.     # GI:   -given debility and frequent PNAs, must r/o aspiration. Speech and swallowing pending pt's ability to tolerate being off NIPPV or high flow supplemental for duration  of evaluation. S/S ordered and will attempt when patient is able.    -Protonix QD.  -No active sign of GI bleed, FOB pending     # HEME:   - Hg remains stable- 9.9/31 s/p 1 prbc transfused on admission on  1/25 for anemia.  -given h/o of PE/DVTs and AFib with  restart Xarelto QD.   -DVT ppx with SCDs    # RENAL:   -Renal function currently WNL.  -trend lytes/Scr daily with BMP  -I's and O's, goal UOP 0.5 cc/kg/hr    # ENDO:   -monitor FS Q 6 hrs in setting of steroid use,  -TFTs normal     # GOC: Full Code- Dr. Hawk spoke with family and update on status.

## 2024-01-30 NOTE — PROGRESS NOTE ADULT - ASSESSMENT
68F PMHx muscle wasting and atrophy, COPD (on home O2 2L NC, prior intubations in the past), Thromboembolism, DVTs of the lower extremity, T2DM,, anxiety disorder, iron deficiency anemia, malignant neoplasm of the breast s/p L mastectomy (in remission), HLD,  HTN presented [facility] for low Hb 6.5 on outpatient labs and SOB today. Admitted to MICU with:  Assessment:  1. Acute on Chronic Hypoxic / Hypercapnic Respiratory Failure   2. AECOPD  3. PNA    Plan:  NEURO:   -Xanax q12h and PRN. Wellbutrin QD, Remeron QHS.   -Monitor mental status closely, avoid neurosuppresants.   -Serial neurologic assessments.     CV:   -Maintain goal MAP >65.   -Keep K~4 and Mg>2 for optimal arrhythmia suppression.  -Cardizem QD for Afib. Lipitor QD.   -TTE with LVEF 50-55%.     PULM:  -Remains dependent on NIPPV for SOB/WOB, unable to be weaned back to conventional NC. Actively titrating FiO2 to maintain goal SpO2 >88%.    -Incentive spirometry, Chest PT/Pulmonary toilet, HOB >30'. Albuterol q6h, Symbicort QD, Spiriva BID. Prednisone taper.   -Respiratory status remains tenuous, clinically appears to be deteriorating from pulmonary standpoint due to End-Stage COPD with acute on chronic recurrent infections. Will attempt to wean off NIPPV tonight as able.     GI:   -NPO on NIPPV.   -Protonix QD.    RENAL:   -Renal function currently WNL.  -trend lytes/Scr daily with BMP  -I's and O's, goal UOP 0.5 cc/kg/hr  -renal dose meds and avoid nephrotoxins     ENDO:   -Aggressive glycemic control to limit FS glucose to <180mg/dl. ISS.     ID:   -Cx NGTD thus far. SputumCx pending. Empiric Cefepime course for PNA.   -ABX use and/or discontinuation based on discussion with ID in conjunction with clinical features, culture data, and judicious procalcitonin monitoring.    HEME:   -Xarelto QD.     GOC: Full Code.  68F PMHx muscle wasting and atrophy, COPD (on home O2 2L NC, prior intubations in the past), Thromboembolism, DVTs of the lower extremity, T2DM,, anxiety disorder, iron deficiency anemia, malignant neoplasm of the breast s/p L mastectomy (in remission), HLD,  HTN presented [facility] for low Hb 6.5 on outpatient labs and SOB today. Admitted to MICU with:  Assessment:  1. Acute on Chronic Hypoxic / Hypercapnic Respiratory Failure   2. AECOPD  3. PNA    Plan:  NEURO:   -Xanax q12h and PRN. Wellbutrin QD, Remeron QHS.   -Monitor mental status closely, avoid neurosuppresants.   -Serial neurologic assessments.     CV:   -Maintain goal MAP >65.   -Keep K~4 and Mg>2 for optimal arrhythmia suppression.  -Cardizem QD for Afib. Lipitor QD.   -TTE with LVEF 50-55%.     PULM:  -Remains dependent on NIPPV for SOB/WOB, unable to be weaned back to conventional NC. Actively titrating FiO2 to maintain goal SpO2 >88%.    -Incentive spirometry, Chest PT/Pulmonary toilet, HOB >30'. Albuterol changed to Xopenex q6h due to mild tachycardia, Symbicort QD, Spiriva BID. Prednisone taper.   -Respiratory status remains tenuous, clinically appears to be deteriorating from pulmonary standpoint due to End-Stage COPD with acute on chronic recurrent infections. Will attempt to wean off NIPPV tonight as able.     GI:   -NPO on NIPPV.   -Protonix QD.    RENAL:   -Renal function currently WNL.  -trend lytes/Scr daily with BMP  -I's and O's, goal UOP 0.5 cc/kg/hr  -renal dose meds and avoid nephrotoxins     ENDO:   -Aggressive glycemic control to limit FS glucose to <180mg/dl. ISS.     ID:   -Cx NGTD thus far. SputumCx pending. Empiric Cefepime course for PNA.   -ABX use and/or discontinuation based on discussion with ID in conjunction with clinical features, culture data, and judicious procalcitonin monitoring.    HEME:   -Xarelto QD.     GOC: Full Code.

## 2024-01-30 NOTE — PROGRESS NOTE ADULT - SUBJECTIVE AND OBJECTIVE BOX
Patient is a 68y old  Female who presents with a chief complaint of COPD exacerbation (30 Jan 2024 11:27)    BRIEF HOSPITAL COURSE:   68F PMHx muscle wasting and atrophy, COPD (on home O2 2L NC, prior intubations in the past), Thromboembolism, DVTs of the lower extremity, T2DM,, anxiety disorder, iron deficiency anemia, malignant neoplasm of the breast s/p L mastectomy (in remission), HLD,  HTN presented [facility] for low Hb 6.5 on outpatient labs and SOB today. Admitted to MICU with Acute on Chronic Hypoxic / Hypercapnic Respiratory Failure secondary to AECOPD, PNA.     Events last 24 hours:   -Remains dependent on NIPPV for SOB/WOB, unable to be weaned back to conventional NC.  -Failed S/s evaluation.   -FS stable. Afebrile.     PAST MEDICAL & SURGICAL HISTORY:  COPD (chronic obstructive pulmonary disease)  Breast cancer  Smoker  DM (diabetes mellitus)  new diagnosis  HTN (hypertension)  COPD, moderate  HTN (hypertension)  Diabetes  COPD, severe  CAD (coronary artery disease)  Breast cancer  H/O left mastectomy  H/O breast surgery  History of cardiac cath  H/O left mastectomy    Review of Systems:  CONSTITUTIONAL: No fever, chills, or fatigue  EYES: No eye pain, visual disturbances, or discharge  ENMT:  No difficulty hearing, tinnitus, vertigo; No sinus or throat pain  NECK: No pain or stiffness  RESPIRATORY: No cough, wheezing, chills or hemoptysis; (+)shortness of breath  CARDIOVASCULAR: No chest pain, palpitations, dizziness, or leg swelling  GASTROINTESTINAL: No abdominal or epigastric pain. No nausea, vomiting, or hematemesis; No diarrhea or constipation. No melena or hematochezia.  GENITOURINARY: No dysuria, frequency, hematuria, or incontinence  NEUROLOGICAL: No headaches, memory loss, loss of strength, numbness, or tremors  SKIN: No itching, burning, rashes, or lesions   MUSCULOSKELETAL: No joint pain or swelling; No muscle, back, or extremity pain  PSYCHIATRIC: No depression, anxiety, mood swings, or difficulty sleeping    Medications:  cefepime   IVPB 2000 milliGRAM(s) IV Intermittent every 12 hours  diltiazem    milliGRAM(s) Oral daily  doxazosin 2 milliGRAM(s) Oral at bedtime  albuterol    0.083% 2.5 milliGRAM(s) Nebulizer every 6 hours  budesonide 160 MICROgram(s)/formoterol 4.5 MICROgram(s) Inhaler 2 Puff(s) Inhalation two times a day  roflumilast 500 MICROGram(s) Oral daily  ALPRAZolam 0.25 milliGRAM(s) Oral every 8 hours PRN  ALPRAZolam 0.25 milliGRAM(s) Oral every 12 hours  buPROPion XL (24-Hour) . 150 milliGRAM(s) Oral daily  dronabinol 5 milliGRAM(s) Oral daily  melatonin 3 milliGRAM(s) Oral at bedtime PRN  mirtazapine 7.5 milliGRAM(s) Oral at bedtime  ondansetron    Tablet 4 milliGRAM(s) Oral four times a day PRN  letrozole 2.5 milliGRAM(s) Oral daily  rivaroxaban 10 milliGRAM(s) Oral with dinner  bisacodyl Suppository 10 milliGRAM(s) Rectal daily PRN  pantoprazole   Suspension 40 milliGRAM(s) Oral before breakfast  polyethylene glycol 3350 17 Gram(s) Oral daily  atorvastatin 80 milliGRAM(s) Oral at bedtime  dextrose 50% Injectable 25 Gram(s) IV Push once  insulin lispro (ADMELOG) corrective regimen sliding scale   SubCutaneous every 6 hours  predniSONE   Tablet 40 milliGRAM(s) Oral daily  ascorbic acid 500 milliGRAM(s) Oral daily  dextrose 5%. 1000 milliLiter(s) IV Continuous <Continuous>  ferrous    sulfate 325 milliGRAM(s) Oral daily  multivitamin 1 Tablet(s) Oral daily  zinc sulfate 220 milliGRAM(s) Oral daily  chlorhexidine 2% Cloths 1 Application(s) Topical <User Schedule>  collagenase Ointment 1 Application(s) Topical daily  mupirocin 2% Nasal 1 Application(s) Both Nostrils two times a day  nicotine -   7 mG/24Hr(s) Patch 1 Patch Transdermal daily    ICU Vital Signs Last 24 Hrs  T(C): 36.5 (30 Jan 2024 15:00), Max: 36.7 (29 Jan 2024 20:00)  T(F): 97.7 (30 Jan 2024 15:00), Max: 98 (29 Jan 2024 20:00)  HR: 110 (30 Jan 2024 18:00) (94 - 120)  BP: 125/69 (30 Jan 2024 18:00) (85/69 - 148/125)  BP(mean): 85 (30 Jan 2024 18:00) (74 - 134)  ABP: --  ABP(mean): --  RR: 23 (30 Jan 2024 18:00) (16 - 36)  SpO2: 99% (30 Jan 2024 18:00) (96% - 100%)  O2 Parameters below as of 30 Jan 2024 15:53  Patient On (Oxygen Delivery Method): BiPAP/CPAP    I&O's Detail  29 Jan 2024 07:01  -  30 Jan 2024 07:00  --------------------------------------------------------  IN:    IV PiggyBack: 100 mL    IV PiggyBack: 200 mL    IV PiggyBack: 100 mL    Oral Fluid: 200 mL  Total IN: 600 mL  OUT:    Voided (mL): 600 mL  Total OUT: 600 mL  Total NET: 0 mL      30 Jan 2024 07:01  -  30 Jan 2024 19:47  --------------------------------------------------------  IN:    IV PiggyBack: 50 mL  Total IN: 50 mL  OUT:    Voided (mL): 600 mL  Total OUT: 600 mL  Total NET: -550 mL    LABS:                      9.9    14.84 )-----------( 276      ( 30 Jan 2024 05:00 )             31.7     01-30  141  |  106  |  13  ----------------------------<  116<H>  5.5<H>   |  31  |  0.27<L>  Ca    8.4      30 Jan 2024 05:00  Phos  1.9     01-30  Mg     2.3     01-30  TPro  5.8<L>  /  Alb  1.9<L>  /  TBili  0.4  /  DBili  x   /  AST  15  /  ALT  21  /  AlkPhos  82  01-29    CAPILLARY BLOOD GLUCOSE  POCT Blood Glucose.: 83 mg/dL (30 Jan 2024 17:30)    Urinalysis Basic - ( 30 Jan 2024 05:00 )  Color: x / Appearance: x / SG: x / pH: x  Gluc: 116 mg/dL / Ketone: x  / Bili: x / Urobili: x   Blood: x / Protein: x / Nitrite: x   Leuk Esterase: x / RBC: x / WBC x   Sq Epi: x / Non Sq Epi: x / Bacteria: x    CULTURES:  Culture Results:   No growth at 5 days (01-25-24 @ 13:50)  Culture Results:   No growth at 5 days (01-25-24 @ 13:50)  Rapid RVP Result: Detected (01-25-24 @ 12:26)    Physical Examination:  General: +NIPPV.   HEENT: PERRL.  NECK: Supple.   PULM: Decreased BS at bases bilaterally.  CVS: s1/s2.  ABD: Soft, nondistended, nontender, normoactive bowel sounds.  EXT: No edema, nontender.  SKIN: Warm.    RADIOLOGY:   < from: Xray Chest 1 View- PORTABLE-Routine (01.28.24 @ 09:15) >  ACC: [id number] EXAM:  XR CHEST PORTABLE ROUTINE 1V   ORDERED BY: PETER VELOZ   PROCEDURE DATE:  01/28/2024    IMPRESSION:  Right subclavian line reidentified in position. No change heart   mediastinum. Hyperinflated lungs. Patchy bibasal infiltrates without   gross interval change. Trace bilateral pleural effusions. No pneumothorax.    Time spent on this patient encounter, which includes documenting this note in the electronic medical record, was +50 minutes including assessing the presenting problems with associated risks, reviewing the medical record to prepare for the encounter, and meeting face to face with the patient to obtain additional history. I have also performed an appropriate physical exam, made interventions listed and ordered and interpreted appropriate diagnostic studies as documented. To improve communication and patient safety, I have coordinated care with the multidisciplinary team including the bedside nurse, appropriate attending of record and consultants as needed. This time is independent of any procedures performed.    Date of entry of this note is equal to the date of services rendered.

## 2024-01-30 NOTE — PROGRESS NOTE ADULT - SUBJECTIVE AND OBJECTIVE BOX
Indication for Geriatrics and Palliative Care Services/INTERVAL HPI:    OVERNIGHT EVENTS: no acute events noted  SUBJECTIVE AND OBJECTIVE: Pt seen and examined at bedside this morning. She reports feeling okay. RT removed her BiPAP mask.  Denies CP, abd pain, or any other complaints at this time.    Allergies  penicillin (Hives)  penicillin (Short breath)  penicillin (Other)    Intolerances    MEDICATIONS  (STANDING):  albuterol    0.083% 2.5 milliGRAM(s) Nebulizer every 6 hours  ALPRAZolam 0.25 milliGRAM(s) Oral every 12 hours  ascorbic acid 500 milliGRAM(s) Oral daily  atorvastatin 80 milliGRAM(s) Oral at bedtime  budesonide 160 MICROgram(s)/formoterol 4.5 MICROgram(s) Inhaler 2 Puff(s) Inhalation two times a day  buPROPion XL (24-Hour) . 150 milliGRAM(s) Oral daily  cefepime   IVPB 2000 milliGRAM(s) IV Intermittent every 12 hours  chlorhexidine 2% Cloths 1 Application(s) Topical <User Schedule>  collagenase Ointment 1 Application(s) Topical daily  dextrose 5%. 1000 milliLiter(s) (50 mL/Hr) IV Continuous <Continuous>  dextrose 50% Injectable 25 Gram(s) IV Push once  diltiazem    milliGRAM(s) Oral daily  doxazosin 2 milliGRAM(s) Oral at bedtime  dronabinol 5 milliGRAM(s) Oral daily  ferrous    sulfate 325 milliGRAM(s) Oral daily  insulin lispro (ADMELOG) corrective regimen sliding scale   SubCutaneous every 6 hours  letrozole 2.5 milliGRAM(s) Oral daily  mirtazapine 7.5 milliGRAM(s) Oral at bedtime  multivitamin 1 Tablet(s) Oral daily  mupirocin 2% Nasal 1 Application(s) Both Nostrils two times a day  nicotine -   7 mG/24Hr(s) Patch 1 Patch Transdermal daily  pantoprazole   Suspension 40 milliGRAM(s) Oral before breakfast  polyethylene glycol 3350 17 Gram(s) Oral daily  predniSONE   Tablet 40 milliGRAM(s) Oral daily  rivaroxaban 10 milliGRAM(s) Oral with dinner  roflumilast 500 MICROGram(s) Oral daily  zinc sulfate 220 milliGRAM(s) Oral daily    MEDICATIONS  (PRN):  ALPRAZolam 0.25 milliGRAM(s) Oral every 8 hours PRN anxiety  bisacodyl Suppository 10 milliGRAM(s) Rectal daily PRN Constipation  melatonin 3 milliGRAM(s) Oral at bedtime PRN for insomnia  ondansetron    Tablet 4 milliGRAM(s) Oral four times a day PRN for nausea      ITEMS UNCHECKED ARE NOT PRESENT    PRESENT SYMPTOMS:     Pain:  no  QOL impact -   Location -                    Aggravating factors -  Quality -  Radiation -  Timing-  Severity (0-10 scale):  Minimal acceptable level (0-10 scale):     Dyspnea:                           Severe  Anxiety:                             Mild    Depressed:                    mild  Fatigue:                             Severe  Nausea:                            none  Loss of appetite:              Moderate   Constipation:                   none      Other Symptoms:  [X ]All other review of systems negative       Chaplaincy Referral:   Deferred   Palliative Performance Status Version 2:      30   %      http://npcrc.org/files/news/palliative_performance_scale_ppsv2.pdf    PHYSICAL EXAM:  Vital Signs Last 24 Hrs  T(C): 36.6 (30 Jan 2024 07:00), Max: 36.7 (29 Jan 2024 20:00)  T(F): 97.9 (30 Jan 2024 07:00), Max: 98 (29 Jan 2024 20:00)  HR: 116 (30 Jan 2024 11:00) (93 - 120)  BP: 139/78 (30 Jan 2024 11:00) (118/66 - 148/125)  BP(mean): 94 (30 Jan 2024 11:00) (80 - 134)  RR: 33 (30 Jan 2024 11:00) (14 - 36)  SpO2: 100% (30 Jan 2024 11:00) (96% - 100%)    Parameters below as of 30 Jan 2024 09:13  Patient On (Oxygen Delivery Method): nasal cannula     I&O's Summary    29 Jan 2024 07:01  -  30 Jan 2024 07:00  --------------------------------------------------------  IN: 600 mL / OUT: 600 mL / NET: 0 mL    30 Jan 2024 07:01  -  30 Jan 2024 11:27  --------------------------------------------------------  IN: 0 mL / OUT: 300 mL / NET: -300 mL       GENERAL:  Cachexia, tired and weak appearing  HEENT: NC/AT, dry mucous membranes  PULMONARY: diminished breath sounds throughout, tachypneic   CARDIOVASCULAR:  RRR, no murmur  GASTROINTESTINAL: soft, nontender  Last BM: 1/30  MUSCULOSKELETAL: +weakness, no edema to ble  NEUROLOGIC: +dysphagia, A&Ox3  SKIN: see nursing assessment for details    LABS:                        9.9    14.84 )-----------( 276      ( 30 Jan 2024 05:00 )             31.7   01-30    141  |  106  |  13  ----------------------------<  116<H>  5.5<H>   |  31  |  0.27<L>    Ca    8.4      30 Jan 2024 05:00  Phos  1.9     01-30  Mg     2.3     01-30    TPro  5.8<L>  /  Alb  1.9<L>  /  TBili  0.4  /  DBili  x   /  AST  15  /  ALT  21  /  AlkPhos  82  01-29      Urinalysis Basic - ( 30 Jan 2024 05:00 )    Color: x / Appearance: x / SG: x / pH: x  Gluc: 116 mg/dL / Ketone: x  / Bili: x / Urobili: x   Blood: x / Protein: x / Nitrite: x   Leuk Esterase: x / RBC: x / WBC x   Sq Epi: x / Non Sq Epi: x / Bacteria: x      RADIOLOGY & ADDITIONAL STUDIES:    Protein Calorie Malnutrition Present: severe  https://www.andeal.org/vault/2440/web/files/ONC/Table_Clinical%20Characteristics%20to%20Document%20Malnutrition-White%20JV%20et%20al%202012.pdf    Height (cm): 149.9 (01-25-24 @ 11:52), 149.9 (12-29-23 @ 12:20), 149.9 (12-04-23 @ 08:39)  Weight (kg): 36.3 (01-25-24 @ 11:52), 38.6 (12-29-23 @ 12:20), 40 (12-04-23 @ 08:39)  BMI (kg/m2): 16.2 (01-25-24 @ 11:52), 17.2 (12-29-23 @ 12:20), 17.8 (12-04-23 @ 08:39)    [ X]PPSV2 < or = 30%  [ X]significant weight loss [ X]poor nutritional intake [ ]anasarca[ ]Artificial Nutrition    Other REFERRALS:  [ ]Hospice  [ ]Child Life  [ ]Social Work  [ ]Case management [ ]Holistic Therapy

## 2024-01-31 NOTE — PROVIDER CONTACT NOTE (EICU) - RECOMMENDATIONS
- Bronchodilators and IV systemic steroids in the setting of acute COPD exacerbation  - Ordered for cxr to assess ET/NG tube placement; will deploy LTVV w/ 6ml/kg/ibw and obtain ABG post intubation  - WIll add ventilator bundle with protonix GI prophylaxis if enteral nutrition not started  - Carmella-intubation period was marked by hypotension requiring IVF bolus and vasopressor support likely the result of induction agents. Attempt to titrate off levophed for goal MAP >65.  - agree with beta lactam for CAP coverage, but should also add MRSA coverage given risk factors and elevated SHORR score.

## 2024-01-31 NOTE — PROGRESS NOTE ADULT - ASSESSMENT
68F PMHx muscle wasting and atrophy, COPD (on home O2 2L NC, prior intubations in the past), Thromboembolism, DVTs of the lower extremity, T2DM,, anxiety disorder, iron deficiency anemia, malignant neoplasm of the breast s/p L mastectomy (in remission), HLD,  HTN presented [facility] for low Hb 6.5 on outpatient labs and SOB today. Admitted to MICU with:  Assessment:  1. Acute on Chronic Hypoxic / Hypercapnic Respiratory Failure   2. AECOPD  3. PNA    Plan:  NEURO:   -Xanax q12h and PRN. Wellbutrin QD, Remeron QHS.   -Monitor mental status closely, avoid neurosuppresants.   -Serial neurologic assessments.     CV:   -Maintain goal MAP >65.   -Keep K~4 and Mg>2 for optimal arrhythmia suppression.  -Cardizem QD for Afib. Lipitor QD.   -TTE with LVEF 50-55%.     PULM:  -Remains dependent on NIPPV for SOB/WOB, unable to be weaned back to conventional NC. Actively titrating FiO2 to maintain goal SpO2 >88%.    -Incentive spirometry, Chest PT/Pulmonary toilet, HOB >30'. Albuterol changed to Xopenex q6h due to mild tachycardia, Symbicort QD, Spiriva BID. Prednisone taper.   -Respiratory status remains tenuous, clinically appears to be deteriorating from pulmonary standpoint due to End-Stage COPD with acute on chronic recurrent infections. Will attempt to wean off NIPPV tonight as able.     GI:   -NPO on NIPPV.   -Protonix QD.    RENAL:   -Renal function currently WNL.  -trend lytes/Scr daily with BMP  -I's and O's, goal UOP 0.5 cc/kg/hr  -renal dose meds and avoid nephrotoxins     ENDO:   -Aggressive glycemic control to limit FS glucose to <180mg/dl. ISS.     ID:   -Cx NGTD thus far. SputumCx pending. Empiric Cefepime course for PNA.   -ABX use and/or discontinuation based on discussion with ID in conjunction with clinical features, culture data, and judicious procalcitonin monitoring.    HEME:   -Xarelto QD.     GOC: Full Code.  68F PMHx muscle wasting and atrophy, COPD (on home O2 2L NC, prior intubations in the past), Thromboembolism, DVTs of the lower extremity, T2DM,, anxiety disorder, iron deficiency anemia, malignant neoplasm of the breast s/p L mastectomy (in remission), HLD,  HTN presented [facility] for low Hb 6.5 on outpatient labs and SOB today. Admitted to MICU with:  Assessment:  1. Acute on Chronic Hypoxic / Hypercapnic Respiratory Failure   2. AECOPD  3. PNA    Plan:  NEURO:   -Xanax q12h and PRN. Wellbutrin QD, Remeron QHS.   -Monitor mental status closely, avoid neurosuppresants.   -Serial neurologic assessments.     CV:   -Maintain goal MAP >65.   -Keep K~4 and Mg>2 for optimal arrhythmia suppression.  -Cardizem QD for Afib. Lipitor QD.   -TTE with LVEF 50-55%.     PULM:  -Remains dependent on NIPPV for SOB/WOB, unable to be weaned back to conventional NC. Will start High flow nasal cannula FiO2 30% on 40 Ls  maintain goal SpO2 >88%.    -Incentive spirometry, Chest PT/Pulmonary toilet, HOB >30'. Albuterol changed to Xopenex q6h due to mild tachycardia, Symbicort QD, Spiriva BID. Prednisone taper.   -Respiratory status remains tenuous, clinically appears to be deteriorating from pulmonary standpoint due to End-Stage COPD with acute on chronic recurrent infections.     GI:   -NPO on NIPPV.   -Protonix QD.    RENAL:   -Renal function currently WNL.  -trend lytes/Scr daily with BMP  -I's and O's, goal UOP 0.5 cc/kg/hr  -renal dose meds and avoid nephrotoxins     ENDO:   -Aggressive glycemic control to limit FS glucose to <180mg/dl. ISS.     ID:   -Cx NGTD thus far. SputumCx pending. Empiric Cefepime course for PNA.   -ABX use and/or discontinuation based on discussion with ID in conjunction with clinical features, culture data, and judicious procalcitonin monitoring.    HEME:   -Xarelto QD.     GOC: Full Code.

## 2024-01-31 NOTE — PROGRESS NOTE ADULT - SUBJECTIVE AND OBJECTIVE BOX
Patient is a 68y old  Female who presents with a chief complaint of COPD exacerbation (30 Jan 2024 11:27)    BRIEF HOSPITAL COURSE:   68F PMHx muscle wasting and atrophy, COPD (on home O2 2L NC, prior intubations in the past), Thromboembolism, DVTs of the lower extremity, T2DM,, anxiety disorder, iron deficiency anemia, malignant neoplasm of the breast s/p L mastectomy (in remission), HLD,  HTN presented [facility] for low Hb 6.5 on outpatient labs and SOB today. Admitted to MICU with Acute on Chronic Hypoxic / Hypercapnic Respiratory Failure secondary to AECOPD, PNA.     Events last 24 hours:   -Remains dependent on NIPPV for SOB/WOB, unable to be weaned back to conventional NC.  -Failed S/s evaluation.   -Thick purulent sputum on suction; Sputum culture ordered  -FS stable. Afebrile.     PAST MEDICAL & SURGICAL HISTORY:  COPD (chronic obstructive pulmonary disease)  Breast cancer  Smoker  DM (diabetes mellitus)  new diagnosis  HTN (hypertension)  COPD, moderate  HTN (hypertension)  Diabetes  COPD, severe  CAD (coronary artery disease)  Breast cancer  H/O left mastectomy  H/O breast surgery  History of cardiac cath  H/O left mastectomy    Review of Systems:  CONSTITUTIONAL: No fever, chills, or fatigue  EYES: No eye pain, visual disturbances, or discharge  ENMT:  No difficulty hearing, tinnitus, vertigo; No sinus or throat pain  NECK: No pain or stiffness  RESPIRATORY: No cough, wheezing, chills or hemoptysis; (+)shortness of breath  CARDIOVASCULAR: No chest pain, palpitations, dizziness, or leg swelling  GASTROINTESTINAL: No abdominal or epigastric pain. No nausea, vomiting, or hematemesis; No diarrhea or constipation. No melena or hematochezia.  GENITOURINARY: No dysuria, frequency, hematuria, or incontinence  NEUROLOGICAL: No headaches, memory loss, loss of strength, numbness, or tremors  SKIN: No itching, burning, rashes, or lesions   MUSCULOSKELETAL: No joint pain or swelling; No muscle, back, or extremity pain  PSYCHIATRIC: No depression, anxiety, mood swings, or difficulty sleeping    Medications:  cefepime   IVPB 2000 milliGRAM(s) IV Intermittent every 12 hours  diltiazem    milliGRAM(s) Oral daily  doxazosin 2 milliGRAM(s) Oral at bedtime  albuterol    0.083% 2.5 milliGRAM(s) Nebulizer every 6 hours  budesonide 160 MICROgram(s)/formoterol 4.5 MICROgram(s) Inhaler 2 Puff(s) Inhalation two times a day  roflumilast 500 MICROGram(s) Oral daily  ALPRAZolam 0.25 milliGRAM(s) Oral every 8 hours PRN  ALPRAZolam 0.25 milliGRAM(s) Oral every 12 hours  buPROPion XL (24-Hour) . 150 milliGRAM(s) Oral daily  dronabinol 5 milliGRAM(s) Oral daily  melatonin 3 milliGRAM(s) Oral at bedtime PRN  mirtazapine 7.5 milliGRAM(s) Oral at bedtime  ondansetron    Tablet 4 milliGRAM(s) Oral four times a day PRN  letrozole 2.5 milliGRAM(s) Oral daily  rivaroxaban 10 milliGRAM(s) Oral with dinner  bisacodyl Suppository 10 milliGRAM(s) Rectal daily PRN  pantoprazole   Suspension 40 milliGRAM(s) Oral before breakfast  polyethylene glycol 3350 17 Gram(s) Oral daily  atorvastatin 80 milliGRAM(s) Oral at bedtime  dextrose 50% Injectable 25 Gram(s) IV Push once  insulin lispro (ADMELOG) corrective regimen sliding scale   SubCutaneous every 6 hours  predniSONE   Tablet 40 milliGRAM(s) Oral daily  ascorbic acid 500 milliGRAM(s) Oral daily  dextrose 5%. 1000 milliLiter(s) IV Continuous <Continuous>  ferrous    sulfate 325 milliGRAM(s) Oral daily  multivitamin 1 Tablet(s) Oral daily  zinc sulfate 220 milliGRAM(s) Oral daily  chlorhexidine 2% Cloths 1 Application(s) Topical <User Schedule>  collagenase Ointment 1 Application(s) Topical daily  mupirocin 2% Nasal 1 Application(s) Both Nostrils two times a day  nicotine -   7 mG/24Hr(s) Patch 1 Patch Transdermal daily    ICU Vital Signs Last 24 Hrs  T(C): 36.5 (30 Jan 2024 15:00), Max: 36.7 (29 Jan 2024 20:00)  T(F): 97.7 (30 Jan 2024 15:00), Max: 98 (29 Jan 2024 20:00)  HR: 110 (30 Jan 2024 18:00) (94 - 120)  BP: 125/69 (30 Jan 2024 18:00) (85/69 - 148/125)  BP(mean): 85 (30 Jan 2024 18:00) (74 - 134)  ABP: --  ABP(mean): --  RR: 23 (30 Jan 2024 18:00) (16 - 36)  SpO2: 99% (30 Jan 2024 18:00) (96% - 100%)  O2 Parameters below as of 30 Jan 2024 15:53  Patient On (Oxygen Delivery Method): BiPAP/CPAP    I&O's Detail  29 Jan 2024 07:01  -  30 Jan 2024 07:00  --------------------------------------------------------  IN:    IV PiggyBack: 100 mL    IV PiggyBack: 200 mL    IV PiggyBack: 100 mL    Oral Fluid: 200 mL  Total IN: 600 mL  OUT:    Voided (mL): 600 mL  Total OUT: 600 mL  Total NET: 0 mL      30 Jan 2024 07:01  -  30 Jan 2024 19:47  --------------------------------------------------------  IN:    IV PiggyBack: 50 mL  Total IN: 50 mL  OUT:    Voided (mL): 600 mL  Total OUT: 600 mL  Total NET: -550 mL    LABS:                      9.9    14.84 )-----------( 276      ( 30 Jan 2024 05:00 )             31.7     01-30  141  |  106  |  13  ----------------------------<  116<H>  5.5<H>   |  31  |  0.27<L>  Ca    8.4      30 Jan 2024 05:00  Phos  1.9     01-30  Mg     2.3     01-30  TPro  5.8<L>  /  Alb  1.9<L>  /  TBili  0.4  /  DBili  x   /  AST  15  /  ALT  21  /  AlkPhos  82  01-29    CAPILLARY BLOOD GLUCOSE  POCT Blood Glucose.: 83 mg/dL (30 Jan 2024 17:30)    Urinalysis Basic - ( 30 Jan 2024 05:00 )  Color: x / Appearance: x / SG: x / pH: x  Gluc: 116 mg/dL / Ketone: x  / Bili: x / Urobili: x   Blood: x / Protein: x / Nitrite: x   Leuk Esterase: x / RBC: x / WBC x   Sq Epi: x / Non Sq Epi: x / Bacteria: x    CULTURES:  Culture Results:   No growth at 5 days (01-25-24 @ 13:50)  Culture Results:   No growth at 5 days (01-25-24 @ 13:50)  Rapid RVP Result: Detected (01-25-24 @ 12:26)    Physical Examination:  General: +NIPPV.   HEENT: PERRL.  NECK: Supple.   PULM: Decreased BS at bases bilaterally.  CVS: s1/s2.  ABD: Soft, nondistended, nontender, normoactive bowel sounds.  EXT: No edema, nontender.  SKIN: Warm.    RADIOLOGY:   < from: Xray Chest 1 View- PORTABLE-Routine (01.28.24 @ 09:15) >  ACC: [id number] EXAM:  XR CHEST PORTABLE ROUTINE 1V   ORDERED BY: PETER VELOZ   PROCEDURE DATE:  01/28/2024    IMPRESSION:  Right subclavian line reidentified in position. No change heart   mediastinum. Hyperinflated lungs. Patchy bibasal infiltrates without   gross interval change. Trace bilateral pleural effusions. No pneumothorax.    Time spent on this patient encounter, which includes documenting this note in the electronic medical record, was +50 minutes including assessing the presenting problems with associated risks, reviewing the medical record to prepare for the encounter, and meeting face to face with the patient to obtain additional history. I have also performed an appropriate physical exam, made interventions listed and ordered and interpreted appropriate diagnostic studies as documented. To improve communication and patient safety, I have coordinated care with the multidisciplinary team including the bedside nurse, appropriate attending of record and consultants as needed. This time is independent of any procedures performed.    Date of entry of this note is equal to the date of services rendered.  Patient is a 68y old  Female who presents with a chief complaint of COPD exacerbation (30 Jan 2024 11:27)    BRIEF HOSPITAL COURSE:   68F PMHx muscle wasting and atrophy, COPD (on home O2 2L NC, prior intubations in the past), Thromboembolism, DVTs of the lower extremity, T2DM,, anxiety disorder, iron deficiency anemia, malignant neoplasm of the breast s/p L mastectomy (in remission), HLD,  HTN presented [facility] for low Hb 6.5 on outpatient labs and SOB today. Admitted to MICU with Acute on Chronic Hypoxic / Hypercapnic Respiratory Failure secondary to AECOPD, PNA.     Events last 24 hours:   -Remains dependent on NIPPV for SOB/WOB, unable to be weaned back to conventional NC.  -Start High Flow Nasal cannula; Will consider eventual Ventilation  -Failed S/s evaluation.   -Thick purulent sputum on suction; Sputum culture ordered  -FS stable. Afebrile.     PAST MEDICAL & SURGICAL HISTORY:  COPD (chronic obstructive pulmonary disease)  Breast cancer  Smoker  DM (diabetes mellitus)  new diagnosis  HTN (hypertension)  COPD, moderate  HTN (hypertension)  Diabetes  COPD, severe  CAD (coronary artery disease)  Breast cancer  H/O left mastectomy  H/O breast surgery  History of cardiac cath  H/O left mastectomy    Review of Systems:  CONSTITUTIONAL: No fever, chills, or fatigue  EYES: No eye pain, visual disturbances, or discharge  ENMT:  No difficulty hearing, tinnitus, vertigo; No sinus or throat pain  NECK: No pain or stiffness  RESPIRATORY: No cough, wheezing, chills or hemoptysis; (+)shortness of breath (+)Secretions   CARDIOVASCULAR: No chest pain, palpitations, dizziness, or leg swelling  GASTROINTESTINAL: No abdominal or epigastric pain. No nausea, vomiting, or hematemesis; No diarrhea or constipation. No melena or hematochezia.  GENITOURINARY: No dysuria, frequency, hematuria, or incontinence  NEUROLOGICAL: No headaches, memory loss, loss of strength, numbness, or tremors  SKIN: No itching, burning, rashes, or lesions   MUSCULOSKELETAL: No joint pain or swelling; No muscle, back, or extremity pain  PSYCHIATRIC: No depression, anxiety, mood swings, or difficulty sleeping    Medications:  MEDICATIONS  (STANDING):  ALPRAZolam 0.25 milliGRAM(s) Oral every 12 hours  ascorbic acid 500 milliGRAM(s) Oral daily  atorvastatin 80 milliGRAM(s) Oral at bedtime  budesonide 160 MICROgram(s)/formoterol 4.5 MICROgram(s) Inhaler 2 Puff(s) Inhalation two times a day  buPROPion XL (24-Hour) . 150 milliGRAM(s) Oral daily  cefepime   IVPB 2000 milliGRAM(s) IV Intermittent every 12 hours  chlorhexidine 2% Cloths 1 Application(s) Topical <User Schedule>  collagenase Ointment 1 Application(s) Topical daily  dextrose 5%. 1000 milliLiter(s) (50 mL/Hr) IV Continuous <Continuous>  dextrose 5%. 1000 milliLiter(s) (40 mL/Hr) IV Continuous <Continuous>  dextrose 50% Injectable 25 Gram(s) IV Push once  diltiazem    milliGRAM(s) Oral daily  doxazosin 2 milliGRAM(s) Oral at bedtime  dronabinol 5 milliGRAM(s) Oral daily  ferrous    sulfate 325 milliGRAM(s) Oral daily  letrozole 2.5 milliGRAM(s) Oral daily  levalbuterol Inhalation 0.63 milliGRAM(s) Inhalation every 6 hours  mirtazapine 7.5 milliGRAM(s) Oral at bedtime  multivitamin 1 Tablet(s) Oral daily  mupirocin 2% Nasal 1 Application(s) Both Nostrils two times a day  nicotine -   7 mG/24Hr(s) Patch 1 Patch Transdermal daily  pantoprazole   Suspension 40 milliGRAM(s) Oral before breakfast  polyethylene glycol 3350 17 Gram(s) Oral daily  potassium phosphate IVPB 30 milliMole(s) IV Intermittent once  predniSONE   Tablet 40 milliGRAM(s) Oral daily  rivaroxaban 10 milliGRAM(s) Oral with dinner  roflumilast 500 MICROGram(s) Oral daily  zinc sulfate 220 milliGRAM(s) Oral daily      ICU Vital Signs Last 24 Hrs  T(C): 36.3 (31 Jan 2024 05:00), Max: 36.7 (30 Jan 2024 19:00)  T(F): 97.4 (31 Jan 2024 05:00), Max: 98 (30 Jan 2024 19:00)  HR: 109 (31 Jan 2024 10:00) (101 - 116)  BP: 123/58 (31 Jan 2024 10:00) (85/69 - 143/97)  BP(mean): 78 (31 Jan 2024 10:00) (68 - 108)  ABP: --  ABP(mean): --  RR: 22 (31 Jan 2024 10:00) (14 - 44)  SpO2: 97% (31 Jan 2024 10:00) (95% - 100%)    O2 Parameters below as of 31 Jan 2024 04:58  Patient On (Oxygen Delivery Method): BiPAP/CPAP      I&O's Detail  29 Jan 2024 07:01  -  30 Jan 2024 07:00  --------------------------------------------------------  IN:    IV PiggyBack: 100 mL    IV PiggyBack: 200 mL    IV PiggyBack: 100 mL    Oral Fluid: 200 mL  Total IN: 600 mL  OUT:    Voided (mL): 600 mL  Total OUT: 600 mL  Total NET: 0 mL      30 Jan 2024 07:01  -  30 Jan 2024 19:47  --------------------------------------------------------  IN:    IV PiggyBack: 50 mL  Total IN: 50 mL  OUT:    Voided (mL): 600 mL  Total OUT: 600 mL  Total NET: -550 mL    I&O's Detail    30 Jan 2024 07:01  -  31 Jan 2024 07:00  --------------------------------------------------------  IN:    dextrose 5%: 280 mL    IV PiggyBack: 100 mL  Total IN: 380 mL    OUT:    Intermittent Catheterization - Urethral (mL): 500 mL    Voided (mL): 600 mL  Total OUT: 1100 mL    Total NET: -720 mL      31 Jan 2024 07:01  -  31 Jan 2024 10:15  --------------------------------------------------------  IN:    dextrose 5%: 160 mL    IV PiggyBack: 50 mL  Total IN: 210 mL    OUT:  Total OUT: 0 mL    Total NET: 210 mL            LABS:                                 9.3    10.73 )-----------( 245      ( 31 Jan 2024 05:00 )             30.4     01-31    143  |  105  |  12  ----------------------------<  69<L>  3.6   |  33<H>  |  <0.20<L>    Ca    8.2<L>      31 Jan 2024 05:00  Phos  1.5     01-31  Mg     1.8     01-31    TPro  5.7<L>  /  Alb  1.9<L>  /  TBili  0.5  /  DBili  x   /  AST  24  /  ALT  21  /  AlkPhos  78  01-31          CAPILLARY BLOOD GLUCOSE:  POCT Blood Glucose.: 121 mg/dL (31 Jan 2024 07:58)  POCT Blood Glucose.: 83 mg/dL (30 Jan 2024 17:30)      Urinalysis Basic - ( 30 Jan 2024 05:00 )  Color: x / Appearance: x / SG: x / pH: x  Gluc: 116 mg/dL / Ketone: x  / Bili: x / Urobili: x   Blood: x / Protein: x / Nitrite: x   Leuk Esterase: x / RBC: x / WBC x   Sq Epi: x / Non Sq Epi: x / Bacteria: x    CULTURES:  Culture Results:   No growth at 5 days (01-25-24 @ 13:50)  Culture Results:   No growth at 5 days (01-25-24 @ 13:50)  Rapid RVP Result: Detected (01-25-24 @ 12:26)    Physical Examination:  General: +NIPPV.   HEENT: PERRL.  NECK: Supple.   PULM: Decreased BS at bases bilaterally.  CVS: s1/s2.  ABD: Soft, nondistended, nontender, normoactive bowel sounds.  EXT: No edema, nontender.  SKIN: Warm.      RADIOLOGY:   < from: Xray Chest 1 View- PORTABLE-Routine (01.28.24 @ 09:15) >  ACC: [id number] EXAM:  XR CHEST PORTABLE ROUTINE 1V   ORDERED BY: PETER VELOZ   PROCEDURE DATE:  01/28/2024    IMPRESSION:  Right subclavian line reidentified in position. No change heart   mediastinum. Hyperinflated lungs. Patchy bibasal infiltrates without   gross interval change. Trace bilateral pleural effusions. No pneumothorax.    Time spent on this patient encounter, which includes documenting this note in the electronic medical record, was +50 minutes including assessing the presenting problems with associated risks, reviewing the medical record to prepare for the encounter, and meeting face to face with the patient to obtain additional history. I have also performed an appropriate physical exam, made interventions listed and ordered and interpreted appropriate diagnostic studies as documented. To improve communication and patient safety, I have coordinated care with the multidisciplinary team including the bedside nurse, appropriate attending of record and consultants as needed. This time is independent of any procedures performed.    Date of entry of this note is equal to the date of services rendered.

## 2024-01-31 NOTE — PROVIDER CONTACT NOTE (EICU) - ASSESSMENT
68F PMHx muscle wasting and atrophy, COPD (on home O2 2L NC, prior intubations in the past), Thromboembolism, DVTs of the lower extremity, T2DM,, anxiety disorder, iron deficiency anemia, malignant neoplasm of the breast s/p L mastectomy (in remission), HLD,  HTN presented [facility] for low Hb 6.5 on outpatient labs and SOB today. Admitted to MICU with:  Assessment:  1. Acute on Chronic Hypoxic / Hypercapnic Respiratory Failure   2. AECOPD  3. PNA

## 2024-01-31 NOTE — PROGRESS NOTE ADULT - ASSESSMENT
Assessment:  Ms. Callahan is a 69 YO F w/pmhx of muscle wasting & atrophy, COPD on home oxygen, 2LNC w/hx of prior intubations, DVTs, DM, L breast cancer s/p mastectomy, HTN, HLD, admitted for anemia and SOB. Patient is in CCU w/acute on chronic hypoxic/hypercapnic respiratory failure 2/2 acute COPD exacerbation and pneumonia. Patient was intubated 01/31/2024     Problem List:  1. Acute on chronic hypoxic/hypercapnic respiratory failure  2. Acute COPD exacerbation   3. Pneumonia  4. Shock, distributive   5. Anemia     Plan:  Neuro: Titrating Precedex and propofol to RASS 0 to -2. Monitor mental status, avoid deliriogenic medications. Pain & fever control as needed. Nicotine patch     CV: Monitor HR and BP. Actively titrating Levophed to maintain MAP>65mmHg. Suspect shock 2/2 sedation. Statin therapy. Hold PO cardizem in setting of low BP     Pulm: Patient currently on Full vent support. Titrate settings to maintain SaO2 >90%, or pH >7.25. Consider low tidal volume ventilation strategy w/ goal Tv 4-6 cc/kg of ideal body weight. Plateau pressure goal <30. Peridex oral care. Aggressive pulmonary toilet. Daily sedation vacation with spontaneous breathing trial if clinical condition warrants, discuss with respiratory therapy. Steroids, nebs, and antibiotic therapy      Renal: Strict I/Os, goal UOP >0.5cc/kg/hr. Trend renal function and electrolytes, replete as needed. Avoid nephrotoxic agents    GI: Tube feeds. PPI     ID: Broad coverage w/Meropenem. Received 1 dose of vancomycin today. Her MRSA swab from few days ago was negative but positive for Staph, on Bactroban. Ordered urine legionella and strep, f/u results. Consider atypical coverage in setting of acute COPD exacerbation      Heme: Received 1uPRBC during hospital stay, anemia has been stable since. On Rivaroxaban for history of DVTs     Endo: Goal blood glucose <180     CRITICAL CARE TIME SPENT: 35 minutes assessing presenting problems of acute illness, which pose high probability of life threatening deterioration or end organ damage/dysfunction, as well as medical decision making including initiating plan of care, reviewing data, reviewing radiologic exams, discussing with multidisciplinary team,  discussing goals of care with patient/family, and writing this note.  Non-inclusive of procedures performed  Date of entry of this note is equal to the date of services rendered   Assessment:  Ms. Callahan is a 69 YO F w/pmhx of muscle wasting & atrophy, COPD on home oxygen, 2LNC w/hx of prior intubations, DVTs, DM, L breast cancer s/p mastectomy, HTN, HLD, admitted for anemia and SOB. Patient is in CCU w/acute on chronic hypoxic/hypercapnic respiratory failure 2/2 acute COPD exacerbation and pneumonia. Patient was intubated 01/31/2024     Problem List:  1. Acute on chronic hypoxic/hypercapnic respiratory failure  2. Acute COPD exacerbation   3. Pneumonia  4. Shock, distributive   5. Anemia     Plan:  Neuro: Titrating Precedex and propofol to RASS 0 to -2. Monitor mental status, avoid deliriogenic medications. Pain & fever control as needed. Nicotine patch     CV: Monitor HR and BP. Actively titrating Levophed to maintain MAP>65mmHg. Suspect shock 2/2 sedation. Statin therapy. Hold PO cardizem in setting of low BP     Pulm: Patient currently on Full vent support. Titrate settings to maintain SaO2 >90%, or pH >7.25. Consider low tidal volume ventilation strategy w/ goal Tv 4-6 cc/kg of ideal body weight. Plateau pressure goal <30. Peridex oral care. Aggressive pulmonary toilet. Daily sedation vacation with spontaneous breathing trial if clinical condition warrants, discuss with respiratory therapy. Steroids, nebs, and antibiotic therapy      Renal: Strict I/Os, goal UOP >0.5cc/kg/hr. Trend renal function and electrolytes, replete as needed. Avoid nephrotoxic agents    GI: Tube feeds. PPI     ID: Broad coverage w/Meropenem. Received 1 dose of vancomycin today. Her MRSA swab from few days ago was negative but positive for Staph, on Bactroban. Ordered urine legionella and strep, f/u results. Added Azithromycin for atypical coverage     Heme: Received 1uPRBC during hospital stay, anemia has been stable since. On Rivaroxaban for history of DVTs     Endo: Goal blood glucose <180     CRITICAL CARE TIME SPENT: 35 minutes assessing presenting problems of acute illness, which pose high probability of life threatening deterioration or end organ damage/dysfunction, as well as medical decision making including initiating plan of care, reviewing data, reviewing radiologic exams, discussing with multidisciplinary team,  discussing goals of care with patient/family, and writing this note.  Non-inclusive of procedures performed  Date of entry of this note is equal to the date of services rendered

## 2024-01-31 NOTE — PROVIDER CONTACT NOTE (EICU) - BACKGROUND
Pt is in actively in distress   Pt noted to be tachypneic on bipap w/ RR 40  tachcardic   Tripoding holding side rails

## 2024-01-31 NOTE — PROGRESS NOTE ADULT - SUBJECTIVE AND OBJECTIVE BOX
Date of entry of this note is equal to the date of services rendered   Patient is a 68y old  Female who presents with a chief complaint of COPD exacerbation (31 Jan 2024 10:07)    BRIEF HOSPITAL COURSE:   Ms. Callahan is a 69 YO F w/pmhx of muscle wasting & atrophy, COPD on home oxygen, 2LNC w/hx of prior intubations, DVTs, DM, L breast cancer s/p mastectomy, HTN, HLD, admitted for anemia and SOB. Patient is in CCU w/acute on chronic hypoxic/hypercapnic respiratory failure 2/2 acute COPD exacerbation and pneumonia. Patient was intubated 01/31/2024     Events last 24 hours:   - Now intubated   - Sedated  - Low dose vasopressor requirement, will titrate off as able   - Ordered urine legionella & strep     Review of Systems:  Unable to assess given clinical condition     PAST MEDICAL & SURGICAL HISTORY:  COPD (chronic obstructive pulmonary disease)  Breast cancer  Smoker  DM (diabetes mellitus)  new diagnosis  HTN (hypertension)  COPD, moderate  HTN (hypertension)  Diabetes  COPD, severe  CAD (coronary artery disease)  Breast cancer  H/O left mastectomy  H/O breast surgery  History of cardiac cath  H/O left mastectomy    Medications:  meropenem  IVPB 1000 milliGRAM(s) IV Intermittent every 8 hours  diltiazem    milliGRAM(s) Oral daily  doxazosin 2 milliGRAM(s) Oral at bedtime  norepinephrine Infusion 0.05 MICROgram(s)/kG/Min IV Continuous <Continuous>  budesonide 160 MICROgram(s)/formoterol 4.5 MICROgram(s) Inhaler 2 Puff(s) Inhalation two times a day  levalbuterol Inhalation 0.63 milliGRAM(s) Inhalation every 6 hours  roflumilast 500 MICROGram(s) Oral daily  buPROPion XL (24-Hour) . 150 milliGRAM(s) Oral daily  dexMEDEtomidine Infusion 0.5 MICROgram(s)/kG/Hr IV Continuous <Continuous>  dronabinol 5 milliGRAM(s) Oral daily  melatonin 3 milliGRAM(s) Oral at bedtime PRN  mirtazapine 7.5 milliGRAM(s) Oral at bedtime  ondansetron    Tablet 4 milliGRAM(s) Oral four times a day PRN  propofol Infusion 5 MICROgram(s)/kG/Min IV Continuous <Continuous>  letrozole 2.5 milliGRAM(s) Oral daily  rivaroxaban 10 milliGRAM(s) Oral with dinner  bisacodyl Suppository 10 milliGRAM(s) Rectal daily PRN  pantoprazole   Suspension 40 milliGRAM(s) Oral before breakfast  polyethylene glycol 3350 17 Gram(s) Oral daily  atorvastatin 80 milliGRAM(s) Oral at bedtime  dextrose 50% Injectable 25 Gram(s) IV Push once  predniSONE   Tablet 40 milliGRAM(s) Oral daily  ascorbic acid 500 milliGRAM(s) Oral daily  dextrose 5%. 1000 milliLiter(s) IV Continuous <Continuous>  ferrous    sulfate 325 milliGRAM(s) Oral daily  multivitamin 1 Tablet(s) Oral daily  zinc sulfate 220 milliGRAM(s) Oral daily  chlorhexidine 0.12% Liquid 15 milliLiter(s) Oral Mucosa every 12 hours  chlorhexidine 2% Cloths 1 Application(s) Topical <User Schedule>  collagenase Ointment 1 Application(s) Topical daily  mupirocin 2% Nasal 1 Application(s) Both Nostrils two times a day  nicotine -   7 mG/24Hr(s) Patch 1 Patch Transdermal daily    Mode: AC/ CMV (Assist Control/ Continuous Mandatory Ventilation)  RR (machine): 20  TV (machine): 350  FiO2: 30  PEEP: 5  ITime: 0.8  PIP: 23    ICU Vital Signs Last 24 Hrs  T(C): 36.8 (31 Jan 2024 18:00), Max: 36.8 (31 Jan 2024 18:00)  T(F): 98.3 (31 Jan 2024 18:00), Max: 98.3 (31 Jan 2024 18:00)  HR: 90 (31 Jan 2024 18:29) (79 - 114)  BP: 96/67 (31 Jan 2024 18:15) (58/37 - 150/117)  BP(mean): 78 (31 Jan 2024 18:15) (46 - 127)  RR: 20 (31 Jan 2024 18:15) (0 - 44)  SpO2: 98% (31 Jan 2024 18:29) (92% - 100%)  O2 Parameters below as of 31 Jan 2024 04:58  Patient On (Oxygen Delivery Method): BiPAP/CPAP    ABG - ( 31 Jan 2024 16:10 )  pH, Arterial: 7.39  pH, Blood: x     /  pCO2: 44    /  pO2: 183   / HCO3: 27    / Base Excess: 1.6   /  SaO2: 99.2      I&O's Detail    30 Jan 2024 07:01  -  31 Jan 2024 07:00  --------------------------------------------------------  IN:    dextrose 5%: 280 mL    IV PiggyBack: 100 mL  Total IN: 380 mL    OUT:    Intermittent Catheterization - Urethral (mL): 500 mL    Voided (mL): 600 mL  Total OUT: 1100 mL    Total NET: -720 mL    31 Jan 2024 07:01  -  31 Jan 2024 19:50  --------------------------------------------------------  IN:    Dexmedetomidine: 20 mL    Dexmedetomidine: 11.8 mL    dextrose 5%: 320 mL    IV PiggyBack: 50 mL    IV PiggyBack: 100 mL    IV PiggyBack: 500 mL    Norepinephrine: 10.2 mL    Sodium Chloride 0.9% Bolus: 1000 mL  Total IN: 2012 mL    OUT:  Total OUT: 0 mL    Total NET: 2012 mL    LABS:                        9.3    10.73 )-----------( 245      ( 31 Jan 2024 05:00 )             30.4     01-31    143  |  105  |  12  ----------------------------<  69<L>  3.6   |  33<H>  |  <0.20<L>    Ca    8.2<L>      31 Jan 2024 05:00  Phos  1.5     01-31  Mg     1.8     01-31    TPro  5.7<L>  /  Alb  1.9<L>  /  TBili  0.5  /  DBili  x   /  AST  24  /  ALT  21  /  AlkPhos  78  01-31    POCT Blood Glucose.: 162 mg/dL (31 Jan 2024 17:45)    Urinalysis Basic - ( 31 Jan 2024 05:00 )  Color: x / Appearance: x / SG: x / pH: x  Gluc: 69 mg/dL / Ketone: x  / Bili: x / Urobili: x   Blood: x / Protein: x / Nitrite: x   Leuk Esterase: x / RBC: x / WBC x   Sq Epi: x / Non Sq Epi: x / Bacteria: x    CULTURES:  Culture Results:   No growth at 5 days (01-25 @ 13:50)  Culture Results:   No growth at 5 days (01-25 @ 13:50)  Rapid RVP Result: Detected (01-25 @ 12:26)    Physical Examination:  General: No acute distress.    HEENT: Pupils equal, reactive to light.  Symmetric.  PULM: Diminished breath sounds b/l   NECK: Supple, no lymphadenopathy, trachea midline  CVS: Regular rate and rhythm, no murmurs, rubs, or gallops  ABD: Soft, nondistended, nontender  EXT: No edema, nontender  SKIN: Warm and well perfused  NEURO: Intubated and sedated  RADIOLOGY:   < from: Xray Chest 1 View- PORTABLE-Routine (Xray Chest 1 View- PORTABLE-Routine in AM.) (01.29.24 @ 06:36) >    ACC: [id number] EXAM:  XR CHEST PORTABLE ROUTINE 1V   ORDERED BY: RANDALL MACHADO     PROCEDURE DATE:  01/29/2024      INTERPRETATION:  An AP portable chest x-ray was performed for pneumonia   follow-up.    Comparison is made to 1/28/2024.    There is a persistent left lower lobe infiltrate consistent with   pneumonia, which when accounting for patient rotation toward the left on   the current exam is likely unchanged. The remaining lungs are clear.   There is no pneumothorax. A right-sidedcentral line terminates in the   region of the junction of the SVC and right atrium. The hilar and   mediastinal structures as well as cardiac size cannot be accurately   assessed due to patient rotation to the left. There is no CHF. The bony   thorax remains stable.    IMPRESSION:  1. Persistent left lower lobe infiltrate consistent with pneumonia,   likely unchanged when accounting for patient rotation toward the left   side.  2. Central line terminates at the junction of the SVC and RA without  pneumothorax.    --- End of Report ---    MIGUEL ALMONTE MD; Attending Radiologist  This document has been electronically signed. Jan 30 2024  4:48PM    < end of copied text >   Date of entry of this note is equal to the date of services rendered   Patient is a 68y old  Female who presents with a chief complaint of COPD exacerbation (31 Jan 2024 10:07)    BRIEF HOSPITAL COURSE:   Ms. Callahan is a 67 YO F w/pmhx of muscle wasting & atrophy, COPD on home oxygen, 2LNC w/hx of prior intubations, DVTs, DM, L breast cancer s/p mastectomy, HTN, HLD, admitted for anemia and SOB. Patient is in CCU w/acute on chronic hypoxic/hypercapnic respiratory failure 2/2 acute COPD exacerbation and pneumonia. Patient was intubated 01/31/2024     Events last 24 hours:   - Now intubated   - Sedated  - Low dose vasopressor requirement, will titrate off as able   - Ordered urine legionella & strep   - Add azithromycin for atypical coverage     Review of Systems:  Unable to assess given clinical condition     PAST MEDICAL & SURGICAL HISTORY:  COPD (chronic obstructive pulmonary disease)  Breast cancer  Smoker  DM (diabetes mellitus)  new diagnosis  HTN (hypertension)  COPD, moderate  HTN (hypertension)  Diabetes  COPD, severe  CAD (coronary artery disease)  Breast cancer  H/O left mastectomy  H/O breast surgery  History of cardiac cath  H/O left mastectomy    Medications:  meropenem  IVPB 1000 milliGRAM(s) IV Intermittent every 8 hours  diltiazem    milliGRAM(s) Oral daily  doxazosin 2 milliGRAM(s) Oral at bedtime  norepinephrine Infusion 0.05 MICROgram(s)/kG/Min IV Continuous <Continuous>  budesonide 160 MICROgram(s)/formoterol 4.5 MICROgram(s) Inhaler 2 Puff(s) Inhalation two times a day  levalbuterol Inhalation 0.63 milliGRAM(s) Inhalation every 6 hours  roflumilast 500 MICROGram(s) Oral daily  buPROPion XL (24-Hour) . 150 milliGRAM(s) Oral daily  dexMEDEtomidine Infusion 0.5 MICROgram(s)/kG/Hr IV Continuous <Continuous>  dronabinol 5 milliGRAM(s) Oral daily  melatonin 3 milliGRAM(s) Oral at bedtime PRN  mirtazapine 7.5 milliGRAM(s) Oral at bedtime  ondansetron    Tablet 4 milliGRAM(s) Oral four times a day PRN  propofol Infusion 5 MICROgram(s)/kG/Min IV Continuous <Continuous>  letrozole 2.5 milliGRAM(s) Oral daily  rivaroxaban 10 milliGRAM(s) Oral with dinner  bisacodyl Suppository 10 milliGRAM(s) Rectal daily PRN  pantoprazole   Suspension 40 milliGRAM(s) Oral before breakfast  polyethylene glycol 3350 17 Gram(s) Oral daily  atorvastatin 80 milliGRAM(s) Oral at bedtime  dextrose 50% Injectable 25 Gram(s) IV Push once  predniSONE   Tablet 40 milliGRAM(s) Oral daily  ascorbic acid 500 milliGRAM(s) Oral daily  dextrose 5%. 1000 milliLiter(s) IV Continuous <Continuous>  ferrous    sulfate 325 milliGRAM(s) Oral daily  multivitamin 1 Tablet(s) Oral daily  zinc sulfate 220 milliGRAM(s) Oral daily  chlorhexidine 0.12% Liquid 15 milliLiter(s) Oral Mucosa every 12 hours  chlorhexidine 2% Cloths 1 Application(s) Topical <User Schedule>  collagenase Ointment 1 Application(s) Topical daily  mupirocin 2% Nasal 1 Application(s) Both Nostrils two times a day  nicotine -   7 mG/24Hr(s) Patch 1 Patch Transdermal daily    Mode: AC/ CMV (Assist Control/ Continuous Mandatory Ventilation)  RR (machine): 20  TV (machine): 350  FiO2: 30  PEEP: 5  ITime: 0.8  PIP: 23    ICU Vital Signs Last 24 Hrs  T(C): 36.8 (31 Jan 2024 18:00), Max: 36.8 (31 Jan 2024 18:00)  T(F): 98.3 (31 Jan 2024 18:00), Max: 98.3 (31 Jan 2024 18:00)  HR: 90 (31 Jan 2024 18:29) (79 - 114)  BP: 96/67 (31 Jan 2024 18:15) (58/37 - 150/117)  BP(mean): 78 (31 Jan 2024 18:15) (46 - 127)  RR: 20 (31 Jan 2024 18:15) (0 - 44)  SpO2: 98% (31 Jan 2024 18:29) (92% - 100%)  O2 Parameters below as of 31 Jan 2024 04:58  Patient On (Oxygen Delivery Method): BiPAP/CPAP    ABG - ( 31 Jan 2024 16:10 )  pH, Arterial: 7.39  pH, Blood: x     /  pCO2: 44    /  pO2: 183   / HCO3: 27    / Base Excess: 1.6   /  SaO2: 99.2      I&O's Detail    30 Jan 2024 07:01  -  31 Jan 2024 07:00  --------------------------------------------------------  IN:    dextrose 5%: 280 mL    IV PiggyBack: 100 mL  Total IN: 380 mL    OUT:    Intermittent Catheterization - Urethral (mL): 500 mL    Voided (mL): 600 mL  Total OUT: 1100 mL    Total NET: -720 mL    31 Jan 2024 07:01  -  31 Jan 2024 19:50  --------------------------------------------------------  IN:    Dexmedetomidine: 20 mL    Dexmedetomidine: 11.8 mL    dextrose 5%: 320 mL    IV PiggyBack: 50 mL    IV PiggyBack: 100 mL    IV PiggyBack: 500 mL    Norepinephrine: 10.2 mL    Sodium Chloride 0.9% Bolus: 1000 mL  Total IN: 2012 mL    OUT:  Total OUT: 0 mL    Total NET: 2012 mL    LABS:                        9.3    10.73 )-----------( 245      ( 31 Jan 2024 05:00 )             30.4     01-31    143  |  105  |  12  ----------------------------<  69<L>  3.6   |  33<H>  |  <0.20<L>    Ca    8.2<L>      31 Jan 2024 05:00  Phos  1.5     01-31  Mg     1.8     01-31    TPro  5.7<L>  /  Alb  1.9<L>  /  TBili  0.5  /  DBili  x   /  AST  24  /  ALT  21  /  AlkPhos  78  01-31    POCT Blood Glucose.: 162 mg/dL (31 Jan 2024 17:45)    Urinalysis Basic - ( 31 Jan 2024 05:00 )  Color: x / Appearance: x / SG: x / pH: x  Gluc: 69 mg/dL / Ketone: x  / Bili: x / Urobili: x   Blood: x / Protein: x / Nitrite: x   Leuk Esterase: x / RBC: x / WBC x   Sq Epi: x / Non Sq Epi: x / Bacteria: x    CULTURES:  Culture Results:   No growth at 5 days (01-25 @ 13:50)  Culture Results:   No growth at 5 days (01-25 @ 13:50)  Rapid RVP Result: Detected (01-25 @ 12:26)    Physical Examination:  General: No acute distress.    HEENT: Pupils equal, reactive to light.  Symmetric.  PULM: Diminished breath sounds b/l   NECK: Supple, no lymphadenopathy, trachea midline  CVS: Regular rate and rhythm, no murmurs, rubs, or gallops  ABD: Soft, nondistended, nontender  EXT: No edema, nontender  SKIN: Warm and well perfused  NEURO: Intubated and sedated  RADIOLOGY:   < from: Xray Chest 1 View- PORTABLE-Routine (Xray Chest 1 View- PORTABLE-Routine in AM.) (01.29.24 @ 06:36) >    ACC: [id number] EXAM:  XR CHEST PORTABLE ROUTINE 1V   ORDERED BY: RANDALL MACHADO     PROCEDURE DATE:  01/29/2024      INTERPRETATION:  An AP portable chest x-ray was performed for pneumonia   follow-up.    Comparison is made to 1/28/2024.    There is a persistent left lower lobe infiltrate consistent with   pneumonia, which when accounting for patient rotation toward the left on   the current exam is likely unchanged. The remaining lungs are clear.   There is no pneumothorax. A right-sidedcentral line terminates in the   region of the junction of the SVC and right atrium. The hilar and   mediastinal structures as well as cardiac size cannot be accurately   assessed due to patient rotation to the left. There is no CHF. The bony   thorax remains stable.    IMPRESSION:  1. Persistent left lower lobe infiltrate consistent with pneumonia,   likely unchanged when accounting for patient rotation toward the left   side.  2. Central line terminates at the junction of the SVC and RA without  pneumothorax.    --- End of Report ---    MIGUEL ALMONTE MD; Attending Radiologist  This document has been electronically signed. Jan 30 2024  4:48PM    < end of copied text >

## 2024-02-01 NOTE — PROGRESS NOTE ADULT - SUBJECTIVE AND OBJECTIVE BOX
Date of entry of this note is equal to the date of services rendered   Patient is a 68y old  Female who presents with a chief complaint of COPD exacerbation (31 Jan 2024 10:07)    BRIEF HOSPITAL COURSE:   Ms. Callahan is a 67 YO F w/pmhx of muscle wasting & atrophy, COPD on home oxygen, 2LNC w/hx of prior intubations, DVTs, DM, L breast cancer s/p mastectomy, HTN, HLD, admitted for anemia and SOB. Patient is in CCU w/acute on chronic hypoxic/hypercapnic respiratory failure 2/2 acute COPD exacerbation and pneumonia. Patient was intubated 01/31/2024   pt on pressor support ,       Events last 24 hours:   - Now intubated   - Sedated  - Low dose vasopressor requirement, will titrate off as able   - Add azithromycin for atypical coverage     Review of Systems:  Unable to assess given clinical condition     PAST MEDICAL & SURGICAL HISTORY:  COPD (chronic obstructive pulmonary disease)  Breast cancer  Smoker  DM (diabetes mellitus)  new diagnosis  HTN (hypertension)  COPD, moderate  HTN (hypertension)  Diabetes  COPD, severe  CAD (coronary artery disease)  Breast cancer  H/O left mastectomy  H/O breast surgery  History of cardiac cath  H/O left mastectomy           ascorbic acid 500 milliGRAM(s) Oral daily  atorvastatin 80 milliGRAM(s) Oral at bedtime  azithromycin   Tablet 500 milliGRAM(s) Oral daily  bisacodyl Suppository 10 milliGRAM(s) Rectal daily PRN  budesonide 160 MICROgram(s)/formoterol 4.5 MICROgram(s) Inhaler 2 Puff(s) Inhalation two times a day  buPROPion . 75 milliGRAM(s) Oral every 12 hours  chlorhexidine 0.12% Liquid 15 milliLiter(s) Oral Mucosa every 12 hours  chlorhexidine 2% Cloths 1 Application(s) Topical <User Schedule>  collagenase Ointment 1 Application(s) Topical daily  dexMEDEtomidine Infusion 0.5 MICROgram(s)/kG/Hr IV Continuous <Continuous>  dextrose 50% Injectable 50 milliLiter(s) IV Push every 15 minutes  doxazosin 2 milliGRAM(s) Oral at bedtime  ferrous    sulfate Liquid 300 milliGRAM(s) Enteral Tube daily  insulin regular Infusion 3 Unit(s)/Hr IV Continuous <Continuous>  letrozole 2.5 milliGRAM(s) Oral daily  levalbuterol Inhalation 0.63 milliGRAM(s) Inhalation every 6 hours  meropenem  IVPB 1000 milliGRAM(s) IV Intermittent every 8 hours  mirtazapine 7.5 milliGRAM(s) Oral at bedtime  multivitamin/minerals/iron Oral Solution (CENTRUM) 15 milliLiter(s) Enteral Tube daily  mupirocin 2% Nasal 1 Application(s) Both Nostrils two times a day  nicotine -   7 mG/24Hr(s) Patch 1 Patch Transdermal daily  norepinephrine Infusion 0.05 MICROgram(s)/kG/Min IV Continuous <Continuous>  ondansetron    Tablet 4 milliGRAM(s) Oral four times a day PRN  pantoprazole   Suspension 40 milliGRAM(s) Oral before breakfast  polyethylene glycol 3350 17 Gram(s) Oral daily  prednisoLONE    3 mG/mL Solution (ORAPRED) 40 milliGRAM(s) Enteral Tube daily  propofol Infusion 5 MICROgram(s)/kG/Min IV Continuous <Continuous>  rivaroxaban 10 milliGRAM(s) Enteral Tube daily  roflumilast 500 MICROGram(s) Oral daily  sodium chloride 0.9% lock flush 10 milliLiter(s) IV Push every 1 hour PRN  zinc sulfate 220 milliGRAM(s) Oral daily                            9.5    8.83  )-----------( 228      ( 01 Feb 2024 05:30 )             30.5       Hemoglobin: 9.5 g/dL (02-01 @ 05:30)  Hemoglobin: 9.3 g/dL (01-31 @ 05:00)  Hemoglobin: 9.9 g/dL (01-30 @ 05:00)  Hemoglobin: 9.3 g/dL (01-29 @ 05:00)  Hemoglobin: 9.1 g/dL (01-28 @ 06:00)      02-01    143  |  107  |  11  ----------------------------<  203<H>  3.9   |  31  |  <0.20<L>    Ca    8.2<L>      01 Feb 2024 05:30  Phos  2.6     02-01  Mg     2.1     02-01    TPro  5.7<L>  /  Alb  1.9<L>  /  TBili  0.5  /  DBili  x   /  AST  24  /  ALT  21  /  AlkPhos  78  01-31    Creatinine Trend: <0.20<--, <0.20<--, 0.27<--, 0.22<--, 0.22<--, 0.25<--    COAGS:           T(C): 36.4 (02-01-24 @ 17:00), Max: 37.1 (02-01-24 @ 05:00)  HR: 83 (02-01-24 @ 20:00) (62 - 83)  BP: 108/57 (02-01-24 @ 20:00) (77/55 - 128/69)  RR: 21 (02-01-24 @ 20:00) (12 - 28)  SpO2: 100% (02-01-24 @ 20:00) (94% - 100%)  Wt(kg): --    I&O's Summary    31 Jan 2024 07:01  -  01 Feb 2024 07:00  --------------------------------------------------------  IN: 2274.5 mL / OUT: 600 mL / NET: 1674.5 mL    01 Feb 2024 07:01  -  01 Feb 2024 20:34  --------------------------------------------------------  IN: 738.4 mL / OUT: 100 mL / NET: 638.4 mL      CULTURES:  Culture Results:   No growth at 5 days (01-25 @ 13:50)  Culture Results:   No growth at 5 days (01-25 @ 13:50)  Rapid RVP Result: Detected (01-25 @ 12:26)    Physical Examination:  General: No acute distress.    HEENT: Pupils equal, reactive to light.  Symmetric.  PULM: Diminished breath sounds b/l   NECK: Supple, no lymphadenopathy, trachea midline  CVS: Regular rate and rhythm, no murmurs, rubs, or gallops  ABD: Soft, nondistended, nontender  EXT: No edema, nontender  SKIN: Warm and well perfused  NEURO: Intubated and sedated  RADIOLOGY:   < from: Xray Chest 1 View- PORTABLE-Routine (Xray Chest 1 View- PORTABLE-Routine in AM.) (01.29.24 @ 06:36) >    ACC: [id number] EXAM:  XR CHEST PORTABLE ROUTINE 1V   ORDERED BY: RANDALL MACHADO     PROCEDURE DATE:  01/29/2024      INTERPRETATION:  An AP portable chest x-ray was performed for pneumonia   follow-up.    Comparison is made to 1/28/2024.    There is a persistent left lower lobe infiltrate consistent with   pneumonia, which when accounting for patient rotation toward the left on   the current exam is likely unchanged. The remaining lungs are clear.   There is no pneumothorax. A right-sidedcentral line terminates in the   region of the junction of the SVC and right atrium. The hilar and   mediastinal structures as well as cardiac size cannot be accurately   assessed due to patient rotation to the left. There is no CHF. The bony   thorax remains stable.    IMPRESSION:  1. Persistent left lower lobe infiltrate consistent with pneumonia,   likely unchanged when accounting for patient rotation toward the left   side.  2. Central line terminates at the junction of the SVC and RA without  pneumothorax.    --- End of Report ---    MIGUEL ALMONTE MD; Attending Radiologist  This document has been electronically signed. Jan 30 2024  4:48PM    < end of copied text >

## 2024-02-01 NOTE — PROVIDER CONTACT NOTE (OTHER) - SITUATION
Patient admitted with resp. failure/COPD.  Started on IV steroids today.
Patients POC test was 458, repeat 447
 at 1121

## 2024-02-01 NOTE — PROGRESS NOTE ADULT - ASSESSMENT
Get your Prescription filled at [facility]!    · [facility] uses the same medical record your doctor uses. More accurate and timely information for your doctor and your pharmacy means more effective and safer health care for you and your family.  · [facility] accepts all of the major insurance plans which means you pay the same copay wherever you go.  · [facility] has a prescription savings club with over 200 medications available for $3.99 for a 30 day supply. *$5.00 yearly fee to join the club  · [facility] take the time to explain your medication regimen to you.  · [facility] will mail your medications to you free of postage and handling charges. We love snowbirds.     Begin taking live bacteria probiotic and Imodium to help control diarrhea.   Rehydrate with BodyArmour in order to help balance volume loss.      At [facility], one important tool we use to improve our patient services is our Patient Survey.  Following your visit you may receive our survey in the mail.    Please take the time to complete the survey.    If your visit with us was great, we want to hear about it.    If we can improve, please let us know how.          Assessment:  Ms. Callahan is a 67 YO F w/pmhx of muscle wasting & atrophy, COPD on home oxygen, 2LNC w/hx of prior intubations, DVTs, DM, L breast cancer s/p mastectomy, HTN, HLD, admitted for anemia and SOB. Patient is in CCU w/acute on chronic hypoxic/hypercapnic respiratory failure 2/2 acute COPD exacerbation and pneumonia. Patient was intubated 01/31/2024     Problem List:  1. Acute on chronic hypoxic/hypercapnic respiratory failure  2. Acute COPD exacerbation   3. Pneumonia  4. Shock, distributive   5. Anemia     Plan:  Neuro: Titrating Precedex and propofol to RASS 0 to -2. Monitor mental status, avoid deliriogenic medications. Pain & fever control as needed. d/c Nicotine patch     CV: Monitor HR and BP. Actively titrating Levophed to maintain MAP>65mmHg. Suspect shock 2/2 sedation. Statin therapy. Hold PO cardizem and dronabinol in setting of low BP     Pulm: Patient currently on Full vent support. Titrate settings to maintain SaO2 >90%, or pH >7.25. Consider low tidal volume ventilation strategy w/ goal Tv 4-6 cc/kg of ideal body weight. Plateau pressure goal <30. Peridex oral care. Aggressive pulmonary toilet. Daily sedation vacation with spontaneous breathing trial if clinical condition warrants, discuss with respiratory therapy. Steroids, nebs, and antibiotic therapy      Renal: Strict I/Os, goal UOP >0.5cc/kg/hr. Trend renal function and electrolytes, replete as needed. Avoid nephrotoxic agents    GI: Tube feeds. PPI     ID: Broad coverage w/Meropenem. Received 1 dose of vancomycin today. Her MRSA swab from few days ago was negative but positive for Staph, on Bactroban. Ordered urine legionella and strep, f/u results. On Azithromycin for atypical coverage     Heme: Received 1uPRBC during hospital stay, anemia has been stable since. On Rivaroxaban for history of DVTs     Endo: Goal blood glucose <180     CRITICAL CARE TIME SPENT: 35 minutes assessing presenting problems of acute illness, which pose high probability of life threatening deterioration or end organ damage/dysfunction, as well as medical decision making including initiating plan of care, reviewing data, reviewing radiologic exams, discussing with multidisciplinary team,  discussing goals of care with patient/family, and writing this note.  Non-inclusive of procedures performed  Date of entry of this note is equal to the date of services rendered   Assessment:  Ms. Callahan is a 67 YO F w/pmhx of muscle wasting & atrophy, COPD on home oxygen, 2LNC w/hx of prior intubations, DVTs, DM, L breast cancer s/p mastectomy, HTN, HLD, admitted for anemia and SOB. Patient is in CCU w/acute on chronic hypoxic/hypercapnic respiratory failure 2/2 acute COPD exacerbation and pneumonia. Patient was intubated 01/31/2024     Problem List:  1. Acute on chronic hypoxic/hypercapnic respiratory failure requiring intubation 1/31/24  2. Acute COPD exacerbation   3. Pneumonia  4. Shock, distributive   5. Anemia     Plan:  Neuro: Titrating Precedex and propofol to RASS 0 to -2. Monitor mental status, avoid deliriogenic medications. Pain & fever control as needed. d/c Nicotine patch     CV: Monitor HR and BP. Actively titrating Levophed to maintain MAP>65mmHg. Suspect shock 2/2 sedation. Statin therapy. Hold PO cardizem and dronabinol in setting of low BP     Pulm: Patient currently on Full vent support. Titrate settings to maintain SaO2 >90%, or pH >7.25. Consider low tidal volume ventilation strategy w/ goal Tv 4-6 cc/kg of ideal body weight. Plateau pressure goal <30. Peridex oral care. Aggressive pulmonary toilet. Daily sedation vacation with spontaneous breathing trial if clinical condition warrants, discuss with respiratory therapy. Steroids, nebs, and antibiotic therapy    sputum cultures negative  Broaden antibiotics     Renal: Strict I/Os, goal UOP >0.5cc/kg/hr. Trend renal function and electrolytes, replete as needed. Avoid nephrotoxic agents    GI: Tube feeds. PPI     ID: Broad coverage w/Meropenem. Received 1 dose of vancomycin today. Her MRSA swab from few days ago was negative but positive for Staph, on Bactroban. Ordered urine legionella and strep, f/u results. On Azithromycin for atypical coverage     Heme: Received 1uPRBC during hospital stay, anemia has been stable since. On Rivaroxaban for history of DVTs     Endo: Goal blood glucose <180     CRITICAL CARE TIME SPENT: 35 minutes assessing presenting problems of acute illness, which pose high probability of life threatening deterioration or end organ damage/dysfunction, as well as medical decision making including initiating plan of care, reviewing data, reviewing radiologic exams, discussing with multidisciplinary team,  discussing goals of care with patient/family, and writing this note.  Non-inclusive of procedures performed  Date of entry of this note is equal to the date of services rendered

## 2024-02-01 NOTE — PROGRESS NOTE ADULT - SUBJECTIVE AND OBJECTIVE BOX
Date of entry of this note is equal to the date of services rendered   Patient is a 68y old  Female who presents with a chief complaint of COPD exacerbation (31 Jan 2024 10:07)    BRIEF HOSPITAL COURSE:   Ms. Callahan is a 69 YO F w/pmhx of muscle wasting & atrophy, COPD on home oxygen, 2LNC w/hx of prior intubations, DVTs, DM, L breast cancer s/p mastectomy, HTN, HLD, admitted for anemia and SOB. Patient is in CCU w/acute on chronic hypoxic/hypercapnic respiratory failure 2/2 acute COPD exacerbation and pneumonia. Patient was intubated 01/31/2024     Events last 24 hours:   - Pt remains intubated   - Sedated  - Low dose vasopressor requirement, will titrate off as able   - Cardizem and dronabinol held for hypotension    Review of Systems:  Unable to assess given clinical condition     PAST MEDICAL & SURGICAL HISTORY:  COPD (chronic obstructive pulmonary disease)  Breast cancer  Smoker  DM (diabetes mellitus)  new diagnosis  HTN (hypertension)  COPD, moderate  HTN (hypertension)  Diabetes  COPD, severe  CAD (coronary artery disease)  Breast cancer  H/O left mastectomy  H/O breast surgery  History of cardiac cath  H/O left mastectomy    Medications:  meropenem  IVPB 1000 milliGRAM(s) IV Intermittent every 8 hours  diltiazem    milliGRAM(s) Oral daily  doxazosin 2 milliGRAM(s) Oral at bedtime  norepinephrine Infusion 0.05 MICROgram(s)/kG/Min IV Continuous <Continuous>  budesonide 160 MICROgram(s)/formoterol 4.5 MICROgram(s) Inhaler 2 Puff(s) Inhalation two times a day  levalbuterol Inhalation 0.63 milliGRAM(s) Inhalation every 6 hours  roflumilast 500 MICROGram(s) Oral daily  buPROPion XL (24-Hour) . 150 milliGRAM(s) Oral daily  dexMEDEtomidine Infusion 0.5 MICROgram(s)/kG/Hr IV Continuous <Continuous>  dronabinol 5 milliGRAM(s) Oral daily  melatonin 3 milliGRAM(s) Oral at bedtime PRN  mirtazapine 7.5 milliGRAM(s) Oral at bedtime  ondansetron    Tablet 4 milliGRAM(s) Oral four times a day PRN  propofol Infusion 5 MICROgram(s)/kG/Min IV Continuous <Continuous>  letrozole 2.5 milliGRAM(s) Oral daily  rivaroxaban 10 milliGRAM(s) Oral with dinner  bisacodyl Suppository 10 milliGRAM(s) Rectal daily PRN  pantoprazole   Suspension 40 milliGRAM(s) Oral before breakfast  polyethylene glycol 3350 17 Gram(s) Oral daily  atorvastatin 80 milliGRAM(s) Oral at bedtime  dextrose 50% Injectable 25 Gram(s) IV Push once  predniSONE   Tablet 40 milliGRAM(s) Oral daily  ascorbic acid 500 milliGRAM(s) Oral daily  dextrose 5%. 1000 milliLiter(s) IV Continuous <Continuous>  ferrous    sulfate 325 milliGRAM(s) Oral daily  multivitamin 1 Tablet(s) Oral daily  zinc sulfate 220 milliGRAM(s) Oral daily  chlorhexidine 0.12% Liquid 15 milliLiter(s) Oral Mucosa every 12 hours  chlorhexidine 2% Cloths 1 Application(s) Topical <User Schedule>  collagenase Ointment 1 Application(s) Topical daily  mupirocin 2% Nasal 1 Application(s) Both Nostrils two times a day  nicotine -   7 mG/24Hr(s) Patch 1 Patch Transdermal daily    Mode: AC/ CMV (Assist Control/ Continuous Mandatory Ventilation)  RR (machine): 20  TV (machine): 350  FiO2: 30  PEEP: 5  ITime: 0.8  PIP: 23      ICU Vital Signs Last 24 Hrs  T(C): 37.1 (01 Feb 2024 05:00), Max: 37.1 (31 Jan 2024 20:00)  T(F): 98.7 (01 Feb 2024 05:00), Max: 98.7 (31 Jan 2024 20:00)  HR: 75 (01 Feb 2024 09:00) (62 - 114)  BP: 114/64 (01 Feb 2024 08:51) (58/37 - 150/117)  BP(mean): 78 (01 Feb 2024 08:51) (46 - 127)  ABP: --  ABP(mean): --  RR: 22 (01 Feb 2024 08:51) (0 - 30)  SpO2: 100% (01 Feb 2024 09:00) (92% - 100%)    O2 Parameters below as of 01 Feb 2024 09:00  Patient On (Oxygen Delivery Method): ventilator      ABG - ( 01 Feb 2024 05:30 )  pH, Arterial: 7.47  pH, Blood: x     /  pCO2: 39    /  pO2: 130   / HCO3: 28    / Base Excess: 4.7   /  SaO2: 99.1            I&O's Detail    30 Jan 2024 07:01  -  31 Jan 2024 07:00  --------------------------------------------------------  IN:    dextrose 5%: 280 mL    IV PiggyBack: 100 mL  Total IN: 380 mL    OUT:    Intermittent Catheterization - Urethral (mL): 500 mL    Voided (mL): 600 mL  Total OUT: 1100 mL    Total NET: -720 mL    31 Jan 2024 07:01  -  31 Jan 2024 19:50  --------------------------------------------------------  IN:    Dexmedetomidine: 20 mL    Dexmedetomidine: 11.8 mL    dextrose 5%: 320 mL    IV PiggyBack: 50 mL    IV PiggyBack: 100 mL    IV PiggyBack: 500 mL    Norepinephrine: 10.2 mL    Sodium Chloride 0.9% Bolus: 1000 mL  Total IN: 2012 mL    OUT:  Total OUT: 0 mL    Total NET: 2012 mL      I&O's Detail    31 Jan 2024 07:01  -  01 Feb 2024 07:00  --------------------------------------------------------  IN:    Dexmedetomidine: 20 mL    Dexmedetomidine: 106.7 mL    dextrose 5%: 320 mL    IV PiggyBack: 50 mL    IV PiggyBack: 100 mL    IV PiggyBack: 500 mL    Norepinephrine: 37.6 mL    Propofol: 100.2 mL    Sodium Chloride 0.9% Bolus: 1000 mL  Total IN: 2234.5 mL    OUT:    Intermittent Catheterization - Urethral (mL): 600 mL  Total OUT: 600 mL    Total NET: 1634.5 mL      01 Feb 2024 07:01  -  01 Feb 2024 09:53  --------------------------------------------------------  IN:    Dexmedetomidine: 14.6 mL    Norepinephrine: 5.4 mL    Propofol: 15.2 mL  Total IN: 35.2 mL    OUT:  Total OUT: 0 mL    Total NET: 35.2 mL            LABS:                        9.5    8.83  )-----------( 228      ( 01 Feb 2024 05:30 )             30.5   02-01    143  |  107  |  11  ----------------------------<  203<H>  3.9   |  31  |  <0.20<L>    Ca    8.2<L>      01 Feb 2024 05:30  Phos  2.6     02-01  Mg     2.1     02-01    TPro  5.7<L>  /  Alb  1.9<L>  /  TBili  0.5  /  DBili  x   /  AST  24  /  ALT  21  /  AlkPhos  78  01-31               CAPILLARY BLOOD GLUCOSE  POCT Blood Glucose.: 148 mg/dL (01 Feb 2024 05:35)      Urinalysis Basic - ( 31 Jan 2024 05:00 )  Color: x / Appearance: x / SG: x / pH: x  Gluc: 69 mg/dL / Ketone: x  / Bili: x / Urobili: x   Blood: x / Protein: x / Nitrite: x   Leuk Esterase: x / RBC: x / WBC x   Sq Epi: x / Non Sq Epi: x / Bacteria: x    CULTURES:  Culture Results:   No growth at 5 days (01-25 @ 13:50)  Culture Results:   No growth at 5 days (01-25 @ 13:50)  Rapid RVP Result: Detected (01-25 @ 12:26)    Physical Examination:  General: No acute distress.    HEENT: Pupils equal, reactive to light.  Symmetric.  PULM: Diminished breath sounds b/l  NECK: Supple, no lymphadenopathy, trachea midline  CVS: Regular rate and rhythm, no murmurs, rubs, or gallops  ABD: Soft, nondistended, nontender  EXT: No edema, nontender  SKIN: Warm and well perfused  NEURO: Intubated and sedated    RADIOLOGY:   < from: Xray Chest 1 View- PORTABLE-Routine (Xray Chest 1 View- PORTABLE-Routine in AM.) (01.29.24 @ 06:36) >    ACC: [id number] EXAM:  XR CHEST PORTABLE ROUTINE 1V   ORDERED BY: RANDALL MACHADO     IMPRESSION:  1. Persistent left lower lobe infiltrate consistent with pneumonia,   likely unchanged when accounting for patient rotation toward the left   side.  2. Central line terminates at the junction of the SVC and RA without  pneumothorax.       Date of entry of this note is equal to the date of services rendered   Patient is a 68y old  Female who presents with a chief complaint of COPD exacerbation (31 Jan 2024 10:07)    BRIEF HOSPITAL COURSE:   Ms. Callahan is a 69 YO F w/pmhx of muscle wasting & atrophy, COPD on home oxygen, 2LNC w/hx of prior intubations, DVTs, DM, L breast cancer s/p mastectomy, HTN, HLD, admitted for anemia and SOB. Patient is in CCU w/acute on chronic hypoxic/hypercapnic respiratory failure 2/2 acute COPD exacerbation and pneumonia. Patient was intubated 01/31/2024     Events last 24 hours:   - Pt remains intubated   - Sedated  - Low dose vasopressor requirement, will titrate off as able   - Cardizem and dronabinol held for hypotension    Review of Systems:  Unable to assess given clinical condition     PAST MEDICAL & SURGICAL HISTORY:  COPD (chronic obstructive pulmonary disease)  Breast cancer  Smoker  DM (diabetes mellitus)  new diagnosis  HTN (hypertension)  COPD, moderate  HTN (hypertension)  Diabetes  COPD, severe  CAD (coronary artery disease)  Breast cancer  H/O left mastectomy  H/O breast surgery  History of cardiac cath  H/O left mastectomy    Medications:  meropenem  IVPB 1000 milliGRAM(s) IV Intermittent every 8 hours  diltiazem    milliGRAM(s) Oral daily  doxazosin 2 milliGRAM(s) Oral at bedtime  norepinephrine Infusion 0.05 MICROgram(s)/kG/Min IV Continuous <Continuous>  budesonide 160 MICROgram(s)/formoterol 4.5 MICROgram(s) Inhaler 2 Puff(s) Inhalation two times a day  levalbuterol Inhalation 0.63 milliGRAM(s) Inhalation every 6 hours  roflumilast 500 MICROGram(s) Oral daily  buPROPion XL (24-Hour) . 150 milliGRAM(s) Oral daily  dexMEDEtomidine Infusion 0.5 MICROgram(s)/kG/Hr IV Continuous <Continuous>  dronabinol 5 milliGRAM(s) Oral daily  melatonin 3 milliGRAM(s) Oral at bedtime PRN  mirtazapine 7.5 milliGRAM(s) Oral at bedtime  ondansetron    Tablet 4 milliGRAM(s) Oral four times a day PRN  propofol Infusion 5 MICROgram(s)/kG/Min IV Continuous <Continuous>  letrozole 2.5 milliGRAM(s) Oral daily  rivaroxaban 10 milliGRAM(s) Oral with dinner  bisacodyl Suppository 10 milliGRAM(s) Rectal daily PRN  pantoprazole   Suspension 40 milliGRAM(s) Oral before breakfast  polyethylene glycol 3350 17 Gram(s) Oral daily  atorvastatin 80 milliGRAM(s) Oral at bedtime  dextrose 50% Injectable 25 Gram(s) IV Push once  predniSONE   Tablet 40 milliGRAM(s) Oral daily  ascorbic acid 500 milliGRAM(s) Oral daily  dextrose 5%. 1000 milliLiter(s) IV Continuous <Continuous>  ferrous    sulfate 325 milliGRAM(s) Oral daily  multivitamin 1 Tablet(s) Oral daily  zinc sulfate 220 milliGRAM(s) Oral daily  chlorhexidine 0.12% Liquid 15 milliLiter(s) Oral Mucosa every 12 hours  chlorhexidine 2% Cloths 1 Application(s) Topical <User Schedule>  collagenase Ointment 1 Application(s) Topical daily  mupirocin 2% Nasal 1 Application(s) Both Nostrils two times a day  nicotine -   7 mG/24Hr(s) Patch 1 Patch Transdermal daily    Mode: AC/ CMV (Assist Control/ Continuous Mandatory Ventilation)  RR (machine): 20  TV (machine): 350  FiO2: 30  PEEP: 5  ITime: 0.8  PIP: 23      ICU Vital Signs Last 24 Hrs  T(C): 37.1 (01 Feb 2024 05:00), Max: 37.1 (31 Jan 2024 20:00)  T(F): 98.7 (01 Feb 2024 05:00), Max: 98.7 (31 Jan 2024 20:00)  HR: 75 (01 Feb 2024 09:00) (62 - 114)  BP: 114/64 (01 Feb 2024 08:51) (58/37 - 150/117)  BP(mean): 78 (01 Feb 2024 08:51) (46 - 127)  ABP: --  ABP(mean): --  RR: 22 (01 Feb 2024 08:51) (0 - 30)  SpO2: 100% (01 Feb 2024 09:00) (92% - 100%)    O2 Parameters below as of 01 Feb 2024 09:00  Patient On (Oxygen Delivery Method): ventilator      ABG - ( 01 Feb 2024 05:30 )  pH, Arterial: 7.47  pH, Blood: x     /  pCO2: 39    /  pO2: 130   / HCO3: 28    / Base Excess: 4.7   /  SaO2: 99.1           I&O's Detail    30 Jan 2024 07:01  -  31 Jan 2024 07:00  --------------------------------------------------------  IN:    dextrose 5%: 280 mL    IV PiggyBack: 100 mL  Total IN: 380 mL    OUT:    Intermittent Catheterization - Urethral (mL): 500 mL    Voided (mL): 600 mL  Total OUT: 1100 mL    Total NET: -720 mL    31 Jan 2024 07:01  -  31 Jan 2024 19:50  --------------------------------------------------------  IN:    Dexmedetomidine: 20 mL    Dexmedetomidine: 11.8 mL    dextrose 5%: 320 mL    IV PiggyBack: 50 mL    IV PiggyBack: 100 mL    IV PiggyBack: 500 mL    Norepinephrine: 10.2 mL    Sodium Chloride 0.9% Bolus: 1000 mL  Total IN: 2012 mL    OUT:  Total OUT: 0 mL    Total NET: 2012 mL      I&O's Detail    31 Jan 2024 07:01  -  01 Feb 2024 07:00  --------------------------------------------------------  IN:    Dexmedetomidine: 20 mL    Dexmedetomidine: 106.7 mL    dextrose 5%: 320 mL    IV PiggyBack: 50 mL    IV PiggyBack: 100 mL    IV PiggyBack: 500 mL    Norepinephrine: 37.6 mL    Propofol: 100.2 mL    Sodium Chloride 0.9% Bolus: 1000 mL  Total IN: 2234.5 mL    OUT:    Intermittent Catheterization - Urethral (mL): 600 mL  Total OUT: 600 mL    Total NET: 1634.5 mL      01 Feb 2024 07:01  -  01 Feb 2024 09:53  --------------------------------------------------------  IN:    Dexmedetomidine: 14.6 mL    Norepinephrine: 5.4 mL    Propofol: 15.2 mL  Total IN: 35.2 mL    OUT:  Total OUT: 0 mL    Total NET: 35.2 mL            LABS:                        9.5    8.83  )-----------( 228      ( 01 Feb 2024 05:30 )             30.5   02-01    143  |  107  |  11  ----------------------------<  203<H>  3.9   |  31  |  <0.20<L>    Ca    8.2<L>      01 Feb 2024 05:30  Phos  2.6     02-01  Mg     2.1     02-01    TPro  5.7<L>  /  Alb  1.9<L>  /  TBili  0.5  /  DBili  x   /  AST  24  /  ALT  21  /  AlkPhos  78  01-31               CAPILLARY BLOOD GLUCOSE  POCT Blood Glucose.: 148 mg/dL (01 Feb 2024 05:35)      Urinalysis Basic - ( 31 Jan 2024 05:00 )  Color: x / Appearance: x / SG: x / pH: x  Gluc: 69 mg/dL / Ketone: x  / Bili: x / Urobili: x   Blood: x / Protein: x / Nitrite: x   Leuk Esterase: x / RBC: x / WBC x   Sq Epi: x / Non Sq Epi: x / Bacteria: x    CULTURES:  Culture Results:   No growth at 5 days (01-25 @ 13:50)  Culture Results:   No growth at 5 days (01-25 @ 13:50)  Rapid RVP Result: Detected (01-25 @ 12:26)    Physical Examination:  General: No acute distress.    HEENT: Pupils equal, reactive to light.  Symmetric.  PULM: Diminished breath sounds b/l  NECK: Supple, no lymphadenopathy, trachea midline  CVS: Regular rate and rhythm, no murmurs, rubs, or gallops  ABD: Soft, nondistended, nontender  EXT: No edema, nontender  SKIN: Warm and well perfused  NEURO: Intubated and sedated    RADIOLOGY:   < from: Xray Chest 1 View- PORTABLE-Routine (Xray Chest 1 View- PORTABLE-Routine in AM.) (01.29.24 @ 06:36) >    ACC: [id number] EXAM:  XR CHEST PORTABLE ROUTINE 1V   ORDERED BY: RANDALL MACHADO     IMPRESSION:  1. Persistent left lower lobe infiltrate consistent with pneumonia, likely unchanged when accounting for patient rotation toward the left side.  2. Central line terminates at the junction of the SVC and RA without pneumothorax.

## 2024-02-01 NOTE — PROGRESS NOTE ADULT - ASSESSMENT
Assessment:  Ms. Callahan is a 67 YO F w/pmhx of muscle wasting & atrophy, COPD on home oxygen, 2LNC w/hx of prior intubations, DVTs, DM, L breast cancer s/p mastectomy, HTN, HLD, admitted for anemia and SOB. Patient is in CCU w/acute on chronic hypoxic/hypercapnic respiratory failure 2/2 acute COPD exacerbation and pneumonia. Patient was intubated 01/31/2024     Problem List:  1. Acute on chronic hypoxic/hypercapnic respiratory failure  2. Acute COPD exacerbation   3. Pneumonia  4. Shock, distributive   5. Anemia     Plan:  Neuro: Titrating Precedex and propofol to RASS 0 to -2. Monitor mental status, avoid deliriogenic medications. Pain & fever control as needed. Nicotine patch     CV: Monitor HR and BP. Actively titrating Levophed to maintain MAP>65mmHg. Suspect shock 2/2 sedation. Statin therapy. Hold PO cardizem in setting of low BP     Pulm: Patient currently on Full vent support. Titrate settings to maintain SaO2 >90%, or pH >7.25. Consider low tidal volume ventilation strategy w/ goal Tv 4-6 cc/kg of ideal body weight. Plateau pressure goal <30. Peridex oral care. Aggressive pulmonary toilet. Daily sedation vacation with spontaneous breathing trial if clinical condition warrants, discuss with respiratory therapy. Steroids, nebs, and antibiotic therapy      Renal: Strict I/Os, goal UOP >0.5cc/kg/hr. Trend renal function and electrolytes, replete as needed. Avoid nephrotoxic agents    GI: Tube feeds. PPI     ID: Broad coverage w/Meropenem. Received 1 dose of vancomycin today. Her MRSA swab from few days ago was negative but positive for Staph, on Bactroban. Ordered urine legionella and strep, f/u results. Added Azithromycin for atypical coverage     Heme:  On Rivaroxaban for history of DVTs     Endo: Goal blood glucose <180     CRITICAL CARE TIME SPENT: 35 minutes assessing presenting problems of acute illness, which pose high probability of life threatening deterioration or end organ damage/dysfunction, as well as medical decision making including initiating plan of care, reviewing data, reviewing radiologic exams, discussing with multidisciplinary team,  discussing goals of care with patient/family, and writing this note.  Non-inclusive of procedures performed  Date of entry of this note is equal to the date of services rendered

## 2024-02-01 NOTE — PROVIDER CONTACT NOTE (OTHER) - BACKGROUND
Previous 2 glucose in the 80s.
DM 2, COPD
Patient diabetic, was previously hypoglycemic yesterday, feedings started late yesterday.

## 2024-02-02 NOTE — PROGRESS NOTE ADULT - SUBJECTIVE AND OBJECTIVE BOX
Date of entry of this note is equal to the date of services rendered   Patient is a 68y old  Female who presents with a chief complaint of COPD exacerbation (31 Jan 2024 10:07)    BRIEF HOSPITAL COURSE:   Ms. Callahan is a 67 YO F w/pmhx of muscle wasting & atrophy, COPD on home oxygen, 2LNC w/hx of prior intubations, DVTs, DM, L breast cancer s/p mastectomy, HTN, HLD, admitted for anemia and SOB. Patient is in CCU w/acute on chronic hypoxic/hypercapnic respiratory failure 2/2 acute COPD exacerbation and pneumonia. Patient was intubated 01/31/2024   pt on pressor support ,       Events last 24 hours:   - Failed trial of CPAP this AM  - Sedated; decreased propofol, increased precedex  - Low dose vasopressor requirement, will titrate off as able   - On azithromycin for atypical coverage     Review of Systems:  Unable to assess given clinical condition     PAST MEDICAL & SURGICAL HISTORY:  COPD (chronic obstructive pulmonary disease)  Breast cancer  Smoker  DM (diabetes mellitus)  new diagnosis  HTN (hypertension)  COPD, moderate  HTN (hypertension)  Diabetes  COPD, severe  CAD (coronary artery disease)      MEDICATIONS  (STANDING):  ascorbic acid 500 milliGRAM(s) Oral daily  atorvastatin 80 milliGRAM(s) Oral at bedtime  azithromycin   Tablet 500 milliGRAM(s) Oral daily  budesonide 160 MICROgram(s)/formoterol 4.5 MICROgram(s) Inhaler 2 Puff(s) Inhalation two times a day  chlorhexidine 0.12% Liquid 15 milliLiter(s) Oral Mucosa every 12 hours  chlorhexidine 2% Cloths 1 Application(s) Topical <User Schedule>  collagenase Ointment 1 Application(s) Topical daily  dexMEDEtomidine Infusion 0.5 MICROgram(s)/kG/Hr (4.54 mL/Hr) IV Continuous <Continuous>  dextrose 5% + lactated ringers. 1000 milliLiter(s) (75 mL/Hr) IV Continuous <Continuous>  dextrose 50% Injectable 50 milliLiter(s) IV Push every 15 minutes  doxazosin 2 milliGRAM(s) Oral at bedtime  ferrous    sulfate Liquid 300 milliGRAM(s) Enteral Tube daily  insulin regular Infusion 3 Unit(s)/Hr (3 mL/Hr) IV Continuous <Continuous>  letrozole 2.5 milliGRAM(s) Oral daily  levalbuterol Inhalation 0.63 milliGRAM(s) Inhalation every 6 hours  magnesium sulfate  IVPB 2 Gram(s) IV Intermittent once  meropenem  IVPB 1000 milliGRAM(s) IV Intermittent every 8 hours  mirtazapine 7.5 milliGRAM(s) Oral at bedtime  multivitamin/minerals/iron Oral Solution (CENTRUM) 15 milliLiter(s) Enteral Tube daily  mupirocin 2% Nasal 1 Application(s) Both Nostrils two times a day  nicotine -   7 mG/24Hr(s) Patch 1 Patch Transdermal daily  norepinephrine Infusion 0.05 MICROgram(s)/kG/Min (3.4 mL/Hr) IV Continuous <Continuous>  pantoprazole   Suspension 40 milliGRAM(s) Oral before breakfast  polyethylene glycol 3350 17 Gram(s) Oral daily  potassium chloride   Solution 40 milliEquivalent(s) Enteral Tube once  prednisoLONE    3 mG/mL Solution (ORAPRED) 40 milliGRAM(s) Enteral Tube daily  propofol Infusion 5 MICROgram(s)/kG/Min (1.09 mL/Hr) IV Continuous <Continuous>  rivaroxaban 10 milliGRAM(s) Enteral Tube daily  roflumilast 500 MICROGram(s) Oral daily  zinc sulfate 220 milliGRAM(s) Oral daily  Breast cancer  H/O left mastectomy  H/O breast surgery  History of cardiac cath  H/O left mastectomy                               8.3    8.74  )-----------( 215      ( 02 Feb 2024 05:00 )             26.7       Hemoglobin: 9.5 g/dL (02-01 @ 05:30)  Hemoglobin: 9.3 g/dL (01-31 @ 05:00)  Hemoglobin: 9.9 g/dL (01-30 @ 05:00)  Hemoglobin: 9.3 g/dL (01-29 @ 05:00)  Hemoglobin: 9.1 g/dL (01-28 @ 06:00)    02-02    145  |  107  |  12  ----------------------------<  159<H>  3.7   |  31  |  0.22<L>    Ca    9.0      02 Feb 2024 05:00  Phos  2.1     02-02  Mg     1.5     02-02    TPro  5.1<L>  /  Alb  1.6<L>  /  TBili  0.3  /  DBili  x   /  AST  21  /  ALT  20  /  AlkPhos  104  02-02      ICU Vital Signs Last 24 Hrs  T(C): 36.9 (02 Feb 2024 07:00), Max: 37.1 (01 Feb 2024 20:00)  T(F): 98.4 (02 Feb 2024 07:00), Max: 98.8 (01 Feb 2024 20:00)  HR: 132 (02 Feb 2024 10:10) (63 - 132)  BP: 99/49 (02 Feb 2024 08:00) (77/55 - 128/69)  BP(mean): 65 (02 Feb 2024 08:00) (62 - 85)  ABP: --  ABP(mean): --  RR: 19 (02 Feb 2024 08:00) (17 - 28)  SpO2: 99% (02 Feb 2024 10:10) (98% - 100%)    O2 Parameters below as of 02 Feb 2024 08:50  Patient On (Oxygen Delivery Method): ventilator        I&O's Summary    31 Jan 2024 07:01  -  01 Feb 2024 07:00  --------------------------------------------------------  IN: 2274.5 mL / OUT: 600 mL / NET: 1674.5 mL    01 Feb 2024 07:01  -  01 Feb 2024 20:34  --------------------------------------------------------  IN: 738.4 mL / OUT: 100 mL / NET: 638.4 mL    01 Feb 2024 07:01  -  02 Feb 2024 07:00  --------------------------------------------------------  IN: 974.2 mL / OUT: 100 mL / NET: 874.2 mL          CULTURES:  Culture Results:   No growth at 5 days (01-25 @ 13:50)  Culture Results:   No growth at 5 days (01-25 @ 13:50)  Rapid RVP Result: Detected (01-25 @ 12:26)    Physical Examination:  General: No acute distress.    HEENT: Pupils equal, reactive to light.  Symmetric.  PULM: Diminished breath sounds b/l   NECK: Supple, no lymphadenopathy, trachea midline  CVS: Regular rate and rhythm, no murmurs, rubs, or gallops  ABD: Soft, nondistended, nontender  EXT: No edema, nontender  SKIN: Warm and well perfused  NEURO: Intubated and sedated; on CPAP    RADIOLOGY:   < from: Xray Chest 1 View- PORTABLE-Routine (Xray Chest 1 View- PORTABLE-Routine in AM.) (01.29.24 @ 06:36) >    ACC: [id number] EXAM:  XR CHEST PORTABLE ROUTINE 1V   ORDERED BY: RANDALL MACHADO     PROCEDURE DATE:  01/29/2024      INTERPRETATION:  An AP portable chest x-ray was performed for pneumonia   follow-up.    Comparison is made to 1/28/2024.    There is a persistent left lower lobe infiltrate consistent with   pneumonia, which when accounting for patient rotation toward the left on   the current exam is likely unchanged. The remaining lungs are clear.   There is no pneumothorax. A right-sidedcentral line terminates in the   region of the junction of the SVC and right atrium. The hilar and   mediastinal structures as well as cardiac size cannot be accurately   assessed due to patient rotation to the left. There is no CHF. The bony   thorax remains stable.    IMPRESSION:  1. Persistent left lower lobe infiltrate consistent with pneumonia,   likely unchanged when accounting for patient rotation toward the left   side.  2. Central line terminates at the junction of the SVC and RA without  pneumothorax.    --- End of Report ---    MIGUEL ALMONTE MD; Attending Radiologist  This document has been electronically signed. Jan 30 2024  4:48PM       Date of entry of this note is equal to the date of services rendered   Patient is a 68y old  Female who presents with a chief complaint of COPD exacerbation (31 Jan 2024 10:07)    BRIEF HOSPITAL COURSE:   Ms. Callahan is a 67 YO F w/pmhx of muscle wasting & atrophy, COPD on home oxygen, 2LNC w/hx of prior intubations, DVTs, DM, L breast cancer s/p mastectomy, HTN, HLD, admitted for anemia and SOB. Patient is in CCU w/acute on chronic hypoxic/hypercapnic respiratory failure 2/2 acute COPD exacerbation and pneumonia. Patient was intubated 01/31/2024   pt on pressor support ,       Events last 24 hours:   - Failed trial of CPAP this AM  - Sedated; decreased propofol, increased precedex  - Low dose vasopressor requirement, will titrate off as able   - On azithromycin for atypical coverage     Review of Systems:  Unable to assess given clinical condition     PAST MEDICAL & SURGICAL HISTORY:  COPD (chronic obstructive pulmonary disease)  Breast cancer  Smoker  DM (diabetes mellitus)  new diagnosis  HTN (hypertension)  COPD, moderate  HTN (hypertension)  Diabetes  COPD, severe  CAD (coronary artery disease)      MEDICATIONS  (STANDING):  ascorbic acid 500 milliGRAM(s) Oral daily  atorvastatin 80 milliGRAM(s) Oral at bedtime  azithromycin   Tablet 500 milliGRAM(s) Oral daily  budesonide 160 MICROgram(s)/formoterol 4.5 MICROgram(s) Inhaler 2 Puff(s) Inhalation two times a day  chlorhexidine 0.12% Liquid 15 milliLiter(s) Oral Mucosa every 12 hours  chlorhexidine 2% Cloths 1 Application(s) Topical <User Schedule>  collagenase Ointment 1 Application(s) Topical daily  dexMEDEtomidine Infusion 0.5 MICROgram(s)/kG/Hr (4.54 mL/Hr) IV Continuous <Continuous>  dextrose 5% + lactated ringers. 1000 milliLiter(s) (75 mL/Hr) IV Continuous <Continuous>  dextrose 50% Injectable 50 milliLiter(s) IV Push every 15 minutes  doxazosin 2 milliGRAM(s) Oral at bedtime  ferrous    sulfate Liquid 300 milliGRAM(s) Enteral Tube daily  insulin regular Infusion 3 Unit(s)/Hr (3 mL/Hr) IV Continuous <Continuous>  letrozole 2.5 milliGRAM(s) Oral daily  levalbuterol Inhalation 0.63 milliGRAM(s) Inhalation every 6 hours  magnesium sulfate  IVPB 2 Gram(s) IV Intermittent once  meropenem  IVPB 1000 milliGRAM(s) IV Intermittent every 8 hours  mirtazapine 7.5 milliGRAM(s) Oral at bedtime  multivitamin/minerals/iron Oral Solution (CENTRUM) 15 milliLiter(s) Enteral Tube daily  mupirocin 2% Nasal 1 Application(s) Both Nostrils two times a day  nicotine -   7 mG/24Hr(s) Patch 1 Patch Transdermal daily  norepinephrine Infusion 0.05 MICROgram(s)/kG/Min (3.4 mL/Hr) IV Continuous <Continuous>  pantoprazole   Suspension 40 milliGRAM(s) Oral before breakfast  polyethylene glycol 3350 17 Gram(s) Oral daily  potassium chloride   Solution 40 milliEquivalent(s) Enteral Tube once  prednisoLONE    3 mG/mL Solution (ORAPRED) 40 milliGRAM(s) Enteral Tube daily  propofol Infusion 5 MICROgram(s)/kG/Min (1.09 mL/Hr) IV Continuous <Continuous>  rivaroxaban 10 milliGRAM(s) Enteral Tube daily  roflumilast 500 MICROGram(s) Oral daily  zinc sulfate 220 milliGRAM(s) Oral daily  Breast cancer  H/O left mastectomy  H/O breast surgery  History of cardiac cath  H/O left mastectomy                               8.3    8.74  )-----------( 215      ( 02 Feb 2024 05:00 )             26.7       Hemoglobin: 9.5 g/dL (02-01 @ 05:30)  Hemoglobin: 9.3 g/dL (01-31 @ 05:00)  Hemoglobin: 9.9 g/dL (01-30 @ 05:00)  Hemoglobin: 9.3 g/dL (01-29 @ 05:00)  Hemoglobin: 9.1 g/dL (01-28 @ 06:00)    02-02    145  |  107  |  12  ----------------------------<  159<H>  3.7   |  31  |  0.22<L>    Ca    9.0      02 Feb 2024 05:00  Phos  2.1     02-02  Mg     1.5     02-02    TPro  5.1<L>  /  Alb  1.6<L>  /  TBili  0.3  /  DBili  x   /  AST  21  /  ALT  20  /  AlkPhos  104  02-02      ICU Vital Signs Last 24 Hrs  T(C): 36.9 (02 Feb 2024 07:00), Max: 37.1 (01 Feb 2024 20:00)  T(F): 98.4 (02 Feb 2024 07:00), Max: 98.8 (01 Feb 2024 20:00)  HR: 132 (02 Feb 2024 10:10) (63 - 132)  BP: 99/49 (02 Feb 2024 08:00) (77/55 - 128/69)  BP(mean): 65 (02 Feb 2024 08:00) (62 - 85)  ABP: --  ABP(mean): --  RR: 19 (02 Feb 2024 08:00) (17 - 28)  SpO2: 99% (02 Feb 2024 10:10) (98% - 100%)    O2 Parameters below as of 02 Feb 2024 08:50  Patient On (Oxygen Delivery Method): ventilator        I&O's Summary    31 Jan 2024 07:01  -  01 Feb 2024 07:00  --------------------------------------------------------  IN: 2274.5 mL / OUT: 600 mL / NET: 1674.5 mL    01 Feb 2024 07:01  -  01 Feb 2024 20:34  --------------------------------------------------------  IN: 738.4 mL / OUT: 100 mL / NET: 638.4 mL    01 Feb 2024 07:01  -  02 Feb 2024 07:00  --------------------------------------------------------  IN: 974.2 mL / OUT: 100 mL / NET: 874.2 mL      CULTURES:  Culture Results:   No growth at 5 days (01-25 @ 13:50)  Culture Results:   No growth at 5 days (01-25 @ 13:50)  Rapid RVP Result: Detected (01-25 @ 12:26)    Physical Examination:  General: No acute distress.    HEENT: Pupils equal, reactive to light.  Symmetric.  PULM: Diminished breath sounds b/l   NECK: Supple, no lymphadenopathy, trachea midline  CVS: Regular rate and rhythm, no murmurs, rubs, or gallops  ABD: Soft, nondistended, nontender  EXT: No edema, nontender  SKIN: Warm and well perfused  NEURO: Intubated and sedated; on CPAP    RADIOLOGY:   < from: Xray Chest 1 View- PORTABLE-Routine (Xray Chest 1 View- PORTABLE-Routine in AM.) (01.29.24 @ 06:36) >    ACC: [id number] EXAM:  XR CHEST PORTABLE ROUTINE 1V   ORDERED BY: RANDALL MACHADO     PROCEDURE DATE:  01/29/2024      INTERPRETATION:  An AP portable chest x-ray was performed for pneumonia   follow-up.    Comparison is made to 1/28/2024.    There is a persistent left lower lobe infiltrate consistent with   pneumonia, which when accounting for patient rotation toward the left on   the current exam is likely unchanged. The remaining lungs are clear.   There is no pneumothorax. A right-sidedcentral line terminates in the   region of the junction of the SVC and right atrium. The hilar and   mediastinal structures as well as cardiac size cannot be accurately   assessed due to patient rotation to the left. There is no CHF. The bony   thorax remains stable.    IMPRESSION:  1. Persistent left lower lobe infiltrate consistent with pneumonia,   likely unchanged when accounting for patient rotation toward the left   side.  2. Central line terminates at the junction of the SVC and RA without  pneumothorax.    --- End of Report ---    MIGUEL ALMONTE MD; Attending Radiologist  This document has been electronically signed. Jan 30 2024  4:48PM

## 2024-02-02 NOTE — PROGRESS NOTE ADULT - ASSESSMENT
Assessment:  Ms. Callahan is a 69 YO F w/pmhx of muscle wasting & atrophy, COPD on home oxygen, 2LNC w/hx of prior intubations, DVTs, DM, L breast cancer s/p mastectomy, HTN, HLD, admitted for anemia and SOB. Patient is in CCU w/acute on chronic hypoxic/hypercapnic respiratory failure 2/2 acute COPD exacerbation and pneumonia. Patient was intubated 01/31/2024     Problem List:  1. Acute on chronic hypoxic/hypercapnic respiratory failure  2. Acute COPD exacerbation   3. Pneumonia  4. Shock, distributive   5. Anemia     Plan:  Neuro: Increased Precedex, titrate propofol to RASS 0 to -2. Monitor mental status, avoid deliriogenic medications. Pain & fever control as needed. Nicotine patch     CV: Monitor HR and BP. Actively titrating Levophed to maintain MAP>65mmHg. Suspect shock 2/2 sedation. Statin therapy. Hold PO Cardizem in setting of low BP     Pulm: Failed Trial of CPAP this AM; continue on Full vent support. Titrate settings to maintain SaO2 >90%, or pH >7.25. Consider low tidal volume ventilation strategy w/ goal Tv 4-6 cc/kg of ideal body weight. Plateau pressure goal <30. Peridex oral care. Aggressive pulmonary toilet. Daily sedation vacation with spontaneous breathing trial if clinical condition warrants, discuss with respiratory therapy. Steroids, nebs, and antibiotic therapy      Renal: Strict I/Os, goal UOP >0.5cc/kg/hr. Trend renal function and electrolytes, replete as needed. Avoid nephrotoxic agents    GI: Tube feeds. PPI     ID: Broad coverage w/Meropenem. Received 1 dose of vancomycin today. Her MRSA swab from few days ago was negative but positive for Staph, on Bactroban. Ordered urine legionella and strep, f/u results. Added Azithromycin for atypical coverage     Heme:  On Rivaroxaban for history of DVTs     Endo: Goal blood glucose <180     CRITICAL CARE TIME SPENT: 35 minutes assessing presenting problems of acute illness, which pose high probability of life threatening deterioration or end organ damage/dysfunction, as well as medical decision making including initiating plan of care, reviewing data, reviewing radiologic exams, discussing with multidisciplinary team,  discussing goals of care with patient/family, and writing this note.  Non-inclusive of procedures performed  Date of entry of this note is equal to the date of services rendered   Assessment:  Ms. Callahan is a 67 YO F w/pmhx of muscle wasting & atrophy, COPD on home oxygen, 2LNC w/hx of prior intubations, DVTs, DM, L breast cancer s/p mastectomy, HTN, HLD, admitted for anemia and SOB. Patient is in CCU w/acute on chronic hypoxic/hypercapnic respiratory failure 2/2 acute COPD exacerbation and pneumonia. Patient was intubated 01/31/2024     Problem List:  1. Acute on chronic hypoxic/hypercapnic respiratory failure  2. Acute COPD exacerbation   3. Pneumonia  4. Shock, distributive   5. Anemia     Plan:  Neuro: Increased Precedex, titrate propofol to RASS 0 to -2. Monitor mental status, avoid deliriogenic medications. Pain & fever control as needed. Nicotine patch     CV: Monitor HR and BP. Actively titrating Levophed to maintain MAP>65mmHg. Suspect shock 2/2 sedation. Statin therapy. Hold PO Cardizem in setting of low BP     Pulm: Failed Trial of CPAP this AM due to tachypnea and tachycardia; continue on Full vent support. Titrate settings to maintain SaO2 >90%, or pH >7.25. Consider low tidal volume ventilation strategy w/ goal Tv 4-6 cc/kg of ideal body weight. Plateau pressure goal <30. Peridex oral care. Aggressive pulmonary toilet. Daily sedation vacation with spontaneous breathing trial if clinical condition warrants, discuss with respiratory therapy. Steroids, nebs, and antibiotic therapy      Renal: Strict I/Os, goal UOP >0.5cc/kg/hr. Trend renal function and electrolytes, replete as needed. Avoid nephrotoxic agents    GI: Tube feeds. PPI     ID: Broad coverage w/Meropenem. Received 1 dose of vancomycin. Her MRSA swab from few days ago was negative but positive for Staph, on Bactroban. Ordered urine legionella and strep, f/u results. On Azithromycin for atypical coverage.      Heme:  On Rivaroxaban for history of DVTs     Endo: Goal blood glucose <180     CRITICAL CARE TIME SPENT: 35 minutes assessing presenting problems of acute illness, which pose high probability of life threatening deterioration or end organ damage/dysfunction, as well as medical decision making including initiating plan of care, reviewing data, reviewing radiologic exams, discussing with multidisciplinary team,  discussing goals of care with patient/family, and writing this note.  Non-inclusive of procedures performed  Date of entry of this note is equal to the date of services rendered   Assessment:  Ms. Callahan is a 67 YO F w/pmhx of muscle wasting & atrophy, COPD on home oxygen, 2LNC w/hx of prior intubations, DVTs, DM, L breast cancer s/p mastectomy, HTN, HLD, admitted for anemia and SOB. Patient is in CCU w/acute on chronic hypoxic/hypercapnic respiratory failure 2/2 acute COPD exacerbation and pneumonia. Patient was intubated 01/31/2024     Problem List:  1. Acute on chronic hypoxic/hypercapnic respiratory failure  2. Acute COPD exacerbation   3. Pneumonia  4. Shock, distributive   5. Anemia     Plan:  Neuro: Increased Precedex, titrate propofol to RASS 0 to -1. Monitor mental status, avoid deliriogenic medications. Pain & fever control as needed. Nicotine patch     CV: Monitor HR and BP. Actively titrating Levophed to maintain MAP>65mmHg. Suspect shock 2/2 sedation. Statin therapy. Hold PO Cardizem in setting of low BP     Pulm: Failed Trial of CPAP this AM due to tachypnea and tachycardia; continue on Full vent support. Titrate settings to maintain SaO2 >90%, or pH >7.25. Consider low tidal volume ventilation strategy w/ goal Tv 4-6 cc/kg of ideal body weight. Plateau pressure goal <30. Peridex oral care. Aggressive pulmonary toilet. Daily sedation vacation with spontaneous breathing trial if clinical condition warrants, discuss with respiratory therapy. Steroids, nebs, and antibiotic therapy      Renal: Strict I/Os, goal UOP >0.5cc/kg/hr. Trend renal function and electrolytes, replete as needed. Avoid nephrotoxic agents    GI: Tube feeds. PPI     ID: Broad coverage w/Meropenem. Received 1 dose of vancomycin. Her MRSA swab from few days ago was negative but positive for Staph, on Bactroban. Ordered urine legionella and strep, f/u results. On Azithromycin for atypical coverage.      Heme:  On Rivaroxaban for history of DVTs     Endo: Goal blood glucose <180

## 2024-02-02 NOTE — PROGRESS NOTE ADULT - ASSESSMENT
PLAN:      -Patient currently on Full vent support  -titrate settings to maintain SaO2 >90%, or pH >7.25  -consider low tidal volume ventilation strategy w/ goal Tv 4-6 cc/kg of ideal body weight  -plateu pressure goal <30  -Peridex oral care  -aggressive pulmonary toilet  -daily sedation vacation with spontaneous breathing trial if clinical condition warrants, discuss with respiratory therapy   -on course of azithromycin, will need complete course  -if not weanable, early discussions of trach/PEG          TIME SPENT:  35 minutes of  time spent providing medical care for patient's acute illness/conditions that impairs at least one vital organ system and/or poses a high risk of imminent or life threatening deterioration in the patient's condition. It includes time spent evaluating and treating the patient's acute illness as well as time spent reviewing labs, radiology, discussing goals of care with patient and/or patient's family, and discussing the case with a multidisciplinary team, including the eICU, in an effort to prevent further life threatening deterioration or end organ damage. This time is independent of any procedures performed.    DATE OF ENTRY OF THIS NOTE IS EQUAL TO THE DATE OF SERVICES RENDERED

## 2024-02-02 NOTE — PROGRESS NOTE ADULT - SUBJECTIVE AND OBJECTIVE BOX
F/U Note:    -patient now intubate don full vent support after prolonged time period on NIPPV        Vital Signs Last 24 Hrs  T(C): 36.7 (02 Feb 2024 15:00), Max: 36.9 (02 Feb 2024 07:00)  T(F): 98 (02 Feb 2024 15:00), Max: 98.4 (02 Feb 2024 07:00)  HR: 113 (02 Feb 2024 20:00) (78 - 132)  BP: 135/83 (02 Feb 2024 20:00) (78/58 - 145/73)  BP(mean): 98 (02 Feb 2024 20:00) (65 - 98)  RR: 20 (02 Feb 2024 20:00) (13 - 28)  SpO2: 96% (02 Feb 2024 20:00) (96% - 100%)    Parameters below as of 02 Feb 2024 17:00  Patient On (Oxygen Delivery Method): ventilator    O2 Concentration (%): 30                            8.3    8.74  )-----------( 215      ( 02 Feb 2024 05:00 )             26.7         02-02    145  |  110<H>  |  10  ----------------------------<  139<H>  4.6   |  31  |  <0.20<L>    Ca    8.2<L>      02 Feb 2024 14:12  Phos  2.1     02-02  Mg     2.5     02-02    TPro  5.1<L>  /  Alb  1.6<L>  /  TBili  0.3  /  DBili  x   /  AST  21  /  ALT  20  /  AlkPhos  104  02-02        ROS:  -unable to obtain as patient intubated and sedated        NEURO: Sedated, compliant with vent, spontaneous movement noted  CV: (+) S1/S2, rrr, no mrg  RESP: CTA b/l  GI: soft, non tender

## 2024-02-02 NOTE — ADVANCED PRACTICE NURSE CONSULT - ASSESSMENT
HPI Ms. Callahan is a 67 YO F w/pmhx of muscle wasting & atrophy, COPD on home oxygen, 2LNC w/hx of prior intubations, DVTs, DM, L breast cancer s/p mastectomy, HTN, HLD, admitted for anemia and SOB. Patient is in CCU w/acute on chronic hypoxic/hypercapnic respiratory failure 2/2 acute COPD exacerbation and pneumonia. Patient was intubated 01/31/2024. pt on pressor support , Steroid induced hyperglycemia- Insulin infusion started on 2/1 at 12:05 pm at 3 ml/hr.    HgbA1c- 5.1 (12/28/24) questionable accuracy due to anemia (8.3/26.&)  eGFR-  125 (2/2/24)    Current In-patient Diabetes Regimen-  Insulin infusion    Home Diabetes medication Regimen-  Metformin 500 mg orally twice a day per chart history    PCP- Dr Reyes- [phone]     Endocrinology- none    Pt currently sedated and intubated. Unable to receive diabetes education

## 2024-02-02 NOTE — ADVANCED PRACTICE NURSE CONSULT - RECOMMEDATIONS
Recommendations:  1.	BG Goal 140- 180 mg/dL  2.	Discontinue dextrose in IVF- if TF interrupted or held for any significant period of time- give Dextrose containing IVF at 50 ml/hr until TF resumed.  3.	Give 14 units of Humulin N and same time as daily dose of prednisone at 8 am  4.	Discontinue insulin infusion- give NPH 2 hrs before discontinuing- would only give 6 units of NPH if NPH given after 12 noon today, as effect of steroid will be decreasing   5.	If critical care providers wish to continue insulin infusion- order hourly insulin infusion rate and provider to titrate insulin infusion every hour based on BG result.  6.	Moderate ademlog correction scale every 6 hours when insulin infusion discontinued.  7.	Serum Potassium goal 4-5 mmol/L    Discharge Recommendations: TBD

## 2024-02-03 NOTE — PROGRESS NOTE ADULT - SUBJECTIVE AND OBJECTIVE BOX
F/U Note:    -remains intubated on full vent support         Vital Signs Last 24 Hrs  T(C): 36.6 (03 Feb 2024 16:00), Max: 37.5 (02 Feb 2024 22:00)  T(F): 97.8 (03 Feb 2024 16:00), Max: 99.5 (02 Feb 2024 22:00)  HR: 94 (03 Feb 2024 21:39) (71 - 118)  BP: 101/57 (03 Feb 2024 18:30) (82/62 - 123/59)  BP(mean): 71 (03 Feb 2024 18:30) (61 - 79)  RR: 19 (03 Feb 2024 18:30) (11 - 28)  SpO2: 98% (03 Feb 2024 21:39) (95% - 100%)    Parameters below as of 03 Feb 2024 21:39  Patient On (Oxygen Delivery Method): ventilator                                7.1    8.59  )-----------( 206      ( 03 Feb 2024 05:00 )             22.6         02-03    147<H>  |  109<H>  |  16  ----------------------------<  102<H>  4.8   |  33<H>  |  <0.20<L>    Ca    8.0<L>      03 Feb 2024 05:00  Phos  1.8     02-03  Mg     1.9     02-03    TPro  5.4<L>  /  Alb  2.6<L>  /  TBili  0.4  /  DBili  x   /  AST  34  /  ALT  21  /  AlkPhos  88  02-03        ROS:  -unable to obtain as patient intubated and sedated        NEURO: Sedated, compliant with vent, spontaneous movement noted  CV: (+) S1/S2, rrr, no mrg  RESP: CTA b/l  GI: soft, non tender

## 2024-02-03 NOTE — PROGRESS NOTE ADULT - SUBJECTIVE AND OBJECTIVE BOX
Follow-up Critical Care Progress Note  Chief Complaint : Chronic obstructive pulmonary disease with acute exacerbation      Sedation vacation done  CPAP trial done  failed   became tachypneic     Allergies :penicillin (Hives)  penicillin (Short breath)  penicillin (Other)      PAST MEDICAL & SURGICAL HISTORY:  COPD (chronic obstructive pulmonary disease)    Breast cancer    Smoker    DM (diabetes mellitus)  new diagnosis    HTN (hypertension)    COPD, moderate    HTN (hypertension)    Diabetes    COPD, severe    CAD (coronary artery disease)    Breast cancer    H/O left mastectomy    H/O breast surgery    History of cardiac cath    H/O left mastectomy        Medications:  MEDICATIONS  (STANDING):  acetylcysteine 20%  Inhalation 2 milliLiter(s) Inhalation every 6 hours  ascorbic acid 500 milliGRAM(s) Oral daily  atorvastatin 80 milliGRAM(s) Oral at bedtime  azithromycin   Tablet 500 milliGRAM(s) Oral daily  budesonide 160 MICROgram(s)/formoterol 4.5 MICROgram(s) Inhaler 2 Puff(s) Inhalation two times a day  chlorhexidine 0.12% Liquid 15 milliLiter(s) Oral Mucosa every 12 hours  chlorhexidine 2% Cloths 1 Application(s) Topical <User Schedule>  collagenase Ointment 1 Application(s) Topical daily  dexMEDEtomidine Infusion 0.5 MICROgram(s)/kG/Hr (4.54 mL/Hr) IV Continuous <Continuous>  dextrose 50% Injectable 50 milliLiter(s) IV Push every 15 minutes  dextrose Oral Gel 15 Gram(s) Oral once  doxazosin 2 milliGRAM(s) Oral at bedtime  ferrous    sulfate Liquid 300 milliGRAM(s) Enteral Tube daily  glucagon  Injectable 1 milliGRAM(s) IntraMuscular once  insulin lispro (ADMELOG) corrective regimen sliding scale   SubCutaneous every 6 hours  insulin NPH human recombinant 14 Unit(s) SubCutaneous <User Schedule>  letrozole 2.5 milliGRAM(s) Oral daily  levalbuterol Inhalation 0.63 milliGRAM(s) Inhalation every 6 hours  meropenem  IVPB 1000 milliGRAM(s) IV Intermittent every 8 hours  mirtazapine 7.5 milliGRAM(s) Oral at bedtime  multivitamin/minerals/iron Oral Solution (CENTRUM) 15 milliLiter(s) Enteral Tube daily  nicotine -   7 mG/24Hr(s) Patch 1 Patch Transdermal daily  norepinephrine Infusion 0.05 MICROgram(s)/kG/Min (3.4 mL/Hr) IV Continuous <Continuous>  pantoprazole   Suspension 40 milliGRAM(s) Oral before breakfast  polyethylene glycol 3350 17 Gram(s) Oral daily  prednisoLONE    3 mG/mL Solution (ORAPRED) 40 milliGRAM(s) Enteral Tube daily  propofol Infusion 5 MICROgram(s)/kG/Min (1.09 mL/Hr) IV Continuous <Continuous>  rivaroxaban 10 milliGRAM(s) Enteral Tube daily  roflumilast 500 MICROGram(s) Oral daily  zinc sulfate 220 milliGRAM(s) Oral daily    MEDICATIONS  (PRN):  bisacodyl Suppository 10 milliGRAM(s) Rectal daily PRN Constipation  fentaNYL    Injectable 75 MICROGram(s) IV Push every 4 hours PRN Vent Dysynchrony  ondansetron    Tablet 4 milliGRAM(s) Oral four times a day PRN for nausea  sodium chloride 0.9% lock flush 10 milliLiter(s) IV Push every 1 hour PRN Pre/post blood products, medications, blood draw, and to maintain line patency      Antibiotics History  azithromycin   Tablet 500 milliGRAM(s) Oral daily, 01-31-24 @ 21:25, Stop order after: 3 Doses  cefepime   IVPB 2000 milliGRAM(s) IV Intermittent every 12 hours, 01-25-24 @ 15:04  levoFLOXacin IVPB 750 milliGRAM(s) IV Intermittent once, 01-25-24 @ 13:16, Stop order after: 1 Doses  meropenem  IVPB 1000 milliGRAM(s) IV Intermittent every 8 hours, 01-31-24 @ 15:45, Stop order after: 7 Days  vancomycin  IVPB 500 milliGRAM(s) IV Intermittent once, 01-25-24 @ 15:41, Stop order after: 1 Doses  vancomycin  IVPB 500 milliGRAM(s) IV Intermittent once, 01-31-24 @ 15:45, Stop order after: 1 Doses  vancomycin  IVPB    , 01-25-24 @ 16:50  vancomycin  IVPB 500 milliGRAM(s) IV Intermittent every 12 hours, 01-26-24 @ 06:00, Stop order after: 7 Days  vancomycin  IVPB 500 milliGRAM(s) IV Intermittent once, 01-25-24 @ 16:38, Stop order after: 1 Doses      Heme Medications   rivaroxaban 10 milliGRAM(s) Enteral Tube daily, 02-01-24 @ 09:23      GI Medications  bisacodyl Suppository 10 milliGRAM(s) Rectal daily, 01-25-24 @ 14:55, Routine PRN  pantoprazole   Suspension 40 milliGRAM(s) Oral before breakfast, 01-29-24 @ 08:44, Routine  polyethylene glycol 3350 17 Gram(s) Oral daily, 02-01-24 @ 09:27,       COVID  01-25-24 @ 12:26  COVID -   Detected<!>  12-27-23 @ 17:14  COVID -   Detected<!>  12-04-23 @ 09:46  COVID -   NotDetec  12-04-23 @ 06:30  COVID -   NotDetec  10-27-23 @ 10:15  COVID -   NotDetec  11-23-22 @ 21:44  COVID -   NotDetec  09-12-22 @ 10:47  COVID -   NotDetec  05-14-22 @ 21:30  COVID -   NotDete      COVID Biomarkers    01-28-24 @ 06:00 ESR --  ---  CRP 77<H>  ---  DDimer  --   ---   LDH --   ---   Ferritin --    12-29-23 @ 06:41 ESR --  ---  CRP --  ---  DDimer  --   ---   <H>   ---   Ferritin --    12-28-23 @ 08:14 ESR --  ---  <H>  ---  DDimer  --   ---   LDH --   ---   Ferritin --    12-27-23 @ 20:15 ESR --  ---  CRP --  ---  DDimer  <150   ---   LDH --   ---   Ferritin --            Trend Cardiac Enzymes    Trend BNP  01-26-24 @ 05:15   -  855<H>  01-25-24 @ 12:24   -  1950<H>  12-04-23 @ 04:00   -  206  09-12-22 @ 10:47   -  29    Procalcitonin Trend  01-25-24 @ 18:00   -   7.48<H>  12-28-23 @ 08:14   -   0.56<H>  12-05-23 @ 12:37   -   0.32<H>    WBC Trend  02-03-24 @ 05:00   -  8.59  02-02-24 @ 05:00   -  8.74  02-01-24 @ 05:30   -  8.83    H/H Trend  02-03-24 @ 05:00   -   7.1<L>/ 22.6<L>  02-02-24 @ 05:00   -   8.3<L>/ 26.7<L>  02-01-24 @ 05:30   -   9.5<L>/ 30.5<L>  01-31-24 @ 05:00   -   9.3<L>/ 30.4<L>  01-30-24 @ 05:00   -   9.9<L>/ 31.7<L>  01-29-24 @ 05:00   -   9.3<L>/ 29.3<L>    Stool Occult Blood  01-30-24 @ 10:15   -   Negative  12-05-23 @ 20:55   -   Positive<!>  11-07-23 @ 18:16   -   Positive<!>  11-05-23 @ 16:51   -   Negative    Platelet Trend  02-03-24 @ 05:00   -  206  02-02-24 @ 05:00   -  215  02-01-24 @ 05:30   -  228    Trend Sodium  02-03-24 @ 05:00   -  147<H>  02-02-24 @ 14:12   -  145  02-02-24 @ 05:00   -  145  02-01-24 @ 05:30   -  143    Trend Potassium  02-03-24 @ 05:00   -  4.8  02-02-24 @ 14:12   -  4.6  02-02-24 @ 05:00   -  3.7  02-01-24 @ 05:30   -  3.9    Trend Bun/Cr  02-03-24 @ 05:00  BUN/CR -  16 / <0.20<L>  02-02-24 @ 14:12  BUN/CR -  10 / <0.20<L>  02-02-24 @ 05:00  BUN/CR -  12 / 0.22<L>  02-01-24 @ 05:30  BUN/CR -  11 / <0.20<L>    Lactic Acid Trend  01-25-24 @ 18:00   -   2.2<H>  01-25-24 @ 13:50   -   3.0<H>    ABG Trend  02-02-24 @ 03:50   - 7.48<H>/43<H>/80<L>/97.0  02-01-24 @ 05:30   - 7.47<H>/39<H>/130<H>/99.1<H>  01-31-24 @ 16:10   - 7.39/44<H>/183<H>/99.2<H>  01-31-24 @ 09:20   - 7.46<H>/46<H>/78<L>/97.4  01-26-24 @ 04:45   - 7.53<H>/37<H>/140<H>/99.1<H>  01-25-24 @ 12:05   - 7.49<H>/39<H>/105/98.7<H>  12-27-23 @ 18:51   - 7.46<H>/36<H>/90/98.7<H>  12-04-23 @ 03:20   - 7.44/43<H>/349<H>/99.4<H>  11-05-23 @ 13:27   - 7.49<H>/44<H>/110<H>/97.2  11-05-23 @ 09:40   - 7.46<H>/47<H>/95/97.3  11-04-23 @ 05:00   - 7.51<H>/48<H>/107/96.7  11-03-23 @ 17:53   - 7.50<H>/47<H>/148<H>/98.4<H>    Trend AST/ALT/ALK Phos/Bili  02-03-24 @ 05:00   34/21/88/0.4  02-02-24 @ 05:00   21/20/104/0.3  01-31-24 @ 05:00   24/21/78/0.5  01-29-24 @ 05:00   15/21/82/0.4  01-28-24 @ 06:00   14/16/85/0.5  01-27-24 @ 06:00   12/14/87/0.3  01-26-24 @ 09:40   15/15/87/0.4  01-25-24 @ 12:24   16/17/82/0.3  12-30-23 @ 08:45   36/30/63/0.4  12-29-23 @ 06:41   24/28/59/0.2    Albumin Trend  02-03-24 @ 05:00   -   2.6<L>  02-02-24 @ 05:00   -   1.6<L>  01-31-24 @ 05:00   -   1.9<L>  01-29-24 @ 05:00   -   1.9<L>  01-28-24 @ 06:00   -   1.8<L>  01-27-24 @ 06:00   -   1.7<L>      PTT - PT - INR Trend  01-25-24 @ 12:24   -   41.1<H> - 12.6 - 1.08  12-28-23 @ 08:14   -   37.1<H> - 11.8 - 1.04  12-04-23 @ 04:00   -   30.5 - 21.8<H> - 1.95<H>  10-27-23 @ 10:15   -   51.6<H> - 13.9<H> - 1.19<H>    Glucose Trend  02-03-24 @ 05:37   -  -- -- 124<H>  02-03-24 @ 05:00   -  102<H> -- --  02-03-24 @ 00:23   -  -- -- 114<H>  02-02-24 @ 19:11   -  -- -- 94  02-02-24 @ 18:03   -  -- -- 109<H>  02-02-24 @ 17:09   -  -- -- 118<H>  02-02-24 @ 15:57   -  -- -- 132<H>  02-02-24 @ 15:04   -  -- -- 127<H>  02-02-24 @ 14:12   -  139<H> -- --  02-02-24 @ 14:03   -  -- -- 142<H>    A1C with Estimated Average Glucose Result: 5.1 % [4.0 - 5.6] (12-28-23 @ 08:14)  A1C with Estimated Average Glucose Result: 5.3 % [4.0 - 5.6] (10-28-23 @ 05:00)      LABS:                        7.1    8.59  )-----------( 206      ( 03 Feb 2024 05:00 )             22.6     02-03    147<H>  |  109<H>  |  16  ----------------------------<  102<H>  4.8   |  33<H>  |  <0.20<L>    Ca    8.0<L>      03 Feb 2024 05:00  Phos  1.8     02-03  Mg     1.9     02-03    TPro  5.4<L>  /  Alb  2.6<L>  /  TBili  0.4  /  DBili  x   /  AST  34  /  ALT  21  /  AlkPhos  88  02-03              Urinalysis Basic - ( 03 Feb 2024 05:00 )    Color: x / Appearance: x / SG: x / pH: x  Gluc: 102 mg/dL / Ketone: x  / Bili: x / Urobili: x   Blood: x / Protein: x / Nitrite: x   Leuk Esterase: x / RBC: x / WBC x   Sq Epi: x / Non Sq Epi: x / Bacteria: x              CULTURES: (if applicable)    Culture - Sputum (collected 01-31-24 @ 09:50)  Source: .Sputum Sputum  Gram Stain (01-31-24 @ 23:36):    No polymorphonuclear leukocytes per low power field    Few Squamous epithelial cells per low power field    Few Yeast like cells per oil power field  Final Report (02-01-24 @ 23:39):    Normal Respiratory Daria present    Culture - Blood (collected 01-25-24 @ 13:50)  Source: .Blood Blood-Peripheral  Final Report (01-30-24 @ 19:01):    No growth at 5 days    Culture - Blood (collected 01-25-24 @ 13:50)  Source: .Blood Blood-Peripheral  Final Report (01-30-24 @ 19:01):    No growth at 5 days      Rapid RVP Result: Detected (01-25-24 @ 12:26)      ABG - ( 02 Feb 2024 03:50 )  pH, Arterial: 7.48  pH, Blood: x     /  pCO2: 43    /  pO2: 80    / HCO3: 32    / Base Excess: 8.5   /  SaO2: 97.0              CAPILLARY BLOOD GLUCOSE      POCT Blood Glucose.: 124 mg/dL (03 Feb 2024 05:37)      RADIOLOGY  CXR:      CT:    ECHO:      VITALS:  T(C): 36.4 (02-03-24 @ 08:00), Max: 37.5 (02-02-24 @ 22:00)  T(F): 97.5 (02-03-24 @ 08:00), Max: 99.5 (02-02-24 @ 22:00)  HR: 118 (02-03-24 @ 08:20) (76 - 132)  BP: 113/57 (02-03-24 @ 08:00) (78/58 - 162/91)  BP(mean): 74 (02-03-24 @ 08:00) (65 - 110)  ABP: --  ABP(mean): --  RR: 26 (02-03-24 @ 08:00) (11 - 26)  SpO2: 97% (02-03-24 @ 08:20) (95% - 100%)  CVP(mm Hg): --  CVP(cm H2O): --    Ins and Outs     02-02-24 @ 07:01  -  02-03-24 @ 07:00  --------------------------------------------------------  IN: 1455 mL / OUT: 700 mL / NET: 755 mL    02-03-24 @ 07:01  -  02-03-24 @ 09:10  --------------------------------------------------------  IN: 20.6 mL / OUT: 0 mL / NET: 20.6 mL            Device: Avea, Mode: AC/ CMV (Assist Control/ Continuous Mandatory Ventilation), RR (machine): 20, RR (patient): 24, TV (machine): 350, TV (patient): 320, FiO2: 30, PEEP: 5, ITime: 0.8, MAP: 13, PIP: 26    I&O's Detail    02 Feb 2024 07:01  -  03 Feb 2024 07:00  --------------------------------------------------------  IN:    Albumin 25%  -  50 mL: 300 mL    Dexmedetomidine: 251.6 mL    dextrose 5% + lactated ringers: 150 mL    Glucerna 1.5: 440 mL    Insulin: 9.4 mL    IV PiggyBack: 100 mL    Jevity 1.5: 40 mL    Norepinephrine: 25.2 mL    Propofol: 138.8 mL  Total IN: 1455 mL    OUT:    Voided (mL): 700 mL  Total OUT: 700 mL    Total NET: 755 mL      03 Feb 2024 07:01  -  03 Feb 2024 09:10  --------------------------------------------------------  IN:    Dexmedetomidine: 12.7 mL    Norepinephrine: 1.4 mL    Propofol: 6.5 mL  Total IN: 20.6 mL    OUT:  Total OUT: 0 mL    Total NET: 20.6 mL

## 2024-02-03 NOTE — PROGRESS NOTE ADULT - ASSESSMENT
Physical Examination:  GENERAL:               Lethargic/arousable,  No acute distress.    HEENT:                    No JVD, Moist MM, orally intubated  PULM:                     Bilateral air entry, Diminished to auscultation bilaterally,mild  sputum production, No Rales, No Rhonchi, No Wheezing  CVS:                         S1, S2,  No Murmur  ABD:                        Soft, nondistended, nontender, normoactive bowel sounds,   EXT:                         No edema, nontender, No Cyanosis or Clubbing    NEURO:                  Sedated Rass -1  PSYC:                      Calm, no Insight.        Assessment  Acute respiratory failure requiring intubation 1/31/24  Acute on chronic COPD exacerbation in ex smoker and underlying severe copd on home o2  Acute hypercarbic respiratory failure  Pneumonia  Anemia,   H/o DVT on xaralto  h/o CAD s/p PCI on asa  h/o breast cancer       Plan  Failing CPAP  may need trach and peg upcoming week if not improving  Finish course of 5 days of antibiotics  noted anemia , trend cbc this afternoon check stool occult blood   continue prednisone  hold anticholinergics for retention  continue inhalers, daliresp  Glycemic control wiht insulin, due to steroid use  tube feeding  palliative care f/u         PMD:	J Reyes			                   Notified(Date): 2/2/24  Family Updated: 	Maximiliano [phone]	                                 Date: 2/3/24      Sedation & Analgesia:	  Diet/Nutrition:		   Diet, NPO with Tube Feed:   Tube Feeding Modality: Orogastric  Glucerna 1.5 Tom  Total Volume for 24 Hours (mL): 900  Continuous  Starting Tube Feed Rate mL per Hour: 40  Increase Tube Feed Rate by (mL): 10     Every 4 hours  Until Goal Tube Feed Rate (mL per Hour): 50  Tube Feed Duration (in Hours): 18  Tube Feed Start Time: 10:00  Tube Feed Stop Time: 04:00 (02-01-24 @ 14:42) [Active]        GI PPx:			  pantoprazole   Suspension 40 milliGRAM(s) Oral before breakfast  polyethylene glycol 3350 17 Gram(s) Oral daily      DVT Ppx:		  rivaroxaban 10 milliGRAM(s) Enteral Tube daily       Activity:		    Head of Bed:               35-45 Deg  Glycemic Control:          ascorbic acid 500 milliGRAM(s) Oral daily  atorvastatin 80 milliGRAM(s) Oral at bedtime  dextrose 50% Injectable 50 milliLiter(s) IV Push every 15 minutes  dextrose Oral Gel 15 Gram(s) Oral once  ferrous    sulfate Liquid 300 milliGRAM(s) Enteral Tube daily  glucagon  Injectable 1 milliGRAM(s) IntraMuscular once  insulin lispro (ADMELOG) corrective regimen sliding scale   SubCutaneous every 6 hours  insulin NPH human recombinant 14 Unit(s) SubCutaneous <User Schedule>  multivitamin/minerals/iron Oral Solution (CENTRUM) 15 milliLiter(s) Enteral Tube daily  prednisoLONE    3 mG/mL Solution (ORAPRED) 40 milliGRAM(s) Enteral Tube daily  zinc sulfate 220 milliGRAM(s) Oral daily      Lines:  CENTRAL LINE: 	[ ] YES [ ] NO	                    LOCATION:   	                       DATE INSERTED:   	                    REMOVE:  [ ] YES [ ] NO    A-LINE:  	                [ ] YES [ ] NO                      LOCATION:   	                       DATE INSERTED: 		            REMOVE:  [ ] YES [ ] NO    DEVLIN: 		        [ ] YES [ ] NO  		                                       DATE INSERTED:		            REMOVE:  [ ] YES [ ] NO      Restraints were deemed necessary to prevent removal of life-sustaining devices [  x] YES   [   ]  NO    Disposition: ICU Care    Goals of Care: Full code

## 2024-02-03 NOTE — PROGRESS NOTE ADULT - ASSESSMENT
PLAN:      -Patient currently on Full vent support  -titrate settings to maintain SaO2 >90%, or pH >7.25  -consider low tidal volume ventilation strategy w/ goal Tv 4-6 cc/kg of ideal body weight  -plateu pressure goal <30  -Peridex oral care  -aggressive pulmonary toilet  -daily sedation vacation with spontaneous breathing trial if clinical condition warrants, discuss with respiratory therapy   -anbx broadened to meropenem today, will need complete course  -if not weanable, (per report patient failed SBT this AM) early discussions of trach/PEG          TIME SPENT:  35 minutes of  time spent providing medical care for patient's acute illness/conditions that impairs at least one vital organ system and/or poses a high risk of imminent or life threatening deterioration in the patient's condition. It includes time spent evaluating and treating the patient's acute illness as well as time spent reviewing labs, radiology, discussing goals of care with patient and/or patient's family, and discussing the case with a multidisciplinary team, including the eICU, in an effort to prevent further life threatening deterioration or end organ damage. This time is independent of any procedures performed.    DATE OF ENTRY OF THIS NOTE IS EQUAL TO THE DATE OF SERVICES RENDERED

## 2024-02-04 NOTE — PROGRESS NOTE ADULT - SUBJECTIVE AND OBJECTIVE BOX
F/U Note:    -remains intuabted on full vent support  -failed CPAP/SBT today    Vital Signs Last 24 Hrs  T(C): 37.4 (04 Feb 2024 16:00), Max: 37.6 (04 Feb 2024 12:00)  T(F): 99.3 (04 Feb 2024 16:00), Max: 99.7 (04 Feb 2024 12:00)  HR: 81 (04 Feb 2024 19:00) (62 - 138)  BP: 105/54 (04 Feb 2024 19:00) (71/47 - 154/64)  BP(mean): 70 (04 Feb 2024 19:00) (55 - 109)  RR: 22 (04 Feb 2024 19:00) (13 - 29)  SpO2: 100% (04 Feb 2024 19:00) (97% - 100%)    Parameters below as of 04 Feb 2024 15:20  Patient On (Oxygen Delivery Method): ventilator                                7.2    8.58  )-----------( 218      ( 04 Feb 2024 05:00 )             24.2         02-04    147<H>  |  109<H>  |  20  ----------------------------<  175<H>  4.4   |  34<H>  |  0.20<L>    Ca    8.5      04 Feb 2024 05:00  Phos  1.8     02-04  Mg     2.0     02-04    TPro  5.3<L>  /  Alb  2.5<L>  /  TBili  0.4  /  DBili  x   /  AST  34  /  ALT  21  /  AlkPhos  77  02-04              ROS:  -unable to obtain as patient intubated and sedated        NEURO: Sedated, compliant with vent, spontaneous movement noted  CV: (+) S1/S2, rrr, no mrg  RESP: CTA b/l  GI: soft, non tender

## 2024-02-05 NOTE — PROGRESS NOTE ADULT - SUBJECTIVE AND OBJECTIVE BOX
Progress:  intubated, failing c-pap trial, weakened    Review of Systems: [ Unable to obtain due to poor mentation/intubated]    MEDICATIONS  (STANDING):  acetylcysteine 20%  Inhalation 2 milliLiter(s) Inhalation every 6 hours  ascorbic acid 500 milliGRAM(s) Oral daily  atorvastatin 80 milliGRAM(s) Oral at bedtime  budesonide 160 MICROgram(s)/formoterol 4.5 MICROgram(s) Inhaler 2 Puff(s) Inhalation two times a day  chlorhexidine 0.12% Liquid 15 milliLiter(s) Oral Mucosa every 12 hours  chlorhexidine 2% Cloths 1 Application(s) Topical <User Schedule>  collagenase Ointment 1 Application(s) Topical daily  dexMEDEtomidine Infusion 0.5 MICROgram(s)/kG/Hr (4.54 mL/Hr) IV Continuous <Continuous>  dextrose 50% Injectable 50 milliLiter(s) IV Push every 15 minutes  diltiazem    Tablet 30 milliGRAM(s) Enteral Tube every 6 hours  doxazosin 2 milliGRAM(s) Oral at bedtime  ferrous    sulfate Liquid 300 milliGRAM(s) Enteral Tube daily  glucagon  Injectable 1 milliGRAM(s) IntraMuscular once  insulin lispro (ADMELOG) corrective regimen sliding scale   SubCutaneous every 6 hours  insulin NPH human recombinant 14 Unit(s) SubCutaneous <User Schedule>  levalbuterol Inhalation 0.63 milliGRAM(s) Inhalation every 6 hours  mirtazapine 7.5 milliGRAM(s) Oral at bedtime  multivitamin/minerals/iron Oral Solution (CENTRUM) 15 milliLiter(s) Enteral Tube daily  nicotine -   7 mG/24Hr(s) Patch 1 Patch Transdermal daily  norepinephrine Infusion 0.05 MICROgram(s)/kG/Min (3.4 mL/Hr) IV Continuous <Continuous>  pantoprazole   Suspension 40 milliGRAM(s) Oral before breakfast  polyethylene glycol 3350 17 Gram(s) Oral daily  prednisoLONE    3 mG/mL Solution (ORAPRED) 40 milliGRAM(s) Enteral Tube daily  propofol Infusion 5 MICROgram(s)/kG/Min (1.09 mL/Hr) IV Continuous <Continuous>  rivaroxaban 10 milliGRAM(s) Enteral Tube daily  roflumilast 500 MICROGram(s) Oral daily  zinc sulfate 220 milliGRAM(s) Oral daily    MEDICATIONS  (PRN):  bisacodyl Suppository 10 milliGRAM(s) Rectal daily PRN Constipation  ondansetron    Tablet 4 milliGRAM(s) Oral four times a day PRN for nausea  sodium chloride 0.9% lock flush 10 milliLiter(s) IV Push every 1 hour PRN Pre/post blood products, medications, blood draw, and to maintain line patency      PHYSICAL EXAM:  Vital Signs Last 24 Hrs  T(C): 37.8 (05 Feb 2024 08:00), Max: 37.8 (05 Feb 2024 05:00)  T(F): 100 (05 Feb 2024 08:00), Max: 100 (05 Feb 2024 05:00)  HR: 99 (05 Feb 2024 10:00) (67 - 133)  BP: 111/57 (05 Feb 2024 10:00) (97/61 - 145/73)  BP(mean): 71 (05 Feb 2024 10:00) (67 - 99)  RR: 24 (05 Feb 2024 10:00) (12 - 36)  SpO2: 97% (05 Feb 2024 10:00) (93% - 100%)    Parameters below as of 05 Feb 2024 10:00  Patient On (Oxygen Delivery Method): ventilator      General: alert, eyes  will  open , very weakened, cachectic      HEENT: n/c, a/t, ET tube, Ng tube     Lungs: coarse on vent    CV: tachy     GI: abd flat    : lyle/prima fit     Musculoskeletal: weakened, cachectic    Skin: pale     Neuro: deficits, intubated, medicated, opens eyes    Oral intake ability:  unable  Diet: NPO, Ng feeds     LABS:                          7.7    10.92 )-----------( 291      ( 05 Feb 2024 05:00 )             25.5     02-05    147<H>  |  107  |  19  ----------------------------<  153<H>  3.9   |  35<H>  |  0.24<L>    Ca    8.5      05 Feb 2024 05:00  Phos  2.2     02-05  Mg     1.7     02-05    TPro  5.8<L>  /  Alb  2.4<L>  /  TBili  0.4  /  DBili  x   /  AST  29  /  ALT  30  /  AlkPhos  95  02-05    Urinalysis Basic - ( 05 Feb 2024 05:00 )    Color: x / Appearance: x / SG: x / pH: x  Gluc: 153 mg/dL / Ketone: x  / Bili: x / Urobili: x   Blood: x / Protein: x / Nitrite: x   Leuk Esterase: x / RBC: x / WBC x   Sq Epi: x / Non Sq Epi: x / Bacteria: x        RADIOLOGY & ADDITIONAL STUDIES: < from: Xray Chest 1 View- PORTABLE-Routine (Xray Chest 1 View- PORTABLE-Routine in AM.) (02.04.24 @ 09:16) >  INTERPRETATION:  Chest one view 2/3/2024 10:22 AM    HISTORY: Hypoxia    COMPARISON STUDY: 2/2/2024    Frontal view of the chest shows the heart to be normal in size.   Endotracheal tube, nasogastric tube, left axillary clips and right chest   port are again noted. The lungs show slight clearing of left lower lobe   infiltrate and there is no evidence of pneumothorax nor definite pleural   effusion.    Chest one view 2/4/2024 9:19 AM  Compared to the prior study, there is slight clearing of the left lower   lobe infiltrate.    IMPRESSION:  Left lower lobe infiltrate clearing.        ADVANCE DIRECTIVES:  hcp  full code   Advanced Care Planning discussion total time spent:

## 2024-02-05 NOTE — PROGRESS NOTE ADULT - ASSESSMENT
68F PMHx muscle wasting and atrophy, COPD (on home O2 2L NC, prior intubations in the past), Thromboembolism, DVTs of the lower extremity, T2DM,, anxiety disorder, iron deficiency anemia, malignant neoplasm of the breast s/p L mastectomy (in remission), HLD,  HTN presented [facility] for low Hb 6.5 on outpatient labs and SOB today. Admitted to MICU with acute on chronic hypoxic/hypercapenic respiratory failure, acute COPD exacerbation, and PNA. Now intubated s/p failed trial of NIV.     # PULM:   -pt is severe end stage obstructive lung disease with multiple admissions in recent past. and progressive clinical decline  -Intubated on 1/31 after failed trial of NIVPP. Vent settings: PRVC 18/350/5/30%   - Sedated with Propofol and Precedex with daily awakening and CPAP trials off Propofol.  Placed back on vent support for tachycardic.   - Empiric treatment with  Meropenem 1g Q 8 hrs last day today  - Prednisolone 40mg QD. will plan to taper in am.   -  Chest PT/Pulmonary toilet, HOB >30'. Albuterol q6h, Daliresp, Symbicort QD, Spiriva BID.   -Will discuss Tracheostomy due to extended use of ventilator. Surgery Consult based on family and patient's wishes    # CV:   - h/o of Chronic Afib rate on Cardizem, currently NSR  - Cardizem x1 if tachycardic  - holding Xarelto 2/2 anemia / recurrent  - c/ w Lipitor, Cardura  - Maintain goal MAP >65.   -Keep K~4 and Mg ~2  -TTE from10/23 with LVEF 50-55%.     # ID:   - BCx NGTD    - Meropenem 1g Q 8hrs  - RVP remains + Covid from past admission on 12/2023. Afebrile resp status remains tenuous but stable,   -appreciate ID consult and recs     # NEURO:   - known h/o anxiety tim in setting of SOB/resp distress.  - C/W Remeron, holding Xanax while on sedation    - Daily awakening trials     # GI:   -NGT in place with enteral feeds. Glucerna 1.5 @ 50ml/hr (goal 900ml /day )   -Protonix QD.  -GI PCR pending  -No active sign of GI bleed, FOBT negative on this admission     # HEME:   - Hgb 7.7 today  - s/p 1 prbc transfused on admission on  1/25 for anemia.  - holding AC at this time   - DVT ppx with SCDs    # RENAL:   -Renal function currently WNL.  -trend lytes/Scr daily with BMP  -I's and O's, goal UOP 0.5 cc/kg/hr    # ENDO:   -monitor FS Q 6 hrs in setting of steroid use,  -TFTs normal     # GOC: remains full code     68F PMHx muscle wasting and atrophy, COPD (on home O2 2L NC, prior intubations in the past), Thromboembolism, DVTs of the lower extremity, T2DM,, anxiety disorder, iron deficiency anemia, malignant neoplasm of the breast s/p L mastectomy (in remission), HLD,  HTN presented [facility] for low Hb 6.5 on outpatient labs and SOB today. Admitted to MICU with acute on chronic hypoxic/hypercapenic respiratory failure, acute COPD exacerbation, and PNA. Now intubated s/p failed trial of NIV.     # PULM:   -pt is severe end stage obstructive lung disease with multiple admissions in recent past. and progressive clinical decline  -Intubated on 1/31 after failed trial of NIVPP. Vent settings: PRVC 18/350/5/30%   - Sedated with Propofol and Precedex with daily awakening and CPAP trials off Propofol.  Placed back on vent support for tachycardic.   - Finished course of abx    - Prednisolone 40mg QD. will plan to taper to 30mg/day  -  Chest PT/Pulmonary toilet, HOB >30'. Albuterol q6h, Daliresp, Symbicort QD, Spiriva BID.   -Will discuss Tracheostomy due to extended use of ventilator. Surgery Consult based on family and patient's wishes    # CV:   - h/o of Chronic Afib rate on Cardizem, currently NSR  - Cardizem x1 if tachycardic  - cardizem to be held while on pressors/hypotensive  - on levo, will set goal MAP 60-65  - holding Xarelto 2/2 anemia / recurrent  - c/ w Lipitor, Cardura  -Keep K~4 and Mg ~2  -TTE from10/23 with LVEF 50-55%.     # ID:   - BCx NGTD    - Meropenem 1g Q 8hrs  - RVP remains + Covid from past admission on 12/2023. Afebrile resp status remains tenuous but stable,   -appreciate ID consult and recs     # NEURO:   - known h/o anxiety tim in setting of SOB/resp distress.  - C/W Remeron, holding Xanax while on sedation    - Daily awakening trials     # GI:   -NGT in place with enteral feeds. Glucerna 1.5 @ 50ml/hr (goal 900ml /day )   -Protonix QD.  -GI PCR pending  -No active sign of GI bleed, FOBT negative on this admission     # HEME:   - Hgb 7.7 today  - s/p 1 prbc transfused on admission on  1/25 for anemia.  - holding AC at this time   - DVT ppx with SCDs    # RENAL:   -Renal function currently WNL.  -trend lytes/Scr daily with BMP  -I's and O's, goal UOP 0.5 cc/kg/hr    # ENDO:   -monitor FS Q 6 hrs in setting of steroid use,  -TFTs normal     # GOC: remains full code

## 2024-02-05 NOTE — PROGRESS NOTE ADULT - ASSESSMENT
A/P 68F PMHx muscle wasting and atrophy, COPD (on home O2 2L NC, prior intubations in the past), Thromboembolism, DVTs of the lower extremity, T2DM,, anxiety disorder, iron deficiency anemia, malignant neoplasm of the breast s/p L mastectomy (in remission), HLD,  HTN presented [facility] for low Hb 6.5 on outpatient labs and SOB today. Admitted to MICU with acute on chronic hypoxic/ hypercapenic respiratory failure, acute COPD exacerbation, and PNA.   Now intubated s/p failed trial of NIV.     - remains intubated  on full vent support  -failed CPAP/SBT trials       COPD :    -pt is severe end stage obstructive lung disease with multiple admissions in recent past    now  progressive clinical decline  -Intubated on 1/31 after failed trial of NIVPP     A-FIb :    - Chronic Afib rate on Cardizem, currently NSR  - holding Xarelto 2/2 anemia     ANXIETY :  - known h/o anxiety tim in setting of SOB/resp distress.  - C/W Remeron, holding Xanax while on sedation    - Daily awakening trials     Debility:  - PPS 10-20%  - Turn and position    Failure to thrive:  - C/w Marinol, remeron   - Discussions regarding feeding tube had previously   - Trial Ng in place         Palliative :   as a follow up, case d/w ccu team today , pt now intubated in ccu and very weakened, debilitated, and failing c-pap trials.  Family meeting scheduled for today .  Met w/ pts brother Maximiliano and pts children today , had lengthy d/w family and Dr Edgar present as well.  Pt poor prognosis and present condition reviewed.   See [facility] note above.   Family agree w/ putting DNR in place at this time. Family considering comfort/palliative wean, they requested time to  talk as a family, and will let us know if any decisions are made.  Presently to continue what we are doing, and not escalating care . DNR in place, new Molst form completed today .       Will cont to follow w/ ccu team, cont support.

## 2024-02-05 NOTE — PROGRESS NOTE ADULT - SUBJECTIVE AND OBJECTIVE BOX
Significant recent/past 24 hr events:   - failed CPAP trial this AM 2/2 tachycardia and tachypnea  - On precedex and propofol        Subjective:    Review of Systems       Unable to obtain due to: intubated & sedated altered mental status      Subjective: Patient is a 68y old  Female who presents with a chief complaint of COPD exacerbation (03 Feb 2024 19:55)    HPI:  68F PMHx muscle wasting and atrophy, COPD (on home O2 2L NC, prior intubations in the past), Thromboembolism, DVTs of the lower extremity, T2DM,, anxiety disorder, iron deficiency anemia, malignant neoplasm of the breast s/p L mastectomy (in remission), HLD,  HTN presented [facility] for low Hb 6.5 on outpatient labs and SOB today. Admitted to MICU with Acute on Chronic Hypoxic / Hypercapnic Respiratory Failure secondary to acute COPD exacerbation and PNA. Failed trial of NIVVP,  Intubated on 1/31/2024.     ICU Vital Signs Last 24 Hrs  T(C): 37.8 (05 Feb 2024 05:00), Max: 37.8 (05 Feb 2024 05:00)  T(F): 100 (05 Feb 2024 05:00), Max: 100 (05 Feb 2024 05:00)  HR: 100 (05 Feb 2024 09:36) (67 - 133)  BP: 103/58 (05 Feb 2024 08:30) (98/53 - 145/73)  BP(mean): 72 (05 Feb 2024 08:30) (67 - 99)  ABP: --  ABP(mean): --  RR: 12 (05 Feb 2024 08:30) (12 - 36)  SpO2: 97% (05 Feb 2024 09:36) (93% - 100%)    O2 Parameters below as of 05 Feb 2024 09:28  Patient On (Oxygen Delivery Method): ventilator          Physical Exam:   GENERAL: NAD  HEAD:  Atraumatic, Normocephalic  EYES: EOMI, conjunctiva and sclera clear  ENT: Moist mucous membranes  NECK: Supple  CHEST/LUNG: Intubated; decreased breath sounds; No rales, rhonchi, wheezing, or rubs. Labored respirations  HEART: Regular rate and rhythm; No murmurs, rubs, or gallops  ABDOMEN: BSx4; Soft, nontender, nondistended  EXTREMITIES:  2+ Peripheral Pulses, brisk capillary refill. No clubbing, cyanosis, or edema  NEUROLOGICAL: unable to assess 2/2 sedation, opens eyes on stimulation     VENT SETTINGS:  Mode: AC/ CMV (Assist Control/ Continuous Mandatory Ventilation)  RR (machine): 18  TV (machine): 350  FiO2: 30  PEEP: 5  ITime: 0.8  MAP: 12  PIP: 22        I&O's Detail    I&O's Summary    04 Feb 2024 07:01  -  05 Feb 2024 07:00  --------------------------------------------------------  IN: 1598.1 mL / OUT: 1080 mL / NET: 518.1 mL    05 Feb 2024 07:01  -  05 Feb 2024 10:16  --------------------------------------------------------  IN: 21.2 mL / OUT: 0 mL / NET: 21.2 mL            LABS                        7.7    10.92 )-----------( 291      ( 05 Feb 2024 05:00 )             25.5   02-05    147<H>  |  107  |  19  ----------------------------<  153<H>  3.9   |  35<H>  |  0.24<L>    Ca    8.5      05 Feb 2024 05:00  Phos  2.2     02-05  Mg     1.7     02-05    TPro  5.8<L>  /  Alb  2.4<L>  /  TBili  0.4  /  DBili  x   /  AST  29  /  ALT  30  /  AlkPhos  95  02-05             Urinalysis Basic - ( 04 Feb 2024 05:00 )    Color: x / Appearance: x / SG: x / pH: x  Gluc: 175 mg/dL / Ketone: x  / Bili: x / Urobili: x   Blood: x / Protein: x / Nitrite: x   Leuk Esterase: x / RBC: x / WBC x   Sq Epi: x / Non Sq Epi: x / Bacteria: x      CAPILLARY BLOOD GLUCOSE  POCT Blood Glucose.: 148 mg/dL (05 Feb 2024 05:32)  POCT Blood Glucose.: 209 mg/dL *H* (02-04-24 @ 05:48)  POCT Blood Glucose.: 136 mg/dL *H* (02-04-24 @ 00:28)  POCT Blood Glucose.: 155 mg/dL *H* (02-03-24 @ 17:28)  POCT Blood Glucose.: 87 mg/dL (02-03-24 @ 11:32)        MEDICATIONS  (STANDING):  acetylcysteine 20%  Inhalation 2 milliLiter(s) Inhalation every 6 hours  ascorbic acid 500 milliGRAM(s) Oral daily  atorvastatin 80 milliGRAM(s) Oral at bedtime  budesonide 160 MICROgram(s)/formoterol 4.5 MICROgram(s) Inhaler 2 Puff(s) Inhalation two times a day  chlorhexidine 0.12% Liquid 15 milliLiter(s) Oral Mucosa every 12 hours  chlorhexidine 2% Cloths 1 Application(s) Topical <User Schedule>  collagenase Ointment 1 Application(s) Topical daily  dexMEDEtomidine Infusion 0.5 MICROgram(s)/kG/Hr (4.54 mL/Hr) IV Continuous <Continuous>  dextrose 50% Injectable 50 milliLiter(s) IV Push every 15 minutes  diltiazem    Tablet 30 milliGRAM(s) Enteral Tube every 6 hours  diltiazem    Tablet 30 milliGRAM(s) Enteral Tube once  doxazosin 2 milliGRAM(s) Oral at bedtime  ferrous    sulfate Liquid 300 milliGRAM(s) Enteral Tube daily  glucagon  Injectable 1 milliGRAM(s) IntraMuscular once  insulin lispro (ADMELOG) corrective regimen sliding scale   SubCutaneous every 6 hours  insulin NPH human recombinant 14 Unit(s) SubCutaneous <User Schedule>  levalbuterol Inhalation 0.63 milliGRAM(s) Inhalation every 6 hours  mirtazapine 7.5 milliGRAM(s) Oral at bedtime  multivitamin/minerals/iron Oral Solution (CENTRUM) 15 milliLiter(s) Enteral Tube daily  nicotine -   7 mG/24Hr(s) Patch 1 Patch Transdermal daily  norepinephrine Infusion 0.05 MICROgram(s)/kG/Min (3.4 mL/Hr) IV Continuous <Continuous>  pantoprazole   Suspension 40 milliGRAM(s) Oral before breakfast  polyethylene glycol 3350 17 Gram(s) Oral daily  prednisoLONE    3 mG/mL Solution (ORAPRED) 40 milliGRAM(s) Enteral Tube daily  propofol Infusion 5 MICROgram(s)/kG/Min (1.09 mL/Hr) IV Continuous <Continuous>  rivaroxaban 10 milliGRAM(s) Enteral Tube daily  roflumilast 500 MICROGram(s) Oral daily  zinc sulfate 220 milliGRAM(s) Oral daily        Allergies:  penicillin (Hives)  penicillin (Short breath)  penicillin (Other)               Significant recent/past 24 hr events:   - failed CPAP trial this AM 2/2 tachycardia and tachypnea  - On precedex and propofol  noted tachycardia this am, started on CCB low dose       Subjective:    Review of Systems       Unable to obtain due to: intubated & sedated altered mental status      Subjective: Patient is a 68y old  Female who presents with a chief complaint of COPD exacerbation (03 Feb 2024 19:55)       ICU Vital Signs Last 24 Hrs  T(C): 37.8 (05 Feb 2024 05:00), Max: 37.8 (05 Feb 2024 05:00)  T(F): 100 (05 Feb 2024 05:00), Max: 100 (05 Feb 2024 05:00)  HR: 100 (05 Feb 2024 09:36) (67 - 133)  BP: 103/58 (05 Feb 2024 08:30) (98/53 - 145/73)  BP(mean): 72 (05 Feb 2024 08:30) (67 - 99)  ABP: --  ABP(mean): --  RR: 12 (05 Feb 2024 08:30) (12 - 36)  SpO2: 97% (05 Feb 2024 09:36) (93% - 100%)    O2 Parameters below as of 05 Feb 2024 09:28  Patient On (Oxygen Delivery Method): ventilator          Physical Exam:   GENERAL: NAD  HEAD:  Atraumatic, Normocephalic  EYES: EOMI, conjunctiva and sclera clear  ENT: Moist mucous membranes  NECK: Supple  CHEST/LUNG: Intubated; decreased breath sounds; No rales, rhonchi, wheezing, or rubs. Labored respirations (on cpap)  HEART: Regular rate and rhythm; No murmurs, rubs, or gallops  ABDOMEN: BSx4; Soft, nontender, nondistended  EXTREMITIES:  2+ Peripheral Pulses, brisk capillary refill. No clubbing, cyanosis, or edema  NEUROLOGICAL: unable to assess 2/2 sedation, opens eyes on stimulation     VENT SETTINGS:  Mode: AC/ CMV (Assist Control/ Continuous Mandatory Ventilation)  RR (machine): 18  TV (machine): 350  FiO2: 30  PEEP: 5  ITime: 0.8  MAP: 12  PIP: 22        I&O's Detail    I&O's Summary    04 Feb 2024 07:01  -  05 Feb 2024 07:00  --------------------------------------------------------  IN: 1598.1 mL / OUT: 1080 mL / NET: 518.1 mL    05 Feb 2024 07:01  -  05 Feb 2024 10:16  --------------------------------------------------------  IN: 21.2 mL / OUT: 0 mL / NET: 21.2 mL            LABS                        7.7    10.92 )-----------( 291      ( 05 Feb 2024 05:00 )             25.5   02-05    147<H>  |  107  |  19  ----------------------------<  153<H>  3.9   |  35<H>  |  0.24<L>    Ca    8.5      05 Feb 2024 05:00  Phos  2.2     02-05  Mg     1.7     02-05    TPro  5.8<L>  /  Alb  2.4<L>  /  TBili  0.4  /  DBili  x   /  AST  29  /  ALT  30  /  AlkPhos  95  02-05             Urinalysis Basic - ( 04 Feb 2024 05:00 )    Color: x / Appearance: x / SG: x / pH: x  Gluc: 175 mg/dL / Ketone: x  / Bili: x / Urobili: x   Blood: x / Protein: x / Nitrite: x   Leuk Esterase: x / RBC: x / WBC x   Sq Epi: x / Non Sq Epi: x / Bacteria: x      CAPILLARY BLOOD GLUCOSE  POCT Blood Glucose.: 148 mg/dL (05 Feb 2024 05:32)  POCT Blood Glucose.: 209 mg/dL *H* (02-04-24 @ 05:48)  POCT Blood Glucose.: 136 mg/dL *H* (02-04-24 @ 00:28)  POCT Blood Glucose.: 155 mg/dL *H* (02-03-24 @ 17:28)  POCT Blood Glucose.: 87 mg/dL (02-03-24 @ 11:32)        MEDICATIONS  (STANDING):  acetylcysteine 20%  Inhalation 2 milliLiter(s) Inhalation every 6 hours  ascorbic acid 500 milliGRAM(s) Oral daily  atorvastatin 80 milliGRAM(s) Oral at bedtime  budesonide 160 MICROgram(s)/formoterol 4.5 MICROgram(s) Inhaler 2 Puff(s) Inhalation two times a day  chlorhexidine 0.12% Liquid 15 milliLiter(s) Oral Mucosa every 12 hours  chlorhexidine 2% Cloths 1 Application(s) Topical <User Schedule>  collagenase Ointment 1 Application(s) Topical daily  dexMEDEtomidine Infusion 0.5 MICROgram(s)/kG/Hr (4.54 mL/Hr) IV Continuous <Continuous>  dextrose 50% Injectable 50 milliLiter(s) IV Push every 15 minutes  diltiazem    Tablet 30 milliGRAM(s) Enteral Tube every 6 hours  diltiazem    Tablet 30 milliGRAM(s) Enteral Tube once  doxazosin 2 milliGRAM(s) Oral at bedtime  ferrous    sulfate Liquid 300 milliGRAM(s) Enteral Tube daily  glucagon  Injectable 1 milliGRAM(s) IntraMuscular once  insulin lispro (ADMELOG) corrective regimen sliding scale   SubCutaneous every 6 hours  insulin NPH human recombinant 14 Unit(s) SubCutaneous <User Schedule>  levalbuterol Inhalation 0.63 milliGRAM(s) Inhalation every 6 hours  mirtazapine 7.5 milliGRAM(s) Oral at bedtime  multivitamin/minerals/iron Oral Solution (CENTRUM) 15 milliLiter(s) Enteral Tube daily  nicotine -   7 mG/24Hr(s) Patch 1 Patch Transdermal daily  norepinephrine Infusion 0.05 MICROgram(s)/kG/Min (3.4 mL/Hr) IV Continuous <Continuous>  pantoprazole   Suspension 40 milliGRAM(s) Oral before breakfast  polyethylene glycol 3350 17 Gram(s) Oral daily  prednisoLONE    3 mG/mL Solution (ORAPRED) 40 milliGRAM(s) Enteral Tube daily  propofol Infusion 5 MICROgram(s)/kG/Min (1.09 mL/Hr) IV Continuous <Continuous>  rivaroxaban 10 milliGRAM(s) Enteral Tube daily  roflumilast 500 MICROGram(s) Oral daily  zinc sulfate 220 milliGRAM(s) Oral daily        Allergies:  penicillin (Hives)  penicillin (Short breath)  penicillin (Other)

## 2024-02-05 NOTE — PROGRESS NOTE ADULT - SUBJECTIVE AND OBJECTIVE BOX
Date of service  is equal to the date of entry    Patient is a 68y old  Female who presents with a chief complaint of COPD exacerbation (05 Feb 2024 11:43)    PAST MEDICAL & SURGICAL HISTORY:  COPD (chronic obstructive pulmonary disease)      Breast cancer      Smoker      DM (diabetes mellitus)  new diagnosis      HTN (hypertension)      COPD, moderate      HTN (hypertension)      Diabetes      COPD, severe      CAD (coronary artery disease)      Breast cancer      H/O left mastectomy      H/O breast surgery      History of cardiac cath      H/O left mastectomy        REHAN IGLESIAS   68y    Female    BRIEF HOSPITAL COURSE:    Review of Systems:                       All other ROS are negative.    Allergies    penicillin (Hives)  penicillin (Short breath)  penicillin (Other)    Intolerances          ICU Vital Signs Last 24 Hrs  T(C): 36.7 (05 Feb 2024 20:00), Max: 37.8 (05 Feb 2024 05:00)  T(F): 98 (05 Feb 2024 20:00), Max: 100.1 (05 Feb 2024 12:00)  HR: 65 (05 Feb 2024 22:00) (64 - 133)  BP: 93/53 (05 Feb 2024 22:00) (84/48 - 145/73)  BP(mean): 65 (05 Feb 2024 22:00) (59 - 96)  ABP: --  ABP(mean): --  RR: 18 (05 Feb 2024 22:00) (12 - 36)  SpO2: 99% (05 Feb 2024 22:00) (93% - 100%)    O2 Parameters below as of 05 Feb 2024 22:00  Patient On (Oxygen Delivery Method): ventilator    O2 Concentration (%): 30      Physical Examination:    General:  Nicolas     HEENT: No JVD     PULM: bilateral BS    CVS: s1 s2 irreg     ABD: soft NT    EXT: ne edema     SKIN: warm    Neuro: Sedate       Mode: AC/ CMV (Assist Control/ Continuous Mandatory Ventilation)  RR (machine): 18  TV (machine): 350  FiO2: 30  PEEP: 5  ITime: 0.8  MAP: 13  PIP: 22    Mode: AC/ CMV (Assist Control/ Continuous Mandatory Ventilation), RR (machine): 18, TV (machine): 350, FiO2: 30, PEEP: 5, ITime: 0.8, MAP: 13, PIP: 22  LABS:                        7.7    10.92 )-----------( 291      ( 05 Feb 2024 05:00 )             25.5     02-05    147<H>  |  107  |  19  ----------------------------<  153<H>  3.9   |  35<H>  |  0.24<L>    Ca    8.5      05 Feb 2024 05:00  Phos  2.2     02-05  Mg     1.7     02-05    TPro  5.8<L>  /  Alb  2.4<L>  /  TBili  0.4  /  DBili  x   /  AST  29  /  ALT  30  /  AlkPhos  95  02-05          CAPILLARY BLOOD GLUCOSE      POCT Blood Glucose.: 151 mg/dL (05 Feb 2024 17:05)  POCT Blood Glucose.: 117 mg/dL (05 Feb 2024 11:08)  POCT Blood Glucose.: 148 mg/dL (05 Feb 2024 05:32)  POCT Blood Glucose.: 161 mg/dL (04 Feb 2024 23:11)      Urinalysis Basic - ( 05 Feb 2024 05:00 )    Color: x / Appearance: x / SG: x / pH: x  Gluc: 153 mg/dL / Ketone: x  / Bili: x / Urobili: x   Blood: x / Protein: x / Nitrite: x   Leuk Esterase: x / RBC: x / WBC x   Sq Epi: x / Non Sq Epi: x / Bacteria: x      CULTURES:  Culture Results:   Normal Respiratory Daria present (01-31 @ 09:50)      Medications:  MEDICATIONS  (STANDING):  acetylcysteine 20%  Inhalation 2 milliLiter(s) Inhalation every 6 hours  ascorbic acid 500 milliGRAM(s) Oral daily  atorvastatin 80 milliGRAM(s) Oral at bedtime  budesonide 160 MICROgram(s)/formoterol 4.5 MICROgram(s) Inhaler 2 Puff(s) Inhalation two times a day  chlorhexidine 0.12% Liquid 15 milliLiter(s) Oral Mucosa every 12 hours  chlorhexidine 2% Cloths 1 Application(s) Topical <User Schedule>  collagenase Ointment 1 Application(s) Topical daily  dexMEDEtomidine Infusion 0.5 MICROgram(s)/kG/Hr (4.54 mL/Hr) IV Continuous <Continuous>  dextrose 50% Injectable 50 milliLiter(s) IV Push every 15 minutes  doxazosin 2 milliGRAM(s) Oral at bedtime  ferrous    sulfate Liquid 300 milliGRAM(s) Enteral Tube daily  glucagon  Injectable 1 milliGRAM(s) IntraMuscular once  insulin lispro (ADMELOG) corrective regimen sliding scale   SubCutaneous every 6 hours  insulin NPH human recombinant 14 Unit(s) SubCutaneous <User Schedule>  levalbuterol Inhalation 0.63 milliGRAM(s) Inhalation every 6 hours  mirtazapine 7.5 milliGRAM(s) Oral at bedtime  multivitamin/minerals/iron Oral Solution (CENTRUM) 15 milliLiter(s) Enteral Tube daily  nicotine -   7 mG/24Hr(s) Patch 1 Patch Transdermal daily  norepinephrine Infusion 0.05 MICROgram(s)/kG/Min (3.4 mL/Hr) IV Continuous <Continuous>  pantoprazole   Suspension 40 milliGRAM(s) Oral before breakfast  propofol Infusion 5 MICROgram(s)/kG/Min (1.09 mL/Hr) IV Continuous <Continuous>  rivaroxaban 10 milliGRAM(s) Enteral Tube daily  roflumilast 500 MICROGram(s) Oral daily  zinc sulfate 220 milliGRAM(s) Oral daily    MEDICATIONS  (PRN):  bisacodyl Suppository 10 milliGRAM(s) Rectal daily PRN Constipation  ondansetron    Tablet 4 milliGRAM(s) Oral four times a day PRN for nausea  sodium chloride 0.9% lock flush 10 milliLiter(s) IV Push every 1 hour PRN Pre/post blood products, medications, blood draw, and to maintain line patency        02-04 @ 07:01 - 02-05 @ 07:00  --------------------------------------------------------  IN: 1598.1 mL / OUT: 1080 mL / NET: 518.1 mL    02-05 @ 07:01  -  02-05 @ 22:31  --------------------------------------------------------  IN: 1018 mL / OUT: 1200 mL / NET: -182 mL        RADIOLOGY/IMAGING/ECHO  < from: Xray Chest 1 View- PORTABLE-Routine (Xray Chest 1 View- PORTABLE-Routine in AM.) (02.04.24 @ 09:16) >  IMPRESSION:  Left lower lobe infiltrate clearing.      < end of copied text >      Assessment/Plan:    68F hx  muscle wasting & atrophy, COPD on home oxygen, 2LNC w/hx of prior intubations, DVTs, DM, L breast cancer s/p mastectomy, HTN, HLD, admitted for anemia and SOB  w/acute on chronic hypoxic/hypercapnic respiratory failure AECOPD/ pneumonia.     Intubated 1/31      Failed SBT again today  ESCOPD hyperinflated lungs on CXR   Family leaning toward withdrawal of vent   DNR    Sedate with precedex/propofol  Nor-epi as neded for distributive (sedation) hypotension    Hypernatremia> free water via OGT   Hypokalemia >replaced

## 2024-02-05 NOTE — PROGRESS NOTE ADULT - CRITICAL CARE ATTENDING COMMENT
Pt seen and examined  remians on pressors  noted hyperglycemia  started on insulin drip   due to remaining pressors will hold off on weaning trial  plan to start cpap/sedatin vacation in am if remain stable  overall prognosis is poor suspect will need trach
patient seen and examined  failed cpap again  will need trach vs palliative wean  d/w brother mirza plan for Family meeting eva mota antibiotics today  hold a/c due to anemia, trend cbc  diet as tolerated  banitrol for diarrhea  check GI PCR
Patient seen and examined  Failed CPAP today  Prop/precedex for sedation  hold anticholinergcs due to retention  on insulin drip for hyperglycemia, due to Steroid use.  diet changed to glucerna  plan to start NPH and stop insulin drip  IVF    noted pneumonia, thick secretions will start mcumyst and chest pt  d/w family may need trach if not able to wean next week.
pt seen and examined  case d/w family as above and with palliative care  failed cpap trial again  plan as per above
pt seen and examined mutliple times through out the day  patient with increased thick secretions that patient unable to bring up  waxing and waning respiratory status   tried high flow  tired precedex  tried nebs  patient continues to have episodes of severe resp distress stating not eating  as not eating eating and gettign weaker  d/w both pt and brother willing to get intubated  decision mde to intubate.

## 2024-02-06 NOTE — PROGRESS NOTE ADULT - ASSESSMENT
68 year old female with PMH advanced COPD, type 2 DM, HTN, CAD, paroxsymal atrial fibrillation who presented to [facility] with anemia, subsequently developed acute on chronic hypoxic/hypercapenic respiratory failure, acute COPD exacerbation, and PNA requiring intubation

## 2024-02-06 NOTE — PROGRESS NOTE ADULT - SUBJECTIVE AND OBJECTIVE BOX
Sedated, appears uncomfortable on ventilator.  Tolerating feeds via OGT, intermittently needing straight cath for urinary retention.  Continues to have loose stool.    Vital Signs Last 24 Hrs  T(C): 37 (06 Feb 2024 07:15), Max: 37.8 (05 Feb 2024 12:00)  T(F): 98.6 (06 Feb 2024 07:15), Max: 100.1 (05 Feb 2024 12:00)  HR: 115 (06 Feb 2024 11:00) (61 - 138)  BP: 102/69 (06 Feb 2024 11:00) (85/52 - 130/78)  BP(mean): 80 (06 Feb 2024 11:00) (61 - 95)  RR: 15 (06 Feb 2024 11:00) (10 - 23)  SpO2: 98% (06 Feb 2024 11:00) (92% - 100%)    Mode: AC/ CMV (Assist Control/ Continuous Mandatory Ventilation)  RR (machine): 18  TV (machine): 350  FiO2: 30  PEEP: 5  ITime: 0.8  MAP: 10  PIP: 21    Diet, NPO with Tube Feed:   Tube Feeding Modality: Orogastric  Glucerna 1.5 Tom  Total Volume for 24 Hours (mL): 900  Continuous  Starting Tube Feed Rate mL per Hour: 50  Until Goal Tube Feed Rate (mL per Hour): 50  Tube Feed Duration (in Hours): 18  Tube Feed Start Time: 10:00  Tube Feed Stop Time: 04:00  Banatrol TF     Qty per Day:  TID (02-04-24 @ 12:24) [Active]    I&O's Detail    05 Feb 2024 07:01  -  06 Feb 2024 07:00  --------------------------------------------------------  IN:    Dexmedetomidine: 326.4 mL    Glucerna 1.5: 850 mL    IV PiggyBack: 50 mL    Propofol: 183.5 mL  Total IN: 1409.9 mL    OUT:    Intermittent Catheterization - Urethral (mL): 450 mL    Norepinephrine: 0 mL    Rectal Tube (mL): 80 mL    Voided (mL): 950 mL  Total OUT: 1480 mL    Total NET: -70.1 mL      06 Feb 2024 07:01  -  06 Feb 2024 11:50  --------------------------------------------------------  IN:    Dexmedetomidine: 68 mL    dextrose 5%: 200 mL    Glucerna 1.5: 150 mL    Propofol: 35.9 mL  Total IN: 453.9 mL    OUT:    Voided (mL): 250 mL  Total OUT: 250 mL    Total NET: 203.9 mL    Labs                        8.0    11.74 )-----------( 308      ( 06 Feb 2024 05:15 )             26.2     02-06    148<H>  |  109<H>  |  16  ----------------------------<  141<H>  4.3   |  38<H>  |  0.22<L>    Ca    8.8      06 Feb 2024 05:15  Phos  2.8     02-06  Mg     2.1     02-06    TPro  5.8<L>  /  Alb  2.4<L>  /  TBili  0.4  /  DBili  x   /  AST  29  /  ALT  30  /  AlkPhos  95  02-05    Current Medications  acetylcysteine 20%  Inhalation 2 milliLiter(s) Inhalation every 6 hours  ascorbic acid 500 milliGRAM(s) Oral daily  atorvastatin 80 milliGRAM(s) Oral at bedtime  budesonide 160 MICROgram(s)/formoterol 4.5 MICROgram(s) Inhaler 2 Puff(s) Inhalation two times a day  chlorhexidine 0.12% Liquid 15 milliLiter(s) Oral Mucosa every 12 hours  chlorhexidine 2% Cloths 1 Application(s) Topical <User Schedule>  collagenase Ointment 1 Application(s) Topical daily  dexMEDEtomidine Infusion 0.5 MICROgram(s)/kG/Hr (4.54 mL/Hr) IV Continuous <Continuous>  diltiazem    Tablet 30 milliGRAM(s) Oral every 8 hours  doxazosin 2 milliGRAM(s) Oral at bedtime  ferrous    sulfate Liquid 300 milliGRAM(s) Enteral Tube daily  insulin lispro (ADMELOG) corrective regimen sliding scale   SubCutaneous every 6 hours  insulin NPH human recombinant 14 Unit(s) SubCutaneous <User Schedule>  levalbuterol Inhalation 0.63 milliGRAM(s) Inhalation every 6 hours  mirtazapine 7.5 milliGRAM(s) Oral at bedtime  multivitamin/minerals/iron Oral Solution (CENTRUM) 15 milliLiter(s) Enteral Tube daily  nicotine -   7 mG/24Hr(s) Patch 1 Patch Transdermal daily  pantoprazole   Suspension 40 milliGRAM(s) Oral before breakfast  prednisoLONE    3 mG/mL Solution (ORAPRED) 30 milliGRAM(s) Enteral Tube daily  propofol Infusion 5 MICROgram(s)/kG/Min (1.09 mL/Hr) IV Continuous <Continuous>  rivaroxaban 10 milliGRAM(s) Enteral Tube daily  roflumilast 500 MICROGram(s) Oral daily  zinc sulfate 220 milliGRAM(s) Oral daily  bisacodyl Suppository 10 milliGRAM(s) Rectal daily PRN Constipation  fentaNYL    Injectable 25 MICROGram(s) IV Push every 2 hours PRN Moderate Pain (4 - 6)  ondansetron    Tablet 4 milliGRAM(s) Oral four times a day PRN for nausea  sodium chloride 0.9% lock flush 10 milliLiter(s) IV Push every 1 hour PRN Pre/post blood products, medications, blood draw, and to maintain line patency    Physical Exam  Gen:  Cachectic female resting in bed in mild respiratory distress  ENT:  NC/AT, no JVD noted  Thorax:  Symmetric, no retractions  Lung:  Mechanical breath sounds throughout  CV:  S1, S2. RRR  Abd:  Soft, NT/ND.  BS normoactive, no masses to palp  Extrem:  Warm and pink, no edema  Neuro:  No gross motor/sensory deficits  Skin:  Stage II decubiti

## 2024-02-07 NOTE — PROGRESS NOTE ADULT - CONVERSATION DETAILS
d/w Patient wants to be intubated  d/w brother risks/beneifts of intubation, discussed will need early trach
Family meeting had  discussed current care  disscussed will need trach/peg if with in wishes.   need to decide how long to remain on vent prior to trach vs palliative wean  HCP - stated we should have DNR in place meanwhile - new molst filled
Met with pt's family, 3 daughters, son and brother Maximiliano with Dr Edgra today .   Family was updated medically by Dr Edgar, explained pt presently not tolerating being weaned off ventilator and explained pt very weakened. Pt unable to breathe on her own , informed family of  poor prognosis at this time.  Reviewed possible next steps, treatments for pt. Discussed possible trach/peg vs more time on vent, and then likely palliative wean. Discussed what that means, if decision made to not pursue trach/peg that focusing on pt comfort and removing the ventilator and breathing tube understanding that pt may pass away.  Family very tearful, much support given. Brother Maximiliano is hcp, but involves children in decisions.  We have spoken w/ Maximiliano is past , and he recalls  pt saying at one point she would not want a trach placed. We explained that pt would need to reside in a facility for possibly rest of her life if she were to have trach/vent.  We did discuss if at this time, pts heart should stop, should we perform cpr, and Maximiliano and family said no, they do not want cpr at this point.  We completed Molst form today for Dnr .  All questions were answered today , explained to family we were here to help and support them. Family stated understanding, Maximiliano said they needed time to discuss as a family together.
family request family meeting  had meeting, state want palliative wean  state patient had enough on ventilator and want to be weaned from vent  believes this is patients wishs  and does not want to be on vent anymore  understand patient may  by doing so.  but believe its best interest of patient.

## 2024-02-07 NOTE — PROGRESS NOTE ADULT - CONVERSATION/DISCUSSION
Diagnosis/Prognosis/MOLST Discussed/Treatment Options/Palliative Care Referral
Diagnosis/Prognosis
Diagnosis/Prognosis/MOLST Discussed/Treatment Options
Diagnosis/MOLST Discussed/Treatment Options

## 2024-02-07 NOTE — PROVIDER CONTACT NOTE (EICU) - SITUATION
eAlerted by bedside staff requesting Ativan 1mg IVPx1.  CMO.
Elerted by bedside team requesting assistance for order during grace-intubation period. Pt was on NIPPV deemed to have failed and now to undergo endotracheal intubation for increased work of breathing.

## 2024-02-07 NOTE — PROGRESS NOTE ADULT - ASSESSMENT
A/P 68 year old female with PMH advanced COPD, type 2 DM, HTN, CAD, paroxsymal atrial fibrillation who presented to [facility] with anemia, subsequently developed acute on chronic hypoxic/hypercapenic respiratory failure, acute COPD exacerbation, and PNA requiring intubation .    remains intubated this AM, continues to look weaker, drowsy today , but still opens eyes to stim       Assessment/Plans:       COPD :    -pt is severe end stage obstructive lung disease with multiple admissions in recent past    now  progressive clinical decline  -Intubated on 1/31 after failed trial of NIVPP     A-FIb :    - Chronic Afib rate on Cardizem, currently NSR  - holding Xarelto 2/2 anemia     ANXIETY :  - known h/o anxiety tim in setting of SOB/resp distress.  - C/W Remeron, holding Xanax while on sedation    - Daily awakening trials     Debility:  - PPS 10-20%  - Turn and position         Failure to thrive:  - C/w Marinol, remeron   - Discussions regarding feeding tube had previously   - Trial Ng in place      patient appears uncomfortable on vent.   d/w family, brother Maximiliano ,  they do not want trach/peg, are planning for palliative wean , possibly tomorrow .         Palliative :  as a follow up today , reviewed case w/ ccu team today , I reviewed chart . saw pt bedside this AM        A/P 68 year old female with PMH advanced COPD, type 2 DM, HTN, CAD, paroxsymal atrial fibrillation who presented to [facility] with anemia, subsequently developed acute on chronic hypoxic/hypercapenic respiratory failure, acute COPD exacerbation, and PNA requiring intubation .    remains intubated this AM, continues to look weaker, drowsy today , but still opens eyes to stim       Assessment/Plans:       COPD :    -pt is severe end stage obstructive lung disease with multiple admissions in recent past    now  progressive clinical decline  -Intubated on 1/31 after failed trial of NIVPP     A-FIb :    - Chronic Afib rate on Cardizem, currently NSR  - holding Xarelto 2/2 anemia     ANXIETY :  - known h/o anxiety tim in setting of SOB/resp distress.  - C/W Remeron, holding Xanax while on sedation    - Daily awakening trials     Debility:  - PPS 10-20%  - Turn and position         Failure to thrive:  - C/w Marinol, remeron   - Discussions regarding feeding tube had previously   - Trial Ng in place      patient appears uncomfortable on vent.   d/w family, brother Maximiliano ,  they do not want trach/peg, are planning for palliative wean , possibly tomorrow .         Palliative :  as a follow up today , reviewed case w/ ccu team today , I reviewed chart . saw pt bedside this AM , remains very weak, debilitated, cachectic, now appears more uncomfortable.   Family in to visit today , brother Maximiliano informed me, after further d/w ccu team today , family has decided to pursue full  comfort care measures and withdraw treatments, and vent today , and family understands  pt will  likely pass.   New molst was completed  today  by ccu to reflect these wishes.  Much support given,  offered    , family declined  . Cont support, cont comfort measures.

## 2024-02-07 NOTE — DISCHARGE NOTE FOR THE EXPIRED PATIENT - SECONDARY DIAGNOSIS.
PNA (pneumonia) HTN (hypertension) CAD (coronary artery disease) Type 2 diabetes mellitus Paroxysmal atrial fibrillation DVT (deep venous thrombosis)

## 2024-02-07 NOTE — PROVIDER CONTACT NOTE (CRITICAL VALUE NOTIFICATION) - SITUATION
Patient presents with magnesium level of 0.9
No transfusion at this time . Plan for patient to be palliatively extubated today and put on comfort measures. No new orders at this time.
Hgb 7.0

## 2024-02-07 NOTE — PROGRESS NOTE ADULT - SUBJECTIVE AND OBJECTIVE BOX
Progress: pt remains intubated, in very weakened, debilitated state     trial c-pap this Am       Review of Systems: [ Unable to obtain due to poor mentation/intubated, drowsy     MEDICATIONS  (STANDING):  acetylcysteine 20%  Inhalation 2 milliLiter(s) Inhalation every 6 hours  ascorbic acid 500 milliGRAM(s) Oral daily  atorvastatin 80 milliGRAM(s) Oral at bedtime  budesonide 160 MICROgram(s)/formoterol 4.5 MICROgram(s) Inhaler 2 Puff(s) Inhalation two times a day  chlorhexidine 0.12% Liquid 15 milliLiter(s) Oral Mucosa every 12 hours  chlorhexidine 2% Cloths 1 Application(s) Topical <User Schedule>  collagenase Ointment 1 Application(s) Topical daily  dexMEDEtomidine Infusion 0.5 MICROgram(s)/kG/Hr (4.54 mL/Hr) IV Continuous <Continuous>  dextrose 5%. 1000 milliLiter(s) (50 mL/Hr) IV Continuous <Continuous>  dextrose 50% Injectable 50 milliLiter(s) IV Push every 15 minutes  diltiazem    Tablet 30 milliGRAM(s) Oral every 8 hours  doxazosin 2 milliGRAM(s) Oral at bedtime  ferrous    sulfate Liquid 300 milliGRAM(s) Enteral Tube daily  insulin lispro (ADMELOG) corrective regimen sliding scale   SubCutaneous every 6 hours  insulin NPH human recombinant 14 Unit(s) SubCutaneous <User Schedule>  levalbuterol Inhalation 0.63 milliGRAM(s) Inhalation every 6 hours  mirtazapine 7.5 milliGRAM(s) Oral at bedtime  multivitamin/minerals/iron Oral Solution (CENTRUM) 15 milliLiter(s) Enteral Tube daily  nicotine -   7 mG/24Hr(s) Patch 1 Patch Transdermal daily  pantoprazole   Suspension 40 milliGRAM(s) Oral before breakfast  prednisoLONE    3 mG/mL Solution (ORAPRED) 30 milliGRAM(s) Enteral Tube daily  propofol Infusion 5 MICROgram(s)/kG/Min (1.09 mL/Hr) IV Continuous <Continuous>  rivaroxaban 10 milliGRAM(s) Enteral Tube daily  roflumilast 500 MICROGram(s) Oral daily  zinc sulfate 220 milliGRAM(s) Oral daily    MEDICATIONS  (PRN):  acetaminophen   Oral Liquid .. 650 milliGRAM(s) Enteral Tube every 6 hours PRN Temp greater or equal to 38C (100.4F)  bisacodyl Suppository 10 milliGRAM(s) Rectal daily PRN Constipation  fentaNYL    Injectable 25 MICROGram(s) IV Push every 2 hours PRN Moderate Pain (4 - 6)  ondansetron    Tablet 4 milliGRAM(s) Oral four times a day PRN for nausea  sodium chloride 0.9% lock flush 10 milliLiter(s) IV Push every 1 hour PRN Pre/post blood products, medications, blood draw, and to maintain line patency      PHYSICAL EXAM:  Vital Signs Last 24 Hrs  T(C): 37.1 (07 Feb 2024 07:00), Max: 38.3 (06 Feb 2024 16:00)  T(F): 98.8 (07 Feb 2024 07:00), Max: 100.9 (06 Feb 2024 16:00)  HR: 93 (07 Feb 2024 09:39) (70 - 123)  BP: 110/66 (07 Feb 2024 09:00) (82/48 - 165/90)  BP(mean): 81 (07 Feb 2024 09:00) (59 - 109)  RR: 13 (07 Feb 2024 09:00) (11 - 22)  SpO2: 99% (07 Feb 2024 09:39) (95% - 100%)    Parameters below as of 07 Feb 2024 09:34  Patient On (Oxygen Delivery Method): ventilator      General: cachectic, weakened, will open eyes at times,       HEENT: n/c, a/t + temp wasting, ET/OG tubes     Lungs: coarse on vent    CV: tachy    GI: abd flat     : normal, incontinent, some retention     Musculoskeletal: cachectic, weak, ecchymotic    Skin: pale, fragile, ecchymotic     Neuro:  deficits , opens eyes w/ stim    Oral intake ability:  unable   Diet: NPO, Og feeds     LABS:                          7.0    10.04 )-----------( 298      ( 07 Feb 2024 05:00 )             22.5     02-07    146<H>  |  105  |  14  ----------------------------<  165<H>  3.8   |  37<H>  |  0.21<L>    Ca    8.2<L>      07 Feb 2024 05:00  Phos  2.0     02-07  Mg     1.7     02-07    TPro  5.5<L>  /  Alb  2.2<L>  /  TBili  0.3  /  DBili  x   /  AST  98<H>  /  ALT  114<H>  /  AlkPhos  130<H>  02-07    Urinalysis Basic - ( 07 Feb 2024 05:00 )    Color: x / Appearance: x / SG: x / pH: x  Gluc: 165 mg/dL / Ketone: x  / Bili: x / Urobili: x   Blood: x / Protein: x / Nitrite: x   Leuk Esterase: x / RBC: x / WBC x   Sq Epi: x / Non Sq Epi: x / Bacteria: x        RADIOLOGY & ADDITIONAL STUDIES: < from: Xray Chest 1 View- PORTABLE-Routine (Xray Chest 1 View- PORTABLE-Routine in AM.) (02.06.24 @ 06:27) >  ACC: [id number] EXAM:  XR CHEST PORTABLE ROUTINE 1V   ORDERED BY: RAQUEL LUCERO     PROCEDURE DATE:  02/06/2024          INTERPRETATION:  Exam:XR CHEST    clinical history:intubated    Support lines and tubes are stable. Stable patchy airspace disease left   lung base. Mild improved aeration on the right. No pneumothorax.    IMPRESSION: Mild improved aeration          ADVANCE DIRECTIVES:  Hcp  Molst Dnr , trial intubation    Advanced Care Planning discussion total time spent:

## 2024-02-07 NOTE — DISCHARGE NOTE FOR THE EXPIRED PATIENT - HOSPITAL COURSE
68 year old female recent long term smoker with PMH muscle wasting and atrophy, COPD on home o2 2L NC, paroxsymal atrial fibrillation, DVTs of the lower extremity, type 2 DM, anxiety disorder, iron deficiency anemia, breast cancer in remission, dyslipidemia, and HTN presented [facility] 1/25 for low Hb 6.5 on outpatient labs and SOB. Patient complained of having mild cough, palpitations, and shortness of breath at rest for a few days prior to admissionover the past few days. Denied chest pain, vomiting, abdominal pain, fevers, chills, headaches, neck pain, dysuria.    While in ED hgb noted to be 7.4 but  saturation 90-94% with supplemental oxygen, placed on BIPAP for acute on chronic hypoxic and hypercarbic respiratory failure, sepsis. ICU consulted for further management.     In ICU patient initially intermittently BiPAP dependent, treated for acute COPD exacerbation in the setting of bacterial pneumonia.  Eventually developed BiPAP dependence and was intubated on 1/31 for increased work of breathing and worsening hypoxia.  Post intubation sedated on propofol and Precedex initially vasopressor dependent.  Started on TF via OGT, weaned from vasopressors, restarted on broad spectrum antibiotics.  During admission developed no overt signs or symptoms of bleeding, transfused PRBC for medical support.    Unable to wean patient from ventilator, patient was immediately becoming tachycardic and dyspneic on vent when CPAP trials.  Eventually became dyspneic on full ventilator support requiring sedation.  Goals of care conversation held with family, family opted for palliative wean from ventilator. 68 year old female recent long term smoker with PMH muscle wasting and atrophy, COPD on home o2 2L NC, paroxsymal atrial fibrillation, DVTs of the lower extremity, type 2 DM, anxiety disorder, iron deficiency anemia, breast cancer in remission, dyslipidemia, and HTN presented [facility] 1/25 for low Hb 6.5 on outpatient labs and SOB. Patient complained of having mild cough, palpitations, and shortness of breath at rest for a few days prior to admissionover the past few days. Denied chest pain, vomiting, abdominal pain, fevers, chills, headaches, neck pain, dysuria.    While in ED hgb noted to be 7.4 but  saturation 90-94% with supplemental oxygen, placed on BIPAP for acute on chronic hypoxic and hypercarbic respiratory failure, sepsis. ICU consulted for further management.     In ICU patient initially intermittently BiPAP dependent, treated for acute COPD exacerbation in the setting of bacterial pneumonia.  Eventually developed BiPAP dependence and was intubated on 1/31 for increased work of breathing and worsening hypoxia.  Post intubation sedated on propofol and Precedex initially vasopressor dependent.  Started on TF via OGT, weaned from vasopressors, restarted on broad spectrum antibiotics.  During admission developed no overt signs or symptoms of bleeding, transfused PRBC for medical support.    Unable to wean patient from ventilator, patient was immediately becoming tachycardic and dyspneic on vent when CPAP trials.  Eventually became dyspneic on full ventilator support requiring sedation.  Goals of care conversation held with family, family opted for palliative wean from ventilator.    On 2/7 pt was palliatively extubated and placed on a morphine gtt for enhanced comfort / EOL care. Family updated and supported emotionally throughout process. Today at 1354 RN was assessing patient and noted she was no longer breathing and did not feel a pulse. I examined the patient and noted her to be unresponsive with pupils fixed / dilated, no palpable pulses no s1/s2 on ascultation and no chest rise.  68 year old female recent long term smoker with PMH muscle wasting and atrophy, severe COPD on home o2 2L NC, paroxsymal atrial fibrillation, distant history of DVTs of the lower extremity, type 2 DM, anxiety disorder, iron deficiency anemia, breast cancer in remission, dyslipidemia, and HTN presented [facility] 1/25 for low Hb 6.5 on outpatient labs and SOB. Patient complained of having mild cough, palpitations, and shortness of breath at rest for a few days prior to admission the past few days. Denied chest pain, vomiting, abdominal pain, fevers, chills, headaches, neck pain, dysuria.    While in ED hgb noted to be 7.4 but  saturation 90-94% with supplemental oxygen, placed on BIPAP for acute on chronic hypoxic and hypercarbic respiratory failure, sepsis. ICU consulted for further management.     In ICU patient initially intermittently BiPAP dependent, treated for acute COPD exacerbation in the setting of bacterial pneumonia.  Eventually developed BiPAP dependence and was intubated on 1/31 for increased work of breathing and worsening hypoxia.  Post intubation sedated on propofol and Precedex initially vasopressor dependent.  Started on TF via OGT, weaned from vasopressors, restarted on broad spectrum antibiotics.  During admission developed no overt signs or symptoms of bleeding, transfused PRBC for medical support.    Unable to wean patient from ventilator, patient was immediately becoming tachycardic and dyspneic on vent when CPAP trials.  Eventually became dyspneic on full ventilator support requiring sedation.  Goals of care conversation held with family, family opted for palliative wean from ventilator.    On 2/7 pt was palliatively extubated and placed on a morphine gtt for enhanced comfort / EOL care. Family updated and supported emotionally throughout process. Today at 1354 RN was assessing patient and noted she was no longer breathing and did not feel a pulse. I examined the patient and noted her to be unresponsive with pupils fixed / dilated, no palpable pulses no s1/s2 on ascultation and no chest rise.     For full account of hospital course please refer to actual medical records. As this is a brief summery.

## 2024-02-07 NOTE — PROGRESS NOTE ADULT - SUBJECTIVE AND OBJECTIVE BOX
Sedated, appears uncomfortable on ventilator. Accesory muscle use for breathing. Tolerating feeds via OGT.    ICU Vital Signs Last 24 Hrs  T(C): 37.1 (07 Feb 2024 07:00), Max: 38.3 (06 Feb 2024 16:00)  T(F): 98.8 (07 Feb 2024 07:00), Max: 100.9 (06 Feb 2024 16:00)  HR: 93 (07 Feb 2024 09:39) (70 - 123)  BP: 110/66 (07 Feb 2024 09:00) (82/48 - 165/90)  BP(mean): 81 (07 Feb 2024 09:00) (59 - 109)  ABP: --  ABP(mean): --  RR: 13 (07 Feb 2024 09:00) (11 - 22)  SpO2: 99% (07 Feb 2024 09:39) (95% - 100%)    O2 Parameters below as of 07 Feb 2024 09:34  Patient On (Oxygen Delivery Method): ventilator      Currently on CPAP:   Mode: CPAP with PS  FiO2: 30  PEEP: 5  PS: 10    Mode: AC/ CMV (Assist Control/ Continuous Mandatory Ventilation)  RR (machine): 18  TV (machine): 350  FiO2: 30  PEEP: 5  ITime: 0.8  MAP: 10  PIP: 21      LABS:                7.0    10.04 )-----------( 298      ( 07 Feb 2024 05:00 )             22.5   02-07    146<H>  |  105  |  14  ----------------------------<  165<H>  3.8   |  37<H>  |  0.21<L>    Ca    8.2<L>      07 Feb 2024 05:00  Phos  2.0     02-07  Mg     1.7     02-07    TPro  5.5<L>  /  Alb  2.2<L>  /  TBili  0.3  /  DBili  x   /  AST  98<H>  /  ALT  114<H>  /  AlkPhos  130<H>  02-07      CAPILLARY BLOOD GLUCOSE      POCT Blood Glucose.: 186 mg/dL (07 Feb 2024 05:42)    A1C with Estimated Average Glucose Result: 5.1 % (12-28-23 @ 08:14)  A1C with Estimated Average Glucose Result: 5.3 % (10-28-23 @ 05:00)        MEDICATIONS  (STANDING):  acetylcysteine 20%  Inhalation 2 milliLiter(s) Inhalation every 6 hours  ascorbic acid 500 milliGRAM(s) Oral daily  atorvastatin 80 milliGRAM(s) Oral at bedtime  budesonide 160 MICROgram(s)/formoterol 4.5 MICROgram(s) Inhaler 2 Puff(s) Inhalation two times a day  chlorhexidine 0.12% Liquid 15 milliLiter(s) Oral Mucosa every 12 hours  chlorhexidine 2% Cloths 1 Application(s) Topical <User Schedule>  collagenase Ointment 1 Application(s) Topical daily  dexMEDEtomidine Infusion 0.5 MICROgram(s)/kG/Hr (4.54 mL/Hr) IV Continuous <Continuous>  dextrose 5%. 1000 milliLiter(s) (50 mL/Hr) IV Continuous <Continuous>  dextrose 50% Injectable 50 milliLiter(s) IV Push every 15 minutes  diltiazem    Tablet 30 milliGRAM(s) Oral every 8 hours  doxazosin 2 milliGRAM(s) Oral at bedtime  ferrous    sulfate Liquid 300 milliGRAM(s) Enteral Tube daily  insulin lispro (ADMELOG) corrective regimen sliding scale   SubCutaneous every 6 hours  insulin NPH human recombinant 14 Unit(s) SubCutaneous <User Schedule>  levalbuterol Inhalation 0.63 milliGRAM(s) Inhalation every 6 hours  mirtazapine 7.5 milliGRAM(s) Oral at bedtime  multivitamin/minerals/iron Oral Solution (CENTRUM) 15 milliLiter(s) Enteral Tube daily  nicotine -   7 mG/24Hr(s) Patch 1 Patch Transdermal daily  pantoprazole   Suspension 40 milliGRAM(s) Oral before breakfast  prednisoLONE    3 mG/mL Solution (ORAPRED) 30 milliGRAM(s) Enteral Tube daily  propofol Infusion 5 MICROgram(s)/kG/Min (1.09 mL/Hr) IV Continuous <Continuous>  rivaroxaban 10 milliGRAM(s) Enteral Tube daily  roflumilast 500 MICROGram(s) Oral daily  zinc sulfate 220 milliGRAM(s) Oral daily    MEDICATIONS  (PRN):  acetaminophen   Oral Liquid .. 650 milliGRAM(s) Enteral Tube every 6 hours PRN Temp greater or equal to 38C (100.4F)  bisacodyl Suppository 10 milliGRAM(s) Rectal daily PRN Constipation  fentaNYL    Injectable 25 MICROGram(s) IV Push every 2 hours PRN Moderate Pain (4 - 6)  ondansetron    Tablet 4 milliGRAM(s) Oral four times a day PRN for nausea  sodium chloride 0.9% lock flush 10 milliLiter(s) IV Push every 1 hour PRN Pre/post blood products, medications, blood draw, and to maintain line patency          I&O's Summary:  02-06-24 @ 07:01  -  02-07-24 @ 07:00  --------------------------------------------------------  OUT:  Total OUT: 0 mL    Total NET: 900 mL      02-07-24 @ 07:01  -  02-07-24 @ 10:43  --------------------------------------------------------  OUT:  Total OUT: 0 mL    Total NET: 100 mL      END IOFS  START SKINPUEND SKINPU  I&O's Detail    05 Feb 2024 07:01  -  06 Feb 2024 07:00  --------------------------------------------------------  IN:    Dexmedetomidine: 326.4 mL    Glucerna 1.5: 850 mL    IV PiggyBack: 50 mL    Propofol: 183.5 mL  Total IN: 1409.9 mL    OUT:    Intermittent Catheterization - Urethral (mL): 450 mL    Norepinephrine: 0 mL    Rectal Tube (mL): 80 mL    Voided (mL): 950 mL  Total OUT: 1480 mL    Total NET: -70.1 mL      06 Feb 2024 07:01  -  06 Feb 2024 11:50  --------------------------------------------------------  IN:    Dexmedetomidine: 68 mL    dextrose 5%: 200 mL    Glucerna 1.5: 150 mL    Propofol: 35.9 mL  Total IN: 453.9 mL    OUT:    Voided (mL): 250 mL  Total OUT: 250 mL    Total NET: 203.9 mL      Physical Exam  Gen:  Cachectic female resting in bed in mild respiratory distress;   ENT:  NC/AT, no JVD noted  Thorax:  Symmetric, no retractions  Lung:  Mechanical breath sounds throughout; +expiratory wheeze; Currently on CPAP  CV:  S1, S2. RRR  Abd:  Soft, NT/ND.  BS normoactive, no masses to palp  Extrem:  Warm and pink, no edema  Neuro:  No gross motor/sensory deficits  Skin:  Stage II decubiti

## 2024-02-07 NOTE — DISCHARGE NOTE FOR THE EXPIRED PATIENT - NS PATIENT DEATH CRITERIA
unresponsive, pupils fixed / dilated, no chest rise, no s1/s2 noted, no pulses palpated/Patient is dead based on Cardiopulmonary criteria including absent breath sounds, pulselessness and/or asystole

## 2024-02-07 NOTE — DISCHARGE NOTE FOR THE EXPIRED PATIENT - PRINCIPAL DIAGNOSIS
Detail Level: Detailed Date Scheduled For Excision (Optional): 9/21/2020 X Size Of Lesion In Cm (Optional): 1.2 Anatomic Location From Referring Provider: Left Neck COPD exacerbation

## 2024-02-07 NOTE — PROGRESS NOTE ADULT - ASSESSMENT
68 year old female with PMH advanced COPD, type 2 DM, HTN, CAD, paroxsymal atrial fibrillation who presented to [facility] with anemia, subsequently developed acute on chronic hypoxic/hypercapenic respiratory failure, acute COPD exacerbation, and PNA requiring intubation

## 2024-02-08 PROBLEM — J44.9 CHRONIC OBSTRUCTIVE PULMONARY DISEASE, UNSPECIFIED: Chronic | Status: ACTIVE | Noted: 2023-01-01

## 2024-02-08 PROBLEM — C50.919 MALIGNANT NEOPLASM OF UNSPECIFIED SITE OF UNSPECIFIED FEMALE BREAST: Chronic | Status: ACTIVE | Noted: 2023-01-01

## 2024-02-08 PROBLEM — I25.10 ATHEROSCLEROTIC HEART DISEASE OF NATIVE CORONARY ARTERY WITHOUT ANGINA PECTORIS: Chronic | Status: ACTIVE | Noted: 2023-01-01

## 2024-02-08 NOTE — PROGRESS NOTE ADULT - PROBLEM SELECTOR PLAN 6
Unable to tolerate beta blocker with advanced COPD.  ASA held, Continue statin therapies.
Unable to tolerate beta blocker with advanced COPD.  Continue ASA and statin therapies
Unable to tolerate beta blocker with advanced COPD.  ASA and held. On Comfort measures

## 2024-02-08 NOTE — PROGRESS NOTE ADULT - PROBLEM SELECTOR PLAN 2
Continue FS every 6 hours, NPH with steroid dose in AM, RISS
On Comfort measures
Continue FS every 6 hours, NPH with steroid dose in AM, RISS

## 2024-02-08 NOTE — PROGRESS NOTE ADULT - SUBJECTIVE AND OBJECTIVE BOX
BRIEF HOSPITAL COURSE: 69 y/o F with PMH muscle wasting and atrophy, COPD on home o2 2L NC (prior intubation/ICU), thromboembolism, DVTs of the lower extremity, type 2 DM, left mastectomy, anxiety disorder, iron deficiency anemia, malignant neoplasm of the breast in remission,  HLD, HTN presented from [facility] for low Hb 6.5 on outpatient labs and SOB today. Patient having mild cough, tachypnea, and shortness of breath, at rest over the past few days. While in ED hgb noted to be 7.4 but  saturation 90-94% with supplemental oxygen, placed on BIPAP.  ICU consulted for further management.     24 hour events: Sedated, Appears comfortable. On morphine and PRN Ativan. Extubated. Off of OGT. 650 cc of fluid on bladder scan. Straight cath x1    ICU Vital Signs Last 24 Hrs  T(C): 37.7 (08 Feb 2024 07:00), Max: 38 (07 Feb 2024 11:00)  T(F): 99.9 (08 Feb 2024 07:00), Max: 100.4 (07 Feb 2024 11:00)  HR: 120 (08 Feb 2024 10:00) (82 - 152)  BP: 114/61 (08 Feb 2024 07:00) (92/51 - 149/78)  BP(mean): 75 (08 Feb 2024 07:00) (62 - 97)  ABP: --  ABP(mean): --  RR: 14 (08 Feb 2024 10:00) (6 - 25)  SpO2: 91% (08 Feb 2024 09:01) (87% - 100%)    O2 Parameters below as of 08 Feb 2024 10:00  Patient On (Oxygen Delivery Method): nasal cannula  O2 Flow (L/min): 2        MEDICATIONS  (STANDING):  chlorhexidine 2% Cloths 1 Application(s) Topical <User Schedule>  collagenase Ointment 1 Application(s) Topical daily  levalbuterol Inhalation 0.63 milliGRAM(s) Inhalation every 6 hours  morphine  Infusion 2 mG/Hr (2 mL/Hr) IV Continuous <Continuous>  nicotine -   7 mG/24Hr(s) Patch 1 Patch Transdermal daily      LABS:                               6.6    10.01 )-----------( 281      ( 07 Feb 2024 12:15 )             21.9   02-07    146<H>  |  105  |  14  ----------------------------<  165<H>  3.8   |  37<H>  |  0.21<L>    Ca    8.2<L>      07 Feb 2024 05:00  Phos  2.0     02-07  Mg     1.7     02-07    TPro  5.5<L>  /  Alb  2.2<L>  /  TBili  0.3  /  DBili  x   /  AST  98<H>  /  ALT  114<H>  /  AlkPhos  130<H>  02-07      CAPILLARY BLOOD GLUCOSE:  POCT Blood Glucose.: 179 mg/dL (07 Feb 2024 11:07)  POCT Blood Glucose.: 186 mg/dL (07 Feb 2024 05:42)    A1C with Estimated Average Glucose Result: 5.1 % (12-28-23 @ 08:14)  A1C with Estimated Average Glucose Result: 5.3 % (10-28-23 @ 05:00)        I&O's Summary:    02-06-24 @ 07:01  -  02-07-24 @ 07:00  --------------------------------------------------------  OUT:  Total OUT: 0 mL    Total NET: 900 mL      02-07-24 @ 07:01  -  02-07-24 @ 10:43  --------------------------------------------------------  OUT:  Total OUT: 0 mL    Total NET: 100 mL      END IOFS  START SKINPUEND SKINPU  I&O's Detail    05 Feb 2024 07:01  -  06 Feb 2024 07:00  --------------------------------------------------------  IN:    Dexmedetomidine: 326.4 mL    Glucerna 1.5: 850 mL    IV PiggyBack: 50 mL    Propofol: 183.5 mL  Total IN: 1409.9 mL    OUT:    Intermittent Catheterization - Urethral (mL): 450 mL    Norepinephrine: 0 mL    Rectal Tube (mL): 80 mL    Voided (mL): 950 mL  Total OUT: 1480 mL    Total NET: -70.1 mL      06 Feb 2024 07:01  -  06 Feb 2024 11:50  --------------------------------------------------------  IN:    Dexmedetomidine: 68 mL    dextrose 5%: 200 mL    Glucerna 1.5: 150 mL    Propofol: 35.9 mL  Total IN: 453.9 mL    OUT:    Voided (mL): 250 mL  Total OUT: 250 mL    Total NET: 203.9 mL    07 Feb 2024 07:01  -  08 Feb 2024 07:00  --------------------------------------------------------  IN: 871.8 mL / OUT: 1300 mL / NET: -428.2 mL    08 Feb 2024 07:01  -  08 Feb 2024 10:37  --------------------------------------------------------  IN: 18 mL / OUT: 0 mL / NET: 18 mL        Physical Exam  Gen:  Cachectic female resting in bed in mild respiratory distress; extubated, no OGT  ENT:  NC/AT, no JVD noted  Thorax:  Symmetric, no retractions  Lung:  +expiratory wheeze; Currently extubated  CV:  S1, S2. RRR  Abd:  Soft, NT/ND.  BS normoactive, no masses to palp  Extrem:  Warm and pink, no edema  Neuro:  No gross motor/sensory deficits  Skin:  Stage II decubiti BRIEF HOSPITAL COURSE: 69 y/o F with PMH muscle wasting and atrophy, COPD on home o2 2L NC (prior intubation/ICU), thromboembolism, DVTs of the lower extremity, type 2 DM, left mastectomy, anxiety disorder, iron deficiency anemia, malignant neoplasm of the breast in remission,  HLD, HTN presented from [facility] for low Hb 6.5 on outpatient labs and SOB today. Patient having mild cough, tachypnea, and shortness of breath, at rest over the past few days. While in ED hgb noted to be 7.4 but  saturation 90-94% with supplemental oxygen, placed on BIPAP.  ICU consulted for further management.     24 hour events: Sedated, Appears comfortable. On morphine and PRN Ativan. Extubated. Off of OGT. 650 cc of fluid on bladder scan. Straight cath x1    ICU Vital Signs Last 24 Hrs  T(C): 37.7 (08 Feb 2024 07:00), Max: 38 (07 Feb 2024 11:00)  T(F): 99.9 (08 Feb 2024 07:00), Max: 100.4 (07 Feb 2024 11:00)  HR: 120 (08 Feb 2024 10:00) (82 - 152)  BP: 114/61 (08 Feb 2024 07:00) (92/51 - 149/78)  BP(mean): 75 (08 Feb 2024 07:00) (62 - 97)  ABP: --  ABP(mean): --  RR: 14 (08 Feb 2024 10:00) (6 - 25)  SpO2: 91% (08 Feb 2024 09:01) (87% - 100%)    O2 Parameters below as of 08 Feb 2024 10:00  Patient On (Oxygen Delivery Method): nasal cannula  O2 Flow (L/min): 2        MEDICATIONS  (STANDING):  chlorhexidine 2% Cloths 1 Application(s) Topical <User Schedule>  collagenase Ointment 1 Application(s) Topical daily  levalbuterol Inhalation 0.63 milliGRAM(s) Inhalation every 6 hours  morphine  Infusion 2 mG/Hr (2 mL/Hr) IV Continuous <Continuous>  nicotine -   7 mG/24Hr(s) Patch 1 Patch Transdermal daily      LABS:                               6.6    10.01 )-----------( 281      ( 07 Feb 2024 12:15 )             21.9   02-07    146<H>  |  105  |  14  ----------------------------<  165<H>  3.8   |  37<H>  |  0.21<L>    Ca    8.2<L>      07 Feb 2024 05:00  Phos  2.0     02-07  Mg     1.7     02-07    TPro  5.5<L>  /  Alb  2.2<L>  /  TBili  0.3  /  DBili  x   /  AST  98<H>  /  ALT  114<H>  /  AlkPhos  130<H>  02-07      CAPILLARY BLOOD GLUCOSE:  POCT Blood Glucose.: 179 mg/dL (07 Feb 2024 11:07)  POCT Blood Glucose.: 186 mg/dL (07 Feb 2024 05:42)    A1C with Estimated Average Glucose Result: 5.1 % (12-28-23 @ 08:14)  A1C with Estimated Average Glucose Result: 5.3 % (10-28-23 @ 05:00)        I&O's Summary:    02-06-24 @ 07:01  -  02-07-24 @ 07:00  --------------------------------------------------------  OUT:  Total OUT: 0 mL    Total NET: 900 mL      02-07-24 @ 07:01  -  02-07-24 @ 10:43  --------------------------------------------------------  OUT:  Total OUT: 0 mL    Total NET: 100 mL      END IOFS  START SKINPUEND SKINPU  I&O's Detail    05 Feb 2024 07:01  -  06 Feb 2024 07:00  --------------------------------------------------------  IN:    Dexmedetomidine: 326.4 mL    Glucerna 1.5: 850 mL    IV PiggyBack: 50 mL    Propofol: 183.5 mL  Total IN: 1409.9 mL    OUT:    Intermittent Catheterization - Urethral (mL): 450 mL    Norepinephrine: 0 mL    Rectal Tube (mL): 80 mL    Voided (mL): 950 mL  Total OUT: 1480 mL    Total NET: -70.1 mL      06 Feb 2024 07:01  -  06 Feb 2024 11:50  --------------------------------------------------------  IN:    Dexmedetomidine: 68 mL    dextrose 5%: 200 mL    Glucerna 1.5: 150 mL    Propofol: 35.9 mL  Total IN: 453.9 mL    OUT:    Voided (mL): 250 mL  Total OUT: 250 mL    Total NET: 203.9 mL    07 Feb 2024 07:01  -  08 Feb 2024 07:00  --------------------------------------------------------  IN: 871.8 mL / OUT: 1300 mL / NET: -428.2 mL    08 Feb 2024 07:01  -  08 Feb 2024 10:37  --------------------------------------------------------  IN: 18 mL / OUT: 0 mL / NET: 18 mL        Physical Exam  Gen:  Cachectic female resting in bed in mild respiratory distress; extubated, no OGT  ENT:  NC/AT, no JVD noted  Thorax:  Symmetric, no retractions  Lung:  +expiratory wheeze; Currently extubated, diminished breath sounds  CV:  S1, S2. RRR  Abd:  Soft, NT/ND.  BS normoactive, no masses to palp  Extrem:  Warm and pink, no edema  Neuro:  No gross motor/sensory deficits  Skin:  Stage II decubiti

## 2024-02-08 NOTE — PROGRESS NOTE ADULT - PROBLEM SELECTOR PLAN 4
HH stable, On xarelto, monitor HH and for signs and symptoms of acute bleeding
HH stable, continue Eliquis, monitor HH and for signs and symptoms of acute bleeding
On comfort measures. Xarelto d/key

## 2024-02-08 NOTE — PROGRESS NOTE ADULT - ASSESSMENT
68 year old female with PMH advanced COPD, type 2 DM, HTN, CAD, paroxsymal atrial fibrillation who presented to [facility] with anemia, subsequently developed acute on chronic hypoxic/hypercapenic respiratory failure, acute COPD exacerbation, and PNA requiring intubation

## 2024-02-08 NOTE — PROGRESS NOTE ADULT - SUBJECTIVE AND OBJECTIVE BOX
Progress:  s/p palliative extubation yesterday, pt calm, looks comfortable, not interactive , not verbal       Review of Systems: [ Unable to obtain due to poor mentation]    MEDICATIONS  (STANDING):  chlorhexidine 2% Cloths 1 Application(s) Topical <User Schedule>  collagenase Ointment 1 Application(s) Topical daily  levalbuterol Inhalation 0.63 milliGRAM(s) Inhalation every 6 hours  morphine  Infusion 2 mG/Hr (2 mL/Hr) IV Continuous <Continuous>  nicotine -   7 mG/24Hr(s) Patch 1 Patch Transdermal daily    MEDICATIONS  (PRN):  LORazepam   Injectable 1 milliGRAM(s) IV Push every 2 hours PRN end of life care      PHYSICAL EXAM:  Vital Signs Last 24 Hrs  T(C): 37.7 (08 Feb 2024 07:00), Max: 37.7 (08 Feb 2024 07:00)  T(F): 99.9 (08 Feb 2024 07:00), Max: 99.9 (08 Feb 2024 07:00)  HR: 120 (08 Feb 2024 10:00) (82 - 152)  BP: 114/61 (08 Feb 2024 07:00) (92/51 - 114/61)  BP(mean): 75 (08 Feb 2024 07:00) (62 - 79)  RR: 14 (08 Feb 2024 10:00) (6 - 25)  SpO2: 91% (08 Feb 2024 09:01) (87% - 100%)    Parameters below as of 08 Feb 2024 10:00  Patient On (Oxygen Delivery Method): nasal cannula  O2 Flow (L/min): 2    General: lethargic, not interactive, not in distress       HEENT: n/c, a/t , + temp wasting     Lungs: ess cl dim bases    CV: tachy    GI: abd flat     : normal    Musculoskeletal: weak, cachectic,   Skin: pale, fragile     Neuro:  deficits , not interactive, not verbal   Oral intake ability:  unable   Diet: NPO,     LABS:                          6.6    10.01 )-----------( 281      ( 07 Feb 2024 12:15 )             21.9     02-07    146<H>  |  105  |  14  ----------------------------<  165<H>  3.8   |  37<H>  |  0.21<L>    Ca    8.2<L>      07 Feb 2024 05:00  Phos  2.0     02-07  Mg     1.7     02-07    TPro  5.5<L>  /  Alb  2.2<L>  /  TBili  0.3  /  DBili  x   /  AST  98<H>  /  ALT  114<H>  /  AlkPhos  130<H>  02-07    Urinalysis Basic - ( 07 Feb 2024 05:00 )    Color: x / Appearance: x / SG: x / pH: x  Gluc: 165 mg/dL / Ketone: x  / Bili: x / Urobili: x   Blood: x / Protein: x / Nitrite: x   Leuk Esterase: x / RBC: x / WBC x   Sq Epi: x / Non Sq Epi: x / Bacteria: x        RADIOLOGY & ADDITIONAL STUDIES:     ADVANCE DIRECTIVES:  molst dnr/dni no feed tubes, comfort care   Advanced Care Planning discussion total time spent:

## 2024-02-08 NOTE — PROGRESS NOTE ADULT - PROBLEM SELECTOR PLAN 3
On comfort measures. Xarelto and CC blocker d/key.
HH stable, On Xarelto and CC blocker, monitor ECG, monitor HH and for signs and symptoms of acute bleeding
HH stable, continue Eliquis and CC blocker, monitor ECG, monitor HH and for signs and symptoms of acute bleeding

## 2024-02-08 NOTE — PROGRESS NOTE ADULT - NUTRITIONAL ASSESSMENT
This patient has been assessed with a concern for Malnutrition and has been determined to have a diagnosis/diagnoses of Severe protein-calorie malnutrition and Underweight (BMI < 19).    This patient is being managed with:   Diet Minced and Moist-  Mk(7 Gm Arginine/7 Gm Glut/1.2 Gm HMB     Qty per Day:  BID  Supplement Feeding Modality:  Oral  Ensure Plus High Protein Cans or Servings Per Day:  1       Frequency:  Three Times a day  Entered: Jan 26 2024 10:07AM  
This patient has been assessed with a concern for Malnutrition and has been determined to have a diagnosis/diagnoses of Severe protein-calorie malnutrition and Underweight (BMI < 19).    This patient is being managed with:   Diet NPO with Tube Feed-  Tube Feeding Modality: Orogastric  Glucerna 1.5 Tom  Total Volume for 24 Hours (mL): 900  Continuous  Starting Tube Feed Rate {mL per Hour}: 50  Until Goal Tube Feed Rate (mL per Hour): 50  Tube Feed Duration (in Hours): 18  Tube Feed Start Time: 10:00  Tube Feed Stop Time: 04:00  Banatrol TF     Qty per Day:  BID  Entered: Feb  3 2024 10:13AM    Diet Minced and Moist-  Supplement Feeding Modality:  Oral  Ensure Plus High Protein Cans or Servings Per Day:  1       Frequency:  Three Times a day  Entered: Jan 29 2024  8:49AM  
This patient has been assessed with a concern for Malnutrition and has been determined to have a diagnosis/diagnoses of Severe protein-calorie malnutrition and Underweight (BMI < 19).    This patient is being managed with:   Diet NPO-  Except Medications  Entered: Jan 29 2024  5:46PM    The following pending diet order is being considered for treatment of Severe protein-calorie malnutrition and Underweight (BMI < 19):  Diet Minced and Moist-  Supplement Feeding Modality:  Oral  Ensure Plus High Protein Cans or Servings Per Day:  1       Frequency:  Three Times a day  Entered: Jan 29 2024  8:49AM  
This patient has been assessed with a concern for Malnutrition and has been determined to have a diagnosis/diagnoses of Severe protein-calorie malnutrition and Underweight (BMI < 19).    This patient is being managed with:   Diet Minced and Moist-  Mk(7 Gm Arginine/7 Gm Glut/1.2 Gm HMB     Qty per Day:  BID  Supplement Feeding Modality:  Oral  Ensure Plus High Protein Cans or Servings Per Day:  1       Frequency:  Three Times a day  Entered: Jan 26 2024 10:07AM  
This patient has been assessed with a concern for Malnutrition and has been determined to have a diagnosis/diagnoses of Severe protein-calorie malnutrition and Underweight (BMI < 19).    This patient is being managed with:   Diet NPO with Tube Feed-  Tube Feeding Modality: Orogastric  Glucerna 1.5 Tom  Total Volume for 24 Hours (mL): 900  Continuous  Starting Tube Feed Rate {mL per Hour}: 50  Until Goal Tube Feed Rate (mL per Hour): 50  Tube Feed Duration (in Hours): 18  Tube Feed Start Time: 10:00  Tube Feed Stop Time: 04:00  Ana TF     Qty per Day:  TID  Entered: Feb 4 2024 12:24PM  
This patient has been assessed with a concern for Malnutrition and has been determined to have a diagnosis/diagnoses of Severe protein-calorie malnutrition and Underweight (BMI < 19).    This patient is being managed with:   Diet NPO with Tube Feed-  Tube Feeding Modality: Orogastric  Jevity 1.5 Tom  Total Volume for 24 Hours (mL): 960  Continuous  Starting Tube Feed Rate {mL per Hour}: 10  Increase Tube Feed Rate by (mL): 10     Every 4 hours  Until Goal Tube Feed Rate (mL per Hour): 40  Tube Feed Duration (in Hours): 24  Tube Feed Start Time: 16:00  Entered: Jan 31 2024  3:25PM    The following pending diet order is being considered for treatment of Severe protein-calorie malnutrition and Underweight (BMI < 19):  Diet Minced and Moist-  Supplement Feeding Modality:  Oral  Ensure Plus High Protein Cans or Servings Per Day:  1       Frequency:  Three Times a day  Entered: Jan 29 2024  8:49AM  
This patient has been assessed with a concern for Malnutrition and has been determined to have a diagnosis/diagnoses of Severe protein-calorie malnutrition and Underweight (BMI < 19).    This patient is being managed with:   Diet NPO with Tube Feed-  Tube Feeding Modality: Orogastric  Glucerna 1.5 Tom  Total Volume for 24 Hours (mL): 900  Continuous  Starting Tube Feed Rate {mL per Hour}: 40  Increase Tube Feed Rate by (mL): 10     Every 4 hours  Until Goal Tube Feed Rate (mL per Hour): 50  Tube Feed Duration (in Hours): 18  Tube Feed Start Time: 10:00  Tube Feed Stop Time: 04:00  Entered: Feb 1 2024  2:42PM    The following pending diet order is being considered for treatment of Severe protein-calorie malnutrition and Underweight (BMI < 19):  Diet Minced and Moist-  Supplement Feeding Modality:  Oral  Ensure Plus High Protein Cans or Servings Per Day:  1       Frequency:  Three Times a day  Entered: Jan 29 2024  8:49AM  
This patient has been assessed with a concern for Malnutrition and has been determined to have a diagnosis/diagnoses of Severe protein-calorie malnutrition and Underweight (BMI < 19).    This patient is being managed with:   Diet NPO with Tube Feed-  Tube Feeding Modality: Orogastric  Glucerna 1.5 Tom  Total Volume for 24 Hours (mL): 900  Continuous  Starting Tube Feed Rate {mL per Hour}: 50  Until Goal Tube Feed Rate (mL per Hour): 50  Tube Feed Duration (in Hours): 18  Tube Feed Start Time: 10:00  Tube Feed Stop Time: 04:00  Ana TF     Qty per Day:  TID  Entered: Feb 4 2024 12:24PM  
This patient has been assessed with a concern for Malnutrition and has been determined to have a diagnosis/diagnoses of Severe protein-calorie malnutrition and Underweight (BMI < 19).    This patient is being managed with:   Diet NPO-  Except Medications  Entered: Jan 29 2024  5:46PM    The following pending diet order is being considered for treatment of Severe protein-calorie malnutrition and Underweight (BMI < 19):  Diet Minced and Moist-  Supplement Feeding Modality:  Oral  Ensure Plus High Protein Cans or Servings Per Day:  1       Frequency:  Three Times a day  Entered: Jan 29 2024  8:49AM  
This patient has been assessed with a concern for Malnutrition and has been determined to have a diagnosis/diagnoses of Severe protein-calorie malnutrition and Underweight (BMI < 19).    This patient is being managed with:   Diet NPO-  Except Medications  Entered: Jan 29 2024  5:46PM    The following pending diet order is being considered for treatment of Severe protein-calorie malnutrition and Underweight (BMI < 19):  Diet Minced and Moist-  Supplement Feeding Modality:  Oral  Ensure Plus High Protein Cans or Servings Per Day:  1       Frequency:  Three Times a day  Entered: Jan 29 2024  8:49AM  
This patient has been assessed with a concern for Malnutrition and has been determined to have a diagnosis/diagnoses of Severe protein-calorie malnutrition and Underweight (BMI < 19).    This patient is being managed with:   Diet Minced and Moist-  Supplement Feeding Modality:  Oral  Ensure Plus High Protein Cans or Servings Per Day:  1       Frequency:  Three Times a day  Entered: Jan 29 2024  8:49AM    Diet Minced and Moist-  Mk(7 Gm Arginine/7 Gm Glut/1.2 Gm HMB     Qty per Day:  BID  Supplement Feeding Modality:  Oral  Ensure Plus High Protein Cans or Servings Per Day:  1       Frequency:  Three Times a day  Entered: Jan 26 2024 10:07AM    The following pending diet order is being considered for treatment of Severe protein-calorie malnutrition and Underweight (BMI < 19):null
This patient has been assessed with a concern for Malnutrition and has been determined to have a diagnosis/diagnoses of Severe protein-calorie malnutrition and Underweight (BMI < 19).    This patient is being managed with:   Diet NPO with Tube Feed-  Tube Feeding Modality: Orogastric  Glucerna 1.5 Tom  Total Volume for 24 Hours (mL): 900  Continuous  Starting Tube Feed Rate {mL per Hour}: 40  Increase Tube Feed Rate by (mL): 10     Every 4 hours  Until Goal Tube Feed Rate (mL per Hour): 50  Tube Feed Duration (in Hours): 18  Tube Feed Start Time: 10:00  Tube Feed Stop Time: 04:00  Entered: Feb 1 2024  2:42PM    Diet NPO with Tube Feed-  Tube Feeding Modality: Orogastric  Jevity 1.5 Tom  Total Volume for 24 Hours (mL): 960  Continuous  Starting Tube Feed Rate {mL per Hour}: 10  Increase Tube Feed Rate by (mL): 10     Every 4 hours  Until Goal Tube Feed Rate (mL per Hour): 40  Tube Feed Duration (in Hours): 24  Tube Feed Start Time: 16:00  Entered: Jan 31 2024  3:25PM    The following pending diet order is being considered for treatment of Severe protein-calorie malnutrition and Underweight (BMI < 19):  Diet Minced and Moist-  Supplement Feeding Modality:  Oral  Ensure Plus High Protein Cans or Servings Per Day:  1       Frequency:  Three Times a day  Entered: Jan 29 2024  8:49AM  
This patient has been assessed with a concern for Malnutrition and has been determined to have a diagnosis/diagnoses of Severe protein-calorie malnutrition and Underweight (BMI < 19).    This patient is being managed with:   Diet NPO with Tube Feed-  Tube Feeding Modality: Orogastric  Glucerna 1.5 Tom  Total Volume for 24 Hours (mL): 900  Continuous  Starting Tube Feed Rate {mL per Hour}: 40  Increase Tube Feed Rate by (mL): 10     Every 4 hours  Until Goal Tube Feed Rate (mL per Hour): 50  Tube Feed Duration (in Hours): 18  Tube Feed Start Time: 10:00  Tube Feed Stop Time: 04:00  Entered: Feb 1 2024  2:42PM    The following pending diet order is being considered for treatment of Severe protein-calorie malnutrition and Underweight (BMI < 19):  Diet Minced and Moist-  Supplement Feeding Modality:  Oral  Ensure Plus High Protein Cans or Servings Per Day:  1       Frequency:  Three Times a day  Entered: Jan 29 2024  8:49AM    This patient has been assessed with a concern for Malnutrition and has been determined to have a diagnosis/diagnoses of Severe protein-calorie malnutrition and Underweight (BMI < 19).    This patient is being managed with:   Diet NPO with Tube Feed-  Tube Feeding Modality: Orogastric  Glucerna 1.5 Tom  Total Volume for 24 Hours (mL): 900  Continuous  Starting Tube Feed Rate {mL per Hour}: 40  Increase Tube Feed Rate by (mL): 10     Every 4 hours  Until Goal Tube Feed Rate (mL per Hour): 50  Tube Feed Duration (in Hours): 18  Tube Feed Start Time: 10:00  Tube Feed Stop Time: 04:00  Entered: Feb 1 2024  2:42PM    The following pending diet order is being considered for treatment of Severe protein-calorie malnutrition and Underweight (BMI < 19):  Diet Minced and Moist-  Supplement Feeding Modality:  Oral  Ensure Plus High Protein Cans or Servings Per Day:  1       Frequency:  Three Times a day  Entered: Jan 29 2024  8:49AM  
This patient has been assessed with a concern for Malnutrition and has been determined to have a diagnosis/diagnoses of Severe protein-calorie malnutrition and Underweight (BMI < 19).    This patient is being managed with:   Diet NPO-  Except Medications  Entered: Jan 29 2024  5:46PM    The following pending diet order is being considered for treatment of Severe protein-calorie malnutrition and Underweight (BMI < 19):  Diet Minced and Moist-  Supplement Feeding Modality:  Oral  Ensure Plus High Protein Cans or Servings Per Day:  1       Frequency:  Three Times a day  Entered: Jan 29 2024  8:49AM  
This patient has been assessed with a concern for Malnutrition and has been determined to have a diagnosis/diagnoses of Severe protein-calorie malnutrition and Underweight (BMI < 19).    This patient is being managed with:   Diet Minced and Moist-  Mk(7 Gm Arginine/7 Gm Glut/1.2 Gm HMB     Qty per Day:  BID  Supplement Feeding Modality:  Oral  Ensure Plus High Protein Cans or Servings Per Day:  1       Frequency:  Three Times a day  Entered: Jan 26 2024 10:07AM  
This patient has been assessed with a concern for Malnutrition and has been determined to have a diagnosis/diagnoses of Severe protein-calorie malnutrition and Underweight (BMI < 19).    This patient is being managed with:   Diet NPO with Tube Feed-  Tube Feeding Modality: Orogastric  Glucerna 1.5 Tom  Total Volume for 24 Hours (mL): 900  Continuous  Starting Tube Feed Rate {mL per Hour}: 50  Until Goal Tube Feed Rate (mL per Hour): 50  Tube Feed Duration (in Hours): 18  Tube Feed Start Time: 10:00  Tube Feed Stop Time: 04:00  Ana TF     Qty per Day:  TID  Entered: Feb 4 2024 12:24PM  
This patient has been assessed with a concern for Malnutrition and has been determined to have a diagnosis/diagnoses of Severe protein-calorie malnutrition and Underweight (BMI < 19).    This patient is being managed with:   Diet NPO with Tube Feed-  Tube Feeding Modality: Orogastric  Glucerna 1.5 Tom  Total Volume for 24 Hours (mL): 900  Continuous  Starting Tube Feed Rate {mL per Hour}: 50  Until Goal Tube Feed Rate (mL per Hour): 50  Tube Feed Duration (in Hours): 18  Tube Feed Start Time: 10:00  Tube Feed Stop Time: 04:00  Banatrol TF     Qty per Day:  BID  Entered: Feb  3 2024 10:13AM    Diet Minced and Moist-  Supplement Feeding Modality:  Oral  Ensure Plus High Protein Cans or Servings Per Day:  1       Frequency:  Three Times a day  Entered: Jan 29 2024  8:49AM  
This patient has been assessed with a concern for Malnutrition and has been determined to have a diagnosis/diagnoses of Severe protein-calorie malnutrition and Underweight (BMI < 19).    This patient is being managed with:   Diet NPO with Tube Feed-  Tube Feeding Modality: Orogastric  Jevity 1.5 Tom  Total Volume for 24 Hours (mL): 960  Continuous  Starting Tube Feed Rate {mL per Hour}: 10  Increase Tube Feed Rate by (mL): 10     Every 4 hours  Until Goal Tube Feed Rate (mL per Hour): 40  Tube Feed Duration (in Hours): 24  Tube Feed Start Time: 16:00  Entered: Jan 31 2024  3:25PM    The following pending diet order is being considered for treatment of Severe protein-calorie malnutrition and Underweight (BMI < 19):  Diet Minced and Moist-  Supplement Feeding Modality:  Oral  Ensure Plus High Protein Cans or Servings Per Day:  1       Frequency:  Three Times a day  Entered: Jan 29 2024  8:49AM  
This patient has been assessed with a concern for Malnutrition and has been determined to have a diagnosis/diagnoses of Severe protein-calorie malnutrition and Underweight (BMI < 19).    This patient is being managed with:   Diet NPO-  Entered: Feb 7 2024  2:19PM  
This patient has been assessed with a concern for Malnutrition and has been determined to have a diagnosis/diagnoses of Severe protein-calorie malnutrition and Underweight (BMI < 19).    This patient is being managed with:   Diet, NPO with Tube Feed:   Tube Feeding Modality: Orogastric  Glucerna 1.5 Tom  Total Volume for 24 Hours (mL): 900  Continuous  Starting Tube Feed Rate mL per Hour: 50  Until Goal Tube Feed Rate (mL per Hour): 50  Tube Feed Duration (in Hours): 18  Tube Feed Start Time: 10:00  Tube Feed Stop Time: 04:00  Banatrol TF     Qty per Day:  TID (02-04-24 @ 12:24) [Active]
This patient has been assessed with a concern for Malnutrition and has been determined to have a diagnosis/diagnoses of Severe protein-calorie malnutrition and Underweight (BMI < 19).    This patient is being managed with:   Diet NPO with Tube Feed-  Tube Feeding Modality: Orogastric  Glucerna 1.5 Tom  Total Volume for 24 Hours (mL): 900  Continuous  Starting Tube Feed Rate {mL per Hour}: 50  Until Goal Tube Feed Rate (mL per Hour): 50  Tube Feed Duration (in Hours): 18  Tube Feed Start Time: 10:00  Tube Feed Stop Time: 04:00  Ana TF     Qty per Day:  TID  Entered: Feb 4 2024 12:24PM  
This patient has been assessed with a concern for Malnutrition and has been determined to have a diagnosis/diagnoses of Severe protein-calorie malnutrition and Underweight (BMI < 19).    This patient is being managed with:   Diet NPO-  Entered: Feb 7 2024  2:19PM

## 2024-02-08 NOTE — PROGRESS NOTE ADULT - PROBLEM SELECTOR PLAN 7
On full dose AC for afib/DVT  Protonix daily for GI  Feeds via OGT, bedrest  Currently DNR, family currently considering palliative wean from ventilator
On full dose AC for afib/DVT  Protonix daily for GI  Feeds via OGT, bedrest  DNR/DNI, family currently considering palliative wean from ventilator
On comfort measures  Protonix d/key  Feeds via OGT d/key  Currently DNR/DNI, Pt extubated on 2/7/24

## 2024-02-08 NOTE — PROGRESS NOTE ADULT - PROBLEM SELECTOR PLAN 1
in the setting of advanced COPD and pneumonia.  Currently able to tolerate CPAP.  Completed course of antibiotics, continue bronchodilators and aggressive pulmonary toilet and enteral steroids.
in the setting of advanced COPD and pneumonia.  Currently unable to wean from mechanical ventilation as patient develops tachycardia and respiratory distress on CPAP.  Completed course of antibiotics, continue bronchodilators and aggressive pulmonary toilet and enteral steroids.
in the setting of advanced COPD and pneumonia.  Currently extubated. On comfort measures with Morphine and PRN IV ativan

## 2024-02-08 NOTE — PROGRESS NOTE ADULT - ASSESSMENT
A/P 68 year old female with PMH advanced COPD, type 2 DM, HTN, CAD, paroxsymal atrial fibrillation who presented to [facility] with anemia, subsequently developed acute on chronic hypoxic/hypercapenic respiratory failure, acute COPD exacerbation, and PNA requiring intubation      24 hour events:  s/p palliative extubation on 2/7, appears comfortable. On morphine gtt and PRN Ativan.      Assessment/Plans:       COPD :    -pt is severe end stage obstructive lung disease with multiple admissions in recent past    now  progressive clinical decline  -Intubated on 1/31 after failed trial of NIVPP  - now s/p palliative extubation 2/7  and on full comfort measures   - MS gtt for comfort /sob         ANXIETY/agitation :   - Prn ivp ativan     Debility:  - PPS 10%  - Turn and position    Palliative :  as a f/u today , pt now on full comfort care measures, s/p Palliative extubation yesterday afternoon.  Family at bedside today , pt looks comfortable.  Much support offered  to family.     Cont MS gtt  for any s/s pain or sob ,    Cont  prn IVP ativan for agitation    maintain pt comfort as in dying process .

## 2024-02-09 NOTE — PROGRESS NOTE ADULT - NS ATTEND AMEND GEN_ALL_CORE FT
pt seen and examined   on morphine drip  patient has started the dieing process  d/w family bedside  emotional support given.  patient is comfortable
pt seen and examined   comfortable   no cp, sob, palp, n/v  remains weak, and decreased po intake  advised to eat as tolerated  suspect clinical decline from end stage COPD  with recurren infections  continue current care  will add standing low dose xanax
pt seen and examined  niv alternating with n/c  steroids, nebs, antibiotics    ABG Trend  01-26-24 @ 04:45   - 7.53<H>/37<H>/140<H>/99.1<H>  01-25-24 @ 12:05   - 7.49<H>/39<H>/105/98.7<H>  12-27-23 @ 18:51   - 7.46<H>/36<H>/90/98.7<H>  12-04-23 @ 03:20   - 7.44/43<H>/349<H>/99.4<H>  11-05-23 @ 13:27   - 7.49<H>/44<H>/110<H>/97.2  11-05-23 @ 09:40   - 7.46<H>/47<H>/95/97.3  11-04-23 @ 05:00   - 7.51<H>/48<H>/107/96.7       no significant co2 retention to need nightly niv indicated on discharge
pt seen and examined    patient uncomfortable on vent.   d/w family they state they do not want trach/peg, plan for palliative wean in upcoming days, possibly thursday
patient seen and examined  had family meeting  they want palliative wean  comfort measures  new molst filled  understand patient may

## 2024-02-09 NOTE — PROGRESS NOTE ADULT - PROVIDER SPECIALTY LIST ADULT
Critical Care
Palliative Care
Critical Care
Palliative Care
Critical Care
Palliative Care
Critical Care
Palliative Care
Critical Care
Critical Care

## 2024-02-09 NOTE — CHART NOTE - NSCHARTNOTEFT_GEN_A_CORE
Nutrition Follow Up Note  Hospital Course (Per Electronic Medical Record):   Source: Medical Record [X]  Nursing Staff [X]     Diet: Glucerna 1.5 @ 50ml/hr x 18 hrs with Banatrol x 3 via OGT     Patient remains intubated , Propofol infusing @ 6.5ml/hr providing 172 calories , patient tolerating current EN feeds , Patient with multiple BM's IV abx noted , Banatrol added to aid with loose BM's , Stage II sacrum Zinc , MVI , Vit C provided to aid with wound healing . will add Prosource x 2 for additional 120kcals , 30gms protein . Labs / POCT reviewed , insulin regimen noted . Weight appears stable , Palliative note reviewed , DNR presently, ? terminal wean , continue current EN feeds with addition of Pro source due to skin breakdown  . Will follow  clinical course ,     Current Weight: (2/6) 76/34.5kg                          (2/5) 77.8/35.3kg                          (2/4) 78.4/35.6kg     Pertinent Medications: MEDICATIONS  (STANDING):  acetylcysteine 20%  Inhalation 2 milliLiter(s) Inhalation every 6 hours  ascorbic acid 500 milliGRAM(s) Oral daily  atorvastatin 80 milliGRAM(s) Oral at bedtime  budesonide 160 MICROgram(s)/formoterol 4.5 MICROgram(s) Inhaler 2 Puff(s) Inhalation two times a day  chlorhexidine 0.12% Liquid 15 milliLiter(s) Oral Mucosa every 12 hours  chlorhexidine 2% Cloths 1 Application(s) Topical <User Schedule>  collagenase Ointment 1 Application(s) Topical daily  dexMEDEtomidine Infusion 0.5 MICROgram(s)/kG/Hr (4.54 mL/Hr) IV Continuous <Continuous>  dextrose 5%. 1000 milliLiter(s) (50 mL/Hr) IV Continuous <Continuous>  dextrose 50% Injectable 50 milliLiter(s) IV Push every 15 minutes  diltiazem    Tablet 30 milliGRAM(s) Oral every 8 hours  doxazosin 2 milliGRAM(s) Oral at bedtime  ferrous    sulfate Liquid 300 milliGRAM(s) Enteral Tube daily  insulin lispro (ADMELOG) corrective regimen sliding scale   SubCutaneous every 6 hours  insulin NPH human recombinant 14 Unit(s) SubCutaneous <User Schedule>  levalbuterol Inhalation 0.63 milliGRAM(s) Inhalation every 6 hours  mirtazapine 7.5 milliGRAM(s) Oral at bedtime  multivitamin/minerals/iron Oral Solution (CENTRUM) 15 milliLiter(s) Enteral Tube daily  nicotine -   7 mG/24Hr(s) Patch 1 Patch Transdermal daily  pantoprazole   Suspension 40 milliGRAM(s) Oral before breakfast  prednisoLONE    3 mG/mL Solution (ORAPRED) 30 milliGRAM(s) Enteral Tube daily  propofol Infusion 5 MICROgram(s)/kG/Min (1.09 mL/Hr) IV Continuous <Continuous>  rivaroxaban 10 milliGRAM(s) Enteral Tube daily  roflumilast 500 MICROGram(s) Oral daily  zinc sulfate 220 milliGRAM(s) Oral daily    MEDICATIONS  (PRN):  bisacodyl Suppository 10 milliGRAM(s) Rectal daily PRN Constipation  fentaNYL    Injectable 25 MICROGram(s) IV Push every 2 hours PRN Moderate Pain (4 - 6)  ondansetron    Tablet 4 milliGRAM(s) Oral four times a day PRN for nausea  sodium chloride 0.9% lock flush 10 milliLiter(s) IV Push every 1 hour PRN Pre/post blood products, medications, blood draw, and to maintain line patency      Pertinent Labs:  02-06 Na148 mmol/L<H> Glu 141 mg/dL<H> K+ 4.3 mmol/L Cr  0.22 mg/dL<L> BUN 16 mg/dL 02-06 Phos 2.8 mg/dL 02-05 Alb 2.4 g/dL<L>  Hgb8.0mg/dl<L> , Hct 26.2% <L>       Skin: stage II sacrum     Edema: none    Last BM: (2/5) x 3 as per nursing     Estimated Needs:   [X] No Change since Previous Assessment      Previous Nutrition Diagnosis: Severe Protein Calorie Malnutrition     Nutrition Diagnosis is [X] Ongoing & addressed          New Nutrition Diagnosis: [X] Not Applicable    Interventions:   1. Recommend add Prosource x 2 to current EN feeds     Monitoring & Evaluation: will monitor:  [X] Weights    [X] Follow Up (Per Protocol)  [X] Tolerance to EN feeds       RD to follow as per Nutrition protocol  Maude Maldonado RDN
Nutrition Follow Up Note  Hospital Course (Per Electronic Medical Record):   Source: Medical Record [X] Family[X] Nursing Staff [X]     Diet: Jevity 1.5 @ 40ml/hr x 24 hrs via OGT     Patient currently intubated , Propofol infusing 6.5ml/hr providing 172non protein calories , EN feeds infusing @ 40ml/hr , tolerating as per nursing , Labs / POCT reviewed , insulin infusion noted (+) steroids noted , Suggest change EN feeds to Glucerna 1.5 @ 40ml/hr advance to 50ml/hr x 18 hrs  which will provide 1350kcals, 74gmsprotein [id number] . will follow clinical course .     Current Weight: (1/31) 68.3/31kg                          (1/29) 69.8/31.7kg                          (1/28) 70.1/31,8kg     Pertinent Medications: MEDICATIONS  (STANDING):  ascorbic acid 500 milliGRAM(s) Oral daily  atorvastatin 80 milliGRAM(s) Oral at bedtime  azithromycin   Tablet 500 milliGRAM(s) Oral daily  budesonide 160 MICROgram(s)/formoterol 4.5 MICROgram(s) Inhaler 2 Puff(s) Inhalation two times a day  buPROPion . 75 milliGRAM(s) Oral every 12 hours  chlorhexidine 0.12% Liquid 15 milliLiter(s) Oral Mucosa every 12 hours  chlorhexidine 2% Cloths 1 Application(s) Topical <User Schedule>  collagenase Ointment 1 Application(s) Topical daily  dexMEDEtomidine Infusion 0.5 MICROgram(s)/kG/Hr (4.54 mL/Hr) IV Continuous <Continuous>  dextrose 50% Injectable 50 milliLiter(s) IV Push every 15 minutes  doxazosin 2 milliGRAM(s) Oral at bedtime  ferrous    sulfate Liquid 300 milliGRAM(s) Enteral Tube daily  insulin regular Infusion 3 Unit(s)/Hr (3 mL/Hr) IV Continuous <Continuous>  letrozole 2.5 milliGRAM(s) Oral daily  levalbuterol Inhalation 0.63 milliGRAM(s) Inhalation every 6 hours  meropenem  IVPB 1000 milliGRAM(s) IV Intermittent every 8 hours  mirtazapine 7.5 milliGRAM(s) Oral at bedtime  multivitamin/minerals/iron Oral Solution (CENTRUM) 15 milliLiter(s) Enteral Tube daily  mupirocin 2% Nasal 1 Application(s) Both Nostrils two times a day  nicotine -   7 mG/24Hr(s) Patch 1 Patch Transdermal daily  norepinephrine Infusion 0.05 MICROgram(s)/kG/Min (3.4 mL/Hr) IV Continuous <Continuous>  pantoprazole   Suspension 40 milliGRAM(s) Oral before breakfast  polyethylene glycol 3350 17 Gram(s) Oral daily  prednisoLONE    3 mG/mL Solution (ORAPRED) 40 milliGRAM(s) Enteral Tube daily  propofol Infusion 5 MICROgram(s)/kG/Min (1.09 mL/Hr) IV Continuous <Continuous>  rivaroxaban 10 milliGRAM(s) Enteral Tube daily  roflumilast 500 MICROGram(s) Oral daily  zinc sulfate 220 milliGRAM(s) Oral daily    MEDICATIONS  (PRN):  bisacodyl Suppository 10 milliGRAM(s) Rectal daily PRN Constipation  ondansetron    Tablet 4 milliGRAM(s) Oral four times a day PRN for nausea  sodium chloride 0.9% lock flush 10 milliLiter(s) IV Push every 1 hour PRN Pre/post blood products, medications, blood draw, and to maintain line patency      Pertinent Labs:  02-01 Na143 mmol/L Glu 203 mg/dL<H> K+ 3.9 mmol/L Cr  <0.20 mg/dL<L> BUN 11 mg/dL 02-01 Phos 2.6 mg/dL 01-31 Alb 1.9 g/dL<L>  Hgb 9.5g/dl<L> , Hct 30.5% <L>   POCT 402,422,447,458    Skin: stage II sacrum ,Vit C/ MVI  noted     Edema: none    Last BM: (1/30)     Estimated Needs:   [X] No Change since Previous Assessment    Previous Nutrition Diagnosis: Severe protein calorie malnutrition     Nutrition Diagnosis is [X] Ongoing & addressed          New Nutrition Diagnosis: [X] Not Applicable      Interventions:   1. change to Glucerna 1.5 @ 50ml/hr x 18 hrs as goal rate       Monitoring & Evaluation: will monitor:  [X] Weights   [X] tolerance to EN feeds   [X] Follow Up (Per Protocol)  [X] Tolerance to Diet Prescription       RD to follow as per Nutrition protocol  Maude Maldonado, SAMIRAN, CDN
NUTRITION FOLLOW UP    SOURCE: Patient [X)   Family [ ]    Medical Record (X)      Diet, NPO with Tube Feed:   Tube Feeding Modality: Orogastric  Glucerna 1.5 Tom  Total Volume for 24 Hours (mL): 900  Continuous  Starting Tube Feed Rate {mL per Hour}: 40  Increase Tube Feed Rate by (mL): 10     Every 4 hours  Until Goal Tube Feed Rate (mL per Hour): 50  Tube Feed Duration (in Hours): 18  Tube Feed Start Time: 10:00  Tube Feed Stop Time: 04:00 (02-01-24 @ 14:42) [Active]    EN provides: 1,350kcals, 75g protein, 684ml H2O    Estimated needs (based on IBW, 98lbs):  Calories: 1,110- 1,332 kcals/day based on 25-30kcals/kg  Protein: 53-62g/day based on 1.2-1.4g/kg     Patient currently intubated , Propofol infusing 6.5ml/hr providing 172non protein calories , Pt tolerating feeds at 40ml/hr x 18hrs, plan to advance to goal of 50ml/hr today per discussion with RN. Multiple large loose BM's yesterday- will add banatrol BID to promote formed stools. Stage II pressure ulcer on sacrum- addressed with MVI, Vitamin C. POCT noted, pt s/p insulin drip- steroid induced hyperglycemia. Diabetes educator following for glycemic management.     Current Weight: (2/03) 75.6lbs                         (1/31) 68.3/31kg                          (1/29) 69.8/31.7kg                          (1/28) 70.1/31.8kg    PERTINENT MEDS:   Pertinent Medications: MEDICATIONS  (STANDING):  acetylcysteine 20%  Inhalation 2 milliLiter(s) Inhalation every 6 hours  ascorbic acid 500 milliGRAM(s) Oral daily  atorvastatin 80 milliGRAM(s) Oral at bedtime  azithromycin   Tablet 500 milliGRAM(s) Oral daily  budesonide 160 MICROgram(s)/formoterol 4.5 MICROgram(s) Inhaler 2 Puff(s) Inhalation two times a day  chlorhexidine 0.12% Liquid 15 milliLiter(s) Oral Mucosa every 12 hours  chlorhexidine 2% Cloths 1 Application(s) Topical <User Schedule>  collagenase Ointment 1 Application(s) Topical daily  dexMEDEtomidine Infusion 0.5 MICROgram(s)/kG/Hr (4.54 mL/Hr) IV Continuous <Continuous>  dextrose 50% Injectable 50 milliLiter(s) IV Push every 15 minutes  doxazosin 2 milliGRAM(s) Oral at bedtime  ferrous    sulfate Liquid 300 milliGRAM(s) Enteral Tube daily  glucagon  Injectable 1 milliGRAM(s) IntraMuscular once  insulin lispro (ADMELOG) corrective regimen sliding scale   SubCutaneous every 6 hours  insulin NPH human recombinant 14 Unit(s) SubCutaneous <User Schedule>  letrozole 2.5 milliGRAM(s) Oral daily  levalbuterol Inhalation 0.63 milliGRAM(s) Inhalation every 6 hours  meropenem  IVPB 1000 milliGRAM(s) IV Intermittent every 8 hours  mirtazapine 7.5 milliGRAM(s) Oral at bedtime  multivitamin/minerals/iron Oral Solution (CENTRUM) 15 milliLiter(s) Enteral Tube daily  nicotine -   7 mG/24Hr(s) Patch 1 Patch Transdermal daily  norepinephrine Infusion 0.05 MICROgram(s)/kG/Min (3.4 mL/Hr) IV Continuous <Continuous>  pantoprazole   Suspension 40 milliGRAM(s) Oral before breakfast  polyethylene glycol 3350 17 Gram(s) Oral daily  prednisoLONE    3 mG/mL Solution (ORAPRED) 40 milliGRAM(s) Enteral Tube daily  propofol Infusion 5 MICROgram(s)/kG/Min (1.09 mL/Hr) IV Continuous <Continuous>  rivaroxaban 10 milliGRAM(s) Enteral Tube daily  roflumilast 500 MICROGram(s) Oral daily  zinc sulfate 220 milliGRAM(s) Oral daily    MEDICATIONS  (PRN):  bisacodyl Suppository 10 milliGRAM(s) Rectal daily PRN Constipation  fentaNYL    Injectable 75 MICROGram(s) IV Push every 4 hours PRN Vent Dysynchrony  ondansetron    Tablet 4 milliGRAM(s) Oral four times a day PRN for nausea  sodium chloride 0.9% lock flush 10 milliLiter(s) IV Push every 1 hour PRN Pre/post blood products, medications, blood draw, and to maintain line patency      PERTINENT LABS:  02-03 Na147 mmol/L<H> Glu 102 mg/dL<H> K+ 4.8 mmol/L Cr  <0.20 mg/dL<L> BUN 16 mg/dL 02-03 Phos 1.8 mg/dL<L> 02-03 Alb 2.6 g/dL<L>    CAPILLARY BLOOD GLUCOSE      POCT Blood Glucose.: 124 mg/dL (03 Feb 2024 05:37)  POCT Blood Glucose.: 114 mg/dL (03 Feb 2024 00:23)  POCT Blood Glucose.: 94 mg/dL (02 Feb 2024 19:11)  POCT Blood Glucose.: 109 mg/dL (02 Feb 2024 18:03)  POCT Blood Glucose.: 118 mg/dL (02 Feb 2024 17:09)  POCT Blood Glucose.: 132 mg/dL (02 Feb 2024 15:57)  POCT Blood Glucose.: 127 mg/dL (02 Feb 2024 15:04)  POCT Blood Glucose.: 142 mg/dL (02 Feb 2024 14:03)  POCT Blood Glucose.: 172 mg/dL (02 Feb 2024 13:12)  POCT Blood Glucose.: 249 mg/dL (02 Feb 2024 11:58)      Skin: stage II sacrum ,Vit C/ MVI  noted     Edema: none    Last BM: (2/02), loose     Estimated Needs:   [X] No Change since Previous Assessment    Previous Nutrition Diagnosis: Severe protein calorie malnutrition - addressed with EN advancing to goal rate     Nutrition Diagnosis is [X] Ongoing & addressed       New Nutrition Diagnosis: [X] Not Applicable    Interventions:   1. Continue Glucerna 1.5 @ 50ml/hr x 18 hrs as goal rate   2. Add Banatrol BID  3. Continue MVI, Vitamin C  4. Glycemic management per diabetes educator       Monitoring & Evaluation: will monitor:  [X] Weights   [X] tolerance to EN feeds   [X] Follow Up (Per Protocol)  [X] Tolerance to Diet Prescription       RD to follow as per Nutrition protocol  Desiree Aguilar, MOHSEN, CDN  [phone] or TEAMS
Nutrition Follow Up Note  Hospital Course (Per Electronic Medical Record):   Source: Medical Record [X] Nursing Staff [X]     Diet: Minced Moist , Mk BID , Ensure High Protein TID     Patient with varied po intake noted consuming between 25-75% of meal as per nursing flow sheet , Patient requires assistance with feeding , Dx of severe malnutrition noted, providing Ensure supplement TID , Discontinued Mk due to patient not consuming . Labs/POCT  reviewed , patient on steroids .Potassium being supplemented  BIPAP noted HS . Continue current diet & nursing to encourage po intake     Current Weight: (1/29) 69.8/31.7kg                          (1/28) 70.1/31.8kg                          (1/25) 65.6/29.8kg     Pertinent Medications: MEDICATIONS  (STANDING):  albuterol    0.083% 2.5 milliGRAM(s) Nebulizer every 6 hours  atorvastatin 80 milliGRAM(s) Oral at bedtime  budesonide 160 MICROgram(s)/formoterol 4.5 MICROgram(s) Inhaler 2 Puff(s) Inhalation two times a day  buPROPion XL (24-Hour) . 150 milliGRAM(s) Oral daily  cefepime   IVPB 2000 milliGRAM(s) IV Intermittent every 12 hours  chlorhexidine 2% Cloths 1 Application(s) Topical <User Schedule>  collagenase Ointment 1 Application(s) Topical daily  dextrose 5%. 1000 milliLiter(s) (50 mL/Hr) IV Continuous <Continuous>  dextrose 50% Injectable 25 Gram(s) IV Push once  diltiazem    milliGRAM(s) Oral daily  doxazosin 2 milliGRAM(s) Oral at bedtime  dronabinol 5 milliGRAM(s) Oral daily  ferrous    sulfate 325 milliGRAM(s) Oral daily  insulin lispro (ADMELOG) corrective regimen sliding scale   SubCutaneous Before meals and at bedtime  letrozole 2.5 milliGRAM(s) Oral daily  magnesium sulfate  IVPB 2 Gram(s) IV Intermittent every 2 hours  methylPREDNISolone sodium succinate Injectable 40 milliGRAM(s) IV Push every 12 hours  mirtazapine 7.5 milliGRAM(s) Oral at bedtime  nicotine -   7 mG/24Hr(s) Patch 1 Patch Transdermal daily  pantoprazole   Suspension 40 milliGRAM(s) Oral before breakfast  polyethylene glycol 3350 17 Gram(s) Oral daily  potassium chloride   Solution 40 milliEquivalent(s) Oral once  potassium phosphate / sodium phosphate Powder (PHOS-NaK) 2 Packet(s) Oral once  roflumilast 500 MICROGram(s) Oral daily    MEDICATIONS  (PRN):  ALPRAZolam 0.25 milliGRAM(s) Oral every 8 hours PRN anxiety  bisacodyl Suppository 10 milliGRAM(s) Rectal daily PRN Constipation  melatonin 3 milliGRAM(s) Oral at bedtime PRN for insomnia  ondansetron    Tablet 4 milliGRAM(s) Oral four times a day PRN for nausea      Pertinent Labs:  01-29 Na142 mmol/L Glu 126 mg/dL<H> K+ 3.4 mmol/L<L> Cr  0.22 mg/dL<L> BUN 15 mg/dL 01-29 Phos 2.1 mg/dL<L> 01-29 Alb 1.9 g/dL<L>  Hgb 9.3g/dl<L> , Hct 29.3% <L>   POCT 144,179,139,175      Skin: Stage II sacrum noted ,     Edema: none     Last BM: (1/26) (+)  bowel meds provided     Estimated Needs:   [X] No Change since Previous Assessment    Previous Nutrition Diagnosis: Severe protein Calorie Malnutrition     Nutrition Diagnosis is [X] Ongoing & addressed        New Nutrition Diagnosis: [X] Not Applicable      Interventions:   1. Recommend addition of Vit C & MVI due to stage II     Monitoring & Evaluation: will monitor:  [X] Weights   [X] PO Intake   [X] Follow Up (Per Protocol)  [X] Tolerance to Diet Prescription       RD to follow as per Nutrition protocol  Maude Maldonado RD. CDN
Nutrition Follow Up Note  Hospital Course (Per Electronic Medical Record):   Source: Medical Record [X] Nursing Staff [X]     Diet: NPO    Patient extubated , palliative wean , NPO , comfort measures noted , no further nutrition follow up indicated       Pertinent Medications: MEDICATIONS  (STANDING):  chlorhexidine 2% Cloths 1 Application(s) Topical <User Schedule>  collagenase Ointment 1 Application(s) Topical daily  levalbuterol Inhalation 0.63 milliGRAM(s) Inhalation every 6 hours  morphine  Infusion 6 mG/Hr (6 mL/Hr) IV Continuous <Continuous>  nicotine -   7 mG/24Hr(s) Patch 1 Patch Transdermal daily    MEDICATIONS  (PRN):  LORazepam   Injectable 1 milliGRAM(s) IV Push every 2 hours PRN end of life care  morphine  - Injectable 2 milliGRAM(s) IV Push every 2 hours PRN pain/dyspnea      Pertinent Labs:  02-07 Na146 mmol/L<H> Glu 165 mg/dL<H> K+ 3.8 mmol/L Cr  0.21 mg/dL<L> BUN 14 mg/dL 02-07 Phos 2.0 mg/dL<L> 02-07 Alb 2.2 g/dL<L>        Skin: stage II sacrum     Last BM: (2/8)     Estimated Needs:   [X] No Change since Previous Assessment      Previous Nutrition Diagnosis: severe protein calorie malnutrition     Nutrition Diagnosis is [X] Ongoing & addressed       RD to follow as per Nutrition protocol  Maude Maldonado RDN
Patient with increased work of breathing and hypoxia. Patient expressed agreement with intubation. She has been intubated in the past. I called her brother Maximiliano and discussed her current state. She has not been able to eat in several days and therefore has become progressively weak which is exacerbating her poor pulmonary status. She will be able to receive parenteral nutrition when intubated. Maximiliano expressed that he knows this is what the patient would want and agrees with plan for intubation and parenteral nutrition
pt with bladder scan - 730 cc.   pt lyle placed for comfort.

## 2024-02-09 NOTE — PROGRESS NOTE ADULT - ASSESSMENT
68 year old female with PMH advanced COPD, type 2 DM, HTN, CAD, paroxsymal atrial fibrillation who presented to [facility] with anemia, subsequently developed acute on chronic hypoxic/hypercapenic respiratory failure, acute COPD exacerbation, and PNA requiring intubation.     Pt s/p prolonged ICU stay pending patient GOC decision regarding whether or not the patient would want a tracheostomy. On 2/7 decision was made to palliatively extubate the patient and make her comfort measures only. All efforts at that time to ensure patient and family emotional and physical comfort at this difficult time.     Plan as below:  Comfort measures only  Morphine gtt, titrate to pt comfort   Ativan prn  nebs prn  Nursing care  Emotional support   Palliative NP following

## 2024-02-09 NOTE — PROGRESS NOTE ADULT - REASON FOR ADMISSION
COPD exacerbation

## 2024-02-09 NOTE — CHART NOTE - NSCHARTNOTESELECT_GEN_ALL_CORE
Event Note
Nutrition Services
lyle/Event Note
Nutrition Services
Nutrition Services

## 2024-02-09 NOTE — PROGRESS NOTE ADULT - SUBJECTIVE AND OBJECTIVE BOX
69 y/o F with PMH muscle wasting and atrophy, COPD on home o2 2L NC (prior intubation/ICU), thromboembolism, DVTs of the lower extremity, type 2 DM, left mastectomy, anxiety disorder, iron deficiency anemia, malignant neoplasm of the breast in remission,   HLD,  HTN presented [facility] for low Hb 6.5 on outpatient labs and SOB today. Patient having mild cough, tachypnea, and shortness of breath, at rest over the past few days. Denies any chest pain, vomiting, abdominal pain, fevers, chills, headaches, neck pain, dysuria.  While in ED hgb noted to be 7.4 but  saturation 90-94% with supplemental oxygen, placed on BIPAP.  ICU consulted for further management.    (25 Jan 2024 15:25)      24 hr events: No active issues overnight, pt remains on morphine gtt, comfort measures only.       ## ROS: [ x ] unable to obtain      ## Labs:  CBC:                        6.6    10.01 )-----------( 281      ( 07 Feb 2024 12:15 )             21.9         ## Medications:  levalbuterol Inhalation 0.63 milliGRAM(s) Inhalation every 6 hours  LORazepam   Injectable 1 milliGRAM(s) IV Push every 2 hours PRN  morphine  - Injectable 2 milliGRAM(s) IV Push every 2 hours PRN  morphine  Infusion 6 mG/Hr IV Continuous <Continuous>      ## Vitals:  T(C): 36.1 (02-09-24 @ 05:00), Max: 36.1 (02-09-24 @ 05:00)  HR: 109 (02-09-24 @ 05:00) (109 - 115)        02-08 @ 07:01  -  02-09 @ 07:00  --------------------------------------------------------  IN: 96 mL / OUT: 0 mL / NET: 96 mL        ## P/E:  Gen: lying comfortably in bed in no apparent distress  HEENT: PERRL, EOMI  Resp: CTA B/L  CVS: S1S2 noted  Abd: soft NT/ND +BS  Ext: no c/c/e  Neuro: lethargic, EOL        CODE STATUS: DNR / DNI, comfort measures only  67 y/o F with PMH muscle wasting and atrophy, COPD on home o2 2L NC (prior intubation/ICU), thromboembolism, DVTs of the lower extremity, type 2 DM, left mastectomy, anxiety disorder, iron deficiency anemia, malignant neoplasm of the breast in remission,   HLD,  HTN presented [facility] for low Hb 6.5 on outpatient labs and SOB today. Patient having mild cough, tachypnea, and shortness of breath, at rest over the past few days. Denies any chest pain, vomiting, abdominal pain, fevers, chills, headaches, neck pain, dysuria.  While in ED hgb noted to be 7.4 but  saturation 90-94% with supplemental oxygen, placed on BIPAP.  ICU consulted for further management.    (25 Jan 2024 15:25)      24 hr events: No active issues overnight, pt remains on morphine gtt, comfort measures only.   on morphine drip  pt hypoventilating       ## ROS: [ x ] unable to obtain      ## Labs:  CBC:                        6.6    10.01 )-----------( 281      ( 07 Feb 2024 12:15 )             21.9         ## Medications:  levalbuterol Inhalation 0.63 milliGRAM(s) Inhalation every 6 hours  LORazepam   Injectable 1 milliGRAM(s) IV Push every 2 hours PRN  morphine  - Injectable 2 milliGRAM(s) IV Push every 2 hours PRN  morphine  Infusion 6 mG/Hr IV Continuous <Continuous>      ## Vitals:  T(C): 36.1 (02-09-24 @ 05:00), Max: 36.1 (02-09-24 @ 05:00)  HR: 109 (02-09-24 @ 05:00) (109 - 115)        02-08 @ 07:01  -  02-09 @ 07:00  --------------------------------------------------------  IN: 96 mL / OUT: 0 mL / NET: 96 mL        ## P/E:  Gen: lying comfortably in bed in no apparent distress, hypoventilating  HEENT: PERRL, EOMI  Resp: CTA B/L  CVS: S1S2 noted  Abd: soft NT/ND +BS  Ext: no c/c/e  Neuro: lethargic, EOL        CODE STATUS: DNR / DNI, comfort measures only

## 2024-08-09 NOTE — ED ADULT TRIAGE NOTE - TEMPERATURE IN FAHRENHEIT (DEGREES F)
[FreeTextEntry1] : 73 year old male with f/u visit.  Informed pt that the injections are still wearing off and it is going to take time for his testosterone levels to return to normal. Advised pt to continue Sildenafil.   PSA drawn today. Rx refill for Sildenafil given today. RTO in 01/2025 for repeat PSA.
98.1

## 2024-08-28 NOTE — ED ADULT TRIAGE NOTE - BP NONINVASIVE DIASTOLIC (MM HG)
[facility] ENCOUNTER  ORTHOPEDIC OFFICE VISIT    CHIEF COMPLAINT:  Office Visit (Left shoulder pain )      SUBJECTIVE:  Pily Chen is a 64 year old female who presented requesting a follow up visit to review MRI results of her left shoulder.  Patient reports that she has been forcing herself to use her shoulder and it feels better.  She ordered Omega XL.      PROBLEM LIST:  Patient Active Problem List   Diagnosis    Mild recurrent major depression (CMD)    ALESIA (generalized anxiety disorder)    Primary hypertension    ORTIZ on CPAP    Other specified hypothyroidism    Chronic insomnia    Severe obesity (BMI 35.0-39.9) with comorbidity  (CMD)       HISTORIES:  I have reviewed the past medical history, family history, social history, medications and allergies listed in the medical record as obtained by my nursing staff and support staff and agree with their documentation.  Social History     Tobacco Use    Smoking status: Former     Current packs/day: 0.00     Average packs/day: 0.5 packs/day for 46.0 years (23.0 ttl pk-yrs)     Types: Cigarettes     Start date: 1977     Quit date: 2023     Years since quittin.0     Passive exposure: Past    Smokeless tobacco: Never   Substance Use Topics    Alcohol use: Yes     Comment: occ       OBJECTIVE:  PHYSICAL EXAMINATION:  Vitals: There were no vitals taken for this visit.  Constitutional:  Well-developed, well-nourished female in no acute distress.  Skin: Warm, dry, intact without rash or lesion.  Neurologic:  Alert and oriented x3.  Lungs:  Respirations unlabored  Musculoskeletal:  Left shoulder  Hand is NVI    IMAGING STUDIES:  MRI results of the left shoulder were reviewed.  These demonstrate partial thickness tearing of the rotator cuff, moderate AC joint arthritis, a superior labral tear anterior to posterior (SLAP), glenohumeral joint arthritis and partial biceps tearing.        ASSESSMENT:  Left shoulder partial thickness tearing of the  rotator cuff, moderate AC joint arthritis, a SLAP tear, mild glenohumeral joint arthritis and partial biceps tendon tearing        PLAN:  Discussed conservative measures, a cortisone injection and arthroscopic surgery for the left shoulder with the patient.  The patient agrees to a cortisone injection at this time.  Follow up as needed.      Procedure:    After left shoulder was prepped in sterile fashion, the left glenohumeral joint was injected with 4cc of 1% lidocaine and 40mg of Depomedrol. Patient tolerated the procedure well without complications.       No orders of the defined types were placed in this encounter.    Instructions provided as documented in the after visit summary.    The patient indicated understanding of the diagnosis and agreed with the plan of care.    On 9/11/2024, ILeta scribed the services personally performed by Fabricio Pedro MD.      The documentation recorded by the scribe accurately and completely reflects the service(s) I personally performed and the decisions made by me.      75

## 2024-09-24 NOTE — PROGRESS NOTE ADULT - SUBJECTIVE AND OBJECTIVE BOX
Name: Bailee Garza ADMIT: 2024   : 1939  PCP: Eddie Araya MD    MRN: [id number] LOS: 0 days   AGE/SEX: 85 y.o. female  ROOM: [facility]     Subjective   Subjective   Chief Complaint   Patient presents with    Syncope     Was called this morning about diarrhea and hypotension.  Gave additional bolus and send stool studies.  Her diarrhea has become bloody and there also is reporting some blood mixed in urine.  She is alert but not as active as yesterday.  She denied abdominal pain.  No nausea or vomiting reported.  She is not reporting any chest pain palpitation or shortness of breath.  Discussed her positive stool studies and low hemoglobin.  Plan for transfusion.  GI saw her just a few minutes ago.    Discussed with Ms. Carter with neurology about the epileptiform discharges on EEG     Objective   Objective   Vital Signs  Temp:  [97.2 °F (36.2 °C)-98.2 °F (36.8 °C)] 97.2 °F (36.2 °C)  Heart Rate:  [117-127] 120  Resp:  [15-18] 16  BP: ()/(34-60) 101/34  SpO2:  [97 %-99 %] 99 %  on   ;   Device (Oxygen Therapy): room air  Body mass index is 21.98 kg/m².    Physical Exam  Vitals and nursing note reviewed.   Constitutional:       Appearance: She is ill-appearing. She is not diaphoretic.   HENT:      Head: Atraumatic.   Cardiovascular:      Rate and Rhythm: Normal rate and regular rhythm.      Pulses: Normal pulses.   Pulmonary:      Effort: Pulmonary effort is normal.      Breath sounds: No wheezing or rhonchi.   Abdominal:      General: There is no distension.      Palpations: Abdomen is soft.      Tenderness: There is no abdominal tenderness. There is no guarding or rebound.   Musculoskeletal:         General: No tenderness.   Skin:     General: Skin is warm and dry.   Neurological:      Mental Status: She is alert.      Cranial Nerves: No cranial nerve deficit.   Psychiatric:         Mood and Affect: Mood normal.       Results Review  I reviewed the patient's new clinical results.    Results  Patient is a 68y old  Female who presents with a chief complaint of COPD exacerbation (29 Jan 2024 10:40)    BRIEF HOSPITAL COURSE:   68F PMHx muscle wasting and atrophy, COPD (on home O2 2L NC, prior intubations in the past), Thromboembolism, DVTs of the lower extremity, T2DM,, anxiety disorder, iron deficiency anemia, malignant neoplasm of the breast s/p L mastectomy (in remission), HLD,  HTN presented [facility] rehab for low Hb 6.5 on outpatient labs and SOB today. Admitted to MICU with Acute on Chronic Hypoxic / Hypercapnic Respiratory Failure secondary to AECOPD, PNA.     Events last 24 hours:   -Placed back on NIPPV again tonight for increased WOB / SOB on conventional NC. No hypoxia.  -FS stable. Afebrile.     PAST MEDICAL & SURGICAL HISTORY:  COPD (chronic obstructive pulmonary disease)  Breast cancer  Smoker  DM (diabetes mellitus)  new diagnosis  HTN (hypertension)  COPD, moderate  HTN (hypertension)  Diabetes  COPD, severe  CAD (coronary artery disease)  Breast cancer  H/O left mastectomy  H/O breast surgery  History of cardiac cath  H/O left mastectomy    Review of Systems:  CONSTITUTIONAL: No fever, chills, or fatigue  EYES: No eye pain, visual disturbances, or discharge  ENMT:  No difficulty hearing, tinnitus, vertigo; No sinus or throat pain  NECK: No pain or stiffness  RESPIRATORY: No cough, wheezing, chills or hemoptysis; (+) shortness of breath  CARDIOVASCULAR: No chest pain, palpitations, dizziness, or leg swelling  GASTROINTESTINAL: No abdominal or epigastric pain. No nausea, vomiting, or hematemesis; No diarrhea or constipation. No melena or hematochezia.  GENITOURINARY: No dysuria, frequency, hematuria, or incontinence  NEUROLOGICAL: No headaches, memory loss, loss of strength, numbness, or tremors  SKIN: No itching, burning, rashes, or lesions   MUSCULOSKELETAL: No joint pain or swelling; No muscle, back, or extremity pain  PSYCHIATRIC: No depression, anxiety, mood swings, or difficulty sleeping    Medications:  cefepime   IVPB 2000 milliGRAM(s) IV Intermittent every 12 hours  diltiazem    milliGRAM(s) Oral daily  doxazosin 2 milliGRAM(s) Oral at bedtime  albuterol    0.083% 2.5 milliGRAM(s) Nebulizer every 6 hours  budesonide 160 MICROgram(s)/formoterol 4.5 MICROgram(s) Inhaler 2 Puff(s) Inhalation two times a day  roflumilast 500 MICROGram(s) Oral daily  ALPRAZolam 0.25 milliGRAM(s) Oral every 8 hours PRN  buPROPion XL (24-Hour) . 150 milliGRAM(s) Oral daily  dronabinol 5 milliGRAM(s) Oral daily  melatonin 3 milliGRAM(s) Oral at bedtime PRN  mirtazapine 7.5 milliGRAM(s) Oral at bedtime  ondansetron    Tablet 4 milliGRAM(s) Oral four times a day PRN  letrozole 2.5 milliGRAM(s) Oral daily  bisacodyl Suppository 10 milliGRAM(s) Rectal daily PRN  pantoprazole   Suspension 40 milliGRAM(s) Oral before breakfast  polyethylene glycol 3350 17 Gram(s) Oral daily  atorvastatin 80 milliGRAM(s) Oral at bedtime  dextrose 50% Injectable 25 Gram(s) IV Push once  insulin lispro (ADMELOG) corrective regimen sliding scale   SubCutaneous every 6 hours  dextrose 5%. 1000 milliLiter(s) IV Continuous <Continuous>  ferrous    sulfate 325 milliGRAM(s) Oral daily  potassium chloride  20 mEq/100 mL IVPB 20 milliEquivalent(s) IV Intermittent every 2 hours  chlorhexidine 2% Cloths 1 Application(s) Topical <User Schedule>  collagenase Ointment 1 Application(s) Topical daily  mupirocin 2% Nasal 1 Application(s) Both Nostrils two times a day  nicotine -   7 mG/24Hr(s) Patch 1 Patch Transdermal daily    ICU Vital Signs Last 24 Hrs  T(C): 36.6 (29 Jan 2024 15:00), Max: 36.7 (28 Jan 2024 21:00)  T(F): 97.8 (29 Jan 2024 15:00), Max: 98 (28 Jan 2024 21:00)  HR: 113 (29 Jan 2024 18:00) (87 - 119)  BP: 133/78 (29 Jan 2024 18:00) (115/64 - 141/79)  BP(mean): 92 (29 Jan 2024 18:00) (71 - 100)  ABP: --  ABP(mean): --  RR: 21 (29 Jan 2024 18:00) (14 - 36)  SpO2: 100% (29 Jan 2024 18:00) (89% - 100%)  O2 Parameters below as of 29 Jan 2024 14:00  Patient On (Oxygen Delivery Method): BiPAP/CPAP  O2 Flow (L/min): 30    I&O's Detail  28 Jan 2024 07:01  -  29 Jan 2024 07:00  --------------------------------------------------------  IN:    Oral Fluid: 60 mL  Total IN: 60 mL  OUT:  Total OUT: 0 mL  Total NET: 60 mL    29 Jan 2024 07:01  -  29 Jan 2024 20:01  --------------------------------------------------------  IN:    IV PiggyBack: 100 mL    IV PiggyBack: 50 mL    IV PiggyBack: 200 mL    Oral Fluid: 200 mL  Total IN: 550 mL  OUT:    Voided (mL): 300 mL  Total OUT: 300 mL  Total NET: 250 mL    LABS:                      9.3    10.56 )-----------( 246      ( 29 Jan 2024 05:00 )             29.3     01-29  142  |  106  |  15  ----------------------------<  126<H>  3.4<L>   |  34<H>  |  0.22<L>  Ca    8.7      29 Jan 2024 05:00  Phos  2.1     01-29  Mg     1.4     01-29  TPro  5.8<L>  /  Alb  1.9<L>  /  TBili  0.4  /  DBili  x   /  AST  15  /  ALT  21  /  AlkPhos  82  01-29    CAPILLARY BLOOD GLUCOSE  POCT Blood Glucose.: 86 mg/dL (29 Jan 2024 17:35)    Urinalysis Basic - ( 29 Jan 2024 05:00 )  Color: x / Appearance: x / SG: x / pH: x  Gluc: 126 mg/dL / Ketone: x  / Bili: x / Urobili: x   Blood: x / Protein: x / Nitrite: x   Leuk Esterase: x / RBC: x / WBC x   Sq Epi: x / Non Sq Epi: x / Bacteria: x    CULTURES:  Culture Results:   No growth at 4 days (01-25-24 @ 13:50)  Culture Results:   No growth at 4 days (01-25-24 @ 13:50)  Rapid RVP Result: Detected (01-25-24 @ 12:26)    Physical Examination:  General: +NIPPV.   HEENT: PERRL.  NECK: Supple.   PULM: Decreased BS at bases bilaterally.  CVS: s1/s2.  ABD: Soft, nondistended, nontender, normoactive bowel sounds.  EXT: No edema, nontender.  SKIN: Warm.    RADIOLOGY:   < from: Xray Chest 1 View- PORTABLE-Routine (01.28.24 @ 09:15) >  ACC: [id number] EXAM:  XR CHEST PORTABLE ROUTINE 1V   ORDERED BY: PETER VELOZ   PROCEDURE DATE:  01/28/2024    IMPRESSION:  Right subclavian line reidentified in position. No change heart   mediastinum. Hyperinflated lungs. Patchy bibasal infiltrates without   gross interval change. Trace bilateral pleural effusions. No pneumothorax.    Time spent on this patient encounter, which includes documenting this note in the electronic medical record, was +50 minutes including assessing the presenting problems with associated risks, reviewing the medical record to prepare for the encounter, and meeting face to face with the patient to obtain additional history. I have also performed an appropriate physical exam, made interventions listed and ordered and interpreted appropriate diagnostic studies as documented. To improve communication and patient safety, I have coordinated care with the multidisciplinary team including the bedside nurse, appropriate attending of record and consultants as needed. This time is independent of any procedures performed.    Date of entry of this note is equal to the date of services rendered.  from last 7 days   Lab Units 09/24/24  0523 09/23/24  0533   WBC 10*3/mm3 7.42 13.00*   HEMOGLOBIN g/dL 4.2* 9.5*   PLATELETS 10*3/mm3 212 305     Results from last 7 days   Lab Units 09/24/24  0522 09/23/24  0533   SODIUM mmol/L 145 137   POTASSIUM mmol/L 3.5 5.1   CHLORIDE mmol/L 119* 102   CO2 mmol/L 16.0* 24.0   BUN mg/dL 61* 49*   CREATININE mg/dL 0.79 1.16*   GLUCOSE mg/dL 103* 144*   EGFR mL/min/1.73 74.3 46.9*     Results from last 7 days   Lab Units 09/24/24  0522 09/23/24  0533   ALBUMIN g/dL 2.4* 3.7   BILIRUBIN mg/dL 0.2 0.4   ALK PHOS U/L 84 140*   AST (SGOT) U/L 16 34*   ALT (SGPT) U/L 19 41*     Results from last 7 days   Lab Units 09/24/24  0522 09/23/24  0533   CALCIUM mg/dL 6.7* 9.3   ALBUMIN g/dL 2.4* 3.7   MAGNESIUM mg/dL 1.9  --    PHOSPHORUS mg/dL 2.0*  --      Results from last 7 days   Lab Units 09/24/24  0950 09/24/24  0522 09/23/24  0616 09/23/24  0533   PROCALCITONIN ng/mL  --  10.20*  --  21.50*   LACTATE mmol/L 2.8*  --  2.0  --      Hemoglobin A1C   Date/Time Value Ref Range Status   09/24/2024 0523 5.20 4.80 - 5.60 % Final       CT Angiogram Chest Pulmonary Embolism    Result Date: 9/23/2024   1. The study is negative for pulmonary embolism. No acute intrathoracic abnormality. 2. Intrathoracic and extrahepatic biliary dilatation with abrupt tapering at the ampulla where there is questionable hypoenhancing masslike soft tissue at the ampulla and pancreatic head. Could consider correlating with ERCP or MRCP if clinically indicated. 3. Moderate fluid and air distended stomach and high attenuation fluid distention of the proximal duodenum with duodenal wall thickening, suspected duodenal diverticula, and mild proximal duodenal wall thickening. Could correlate with endoscopy if clinically indicated. 4. Distended gallbladder. 5. Decreased hepatic attenuation could reflect hepatic steatosis. 6. Extensive colonic diverticula without CT evidence of diverticulitis. 7. Other chronic findings, as  above.  This report was finalized on 9/23/2024 8:13 AM by Lorenzo Bui MD on Workstation: [id number]      CT Abdomen Pelvis With Contrast    Result Date: 9/23/2024   1. The study is negative for pulmonary embolism. No acute intrathoracic abnormality. 2. Intrathoracic and extrahepatic biliary dilatation with abrupt tapering at the ampulla where there is questionable hypoenhancing masslike soft tissue at the ampulla and pancreatic head. Could consider correlating with ERCP or MRCP if clinically indicated. 3. Moderate fluid and air distended stomach and high attenuation fluid distention of the proximal duodenum with duodenal wall thickening, suspected duodenal diverticula, and mild proximal duodenal wall thickening. Could correlate with endoscopy if clinically indicated. 4. Distended gallbladder. 5. Decreased hepatic attenuation could reflect hepatic steatosis. 6. Extensive colonic diverticula without CT evidence of diverticulitis. 7. Other chronic findings, as above.  This report was finalized on 9/23/2024 8:13 AM by Lorenzo Bui MD on Workstation: [id number]      XR Chest 1 View    Result Date: 9/23/2024  1. Mild interstitial prominence of the lungs somewhat exaggerated by underinflation but overall appear unchanged from the 12/29/2017 study and therefore likely chronic in nature. 2. No definite acute infiltrates are thought to be present.  This report was finalized on 9/23/2024 7:33 AM by Dr. Arun Romero M.D on Workstation: [id number]       I have personally reviewed all medications:  Scheduled Medications  folic acid, 1 mg, Oral, Daily  levETIRAcetam, 500 mg, Oral, BID  levothyroxine, 75 mcg, Oral, Daily  LORazepam, 0.5 mg, Intravenous, Once in imaging  ondansetron, 4 mg, Intravenous, Once  pantoprazole, 40 mg, Intravenous, BID AC  piperacillin-tazobactam, 3.375 g, Intravenous, Q8H  potassium & sodium phosphates, 2 packet, Oral, Once  potassium chloride, 40 mEq, Oral, Q4H  sodium chloride, 10 mL,  Intravenous, Q12H      Infusions  sodium chloride, 125 mL/hr, Last Rate: 125 mL/hr (09/24/24 0412)      Diet  NPO Diet NPO Type: Sips with Meds, Ice Chips       Assessment/Plan     Active Hospital Problems    Diagnosis  POA    **Syncope [R55]  Yes    Nausea & vomiting [R11.2]  Yes    Status post right knee replacement [Z96.651]  Not Applicable    Essential hypertension [I10]  Yes      Resolved Hospital Problems   No resolved problems to display.       85 y.o. female admitted with Syncope.    Syncope: Hypotensive and now having GI bleed.  Duodenitis was noted on CT.  Gallbladder issues are being worked up as well although she is not reporting any ongoing nausea vomiting.  She is not having any right upper quadrant pain currently.  Plan blood transfusion.  Serial H&H.  Sepsis: Abdominal source.  She could not tolerate MRI yesterday.  Will order ultrasound to further evaluate the gallbladder and pancreatic head issues.  CA 19-9 is not elevated.  Ideally would get an MRCP at some point.  EPEC and C. difficile were positive on PCR.  The C. difficile toxin is negative however.  She is on Zosyn currently and blood cultures are pending.  Will ask infectious disease to review.  Pancreatic head abnormality/duodenitis/biliary dilatation: Checking ultrasound when she is stable enough to do so.  MRCP ordered if she is able to tolerate.  GI following.  GI bleed: Serial H&H.  IV PPI.  Transfuse . GI following.  Potential need to transfer to ICU if not improving.  Epileptiform discharges: Noted on EEG.  She is on Keppra now.  MRI ordered but she was not able to tolerate.  Neurology following.  Recent right knee replacement  Elevated D-dimer: No clot on duplex or CTA.  Hypothyroidism: TSH okay.  Continue Synthroid  Folate deficiency: Replace  PPX: SCD  Disposition: TBD    Repeat hemoglobin confirms blood loss at 3.8.  2 units of blood are ordered stat.  Going to transfer to the ICU.  Nursing is notifying GI.  Consult  pulmonology.    Expected Discharge Date: 9/24/2024; Expected Discharge Time:      Jose Manuel Vazquez MD  [facility]  09/24/24  11:17 EDT    Dictated portions of note using Dragon dictation software.  Copied text in this note has been reviewed by me and remains accurate as of 09/24/24

## 2024-11-26 NOTE — H&P ADULT - NSHPLABSRESULTS_GEN_ALL_CORE
SHELDON ( CPAP/BIPAP) DAILY USAGE SUMMARY    TOTAL HOURS USED  5  HOURS AND    MINUTES.     LABS:                        9.3    14.79 )-----------( 467      ( 04 Dec 2023 04:00 )             29.4     12-04    132<L>  |  95<L>  |  9   ----------------------------<  129<H>  4.3   |  30  |  0.26<L>    Ca    8.6      04 Dec 2023 04:00    TPro  6.8  /  Alb  2.7<L>  /  TBili  0.5  /  DBili  x   /  AST  78<H>  /  ALT  39  /  AlkPhos  91  12-04    PT/INR - ( 04 Dec 2023 04:00 )   PT: 21.8 sec;   INR: 1.95 ratio         PTT - ( 04 Dec 2023 04:00 )  PTT:30.5 sec  Urinalysis Basic - ( 04 Dec 2023 04:00 )    Color: x / Appearance: x / SG: x / pH: x  Gluc: 129 mg/dL / Ketone: x  / Bili: x / Urobili: x   Blood: x / Protein: x / Nitrite: x   Leuk Esterase: x / RBC: x / WBC x   Sq Epi: x / Non Sq Epi: x / Bacteria: x    CAPILLARY BLOOD GLUCOSE    ABG - ( 04 Dec 2023 03:20 )  pH, Arterial: 7.44  pH, Blood: x     /  pCO2: 43    /  pO2: 349   / HCO3: 29    / Base Excess: 5.0   /  SaO2: 99.4      Urinalysis Basic - ( 04 Dec 2023 04:00 )    Color: x / Appearance: x / SG: x / pH: x  Gluc: 129 mg/dL / Ketone: x  / Bili: x / Urobili: x   Blood: x / Protein: x / Nitrite: x   Leuk Esterase: x / RBC: x / WBC x   Sq Epi: x / Non Sq Epi: x / Bacteria: x    RADIOLOGY & ADDITIONAL TESTS:    Consultant(s) Notes Reviewed:  [x ] YES  [ ] NO  Care Discussed with Consultants/Other Providers [ x] YES  [ ] NO d/w Dr. Edgar  Imaging Personally Reviewed:  [X] YES  [ ] NO  Chest X-Ray (personally reviewed by me): Portable film, fair inspiratory effort. no focal consolidation  EKG (personally reviewed by me): Sinus tachycardia at 121bpm

## 2025-03-28 NOTE — PATIENT PROFILE ADULT - FALL HARM RISK - FACTORS
Patient Clipped and Prepped: groin. Prepped with ChloraPrep, a minimum of 3 minute dry time, longer if needed, no pooling noted, patient draped in sterile fashion. Weakness

## (undated) DEVICE — TUBING CAP SET ERBEFLO CLEVERCAP HYBRID CO2 FOR OLYMPUS SCOPES AND UCR

## (undated) DEVICE — CATH HEMOSTAT GLD PROBE 7FR 300CM

## (undated) DEVICE — FORCEP RADIAL JAW 4 W NDL 2.4MM 2.8MM 240CM ORANGE DISP

## (undated) DEVICE — KIT ENDO PROCEDURE CUST W/VLV

## (undated) DEVICE — SOL IRR POUR H2O 1000ML

## (undated) DEVICE — PLATE NESSY 170

## (undated) DEVICE — CONTAINER FORMALIN BUFF 10% 60ML